# Patient Record
Sex: FEMALE | Race: WHITE | NOT HISPANIC OR LATINO | Employment: UNEMPLOYED | URBAN - METROPOLITAN AREA
[De-identification: names, ages, dates, MRNs, and addresses within clinical notes are randomized per-mention and may not be internally consistent; named-entity substitution may affect disease eponyms.]

---

## 2020-02-03 ENCOUNTER — APPOINTMENT (EMERGENCY)
Dept: RADIOLOGY | Facility: HOSPITAL | Age: 60
End: 2020-02-03
Payer: COMMERCIAL

## 2020-02-03 ENCOUNTER — HOSPITAL ENCOUNTER (EMERGENCY)
Facility: HOSPITAL | Age: 60
Discharge: HOME | End: 2020-02-03
Attending: EMERGENCY MEDICINE | Admitting: EMERGENCY MEDICINE
Payer: COMMERCIAL

## 2020-02-03 ENCOUNTER — APPOINTMENT (EMERGENCY)
Dept: CT IMAGING | Facility: HOSPITAL | Age: 60
End: 2020-02-03
Payer: COMMERCIAL

## 2020-02-03 VITALS
OXYGEN SATURATION: 98 % | HEIGHT: 61 IN | HEART RATE: 70 BPM | WEIGHT: 206 LBS | BODY MASS INDEX: 38.89 KG/M2 | RESPIRATION RATE: 16 BRPM | DIASTOLIC BLOOD PRESSURE: 92 MMHG | TEMPERATURE: 97.7 F | SYSTOLIC BLOOD PRESSURE: 208 MMHG

## 2020-02-03 DIAGNOSIS — I10 UNCONTROLLED HYPERTENSION: Primary | ICD-10-CM

## 2020-02-03 LAB
ALBUMIN SERPL BCP-MCNC: 3.9 G/DL (ref 3.5–5)
ALP SERPL-CCNC: 143 U/L (ref 46–116)
ALT SERPL W P-5'-P-CCNC: 21 U/L (ref 12–78)
ANION GAP SERPL CALCULATED.3IONS-SCNC: 11 MMOL/L (ref 4–13)
AST SERPL W P-5'-P-CCNC: 12 U/L (ref 5–45)
ATRIAL RATE: 55 BPM
BASOPHILS # BLD AUTO: 0.03 THOUSANDS/ΜL (ref 0–0.1)
BASOPHILS NFR BLD AUTO: 0 % (ref 0–1)
BILIRUB SERPL-MCNC: 0.5 MG/DL (ref 0.2–1)
BUN SERPL-MCNC: 13 MG/DL (ref 5–25)
CALCIUM SERPL-MCNC: 9.3 MG/DL (ref 8.3–10.1)
CHLORIDE SERPL-SCNC: 106 MMOL/L (ref 100–108)
CO2 SERPL-SCNC: 23 MMOL/L (ref 21–32)
CREAT SERPL-MCNC: 1.06 MG/DL (ref 0.6–1.3)
EOSINOPHIL # BLD AUTO: 0.11 THOUSAND/ΜL (ref 0–0.61)
EOSINOPHIL NFR BLD AUTO: 1 % (ref 0–6)
ERYTHROCYTE [DISTWIDTH] IN BLOOD BY AUTOMATED COUNT: 14.6 % (ref 11.6–15.1)
GFR SERPL CREATININE-BSD FRML MDRD: 58 ML/MIN/1.73SQ M
GLUCOSE SERPL-MCNC: 129 MG/DL (ref 65–140)
HCT VFR BLD AUTO: 50.3 % (ref 34.8–46.1)
HGB BLD-MCNC: 16.4 G/DL (ref 11.5–15.4)
IMM GRANULOCYTES # BLD AUTO: 0.05 THOUSAND/UL (ref 0–0.2)
IMM GRANULOCYTES NFR BLD AUTO: 1 % (ref 0–2)
LYMPHOCYTES # BLD AUTO: 1.73 THOUSANDS/ΜL (ref 0.6–4.47)
LYMPHOCYTES NFR BLD AUTO: 16 % (ref 14–44)
MCH RBC QN AUTO: 28.9 PG (ref 26.8–34.3)
MCHC RBC AUTO-ENTMCNC: 32.6 G/DL (ref 31.4–37.4)
MCV RBC AUTO: 89 FL (ref 82–98)
MONOCYTES # BLD AUTO: 0.44 THOUSAND/ΜL (ref 0.17–1.22)
MONOCYTES NFR BLD AUTO: 4 % (ref 4–12)
NEUTROPHILS # BLD AUTO: 8.69 THOUSANDS/ΜL (ref 1.85–7.62)
NEUTS SEG NFR BLD AUTO: 78 % (ref 43–75)
NRBC BLD AUTO-RTO: 0 /100 WBCS
P AXIS: 41 DEGREES
PLATELET # BLD AUTO: 301 THOUSANDS/UL (ref 149–390)
PMV BLD AUTO: 9.8 FL (ref 8.9–12.7)
POTASSIUM SERPL-SCNC: 3.8 MMOL/L (ref 3.5–5.3)
PR INTERVAL: 158 MS
PROT SERPL-MCNC: 7.9 G/DL (ref 6.4–8.2)
QRS AXIS: 28 DEGREES
QRSD INTERVAL: 86 MS
QT INTERVAL: 468 MS
QTC INTERVAL: 447 MS
RBC # BLD AUTO: 5.67 MILLION/UL (ref 3.81–5.12)
SODIUM SERPL-SCNC: 140 MMOL/L (ref 136–145)
T WAVE AXIS: 57 DEGREES
TROPONIN I SERPL-MCNC: <0.02 NG/ML
VENTRICULAR RATE: 55 BPM
WBC # BLD AUTO: 11.05 THOUSAND/UL (ref 4.31–10.16)

## 2020-02-03 PROCEDURE — 99284 EMERGENCY DEPT VISIT MOD MDM: CPT

## 2020-02-03 PROCEDURE — 93010 ELECTROCARDIOGRAM REPORT: CPT | Performed by: PHYSICIAN ASSISTANT

## 2020-02-03 PROCEDURE — 71046 X-RAY EXAM CHEST 2 VIEWS: CPT

## 2020-02-03 PROCEDURE — 70450 CT HEAD/BRAIN W/O DYE: CPT

## 2020-02-03 PROCEDURE — 93010 ELECTROCARDIOGRAM REPORT: CPT | Performed by: INTERNAL MEDICINE

## 2020-02-03 PROCEDURE — 93005 ELECTROCARDIOGRAM TRACING: CPT

## 2020-02-03 PROCEDURE — 80053 COMPREHEN METABOLIC PANEL: CPT | Performed by: PHYSICIAN ASSISTANT

## 2020-02-03 PROCEDURE — 85025 COMPLETE CBC W/AUTO DIFF WBC: CPT | Performed by: PHYSICIAN ASSISTANT

## 2020-02-03 PROCEDURE — 36415 COLL VENOUS BLD VENIPUNCTURE: CPT | Performed by: PHYSICIAN ASSISTANT

## 2020-02-03 PROCEDURE — 99284 EMERGENCY DEPT VISIT MOD MDM: CPT | Performed by: PHYSICIAN ASSISTANT

## 2020-02-03 PROCEDURE — 84484 ASSAY OF TROPONIN QUANT: CPT | Performed by: PHYSICIAN ASSISTANT

## 2020-02-03 RX ORDER — ATORVASTATIN CALCIUM 40 MG/1
40 TABLET, FILM COATED ORAL DAILY
COMMUNITY

## 2020-02-03 RX ORDER — LISINOPRIL AND HYDROCHLOROTHIAZIDE 25; 20 MG/1; MG/1
1 TABLET ORAL DAILY
Qty: 30 TABLET | Refills: 0 | Status: SHIPPED | OUTPATIENT
Start: 2020-02-03 | End: 2021-07-20 | Stop reason: HOSPADM

## 2020-02-03 RX ORDER — MELOXICAM 15 MG/1
15 TABLET ORAL DAILY
COMMUNITY

## 2020-02-03 RX ORDER — LISINOPRIL 20 MG/1
20 TABLET ORAL ONCE
Status: COMPLETED | OUTPATIENT
Start: 2020-02-03 | End: 2020-02-03

## 2020-02-03 RX ORDER — HYDROCHLOROTHIAZIDE 25 MG/1
25 TABLET ORAL DAILY
Status: DISCONTINUED | OUTPATIENT
Start: 2020-02-03 | End: 2020-02-03 | Stop reason: HOSPADM

## 2020-02-03 RX ORDER — LABETALOL 300 MG/1
300 TABLET, FILM COATED ORAL 3 TIMES DAILY
COMMUNITY
End: 2021-07-20 | Stop reason: HOSPADM

## 2020-02-03 RX ADMIN — HYDROCHLOROTHIAZIDE 25 MG: 25 TABLET ORAL at 14:47

## 2020-02-03 RX ADMIN — LISINOPRIL 20 MG: 20 TABLET ORAL at 14:47

## 2020-02-03 NOTE — RESULT ENCOUNTER NOTE
Attempted to call patient to notify her of CXR results  She will need follow-up with her cardiologist  Michael abdi

## 2020-02-03 NOTE — ED NOTES
Discharged reviewed with pt  Pt verbalized understanding and has no further questions at this time  Pt ambulatory off unit with steady gait       Ariana Raya RN  02/03/20 0040

## 2020-02-03 NOTE — DISCHARGE INSTRUCTIONS
Start taking Zestoretic daily  Keep a blood pressure log and follow-up with your family doctor in 1 week  Return to ER with new or worsening symptoms

## 2020-02-03 NOTE — ED PROVIDER NOTES
History  Chief Complaint   Patient presents with    High Blood Pressure     seen by cardiologist on thursday and increased BP medicaiton on thursday  States bp remains high since thursday  Also complaisn of difficulty breathing  32-year-old female with history of hypertension, hyperlipidemia, and CAD presenting for evaluation of uncontrolled blood pressures at home  Patient was recently seen by her cardiologist 4 days ago  Her labetalol dose was increased at that time and she reports compliance with this  Her blood pressure continues to be uncontrolled  Her systolic blood pressure was over 200 today which prompted her to seek medical attention  Patient currently complains of a headache and dyspnea on exertion  Patient has been checking her blood pressures daily and has been unable to keep her SBP 170s  Patient denies chest pain, dizziness, numbness, tingling, visual changes, abdominal pain, vomiting, diaphoresis  History provided by:  Patient   used: No    Hypertension   Severity:  Moderate  Onset quality:  Gradual  Timing:  Constant  Progression:  Worsening  Chronicity:  New  Notable PTA blood pressures:  170/100  Context: not noncompliance    Relieved by:  Nothing  Worsened by:  Nothing  Ineffective treatments:  Beta blockers  Associated symptoms: shortness of breath    Associated symptoms: no abdominal pain, no anxiety, no blurred vision, no chest pain, no confusion, no dizziness, no ear pain, no fatigue, no fever, no headaches, no loss of consciousness, no nausea, no neck pain, no palpitations, no peripheral edema, no syncope, no tinnitus, not vomiting and no weakness    Risk factors: cardiac disease        Prior to Admission Medications   Prescriptions Last Dose Informant Patient Reported?  Taking?   atorvastatin (LIPITOR) 40 mg tablet   Yes Yes   Sig: Take 40 mg by mouth daily   labetalol (NORMODYNE) 300 mg tablet   Yes Yes   Sig: Take 300 mg by mouth 3 (three) times a day   meloxicam (MOBIC) 15 mg tablet   Yes Yes   Sig: Take 15 mg by mouth daily   vitamin E 100 UNIT capsule   Yes Yes   Sig: Take 100 Units by mouth once a week      Facility-Administered Medications: None       Past Medical History:   Diagnosis Date    Hyperlipidemia     Hypertension        History reviewed  No pertinent surgical history  History reviewed  No pertinent family history  I have reviewed and agree with the history as documented  Social History     Tobacco Use    Smoking status: Current Every Day Smoker     Packs/day: 0 50     Types: Cigarettes    Smokeless tobacco: Never Used   Substance Use Topics    Alcohol use: Never     Frequency: Never    Drug use: Never        Review of Systems   Constitutional: Negative for chills, fatigue and fever  HENT: Negative for drooling, ear pain and tinnitus  Eyes: Negative for blurred vision, photophobia and visual disturbance  Respiratory: Positive for shortness of breath  Negative for cough, wheezing and stridor  Cardiovascular: Negative for chest pain, palpitations, leg swelling and syncope  Gastrointestinal: Negative for abdominal pain, nausea and vomiting  Musculoskeletal: Negative for neck pain and neck stiffness  Skin: Negative for color change and rash  Allergic/Immunologic: Negative for immunocompromised state  Neurological: Negative for dizziness, loss of consciousness, weakness and headaches  Psychiatric/Behavioral: Negative for confusion  The patient is not nervous/anxious  All other systems reviewed and are negative  Physical Exam  Physical Exam   Constitutional: She appears well-developed and well-nourished  No distress  Nontoxic appearing   HENT:   Head: Normocephalic and atraumatic  Right Ear: External ear normal    Left Ear: External ear normal    Nose: Nose normal    Mouth/Throat: Oropharynx is clear and moist    Eyes: Pupils are equal, round, and reactive to light   Conjunctivae and EOM are normal  Right eye exhibits no discharge  Left eye exhibits no discharge  No scleral icterus  Neck: Normal range of motion  Neck supple  Cardiovascular: Normal rate, regular rhythm and normal heart sounds  No murmur heard  Pulmonary/Chest: Effort normal and breath sounds normal  No stridor  No respiratory distress  She has no wheezes  She has no rales  No increased work of breathing  Lung sounds  Oxygen saturation 98% on room air    Abdominal: Soft  Bowel sounds are normal  She exhibits no distension  There is no tenderness  There is no guarding  Musculoskeletal: Normal range of motion  She exhibits no edema or deformity  Lymphadenopathy:     She has no cervical adenopathy  Neurological: She is alert  She is not disoriented  GCS eye subscore is 4  GCS verbal subscore is 5  GCS motor subscore is 6  No focal deficits   Skin: Skin is warm and dry  She is not diaphoretic  Psychiatric: She has a normal mood and affect  Her behavior is normal    Nursing note and vitals reviewed        Vital Signs  ED Triage Vitals [02/03/20 1159]   Temperature Pulse Respirations Blood Pressure SpO2   97 7 °F (36 5 °C) 69 16 (!) 220/102 97 %      Temp Source Heart Rate Source Patient Position - Orthostatic VS BP Location FiO2 (%)   Oral Monitor -- Right arm --      Pain Score       2           Vitals:    02/03/20 1159 02/03/20 1300 02/03/20 1447 02/03/20 1448   BP: (!) 220/102 (!) 198/151 (!) 208/92 (!) 208/92   Pulse: 69 69  70         Visual Acuity      ED Medications  Medications   lisinopril (ZESTRIL) tablet 20 mg (20 mg Oral Given 2/3/20 1447)       Diagnostic Studies  Results Reviewed     Procedure Component Value Units Date/Time    Troponin I [[id number]]  (Normal) Collected:  02/03/20 1331    Lab Status:  Final result Specimen:  Blood from Arm, Right Updated:  02/03/20 1357     Troponin I <0 02 ng/mL     Comprehensive metabolic panel [[id number]]  (Abnormal) Collected:  02/03/20 1331    Lab Status:  Final result Specimen:  Blood from Arm, Right Updated:  02/03/20 1355     Sodium 140 mmol/L      Potassium 3 8 mmol/L      Chloride 106 mmol/L      CO2 23 mmol/L      ANION GAP 11 mmol/L      BUN 13 mg/dL      Creatinine 1 06 mg/dL      Glucose 129 mg/dL      Calcium 9 3 mg/dL      AST 12 U/L      ALT 21 U/L      Alkaline Phosphatase 143 U/L      Total Protein 7 9 g/dL      Albumin 3 9 g/dL      Total Bilirubin 0 50 mg/dL      eGFR 58 ml/min/1 73sq m     Narrative:       [place] guidelines for Chronic Kidney Disease (CKD):     Stage 1 with normal or high GFR (GFR > 90 mL/min/1 73 square meters)    Stage 2 Mild CKD (GFR = 60-89 mL/min/1 73 square meters)    Stage 3A Moderate CKD (GFR = 45-59 mL/min/1 73 square meters)    Stage 3B Moderate CKD (GFR = 30-44 mL/min/1 73 square meters)    Stage 4 Severe CKD (GFR = 15-29 mL/min/1 73 square meters)    Stage 5 End Stage CKD (GFR <15 mL/min/1 73 square meters)  Note: GFR calculation is accurate only with a steady state creatinine    CBC and differential [[id number]]  (Abnormal) Collected:  02/03/20 1331    Lab Status:  Final result Specimen:  Blood from Arm, Right Updated:  02/03/20 1335     WBC 11 05 Thousand/uL      RBC 5 67 Million/uL      Hemoglobin 16 4 g/dL      Hematocrit 50 3 %      MCV 89 fL      MCH 28 9 pg      MCHC 32 6 g/dL      RDW 14 6 %      MPV 9 8 fL      Platelets 383 Thousands/uL      nRBC 0 /100 WBCs      Neutrophils Relative 78 %      Immat GRANS % 1 %      Lymphocytes Relative 16 %      Monocytes Relative 4 %      Eosinophils Relative 1 %      Basophils Relative 0 %      Neutrophils Absolute 8 69 Thousands/µL      Immature Grans Absolute 0 05 Thousand/uL      Lymphocytes Absolute 1 73 Thousands/µL      Monocytes Absolute 0 44 Thousand/µL      Eosinophils Absolute 0 11 Thousand/µL      Basophils Absolute 0 03 Thousands/µL                  XR chest 2 views   Final Result by Emy Nunez MD (02/03 1533)      Borderline vascular congestion Workstation performed: [id number]         CT head without contrast   Final Result by Ines Leal MD (02/03 1007)      No acute disease  Minor small vessel disease and scattered vascular calcifications  Workstation performed: [id number]                    Procedures  ECG 12 Lead Documentation Only  Date/Time: 2/3/2020 9:19 PM  Performed by: Janki Burnett PA-C  Authorized by: Janki Burnett PA-C     Indications / Diagnosis:  HTN  ECG reviewed by me, the ED Provider: yes    Patient location:  ED  Previous ECG:     Previous ECG:  Unavailable    Comparison to cardiac monitor: Yes    Interpretation:     Interpretation: abnormal    Rate:     ECG rate:  55    ECG rate assessment: bradycardic    Rhythm:     Rhythm: sinus rhythm    Ectopy:     Ectopy: none    QRS:     QRS axis:  Normal    QRS intervals:  Normal  Conduction:     Conduction: normal    ST segments:     ST segments:  Normal  T waves:     T waves: normal               ED Course  ED Course as of Feb 03 2119   Mon Feb 03, 2020   1448 Discussed case with patient's PCP, Dr Demario Fontana who recommends adding lisinopril 20mg and hydrochlorothiazine 25mg daily to her current regimen  He will see patient in 1 week for follow-up  MDM  Number of Diagnoses or Management Options  Uncontrolled hypertension: new and requires workup  Diagnosis management comments: 62yo female with a history of CAD and hypertension presenting for evaluation of uncontrolled blood pressures at home  Labetalol just increased to 300mg TID about 4 days ago  SBP was >200 today which prompted her to seek medical attention  Currently complains of headache and dyspnea on exertion  Differential diagnosis includes but is not limited to: asymptomatic hypertension, hypertensive urgency, hypertensive emergency, ACS    Initial ED plan: Will check cardiac labs, EKG, CXR, and CT head  No indications for IV antihypertensives at this time      Final assessment: Labs overall unremarkable  Troponin negative  EKG reveals sinus bradycardia without ST changes  CT head negative  CXR unremarkable per my read  Case discussed with patient's PCP, Dr Yareli Abarca via phone  Dr Yareli Abarca would like patient started on a combo of lisinopril and HCTZ  Script given to patient for 30 day supply  Advised patient to keep a blood pressure log and follow-up in 1 week with her PCP for recheck  ED return precautions discussed  Patient expressed understanding and is agreeable to plan  Patient discharged in stable condition  After discharge, radiology called about possible vascular congestion seen on CXR  No history of heart failure and no echocardiograms in the system  No clinical signs of volume overload  Voicemail left for patient  Amount and/or Complexity of Data Reviewed  Clinical lab tests: reviewed and ordered  Tests in the radiology section of CPT®: ordered and reviewed  Review and summarize past medical records: yes  Discuss the patient with other providers: yes  Independent visualization of images, tracings, or specimens: yes    Risk of Complications, Morbidity, and/or Mortality  Presenting problems: moderate  Diagnostic procedures: moderate  Management options: moderate    Patient Progress  Patient progress: stable        Disposition  Final diagnoses:   Uncontrolled hypertension     Time reflects when diagnosis was documented in both MDM as applicable and the Disposition within this note     Time User Action Codes Description Comment    2/3/2020  2:46 PM [address] [place], [place] [address] Uncontrolled hypertension       ED Disposition     ED Disposition Condition Date/Time Comment    Discharge Stable Mon Feb 3, 2020  2:46 PM Steven Coronado discharge to home/self care              Follow-up Information     Follow up With Specialties Details Why Contact Info Additional Information    Kulwant Zhu MD Internal Medicine Schedule an appointment as soon as possible for a visit   [address] [place] [address] [facility] Emergency Department Emergency Medicine  If symptoms worsen [address] [facility] [place] [id number] [phone] [place] ED, [facility], [address], [place], [address]          Discharge Medication List as of 2/3/2020  2:47 PM      START taking these medications    Details   lisinopril-hydrochlorothiazide (PRINZIDE,ZESTORETIC) 20-25 MG per tablet Take 1 tablet by mouth daily, Starting Mon 2/3/2020, Until Wed 3/4/2020, Print         CONTINUE these medications which have NOT CHANGED    Details   atorvastatin (LIPITOR) 40 mg tablet Take 40 mg by mouth daily, Historical Med      labetalol (NORMODYNE) 300 mg tablet Take 300 mg by mouth 3 (three) times a day, Historical Med      meloxicam (MOBIC) 15 mg tablet Take 15 mg by mouth daily, Historical Med      vitamin E 100 UNIT capsule Take 100 Units by mouth once a week, Historical Med           No discharge procedures on file      ED Provider  Electronically Signed by           Sita Adan PA-C  02/03/20 3131

## 2020-09-22 ENCOUNTER — HOSPITAL ENCOUNTER (EMERGENCY)
Facility: HOSPITAL | Age: 60
Discharge: HOME | End: 2020-09-22
Attending: EMERGENCY MEDICINE | Admitting: EMERGENCY MEDICINE
Payer: COMMERCIAL

## 2020-09-22 VITALS
WEIGHT: 213.63 LBS | OXYGEN SATURATION: 96 % | DIASTOLIC BLOOD PRESSURE: 88 MMHG | RESPIRATION RATE: 20 BRPM | TEMPERATURE: 97.9 F | BODY MASS INDEX: 40.36 KG/M2 | HEART RATE: 65 BPM | SYSTOLIC BLOOD PRESSURE: 176 MMHG

## 2020-09-22 DIAGNOSIS — K04.7 DENTAL ABSCESS: Primary | ICD-10-CM

## 2020-09-22 PROCEDURE — 99284 EMERGENCY DEPT VISIT MOD MDM: CPT | Performed by: EMERGENCY MEDICINE

## 2020-09-22 PROCEDURE — 99282 EMERGENCY DEPT VISIT SF MDM: CPT

## 2020-09-22 RX ORDER — PENICILLIN V POTASSIUM 250 MG/1
500 TABLET ORAL ONCE
Status: COMPLETED | OUTPATIENT
Start: 2020-09-22 | End: 2020-09-22

## 2020-09-22 RX ORDER — OXYCODONE HYDROCHLORIDE AND ACETAMINOPHEN 5; 325 MG/1; MG/1
1 TABLET ORAL EVERY 6 HOURS PRN
Qty: 20 TABLET | Refills: 0 | Status: SHIPPED | OUTPATIENT
Start: 2020-09-22

## 2020-09-22 RX ORDER — PENICILLIN V POTASSIUM 500 MG/1
500 TABLET ORAL 4 TIMES DAILY
Qty: 40 TABLET | Refills: 0 | Status: SHIPPED | OUTPATIENT
Start: 2020-09-22 | End: 2020-10-02

## 2020-09-22 RX ORDER — OXYCODONE HYDROCHLORIDE AND ACETAMINOPHEN 5; 325 MG/1; MG/1
1 TABLET ORAL ONCE
Status: COMPLETED | OUTPATIENT
Start: 2020-09-22 | End: 2020-09-22

## 2020-09-22 RX ADMIN — PENICILLIN V POTASSIUM 500 MG: 250 TABLET, FILM COATED ORAL at 19:59

## 2020-09-22 RX ADMIN — OXYCODONE HYDROCHLORIDE AND ACETAMINOPHEN 1 TABLET: 5; 325 TABLET ORAL at 19:59

## 2020-09-22 NOTE — DISCHARGE INSTRUCTIONS
Penicillin every 6 hours infection    Percocet one every 6 hours if needed for pain caution oncotic will make you sleepy  Follow-up with Oral maxillofacial surgery, call tomorrow, tell them you were here in the emergency department    Return for worsening swelling difficulty breathing or any problems

## 2020-09-22 NOTE — ED PROVIDER NOTES
History  Chief Complaint   Patient presents with    Dental Swelling     Pt states she has had bottom left dental pain and swelling since last night      HPI patient is a 82-year-old female, reports a few days ago she developed some swelling around her left lower molar, apparently had some Motrin at home and took that without relief  Patient reports that the swelling seemed to go away and then tonight the swelling has returned  She reports a swelling in her left lower jaw reports bump there around her left lower molar  She reports tenderness and pain there  Denies any difficulty opening her mouth completely  She complains of pain with any palpation or touching of that episode  She denies any vomiting  She had difficulty swallowing her saliva  She denies any drooling  There is no stridor or difficulty breathing  Past will history of hypertension hyperlipidemia  Family history noncontributory new line social history, smoker, denies drug abuse    Prior to Admission Medications   Prescriptions Last Dose Informant Patient Reported? Taking?   atorvastatin (LIPITOR) 40 mg tablet   Yes No   Sig: Take 40 mg by mouth daily   labetalol (NORMODYNE) 300 mg tablet   Yes No   Sig: Take 300 mg by mouth 3 (three) times a day   lisinopril-hydrochlorothiazide (PRINZIDE,ZESTORETIC) 20-25 MG per tablet   No No   Sig: Take 1 tablet by mouth daily   meloxicam (MOBIC) 15 mg tablet   Yes No   Sig: Take 15 mg by mouth daily   vitamin E 100 UNIT capsule   Yes No   Sig: Take 100 Units by mouth once a week      Facility-Administered Medications: None       Past Medical History:   Diagnosis Date    Hyperlipidemia     Hypertension        Past Surgical History:   Procedure Laterality Date    CARDIAC CATHETERIZATION      GANGLION CYST EXCISION      TYMPANOSTOMY TUBE PLACEMENT         History reviewed  No pertinent family history  I have reviewed and agree with the history as documented      E-Cigarette/Vaping     E-Cigarette/Vaping Substances     Social History     Tobacco Use    Smoking status: Current Every Day Smoker     Packs/day: 0 50     Types: Cigarettes    Smokeless tobacco: Never Used   Substance Use Topics    Alcohol use: Never     Frequency: Never    Drug use: Never       Review of Systems   Constitutional: Negative for fever  HENT: Positive for dental problem and facial swelling  Negative for congestion, drooling, trouble swallowing and voice change  Eyes: Negative for pain and redness  Respiratory: Negative for cough, shortness of breath and stridor  Cardiovascular: Negative for chest pain  Gastrointestinal: Negative for abdominal pain and vomiting  Physical Exam  Physical Exam  Vitals signs and nursing note reviewed  Constitutional:       Appearance: She is well-developed  HENT:      Head: Normocephalic  Right Ear: External ear normal       Left Ear: External ear normal       Nose: Nose normal       Mouth/Throat:      Comments: Normal posterior pharynx, there is tenderness and swelling below the only molar on the patient's left lower mandible, tenderness to palpation  The patient has no swelling under the tongue there is no stridor the patient has full excursion of her jaw there is no trismus  No signs of sepsis  No swelling into her neck  No sign of Adolfo's angina  Eyes:      General: Lids are normal       Pupils: Pupils are equal, round, and reactive to light  Neck:      Musculoskeletal: Normal range of motion and neck supple  Pulmonary:      Effort: Pulmonary effort is normal  No respiratory distress  Musculoskeletal: Normal range of motion  General: No deformity  Skin:     General: Skin is warm and dry  Neurological:      Mental Status: She is alert and oriented to person, place, and time           Vital Signs  ED Triage Vitals   Temperature Pulse Respirations Blood Pressure SpO2   09/22/20 1922 09/22/20 1922 09/22/20 1922 09/22/20 1922 09/22/20 1922   97 9 °F (36 6 °C) 65 20 (!) 176/88 96 %      Temp Source Heart Rate Source Patient Position - Orthostatic VS BP Location FiO2 (%)   09/22/20 1922 09/22/20 1922 -- 09/22/20 1922 --   Oral Monitor  Left arm       Pain Score       09/22/20 1959       8           Vitals:    09/22/20 1922   BP: (!) 176/88   Pulse: 65         Visual Acuity      ED Medications  Medications   penicillin V potassium (VEETID) tablet 500 mg (500 mg Oral Given 9/22/20 1959)   oxyCODONE-acetaminophen (PERCOCET) 5-325 mg per tablet 1 tablet (1 tablet Oral Given 9/22/20 1959)       Diagnostic Studies  Results Reviewed     None                 No orders to display              Procedures  Procedures         ED Course                           SBIRT 20yo+      Most Recent Value   SBIRT (22 yo +)   In order to provide better care to our patients, we are screening all of our patients for alcohol and drug use  Would it be okay to ask you these screening questions? Yes Filed at: 09/22/2020 2003   Initial Alcohol Screen: US AUDIT-C    1  How often do you have a drink containing alcohol?  0 Filed at: 09/22/2020 2003   2  How many drinks containing alcohol do you have on a typical day you are drinking? 0 Filed at: 09/22/2020 2003   3b  FEMALE Any Age, or MALE 65+: How often do you have 4 or more drinks on one occassion? 0 Filed at: 09/22/2020 2003   Audit-C Score  0 Filed at: 09/22/2020 2003   NADEEN: How many times in the past year have you    Used an illegal drug or used a prescription medication for non-medical reasons? Never Filed at: 09/22/2020 2003                  [facility] medical decision making 51-year-old female presents with left facial swelling left jaw swelling secondary to an abscessed tooth, no drainable abscess on exam, discussed her with antibiotics discussed analgesics discussed follow-up with Oral maxillofacial surgery for extraction  Discussed indications to return      Disposition  Final diagnoses:   Dental abscess - Left lower molar     Time reflects when diagnosis was documented in both MDM as applicable and the Disposition within this note     Time User Action Codes Description Comment    9/22/2020  7:45 PM Caprice Lazaro Add [K04 7] Dental abscess     9/22/2020  7:45 PM Caprice Lazaro Modify [K04 7] Dental abscess Left lower molar      ED Disposition     ED Disposition Condition Date/Time Comment    Discharge Stable Tue Sep 22, 2020  7:45 PM Ary Jones discharge to home/self care  Follow-up Information     Follow up With Specialties Details Why [address] [facility] 869          Discharge Medication List as of 9/22/2020  7:48 PM      START taking these medications    Details   oxyCODONE-acetaminophen (PERCOCET) 5-325 mg per tablet Take 1 tablet by mouth every 6 (six) hours as needed for severe pain for up to 20 dosesMax Daily Amount: 4 tablets, Starting Tue 9/22/2020, Normal      penicillin V potassium (VEETID) 500 mg tablet Take 1 tablet (500 mg total) by mouth 4 (four) times a day for 10 days, Starting Tue 9/22/2020, Until Fri 10/2/2020, Normal         CONTINUE these medications which have NOT CHANGED    Details   atorvastatin (LIPITOR) 40 mg tablet Take 40 mg by mouth daily, Historical Med      labetalol (NORMODYNE) 300 mg tablet Take 300 mg by mouth 3 (three) times a day, Historical Med      lisinopril-hydrochlorothiazide (PRINZIDE,ZESTORETIC) 20-25 MG per tablet Take 1 tablet by mouth daily, Starting Mon 2/3/2020, Until Wed 3/4/2020, Print      meloxicam (MOBIC) 15 mg tablet Take 15 mg by mouth daily, Historical Med      vitamin E 100 UNIT capsule Take 100 Units by mouth once a week, Historical Med           No discharge procedures on file      PDMP Review     None          ED Provider  Electronically Signed by           Janes Rogers MD  09/22/20 2008

## 2021-07-19 ENCOUNTER — HOSPITAL ENCOUNTER (OUTPATIENT)
Facility: HOSPITAL | Age: 61
Setting detail: OBSERVATION
Discharge: HOME | End: 2021-07-20
Attending: EMERGENCY MEDICINE | Admitting: FAMILY MEDICINE
Payer: COMMERCIAL

## 2021-07-19 ENCOUNTER — APPOINTMENT (EMERGENCY)
Dept: RADIOLOGY | Facility: HOSPITAL | Age: 61
End: 2021-07-19
Payer: COMMERCIAL

## 2021-07-19 DIAGNOSIS — I16.0 HYPERTENSIVE URGENCY: ICD-10-CM

## 2021-07-19 DIAGNOSIS — R07.89 CHEST TIGHTNESS: Primary | ICD-10-CM

## 2021-07-19 PROBLEM — Z72.0 TOBACCO ABUSE: Status: ACTIVE | Noted: 2021-07-19

## 2021-07-19 PROBLEM — I25.10 CAD (CORONARY ARTERY DISEASE): Status: ACTIVE | Noted: 2021-07-19

## 2021-07-19 PROBLEM — R07.9 CHEST PAIN: Status: ACTIVE | Noted: 2021-07-19

## 2021-07-19 LAB
ALBUMIN SERPL BCP-MCNC: 3.4 G/DL (ref 3.5–5)
ALP SERPL-CCNC: 141 U/L (ref 46–116)
ALT SERPL W P-5'-P-CCNC: 36 U/L (ref 12–78)
ANION GAP SERPL CALCULATED.3IONS-SCNC: 11 MMOL/L (ref 4–13)
AST SERPL W P-5'-P-CCNC: 21 U/L (ref 5–45)
ATRIAL RATE: 48 BPM
BASOPHILS # BLD AUTO: 0.05 THOUSANDS/ΜL (ref 0–0.1)
BASOPHILS NFR BLD AUTO: 0 % (ref 0–1)
BILIRUB SERPL-MCNC: 0.35 MG/DL (ref 0.2–1)
BUN SERPL-MCNC: 23 MG/DL (ref 5–25)
CALCIUM ALBUM COR SERPL-MCNC: 9.2 MG/DL (ref 8.3–10.1)
CALCIUM SERPL-MCNC: 8.7 MG/DL (ref 8.3–10.1)
CHLORIDE SERPL-SCNC: 106 MMOL/L (ref 100–108)
CO2 SERPL-SCNC: 26 MMOL/L (ref 21–32)
CREAT SERPL-MCNC: 1.05 MG/DL (ref 0.6–1.3)
EOSINOPHIL # BLD AUTO: 0.16 THOUSAND/ΜL (ref 0–0.61)
EOSINOPHIL NFR BLD AUTO: 1 % (ref 0–6)
ERYTHROCYTE [DISTWIDTH] IN BLOOD BY AUTOMATED COUNT: 15.1 % (ref 11.6–15.1)
GFR SERPL CREATININE-BSD FRML MDRD: 59 ML/MIN/1.73SQ M
GLUCOSE SERPL-MCNC: 105 MG/DL (ref 65–140)
HCT VFR BLD AUTO: 52.6 % (ref 34.8–46.1)
HGB BLD-MCNC: 16.6 G/DL (ref 11.5–15.4)
IMM GRANULOCYTES # BLD AUTO: 0.07 THOUSAND/UL (ref 0–0.2)
IMM GRANULOCYTES NFR BLD AUTO: 1 % (ref 0–2)
LYMPHOCYTES # BLD AUTO: 3.15 THOUSANDS/ΜL (ref 0.6–4.47)
LYMPHOCYTES NFR BLD AUTO: 23 % (ref 14–44)
MCH RBC QN AUTO: 28.4 PG (ref 26.8–34.3)
MCHC RBC AUTO-ENTMCNC: 31.6 G/DL (ref 31.4–37.4)
MCV RBC AUTO: 90 FL (ref 82–98)
MONOCYTES # BLD AUTO: 0.74 THOUSAND/ΜL (ref 0.17–1.22)
MONOCYTES NFR BLD AUTO: 5 % (ref 4–12)
NEUTROPHILS # BLD AUTO: 9.74 THOUSANDS/ΜL (ref 1.85–7.62)
NEUTS SEG NFR BLD AUTO: 70 % (ref 43–75)
NRBC BLD AUTO-RTO: 0 /100 WBCS
P AXIS: 53 DEGREES
PLATELET # BLD AUTO: 286 THOUSANDS/UL (ref 149–390)
PMV BLD AUTO: 9.3 FL (ref 8.9–12.7)
POTASSIUM SERPL-SCNC: 4.4 MMOL/L (ref 3.5–5.3)
PR INTERVAL: 130 MS
PROT SERPL-MCNC: 7.5 G/DL (ref 6.4–8.2)
QRS AXIS: 41 DEGREES
QRSD INTERVAL: 80 MS
QT INTERVAL: 496 MS
QTC INTERVAL: 443 MS
RBC # BLD AUTO: 5.85 MILLION/UL (ref 3.81–5.12)
SODIUM SERPL-SCNC: 143 MMOL/L (ref 136–145)
T WAVE AXIS: 78 DEGREES
TROPONIN I SERPL-MCNC: <0.02 NG/ML
TROPONIN I SERPL-MCNC: <0.02 NG/ML
VENTRICULAR RATE: 48 BPM
WBC # BLD AUTO: 13.91 THOUSAND/UL (ref 4.31–10.16)

## 2021-07-19 PROCEDURE — 99285 EMERGENCY DEPT VISIT HI MDM: CPT | Performed by: EMERGENCY MEDICINE

## 2021-07-19 PROCEDURE — 84484 ASSAY OF TROPONIN QUANT: CPT | Performed by: PHYSICIAN ASSISTANT

## 2021-07-19 PROCEDURE — 93010 ELECTROCARDIOGRAM REPORT: CPT | Performed by: INTERNAL MEDICINE

## 2021-07-19 PROCEDURE — 71045 X-RAY EXAM CHEST 1 VIEW: CPT

## 2021-07-19 PROCEDURE — 80053 COMPREHEN METABOLIC PANEL: CPT | Performed by: EMERGENCY MEDICINE

## 2021-07-19 PROCEDURE — 84484 ASSAY OF TROPONIN QUANT: CPT | Performed by: EMERGENCY MEDICINE

## 2021-07-19 PROCEDURE — 99284 EMERGENCY DEPT VISIT MOD MDM: CPT

## 2021-07-19 PROCEDURE — 36415 COLL VENOUS BLD VENIPUNCTURE: CPT

## 2021-07-19 PROCEDURE — 99220 PR INITIAL OBSERVATION CARE/DAY 70 MINUTES: CPT | Performed by: HOSPITALIST

## 2021-07-19 PROCEDURE — 93005 ELECTROCARDIOGRAM TRACING: CPT

## 2021-07-19 PROCEDURE — 85025 COMPLETE CBC W/AUTO DIFF WBC: CPT | Performed by: EMERGENCY MEDICINE

## 2021-07-19 RX ORDER — HYDRALAZINE HYDROCHLORIDE 20 MG/ML
5 INJECTION INTRAMUSCULAR; INTRAVENOUS EVERY 6 HOURS PRN
Status: DISCONTINUED | OUTPATIENT
Start: 2021-07-19 | End: 2021-07-20 | Stop reason: HOSPADM

## 2021-07-19 RX ORDER — NICOTINE 21 MG/24HR
1 PATCH, TRANSDERMAL 24 HOURS TRANSDERMAL DAILY
Status: DISCONTINUED | OUTPATIENT
Start: 2021-07-19 | End: 2021-07-20 | Stop reason: HOSPADM

## 2021-07-19 RX ORDER — MELOXICAM 7.5 MG/1
15 TABLET ORAL DAILY
Status: DISCONTINUED | OUTPATIENT
Start: 2021-07-19 | End: 2021-07-19

## 2021-07-19 RX ORDER — ONDANSETRON 2 MG/ML
4 INJECTION INTRAMUSCULAR; INTRAVENOUS EVERY 6 HOURS PRN
Status: DISCONTINUED | OUTPATIENT
Start: 2021-07-19 | End: 2021-07-20 | Stop reason: HOSPADM

## 2021-07-19 RX ORDER — MAGNESIUM HYDROXIDE/ALUMINUM HYDROXICE/SIMETHICONE 120; 1200; 1200 MG/30ML; MG/30ML; MG/30ML
30 SUSPENSION ORAL EVERY 6 HOURS PRN
Status: DISCONTINUED | OUTPATIENT
Start: 2021-07-19 | End: 2021-07-20 | Stop reason: HOSPADM

## 2021-07-19 RX ORDER — POLYETHYLENE GLYCOL 3350 17 G/17G
17 POWDER, FOR SOLUTION ORAL DAILY PRN
Status: DISCONTINUED | OUTPATIENT
Start: 2021-07-19 | End: 2021-07-20 | Stop reason: HOSPADM

## 2021-07-19 RX ORDER — AMLODIPINE BESYLATE 5 MG/1
5 TABLET ORAL DAILY
Status: DISCONTINUED | OUTPATIENT
Start: 2021-07-20 | End: 2021-07-19

## 2021-07-19 RX ORDER — ACETAMINOPHEN 325 MG/1
650 TABLET ORAL EVERY 6 HOURS PRN
Status: DISCONTINUED | OUTPATIENT
Start: 2021-07-19 | End: 2021-07-20 | Stop reason: HOSPADM

## 2021-07-19 RX ORDER — LISINOPRIL 20 MG/1
40 TABLET ORAL DAILY
Status: DISCONTINUED | OUTPATIENT
Start: 2021-07-20 | End: 2021-07-20 | Stop reason: HOSPADM

## 2021-07-19 RX ORDER — LISINOPRIL 10 MG/1
20 TABLET ORAL ONCE
Status: COMPLETED | OUTPATIENT
Start: 2021-07-19 | End: 2021-07-19

## 2021-07-19 RX ORDER — AMLODIPINE BESYLATE 5 MG/1
10 TABLET ORAL DAILY
Status: DISCONTINUED | OUTPATIENT
Start: 2021-07-19 | End: 2021-07-19

## 2021-07-19 RX ORDER — MELATONIN
1000 WEEKLY
COMMUNITY

## 2021-07-19 RX ORDER — ATORVASTATIN CALCIUM 40 MG/1
40 TABLET, FILM COATED ORAL
Status: DISCONTINUED | OUTPATIENT
Start: 2021-07-19 | End: 2021-07-20 | Stop reason: HOSPADM

## 2021-07-19 RX ORDER — ASPIRIN 81 MG/1
324 TABLET, CHEWABLE ORAL ONCE
Status: COMPLETED | OUTPATIENT
Start: 2021-07-19 | End: 2021-07-19

## 2021-07-19 RX ORDER — AMLODIPINE BESYLATE 10 MG/1
10 TABLET ORAL DAILY
Status: DISCONTINUED | OUTPATIENT
Start: 2021-07-20 | End: 2021-07-20 | Stop reason: HOSPADM

## 2021-07-19 RX ADMIN — Medication 1 PATCH: at 16:06

## 2021-07-19 RX ADMIN — LISINOPRIL 20 MG: 10 TABLET ORAL at 18:23

## 2021-07-19 RX ADMIN — ATORVASTATIN CALCIUM 40 MG: 40 TABLET, FILM COATED ORAL at 16:06

## 2021-07-19 RX ADMIN — AMLODIPINE BESYLATE 10 MG: 5 TABLET ORAL at 16:06

## 2021-07-19 RX ADMIN — ASPIRIN 81 MG CHEWABLE TABLET 324 MG: 81 TABLET CHEWABLE at 13:57

## 2021-07-19 NOTE — H&P
[address] H&P- Adela Cazares 1960, 64 y o  female MRN: [id number]  Unit/Bed#: ED 15 Encounter: [id number]  Primary Care Provider: Apryl Chavez MD   Date and time admitted to hospital: 7/19/2021  1:04 PM    * Hypertensive urgency  Assessment & Plan  · Recently seen in [facility] ER due to same symptoms of dizziness and noted HTN've above baseline  · Reports labetalol recently switched to metoprolol, but having episodes of bradycardia  · Reports lisinopril increased after ER visit, but denies improvement in symptoms  · No chest pain or headache associated, just dizziness  · Observation status for BP control, aim for  overnight and further tomorrow if tolerating well   · IV hydralazine prn significant or persistent elevations     Tobacco abuse  Assessment & Plan  · Counseling given, patch ordered    VTE Pharmacologic Prophylaxis: VTE Score: 2 Low Risk (Score 0-2) - Encourage Ambulation  Code Status: No Order FULL  Discussion with family: Patient declined call to   Anticipated Length of Stay: Patient will be admitted on an observation basis with an anticipated length of stay of less than 2 midnights secondary to Hypertensive urgency requiring IV medication and monitoring  Total Time for Visit, including Counseling / Coordination of Care: 30 minutes Greater than 50% of this total time spent on direct patient counseling and coordination of care  Chief Complaint: dizziness, elevated blood pressure     History of Present Illness:  Adela Cazares is a 64 y o  female with a PMH of HTN, tobacco abuse, and obesity who presents with headache and dizziness associated with hypertension above baseline  Patient does admit to increased stressors right now, mom has been in and out of the hospital for the past 4 months several times    Patient continues to smoke, she has not been eating healthy and has been using more prepared foods and lunch meats than usual   Patient admits to medication compliance, her labetalol was switched about 2 months ago to metoprolol  She has noticed a couple episodes of bradycardia as low as 25  She denies any symptoms associated with that  Blood pressure has been quite elevated, over 200 at home  With this she has noticed headaches and dizziness  No vision loss, no weakness, no chest pain or shortness of breath  No palpitations  She was seen at [facility] ER about 2 days ago and was advised increase lisinopril to 40 mg  Despite that, blood pressure remains above goal     Review of Systems:  Review of Systems   Constitutional: Negative for activity change, appetite change, chills, fatigue, fever and unexpected weight change  Respiratory: Negative for cough and shortness of breath  Cardiovascular: Negative for chest pain  Gastrointestinal: Negative for abdominal distention and abdominal pain  Genitourinary: Negative for difficulty urinating  Musculoskeletal: Negative for back pain  Neurological: Positive for dizziness and headaches  Negative for syncope and light-headedness  All other systems reviewed and are negative  Past Medical and Surgical History:   Past Medical History:   Diagnosis Date    Hyperlipidemia     Hypertension        Past Surgical History:   Procedure Laterality Date    CARDIAC CATHETERIZATION      GANGLION CYST EXCISION      TYMPANOSTOMY TUBE PLACEMENT         Meds/Allergies:  Prior to Admission medications    Medication Sig Start Date End Date Taking?  Authorizing Provider   atorvastatin (LIPITOR) 40 mg tablet Take 40 mg by mouth daily    Historical Provider, MD   labetalol (NORMODYNE) 300 mg tablet Take 300 mg by mouth 3 (three) times a day    Historical Provider, MD   lisinopril-hydrochlorothiazide (PRINZIDE,ZESTORETIC) 20-25 MG per tablet Take 1 tablet by mouth daily 2/3/20 3/4/20  Laura Romo PA-C   meloxicam (MOBIC) 15 mg tablet Take 15 mg by mouth daily    Historical Provider, MD oxyCODONE-acetaminophen (PERCOCET) 5-325 mg per tablet Take 1 tablet by mouth every 6 (six) hours as needed for severe pain for up to 20 dosesMax Daily Amount: 4 tablets 9/22/20   Malena Kevin MD   vitamin E 100 UNIT capsule Take 100 Units by mouth once a week    Historical Provider, MD     I have reviewed home medications with patient personally  Allergies: Allergies   Allergen Reactions    Codeine Rash       Social History:  Marital Status: /Civil Union   Occupation:  na  Patient Pre-hospital Living Situation: Home  Patient Pre-hospital Level of Mobility: walks  Patient Pre-hospital Diet Restrictions: none   Substance Use History:   Social History     Substance and Sexual Activity   Alcohol Use Never     Social History     Tobacco Use   Smoking Status Current Every Day Smoker    Packs/day: 0 50    Types: Cigarettes   Smokeless Tobacco Never Used     Social History     Substance and Sexual Activity   Drug Use Never       Family History:  History reviewed  No pertinent family history  Physical Exam:     Vitals:   Blood Pressure: (!) 223/104 (07/19/21 1118)  Pulse: 56 (07/19/21 1118)  Temperature: 97 5 °F (36 4 °C) (07/19/21 1118)  Temp Source: Temporal (07/19/21 1118)  Respirations: 16 (07/19/21 1118)  SpO2: 96 % (07/19/21 1118)    Physical Exam  Vitals and nursing note reviewed  Constitutional:       General: She is not in acute distress  Appearance: She is obese  She is not toxic-appearing  HENT:      Head: Normocephalic and atraumatic  Eyes:      Conjunctiva/sclera: Conjunctivae normal    Cardiovascular:      Rate and Rhythm: Normal rate and regular rhythm  Pulses: Normal pulses  Heart sounds: No murmur heard  Pulmonary:      Effort: Pulmonary effort is normal  No respiratory distress  Breath sounds: Normal breath sounds  No wheezing or rales  Abdominal:      General: Bowel sounds are normal  There is no distension  Palpations: Abdomen is soft  Comments: Obese but benign    Musculoskeletal:      Right lower leg: No edema  Left lower leg: No edema  Neurological:      Mental Status: She is alert and oriented to person, place, and time  Psychiatric:         Mood and Affect: Mood normal          Behavior: Behavior normal            Additional Data:     Lab Results:  Results from last 7 days   Lab Units 07/19/21  1127   WBC Thousand/uL 13 91*   HEMOGLOBIN g/dL 16 6*   HEMATOCRIT % 52 6*   PLATELETS Thousands/uL 286   NEUTROS PCT % 70   LYMPHS PCT % 23   MONOS PCT % 5   EOS PCT % 1     Results from last 7 days   Lab Units 07/19/21  1127   SODIUM mmol/L 143   POTASSIUM mmol/L 4 4   CHLORIDE mmol/L 106   CO2 mmol/L 26   BUN mg/dL 23   CREATININE mg/dL 1 05   ANION GAP mmol/L 11   CALCIUM mg/dL 8 7   ALBUMIN g/dL 3 4*   TOTAL BILIRUBIN mg/dL 0 35   ALK PHOS U/L 141*   ALT U/L 36   AST U/L 21   GLUCOSE RANDOM mg/dL 105                       Imaging: Reviewed radiology reports from this admission including: chest xray and Personally reviewed the following imaging: chest xray  XR chest 1 view portable   Final Result by Sharda Chopra MD (07/19 1236)      No acute consolidation or congestion                  Workstation performed: [id number]             EKG and Other Studies Reviewed on Admission:   · EKG: NSR  HR 50-60  ** Please Note: This note has been constructed using a voice recognition system   **

## 2021-07-19 NOTE — ASSESSMENT & PLAN NOTE
· Recently seen in [facility] ER due to same symptoms of dizziness and noted HTN've above baseline  · Reports labetalol recently switched to metoprolol, but having episodes of bradycardia  · Reports lisinopril increased after ER visit, but denies improvement in symptoms  · No chest pain or headache associated, just dizziness  · Observation status for BP control, aim for  overnight and further tomorrow if tolerating well   · IV hydralazine prn significant or persistent elevations

## 2021-07-19 NOTE — ED PROVIDER NOTES
Pt Name: Carlos Cassidy  MRN: [id number]  [place] 1960  Age/Sex: 64 y o  female  Date of evaluation: 2021  PCP: Manuela Apgar, MD    CHIEF COMPLAINT    Chief Complaint   Patient presents with    Hypertension     pt reports shes had hypertension X3 days  Was seen at [facility] and told to increase her meds but it has not helped  Pt reports her chest feels tight  HPI    64 y o  female presenting with uncontrolled hypertension for last several days  Was seen at [facility] couple days ago with a uncontrolled hypertension and dizziness, was advised to increase her lisinopril  Patient also states her heart rate has dropped as low as 25  She used to be on labetalol however 2 months ago was switched to metoprolol  Mentions lightheadedness when she gets up to go the bathroom  No syncope  Also now having chest tightness, mentions history of heart attack, states she had a blockage that was not amenable to stenting and was medically treated  No shortness of breath  Does have nausea, no vomiting  No diaphoresis  Past Medical and Surgical History    Past Medical History:   Diagnosis Date    Hyperlipidemia     Hypertension        Past Surgical History:   Procedure Laterality Date    CARDIAC CATHETERIZATION      GANGLION CYST EXCISION      TYMPANOSTOMY TUBE PLACEMENT         History reviewed  No pertinent family history  Social History     Tobacco Use    Smoking status: Current Every Day Smoker     Packs/day: 0 50     Types: Cigarettes    Smokeless tobacco: Never Used   Substance Use Topics    Alcohol use: Never    Drug use: Never           Allergies    Allergies   Allergen Reactions    Codeine Rash       Home Medications    Prior to Admission medications    Medication Sig Start Date End Date Taking?  Authorizing Provider   atorvastatin (LIPITOR) 40 mg tablet Take 40 mg by mouth daily    Historical Provider, MD   labetalol (NORMODYNE) 300 mg tablet Take 300 mg by mouth 3 (three) times a day    Historical Provider, MD   lisinopril-hydrochlorothiazide (PRINZIDE,ZESTORETIC) 20-25 MG per tablet Take 1 tablet by mouth daily 2/3/20 3/4/20  Laura Romo PA-C   meloxicam (MOBIC) 15 mg tablet Take 15 mg by mouth daily    Historical Provider, MD   oxyCODONE-acetaminophen (PERCOCET) 5-325 mg per tablet Take 1 tablet by mouth every 6 (six) hours as needed for severe pain for up to 20 dosesMax Daily Amount: 4 tablets 9/22/20   Paula Jacobs MD   vitamin E 100 UNIT capsule Take 100 Units by mouth once a week    Historical Provider, MD           Review of Systems    Review of Systems   Constitutional: Negative for chills and fever  HENT: Negative for rhinorrhea and sore throat  Eyes: Negative for pain and visual disturbance  Respiratory: Negative for cough and shortness of breath  Cardiovascular: Positive for chest pain  Negative for leg swelling  Gastrointestinal: Positive for nausea  Negative for abdominal pain and vomiting  Genitourinary: Negative for dysuria and hematuria  Musculoskeletal: Negative for back pain and myalgias  Skin: Negative for rash and wound  Neurological: Positive for dizziness  Negative for syncope and headaches  Physical Exam      ED Triage Vitals [07/19/21 1118]   Temperature Pulse Respirations Blood Pressure SpO2   97 5 °F (36 4 °C) 56 16 (!) 223/104 96 %      Temp Source Heart Rate Source Patient Position - Orthostatic VS BP Location FiO2 (%)   Temporal Monitor Sitting Left arm --      Pain Score       9               Physical Exam  Constitutional:       General: She is not in acute distress  Appearance: She is not ill-appearing  HENT:      Head: Normocephalic and atraumatic  Nose: Nose normal    Eyes:      Extraocular Movements: Extraocular movements intact  Pupils: Pupils are equal, round, and reactive to light  Cardiovascular:      Rate and Rhythm: Normal rate and regular rhythm     Pulmonary:      Effort: Pulmonary effort is normal  No respiratory distress  Chest:      Chest wall: No tenderness  Abdominal:      General: Abdomen is flat  There is no distension  Palpations: Abdomen is soft  Tenderness: There is no abdominal tenderness  Musculoskeletal:         General: No swelling or deformity  Normal range of motion  Cervical back: Normal range of motion and neck supple  Skin:     General: Skin is warm  Findings: No erythema  Neurological:      Mental Status: She is alert and oriented to person, place, and time  Mental status is at baseline                Diagnostic Results      Labs:    Results Reviewed     Procedure Component Value Units Date/Time    Troponin I [[id number]]  (Normal) Collected: 07/19/21 1127    Lab Status: Final result Specimen: Blood from Arm, Right Updated: 07/19/21 1156     Troponin I <0 02 ng/mL     Comprehensive metabolic panel [[id number]]  (Abnormal) Collected: 07/19/21 1127    Lab Status: Final result Specimen: Blood from Arm, Right Updated: 07/19/21 1152     Sodium 143 mmol/L      Potassium 4 4 mmol/L      Chloride 106 mmol/L      CO2 26 mmol/L      ANION GAP 11 mmol/L      BUN 23 mg/dL      Creatinine 1 05 mg/dL      Glucose 105 mg/dL      Calcium 8 7 mg/dL      Corrected Calcium 9 2 mg/dL      AST 21 U/L      ALT 36 U/L      Alkaline Phosphatase 141 U/L      Total Protein 7 5 g/dL      Albumin 3 4 g/dL      Total Bilirubin 0 35 mg/dL      eGFR 59 ml/min/1 73sq m     Narrative:      Laila guidelines for Chronic Kidney Disease (CKD):     Stage 1 with normal or high GFR (GFR > 90 mL/min/1 73 square meters)    Stage 2 Mild CKD (GFR = 60-89 mL/min/1 73 square meters)    Stage 3A Moderate CKD (GFR = 45-59 mL/min/1 73 square meters)    Stage 3B Moderate CKD (GFR = 30-44 mL/min/1 73 square meters)    Stage 4 Severe CKD (GFR = 15-29 mL/min/1 73 square meters)    Stage 5 End Stage CKD (GFR <15 mL/min/1 73 square meters)  Note: GFR calculation is accurate only with a steady state creatinine    CBC and differential [[id number]]  (Abnormal) Collected: 07/19/21 1127    Lab Status: Final result Specimen: Blood from Arm, Right Updated: 07/19/21 1134     WBC 13 91 Thousand/uL      RBC 5 85 Million/uL      Hemoglobin 16 6 g/dL      Hematocrit 52 6 %      MCV 90 fL      MCH 28 4 pg      MCHC 31 6 g/dL      RDW 15 1 %      MPV 9 3 fL      Platelets 750 Thousands/uL      nRBC 0 /100 WBCs      Neutrophils Relative 70 %      Immat GRANS % 1 %      Lymphocytes Relative 23 %      Monocytes Relative 5 %      Eosinophils Relative 1 %      Basophils Relative 0 %      Neutrophils Absolute 9 74 Thousands/µL      Immature Grans Absolute 0 07 Thousand/uL      Lymphocytes Absolute 3 15 Thousands/µL      Monocytes Absolute 0 74 Thousand/µL      Eosinophils Absolute 0 16 Thousand/µL      Basophils Absolute 0 05 Thousands/µL           All labs reviewed and utilized in the medical decision making process    Radiology:    XR chest 1 view portable   Final Result      No acute consolidation or congestion                  Workstation performed: [id number]             All radiology studies independently viewed by me and interpreted by the radiologist     Procedure    Procedures        MDM    Blood work reveals nonspecific leukocytosis, troponin negative, otherwise reassuring  Given history coronary artery disease and current chest pain as well as uncontrolled hypertension, will discuss with internal medicine for hospitalization  Will give aspirin  ED Course as of Jul 19 1344   Mon Jul 19, 2021   1321 EKG shows sinus bradycardia heart rate of 48, narrow QRS, intervals reassuring, normal axis, no ST elevation, no significant ST depression             Medications   aspirin chewable tablet 324 mg (has no administration in time range)       HEART score:    History 1=Moderate suspicious   ECG 1=Nonspecific repolarization disturbance   Age 1= > 45 - <65 years   Risk Factors 2= > 3 risk factors, or history of atherosclerotic disease   Troponin 0= < Normal limit   Total 5       Score 0-3: 1 7% had a MACE risk    0 4% (1 patient)     36 4% of patients were in this low risk group    Score 4-6: 16 6% had a MACE risk    Score 7-10: 50 1% had a MACE risk       FINAL IMPRESSION    Final diagnoses:   Chest tightness   Hypertensive urgency         DISPOSITION    Time reflects when diagnosis was documented in both MDM as applicable and the Disposition within this note     Time User Action Codes Description Comment    2021  1:44 PM Selma Creole Add [R07 89] Chest tightness     2021  1:44 PM Selma Creole Add [I16 0] Hypertensive urgency       ED Disposition     ED Disposition Condition Date/Time Comment    Admit Stable   1:44 PM Case was discussed with Dr Juan M Sanon and the patient's admission status was agreed to be Admission Status: observation status to the service of Dr Juan M Sanon   Follow-up Information    None           PATIENT REFERRED TO:    No follow-up provider specified  DISCHARGE MEDICATIONS:    Patient's Medications   Discharge Prescriptions    No medications on file       No discharge procedures on file  Missy Wasserman DO        This note was partially completed using voice recognition technology, and was scanned for gross errors; however some errors may still exist  Please contact the author with any questions or requests for clarification        Missy Wasserman DO  [phone]

## 2021-07-20 VITALS
HEIGHT: 59 IN | SYSTOLIC BLOOD PRESSURE: 148 MMHG | WEIGHT: 227.51 LBS | BODY MASS INDEX: 45.87 KG/M2 | TEMPERATURE: 97.5 F | HEART RATE: 56 BPM | OXYGEN SATURATION: 90 % | RESPIRATION RATE: 18 BRPM | DIASTOLIC BLOOD PRESSURE: 63 MMHG

## 2021-07-20 PROBLEM — R07.9 CHEST PAIN: Status: RESOLVED | Noted: 2021-07-19 | Resolved: 2021-07-20

## 2021-07-20 LAB
ANION GAP SERPL CALCULATED.3IONS-SCNC: 6 MMOL/L (ref 4–13)
BUN SERPL-MCNC: 31 MG/DL (ref 5–25)
CALCIUM SERPL-MCNC: 8.9 MG/DL (ref 8.3–10.1)
CHLORIDE SERPL-SCNC: 103 MMOL/L (ref 100–108)
CO2 SERPL-SCNC: 32 MMOL/L (ref 21–32)
CREAT SERPL-MCNC: 1.28 MG/DL (ref 0.6–1.3)
ERYTHROCYTE [DISTWIDTH] IN BLOOD BY AUTOMATED COUNT: 15.6 % (ref 11.6–15.1)
GFR SERPL CREATININE-BSD FRML MDRD: 46 ML/MIN/1.73SQ M
GLUCOSE P FAST SERPL-MCNC: 111 MG/DL (ref 65–99)
GLUCOSE SERPL-MCNC: 111 MG/DL (ref 65–140)
HCT VFR BLD AUTO: 49.6 % (ref 34.8–46.1)
HGB BLD-MCNC: 15.3 G/DL (ref 11.5–15.4)
MCH RBC QN AUTO: 28.8 PG (ref 26.8–34.3)
MCHC RBC AUTO-ENTMCNC: 30.8 G/DL (ref 31.4–37.4)
MCV RBC AUTO: 93 FL (ref 82–98)
PLATELET # BLD AUTO: 277 THOUSANDS/UL (ref 149–390)
PMV BLD AUTO: 9.7 FL (ref 8.9–12.7)
POTASSIUM SERPL-SCNC: 4.4 MMOL/L (ref 3.5–5.3)
RBC # BLD AUTO: 5.32 MILLION/UL (ref 3.81–5.12)
SODIUM SERPL-SCNC: 141 MMOL/L (ref 136–145)
WBC # BLD AUTO: 11.82 THOUSAND/UL (ref 4.31–10.16)

## 2021-07-20 PROCEDURE — 80048 BASIC METABOLIC PNL TOTAL CA: CPT | Performed by: PHYSICIAN ASSISTANT

## 2021-07-20 PROCEDURE — 99217 PR OBSERVATION CARE DISCHARGE MANAGEMENT: CPT | Performed by: PHYSICIAN ASSISTANT

## 2021-07-20 PROCEDURE — 85027 COMPLETE CBC AUTOMATED: CPT | Performed by: PHYSICIAN ASSISTANT

## 2021-07-20 RX ORDER — NICOTINE 21 MG/24HR
1 PATCH, TRANSDERMAL 24 HOURS TRANSDERMAL DAILY
Qty: 28 PATCH | Refills: 0 | Status: SHIPPED | OUTPATIENT
Start: 2021-07-21

## 2021-07-20 RX ORDER — METOPROLOL TARTRATE 50 MG/1
50 TABLET, FILM COATED ORAL 2 TIMES DAILY
COMMUNITY
Start: 2021-02-18 | End: 2021-07-20 | Stop reason: HOSPADM

## 2021-07-20 RX ORDER — LISINOPRIL 20 MG/1
20 TABLET ORAL 2 TIMES DAILY
Status: ON HOLD | COMMUNITY
Start: 2021-07-18 | End: 2021-07-20 | Stop reason: SDUPTHER

## 2021-07-20 RX ORDER — AMLODIPINE BESYLATE 10 MG/1
10 TABLET ORAL DAILY
Qty: 30 TABLET | Refills: 1 | Status: SHIPPED | OUTPATIENT
Start: 2021-07-21

## 2021-07-20 RX ORDER — LISINOPRIL 40 MG/1
40 TABLET ORAL DAILY
Qty: 30 TABLET | Refills: 1 | Status: SHIPPED | OUTPATIENT
Start: 2021-07-20 | End: 2022-07-20

## 2021-07-20 RX ADMIN — LISINOPRIL 40 MG: 20 TABLET ORAL at 09:50

## 2021-07-20 RX ADMIN — Medication 1 PATCH: at 09:54

## 2021-07-20 RX ADMIN — AMLODIPINE BESYLATE 10 MG: 10 TABLET ORAL at 09:50

## 2021-07-20 NOTE — PLAN OF CARE
Problem: Potential for Falls  Goal: Patient will remain free of falls  Description: INTERVENTIONS:  - Educate patient/family on patient safety including physical limitations  - Instruct patient to call for assistance with activity   - Consult OT/PT to assist with strengthening/mobility   - Keep Call bell within reach  - Keep bed low and locked with side rails adjusted as appropriate  - Keep care items and personal belongings within reach  - Initiate and maintain comfort rounds  - Make Fall Risk Sign visible to staff  - Offer Toileting every Hours, in advance of need  - Initiate/Maintain alarm  - Obtain necessary fall risk management equipment:   - Apply yellow socks and bracelet for high fall risk patients  - Consider moving patient to room near nurses station  Outcome: Progressing     Problem: PAIN - ADULT  Goal: Verbalizes/displays adequate comfort level or baseline comfort level  Description: Interventions:  - Encourage patient to monitor pain and request assistance  - Assess pain using appropriate pain scale  - Administer analgesics based on type and severity of pain and evaluate response  - Implement non-pharmacological measures as appropriate and evaluate response  - Consider cultural and social influences on pain and pain management  - Notify physician/advanced practitioner if interventions unsuccessful or patient reports new pain  Outcome: Progressing     Problem: SAFETY ADULT  Goal: Patient will remain free of falls  Description: INTERVENTIONS:  - Educate patient/family on patient safety including physical limitations  - Instruct patient to call for assistance with activity   - Consult OT/PT to assist with strengthening/mobility   - Keep Call bell within reach  - Keep bed low and locked with side rails adjusted as appropriate  - Keep care items and personal belongings within reach  - Initiate and maintain comfort rounds  - Make Fall Risk Sign visible to staff  - Offer Toileting every Hours, in advance of need  - Initiate/Maintain alarm  - Obtain necessary fall risk management equipment:   - Apply yellow socks and bracelet for high fall risk patients  - Consider moving patient to room near nurses station  Outcome: Progressing     Problem: DISCHARGE PLANNING  Goal: Discharge to home or other facility with appropriate resources  Description: INTERVENTIONS:  - Identify barriers to discharge w/patient and caregiver  - Arrange for needed discharge resources and transportation as appropriate  - Identify discharge learning needs (meds, wound care, etc )  - Arrange for interpretive services to assist at discharge as needed  - Refer to Case Management Department for coordinating discharge planning if the patient needs post-hospital services based on physician/advanced practitioner order or complex needs related to functional status, cognitive ability, or social support system  Outcome: Progressing     Problem: Knowledge Deficit  Goal: Patient/family/caregiver demonstrates understanding of disease process, treatment plan, medications, and discharge instructions  Description: Complete learning assessment and assess knowledge base    Interventions:  - Provide teaching at level of understanding  - Provide teaching via preferred learning methods  Outcome: Progressing     Problem: CARDIOVASCULAR - ADULT  Goal: Maintains optimal cardiac output and hemodynamic stability  Description: INTERVENTIONS:  - Monitor I/O, vital signs and rhythm  - Monitor for S/S and trends of decreased cardiac output  - Administer and titrate ordered vasoactive medications to optimize hemodynamic stability  - Assess quality of pulses, skin color and temperature  - Assess for signs of decreased coronary artery perfusion  - Instruct patient to report change in severity of symptoms  Outcome: Progressing  Goal: Absence of cardiac dysrhythmias or at baseline rhythm  Description: INTERVENTIONS:  - Continuous cardiac monitoring, vital signs, obtain 12 lead EKG if ordered  - Administer antiarrhythmic and heart rate control medications as ordered  - Monitor electrolytes and administer replacement therapy as ordered  Outcome: Progressing     Problem: METABOLIC, FLUID AND ELECTROLYTES - ADULT  Goal: Electrolytes maintained within normal limits  Description: INTERVENTIONS:  - Monitor labs and assess patient for signs and symptoms of electrolyte imbalances  - Administer electrolyte replacement as ordered  - Monitor response to electrolyte replacements, including repeat lab results as appropriate  - Instruct patient on fluid and nutrition as appropriate  Outcome: Progressing

## 2021-07-20 NOTE — CASE MANAGEMENT
Per rounding with SLIM, patient is anticipated to be discharged today  No needs identified  CM department will continue to follow patient through discharge

## 2021-07-20 NOTE — ASSESSMENT & PLAN NOTE
· History of, medically managed; continue outpatient follow up  · EKG sinus rhythm   Recent Labs     07/19/21  1127 07/19/21  1610   TROPONINI <0 02 <0 02

## 2021-07-20 NOTE — ASSESSMENT & PLAN NOTE
· Recently seen in [facility] ER due to same symptoms of dizziness and noted HTN've above baseline  · Medications adjusted and patient to follow up with primary cardiology, Dr Juliet Landaverde, 2 weeks from discharge   · BP improved  · Still mildly bradycardic, will hold on restarting beta blocker and defer to her follow up appointment with cardiology  · Advised to have BMP check in 1 week through PCP  /63 (BP Location: Left arm)   Pulse 56   Temp 97 5 °F (36 4 °C)   Resp 18   Ht 4' 11" (1 499 m)   Wt 103 kg (227 lb 8 2 oz)   SpO2 90%   BMI 45 95 kg/m²   Recent Labs     07/20/21  0448   BUN 31*   CREATININE 1 28

## 2021-07-20 NOTE — DISCHARGE INSTRUCTIONS
Bradycardia   WHAT YOU NEED TO KNOW:   Bradycardia is a slow heart rate, usually fewer than 60 beats per minute  A slow heart rate is normal for some people, such as athletes, and needs no treatment  Bradycardia may also be caused by health conditions that do need treatment  Your healthcare provider will tell you what heart rate is too low for you  DISCHARGE INSTRUCTIONS:   Call your local emergency number (911 in the [address] [facility] [facility]) or have someone call if:   · You have new or worsening dizziness, shortness of breath, chest pain, or confusion  Call your doctor or cardiologist if:   · You feel lightheaded or faint  · Your pulse rate is lower than healthcare providers say it should be, even with treatment  · You are more tired than usual, even with treatment  · You have questions or concerns about your condition or care  Medicines:   · Medicines  may be given to increase your heart rate and help your symptoms  You may also need medicine to treat a condition that is causing your symptoms  · Take your medicine as directed  Contact your healthcare provider if you think your medicine is not helping or if you have side effects  Tell him or her if you are allergic to any medicine  Keep a list of the medicines, vitamins, and herbs you take  Include the amounts, and when and why you take them  Bring the list or the pill bottles to follow-up visits  Carry your medicine list with you in case of an emergency  Manage or prevent bradycardia:   · Talk to your healthcare provider about all your current medicines  He or she may change a medicine if it is causing your slow heart rate  Do not  stop taking any medicine unless directed by your provider  · Keep a record of your symptoms  Include when you have bradycardia, and what you were doing when it started  Also include anything that made your symptoms better or worse  Bring the record to follow-up visits with your healthcare providers      · Do not smoke   Nicotine and other chemicals in cigarettes and cigars can cause heart and lung damage  Ask your healthcare provider for information if you currently smoke and need help to quit  E-cigarettes or smokeless tobacco still contain nicotine  Talk to your healthcare provider before you use these products  · Reach or maintain a healthy weight  Your healthcare provider can tell you what a healthy weight is for you  He or she can help you create a weight loss plan if needed  Weight loss can help strengthen your heartbeat  If you have sleep apnea, weight loss may help you breathe more regularly while you sleep  · Exercise as directed  Exercise can help strengthen your heart  It can also help you manage your weight and improve your sleep  Your healthcare provider can help you create an exercise plan  He or she may recommend 30 to 40 minutes of exercise most days of the week  · Eat a variety of healthy foods  Healthy foods include fruits, vegetables, whole-grain breads and cereals, low-fat dairy products, lean meats, fish, and cooked beans  Depending on the cause of your bradycardia, your healthcare provider may recommend a heart healthy diet  This diet is low in sodium (salt) and unhealthy fats  A dietitian or your healthcare provider can help you create a heart healthy diet  Heart monitoring at home: You may need to use a heart monitor at home to provide more information about your condition  This device is also called a Holter monitor, event monitor, or mobile telemetry  Bring your monitor with you to follow-up visits with your healthcare provider or cardiologist  Ask for more information about the Holter monitor  Follow up with your doctor or cardiologist as directed:  Write down your questions so you remember to ask them during your visits  You may need to see specialists for more treatment   If you have a pacemaker, your cardiologist needs to make sure that it is working as it should  For more information:   · [address]  [place] , [place]   Phone: [phone]  Web Address: https://[employer]/  [facility] 2021 Information is for End User's use only and may not be sold, redistributed or otherwise used for commercial purposes  All illustrations and images included in CareNotes® are the copyrighted property of A D A M , Inc  or [address] [place], [place] [address]  The above information is an  only  It is not intended as medical advice for individual conditions or treatments  Talk to your doctor, nurse or pharmacist before following any medical regimen to see if it is safe and effective for you  Hypertensive Crisis   WHAT YOU NEED TO KNOW:   What is a hypertensive crisis? A hypertensive crisis is a sudden spike in blood pressure to 180/120 or higher  A normal blood pressure is 119/79 or lower  A hypertensive crisis is also known as acute hypertension  This is a medical emergency that could lead to organ damage or be life-threatening  What increases my risk for a hypertensive crisis? · Not taking your blood pressure medicine as directed    · Hypertension caused by pregnancy (preeclampsia, eclampsia)    · Thyroid disease, kidney or adrenal disease, heart disease, or stroke    · Cocaine or amphetamine use    · Cigarette smoking or alcohol abuse    · Burns, head injuries, surgery, or trauma    What are the signs and symptoms of a hypertensive crisis? · Blurred vision or headache    · Nausea or vomiting    · Shortness of breath or chest pain    · Dizziness or weakness    · Problems with thinking or behavior changes, such as sleepiness, forgetfulness, or confusion    How is a hypertensive crisis diagnosed? Your healthcare provider will ask if you have health conditions, such as high blood pressure, diabetes, or heart disease  He or she will ask what your symptoms are and if you have ever had surgery   Tell him or her what medicines you take and if you have ever used illegal drugs  Your healthcare provider will check your blood pressure on both arms and listen to your heart and lungs  He or she will examine your eyes closely and also test your strength, balance, reflexes, and memory  The following tests may be done to check for damage to your heart, brain, and kidneys:  · Blood or urine tests  are done to find out if your liver and kidneys are functioning properly  High blood pressure can damage your kidneys  · An EKG  is used to monitor your heart  Sticky pads placed on your skin record your heart's electrical activity  · X-ray or CT  pictures may show signs of a stroke, heart failure, or fluid around your heart and lungs  You may be given contrast liquid before a CT scan to help healthcare providers see the pictures better  Tell the healthcare provider if you have ever had an allergic reaction to contrast liquid  How is a hypertensive crisis treated? Treatment depends on the cause of your hypertensive crisis  Healthcare providers will lower your blood pressure and try to prevent organ damage  You may need the following:  · Blood pressure medicine  is given to lower your blood pressure  There are many different types of blood pressure medicine, and you may need more than one type  It is very important to take your blood pressure medicine exactly as directed  Skipped doses can lead to a hypertensive crisis  Talk to your healthcare provider if you are having side effects from your medicine  Do not  stop taking it without talking to your healthcare provider  · Diuretics  help decrease extra fluid that collects in your blood vessels  This lowers your blood pressure by reducing pressure in your arteries  Diuretics are often called water pills  You may urinate more often while you take this medicine  What are the risks of a hypertensive crisis?   Even with treatment, you are at risk for a heart attack, stroke, or kidney damage  You could develop a bulge or tear in the wall of your aorta (the artery that supplies blood throughout your body)  Fluid could collect in your lungs and make it hard for you to breathe  You are at risk for blindness, eye damage, seizures, as well as brain damage  How can I help prevent another hypertensive crisis? · Check your blood pressure at home  Sit and rest for 5 minutes before you take your blood pressure  Extend your arm and support it on a flat surface  Your arm should be at the same level as your heart  Follow the directions that came with your blood pressure monitor  If possible, take at least 2 blood pressure readings each time  Take your blood pressure at least twice a day at the same times each day, such as morning and evening  Keep a record of your readings and bring it to your follow-up visits  Ask your healthcare provider what your blood pressure should be  · Manage any other health conditions you have  Health conditions such as diabetes can increase your risk for hypertension  Follow your healthcare provider's instructions and take all your medicines as directed  · Ask about all medicines  Certain medicines can increase your blood pressure  Examples include oral birth control pills, decongestants, herbal supplements, and NSAIDs, such as ibuprofen  Your healthcare provider can tell you which medicines are safe for you to take  This includes prescription and over-the-counter medicines  · Limit sodium (salt) as directed  Too much sodium can affect your fluid balance  Check labels to find low-sodium or no-salt-added foods  Some low-sodium foods use potassium salts for flavor  Too much potassium can also cause health problems  Your healthcare provider will tell you how much sodium and potassium are safe for you to have in a day  He or she may recommend that you limit sodium to 2,300 mg a day  · Follow the meal plan recommended by your healthcare provider    A dietitian or your provider can give you more information on low-sodium plans or the DASH (Dietary Approaches to Stop Hypertension) eating plan  The DASH plan is low in sodium, unhealthy fats, and total fat  It is high in potassium, calcium, and fiber  · Exercise to maintain a healthy weight  Exercise at least 30 minutes per day, on most days of the week  This will help decrease your blood pressure  Ask your healthcare provider about the best exercise plan for you  · Decrease stress  This may help lower your blood pressure  Learn ways to relax, such as deep breathing or listening to music  · Limit alcohol as directed  Alcohol can increase your blood pressure  A drink of alcohol is 12 ounces of beer, 5 ounces of wine, or 1½ ounces of liquor  · Do not smoke  Nicotine and other chemicals in cigarettes and cigars can increase your blood pressure and also cause lung damage  Ask your healthcare provider for information if you currently smoke and need help to quit  E-cigarettes or smokeless tobacco still contain nicotine  Talk to your healthcare provider before you use these products  Where can I find support and more information? · [facility]  [facility]   Phone: 7- [phone]  Web Address: https://www [employer]/  org     Call 798 for any of the following:   · You have chest pain  · You have back pain or shortness of breath  · You have weakness or numbness in your face, arms, or legs  · You cannot see or talk as well as usual     When should I seek immediate care? · You have a severe headache  When should I contact my healthcare provider? · Your blood pressure is 180/110 or higher but you have no other symptoms  · You have questions or concerns about your condition or care  CARE AGREEMENT:   You have the right to help plan your care  Learn about your health condition and how it may be treated   Discuss treatment options with your healthcare providers to decide what care you want to receive  You always have the right to refuse treatment  The above information is an  only  It is not intended as medical advice for individual conditions or treatments  Talk to your doctor, nurse or pharmacist before following any medical regimen to see if it is safe and effective for you  © Copyright [employer] 2021 Information is for End User's use only and may not be sold, redistributed or otherwise used for commercial purposes   All illustrations and images included in CareNotes® are the copyrighted property of A D A M , Inc  or [address] [place] [address]

## 2021-07-20 NOTE — UTILIZATION REVIEW
Initial Clinical Review    Admission: Date/Time/Statement:   Admission Orders (From admission, onward)     Ordered        07/19/21 1344  Place in Observation  Once                   Orders Placed This Encounter   Procedures    Place in Observation     Standing Status:   Standing     Number of Occurrences:   1     Order Specific Question:   Level of Care     Answer:   Med Surg [16]     ED Arrival Information     Expected Arrival Acuity    - 7/19/2021 11:00 Urgent         Means of arrival Escorted by Service Admission type    Walk-In Spouse General Medicine Urgent         Arrival complaint    HBP        Chief Complaint   Patient presents with    Hypertension     pt reports shes had hypertension X3 days  Was seen at [facility] and told to increase her meds but it has not helped  Pt reports her chest feels tight  Initial Presentation: 64 y o  female with a PMH of HTN, tobacco abuse, and obesity who presents with headache and dizziness associated with hypertension above baseline  increased stressors right now, mom has been in and out of the hospital for the past 4 months several times  Patient continues to smoke, she has not been eating healthy and has been using more prepared foods and lunch meats than usual noticed a couple episodes of bradycardia as low as 25  She denies any symptoms associated with that  Blood pressure has been quite elevated, over 200 at home  With this she has noticed headaches and dizziness  seen at [facility] ER about 2 days ago and was advised increase lisinopril to 40 mg  Admitted OBS status to monitor , iv hydralazine prn          ED Triage Vitals [07/19/21 1118]   Temperature Pulse Respirations Blood Pressure SpO2   97 5 °F (36 4 °C) 56 16 (!) 223/104 96 %      Temp Source Heart Rate Source Patient Position - Orthostatic VS BP Location FiO2 (%)   Temporal Monitor Sitting Left arm --      Pain Score       9          Wt Readings from Last 1 Encounters:   07/20/21 103 kg (227 lb 8 2 oz) Additional Vital Signs:   07/20/21 0726  --  56  --  170/79  --  --  --  --   07/20/21 0306  --  --  --  138/77  --  --  --  --   07/20/21 0000  97 5 °F (36 4 °C)  52Abnormal   18  155/80  --  90 %  --  --   07/19/21 2345  --  --  --  --  --  --  None (Room air)  --   07/19/21 2122  --  48Abnormal   18  179/84Abnormal   --  92 %  None (Room air)  Sitting   07/19/21 1823  --  --  --  190/84Abnormal   --  --  --  --   07/19/21 1630  --  48Abnormal   16  167/77  110  93 %  None (Room air)  Lying   07/19/21 1545  --  42Abnormal   21  208/100Abnormal   144  94 %  None (Room air)  Lying   07/19/21 1357  --  --  --  --  --  --           Pertinent Labs/Diagnostic Test Results:   7/19 PCXR No acute consolidation or congestion   7/19 EKG SB   Results from last 7 days   Lab Units 07/20/21  0448 07/19/21  1127   WBC Thousand/uL 11 82* 13 91*   HEMOGLOBIN g/dL 15 3 16 6*   HEMATOCRIT % 49 6* 52 6*   PLATELETS Thousands/uL 277 286   NEUTROS ABS Thousands/µL  --  9 74*     Results from last 7 days   Lab Units 07/20/21  0448 07/19/21  1127   SODIUM mmol/L 141 143   POTASSIUM mmol/L 4 4 4 4   CHLORIDE mmol/L 103 106   CO2 mmol/L 32 26   ANION GAP mmol/L 6 11   BUN mg/dL 31* 23   CREATININE mg/dL 1 28 1 05   EGFR ml/min/1 73sq m 46 59   CALCIUM mg/dL 8 9 8 7     Results from last 7 days   Lab Units 07/19/21  1127   AST U/L 21   ALT U/L 36   ALK PHOS U/L 141*   TOTAL PROTEIN g/dL 7 5   ALBUMIN g/dL 3 4*   TOTAL BILIRUBIN mg/dL 0 35     Results from last 7 days   Lab Units 07/20/21  0448 07/19/21  1127   GLUCOSE RANDOM mg/dL 111 105     Results from last 7 days   Lab Units 07/19/21  1610 07/19/21  1127   TROPONIN I ng/mL <0 02 <0 02     ED Treatment:   Medication Administration from 07/19/2021 1100 to 07/19/2021 2344       Date/Time Order Dose Route Action     07/19/2021 1357 aspirin chewable tablet 324 mg 324 mg Oral Given     07/19/2021 1606 atorvastatin (LIPITOR) tablet 40 mg 40 mg Oral Given     07/19/2021 1606 nicotine (NICODERM CQ) 14 mg/24hr TD 24 hr patch 1 patch 1 patch Transdermal Medication Applied     07/19/2021 1606 amLODIPine (NORVASC) tablet 10 mg 10 mg Oral Given     07/19/2021 1823 lisinopril (ZESTRIL) tablet 20 mg 20 mg Oral Given        Past Medical History:   Diagnosis Date    Hyperlipidemia     Hypertension      Present on Admission:   Hypertensive urgency   Tobacco abuse      Admitting Diagnosis: Chest tightness [R07 89]  Hypertension [I10]  Hypertensive urgency [I16 0]  Age/Sex: 64 y o  female  Admission Orders:  Scheduled Medications:  amLODIPine, 10 mg, Oral, Daily  atorvastatin, 40 mg, Oral, Daily With Dinner  lisinopril, 40 mg, Oral, Daily  nicotine, 1 patch, Transdermal, Daily      Continuous IV Infusions:     PRN Meds:  acetaminophen, 650 mg, Oral, Q6H PRN  aluminum-magnesium hydroxide-simethicone, 30 mL, Oral, Q6H PRN  hydrALAZINE, 5 mg, Intravenous, Q6H PRN  ondansetron, 4 mg, Intravenous, Q6H PRN  polyethylene glycol, 17 g, Oral, Daily PRN    Tele       Network Utilization Review Department  ATTENTION: Please call with any questions or concerns to [phone] and carefully listen to the prompts so that you are directed to the right person  All voicemails are confidential   Bess Hurley all requests for admission clinical reviews, approved or denied determinations and any other requests to dedicated fax number below belonging to the campus where the patient is receiving treatment   List of dedicated fax numbers for the Facilities:  [address] DENIALS (Administrative/Medical Necessity) [phone]   [address] (Maternity/NICU/Pediatrics) [phone]   [address] Nancie Oglesby [address] [place] [address]  [address] [address] [place] [address] [address] Long[address] [facility] [facility] [phone]

## 2021-07-20 NOTE — DISCHARGE SUMMARY
[facility] Discharge- Scottie López 1960, 64 y o  female MRN: [id number]  Unit/Bed#: -[facility] Encounter: [id number]  Primary Care Provider: Hayden Erazo MD   Date and time admitted to hospital: 7/19/2021  1:04 PM    * Hypertensive urgency  Assessment & Plan  · Recently seen in [facility] ER due to same symptoms of dizziness and noted HTN've above baseline  · Medications adjusted and patient to follow up with primary cardiology, Dr Ferdinand Scheuermann, 2 weeks from discharge   · BP improved  · Still mildly bradycardic, will hold on restarting beta blocker and defer to her follow up appointment with cardiology  · Advised to have BMP check in 1 week through PCP  /63 (BP Location: Left arm)   Pulse 56   Temp 97 5 °F (36 4 °C)   Resp 18   Ht 4' 11" (1 499 m)   Wt 103 kg (227 lb 8 2 oz)   SpO2 90%   BMI 45 95 kg/m²   Recent Labs     07/20/21  0448   BUN 31*   CREATININE 1 28       Tobacco abuse  Assessment & Plan  · Counseling given, patch ordered    CAD (coronary artery disease)  Assessment & Plan  · History of, medically managed; continue outpatient follow up  · EKG sinus rhythm   Recent Labs     07/19/21  1127 07/19/21  1610   TROPONINI <0 02 <0 02         Chest pain-resolved as of 7/20/2021  Assessment & Plan  Resolved, non cardiac        Medical Problems     Resolved Problems  Date Reviewed: 7/20/2021        Resolved    Chest pain 7/20/2021     Resolved by  Flora Saha PA-C              Discharging Physician / Practitioner: Flora Saha PA-C  PCP: Hayden Erazo MD  Admission Date:   Admission Orders (From admission, onward)     Ordered        07/19/21 1344  Place in Observation  Once                   Discharge Date: 07/20/21    Consultations During Hospital Stay:  · None     Procedures Performed:   · None     Significant Findings / Test Results:   XR chest 1 view portable   Final Result by Lucia Aponte MD (07/19 1236)      No acute consolidation or congestion Workstation performed: [id number]              Incidental Findings:   · None      Test Results Pending at Discharge (will require follow up): · None      Outpatient Follow-up/Tests Requested:  · PCP  · Cardiology  · BMP     Complications:  None     Reason for Admission: hypertensive urgency     Hospital Course:   Omar Parsons is a 64 y o  female patient who originally presented to the hospital on 7/19/2021 due to headache and dizziness with associated significantly elevated blood pressure  Patient was recently seen at [facility] 2 days prior to presentation here, prescribed higher dose of lisinopril and outpatient follow-up  Despite increase in lisinopril, patient still with persistently elevated systolic blood pressure > 200 with associated headache and dizziness  She has past medical history significant for hypertension, CAD which has been medically managed, obesity, tobacco abuse  She also has recent increase in stressors, her mother has been sick and in/out of the hospital many times over the past few months  Patient admits that she has not been on a good diet and has been eating a lot of prepared and frozen meals  BP now improved, stable for discharge  Please see above list of diagnoses and related plan for additional information  Condition at Discharge: good    Discharge Day Visit / Exam:   Subjective:  Patient seen this morning, she is feeling much better  No headache  No dizziness  No chest pain  She is hopeful to go home  Vitals: Blood Pressure: 148/63 (07/20/21 1150)  Pulse: 56 (07/20/21 0726)  Temperature: 97 5 °F (36 4 °C) (07/20/21 0000)  Temp Source: Temporal (07/19/21 1118)  Respirations: 18 (07/20/21 0000)  Height: 4' 11" (149 9 cm) (07/19/21 2345)  Weight - Scale: 103 kg (227 lb 8 2 oz) (07/20/21 0000)  SpO2: 90 % (07/20/21 0000)  Exam:   Physical Exam  Constitutional:       General: She is not in acute distress  Appearance: She is obese   She is not toxic-appearing or diaphoretic  Cardiovascular:      Rate and Rhythm: Normal rate and regular rhythm  Pulmonary:      Effort: Pulmonary effort is normal  No respiratory distress  Breath sounds: Normal breath sounds  Neurological:      Mental Status: She is alert and oriented to person, place, and time  Cranial Nerves: No cranial nerve deficit  Psychiatric:         Mood and Affect: Mood normal          Behavior: Behavior normal           Discussion with Family: Updated  () at bedside  Discharge instructions/Information to patient and family:   See after visit summary for information provided to patient and family  Provisions for Follow-Up Care:  See after visit summary for information related to follow-up care and any pertinent home health orders  Disposition:   Home    Planned Readmission: none      Discharge Statement:  I spent approx 25 minutes discharging the patient  This time was spent on the day of discharge  I had direct contact with the patient on the day of discharge  Greater than 50% of the total time was spent examining patient, answering all patient questions, arranging and discussing plan of care with patient as well as directly providing post-discharge instructions  Additional time then spent on discharge activities  Discharge Medications:  See after visit summary for reconciled discharge medications provided to patient and/or family        **Please Note: This note may have been constructed using a voice recognition system**

## 2024-03-11 ENCOUNTER — APPOINTMENT (EMERGENCY)
Dept: RADIOLOGY | Facility: HOSPITAL | Age: 64
DRG: 194 | End: 2024-03-11
Payer: COMMERCIAL

## 2024-03-11 ENCOUNTER — HOSPITAL ENCOUNTER (INPATIENT)
Facility: HOSPITAL | Age: 64
LOS: 8 days | Discharge: HOME HEALTH | DRG: 194 | End: 2024-03-19
Attending: EMERGENCY MEDICINE | Admitting: INTERNAL MEDICINE
Payer: COMMERCIAL

## 2024-03-11 DIAGNOSIS — R41.82 ALTERED MENTAL STATUS, UNSPECIFIED ALTERED MENTAL STATUS TYPE: ICD-10-CM

## 2024-03-11 DIAGNOSIS — K66.8 PNEUMOPERITONEUM: ICD-10-CM

## 2024-03-11 DIAGNOSIS — J44.9 CHRONIC OBSTRUCTIVE PULMONARY DISEASE, UNSPECIFIED COPD TYPE (HCC): ICD-10-CM

## 2024-03-11 DIAGNOSIS — J96.01 ACUTE RESPIRATORY FAILURE WITH HYPOXIA (HCC): ICD-10-CM

## 2024-03-11 DIAGNOSIS — I50.33 ACUTE ON CHRONIC DIASTOLIC HEART FAILURE (HCC): ICD-10-CM

## 2024-03-11 DIAGNOSIS — Z72.0 TOBACCO ABUSE: ICD-10-CM

## 2024-03-11 DIAGNOSIS — N17.9 AKI (ACUTE KIDNEY INJURY) (HCC): ICD-10-CM

## 2024-03-11 DIAGNOSIS — I50.9 CHF (CONGESTIVE HEART FAILURE) (HCC): Primary | ICD-10-CM

## 2024-03-11 DIAGNOSIS — E87.29 RESPIRATORY ACIDOSIS: ICD-10-CM

## 2024-03-11 DIAGNOSIS — G25.81 RLS (RESTLESS LEGS SYNDROME): ICD-10-CM

## 2024-03-11 LAB
ALBUMIN SERPL BCP-MCNC: 3.5 G/DL (ref 3.5–5)
ALP SERPL-CCNC: 169 U/L (ref 34–104)
ALT SERPL W P-5'-P-CCNC: 123 U/L (ref 7–52)
ANION GAP SERPL CALCULATED.3IONS-SCNC: 6 MMOL/L
AST SERPL W P-5'-P-CCNC: 65 U/L (ref 13–39)
BASOPHILS # BLD AUTO: 0.04 THOUSANDS/ÂΜL (ref 0–0.1)
BASOPHILS NFR BLD AUTO: 0 % (ref 0–1)
BILIRUB SERPL-MCNC: 0.63 MG/DL (ref 0.2–1)
BNP SERPL-MCNC: 1925 PG/ML (ref 0–100)
BUN SERPL-MCNC: 30 MG/DL (ref 5–25)
CALCIUM SERPL-MCNC: 8.6 MG/DL (ref 8.4–10.2)
CARDIAC TROPONIN I PNL SERPL HS: 23 NG/L
CHLORIDE SERPL-SCNC: 105 MMOL/L (ref 96–108)
CO2 SERPL-SCNC: 28 MMOL/L (ref 21–32)
CREAT SERPL-MCNC: 1.18 MG/DL (ref 0.6–1.3)
EOSINOPHIL # BLD AUTO: 0.07 THOUSAND/ÂΜL (ref 0–0.61)
EOSINOPHIL NFR BLD AUTO: 1 % (ref 0–6)
ERYTHROCYTE [DISTWIDTH] IN BLOOD BY AUTOMATED COUNT: 19.1 % (ref 11.6–15.1)
FLUAV RNA RESP QL NAA+PROBE: NEGATIVE
FLUBV RNA RESP QL NAA+PROBE: NEGATIVE
GFR SERPL CREATININE-BSD FRML MDRD: 49 ML/MIN/1.73SQ M
GLUCOSE SERPL-MCNC: 120 MG/DL (ref 65–140)
HCT VFR BLD AUTO: 46.7 % (ref 34.8–46.1)
HGB BLD-MCNC: 13.7 G/DL (ref 11.5–15.4)
IMM GRANULOCYTES # BLD AUTO: 0.07 THOUSAND/UL (ref 0–0.2)
IMM GRANULOCYTES NFR BLD AUTO: 1 % (ref 0–2)
LYMPHOCYTES # BLD AUTO: 1.44 THOUSANDS/ÂΜL (ref 0.6–4.47)
LYMPHOCYTES NFR BLD AUTO: 11 % (ref 14–44)
MAGNESIUM SERPL-MCNC: 2.1 MG/DL (ref 1.9–2.7)
MCH RBC QN AUTO: 23.6 PG (ref 26.8–34.3)
MCHC RBC AUTO-ENTMCNC: 29.3 G/DL (ref 31.4–37.4)
MCV RBC AUTO: 80 FL (ref 82–98)
MONOCYTES # BLD AUTO: 0.89 THOUSAND/ÂΜL (ref 0.17–1.22)
MONOCYTES NFR BLD AUTO: 7 % (ref 4–12)
NEUTROPHILS # BLD AUTO: 11.21 THOUSANDS/ÂΜL (ref 1.85–7.62)
NEUTS SEG NFR BLD AUTO: 80 % (ref 43–75)
NRBC BLD AUTO-RTO: 0 /100 WBCS
PLATELET # BLD AUTO: 266 THOUSANDS/UL (ref 149–390)
PMV BLD AUTO: 9 FL (ref 8.9–12.7)
POTASSIUM SERPL-SCNC: 5.2 MMOL/L (ref 3.5–5.3)
PROT SERPL-MCNC: 6.4 G/DL (ref 6.4–8.4)
RBC # BLD AUTO: 5.81 MILLION/UL (ref 3.81–5.12)
RSV RNA RESP QL NAA+PROBE: NEGATIVE
SARS-COV-2 RNA RESP QL NAA+PROBE: NEGATIVE
SODIUM SERPL-SCNC: 139 MMOL/L (ref 135–147)
WBC # BLD AUTO: 13.72 THOUSAND/UL (ref 4.31–10.16)

## 2024-03-11 PROCEDURE — 0241U HB NFCT DS VIR RESP RNA 4 TRGT: CPT | Performed by: EMERGENCY MEDICINE

## 2024-03-11 PROCEDURE — 83735 ASSAY OF MAGNESIUM: CPT | Performed by: EMERGENCY MEDICINE

## 2024-03-11 PROCEDURE — 83880 ASSAY OF NATRIURETIC PEPTIDE: CPT | Performed by: EMERGENCY MEDICINE

## 2024-03-11 PROCEDURE — 93005 ELECTROCARDIOGRAM TRACING: CPT

## 2024-03-11 PROCEDURE — 84484 ASSAY OF TROPONIN QUANT: CPT | Performed by: EMERGENCY MEDICINE

## 2024-03-11 PROCEDURE — 80053 COMPREHEN METABOLIC PANEL: CPT | Performed by: EMERGENCY MEDICINE

## 2024-03-11 PROCEDURE — 85025 COMPLETE CBC W/AUTO DIFF WBC: CPT | Performed by: EMERGENCY MEDICINE

## 2024-03-11 PROCEDURE — 99285 EMERGENCY DEPT VISIT HI MDM: CPT | Performed by: EMERGENCY MEDICINE

## 2024-03-11 PROCEDURE — 71046 X-RAY EXAM CHEST 2 VIEWS: CPT

## 2024-03-11 PROCEDURE — 36415 COLL VENOUS BLD VENIPUNCTURE: CPT | Performed by: EMERGENCY MEDICINE

## 2024-03-11 PROCEDURE — 99285 EMERGENCY DEPT VISIT HI MDM: CPT

## 2024-03-11 PROCEDURE — 96374 THER/PROPH/DIAG INJ IV PUSH: CPT

## 2024-03-11 RX ORDER — FUROSEMIDE 10 MG/ML
40 INJECTION INTRAMUSCULAR; INTRAVENOUS ONCE
Status: COMPLETED | OUTPATIENT
Start: 2024-03-11 | End: 2024-03-11

## 2024-03-11 RX ADMIN — FUROSEMIDE 40 MG: 10 INJECTION, SOLUTION INTRAMUSCULAR; INTRAVENOUS at 23:00

## 2024-03-12 ENCOUNTER — TELEPHONE (OUTPATIENT)
Dept: CARDIOLOGY CLINIC | Facility: CLINIC | Age: 64
End: 2024-03-12

## 2024-03-12 ENCOUNTER — APPOINTMENT (INPATIENT)
Dept: NON INVASIVE DIAGNOSTICS | Facility: HOSPITAL | Age: 64
DRG: 194 | End: 2024-03-12
Payer: COMMERCIAL

## 2024-03-12 PROBLEM — I50.33 ACUTE ON CHRONIC DIASTOLIC HEART FAILURE (HCC): Status: ACTIVE | Noted: 2024-03-12

## 2024-03-12 PROBLEM — J44.9 COPD (CHRONIC OBSTRUCTIVE PULMONARY DISEASE) (HCC): Status: ACTIVE | Noted: 2024-03-12

## 2024-03-12 PROBLEM — D72.829 LEUKOCYTOSIS: Status: ACTIVE | Noted: 2024-03-12

## 2024-03-12 PROBLEM — R74.01 TRANSAMINITIS: Status: ACTIVE | Noted: 2024-03-12

## 2024-03-12 LAB
2HR DELTA HS TROPONIN: 3 NG/L
4HR DELTA HS TROPONIN: -1 NG/L
ALBUMIN SERPL BCP-MCNC: 3.5 G/DL (ref 3.5–5)
ALP SERPL-CCNC: 158 U/L (ref 34–104)
ALT SERPL W P-5'-P-CCNC: 108 U/L (ref 7–52)
ANION GAP SERPL CALCULATED.3IONS-SCNC: 6 MMOL/L
AST SERPL W P-5'-P-CCNC: 49 U/L (ref 13–39)
ATRIAL RATE: 83 BPM
BILIRUB SERPL-MCNC: 0.61 MG/DL (ref 0.2–1)
BSA FOR ECHO PROCEDURE: 2.29 M2
BUN SERPL-MCNC: 31 MG/DL (ref 5–25)
CALCIUM SERPL-MCNC: 8.5 MG/DL (ref 8.4–10.2)
CARDIAC TROPONIN I PNL SERPL HS: 22 NG/L
CARDIAC TROPONIN I PNL SERPL HS: 26 NG/L
CHLORIDE SERPL-SCNC: 105 MMOL/L (ref 96–108)
CO2 SERPL-SCNC: 28 MMOL/L (ref 21–32)
CREAT SERPL-MCNC: 1.25 MG/DL (ref 0.6–1.3)
ERYTHROCYTE [DISTWIDTH] IN BLOOD BY AUTOMATED COUNT: 18.9 % (ref 11.6–15.1)
GFR SERPL CREATININE-BSD FRML MDRD: 46 ML/MIN/1.73SQ M
GLUCOSE SERPL-MCNC: 121 MG/DL (ref 65–140)
HCT VFR BLD AUTO: 46.9 % (ref 34.8–46.1)
HGB BLD-MCNC: 13.6 G/DL (ref 11.5–15.4)
MAGNESIUM SERPL-MCNC: 2.1 MG/DL (ref 1.9–2.7)
MCH RBC QN AUTO: 23.5 PG (ref 26.8–34.3)
MCHC RBC AUTO-ENTMCNC: 29 G/DL (ref 31.4–37.4)
MCV RBC AUTO: 81 FL (ref 82–98)
P AXIS: 64 DEGREES
PLATELET # BLD AUTO: 267 THOUSANDS/UL (ref 149–390)
PMV BLD AUTO: 9.3 FL (ref 8.9–12.7)
POTASSIUM SERPL-SCNC: 4.9 MMOL/L (ref 3.5–5.3)
PR INTERVAL: 138 MS
PROT SERPL-MCNC: 6.3 G/DL (ref 6.4–8.4)
QRS AXIS: 115 DEGREES
QRSD INTERVAL: 74 MS
QT INTERVAL: 358 MS
QTC INTERVAL: 420 MS
RBC # BLD AUTO: 5.79 MILLION/UL (ref 3.81–5.12)
SL CV LV EF: 60
SODIUM SERPL-SCNC: 139 MMOL/L (ref 135–147)
T WAVE AXIS: 94 DEGREES
VENTRICULAR RATE: 83 BPM
WBC # BLD AUTO: 13.43 THOUSAND/UL (ref 4.31–10.16)

## 2024-03-12 PROCEDURE — 93306 TTE W/DOPPLER COMPLETE: CPT

## 2024-03-12 PROCEDURE — 84484 ASSAY OF TROPONIN QUANT: CPT | Performed by: EMERGENCY MEDICINE

## 2024-03-12 PROCEDURE — 80053 COMPREHEN METABOLIC PANEL: CPT | Performed by: INTERNAL MEDICINE

## 2024-03-12 PROCEDURE — 93306 TTE W/DOPPLER COMPLETE: CPT | Performed by: INTERNAL MEDICINE

## 2024-03-12 PROCEDURE — 93010 ELECTROCARDIOGRAM REPORT: CPT | Performed by: INTERNAL MEDICINE

## 2024-03-12 PROCEDURE — 99222 1ST HOSP IP/OBS MODERATE 55: CPT | Performed by: INTERNAL MEDICINE

## 2024-03-12 PROCEDURE — 94760 N-INVAS EAR/PLS OXIMETRY 1: CPT

## 2024-03-12 PROCEDURE — 83735 ASSAY OF MAGNESIUM: CPT | Performed by: INTERNAL MEDICINE

## 2024-03-12 PROCEDURE — 36415 COLL VENOUS BLD VENIPUNCTURE: CPT | Performed by: EMERGENCY MEDICINE

## 2024-03-12 PROCEDURE — 85027 COMPLETE CBC AUTOMATED: CPT | Performed by: INTERNAL MEDICINE

## 2024-03-12 PROCEDURE — 94664 DEMO&/EVAL PT USE INHALER: CPT

## 2024-03-12 RX ORDER — ATORVASTATIN CALCIUM 40 MG/1
40 TABLET, FILM COATED ORAL
COMMUNITY

## 2024-03-12 RX ORDER — LABETALOL 300 MG/1
300 TABLET, FILM COATED ORAL 3 TIMES DAILY
COMMUNITY
End: 2024-03-28

## 2024-03-12 RX ORDER — LISINOPRIL 20 MG/1
20 TABLET ORAL 2 TIMES DAILY
COMMUNITY
End: 2024-03-19 | Stop reason: ALTCHOICE

## 2024-03-12 RX ORDER — ESCITALOPRAM OXALATE 5 MG/1
5 TABLET ORAL DAILY
COMMUNITY

## 2024-03-12 RX ORDER — TOPIRAMATE 25 MG/1
25 TABLET ORAL 2 TIMES DAILY
COMMUNITY

## 2024-03-12 RX ORDER — ACETAMINOPHEN 325 MG/1
650 TABLET ORAL EVERY 6 HOURS PRN
Status: DISCONTINUED | OUTPATIENT
Start: 2024-03-12 | End: 2024-03-15

## 2024-03-12 RX ORDER — FUROSEMIDE 10 MG/ML
60 INJECTION INTRAMUSCULAR; INTRAVENOUS
Status: DISCONTINUED | OUTPATIENT
Start: 2024-03-12 | End: 2024-03-13

## 2024-03-12 RX ORDER — ONDANSETRON 2 MG/ML
4 INJECTION INTRAMUSCULAR; INTRAVENOUS EVERY 6 HOURS PRN
Status: DISCONTINUED | OUTPATIENT
Start: 2024-03-12 | End: 2024-03-19 | Stop reason: HOSPADM

## 2024-03-12 RX ORDER — TOPIRAMATE 25 MG/1
25 TABLET ORAL 2 TIMES DAILY
Status: DISCONTINUED | OUTPATIENT
Start: 2024-03-12 | End: 2024-03-19 | Stop reason: HOSPADM

## 2024-03-12 RX ORDER — ENOXAPARIN SODIUM 100 MG/ML
40 INJECTION SUBCUTANEOUS DAILY
Status: DISCONTINUED | OUTPATIENT
Start: 2024-03-12 | End: 2024-03-19 | Stop reason: HOSPADM

## 2024-03-12 RX ORDER — HYDROXYZINE HYDROCHLORIDE 25 MG/1
25 TABLET, FILM COATED ORAL
Status: DISCONTINUED | OUTPATIENT
Start: 2024-03-12 | End: 2024-03-13

## 2024-03-12 RX ORDER — HYDROXYZINE HYDROCHLORIDE 25 MG/1
25 TABLET, FILM COATED ORAL
COMMUNITY

## 2024-03-12 RX ORDER — FLUTICASONE FUROATE, UMECLIDINIUM BROMIDE AND VILANTEROL TRIFENATATE 100; 62.5; 25 UG/1; UG/1; UG/1
1 POWDER RESPIRATORY (INHALATION) DAILY
COMMUNITY

## 2024-03-12 RX ORDER — ESCITALOPRAM OXALATE 10 MG/1
5 TABLET ORAL DAILY
Status: DISCONTINUED | OUTPATIENT
Start: 2024-03-12 | End: 2024-03-19 | Stop reason: HOSPADM

## 2024-03-12 RX ORDER — FLUTICASONE FUROATE AND VILANTEROL 100; 25 UG/1; UG/1
1 POWDER RESPIRATORY (INHALATION) DAILY
Status: DISCONTINUED | OUTPATIENT
Start: 2024-03-12 | End: 2024-03-19 | Stop reason: HOSPADM

## 2024-03-12 RX ORDER — ATORVASTATIN CALCIUM 40 MG/1
40 TABLET, FILM COATED ORAL
Status: DISCONTINUED | OUTPATIENT
Start: 2024-03-12 | End: 2024-03-12

## 2024-03-12 RX ORDER — ALBUTEROL SULFATE 2.5 MG/3ML
2.5 SOLUTION RESPIRATORY (INHALATION) EVERY 6 HOURS PRN
Status: DISCONTINUED | OUTPATIENT
Start: 2024-03-12 | End: 2024-03-17

## 2024-03-12 RX ORDER — LABETALOL 100 MG/1
300 TABLET, FILM COATED ORAL EVERY 8 HOURS SCHEDULED
Status: DISCONTINUED | OUTPATIENT
Start: 2024-03-12 | End: 2024-03-19 | Stop reason: HOSPADM

## 2024-03-12 RX ORDER — LISINOPRIL 20 MG/1
20 TABLET ORAL 2 TIMES DAILY
Status: DISCONTINUED | OUTPATIENT
Start: 2024-03-12 | End: 2024-03-13

## 2024-03-12 RX ORDER — ALBUTEROL SULFATE 2.5 MG/3ML
2.5 SOLUTION RESPIRATORY (INHALATION) EVERY 6 HOURS PRN
COMMUNITY

## 2024-03-12 RX ADMIN — ENOXAPARIN SODIUM 40 MG: 40 INJECTION SUBCUTANEOUS at 09:34

## 2024-03-12 RX ADMIN — LABETALOL HYDROCHLORIDE 300 MG: 100 TABLET, FILM COATED ORAL at 15:58

## 2024-03-12 RX ADMIN — TOPIRAMATE 25 MG: 25 TABLET, FILM COATED ORAL at 18:07

## 2024-03-12 RX ADMIN — LISINOPRIL 20 MG: 20 TABLET ORAL at 09:34

## 2024-03-12 RX ADMIN — FUROSEMIDE 60 MG: 10 INJECTION, SOLUTION INTRAMUSCULAR; INTRAVENOUS at 16:05

## 2024-03-12 RX ADMIN — LABETALOL HYDROCHLORIDE 300 MG: 100 TABLET, FILM COATED ORAL at 21:48

## 2024-03-12 RX ADMIN — UMECLIDINIUM 1 PUFF: 62.5 AEROSOL, POWDER ORAL at 11:29

## 2024-03-12 RX ADMIN — ACETAMINOPHEN 650 MG: 325 TABLET, FILM COATED ORAL at 11:39

## 2024-03-12 RX ADMIN — LISINOPRIL 20 MG: 20 TABLET ORAL at 21:48

## 2024-03-12 RX ADMIN — FLUTICASONE FUROATE AND VILANTEROL TRIFENATATE 1 PUFF: 100; 25 POWDER RESPIRATORY (INHALATION) at 11:29

## 2024-03-12 RX ADMIN — FUROSEMIDE 60 MG: 10 INJECTION, SOLUTION INTRAMUSCULAR; INTRAVENOUS at 09:33

## 2024-03-12 RX ADMIN — TOPIRAMATE 25 MG: 25 TABLET, FILM COATED ORAL at 09:34

## 2024-03-12 RX ADMIN — ESCITALOPRAM OXALATE 5 MG: 10 TABLET ORAL at 09:34

## 2024-03-12 RX ADMIN — LABETALOL HYDROCHLORIDE 300 MG: 100 TABLET, FILM COATED ORAL at 05:49

## 2024-03-12 NOTE — CONSULTS
Consultation - Cardiology   Dwaine Gould 63 y.o. female MRN: [id number]  Unit/Bed#: -01 Encounter: [id number]  03/12/24  2:23 PM    Assessment/ Plan:  Acute on chronic HFpEF, quite overloaded, continue lasix 60mg BID.  Daily weights.  Salt restriction.  Strict I's and O's.  Continue labetalol, lisinopril.  Echocardiogram ordered and pending.    2.  Hypertension stable on the lower side at times.  Continue on medications.  Continue labetalol, lisinopril, Lasix    3.  CAD, per [facility] records patient with single-vessel CAD with ostial RPDA lesion not amenable to PCI due to location of lesion October 2014.  Medical management advised.  Continue labetalol, lisinopril    4.  Transaminitis likely secondary to #1, statins discontinued at this time.  Continue to monitor liver functions.  Previously on Lipitor    5.  Hyperlipidemia, currently discontinued given transaminitis.      History of Present Illness   Physician Requesting Consult: Pravin Wolff DO    Reason for Consult / Principal Problem: chf    HPI: Dwaine Gould is a 63 y.o. year old female who presents with extremity edema.  Patient states she has chronic lower extremity and takes Lasix as needed.  Patient notes over the last couple days she had worsening lower extremity edema, increasing abdominal girth so much that she realized she could not wipe her own behind.  Patient admits to dietary indiscretions including Chinese food, chicken noodle soup, ham.  Also notes feeling extremely bloated and short of breath.  Patient is also a chronic smoker 2 packs a day.  She denies any chest pain, sweatiness, palpitations.  Patient states she took her last dose of Lasix the day prior without much relief.     PMH: Chronic diastolic heart failure, hypertension, CAD per [facility] records cardiac cath October 2014 showed single-vessel CAD with ostial RPDA lesion not amenable to PCI due to location of lesion, hyperlipidemia    Follows with Betty Dan    Inpatient  consult to Cardiology  Consult performed by: Angie Leong PA-C  Consult ordered by: Gabby Duran DO          EKG: NSR      Review of Systems   Constitutional: Negative.    Respiratory:  Positive for shortness of breath.    Cardiovascular:  Positive for leg swelling.   Gastrointestinal:  Positive for abdominal distention.   Musculoskeletal: Negative.    Neurological: Negative.    Hematological: Negative.    Psychiatric/Behavioral: Negative.     All other systems reviewed and are negative.      Historical Information   Past Medical History:   Diagnosis Date    Hyperlipidemia     Hypertension      Past Surgical History:   Procedure Laterality Date    CARDIAC CATHETERIZATION      GANGLION CYST EXCISION      TYMPANOSTOMY TUBE PLACEMENT       Social History     Substance and Sexual Activity   Alcohol Use Never     Social History     Substance and Sexual Activity   Drug Use Never     Social History     Tobacco Use   Smoking Status Every Day    Current packs/day: 0.50    Types: Cigarettes   Smokeless Tobacco Never       Family History: History reviewed. No pertinent family history.    Meds/Allergies   all current active meds have been reviewed  Allergies   Allergen Reactions    Codeine Anaphylaxis    Contrast [Iodinated Contrast Media] GI Intolerance       Objective   Vitals: Blood pressure 101/58, pulse 60, temperature 97.8 °F (36.6 °C), temperature source Oral, resp. rate 17, height 5' (1.524 m), weight (!) 147 kg (325 lb), SpO2 95%., Body mass index is 63.47 kg/m².,   Orthostatic Blood Pressures      Flowsheet Row Most Recent Value   Blood Pressure 101/58 filed at 2024 1226   Patient Position - Orthostatic VS Lying filed at 2024 1217            Systolic (24hrs), Av , Min:101 , Max:183     Diastolic (24hrs), Av, Min:58, Max:94      No intake or output data in the 24 hours ending 24 1423    Invasive Devices       Peripheral Intravenous Line  Duration             Peripheral IV  03/11/24 Right Antecubital <1 day                        Physical Exam:  GEN: Alert and oriented x 3, in no acute distress.  Ill appearing and well nourished.   HEENT: Sclera anicteric, conjunctivae pink, mucous membranes moist. Oropharynx clear.   NECK: Supple, no carotid bruits, unable to assess JVD. Trachea midline, no thyromegaly.   HEART: Regular rhythm, normal S1 and S2, no murmurs, clicks, gallops or rubs. PMI nondisplaced, no thrills.   LUNGS: decreased breath sounds bilaterally; no wheezes, rales, or rhonchi. No increased work of breathing or signs of respiratory distress.   ABDOMEN: +abdominal wall edema  EXTREMITIES: +3 edema, Skin warm and well perfused, no clubbing, cyanosis  NEURO: No focal findings. Normal speech. Mood and affect normal.   SKIN: Normal without suspicious lesions on exposed skin.      Lab Results:     Troponins:   Results from last 7 days   Lab Units 03/12/24  0232 03/12/24  0019 03/11/24  2204   HS TNI 0HR ng/L  --   --  23   HS TNI 2HR ng/L  --  26  --    HS TNI 4HR ng/L 22  --   --    HSTNI D4 ng/L -1  --   --        CBC with diff:   Results from last 7 days   Lab Units 03/12/24  0549 03/11/24  2204   WBC Thousand/uL 13.43* 13.72*   HEMOGLOBIN g/dL 13.6 13.7   HEMATOCRIT % 46.9* 46.7*   MCV fL 81* 80*   PLATELETS Thousands/uL 267 266   RBC Million/uL 5.79* 5.81*   MCH pg 23.5* 23.6*   MCHC g/dL 29.0* 29.3*   RDW % 18.9* 19.1*   MPV fL 9.3 9.0   NRBC AUTO /100 WBCs  --  0         CMP:   Results from last 7 days   Lab Units 03/12/24  0549 03/11/24  2204   POTASSIUM mmol/L 4.9 5.2   CHLORIDE mmol/L 105 105   CO2 mmol/L 28 28   BUN mg/dL 31* 30*   CREATININE mg/dL 1.25 1.18   CALCIUM mg/dL 8.5 8.6   AST U/L 49* 65*   ALT U/L 108* 123*   ALK PHOS U/L 158* 169*   EGFR ml/min/1.73sq m 46 49

## 2024-03-12 NOTE — TELEPHONE ENCOUNTER
PT is currently admitted     PT is not established with  Cardiology and has nor been consulted by cardio in the hospital as of yet. PT goes to [facility] for cardiac services. Need to verify if patient will be following up with regular cardiologist.     PT needs 24-72 hr hsp f/u for acute CHF

## 2024-03-12 NOTE — ASSESSMENT & PLAN NOTE
Wt Readings from Last 3 Encounters:   07/20/21 103 kg (227 lb 8.2 oz)   09/22/20 96.9 kg (213 lb 10 oz)   02/03/20 93.4 kg (206 lb)   Last echocardiogram in 2022 with preserved EF of 60 to 65%, grade 1 diastolic dysfunction.  Will repeat echo  Will consult cardiology  Chronically on p.o. Lasix 20 mg daily  Will place on IV Lasix 60 mg twice daily  Monitor I/O, daily weights  Educated patient on lifestyle/dietary modification  Serial troponin negative

## 2024-03-12 NOTE — H&P
Critical access hospital  H&P  Name: Dwaine Gould 63 y.o. female I MRN: [id number]  Unit/Bed#: Z1H4 I Date of Admission: 3/11/2024   Date of Service: 3/12/2024 I Hospital Day: 1      Assessment/Plan   * Acute on chronic diastolic heart failure (HCC)  Assessment & Plan  Wt Readings from Last 3 Encounters:   07/20/21 103 kg (227 lb 8.2 oz)   09/22/20 96.9 kg (213 lb 10 oz)   02/03/20 93.4 kg (206 lb)   Last echocardiogram in 2022 with preserved EF of 60 to 65%, grade 1 diastolic dysfunction.  Will repeat echo  Will consult cardiology  Chronically on p.o. Lasix 20 mg daily  Will place on IV Lasix 60 mg twice daily  Monitor I/O, daily weights  Educated patient on lifestyle/dietary modification  Serial troponin negative            Hypertensive urgency  Assessment & Plan  Improved  Continue blood pressure medication    Leukocytosis  Assessment & Plan  Likely reactive  No focal sign of acute infection source  Continue to trend    Transaminitis  Assessment & Plan  Likely secondary to hepatic congestion due to heart failure exacerbation  No clinical signs to suggest acute biliary tree disease i.e. infection or obstruction  Continue to trend    COPD (chronic obstructive pulmonary disease) ([facility])  Assessment & Plan  No sign of acute exacerbation  Continue pulmonary inhalers      CAD (coronary artery disease)  Assessment & Plan  Serial troponin negative  Patient is chest pain-free      Tobacco abuse  Assessment & Plan  Smoking cessation         VTE Prophylaxis: Enoxaparin (Lovenox)  / sequential compression device   Code Status: Full code  POLST: There is no POLST form on file for this patient (pre-hospital)  Discussion with family: Plan of care discussed with patient and her     Anticipated Length of Stay:  Patient will be admitted on an Inpatient basis with an anticipated length of stay of more than 2 midnights.   Justification for Hospital Stay: CHF exacerbation    Total Time for Visit, including Counseling /  Coordination of Care: 60 minutes.  Greater than 50% of this total time spent on direct patient counseling and coordination of care.    Chief Complaint:   Lower extremity edema, anasarca    History of Present Illness:    Dwaine Gould is a 63 y.o. female  medical history significant for chronic diastolic heart failure, hypertension, CAD presenting with lower extremity edema and anasarca.  She reports of chronic intermittent lower extremity edema for which she reports she takes as needed Lasix.  Per her med list at [facility] though she is supposed to be on scheduled Lasix 20 mg daily.  She reports over the past few days she has noted interval worsening lower extremity edema and increasing abdominal girth.  She also reports of edematous feeling extremely bloated.  She denies chest pain, diaphoresis, palpitations.  She reports that she last took her dose of Lasix yesterday without much relief.    Upon presentation to the ER, noted with signs of volume overload.  She was given a dose of IV Lasix 40 mg x 1.    Review of Systems:    Review of Systems   Cardiovascular:  Positive for leg swelling.   Musculoskeletal:  Positive for back pain.        History of chronic back pain       Past Medical and Surgical History:     Past Medical History:   Diagnosis Date    Hyperlipidemia     Hypertension        Past Surgical History:   Procedure Laterality Date    CARDIAC CATHETERIZATION      GANGLION CYST EXCISION      TYMPANOSTOMY TUBE PLACEMENT         Meds/Allergies:    Prior to Admission medications    Medication Sig Start Date End Date Taking? Authorizing Provider   albuterol (2.5 mg/3 mL) 0.083 % nebulizer solution Take 2.5 mg by nebulization every 6 (six) hours as needed for wheezing or shortness of breath   Yes Historical Provider, MD   atorvastatin (Lipitor) 40 mg tablet Take 40 mg by mouth daily at bedtime   Yes Historical Provider, MD   escitalopram (Lexapro) 5 mg tablet Take 5 mg by mouth daily   Yes Historical Provider,  MD   fluticasone-umeclidinium-vilanterol (Trelegy Ellipta) 100-62.5-25 mcg/actuation inhaler Inhale 1 puff daily Rinse mouth after use.   Yes Historical Provider, MD   hydrOXYzine HCL (ATARAX) 25 mg tablet Take 25 mg by mouth daily at bedtime as needed for itching   Yes Historical Provider, MD   labetalol (NORMODYNE) 300 mg tablet Take 300 mg by mouth 3 (three) times a day   Yes Historical Provider, MD   lisinopril (ZESTRIL) 20 mg tablet Take 20 mg by mouth 2 (two) times a day   Yes Historical Provider, MD   topiramate (Topamax) 25 mg tablet Take 25 mg by mouth 2 (two) times a day   Yes Historical Provider, MD   cholecalciferol (VITAMIN D3) 1,000 units tablet Take 50,000 Units by mouth once a week    Historical Provider, MD   meloxicam (MOBIC) 15 mg tablet Take 15 mg by mouth daily    Historical Provider, MD   amLODIPine (NORVASC) 10 mg tablet Take 1 tablet (10 mg total) by mouth daily 7/21/21 3/12/24  Milagro Mike PA-C   atorvastatin (LIPITOR) 40 mg tablet Take 40 mg by mouth daily  Patient not taking: Reported on 7/19/2021  3/12/24  Historical Provider, MD   nicotine (NICODERM CQ) 14 mg/24hr TD 24 hr patch Place 1 patch on the skin daily 7/21/21 3/12/24  Milagro Mike PA-C   oxyCODONE-acetaminophen (PERCOCET) 5-325 mg per tablet Take 1 tablet by mouth every 6 (six) hours as needed for severe pain for up to 20 dosesMax Daily Amount: 4 tablets  Patient not taking: Reported on 7/19/2021 9/22/20 3/12/24  Ernesto Hickman MD   vitamin E 100 UNIT capsule Take 100 Units by mouth once a week  3/12/24  Historical Provider, MD LARIOS have reviewed home medications using allscripts.    Allergies:   Allergies   Allergen Reactions    Codeine Anaphylaxis    Contrast [Iodinated Contrast Media] GI Intolerance       Social History:     Marital Status: /Civil Union   Occupation:   Patient Pre-hospital Living Situation: Resides at home  Patient Pre-hospital Level of Mobility: Independent  Patient Pre-hospital Diet  Restrictions: None  Substance Use History:   Social History     Substance and Sexual Activity   Alcohol Use Never     Social History     Tobacco Use   Smoking Status Every Day    Current packs/day: 0.50    Types: Cigarettes   Smokeless Tobacco Never     Social History     Substance and Sexual Activity   Drug Use Never       Family History:    History reviewed. No pertinent family history.    Physical Exam:     Vitals:   Blood Pressure: 121/74 (03/11/24 2308)  Pulse: 66 (03/11/24 2308)  Temperature: 98.1 °F (36.7 °C) (03/11/24 2112)  Temp Source: Temporal (03/11/24 2112)  Respirations: 20 (03/11/24 2308)  SpO2: 96 % (03/11/24 2308)    Physical Exam  Constitutional:       Appearance: She is obese.   Cardiovascular:      Rate and Rhythm: Normal rate and regular rhythm.      Pulses: Normal pulses.      Heart sounds: Normal heart sounds.   Pulmonary:      Effort: Pulmonary effort is normal. No respiratory distress.      Breath sounds: Normal breath sounds. No wheezing or rales.   Abdominal:      General: Bowel sounds are normal. There is no distension.      Palpations: Abdomen is soft.      Tenderness: There is no abdominal tenderness. There is no guarding.      Comments: Positive anasarca   Musculoskeletal:      Cervical back: Normal range of motion and neck supple.      Right lower leg: Edema present.      Left lower leg: Edema present.   Skin:     General: Skin is warm and dry.   Neurological:      General: No focal deficit present.      Mental Status: She is alert and oriented to person, place, and time. Mental status is at baseline.      Cranial Nerves: No cranial nerve deficit.      Motor: No weakness.         Additional Data:     Lab Results: I have personally reviewed pertinent reports.      Results from last 7 days   Lab Units 03/11/24  2204   WBC Thousand/uL 13.72*   HEMOGLOBIN g/dL 13.7   HEMATOCRIT % 46.7*   PLATELETS Thousands/uL 266   NEUTROS PCT % 80*   LYMPHS PCT % 11*   MONOS PCT % 7   EOS PCT % 1      Results from last 7 days   Lab Units 03/11/24  2204   SODIUM mmol/L 139   POTASSIUM mmol/L 5.2   CHLORIDE mmol/L 105   CO2 mmol/L 28   BUN mg/dL 30*   CREATININE mg/dL 1.18   ANION GAP mmol/L 6   CALCIUM mg/dL 8.6   ALBUMIN g/dL 3.5   TOTAL BILIRUBIN mg/dL 0.63   ALK PHOS U/L 169*   ALT U/L 123*   AST U/L 65*   GLUCOSE RANDOM mg/dL 120                       Imaging:  Follow-up official chest x-ray results    XR chest 2 views    (Results Pending)       EKG, Pathology, and Other Studies Reviewed on Admission:   EKG: Normal sinus rhythm at 83 bpm, right axis deviation    Allscripts / [facility] Records Reviewed: Yes     ** Please Note: This note has been constructed using a voice recognition system. **

## 2024-03-12 NOTE — ASSESSMENT & PLAN NOTE
Likely secondary to hepatic congestion due to heart failure exacerbation  No clinical signs to suggest acute biliary tree disease i.e. infection or obstruction  Continue to trend

## 2024-03-12 NOTE — ED PROVIDER NOTES
"Pt Name: Dwaine Gould  MRN: [id number]  Birthdate 1960  Age/Sex: 63 y.o. female  Date of evaluation: 3/11/2024  PCP: Jordan Ramirez MD    CHIEF COMPLAINT    Chief Complaint   Patient presents with    Shortness of Breath     Pt reports shortness of breath and feeling \"swollen\" on and off for \"awhile\". Pt states PCP prescribed Lasix and has been compliant with medication.          HPI and MDM    63 y.o. female presenting with shortness breath, leg swelling bilaterally.  Patient states that 2 months ago she was started on as needed Lasix for swelling.  Has been recently getting worse.  She has become short of breath.  Has productive cough with white sputum.  No fevers or chills.  No chest pain.  No known sick contacts.      Differential diagnosis considered includes but not limited to CHF exacerbation, ACS, electrolyte abnormalities, pneumonia, viral respiratory infection.    Patient does not wear any baseline oxygen, is currently requiring 2 L of oxygen via nasal cannula.    Per my independent interpretation of EKG, normal sinus, heart of 83, narrow QRS, intervals reassuring, no STEMI.    ED Course as of 03/11/24 2338   Mon Mar 11, 2024   2259 XR chest 2 views  Per my independent interpretation, cardiomegaly is noted.      BNP is elevated.  Patient given IV Lasix.  Discussed with internal medicine for hospitalization.    Medications   furosemide (LASIX) injection 40 mg (40 mg Intravenous Given 3/11/24 2300)         Past Medical and Surgical History    Past Medical History:   Diagnosis Date    Hyperlipidemia     Hypertension        Past Surgical History:   Procedure Laterality Date    CARDIAC CATHETERIZATION      GANGLION CYST EXCISION      TYMPANOSTOMY TUBE PLACEMENT         History reviewed. No pertinent family history.    Social History     Tobacco Use    Smoking status: Every Day     Current packs/day: 0.50     Types: Cigarettes    Smokeless tobacco: Never   Substance Use Topics    Alcohol use: Never    Drug " use: Never           Allergies    Allergies   Allergen Reactions    Codeine Anaphylaxis    Contrast [Iodinated Contrast Media] GI Intolerance       Home Medications    Prior to Admission medications    Medication Sig Start Date End Date Taking? Authorizing Provider   amLODIPine (NORVASC) 10 mg tablet Take 1 tablet (10 mg total) by mouth daily 7/21/21   Milagro Mike PA-C   atorvastatin (LIPITOR) 40 mg tablet Take 40 mg by mouth daily  Patient not taking: Reported on 7/19/2021    Historical Provider, MD   cholecalciferol (VITAMIN D3) 1,000 units tablet Take 1,000 Units by mouth once a week    Historical Provider, MD   lisinopril (ZESTRIL) 40 mg tablet Take 1 tablet (40 mg total) by mouth daily 7/20/21 7/20/22  Milagro Mike PA-C   meloxicam (MOBIC) 15 mg tablet Take 15 mg by mouth daily    Historical Provider, MD   nicotine (NICODERM CQ) 14 mg/24hr TD 24 hr patch Place 1 patch on the skin daily 7/21/21   Milagro Mike PA-C   oxyCODONE-acetaminophen (PERCOCET) 5-325 mg per tablet Take 1 tablet by mouth every 6 (six) hours as needed for severe pain for up to 20 dosesMax Daily Amount: 4 tablets  Patient not taking: Reported on 7/19/2021 9/22/20   Ernesto Hickman MD   vitamin E 100 UNIT capsule Take 100 Units by mouth once a week  Patient not taking: Reported on 7/19/2021    Historical Provider, MD           Physical Exam      ED Triage Vitals [03/11/24 2112]   Temperature Pulse Respirations Blood Pressure SpO2   98.1 °F (36.7 °C) 81 19 (!) 183/94 90 %      Temp Source Heart Rate Source Patient Position - Orthostatic VS BP Location FiO2 (%)   Temporal Monitor Sitting Right arm --      Pain Score       --               Physical Exam  Constitutional:       General: She is not in acute distress.  HENT:      Head: Normocephalic and atraumatic.      Nose: Nose normal.      Mouth/Throat:      Mouth: Mucous membranes are moist.   Eyes:      Extraocular Movements: Extraocular movements intact.      Pupils: Pupils are equal,  round, and reactive to light.   Cardiovascular:      Rate and Rhythm: Normal rate and regular rhythm.   Pulmonary:      Effort: Pulmonary effort is normal. No respiratory distress.      Comments: Fine wheezes appreciated bilaterally  Abdominal:      Palpations: Abdomen is soft.      Tenderness: There is no abdominal tenderness.   Musculoskeletal:         General: No deformity.      Cervical back: Normal range of motion and neck supple.      Comments: Significant bilateral lower extremity pitting edema   Skin:     General: Skin is warm.      Findings: No erythema.   Neurological:      Mental Status: She is alert and oriented to person, place, and time. Mental status is at baseline.              Diagnostic Results      Labs:    Results Reviewed       Procedure Component Value Units Date/Time    FLU/RSV/COVID - if FLU/RSV clinically relevant [[id number]] Collected: 03/11/24 2259    Lab Status: In process Specimen: Nares from Nose Updated: 03/11/24 2303    B-Type Natriuretic Peptide(BNP) [[id number]]  (Abnormal) Collected: 03/11/24 2204    Lab Status: Final result Specimen: Blood from Arm, Right Updated: 03/11/24 2240     BNP 1,925 pg/mL     HS Troponin I 2hr [[id number]]     Lab Status: No result Specimen: Blood     HS Troponin 0hr (reflex protocol) [[id number]]  (Normal) Collected: 03/11/24 2204    Lab Status: Final result Specimen: Blood from Arm, Right Updated: 03/11/24 2239     hs TnI 0hr 23 ng/L     Comprehensive metabolic panel [[id number]]  (Abnormal) Collected: 03/11/24 2204    Lab Status: Final result Specimen: Blood from Arm, Right Updated: 03/11/24 2234     Sodium 139 mmol/L      Potassium 5.2 mmol/L      Chloride 105 mmol/L      CO2 28 mmol/L      ANION GAP 6 mmol/L      BUN 30 mg/dL      Creatinine 1.18 mg/dL      Glucose 120 mg/dL      Calcium 8.6 mg/dL      AST 65 U/L       U/L      Alkaline Phosphatase 169 U/L      Total Protein 6.4 g/dL      Albumin 3.5 g/dL      Total Bilirubin 0.63 mg/dL      eGFR  49 ml/min/1.73sq m     Narrative:      National Kid[employer] guidelines for Chronic Kidney Disease (CKD):     Stage 1 with normal or high GFR (GFR > 90 mL/min/1.73 square meters)    Stage 2 Mild CKD (GFR = 60-89 mL/min/1.73 square meters)    Stage 3A Moderate CKD (GFR = 45-59 mL/min/1.73 square meters)    Stage 3B Moderate CKD (GFR = 30-44 mL/min/1.73 square meters)    Stage 4 Severe CKD (GFR = 15-29 mL/min/1.73 square meters)    Stage 5 End Stage CKD (GFR <15 mL/min/1.73 square meters)  Note: GFR calculation is accurate only with a steady state creatinine    Magnesium [[id number]]  (Normal) Collected: 03/11/24 2204    Lab Status: Final result Specimen: Blood from Arm, Right Updated: 03/11/24 2234     Magnesium 2.1 mg/dL     CBC and differential [[id number]]  (Abnormal) Collected: 03/11/24 2204    Lab Status: Final result Specimen: Blood from Arm, Right Updated: 03/11/24 2210     WBC 13.72 Thousand/uL      RBC 5.81 Million/uL      Hemoglobin 13.7 g/dL      Hematocrit 46.7 %      MCV 80 fL      MCH 23.6 pg      MCHC 29.3 g/dL      RDW 19.1 %      MPV 9.0 fL      Platelets 266 Thousands/uL      nRBC 0 /100 WBCs      Neutrophils Relative 80 %      Immat GRANS % 1 %      Lymphocytes Relative 11 %      Monocytes Relative 7 %      Eosinophils Relative 1 %      Basophils Relative 0 %      Neutrophils Absolute 11.21 Thousands/µL      Immature Grans Absolute 0.07 Thousand/uL      Lymphocytes Absolute 1.44 Thousands/µL      Monocytes Absolute 0.89 Thousand/µL      Eosinophils Absolute 0.07 Thousand/µL      Basophils Absolute 0.04 Thousands/µL             All labs reviewed and utilized in the medical decision making process    Radiology:    XR chest 2 views    (Results Pending)       All radiology studies independently viewed by me and interpreted by the radiologist.    Procedure    Procedures        FINAL IMPRESSION    Final diagnoses:   CHF (congestive heart failure) (HCC)   Acute respiratory failure with hypoxia  (HCC)         DISPOSITION    Time reflects when diagnosis was documented in both MDM as applicable and the Disposition within this note       Time User Action Codes Description Comment    3/11/2024 11:37 PM Jose David Uriostegui [I50.9] CHF (congestive heart failure) (HCC)     3/11/2024 11:37 PM Jose David Uriostegui [J96.01] Acute respiratory failure with hypoxia ([facility])           ED Disposition       ED Disposition   Admit    Condition   Stable    Date/Time   Mon Mar 11, 2024 11:37 PM    Comment   Case was discussed with Dr. Duran and the patient's admission status was agreed to be Admission Status: inpatient status to the service of Dr. Duran.               Follow-up Information    None           PATIENT REFERRED TO:    No follow-up provider specified.    DISCHARGE MEDICATIONS:    Patient's Medications   Discharge Prescriptions    No medications on file       No discharge procedures on file.         Jose David Uriostegui DO        This note was partially completed using voice recognition technology, and was scanned for gross errors; however some errors may still exist. Please contact the author with any questions or requests for clarification.      Jose David Uriostegui DO  03/11/24 2765

## 2024-03-12 NOTE — UTILIZATION REVIEW
"Initial Clinical Review    Admission: Date/Time/Statement:   Admission Orders (From admission, onward)       Ordered        03/11/24 2337  INPATIENT ADMISSION  Once                          Orders Placed This Encounter   Procedures    INPATIENT ADMISSION     Standing Status:   Standing     Number of Occurrences:   1     Order Specific Question:   Level of Care     Answer:   Med Surg [16]     Order Specific Question:   Estimated length of stay     Answer:   More than 2 Midnights     Order Specific Question:   Certification     Answer:   I certify that inpatient services are medically necessary for this patient for a duration of greater than two midnights. See H&P and MD Progress Notes for additional information about the patient's course of treatment.     ED Arrival Information       Expected   -    Arrival   3/11/2024 21:03    Acuity   Emergent              Means of arrival   Wheelchair    Escorted by   Family Member    Service   Hospitalist    Admission type   Emergency              Arrival complaint   sob             Chief Complaint   Patient presents with    Shortness of Breath     Pt reports shortness of breath and feeling \"swollen\" on and off for \"awhile\". Pt states PCP prescribed Lasix and has been compliant with medication.        Initial Presentation: 63 y.o. female who presented self from home to [facility] ED. Inpatient admission for evaluation and treatment of CHF. PMHx: HLD, HTN, CHF, CAD. Presented w/ b/l LE swelling, increased abdominal girth and bloating. Pt reports being on \"as needed\" lasix, but chart review shows pt prescribed 20 mg lasix daily. On exam, significant b/l LE pitting edema, wheezing b/l, requiring 2L O2 NC (baseline room air). BNP 1,925. Plan: IV lasix, Trend labs, replete electrolytes as needed; Supplemental O2, weaned as tolerated; continue PTA meds. Cardiology consulted.      Date: 03/12/24   Day 2:  On exam, anasarca, b/l LE edema. Plan: check echo, IV lasix 60 mg BID, DW, " I&O, continue PTA meds, Trend labs, replete electrolytes as needed. Supportive care.     Cardiology: Pt is significantly volume overloaded. Exam: unable to assess for JVD, decreased breath sounds, abdominal edema, b/l LE 3+ edema. Plan: continue IV lasix 60 mg BID, DW, I&O, sodium and fluid restriction. Continue labetalol and lisinopril. Check echo. Trend labs, replete electrolytes as needed.    ED Triage Vitals [03/11/24 2112]   Temperature Pulse Respirations Blood Pressure SpO2   98.1 °F (36.7 °C) 81 19 (!) 183/94 90 %      Temp Source Heart Rate Source Patient Position - Orthostatic VS BP Location FiO2 (%)   Temporal Monitor Sitting Right arm --      Pain Score       --          Wt Readings from Last 1 Encounters:   07/20/21 103 kg (227 lb 8.2 oz)     Additional Vital Signs:   Date/Time Temp Pulse Resp BP MAP (mmHg) SpO2 O2 Device   03/12/24 1410 -- 60 -- 101/58 -- -- --   03/12/24 1226 97.8 °F (36.6 °C) 60 17 101/58 -- -- --   03/12/24 1217 97.8 °F (36.6 °C) 60 17 101/58 77 95 % --   03/12/24 1043 -- 68 18 127/62 86 96 % Nasal cannula     Date/Time Pulse Resp BP MAP (mmHg) SpO2 Calculated FIO2 (%) - Nasal Cannula Nasal Cannula O2 Flow Rate (L/min) O2 Device   03/12/24 0831 50 Abnormal  -- 120/67 88 94 % -- -- None (Room air)   03/12/24 0624 73 18 119/59 84 97 % 28 2 L/min Nasal cannula   03/11/24 2308 66 20 121/74 93 96 % 28 2 L/min Nasal cannula   03/11/24 2116 -- -- -- -- 94 % 28 2 L/min Nasal cannula   03/11/24 2115 -- -- -- -- 88 % Abnormal  -- -- None (Room air)       Pertinent Labs/Diagnostic Test Results:   3/11 - EKG  Normal sinus rhythm  Right axis deviation  Low voltage QRS  Cannot rule out Anterior infarct , age undetermined    3/12 - Echo    Left Ventricle: Left ventricular cavity size is normal. Wall thickness is increased. There is mild to moderate concentric hypertrophy. The left ventricular ejection fraction is 60%. Systolic function is normal. Although no diagnostic regional wall motion  abnormality was identified, this possibility cannot be completely excluded on the basis of this study. Diastolic function is normal.    Right Ventricle: Right ventricular cavity size is mild to moderately dilated. Systolic function is mildly reduced.    Left Atrium: The atrium is mildly dilated.    Right Atrium: The atrium is mildly dilated.    Tricuspid Valve: There is mild to moderate regurgitation. Estimated RVSP 40 mmHg.    XR chest 2 views   Final Result by Dale Heredia MD (03/12 0839)      No acute cardiopulmonary disease on this examination, which is somewhat limited secondary to low lung volumes.            Workstation performed: [id number]           Results from last 7 days   Lab Units 03/11/24  2259   SARS-COV-2  Negative     Results from last 7 days   Lab Units 03/12/24  0549 03/11/24 2204   WBC Thousand/uL 13.43* 13.72*   HEMOGLOBIN g/dL 13.6 13.7   HEMATOCRIT % 46.9* 46.7*   PLATELETS Thousands/uL 267 266   NEUTROS ABS Thousands/µL  --  11.21*         Results from last 7 days   Lab Units 03/12/24  0549 03/11/24 2204   SODIUM mmol/L 139 139   POTASSIUM mmol/L 4.9 5.2   CHLORIDE mmol/L 105 105   CO2 mmol/L 28 28   ANION GAP mmol/L 6 6   BUN mg/dL 31* 30*   CREATININE mg/dL 1.25 1.18   EGFR ml/min/1.73sq m 46 49   CALCIUM mg/dL 8.5 8.6   MAGNESIUM mg/dL 2.1 2.1     Results from last 7 days   Lab Units 03/12/24  0549 03/11/24  2204   AST U/L 49* 65*   ALT U/L 108* 123*   ALK PHOS U/L 158* 169*   TOTAL PROTEIN g/dL 6.3* 6.4   ALBUMIN g/dL 3.5 3.5   TOTAL BILIRUBIN mg/dL 0.61 0.63         Results from last 7 days   Lab Units 03/12/24  0549 03/11/24  2204   GLUCOSE RANDOM mg/dL 121 120     Results from last 7 days   Lab Units 03/12/24  0232 03/12/24  0019 03/11/24 2204   HS TNI 0HR ng/L  --   --  23   HS TNI 2HR ng/L  --  26  --    HSTNI D2 ng/L  --  3  --    HS TNI 4HR ng/L 22  --   --    HSTNI D4 ng/L -1  --   --      Results from last 7 days   Lab Units 03/11/24  2204   BNP pg/mL 1,925*      Results from last 7 days   Lab Units 03/11/24  6699   INFLUENZA A PCR  Negative   INFLUENZA B PCR  Negative   RSV PCR  Negative         ED Treatment:   Medication Administration from 03/11/2024 2103 to 03/12/2024 0956         Date/Time Order Dose Route Action     03/11/2024 2300 EDT furosemide (LASIX) injection 40 mg 40 mg Intravenous Given     03/12/2024 0934 EDT enoxaparin (LOVENOX) subcutaneous injection 40 mg 40 mg Subcutaneous Given     03/12/2024 0933 EDT furosemide (LASIX) injection 60 mg 60 mg Intravenous Given     03/12/2024 0934 EDT escitalopram (LEXAPRO) tablet 5 mg 5 mg Oral Given     03/12/2024 0549 EDT labetalol (NORMODYNE) tablet 300 mg 300 mg Oral Given     03/12/2024 0934 EDT lisinopril (ZESTRIL) tablet 20 mg 20 mg Oral Given     03/12/2024 0934 EDT topiramate (TOPAMAX) tablet 25 mg 25 mg Oral Given          Past Medical History:   Diagnosis Date    Hyperlipidemia     Hypertension      Present on Admission:   Hypertensive urgency   CAD (coronary artery disease)   Acute on chronic diastolic heart failure (HCC)   Tobacco abuse      Admitting Diagnosis: SOB (shortness of breath) [R06.02]  Age/Sex: 63 y.o. female  Admission Orders:  Cardiac Diet w/ 2 gm sodium restriction.  1500 mL fluid restriction.  Up & OOB as tolerated.  DW. I&O. SCDs.    Scheduled Medications:  enoxaparin, 40 mg, Subcutaneous, Daily  escitalopram, 5 mg, Oral, Daily  Fluticasone Furoate-Vilanterol, 1 puff, Inhalation, Daily   And  umeclidinium, 1 puff, Inhalation, Daily  furosemide, 60 mg, Intravenous, BID (diuretic)  labetalol, 300 mg, Oral, Q8H ANGELES  lisinopril, 20 mg, Oral, BID  topiramate, 25 mg, Oral, BID    Continuous IV Infusions: none    PRN Meds:  acetaminophen, 650 mg, Oral, Q6H PRN; 3/12 x1  albuterol, 2.5 mg, Nebulization, Q6H PRN  hydrOXYzine HCL, 25 mg, Oral, HS PRN  ondansetron, 4 mg, Intravenous, Q6H PRN        IP CONSULT TO CARDIOLOGY    Network Utilization Review Department  ATTENTION: Please call with any  questions or concerns to [phone] and carefully listen to the prompts so that you are directed to the right person. All voicemails are confidential.   For Discharge needs, contact Care Management DC Support Team at [phone] opt. [phone]  Send all requests for admission clinical reviews, approved or denied determinations and any other requests to dedicated fax number below belonging to the [facility] where the patient is receiving treatment. List of dedicated fax numbers for the Facilities:  FACILITY NAME UR FAX NUMBER   ADMISSION DENIALS (Administrative/Medical Necessity) [phone]   DISCHARGE SUPPORT TEAM (NETWORK) [phone]   PARENT CHILD HEALTH (Maternity/NICU/Pediatrics) [phone]   [facility] [phone]   [facility] [phone]   [facility] [phone]   [facility] [phone]   formerly [facility] [phone]   [facility] [phone]   General acute hospital [phone]   [facility] [phone]   [facility] [phone]   [facility] [phone]   [facility] [phone]   [facility] [phone]

## 2024-03-12 NOTE — CASE MANAGEMENT
Case Management Discharge Planning Note    Patient name Dwaine Gould  Location /-01 MRN [id number]  : 1960 Date 3/12/2024       Current Admission Date: 3/11/2024  Current Admission Diagnosis:Acute on chronic diastolic heart failure (HCC)   Patient Active Problem List    Diagnosis Date Noted    Acute on chronic diastolic heart failure (HCC) 2024    COPD (chronic obstructive pulmonary disease) (HCC) 2024    Transaminitis 2024    Leukocytosis 2024    Hypertensive urgency 2021    Tobacco abuse 2021    CAD (coronary artery disease) 2021      LOS (days): 1  Geometric Mean LOS (GMLOS) (days):   Days to GMLOS:     OBJECTIVE:  Risk of Unplanned Readmission Score: 7.71         Current admission status: Inpatient   Preferred Pharmacy:   [facility]/pharmacy #[id number] - [place] - [address]  [address]  [place] [id number]  Phone: [phone] Fax: [phone]    Primary Care Provider: Jordan Ramirez MD    Primary Insurance: [employer]  Secondary Insurance:     DISCHARGE DETAILS:    As per SLIM rounds, patient dx: acute on chronic diastolic heart failure. Cardio consult, diuresis, immobile, patient non-compliant, OPCM referral needed.

## 2024-03-12 NOTE — PLAN OF CARE
Problem: PAIN - ADULT  Goal: Verbalizes/displays adequate comfort level or baseline comfort level  Description: Interventions:  - Encourage patient to monitor pain and request assistance  - Assess pain using appropriate pain scale  - Administer analgesics based on type and severity of pain and evaluate response  - Implement non-pharmacological measures as appropriate and evaluate response  - Consider cultural and social influences on pain and pain management  - Notify physician/advanced practitioner if interventions unsuccessful or patient reports new pain  Outcome: Progressing     Problem: INFECTION - ADULT  Goal: Absence or prevention of progression during hospitalization  Description: INTERVENTIONS:  - Assess and monitor for signs and symptoms of infection  - Monitor lab/diagnostic results  - Monitor all insertion sites, i.e. indwelling lines, tubes, and drains  - Monitor endotracheal if appropriate and nasal secretions for changes in amount and color  - [place] appropriate cooling/warming therapies per order  - Administer medications as ordered  - Instruct and encourage patient and family to use good hand hygiene technique  - Identify and instruct in appropriate isolation precautions for identified infection/condition  Outcome: Progressing  Goal: Absence of fever/infection during neutropenic period  Description: INTERVENTIONS:  - Monitor WBC    Outcome: Progressing     Problem: SAFETY ADULT  Goal: Patient will remain free of falls  Description: INTERVENTIONS:  - Educate patient/family on patient safety including physical limitations  - Instruct patient to call for assistance with activity   - Consult OT/PT to assist with strengthening/mobility   - Keep Call bell within reach  - Keep bed low and locked with side rails adjusted as appropriate  - Keep care items and personal belongings within reach  - Initiate and maintain comfort rounds  - Make Fall Risk Sign visible to staff  - Offer Toileting every 2 Hours,  in advance of need  - Initiate/Maintain Bed alarm  - Obtain necessary fall risk management equipment: bed alarm  - Apply yellow socks and bracelet for high fall risk patients  - Consider moving patient to room near nurses station  Outcome: Progressing  Goal: Maintain or return to baseline ADL function  Description: INTERVENTIONS:  -  Assess patient's ability to carry out ADLs; assess patient's baseline for ADL function and identify physical deficits which impact ability to perform ADLs (bathing, care of mouth/teeth, toileting, grooming, dressing, etc.)  - Assess/evaluate cause of self-care deficits   - Assess range of motion  - Assess patient's mobility; develop plan if impaired  - Assess patient's need for assistive devices and provide as appropriate  - Encourage maximum independence but intervene and supervise when necessary  - Involve family in performance of ADLs  - Assess for home care needs following discharge   - Consider OT consult to assist with ADL evaluation and planning for discharge  - Provide patient education as appropriate  Outcome: Progressing  Goal: Maintains/Returns to pre admission functional level  Description: INTERVENTIONS:  - Perform AM-PAC 6 Click Basic Mobility/ Daily Activity assessment daily.  - Set and communicate daily mobility goal to care team and patient/family/caregiver.   - Collaborate with rehabilitation services on mobility goals if consulted  - Perform Range of Motion 3 times a day.  - Reposition patient every 3 hours.  - Dangle patient 3 times a day  - Stand patient 3 times a day  - Ambulate patient 3 times a day  - Out of bed to chair 3 times a day   - Out of bed for meals 3 times a day  - Out of bed for toileting  - Record patient progress and toleration of activity level   Outcome: Progressing

## 2024-03-12 NOTE — QUICK NOTE
Patient seen and examined at bedside, she is post-midnight admission.    Planning for cardiology evaluation for diuresis.  Patient volume overloaded as well as non-compliant with diet at home.    Echocardiogram pending, IV Lasix.  Strict intake/output.     Blood pressures have improved, stable, 101-120/58-67    Continue to monitor, follow-up with intake/output in the AM

## 2024-03-13 PROBLEM — I10 ESSENTIAL HYPERTENSION: Status: ACTIVE | Noted: 2019-06-04

## 2024-03-13 PROBLEM — E78.5 HYPERLIPIDEMIA: Status: ACTIVE | Noted: 2019-06-04

## 2024-03-13 PROBLEM — R79.89 ELEVATED SERUM CREATININE: Status: ACTIVE | Noted: 2024-03-13

## 2024-03-13 PROBLEM — I16.0 HYPERTENSIVE URGENCY: Status: RESOLVED | Noted: 2021-07-19 | Resolved: 2024-03-13

## 2024-03-13 LAB
ALBUMIN SERPL BCP-MCNC: 3.5 G/DL (ref 3.5–5)
ALP SERPL-CCNC: 143 U/L (ref 34–104)
ALT SERPL W P-5'-P-CCNC: 104 U/L (ref 7–52)
ANION GAP SERPL CALCULATED.3IONS-SCNC: 5 MMOL/L (ref 4–13)
AST SERPL W P-5'-P-CCNC: 47 U/L (ref 13–39)
BASE EX.OXY STD BLDV CALC-SCNC: 94.4 % (ref 60–80)
BASE EXCESS BLDV CALC-SCNC: 0.7 MMOL/L
BASOPHILS # BLD AUTO: 0.04 THOUSANDS/ÂΜL (ref 0–0.1)
BASOPHILS NFR BLD AUTO: 0 % (ref 0–1)
BILIRUB SERPL-MCNC: 0.42 MG/DL (ref 0.2–1)
BUN SERPL-MCNC: 40 MG/DL (ref 5–25)
CALCIUM SERPL-MCNC: 8.3 MG/DL (ref 8.4–10.2)
CHLORIDE SERPL-SCNC: 104 MMOL/L (ref 96–108)
CO2 SERPL-SCNC: 31 MMOL/L (ref 21–32)
CREAT SERPL-MCNC: 1.44 MG/DL (ref 0.6–1.3)
EOSINOPHIL # BLD AUTO: 0.15 THOUSAND/ÂΜL (ref 0–0.61)
EOSINOPHIL NFR BLD AUTO: 1 % (ref 0–6)
ERYTHROCYTE [DISTWIDTH] IN BLOOD BY AUTOMATED COUNT: 19 % (ref 11.6–15.1)
GFR SERPL CREATININE-BSD FRML MDRD: 39 ML/MIN/1.73SQ M
GLUCOSE SERPL-MCNC: 125 MG/DL (ref 65–140)
HCO3 BLDV-SCNC: 25.9 MMOL/L (ref 24–30)
HCT VFR BLD AUTO: 43.5 % (ref 34.8–46.1)
HGB BLD-MCNC: 12.8 G/DL (ref 11.5–15.4)
IMM GRANULOCYTES # BLD AUTO: 0.06 THOUSAND/UL (ref 0–0.2)
IMM GRANULOCYTES NFR BLD AUTO: 1 % (ref 0–2)
LYMPHOCYTES # BLD AUTO: 1.43 THOUSANDS/ÂΜL (ref 0.6–4.47)
LYMPHOCYTES NFR BLD AUTO: 13 % (ref 14–44)
MAGNESIUM SERPL-MCNC: 2.1 MG/DL (ref 1.9–2.7)
MCH RBC QN AUTO: 23.7 PG (ref 26.8–34.3)
MCHC RBC AUTO-ENTMCNC: 29.4 G/DL (ref 31.4–37.4)
MCV RBC AUTO: 81 FL (ref 82–98)
MONOCYTES # BLD AUTO: 0.94 THOUSAND/ÂΜL (ref 0.17–1.22)
MONOCYTES NFR BLD AUTO: 8 % (ref 4–12)
NEUTROPHILS # BLD AUTO: 8.62 THOUSANDS/ÂΜL (ref 1.85–7.62)
NEUTS SEG NFR BLD AUTO: 77 % (ref 43–75)
NRBC BLD AUTO-RTO: 0 /100 WBCS
O2 CT BLDV-SCNC: 18.1 ML/DL
PCO2 BLDV: 43.2 MM HG (ref 42–50)
PH BLDV: 7.39 [PH] (ref 7.3–7.4)
PLATELET # BLD AUTO: 233 THOUSANDS/UL (ref 149–390)
PMV BLD AUTO: 9.5 FL (ref 8.9–12.7)
PO2 BLDV: 112.5 MM HG (ref 35–45)
POTASSIUM SERPL-SCNC: 4.2 MMOL/L (ref 3.5–5.3)
PROT SERPL-MCNC: 6.1 G/DL (ref 6.4–8.4)
RBC # BLD AUTO: 5.4 MILLION/UL (ref 3.81–5.12)
SODIUM SERPL-SCNC: 140 MMOL/L (ref 135–147)
WBC # BLD AUTO: 11.24 THOUSAND/UL (ref 4.31–10.16)

## 2024-03-13 PROCEDURE — 80053 COMPREHEN METABOLIC PANEL: CPT | Performed by: NURSE PRACTITIONER

## 2024-03-13 PROCEDURE — 99232 SBSQ HOSP IP/OBS MODERATE 35: CPT | Performed by: INTERNAL MEDICINE

## 2024-03-13 PROCEDURE — 83735 ASSAY OF MAGNESIUM: CPT | Performed by: NURSE PRACTITIONER

## 2024-03-13 PROCEDURE — 99232 SBSQ HOSP IP/OBS MODERATE 35: CPT | Performed by: NURSE PRACTITIONER

## 2024-03-13 PROCEDURE — 94640 AIRWAY INHALATION TREATMENT: CPT

## 2024-03-13 PROCEDURE — 85025 COMPLETE CBC W/AUTO DIFF WBC: CPT | Performed by: NURSE PRACTITIONER

## 2024-03-13 PROCEDURE — 82805 BLOOD GASES W/O2 SATURATION: CPT | Performed by: NURSE PRACTITIONER

## 2024-03-13 PROCEDURE — 94760 N-INVAS EAR/PLS OXIMETRY 1: CPT

## 2024-03-13 PROCEDURE — 94664 DEMO&/EVAL PT USE INHALER: CPT

## 2024-03-13 RX ORDER — LISINOPRIL 5 MG/1
5 TABLET ORAL 2 TIMES DAILY
Status: DISCONTINUED | OUTPATIENT
Start: 2024-03-13 | End: 2024-03-14

## 2024-03-13 RX ORDER — OXYCODONE HYDROCHLORIDE 5 MG/1
5 TABLET ORAL EVERY 4 HOURS PRN
Status: DISCONTINUED | OUTPATIENT
Start: 2024-03-13 | End: 2024-03-15

## 2024-03-13 RX ORDER — DIPHENHYDRAMINE HCL 25 MG
25 TABLET ORAL
Status: DISCONTINUED | OUTPATIENT
Start: 2024-03-13 | End: 2024-03-19 | Stop reason: HOSPADM

## 2024-03-13 RX ORDER — FUROSEMIDE 10 MG/ML
60 INJECTION INTRAMUSCULAR; INTRAVENOUS
Status: DISCONTINUED | OUTPATIENT
Start: 2024-03-13 | End: 2024-03-14

## 2024-03-13 RX ORDER — LANOLIN ALCOHOL/MO/W.PET/CERES
6 CREAM (GRAM) TOPICAL
Status: DISCONTINUED | OUTPATIENT
Start: 2024-03-13 | End: 2024-03-19 | Stop reason: HOSPADM

## 2024-03-13 RX ORDER — HYDROMORPHONE HCL IN WATER/PF 6 MG/30 ML
0.2 PATIENT CONTROLLED ANALGESIA SYRINGE INTRAVENOUS EVERY 2 HOUR PRN
Status: DISCONTINUED | OUTPATIENT
Start: 2024-03-13 | End: 2024-03-13

## 2024-03-13 RX ORDER — GABAPENTIN 100 MG/1
100 CAPSULE ORAL 3 TIMES DAILY
Status: DISCONTINUED | OUTPATIENT
Start: 2024-03-13 | End: 2024-03-14

## 2024-03-13 RX ORDER — LISINOPRIL 10 MG/1
10 TABLET ORAL 2 TIMES DAILY
Status: DISCONTINUED | OUTPATIENT
Start: 2024-03-13 | End: 2024-03-13

## 2024-03-13 RX ADMIN — ALBUTEROL SULFATE 2.5 MG: 2.5 SOLUTION RESPIRATORY (INHALATION) at 21:30

## 2024-03-13 RX ADMIN — DICLOFENAC SODIUM 2 G: 10 GEL TOPICAL at 21:37

## 2024-03-13 RX ADMIN — TOPIRAMATE 25 MG: 25 TABLET, FILM COATED ORAL at 17:12

## 2024-03-13 RX ADMIN — LISINOPRIL 5 MG: 5 TABLET ORAL at 21:26

## 2024-03-13 RX ADMIN — ENOXAPARIN SODIUM 40 MG: 40 INJECTION SUBCUTANEOUS at 09:16

## 2024-03-13 RX ADMIN — LABETALOL HYDROCHLORIDE 300 MG: 100 TABLET, FILM COATED ORAL at 05:17

## 2024-03-13 RX ADMIN — UMECLIDINIUM 1 PUFF: 62.5 AEROSOL, POWDER ORAL at 09:31

## 2024-03-13 RX ADMIN — GABAPENTIN 100 MG: 100 CAPSULE ORAL at 17:12

## 2024-03-13 RX ADMIN — FLUTICASONE FUROATE AND VILANTEROL TRIFENATATE 1 PUFF: 100; 25 POWDER RESPIRATORY (INHALATION) at 09:31

## 2024-03-13 RX ADMIN — GABAPENTIN 100 MG: 100 CAPSULE ORAL at 21:26

## 2024-03-13 RX ADMIN — GABAPENTIN 100 MG: 100 CAPSULE ORAL at 11:57

## 2024-03-13 RX ADMIN — FUROSEMIDE 60 MG: 10 INJECTION, SOLUTION INTRAMUSCULAR; INTRAVENOUS at 17:47

## 2024-03-13 RX ADMIN — HYDROXYZINE HYDROCHLORIDE 25 MG: 25 TABLET ORAL at 02:39

## 2024-03-13 RX ADMIN — ACETAMINOPHEN 650 MG: 325 TABLET, FILM COATED ORAL at 09:35

## 2024-03-13 RX ADMIN — LABETALOL HYDROCHLORIDE 300 MG: 100 TABLET, FILM COATED ORAL at 21:26

## 2024-03-13 RX ADMIN — ACETAMINOPHEN 650 MG: 325 TABLET, FILM COATED ORAL at 02:38

## 2024-03-13 RX ADMIN — TOPIRAMATE 25 MG: 25 TABLET, FILM COATED ORAL at 09:27

## 2024-03-13 RX ADMIN — Medication 6 MG: at 21:26

## 2024-03-13 RX ADMIN — OXYCODONE HYDROCHLORIDE 5 MG: 5 TABLET ORAL at 21:37

## 2024-03-13 RX ADMIN — ESCITALOPRAM OXALATE 5 MG: 10 TABLET ORAL at 09:27

## 2024-03-13 RX ADMIN — LABETALOL HYDROCHLORIDE 300 MG: 100 TABLET, FILM COATED ORAL at 14:39

## 2024-03-13 NOTE — UTILIZATION REVIEW
NOTIFICATION OF INPATIENT ADMISSION   AUTHORIZATION REQUEST   SERVICING FACILITY:   [place] [address]  Tax ID: [id number]  NPI: [id number] ATTENDING PROVIDER:  Attending Name and NPI#: Pravin Wolff Do [[id number]]  Address: [address] [place] [address]  Phone: [phone]     ADMISSION INFORMATION:  Place of Service: Inpatient [facility]  Place of Service Code: 21  Inpatient Admission Date/Time: 3/11/24 11:37 PM  Discharge Date/Time: No discharge date for patient encounter.  Admitting Diagnosis Code/Description:  CHF (congestive heart failure) ([facility]) [I50.9]  SOB (shortness of breath) [R06.02]  Acute respiratory failure with hypoxia ([facility]) [J96.01]     UTILIZATION REVIEW CONTACT:  Mishel Warren Utilization   Network Utilization Review Department  Phone: [phone]  Fax [phone]  Email: [email or web address]  Contact for approvals/pending authorizations, clinical reviews, and discharge.     PHYSICIAN ADVISORY SERVICES:  Medical Necessity Denial & Hljh-hq-Xnmk Review  Phone: [phone]  Fax: [phone]  Email: [email or web address]     DISCHARGE SUPPORT TEAM:  For Patients Discharge Needs & Updates  Phone: [phone] opt. 2 Fax: [phone]  Email: [email or web address]

## 2024-03-13 NOTE — CASE MANAGEMENT
Case Management Assessment & Discharge Planning Note    Patient name Dwaine Gould  Location /-01 MRN [id number]  : 1960 Date 3/13/2024       Current Admission Date: 3/11/2024  Current Admission Diagnosis:Acute on chronic diastolic heart failure (HCC)   Patient Active Problem List    Diagnosis Date Noted    Elevated serum creatinine 2024    Acute on chronic diastolic heart failure (HCC) 2024    COPD (chronic obstructive pulmonary disease) (HCC) 2024    Transaminitis 2024    Leukocytosis 2024    Tobacco abuse 2021    CAD (coronary artery disease) 2021    Essential hypertension 2019    Hyperlipidemia 2019      LOS (days): 2  Geometric Mean LOS (GMLOS) (days):   Days to GMLOS:     OBJECTIVE:    Risk of Unplanned Readmission Score: 10.25         Current admission status: Inpatient       Preferred Pharmacy:   [facility]/pharmacy #[id number] - [place] [address]  [address]  [place] [id number]  Phone: [phone] Fax: [phone]    Primary Care Provider: Jordan Ramirez MD    Primary Insurance: [employer]  Secondary Insurance:     ASSESSMENT:  Active Health Care Proxies       JOHN GOULD Health Care Representative - Spouse   Primary Phone: [phone] (Mobile)                 Advance Directives  Does patient have a Health Care POA?: No  Was patient offered paperwork?: Yes (Patient declined)  Does patient currently have a Health Care decision maker?: Yes, please see Health Care Proxy section  Does patient have Advance Directives?: No  Was patient offered paperwork?: Yes (Patient declined)  Primary Contact: John Gould, Spouse         Readmission Root Cause  30 Day Readmission: No    Patient Information  Admitted from:: Home  Mental Status: Alert  During Assessment patient was accompanied by: Not accompanied during assessment  Assessment information provided by:: Patient  Primary Caregiver: Self  Caregiver's  Relationship to Patient:: Family Member  Support Systems: Self, Spouse/significant other, Daughter, Family members  County of Residence: [place]  What city do you live in?: [place]  Home entry access options. Select all that apply.: Stairs  Number of steps to enter home.: 4  Do the steps have railings?: No  Type of Current Residence: [address] [place] home  Upon entering residence, is there a bedroom on the main floor (no further steps)?: No (Patient sleeps on recliner when she can't make it upstairs.)  A bedroom is located on the following floor levels of residence (select all that apply):: 2nd Floor  Upon entering residence, is there a bathroom on the main floor (no further steps)?: Yes  Number of steps to 2nd floor from main floor: One Flight  Living Arrangements: Lives w/ Spouse/significant other  Is patient a ?: No    Activities of Daily Living Prior to Admission  Functional Status: Assistance  Completes ADLs independently?: No  Level of ADL dependence: Assistance  Ambulates independently?: No  Level of ambulatory dependence: Assistance  Does patient use assisted devices?: Yes  Assisted Devices (DME) used: Straight Cane, Wheelchair, Walker  Does patient currently own DME?: Yes  What DME does the patient currently own?: Walker, Wheelchair, Straight Cane  Does patient have a history of Outpatient Therapy (PT/OT)?: No  Does the patient have a history of Short-Term Rehab?: No  Does patient have a history of HHC?: No  Does patient currently have HHC?: No         Patient Information Continued  Income Source: Other (Comment) (Spouse's disability check)  Does patient have prescription coverage?: Yes  Does patient receive dialysis treatments?: No  Does patient have a history of substance abuse?: Yes  Historical substance use preference: Methamphetamines, Other (Pain meds)  History of Withdrawal Symptoms: Other withdrawal symptoms (specify in comment) (Unable to explain)  Is patient currently in treatment for substance  abuse?: No. Patient declined treatment information.  Does patient have a history of Mental Health Diagnosis?: No    PHQ 2/9 Screening   Reviewed PHQ 2/9 Depression Screening Score?: No    Means of Transportation  Means of Transport to Appts:: Family transport      Social Determinants of Health (SDOH)      Flowsheet Row Most Recent Value   Housing Stability    In the last 12 months, was there a time when you were not able to pay the mortgage or rent on time? N   In the last 12 months, how many places have you lived? 1   In the last 12 months, was there a time when you did not have a steady place to sleep or slept in a shelter (including now)? N   Transportation Needs    In the past 12 months, has lack of transportation kept you from medical appointments or from getting medications? no   In the past 12 months, has lack of transportation kept you from meetings, work, or from getting things needed for daily living? No   Food Insecurity    Within the past 12 months, you worried that your food would run out before you got the money to buy more. Never true   Within the past 12 months, the food you bought just didn't last and you didn't have money to get more. Never true   Utilities    In the past 12 months has the electric, gas, oil, or water company threatened to shut off services in your home? No            DISCHARGE DETAILS:    Discharge planning discussed with:: Patient  Freedom of Choice: Yes  Comments - Freedom of Choice: CM discussed discharge planning and freedom of choice if services are recommended by SLIM. While patient requested HHC if needed, CM expressed importance of family support and being able to care for patient in HHC absence and between visits while STR will provide 24 hour care. Patient understood and is open of STR if needed.  CM contacted family/caregiver?: No- see comments (Patient declined.)  Were Treatment Team discharge recommendations reviewed with patient/caregiver?: Yes  Did patient/caregiver  verbalize understanding of patient care needs?: Yes  Were patient/caregiver advised of the risks associated with not following Treatment Team discharge recommendations?: Yes    Requested Home Health Care         Is the patient interested in HHC at discharge?: Yes  Home Health Discipline requested:: Home Health Aide, Nursing, Occupational Therapy, Physical Therapy  Home Health Follow-Up Provider:: PCP  Home Health Services Needed:: Evaluate Functional Status and Safety, Gait/ADL Training, Strengthening/Theraputic Exercises to Improve Function    DME Referral Provided  Referral made for DME?: No    Other Referral/Resources/Interventions Provided:  Interventions: HHC, Short Term Rehab  Referral Comments: TBD    Would you like to participate in our [facility] service program?  : No - Declined    Treatment Team Recommendation: Home with Home Health Care, Short Term Rehab  Discharge Destination Plan:: Home with Home Health Care, Short Term Rehab  Transport at Discharge : Family, BLS Ambulance (Depending on recs)      OPCM referral sent in outbasket.     *Reply received from OPCM stating that patient's PCP is out of network and unable to provide OPCM.

## 2024-03-13 NOTE — PROGRESS NOTES
Cardiology Progress Note - Dwaine Gould 63 y.o. female MRN: [id number]    Unit/Bed#: -01 Encounter: [id number]      Assessment/Plan:  Acute on chronic HFpEF, still quite overloaded but a shade improved.  Continue with current dose of IV Lasix.  Daily weights.  Salt restriction.  Strict I's and O's/lackluster output.  Continue labetalol, lisinopril.  Echo done EF 60%, mild to moderate concentric hypertrophy, biatrial dilation, mild to moderate TR with PA pressure of 40    2.  Hypertension, blood pressure on the lower side.  Continue labetalol, lisinopril decreased, continue Lasix.    3.  CAD. per [facility] records.  Continue medical management.    4.  Transaminitis likely secondary to #1.  Remain off of statins.  Continue to monitor liver functions, management per Slim.  Would consider abdominal ultrasound to rule out underlying GI etiology for transaminitis.    5.  Hyperlipidemia, currently off statins given #4      Subjective:   Patient seen and examined.  No significant events overnight. Im feeling slightly better. Denies cp    Objective:     Vitals: Blood pressure 104/59, pulse 73, temperature (!) 97.4 °F (36.3 °C), resp. rate 20, height 5' (1.524 m), weight (!) 148 kg (326 lb 1 oz), SpO2 93%., Body mass index is 63.68 kg/m².,   Orthostatic Blood Pressures      Flowsheet Row Most Recent Value   Blood Pressure 104/59 filed at 03/13/2024 1439   Patient Position - Orthostatic VS Lying filed at 03/12/2024 1500              Intake/Output Summary (Last 24 hours) at 3/13/2024 1445  Last data filed at 3/13/2024 1235  Gross per 24 hour   Intake 430 ml   Output 100 ml   Net 330 ml         Physical Exam:  GEN: Alert and oriented x 3, in no acute distress.  Ill appearing and well nourished.   HEENT: Sclera anicteric, conjunctivae pink, mucous membranes moist. Oropharynx clear.   NECK: Supple, no carotid bruits, unable to assess JVD. Trachea midline, no thyromegaly.   HEART: Regular rhythm, normal S1 and S2, no murmurs,  clicks, gallops or rubs. PMI nondisplaced, no thrills.   LUNGS: decreased breath sounds bilaterally; no wheezes, rales, or rhonchi. No increased work of breathing or signs of respiratory distress.   ABDOMEN: +abdominal wall edema, Soft, nontender, normoactive bowel sounds.   EXTREMITIES: +2 edema, Skin warm and well perfused, no clubbing, cyanosis  NEURO: No focal findings. Normal speech. Mood and affect normal.   SKIN: Normal without suspicious lesions on exposed skin.      Medications:      Current Facility-Administered Medications:     acetaminophen (TYLENOL) tablet 650 mg, 650 mg, Oral, Q6H PRN, Gabby Duran, DO, 650 mg at 03/13/24 0935    albuterol inhalation solution 2.5 mg, 2.5 mg, Nebulization, Q6H PRN, Gabby Duran DO    Diclofenac Sodium (VOLTAREN) 1 % topical gel 2 g, 2 g, Topical, 4x Daily, GUSTAVO Turcios    diphenhydrAMINE (BENADRYL) tablet 25 mg, 25 mg, Oral, HS PRN, GUSTAVO Turcios    enoxaparin (LOVENOX) subcutaneous injection 40 mg, 40 mg, Subcutaneous, Daily, Gabby Duran DO, 40 mg at 03/13/24 0916    escitalopram (LEXAPRO) tablet 5 mg, 5 mg, Oral, Daily, Gabby Duran DO, 5 mg at 03/13/24 0927    Fluticasone Furoate-Vilanterol 100-25 mcg/actuation 1 puff, 1 puff, Inhalation, Daily, 1 puff at 03/13/24 0931 **AND** umeclidinium 62.5 mcg/actuation inhaler AEPB 1 puff, 1 puff, Inhalation, Daily, Gabby Duran DO, 1 puff at 03/13/24 0931    furosemide (LASIX) injection 60 mg, 60 mg, Intravenous, BID (diuretic), Angie Loeng PA-C    gabapentin (NEURONTIN) capsule 100 mg, 100 mg, Oral, TID, GUSTAVO Turcios, 100 mg at 03/13/24 1157    labetalol (NORMODYNE) tablet 300 mg, 300 mg, Oral, Q8H ANGELES, Gabby Duran DO, 300 mg at 03/13/24 1439    lisinopril (ZESTRIL) tablet 5 mg, 5 mg, Oral, BID, Angie Leong PA-C    melatonin tablet 6 mg, 6 mg, Oral, HS, GUSTAVO Turcios    ondansetron (ZOFRAN)  injection 4 mg, 4 mg, Intravenous, Q6H PRN, Gabby Duran DO    oxyCODONE (ROXICODONE) split tablet 2.5 mg, 2.5 mg, Oral, Q4H PRN **OR** oxyCODONE (ROXICODONE) IR tablet 5 mg, 5 mg, Oral, Q4H PRN, GUSTAVO Turcios    topiramate (TOPAMAX) tablet 25 mg, 25 mg, Oral, BID, Gabby Duran DO, 25 mg at 03/13/24 0927     Labs & Results:    Results from last 7 days   Lab Units 03/12/24  0232 03/12/24  0019 03/11/24  2204   HS TNI 0HR ng/L  --   --  23   HS TNI 2HR ng/L  --  26  --    HS TNI 4HR ng/L 22  --   --    HSTNI D4 ng/L -1  --   --      Results from last 7 days   Lab Units 03/13/24  0514 03/12/24  0549 03/11/24  2204   WBC Thousand/uL 11.24* 13.43* 13.72*   HEMOGLOBIN g/dL 12.8 13.6 13.7   HEMATOCRIT % 43.5 46.9* 46.7*   PLATELETS Thousands/uL 233 267 266         Results from last 7 days   Lab Units 03/13/24  0514 03/12/24  0549 03/11/24  2204   POTASSIUM mmol/L 4.2 4.9 5.2   CHLORIDE mmol/L 104 105 105   CO2 mmol/L 31 28 28   BUN mg/dL 40* 31* 30*   CREATININE mg/dL 1.44* 1.25 1.18   CALCIUM mg/dL 8.3* 8.5 8.6   ALK PHOS U/L 143* 158* 169*   ALT U/L 104* 108* 123*   AST U/L 47* 49* 65*         Results from last 7 days   Lab Units 03/13/24  0514 03/12/24  0549 03/11/24  2204   MAGNESIUM mg/dL 2.1 2.1 2.1

## 2024-03-13 NOTE — PROGRESS NOTES
[facility]  Progress Note  Name: Dwaine Gould I  MRN: [id number]  Unit/Bed#: -01 I Date of Admission: 3/11/2024   Date of Service: 3/13/2024 I Hospital Day: 2    Assessment/Plan   * Acute on chronic diastolic heart failure (HCC)  Assessment & Plan  Patient presented to the ED with complaints of bilateral lower extremity edema and anasarca, with non-compliance with her diet.  Patient with acute on chronic diastolic HF exacerbation  Cardiology consult, appreciate input  Recommending to treat with IV Lasix 60 mg BID  Monitor intake/output  No output documented - will discuss with nursing  Daily standing weight  Low salt diet  Echo (3/12/24): Left ventricular cavity size is normal. Wall thickness is increased. There is mild to moderate concentric hypertrophy. The left ventricular ejection fraction is 60%. Systolic function is normal.  Diastolic function is normal.     Elevated serum creatinine  Assessment & Plan  Creatinine rising from 1.25 to 1.44 this morning  Unsure of most recent baseline, 2021 baseline is 0.8-1  Patient is receiving IV Lasix, 60 mg BID at this time  Monitor for hypotension, avoid nephrotoxins  Monitor BMP    Leukocytosis  Assessment & Plan  Trending down since admission  No infectious source    Transaminitis  Assessment & Plan  Present on admission, remaining stable  Likely in setting of CHF exacerbation, no RUQ pain  Statin on hold    COPD (chronic obstructive pulmonary disease) ([facility])  Assessment & Plan  No sign of acute exacerbation  Continue pulmonary inhalers  Recommending OP follow-up    CAD (coronary artery disease)  Assessment & Plan  History of single vessel CAD  No chest pain currently  Continue with Labetalol and Lisinopril  Not on a statin at this time due to transaminitis on admission    Tobacco abuse  Assessment & Plan  Patient smokes 1/2 ppd  Counseled on smoking cessation for >3 min           VTE Pharmacologic Prophylaxis:    Enoxaparin    Mobility:    Basic Mobility Inpatient Raw Score: 18  -HLM Goal: 6: Walk 10 steps or more  JH-HLM Achieved: 3: Sit at edge of bed  HLM Goal NOT achieved. Continue with multidisciplinary rounding and encourage appropriate mobility to improve upon HLM goals.    Patient Centered Rounds: I performed bedside rounds with nursing staff today.   Discussions with Specialists or Other Care Team Provider: Case Management, Cardiology    Education and Discussions with Family / Patient: Patient declined call to .     Total Time Spent on Date of Encounter in care of patient: 45 mins. This time was spent on one or more of the following: performing physical exam; counseling and coordination of care; obtaining or reviewing history; documenting in the medical record; reviewing/ordering tests, medications or procedures; communicating with other healthcare professionals and discussing with patient's family/caregivers.    Current Length of Stay: 2 day(s)  Current Patient Status: Inpatient   Certification Statement: The patient will continue to require additional inpatient hospital stay due to ongoing treatment in setting of acute on chronic CHF exacerbation.  Discharge Plan: Anticipate discharge in 48-72 hrs to home with home services.    Code Status: Level 1 - Full Code    Subjective:   Patient resting in bed, reporting pain in her legs, and inability to sleep.  No complaints of shortness of breath.  Feels that her legs are not any less swollen then when she came in.    Objective:     Vitals:   Temp (24hrs), Av.7 °F (36.5 °C), Min:97.3 °F (36.3 °C), Max:98.2 °F (36.8 °C)    Temp:  [97.3 °F (36.3 °C)-98.2 °F (36.8 °C)] 97.4 °F (36.3 °C)  HR:  [60-75] 67  Resp:  [17-22] 22  BP: ()/(49-73) 93/50  SpO2:  [88 %-95 %] 90 %  Body mass index is 63.68 kg/m².     Input and Output Summary (last 24 hours):     Intake/Output Summary (Last 24 hours) at 3/13/2024 1045  Last data filed at 3/13/2024 0882  Gross per 24 hour   Intake 210 ml    Output 100 ml   Net 110 ml       Physical Exam:   Physical Exam  Vitals and nursing note reviewed.   Constitutional:       General: She is not in acute distress.     Appearance: She is morbidly obese. She is ill-appearing.   Cardiovascular:      Rate and Rhythm: Normal rate.      Pulses: Normal pulses.      Heart sounds: Normal heart sounds.   Pulmonary:      Effort: Pulmonary effort is normal. No tachypnea, bradypnea or respiratory distress.      Breath sounds: Decreased breath sounds present.      Comments: 2 L o2 via NC  Abdominal:      General: Bowel sounds are normal. There is no distension.      Palpations: Abdomen is soft.      Tenderness: There is no abdominal tenderness.   Musculoskeletal:         General: Normal range of motion.      Right lower leg: Edema present.      Left lower leg: Edema present.   Skin:     General: Skin is warm and dry.   Neurological:      Mental Status: She is alert and oriented to person, place, and time.   Psychiatric:         Mood and Affect: Mood normal.          Additional Data:     Labs:  Results from last 7 days   Lab Units 03/13/24  0514   WBC Thousand/uL 11.24*   HEMOGLOBIN g/dL 12.8   HEMATOCRIT % 43.5   PLATELETS Thousands/uL 233   NEUTROS PCT % 77*   LYMPHS PCT % 13*   MONOS PCT % 8   EOS PCT % 1     Results from last 7 days   Lab Units 03/13/24  0514   SODIUM mmol/L 140   POTASSIUM mmol/L 4.2   CHLORIDE mmol/L 104   CO2 mmol/L 31   BUN mg/dL 40*   CREATININE mg/dL 1.44*   ANION GAP mmol/L 5   CALCIUM mg/dL 8.3*   ALBUMIN g/dL 3.5   TOTAL BILIRUBIN mg/dL 0.42   ALK PHOS U/L 143*   ALT U/L 104*   AST U/L 47*   GLUCOSE RANDOM mg/dL 125                       Lines/Drains:  Invasive Devices       Peripheral Intravenous Line  Duration             Peripheral IV 03/11/24 Right Antecubital 1 day                          Imaging: No pertinent imaging reviewed.    Recent Cultures (last 7 days):         Last 24 Hours Medication List:   Current Facility-Administered Medications    Medication Dose Route Frequency Provider Last Rate    acetaminophen  650 mg Oral Q6H PRN Gabby Cecyjuan Duran, DO      albuterol  2.5 mg Nebulization Q6H PRN Gabby Cecy Roger, DO      enoxaparin  40 mg Subcutaneous Daily Gabby Cecyjuan Duran, DO      escitalopram  5 mg Oral Daily Gabby Cecy Roger, DO      Fluticasone Furoate-Vilanterol  1 puff Inhalation Daily Gabbybrown Duran, DO      And    umeclidinium  1 puff Inhalation Daily Gabby Cecy Duran, DO      furosemide  60 mg Intravenous BID (diuretic) Angie Leong PA-C      hydrOXYzine HCL  25 mg Oral HS PRN Gabby Duran, DO      labetalol  300 mg Oral Q8H ANGELES Gabby Duran, DO      lisinopril  5 mg Oral BID Angie Leong PA-C      ondansetron  4 mg Intravenous Q6H PRN Gabby Duran, DO      topiramate  25 mg Oral BID Gabby Duran, DO          Today, Patient Was Seen By: GUSTAVO Turcios    **Please Note: This note may have been constructed using a voice recognition system.**

## 2024-03-13 NOTE — RESPIRATORY THERAPY NOTE
RT Protocol Note  Dwaine Gould 63 y.o. female MRN: [id number]  Unit/Bed#: -01 Encounter: [id number]    Assessment    Principal Problem:    Acute on chronic diastolic heart failure (HCC)  Active Problems:    Tobacco abuse    CAD (coronary artery disease)    COPD (chronic obstructive pulmonary disease) (HCC)    Transaminitis    Leukocytosis    Elevated serum creatinine      Home Pulmonary Medications:  Inhalers/neb       Past Medical History:   Diagnosis Date    Hyperlipidemia     Hypertension      Social History     Socioeconomic History    Marital status: /Civil Union     Spouse name: None    Number of children: None    Years of education: None    Highest education level: None   Occupational History    None   Tobacco Use    Smoking status: Every Day     Current packs/day: 0.50     Types: Cigarettes    Smokeless tobacco: Never   Vaping Use    Vaping status: Every Day    Substances: Nicotine   Substance and Sexual Activity    Alcohol use: Never    Drug use: Never    Sexual activity: Not Currently   Other Topics Concern    None   Social History Narrative    None     Social Determinants of Health     Financial Resource Strain: Not on file   Food Insecurity: No Food Insecurity (3/12/2024)    Hunger Vital Sign     Worried About Running Out of Food in the Last Year: Never true     Ran Out of Food in the Last Year: Never true   Transportation Needs: No Transportation Needs (3/12/2024)    PRAPARE - Transportation     Lack of Transportation (Medical): No     Lack of Transportation (Non-Medical): No   Physical Activity: Not on file   Stress: Not on file   Social Connections: Not on file   Intimate Partner Violence: Not on file   Housing Stability: Low Risk  (3/12/2024)    Housing Stability Vital Sign     Unable to Pay for Housing in the Last Year: No     Number of Places Lived in the Last Year: 1     Unstable Housing in the Last Year: No       Subjective         Objective    Physical Exam:   Assessment Type: Assess  only  General Appearance: Drowsy, Eyes open/responds to voice  Respiratory Pattern: Labored (mild)  Cough: None  O2 Device: NC    Vitals:  Blood pressure 93/50, pulse (!) 46, temperature (!) 97.4 °F (36.3 °C), resp. rate 20, height 5' (1.524 m), weight (!) 148 kg (326 lb 1 oz), SpO2 90%.          Imaging and other studies: I have personally reviewed pertinent reports.      O2 Device: NC     Plan    Respiratory Plan: Home Bronchodilator Patient pathway        Resp Comments: Pt sleeping upon entering room.  Wakes when name is called.  Pt w/ mild labored breathing.  Pt in no distress.  Will cont w/ current therapy.

## 2024-03-13 NOTE — DISCHARGE INSTRUCTIONS
Low-Sodium Diet   WHAT YOU NEED TO KNOW:   A low-sodium diet limits foods that are high in sodium (salt). You will need to follow a low-sodium diet if you have high blood pressure, kidney disease, or heart failure. You may also need to follow this diet if you have a condition that is causing your body to retain (hold) extra fluid. You may need to limit the amount of sodium you eat in a day to 1,500 to 2,000 mg. Ask your healthcare provider how much sodium you can have each day.  DISCHARGE INSTRUCTIONS:   How to use food labels to choose foods that are low in sodium:  Read food labels to find the amount of sodium they contain. The amount of sodium is listed in milligrams (mg). The % Daily Value (DV) column tells you how much of your daily needs are met by 1 serving of the food for each nutrient listed. Choose foods that have less than 5% of the DV of sodium. These foods are considered low in sodium. Foods that have 20% or more of the DV of sodium are considered high in sodium. Some food labels may also list any of the following terms that tell you about the sodium content in the food:  Sodium-free:  Less than 5 mg in each serving    Very low sodium:  35 mg of sodium or less in each serving    Low sodium:  140 mg of sodium or less in each serving    Reduced sodium:  At least 25% less sodium in each serving than the regular type    Light in sodium:  50% less sodium in each serving    Unsalted or no added salt:  No extra salt is added during processing (the food may still contain sodium)       Foods to avoid:  Salty foods are high in sodium. You should avoid the following:  Processed foods:      Mixes for cornbread, biscuits, cake, and pudding     Instant foods, such as potatoes, cereals, noodles, and rice     Packaged foods, such as bread stuffing, rice and pasta mixes, snack dip mixes, and macaroni and cheese     Canned foods, such as canned vegetables, soups, broths, sauces, and vegetable or tomato juice    Snack  foods, such as salted chips, popcorn, pretzels, pork rinds, salted crackers, and salted nuts    Frozen foods, such as dinners, entrees, vegetables with sauces, and breaded meats    Sauerkraut, pickled vegetables, and other foods prepared in brine    Meats and cheeses:      Smoked or cured meat, such as corned beef, reeves, ham, hot dogs, and sausage    Canned meats or spreads, such as potted meats, sardines, anchovies, and imitation seafood    Deli or lunch meats, such as bologna, ham, turkey, and roast beef    Processed cheese, such as American cheese and cheese spreads    Condiments, sauces, and seasonings:      Salt (¼ teaspoon of salt contains 575 mg of sodium)    Seasonings made with salt, such as garlic salt, celery salt, onion salt, and seasoned salt    Regular soy sauce, barbecue sauce, teriyaki sauce, steak sauce, Worcestershire sauce, and most flavored vinegars    Canned gravy and mixes     Regular condiments, such as mustard, ketchup, and salad dressings    Pickles and olives    Meat tenderizers and monosodium glutamate (MSG)    Foods to include:  Read the food label to find the amount of sodium in each serving.  Bread and cereal:  Try to choose breads with less than 80 mg of sodium per serving.     Bread, roll, chantelle, tortilla, or unsalted crackers.    Ready-to-eat cereals with less than 5% DV of sodium (examples include shredded wheat and puffed rice)    Pasta    Vegetables and fruits:      Unsalted fresh, frozen, or canned vegetables    Fresh, frozen, or canned fruits    Fruit juice    Dairy:  One serving has about 150 mg of sodium.    Milk, all types    Yogurt    Hard cheese, such as cheddar, Swiss, [place] merly, or mozzarella    Meat and other protein foods:  Some raw meats may have added sodium.     Plain meats, fish, and poultry     Egg    Other foods:      Homemade pudding    Unsalted nuts, popcorn, or pretzels    Unsalted butter or margarine    Ways to get less sodium:  If you are used to the  flavor of salt, it will take time to get used to low-sodium foods. Your healthcare provider or dietitian can help you create a plan for lowering sodium. The plan will be specific to your needs and your family's needs. You may focus on 1 or 2 changes each week, such as the following:  Add spices and herbs to foods instead of salt during cooking.  Use salt-free seasonings to add flavor to foods. Examples include onion powder, garlic powder, basil, carr powder, paprika, and parsley. Try lemon or lime juice or vinegar to give foods a tart flavor. Use hot peppers, pepper, or cayenne pepper to add a spicy flavor.    Do not keep a salt shaker at your kitchen table.  This may help keep you from adding salt to food at the table. A teaspoon of salt has 2,300 mg of sodium. It may take time to get used to enjoying the natural flavor of food instead of adding salt. Talk to your healthcare provider before you use salt substitutes. Some salt substitutes have a high amount of a chemical called potassium chloride. This form of potassium needs to be avoided if you have kidney disease.    Choose low-sodium foods at restaurants.  Meals from restaurants are often high in sodium. Some restaurants have nutrition information on the menu that tells you the amount of sodium in their foods. If possible, ask for your food to be prepared with less, or no salt.    Shop for unsalted or low-sodium foods and snacks at the grocery store.  Examples include unsalted or low-sodium broths, soups, and canned vegetables. Choose fresh or frozen vegetables instead. Choose unsalted nuts or seeds or fresh fruits or vegetables as snacks. Read food labels and choose salt-free, very low-sodium, or low-sodium foods. You can also rinse canned vegetables under running water to remove extra sodium before you cook them.    © Copyright [employer] 2023 Information is for End User's use only and may not be sold, redistributed or otherwise used for commercial  purposes.  The above information is an  only. It is not intended as medical advice for individual conditions or treatments. Talk to your doctor, nurse or pharmacist before following any medical regimen to see if it is safe and effective for you.

## 2024-03-13 NOTE — PLAN OF CARE
Problem: INFECTION - ADULT  Goal: Absence or prevention of progression during hospitalization  Description: INTERVENTIONS:  - Assess and monitor for signs and symptoms of infection  - Monitor lab/diagnostic results  - Monitor all insertion sites, i.e. indwelling lines, tubes, and drains  - Monitor endotracheal if appropriate and nasal secretions for changes in amount and color  - [place] appropriate cooling/warming therapies per order  - Administer medications as ordered  - Instruct and encourage patient and family to use good hand hygiene technique  - Identify and instruct in appropriate isolation precautions for identified infection/condition  Outcome: Progressing

## 2024-03-13 NOTE — ASSESSMENT & PLAN NOTE
Patient presented to the ED with complaints of bilateral lower extremity edema and anasarca, with non-compliance with her diet.  Patient with acute on chronic diastolic HF exacerbation  Cardiology consult, appreciate input  Recommending to treat with IV Lasix 60 mg BID  Monitor intake/output  No output documented - will discuss with nursing  Daily standing weight  Low salt diet  Echo (3/12/24): Left ventricular cavity size is normal. Wall thickness is increased. There is mild to moderate concentric hypertrophy. The left ventricular ejection fraction is 60%. Systolic function is normal.  Diastolic function is normal.

## 2024-03-13 NOTE — ASSESSMENT & PLAN NOTE
Creatinine rising from 1.25 to 1.44 this morning  Unsure of most recent baseline, 2021 baseline is 0.8-1  Patient is receiving IV Lasix, 60 mg BID at this time  Monitor for hypotension, avoid nephrotoxins  Monitor BMP

## 2024-03-13 NOTE — ASSESSMENT & PLAN NOTE
Present on admission, remaining stable  Likely in setting of CHF exacerbation, no RUQ pain  Statin on hold

## 2024-03-13 NOTE — ASSESSMENT & PLAN NOTE
History of single vessel CAD  No chest pain currently  Continue with Labetalol and Lisinopril  Not on a statin at this time due to transaminitis on admission

## 2024-03-13 NOTE — PLAN OF CARE
Problem: PAIN - ADULT  Goal: Verbalizes/displays adequate comfort level or baseline comfort level  Description: Interventions:  - Encourage patient to monitor pain and request assistance  - Assess pain using appropriate pain scale  - Administer analgesics based on type and severity of pain and evaluate response  - Implement non-pharmacological measures as appropriate and evaluate response  - Consider cultural and social influences on pain and pain management  - Notify physician/advanced practitioner if interventions unsuccessful or patient reports new pain  Outcome: Progressing     Problem: INFECTION - ADULT  Goal: Absence or prevention of progression during hospitalization  Description: INTERVENTIONS:  - Assess and monitor for signs and symptoms of infection  - Monitor lab/diagnostic results  - Monitor all insertion sites, i.e. indwelling lines, tubes, and drains  - Monitor endotracheal if appropriate and nasal secretions for changes in amount and color  - [place] appropriate cooling/warming therapies per order  - Administer medications as ordered  - Instruct and encourage patient and family to use good hand hygiene technique  - Identify and instruct in appropriate isolation precautions for identified infection/condition  Outcome: Progressing  Goal: Absence of fever/infection during neutropenic period  Description: INTERVENTIONS:  - Monitor WBC    Outcome: Progressing     Problem: SAFETY ADULT  Goal: Patient will remain free of falls  Description: INTERVENTIONS:  - Educate patient/family on patient safety including physical limitations  - Instruct patient to call for assistance with activity   - Consult OT/PT to assist with strengthening/mobility   - Keep Call bell within reach  - Keep bed low and locked with side rails adjusted as appropriate  - Keep care items and personal belongings within reach  - Initiate and maintain comfort rounds  - Make Fall Risk Sign visible to staff  - Offer Toileting every 2 Hours,  in advance of need  - Initiate/Maintain bed alarm  - Obtain necessary fall risk management equipment  - Apply yellow socks and bracelet for high fall risk patients  - Consider moving patient to room near nurses station  Outcome: Progressing  Goal: Maintain or return to baseline ADL function  Description: INTERVENTIONS:  -  Assess patient's ability to carry out ADLs; assess patient's baseline for ADL function and identify physical deficits which impact ability to perform ADLs (bathing, care of mouth/teeth, toileting, grooming, dressing, etc.)  - Assess/evaluate cause of self-care deficits   - Assess range of motion  - Assess patient's mobility; develop plan if impaired  - Assess patient's need for assistive devices and provide as appropriate  - Encourage maximum independence but intervene and supervise when necessary  - Involve family in performance of ADLs  - Assess for home care needs following discharge   - Consider OT consult to assist with ADL evaluation and planning for discharge  - Provide patient education as appropriate  Outcome: Progressing  Goal: Maintains/Returns to pre admission functional level  Description: INTERVENTIONS:  - Perform AM-PAC 6 Click Basic Mobility/ Daily Activity assessment daily.  - Set and communicate daily mobility goal to care team and patient/family/caregiver.   - Collaborate with rehabilitation services on mobility goals if consulted  - Perform Range of Motion 2 times a day.  - Reposition patient every 2 hours.  - Dangle patient 2 times a day  - Stand patient 2 times a day  - Ambulate patient 2 times a day  - Out of bed to chair 2 times a day   - Out of bed for meals 2 times a day  - Out of bed for toileting  - Record patient progress and toleration of activity level   Outcome: Progressing     Problem: Prexisting or High Potential for Compromised Skin Integrity  Goal: Skin integrity is maintained or improved  Description: INTERVENTIONS:  - Identify patients at risk for skin  breakdown  - Assess and monitor skin integrity  - Assess and monitor nutrition and hydration status  - Monitor labs   - Assess for incontinence   - Turn and reposition patient  - Assist with mobility/ambulation  - Relieve pressure over bony prominences  - Avoid friction and shearing  - Provide appropriate hygiene as needed including keeping skin clean and dry  - Evaluate need for skin moisturizer/barrier cream  - Collaborate with interdisciplinary team   - Patient/family teaching  - Consider wound care consult   Outcome: Progressing

## 2024-03-14 ENCOUNTER — APPOINTMENT (INPATIENT)
Dept: CT IMAGING | Facility: HOSPITAL | Age: 64
DRG: 194 | End: 2024-03-14
Payer: COMMERCIAL

## 2024-03-14 ENCOUNTER — APPOINTMENT (INPATIENT)
Dept: ULTRASOUND IMAGING | Facility: HOSPITAL | Age: 64
DRG: 194 | End: 2024-03-14
Payer: COMMERCIAL

## 2024-03-14 PROBLEM — J96.01 ACUTE RESPIRATORY FAILURE WITH HYPOXIA (HCC): Status: ACTIVE | Noted: 2024-03-14

## 2024-03-14 PROBLEM — N17.9 AKI (ACUTE KIDNEY INJURY) (HCC): Status: ACTIVE | Noted: 2024-03-13

## 2024-03-14 PROBLEM — J96.00 ACUTE RESPIRATORY FAILURE (HCC): Status: ACTIVE | Noted: 2024-03-14

## 2024-03-14 PROBLEM — R41.82 ALTERED MENTAL STATUS: Status: ACTIVE | Noted: 2024-03-14

## 2024-03-14 PROBLEM — E87.29 RESPIRATORY ACIDOSIS: Status: ACTIVE | Noted: 2024-03-14

## 2024-03-14 LAB
ALBUMIN SERPL BCP-MCNC: 3.8 G/DL (ref 3.5–5)
ALP SERPL-CCNC: 158 U/L (ref 34–104)
ALT SERPL W P-5'-P-CCNC: 98 U/L (ref 7–52)
ANION GAP SERPL CALCULATED.3IONS-SCNC: 5 MMOL/L (ref 4–13)
ARTERIAL PATENCY WRIST A: YES
AST SERPL W P-5'-P-CCNC: 40 U/L (ref 13–39)
BACTERIA UR QL AUTO: ABNORMAL /HPF
BASE EXCESS BLDA CALC-SCNC: -0.4 MMOL/L
BASE EXCESS BLDA CALC-SCNC: 0 MMOL/L
BASE EXCESS BLDA CALC-SCNC: 3.5 MMOL/L
BILIRUB DIRECT SERPL-MCNC: 0.18 MG/DL (ref 0–0.2)
BILIRUB SERPL-MCNC: 0.46 MG/DL (ref 0.2–1)
BILIRUB UR QL STRIP: NEGATIVE
BUN SERPL-MCNC: 48 MG/DL (ref 5–25)
CALCIUM SERPL-MCNC: 8.3 MG/DL (ref 8.4–10.2)
CHLORIDE SERPL-SCNC: 103 MMOL/L (ref 96–108)
CLARITY UR: CLEAR
CO2 SERPL-SCNC: 31 MMOL/L (ref 21–32)
COLOR UR: YELLOW
CREAT SERPL-MCNC: 1.69 MG/DL (ref 0.6–1.3)
CREAT UR-MCNC: 131.1 MG/DL
ERYTHROCYTE [DISTWIDTH] IN BLOOD BY AUTOMATED COUNT: 19.5 % (ref 11.6–15.1)
GFR SERPL CREATININE-BSD FRML MDRD: 32 ML/MIN/1.73SQ M
GLUCOSE SERPL-MCNC: 127 MG/DL (ref 65–140)
GLUCOSE SERPL-MCNC: 127 MG/DL (ref 65–140)
GLUCOSE UR STRIP-MCNC: NEGATIVE MG/DL
HCO3 BLDA-SCNC: 30.2 MMOL/L (ref 22–28)
HCO3 BLDA-SCNC: 30.5 MMOL/L (ref 22–28)
HCO3 BLDA-SCNC: 33.5 MMOL/L (ref 22–28)
HCT VFR BLD AUTO: 47.9 % (ref 34.8–46.1)
HGB BLD-MCNC: 13.5 G/DL (ref 11.5–15.4)
HGB UR QL STRIP.AUTO: NEGATIVE
HYALINE CASTS #/AREA URNS LPF: ABNORMAL /LPF
IPAP: 16
IPAP: 20
KETONES UR STRIP-MCNC: NEGATIVE MG/DL
LEUKOCYTE ESTERASE UR QL STRIP: ABNORMAL
MAGNESIUM SERPL-MCNC: 2.4 MG/DL (ref 1.9–2.7)
MCH RBC QN AUTO: 23.6 PG (ref 26.8–34.3)
MCHC RBC AUTO-ENTMCNC: 28.2 G/DL (ref 31.4–37.4)
MCV RBC AUTO: 84 FL (ref 82–98)
MICROALBUMIN UR-MCNC: 32.4 MG/L
MICROALBUMIN/CREAT 24H UR: 25 MG/G CREATININE (ref 0–30)
NASAL CANNULA: 5
NITRITE UR QL STRIP: NEGATIVE
NON VENT- BIPAP: ABNORMAL
NON VENT- BIPAP: ABNORMAL
NON-SQ EPI CELLS URNS QL MICRO: ABNORMAL /HPF
O2 CT BLDA-SCNC: 16.8 ML/DL (ref 16–23)
O2 CT BLDA-SCNC: 17.7 ML/DL (ref 16–23)
O2 CT BLDA-SCNC: 17.7 ML/DL (ref 16–23)
OSMOLALITY UR: 569 MMOL/KG
OXYHGB MFR BLDA: 89.2 % (ref 94–97)
OXYHGB MFR BLDA: 92.3 % (ref 94–97)
OXYHGB MFR BLDA: 92.3 % (ref 94–97)
PCO2 BLDA: 80.6 MM HG (ref 36–44)
PCO2 BLDA: 81.8 MM HG (ref 36–44)
PCO2 BLDA: 82.5 MM HG (ref 36–44)
PEEP MAX SETTING VENT: 8 CM[H2O]
PEEP MAX SETTING VENT: 8 CM[H2O]
PH BLDA: 7.19 [PH] (ref 7.35–7.45)
PH BLDA: 7.19 [PH] (ref 7.35–7.45)
PH BLDA: 7.23 [PH] (ref 7.35–7.45)
PH UR STRIP.AUTO: 5.5 [PH]
PLATELET # BLD AUTO: 257 THOUSANDS/UL (ref 149–390)
PMV BLD AUTO: 9.3 FL (ref 8.9–12.7)
PO2 BLDA: 69 MM HG (ref 75–129)
PO2 BLDA: 78.1 MM HG (ref 75–129)
PO2 BLDA: 82.3 MM HG (ref 75–129)
POTASSIUM SERPL-SCNC: 5 MMOL/L (ref 3.5–5.3)
PROCALCITONIN SERPL-MCNC: 0.13 NG/ML
PROT SERPL-MCNC: 6.7 G/DL (ref 6.4–8.4)
PROT UR STRIP-MCNC: ABNORMAL MG/DL
RBC # BLD AUTO: 5.71 MILLION/UL (ref 3.81–5.12)
RBC #/AREA URNS AUTO: ABNORMAL /HPF
SODIUM 24H UR-SCNC: 16 MOL/L
SODIUM SERPL-SCNC: 139 MMOL/L (ref 135–147)
SP GR UR STRIP.AUTO: 1.02 (ref 1–1.03)
SPECIMEN SOURCE: ABNORMAL
TSH SERPL DL<=0.05 MIU/L-ACNC: 2.62 UIU/ML (ref 0.45–4.5)
UROBILINOGEN UR STRIP-ACNC: <2 MG/DL
UUN 24H UR-MCNC: 920 MG/DL
VENT BIPAP FIO2: 40 %
VENT BIPAP FIO2: 40 %
WBC # BLD AUTO: 12.3 THOUSAND/UL (ref 4.31–10.16)
WBC #/AREA URNS AUTO: ABNORMAL /HPF

## 2024-03-14 PROCEDURE — 94760 N-INVAS EAR/PLS OXIMETRY 1: CPT

## 2024-03-14 PROCEDURE — 99255 IP/OBS CONSLTJ NEW/EST HI 80: CPT | Performed by: INTERNAL MEDICINE

## 2024-03-14 PROCEDURE — 92610 EVALUATE SWALLOWING FUNCTION: CPT

## 2024-03-14 PROCEDURE — 99232 SBSQ HOSP IP/OBS MODERATE 35: CPT | Performed by: INTERNAL MEDICINE

## 2024-03-14 PROCEDURE — 80076 HEPATIC FUNCTION PANEL: CPT | Performed by: NURSE PRACTITIONER

## 2024-03-14 PROCEDURE — 82570 ASSAY OF URINE CREATININE: CPT

## 2024-03-14 PROCEDURE — 82043 UR ALBUMIN QUANTITATIVE: CPT

## 2024-03-14 PROCEDURE — 84300 ASSAY OF URINE SODIUM: CPT | Performed by: NURSE PRACTITIONER

## 2024-03-14 PROCEDURE — 80048 BASIC METABOLIC PNL TOTAL CA: CPT | Performed by: NURSE PRACTITIONER

## 2024-03-14 PROCEDURE — 94664 DEMO&/EVAL PT USE INHALER: CPT

## 2024-03-14 PROCEDURE — 83735 ASSAY OF MAGNESIUM: CPT | Performed by: NURSE PRACTITIONER

## 2024-03-14 PROCEDURE — 97167 OT EVAL HIGH COMPLEX 60 MIN: CPT

## 2024-03-14 PROCEDURE — 85027 COMPLETE CBC AUTOMATED: CPT | Performed by: NURSE PRACTITIONER

## 2024-03-14 PROCEDURE — 94660 CPAP INITIATION&MGMT: CPT

## 2024-03-14 PROCEDURE — 84145 PROCALCITONIN (PCT): CPT | Performed by: NURSE PRACTITIONER

## 2024-03-14 PROCEDURE — 71250 CT THORAX DX C-: CPT

## 2024-03-14 PROCEDURE — 36600 WITHDRAWAL OF ARTERIAL BLOOD: CPT

## 2024-03-14 PROCEDURE — 94002 VENT MGMT INPAT INIT DAY: CPT

## 2024-03-14 PROCEDURE — 84540 ASSAY OF URINE/UREA-N: CPT

## 2024-03-14 PROCEDURE — 76705 ECHO EXAM OF ABDOMEN: CPT

## 2024-03-14 PROCEDURE — 82805 BLOOD GASES W/O2 SATURATION: CPT | Performed by: NURSE PRACTITIONER

## 2024-03-14 PROCEDURE — 83935 ASSAY OF URINE OSMOLALITY: CPT | Performed by: NURSE PRACTITIONER

## 2024-03-14 PROCEDURE — 99232 SBSQ HOSP IP/OBS MODERATE 35: CPT | Performed by: NURSE PRACTITIONER

## 2024-03-14 PROCEDURE — 70450 CT HEAD/BRAIN W/O DYE: CPT

## 2024-03-14 PROCEDURE — 97163 PT EVAL HIGH COMPLEX 45 MIN: CPT

## 2024-03-14 PROCEDURE — 81001 URINALYSIS AUTO W/SCOPE: CPT | Performed by: NURSE PRACTITIONER

## 2024-03-14 PROCEDURE — 82805 BLOOD GASES W/O2 SATURATION: CPT

## 2024-03-14 PROCEDURE — 84443 ASSAY THYROID STIM HORMONE: CPT | Performed by: NURSE PRACTITIONER

## 2024-03-14 PROCEDURE — 99255 IP/OBS CONSLTJ NEW/EST HI 80: CPT | Performed by: PSYCHIATRY & NEUROLOGY

## 2024-03-14 PROCEDURE — 82948 REAGENT STRIP/BLOOD GLUCOSE: CPT

## 2024-03-14 RX ORDER — NICOTINE 21 MG/24HR
21 PATCH, TRANSDERMAL 24 HOURS TRANSDERMAL DAILY
Status: DISCONTINUED | OUTPATIENT
Start: 2024-03-15 | End: 2024-03-19 | Stop reason: HOSPADM

## 2024-03-14 RX ADMIN — LABETALOL HYDROCHLORIDE 300 MG: 100 TABLET, FILM COATED ORAL at 05:58

## 2024-03-14 RX ADMIN — TOPIRAMATE 25 MG: 25 TABLET, FILM COATED ORAL at 17:35

## 2024-03-14 RX ADMIN — ACETAMINOPHEN 650 MG: 325 TABLET, FILM COATED ORAL at 19:48

## 2024-03-14 RX ADMIN — TOPIRAMATE 25 MG: 25 TABLET, FILM COATED ORAL at 13:19

## 2024-03-14 RX ADMIN — ESCITALOPRAM OXALATE 5 MG: 10 TABLET ORAL at 13:19

## 2024-03-14 RX ADMIN — Medication 6 MG: at 21:09

## 2024-03-14 RX ADMIN — LABETALOL HYDROCHLORIDE 300 MG: 100 TABLET, FILM COATED ORAL at 21:09

## 2024-03-14 RX ADMIN — UMECLIDINIUM 1 PUFF: 62.5 AEROSOL, POWDER ORAL at 13:19

## 2024-03-14 RX ADMIN — FLUTICASONE FUROATE AND VILANTEROL TRIFENATATE 1 PUFF: 100; 25 POWDER RESPIRATORY (INHALATION) at 13:19

## 2024-03-14 RX ADMIN — Medication 2.5 MG: at 21:16

## 2024-03-14 NOTE — ASSESSMENT & PLAN NOTE
History of single vessel CAD  No chest pain currently  Continue with Labetalol  Hold lisinopril at this time due to lower blood pressures, most recent 109/55  Not on a statin at this time due to transaminitis on admission

## 2024-03-14 NOTE — ASSESSMENT & PLAN NOTE
History of single vessel CAD  No chest pain currently  Continue with Labetalol  Hold lisinopril at this time due to lower blood pressures, most recent 119/74  Not on a statin at this time due to transaminitis on admission

## 2024-03-14 NOTE — CONSULTS
Consultation - Neurology   Dwaine Gould 63 y.o. female MRN: [id number]  Unit/Bed#: -01 Encounter: [id number]      Assessment/Plan     Altered mental status  Assessment & Plan  63 y.o. female with HTN, HLD, CAD, tobacco use, HFpEF, COPD, obesity and self reported remote history of seizure not on AEDs admitted 3/11/24 with SOB, worsening edema, acute on chronic HFpEF. Cardiology was consulted and she was diuresed. Initial labs notable for leukocytosis and transaminitis (statin discontinued).   3/14 RRT called for acute change in mental status with reported episode of lethargy and confusion lasting about 5-10 minutes.  She was initially disoriented to situation, place, and president. /74 with . Upon neurology arrival, she was awake and alert, disoriented to age and month. There was no post-ictal state, states that she recalls the event and being confused, no incontinence or tongue bite. States that she had a seizure about 10 years ago but is not on AEDs STAT CTH without acute intracranial findings. Labs notable for hypercapnia with respiratory acidosis and LETICIA with creat 1.69 (baseline 0.8-1.2). Patient was placed on Bipap and nephrology consulted, holding diuretics  Neuroimaging/work-up:  3/14/24 CTH: No acute intracranial abnormality. Chronic microangiopathy.  Right middle ear and small mastoid effusions.  3/12/24 ECHO:    Left Ventricle: Left ventricular cavity size is normal. Wall thickness is increased. There is mild to moderate concentric hypertrophy. The left ventricular ejection fraction is 60%. Systolic function is normal. Although no diagnostic regional wall motion abnormality was identified, this possibility cannot be completely excluded on the basis of this study. Diastolic function is normal.    Right Ventricle: Right ventricular cavity size is mild to moderately dilated. Systolic function is mildly reduced.    Left Atrium: The atrium is mildly dilated.    Right Atrium: The atrium is  mildly dilated.    Tricuspid Valve: There is mild to moderate regurgitation. Estimated RVSP 40 mmHg.  Pertinent labs:  AB.1/81.8/82.3/30.5  TSH WNL  Creat 1.6 (1.4)  WBC 12.3 (11.2)  AST/ALT/Alk phos 40/98/158 (47/104/143)  UA +leukocytes  Recommendations:  TME in the setting of acute medical issues including hypoxic/hypercapnic respiratory failure, respiratory acidosis, LETICIA  MRI brain w/wo contrast to better evaluate white matter changes on CTH; possible PRES  Hold gabapentin in the setting of LETICIA  If recurrent events or no clinical improvement despite correction of metabolic derangement, consider Routine EEG  Euglycemic, Normotension  Continue to monitor and notify neurology with any changes.  - Medical management and correction of any metabolic or infectious disturbances per primary service.   - outpatient sleep study; possible RILEY/CSA        Recommendations for outpatient neurological follow up have yet to be determined.    Reason for Consult / Principal Problem: AMS  HPI: Dwaine Gould is a 63 y.o. female with HTN, HLD, CAD, tobacco use, HFpEF, COPD, obesity and self reported remote history of seizure not on AEDs admitted 3/11/24 with SOB, worsening edema, acute on chronic HFpEF. Cardiology was consulted and she was diuresed. Initial labs notable for leukocytosis and transaminitis (statin discontinued).   3/14 RRT called for acute change in mental status with reported episode of lethargy and confusion lasting about 5-10 minutes.  She was initially disoriented to situation, place, and president. /74 with . Upon neurology arrival, she was awake and alert, disoriented to age and month. There was no post-ictal state, states that she recalls the event and being confused, no incontinence or tongue bite. States that she had a seizure about 10 years ago but is not on AEDs STAT CTH without acute intracranial findings. Labs notable for hypercapnia with respiratory acidosis and LETICIA with creat 1.69 (baseline  0.8-1.2). Patient was placed on Bipap and nephrology consulted, holding diuretics    Inpatient consult to Neurology  Consult performed by: GUSTAVO Duncan  Consult ordered by: GUSTAVO Turcios        Recommendpreston  Review of Systems   Unable to perform ROS: Acuity of condition       Historical Information   Past Medical History:   Diagnosis Date    Hyperlipidemia     Hypertension      Past Surgical History:   Procedure Laterality Date    CARDIAC CATHETERIZATION      GANGLION CYST EXCISION      TYMPANOSTOMY TUBE PLACEMENT       Social History   Social History     Substance and Sexual Activity   Alcohol Use Never     Social History     Substance and Sexual Activity   Drug Use Never     E-Cigarette/Vaping    E-Cigarette Use Current Every Day User      E-Cigarette/Vaping Substances    Nicotine Yes     THC No     CBD No     Flavoring No     Other No     Unknown No      Social History     Tobacco Use   Smoking Status Every Day    Current packs/day: 0.50    Types: Cigarettes   Smokeless Tobacco Never     Family History: History reviewed. No pertinent family history.    Review of previous medical records was completed.     Meds/Allergies   all current active meds have been reviewed    Allergies   Allergen Reactions    Codeine Anaphylaxis    Contrast [Iodinated Contrast Media] GI Intolerance       Objective   Vitals:Blood pressure 111/60, pulse 77, temperature (!) 97.4 °F (36.3 °C), resp. rate 16, height 5' (1.524 m), weight (!) 148 kg (326 lb 4.5 oz), SpO2 92%.,Body mass index is 63.72 kg/m².    Intake/Output Summary (Last 24 hours) at 3/14/2024 1322  Last data filed at 3/14/2024 1200  Gross per 24 hour   Intake 60 ml   Output 950 ml   Net -890 ml       Invasive Devices:   Invasive Devices       Peripheral Intravenous Line  Duration             Peripheral IV 03/11/24 Right Antecubital 2 days              Drain  Duration             Urethral Catheter 16 Fr. <1 day                    Physical Exam  Vitals reviewed.    Constitutional:       General: She is not in acute distress.     Appearance: She is obese.   HENT:      Head: Normocephalic and atraumatic.   Pulmonary:      Comments: On supplemental oxygen  Musculoskeletal:      Right lower leg: Edema present.      Left lower leg: Edema present.   Neurological:      Mental Status: She is alert.      Cranial Nerves: Cranial nerves 2-12 are intact.      Coordination: Finger-Nose-Finger Test normal.   Psychiatric:         Speech: Speech normal.       Neurologic Exam     Mental Status   Follows 2 step commands.   Attention: normal. Concentration: normal.   Speech: speech is normal   Level of consciousness: alert  Able to name object. Able to repeat.   Disoriented to month and age  Did not answer year     Cranial Nerves   Cranial nerves II through XII intact.     Motor Exam BUE no drift >10seconds  BLE +drift but doesn't hit the bed within 5 seconds     Sensory Exam   Light touch normal.     Gait, Coordination, and Reflexes     Coordination   Finger to nose coordination: normal    Tremor   Resting tremor: absent      Lab Results: I have personally reviewed pertinent reports.    Imaging Studies: I have personally reviewed pertinent reports.   and I have personally reviewed pertinent films in PACS  EKG, Pathology, and Other Studies: I have personally reviewed pertinent reports.      Code Status: Level 1 - Full Code    Counseling / Coordination of Care  Assessment, images, and plan reviewed with Dr. Qiu. Plan discussed with patient and primary service

## 2024-03-14 NOTE — ASSESSMENT & PLAN NOTE
Patient presented to the ED with complaints of bilateral lower extremity edema and anasarca, with non-compliance with her diet.  Patient with acute on chronic diastolic HF exacerbation  Cardiology consult, appreciate input  Patient had been receiving 60 mg IV Lasix intially, but was discontinued when she developed an LETICIA   Monitor intake/output  [place] catheter was placed for strict I&O, only net - 790 mL  Daily standing weight  Low salt diet  Echo (3/12/24): Left ventricular cavity size is normal. Wall thickness is increased. There is mild to moderate concentric hypertrophy. The left ventricular ejection fraction is 60%. Systolic function is normal.  Diastolic function is normal.   Cardiology, Nephrology following  Defer further diuretics to them.

## 2024-03-14 NOTE — ASSESSMENT & PLAN NOTE
Patient had been smoking 2 ppd; quit on day of admission.  Counseled on smoking cessation for >3 min

## 2024-03-14 NOTE — ASSESSMENT & PLAN NOTE
Initially, patient had been requiring 3 L of supplemental oxygen to maintain oxygen saturation levels above 88%  On 3/15, patient with increasing oxygen requirements up to 5 L, BiPAP as well in setting of respiratory acidosis.  CT chest (3/14/24): Mild interstitial edema. Mild dependent atelectasis in the lower lobes. Emphysema.  Procalcitonin within normal limits, low concern for pneumonia  Will initiate patient on IV Solu-Medrol today, in setting of emphysema, monitor for improvement.  Respiratory protocol

## 2024-03-14 NOTE — PLAN OF CARE
Problem: OCCUPATIONAL THERAPY ADULT  Goal: Performs self-care activities at highest level of function for planned discharge setting.  See evaluation for individualized goals.  Description: Treatment Interventions: ADL retraining, Functional transfer training, UE strengthening/ROM, Endurance training, Cognitive reorientation, Patient/family training, Equipment evaluation/education, Compensatory technique education, Energy conservation, Activityengagement          See flowsheet documentation for full assessment, interventions and recommendations.   Note: Limitation: Decreased ADL status, Decreased UE strength, Decreased cognition, Decreased endurance, Decreased self-care trans, Decreased high-level ADLs, Decreased sensation  Prognosis: Good  Assessment: Patient is a 63 y.o. female seen for OT evaluation s/p admit to [facility]  on 3/11/2024 w/Acute on chronic diastolic heart failure (HCC). Commorbidities affecting patient's functional performance at time of assessment include: tobacco abuse, CAD, COPD, leukocytosis, LETICIA, acute respiratory failure, AMS and HTN. Orders placed for OT evaluation and treatment and up and OOB as tolerated . Performed at least two patient identifiers during session including name and wristband.  Prior to admission, Patient reporting being requires assist with LB ADLs, ambulatory with no AD, and lives with . Personal factors affecting patient at time of initial evaluation include: steps to enter, difficulty performing ADLs, and difficulty performing IADLs. Upon evaluation, patient requires minimal  assist for UB ADLs, moderate assist for LB ADLs, transfers and functional ambulation in room and bathroom with minimal  assist, with the use of Rolling Walker.  Patient is oriented x 4.  Occupational performance is affected by the following deficits: decreased functional reach, decreased muscle strength, dynamic sit/ stand balance deficit with poor standing tolerance time for  self care and functional mobility, decreased activity tolerance, impaired sensation, impaired memory, and delayed righting and equilibrium reactions. Patient to benefit from continued Occupational Therapy treatment while in the hospital to address deficits as defined above and maximize level of functional independence with ADLs and functional mobility. Occupational Performance areas to address include: grooming , bathing/ shower, dressing, toilet hygiene, transfer to all surfaces, functional ambulation, medication routine/ management, IADLS: Household maintenance, IADLs: safety procedures, and IADLs: meal prep/ clean up. From OT standpoint, recommendation at time of d/c would be Level II (moderate resource intensity).     Rehab Resource Intensity Level, OT: II (Moderate Resource Intensity)     Rafaela Kaur OTSONALI, OTR/L

## 2024-03-14 NOTE — ASSESSMENT & PLAN NOTE
Noted on the morning of 3/14, evidenced by creatinine rising from 1.25 to 1.69  Creatinine this morning 1.19 after discontinuation of IV Lasix  Unsure of most recent baseline, 2021 baseline is 0.8-1  FEUrea noting 24.7% which is consistent with prerenal disease.  De La Cruz in place for accurate intake/output  Monitor for hypotension, avoid nephrotoxins  Monitor BMP  Nephrology following

## 2024-03-14 NOTE — CASE MANAGEMENT
Case Management Progress Note    Patient name Dwaine Gould  Location /-01 MRN [id number]  : 1960 Date 3/14/2024       LOS (days): 3  Geometric Mean LOS (GMLOS) (days):   Days to GMLOS:        OBJECTIVE:        Current admission status: Inpatient  Preferred Pharmacy:   [facility]/pharmacy #[id number] - [place] - [address]  [place] [address]  Phone: [phone] Fax: [phone]    Primary Care Provider: Jordan Ramirez MD    Primary Insurance: [employer]  Secondary Insurance:     PROGRESS NOTE:    CM reviewed chart and discussed case in SLIM rounds.  The patient had a rapid response this morning- now placed back on BiPAP.    Patient seen by Therapy today.  Rehab Resource Intensity Level, PT: II (Moderate Resource Intensity)     CM was unable to speak with patient about D/C planning and disposition today- she was not up to talking.    CM made AIDIN referrals for both STR and HHC .      PLAN-CM will continue to monitor status and meet with the patient to discuss accepting Provider options.

## 2024-03-14 NOTE — ASSESSMENT & PLAN NOTE
In setting of Co2 retention  ABG completed, noted to have a pH of 7.191, pCO2 of 80.6, with a HCO3 of 30.2  Likely cause of altered mental status, in setting of undiagnosed sleep apnea, obesity hypoventilation syndrome  Neurology asked to evaluate the patient  MRI brain w/wo contrast to better evaluate white matter changes on CTH; possible PRES   If recurrent events or no clinical improvement despite correction of metabolic derangement, consider Routine EEG   Outpatient sleep study; possible RILEY/CSA   CT Head (3/14/24): No acute intracranial abnormality. Chronic microangiopathy. Right middle ear and small mastoid effusions.    Now resolved.

## 2024-03-14 NOTE — PLAN OF CARE
Problem: PHYSICAL THERAPY ADULT  Goal: Performs mobility at highest level of function for planned discharge setting.  See evaluation for individualized goals.  Description: Treatment/Interventions: Functional transfer training, LE strengthening/ROM, Therapeutic exercise, Endurance training, Patient/family training, Bed mobility, Gait training, Spoke to nursing, OT  Equipment Recommended: Walker       See flowsheet documentation for full assessment, interventions and recommendations.  Note: Prognosis: Good  Problem List: Decreased strength, Decreased endurance, Impaired balance, Decreased mobility, Decreased safety awareness, Impaired sensation  Assessment: Pt is 63 y.o. female seen for high-complexity PT evaluation on 3/14/2024 s/p admit to [facility] on 3/11/2024 w/ Acute on chronic diastolic heart failure (HCC). PT was consulted to assess pt's functional mobility and d/c needs. Order placed for PT eval and tx, w/ up and OOB as tolerated order. PTA, pt was living in a two story home with 27 MEGA and FOS to second floor, pt has been sleeping on first floor. Pt reports independence with functional mobility at baseline with recent use of RW due to fatigue and weakness. At time of eval, patient required Uday for sup > sit with HOB elevated, and Uday for STS and ambulation 4' to bedside chair with rolling walker. Spo2 maintained >/=91% on 5Lo2 via nc. Upon evaluation, pt presenting with impaired functional mobility d/t decreased strength, decreased endurance, impaired balance, decreased mobility, decreased safety awareness, and activity intolerance. Pertinent PMHx and current co-morbidities affecting pt's physical performance at time of assessment include: acute on chronic diastolic heart failure, respiratory acidosis, altered mental status, LETICIA, leukocytosis, CAD . Personal factors affecting pt at time of eval include: inaccessible home environment, lives in two story house, ambulating w/ assistive  device, stairs to enter home, inability to ambulate household distances, inability to navigate community distances, inability to navigate level surfaces w/o external assistance, and positive fall history. The following objective measures performed on IE also reveal limitations: AM-PAC 6-Clicks: 15/24. Pt's clinical presentation is currently unstable/unpredictable seen in pt's presentation of abnormal lab value(s), need for input for task focus and mobility technique, need for minimal assist w/ all phases of mobility when usually mobilizing independently, tolerance to only 4 feet of ambulation, and ongoing medical assessment. Overall, pt's rehab potential and prognosis to return to PLOF is good as impacted by objective findings, warranting pt to receive further skilled PT interventions to address impairments, activity limitations, and participation restrictions. Pt to benefit from continued PT services to address deficits as defined above and maximize level of functional independent mobility and consistency. From PT/mobility standpoint, recommendation at time of d/c would be level 2, moderate resource intensity in order to facilitate return to PLOF.  Barriers to Discharge: Inaccessible home environment, Decreased caregiver support     Rehab Resource Intensity Level, PT: II (Moderate Resource Intensity)    See flowsheet documentation for full assessment.      Veronica Soto; PT, DPT

## 2024-03-14 NOTE — ASSESSMENT & PLAN NOTE
Creatinine rising from 1.25 to 1.69 this morning  Unsure of most recent baseline, 2021 baseline is 0.8-1  Patient is receiving IV Lasix, 60 mg BID, will hold at this time  Urinary retention protocol, check a bladder scan to rule out possible retention  Check urine studies for FENa  Monitor for hypotension, avoid nephrotoxins  Monitor BMP  Consult nephrology due to increasing serum creatinine

## 2024-03-14 NOTE — ASSESSMENT & PLAN NOTE
63 y.o. female with HTN, HLD, CAD, tobacco use, HFpEF, COPD, obesity and self reported remote history of seizure not on AEDs admitted 3/11/24 with SOB, worsening edema, acute on chronic HFpEF. Cardiology was consulted and she was diuresed. Initial labs notable for leukocytosis and transaminitis (statin discontinued).   3/14 RRT called for acute change in mental status with reported episode of lethargy and confusion lasting about 5-10 minutes.  She was initially disoriented to situation, place, and president. /74 with . Upon neurology arrival, she was awake and alert, disoriented to age and month. There was no post-ictal state, states that she recalls the event and being confused, no incontinence or tongue bite. States that she had a seizure about 10 years ago but is not on AEDs STAT CTH without acute intracranial findings. Labs notable for hypercapnia with respiratory acidosis and LETICIA with creat 1.69 (baseline 0.8-1.2). Patient was placed on Bipap and nephrology consulted, holding diuretics  Neuroimaging/work-up:  3/14/24 CTH: No acute intracranial abnormality. Chronic microangiopathy.  Right middle ear and small mastoid effusions.  3/12/24 ECHO:    Left Ventricle: Left ventricular cavity size is normal. Wall thickness is increased. There is mild to moderate concentric hypertrophy. The left ventricular ejection fraction is 60%. Systolic function is normal. Although no diagnostic regional wall motion abnormality was identified, this possibility cannot be completely excluded on the basis of this study. Diastolic function is normal.    Right Ventricle: Right ventricular cavity size is mild to moderately dilated. Systolic function is mildly reduced.    Left Atrium: The atrium is mildly dilated.    Right Atrium: The atrium is mildly dilated.    Tricuspid Valve: There is mild to moderate regurgitation. Estimated RVSP 40 mmHg.  Pertinent labs:  AB.1/81.8/82.3/30.5  TSH WNL  Creat 1.6 (1.4)  WBC 12.3  (11.2)  AST/ALT/Alk phos 40/98/158 (47/104/143)  UA +leukocytes  Recommendations:  TME in the setting of acute medical issues including hypoxic/hypercapnic respiratory failure, respiratory acidosis, LETICIA  MRI brain w/wo contrast to better evaluate white matter changes on CTH; possible PRES  Hold gabapentin in the setting of LETICIA  If recurrent events or no clinical improvement despite correction of metabolic derangement, consider Routine EEG  Euglycemic, Normotension  Continue to monitor and notify neurology with any changes.  - Medical management and correction of any metabolic or infectious disturbances per primary service.   - outpatient sleep study; possible RILEY/CSA

## 2024-03-14 NOTE — ASSESSMENT & PLAN NOTE
Present on admission, remaining stable  Likely in setting of CHF exacerbation, no RUQ pain  Statin on hold  RUQ US (3/15/24): Liver size within normal range, surface contour is smooth, echogenicity and echotexture are within normal limits, no significant intrahepatic biliary dilatation, CBD not visualized, no ascites.

## 2024-03-14 NOTE — ASSESSMENT & PLAN NOTE
Stabilized. Procalcitonin level within normal limits  Afebrile, not tachycardic  CT Chest (3/14/24): Mild interstitial edema. Mild dependent atelectasis in the lower lobes. Emphysema

## 2024-03-14 NOTE — ASSESSMENT & PLAN NOTE
Acute altered mental status this morning  ABG completed, noted to have a pH of 7.191, pCO2 of 80.6, with a HCO3 of 30.2  Likely cause of altered mental status, in setting of undiagnosed sleep apnea, obesity hypoventilation syndrome  Neurology asked to evaluate the patient  CT Head completed.

## 2024-03-14 NOTE — CONSULTS
NEPHROLOGY CONSULTATION NOTE    Patient: Dwaine Gould               Sex: female          DOA: 3/11/2024  9:31 PM   YOB: 1960        Age:  63 y.o.        LOS:  LOS: 3 days         REASON FOR THE REFERRAL / CONSULTATION: Acute kidney injury    DATE OF CONSULTATION / SERVICE: 3/14/2024    ADMISSION DIAGNOSIS: Acute on chronic diastolic heart failure (HCC)     CHIEF COMPLAINT     Worsening lower extremity edema and anasarca    HPI     Dwaine Gould is a 63 y.o. female  with a past medical history sniffing for chronic diastolic heart failure, hypertension, and coronary artery disease who presented to the emergency department with worsening lower extremity edema and anasarca. A renal consultation is requested today for assistance in the management of kidney injury.    Patient reports chronic lower extremity edema for which she was previously prescribed Lasix once daily, though she has been taking on an as-needed basis prior to admission.  She reports worsening of lower extremity edema over the past several days which prompted hospital admission.  She is unclear on her home medication regimen.    After review of the medical record, it appears that her baseline creatinine level fluctuates around 0.8.  She presented to our facility with a creatinine of 1.18 and elevated.  She initiated on IV furosemide for diuresis for management of lower extremity edema with noted rise in her creatinine to 1.69 on labs today.  Patient with no significant documented urine output and bedside nursing staff reports incontinence.    Patient was a rapid response earlier this morning for altered mental status.  She also reports ongoing shortness of breath and stat ABG showing respiratory acidosis with plans to initiate BiPAP.  Patient reports she is feeling okay and is asking for food and water.    Reviewed past 24 hour events.    PAST MEDICAL HISTORY     Past Medical History:   Diagnosis Date    Hyperlipidemia     Hypertension         PAST SURGICAL HISTORY     Past Surgical History:   Procedure Laterality Date    CARDIAC CATHETERIZATION      GANGLION CYST EXCISION      TYMPANOSTOMY TUBE PLACEMENT         ALLERGIES     Allergies   Allergen Reactions    Codeine Anaphylaxis    Contrast [Iodinated Contrast Media] GI Intolerance       SOCIAL HISTORY     Social History     Substance and Sexual Activity   Alcohol Use Never     Social History     Substance and Sexual Activity   Drug Use Never     Social History     Tobacco Use   Smoking Status Every Day    Current packs/day: 0.50    Types: Cigarettes   Smokeless Tobacco Never       FAMILY HISTORY     History reviewed. No pertinent family history.    CURRENT MEDICATIONS       Current Facility-Administered Medications:     acetaminophen (TYLENOL) tablet 650 mg, 650 mg, Oral, Q6H PRN, Gabby Duran, DO, 650 mg at 03/13/24 0935    albuterol inhalation solution 2.5 mg, 2.5 mg, Nebulization, Q6H PRN, Gabby Duran DO, 2.5 mg at 03/13/24 2130    Diclofenac Sodium (VOLTAREN) 1 % topical gel 2 g, 2 g, Topical, 4x Daily, GUSTAVO Turcios, 2 g at 03/13/24 2137    diphenhydrAMINE (BENADRYL) tablet 25 mg, 25 mg, Oral, HS PRN, GUSTAVO Turcios    enoxaparin (LOVENOX) subcutaneous injection 40 mg, 40 mg, Subcutaneous, Daily, Gabby Duran DO, 40 mg at 03/13/24 0916    escitalopram (LEXAPRO) tablet 5 mg, 5 mg, Oral, Daily, Gabby Duran DO, 5 mg at 03/13/24 0927    Fluticasone Furoate-Vilanterol 100-25 mcg/actuation 1 puff, 1 puff, Inhalation, Daily, 1 puff at 03/13/24 0931 **AND** umeclidinium 62.5 mcg/actuation inhaler AEPB 1 puff, 1 puff, Inhalation, Daily, Gabby Duran DO, 1 puff at 03/13/24 0931    labetalol (NORMODYNE) tablet 300 mg, 300 mg, Oral, Q8H ANGELES, Gabby Duran DO, 300 mg at 03/14/24 0558    melatonin tablet 6 mg, 6 mg, Oral, HS, GUSTAVO Turcios, 6 mg at 03/13/24 2126    ondansetron (ZOFRAN) injection 4 mg,  4 mg, Intravenous, Q6H PRN, Gabby Duran,     oxyCODONE (ROXICODONE) split tablet 2.5 mg, 2.5 mg, Oral, Q4H PRN **OR** oxyCODONE (ROXICODONE) IR tablet 5 mg, 5 mg, Oral, Q4H PRN, GUSTAVO Turcios, 5 mg at 03/13/24 2137    topiramate (TOPAMAX) tablet 25 mg, 25 mg, Oral, BID, Gabbyjosué Duran DO, 25 mg at 03/13/24 1712    REVIEW OF SYSTEMS   Review of Systems   Constitutional:  Positive for activity change and fatigue. Negative for chills and fever.   HENT:  Negative for trouble swallowing.    Respiratory:  Positive for shortness of breath.    Cardiovascular:  Positive for leg swelling. Negative for chest pain.   Gastrointestinal:  Negative for nausea and vomiting.   Genitourinary:  Negative for difficulty urinating, dysuria, frequency and hematuria.   Musculoskeletal:  Negative for back pain.   Skin:  Negative for pallor.   Neurological:  Positive for weakness. Negative for dizziness, syncope and light-headedness.   Psychiatric/Behavioral:  Negative for sleep disturbance. The patient is not nervous/anxious.        OBJECTIVE     Current Weight: Weight - Scale: (!) 148 kg (326 lb 4.5 oz)  Vitals:    03/14/24 1036   BP:    Pulse: 71   Resp: 16   Temp:    SpO2: 95%     Body mass index is 63.72 kg/m².    Intake/Output Summary (Last 24 hours) at 3/14/2024 1145  Last data filed at 3/14/2024 0952  Gross per 24 hour   Intake 280 ml   Output 550 ml   Net -270 ml       PHYSICAL EXAMINATION     Physical Exam  Vitals reviewed.   Constitutional:       General: She is not in acute distress.  HENT:      Head: Normocephalic.      Mouth/Throat:      Lips: Pink.      Mouth: Mucous membranes are moist.   Eyes:      General: Lids are normal. No scleral icterus.  Cardiovascular:      Rate and Rhythm: Normal rate and regular rhythm.      Heart sounds: S1 normal and S2 normal.   Pulmonary:      Effort: Tachypnea and accessory muscle usage present. No respiratory distress.      Breath sounds: Decreased air  "movement present.   Abdominal:      General: There is no distension.      Tenderness: There is no abdominal tenderness.   Musculoskeletal:      Cervical back: Normal range of motion and neck supple. No tenderness.      Right lower leg: Edema present.      Left lower leg: Edema present.   Skin:     General: Skin is warm.      Coloration: Skin is not cyanotic or jaundiced.   Neurological:      General: No focal deficit present.      Mental Status: She is alert and oriented to person, place, and time.   Psychiatric:         Attention and Perception: Attention normal.         Speech: Speech normal.         Behavior: Behavior is cooperative.           LAB RESULTS        Results from last 7 days   Lab Units 03/14/24  0427 03/13/24  0514 03/12/24  0549 03/11/24  2204   WBC Thousand/uL 12.30* 11.24* 13.43* 13.72*   HEMOGLOBIN g/dL 13.5 12.8 13.6 13.7   HEMATOCRIT % 47.9* 43.5 46.9* 46.7*   PLATELETS Thousands/uL 257 233 267 266   POTASSIUM mmol/L 5.0 4.2 4.9 5.2   CHLORIDE mmol/L 103 104 105 105   CO2 mmol/L 31 31 28 28   BUN mg/dL 48* 40* 31* 30*   CREATININE mg/dL 1.69* 1.44* 1.25 1.18   EGFR ml/min/1.73sq m 32 39 46 49   CALCIUM mg/dL 8.3* 8.3* 8.5 8.6   MAGNESIUM mg/dL 2.4 2.1 2.1 2.1       Recent Labs     03/14/24 0427   WBC 12.30*     Recent Labs     03/14/24 0427   HGB 13.5       Recent Labs     03/14/24 0427   HCT 47.9*     Recent Labs     03/14/24  0427        Recent Labs     03/14/24  0427   SODIUM 139     Recent Labs     03/14/24  0427   K 5.0     Recent Labs     03/14/24  0427        Recent Labs     03/14/24 0427   CO2 31     Recent Labs     03/14/24 0427   BUN 48*     Recent Labs     03/14/24  0427   CREATININE 1.69*     Recent Labs     03/14/24 0427   EGFR 32     Recent Labs     03/14/24 0427   CALCIUM 8.3*     Recent Labs     03/14/24 0427   MG 2.4     No results for input(s): \"PHOS\" in the last 72 hours.  Invalid input(s): \"ALBUMIN\"  No results for input(s): \"PROT\" in the last 72 " "hours.  No results for input(s): \"GLUCOSE\" in the last 72 hours.    RADIOLOGY RESULTS     Results for orders placed during the hospital encounter of 07/19/21    XR chest 1 view portable    Narrative  CHEST    INDICATION:   chest tightness.    COMPARISON:  February 3, 2020    EXAM PERFORMED/VIEWS:  XR CHEST PORTABLE  Images: 2    FINDINGS:    Cardiomediastinal silhouette appears unremarkable.    No acute consolidation.  No pleural effusion or pneumothorax    Osseous structures appear within normal limits for patient age.    Impression  No acute consolidation or congestion            Workstation performed: [id number]    Results for orders placed during the hospital encounter of 03/11/24    XR chest 2 views    Narrative  XR CHEST PA & LATERAL    INDICATION: sob.    COMPARISON: 7/19/2021; 2/3/2020    FINDINGS:    Low lung volumes, which causes crowding of bronchovascular markings.  Within that limitation, there is no focal lung opacity. No pneumothorax or pleural effusion.    Normal cardiomediastinal silhouette.    Bones are unremarkable for age.    Normal upper abdomen.    Impression  No acute cardiopulmonary disease on this examination, which is somewhat limited secondary to low lung volumes.        Workstation performed: [id number]      PLAN / RECOMMENDATIONS      63-year-old female with a past medical history sniffing for chronic diastolic heart failure, hypertension, and coronary artery disease who presented to the emergency department with worsening lower extremity edema and anasarca.    Acute kidney injury:  Admitted with a creatinine of 1.18, worsening to 1.69 on labs today.  There is only 250 mL of urine output documented over the past 24 hours despite use of IV furosemide.     Etiology unclear at present time, patient with diffuse anasarca and concern for third spacing of fluids.  Chest x-ray showed no acute pulmonary disease on admission.  Will plan to hold diuretic therapy today and check urinalysis " "and urine electrolyte levels for further evaluation.    Will place De La Cruz catheter for strict I's/O monitoring to rule out urinary retention.  Will monitor daily BMP.  Avoid nephrotoxic agents such as NSAIDs and IV contrast as able.     Chronic diastolic heart failure:  Most recent echocardiogram showed an ejection fraction of 60% with normal diastolic function.  She has chronic lower extremity edema, patient poor historian given recent rapid response and uncertain if improved since admission.    Respiratory acidosis:  Patient rapid response for altered mental status and noted to have an ABG with pCO2 of 80.6 and pH of 7.191.  Patient being placed on BiPAP with plans for CT scan, management per primary team.    Thank you for the consultation to participate in patient's care. I have discussed this plan with my attending physician.     GUSTAVO Smith    3/14/2024      Portions of the record may have been created with voice recognition software. Occasional wrong word or \"sound a like\" substitutions may have occurred due to the inherent limitations of voice recognition software. Read the chart carefully and recognize, using context, where substitutions have occurred.     "

## 2024-03-14 NOTE — OCCUPATIONAL THERAPY NOTE
Occupational Therapy Evaluation      Dwaine Gould    3/14/2024    Patient Active Problem List   Diagnosis    Tobacco abuse    CAD (coronary artery disease)    Acute on chronic diastolic heart failure (HCC)    COPD (chronic obstructive pulmonary disease) (HCC)    Transaminitis    Leukocytosis    Essential hypertension    Hyperlipidemia    LETICIA (acute kidney injury) (HCC)    Acute respiratory failure with hypoxia (HCC)    Altered mental status    Respiratory acidosis       Past Medical History:   Diagnosis Date    Hyperlipidemia     Hypertension        Past Surgical History:   Procedure Laterality Date    CARDIAC CATHETERIZATION      GANGLION CYST EXCISION      TYMPANOSTOMY TUBE PLACEMENT          03/14/24 1239   OT Last Visit   OT Visit Date 03/14/24   Note Type   Note type Evaluation   Additional Comments Pt agreeable to OT eval. Upon arrival pt supine in bed with HOB elevated.   Pain Assessment   Pain Assessment Tool 0-10   Pain Score No Pain   Restrictions/Precautions   Weight Bearing Precautions Per Order No   Other Precautions Chair Alarm;Bed Alarm;O2;Fall Risk;Telemetry  (5 L O2 via NC)   Home Living   Type of Home House   Home Layout Two level;Stairs to enter with rails;1/2 bath on main level  (27 MEGA; FOS to 2nd floor; pt sleeping on 1st floor in recliner)   Bathroom Shower/Tub   (sponge bathing on 1st floor)   Bathroom Toilet Standard   Bathroom Accessibility Accessible   Home Equipment Walker  (recently utilizing RW for the past 2 days; no AD used at baseline)   Prior Function   Level of [place] Needs assistance with ADLs;Independent with functional mobility;Independent with IADLS  ( assists c LB dressing and bathing)   Lives With Spouse   Receives Help From Family   IADLs Independent with driving;Independent with meal prep;Independent with medication management   Falls in the last 6 months 1 to 4   Vocational Retired   Lifestyle   Autonomy Patient reporting being requires assist with LB ADLs,  ambulatory with no AD, and lives with    Reciprocal Relationships    Service to Others Retired Manger of [employer]    Intrinsic Gratification Reading   ADL   Eating Assistance 5  Supervision/Setup   Grooming Assistance 5  Supervision/Setup   UB Bathing Assistance 4  Minimal Assistance   LB Bathing Assistance 3  Moderate Assistance   UB Dressing Assistance 4  Minimal Assistance   LB Dressing Assistance 3  Moderate Assistance   Toileting Assistance  3  Moderate Assistance   Additional Comments ADL levels based on functional performance during OT eval   Bed Mobility   Supine to Sit 4  Minimal assistance   Additional items Assist x 1;HOB elevated;Bedrails;Increased time required;Verbal cues   Sit to Supine   (DNT: pt seated OOB in recliner at end of session)   Additional Comments Pt denied lightheaded/dizziness with transitional movements   Transfers   Sit to Stand 4  Minimal assistance   Additional items Assist x 1;Increased time required;Verbal cues;Bedrails   Stand to Sit 4  Minimal assistance   Additional items Assist x 1;Armrests;Increased time required;Verbal cues   Functional Mobility   Functional Mobility 4  Minimal assistance   Additional Comments Pt ambulated short distance to recliner with RW. Pt grossly unsteady with mild SOB. SpO2 >90% on 5 L O2. Encouraged PLB.   Additional items Rolling walker   Balance   Static Sitting Fair +   Dynamic Sitting Fair   Static Standing Fair -   Dynamic Standing Poor +   Activity Tolerance   Activity Tolerance Patient limited by fatigue   Medical Staff Made Aware Pt seen as a co-eval with PT due to the patient's co-morbidities and clinically unstable presentation indicated by chart review.   RUE Assessment   RUE Assessment WFL  (grossly 4-/5 MMT based on functional performance during OT eval)   LUE Assessment   LUE Assessment WFL  (grossly 4-/5 MMT based on functional performance during OT eval)   Hand Function   Gross Motor Coordination Functional   Fine  Motor Coordination Functional   Sensation   Light Touch Partial deficits in the RLE;Partial deficits in the LLE  (decreased sensation in BLEs)   Cognition   Overall Cognitive Status   (Questionable)   Arousal/Participation Alert;Responsive;Cooperative   Attention Within functional limits   Orientation Level Oriented X4  (increased time to recall month/year)   Memory Decreased recall of recent events  (pt did not recall events of today (speech eval ~1 hr prior to session))   Following Commands Follows all commands and directions without difficulty   Assessment   Limitation Decreased ADL status;Decreased UE strength;Decreased cognition;Decreased endurance;Decreased self-care trans;Decreased high-level ADLs;Decreased sensation   Prognosis Good   Assessment Patient is a 63 y.o. female seen for OT evaluation s/p admit to [facility]  on 3/11/2024 w/Acute on chronic diastolic heart failure (HCC). Commorbidities affecting patient's functional performance at time of assessment include: tobacco abuse, CAD, COPD, leukocytosis, LETICIA, acute respiratory failure, AMS and HTN. Orders placed for OT evaluation and treatment and up and OOB as tolerated . Performed at least two patient identifiers during session including name and wristband.  Prior to admission, Patient reporting being requires assist with LB ADLs, ambulatory with no AD, and lives with . Personal factors affecting patient at time of initial evaluation include: steps to enter, difficulty performing ADLs, and difficulty performing IADLs. Upon evaluation, patient requires minimal  assist for UB ADLs, moderate assist for LB ADLs, transfers and functional ambulation in room and bathroom with minimal  assist, with the use of Rolling Walker.  Patient is oriented x 4.  Occupational performance is affected by the following deficits: decreased functional reach, decreased muscle strength, dynamic sit/ stand balance deficit with poor standing tolerance time for  self care and functional mobility, decreased activity tolerance, impaired sensation, impaired memory, and delayed righting and equilibrium reactions. Patient to benefit from continued Occupational Therapy treatment while in the hospital to address deficits as defined above and maximize level of functional independence with ADLs and functional mobility. Occupational Performance areas to address include: grooming , bathing/ shower, dressing, toilet hygiene, transfer to all surfaces, functional ambulation, medication routine/ management, IADLS: Household maintenance, IADLs: safety procedures, and IADLs: meal prep/ clean up. From OT standpoint, recommendation at time of d/c would be Level II (moderate resource intensity).   Goals   Patient Goals to get better   Plan   Treatment Interventions ADL retraining;Functional transfer training;UE strengthening/ROM;Endurance training;Cognitive reorientation;Patient/family training;Equipment evaluation/education;Compensatory technique education;Energy conservation;Activityengagement   Goal Expiration Date 03/29/24   OT Treatment Day 0   OT Frequency 3-5x/wk   Discharge Recommendation   Rehab Resource Intensity Level, OT II (Moderate Resource Intensity)   AM-PAC Daily Activity Inpatient   Lower Body Dressing 2   Bathing 2   Toileting 2   Upper Body Dressing 3   Grooming 3   Eating 3   Daily Activity Raw Score 15   Daily Activity Standardized Score (Calc for Raw Score >=11) 34.69   AM-PAC Applied Cognition Inpatient   Following a Speech/Presentation 3   Understanding Ordinary Conversation 4   Taking Medications 3   Remembering Where Things Are Placed or Put Away 2   Remembering List of 4-5 Errands 2   Taking Care of Complicated Tasks 2   Applied Cognition Raw Score 16   Applied Cognition Standardized Score 35.03   Modified [place] Scale   Modified [place] Scale 4   End of Consult   Patient Position at End of Consult Bedside chair;Bed/Chair alarm activated;All needs within reach    Nurse Communication Nurse aware of consult     GOALS:    *ADL transfers with (I) for inc'd independence with ADLs/purposeful tasks    *UB ADL with (I) for inc'd independence with self cares    *LB ADL with (I) using AE prn for inc'd independence with self cares    *Toileting with (I) for clothing management and hygiene for return to PLOF with personal care    *Increase stand tolerance x 5  m for inc'd tolerance with standing purposeful tasks    *Participate in 10m UE therex to increase overall stamina/activity tolerance for purposeful tasks    *Bed mobility- (I) for inc'd independence to manage own comfort and initiate EOB & OOB purposeful tasks    *Patient will verbalize 3 safety awareness/ principles to prevent falls in the home setting.     *Patient will verbalize and demonstrate use of energy conservation/deep breathing techniques and work simplification skills during functional activities with no verbal cues.     *Patient will increase OOB/sitting tolerance to 2-4 hours per day to increase participation in self-care and leisure tasks with no s/s of exertion.     *Patient will engage in ongoing cognitive assessment to assist with safe discharge planning/recommendations.      *Pt will demonstrate use of long handled AE during 100% of tx sessions for increased ADL safety and independence following D/C     MELIZA Crystal, OTR/L

## 2024-03-14 NOTE — SPEECH THERAPY NOTE
Speech-Language Pathology Bedside Swallow Evaluation        Patient Name: Dwaine Gould    Today's Date: 3/14/2024     Problem List  Principal Problem:    Acute on chronic diastolic heart failure (HCC)  Active Problems:    Tobacco abuse    CAD (coronary artery disease)    COPD (chronic obstructive pulmonary disease) (HCC)    Transaminitis    Leukocytosis    LETICIA (acute kidney injury) (HCC)    Acute respiratory failure with hypoxia (HCC)    Altered mental status    Respiratory acidosis         Past Medical History  Past Medical History:   Diagnosis Date    Hyperlipidemia     Hypertension        Past Surgical History  Past Surgical History:   Procedure Laterality Date    CARDIAC CATHETERIZATION      GANGLION CYST EXCISION      TYMPANOSTOMY TUBE PLACEMENT         Summary/Impressions:   Bedside observations support grossly intact oropharyngeal swallow function across all consistencies tested.  Self-fed solid and liquid trials with no s/s of dysphagia or distress.  Patient denies difficulties or changes from baseline function.  Note, pt w/ limited dentition though reports ability to eat without - demonstrated at bedside.      Recommendations:   Diet: regular diet and thin liquids   Meds: whole with liquid   Feeding assistance: NA  Frequent Oral care: 2-4x/day  Aspiration precautions and compensatory swallowing strategies: upright posture and slow rate of feeding  Other Recommendations/ considerations: No further ST follow-up; please re order should pt status change or concerns arise      Current Medical Status  Pt is a 63 y.o. female who presented to [facility] with worsening lower extremity edema and anasarca. Patient was a rapid response earlier this morning for altered mental status. She also reports ongoing shortness of breath and stat ABG showing respiratory acidosis with plans to initiate BiPAP. Patient reports she is feeling okay and is asking for food and water.   Given RRT orders for dysphagia evaluation  placed prior to PO offerings upon removal of BiPAP     Past medical history:  Please see H&P for details    Special Studies:  CT head 3/14/24:  No acute intracranial abnormality. Chronic microangiopathy.  Right middle ear and small mastoid effusions.    CT chest pending    Chest XR 3/11/24:  No acute cardiopulmonary disease on this examination, which is somewhat limited secondary to low lung volumes.     Social/Education/Vocational Hx:  Pt lives with family    Swallow Information   Current Risks for Dysphagia & Aspiration: AMS  Current Symptoms/Concerns:  change in mental status   Current Diet: NPO   Baseline Diet: regular diet and thin liquids    Baseline Assessment   Behavior/Cognition: alert  Speech/Language Status: able to participate in conversation  Patient Positioning: upright in bed    Swallow Mechanism Exam   Facial: symmetrical  Labial: WFL  Lingual: WFL  Velum: symmetrical  Mandible: adequate ROM  Dentition: limited dentition  Vocal quality:rough   Volitional Cough: strong/productive   Respiratory: 5L NC     Consistencies Assessed and Performance   Consistencies Administered: thin liquids, puree, and hard solids    Oral Stage: WFL. Patient presents with adequate bolus acceptance, containment and manipulation.  Mastication judged to be efficient and complete.  No oral residue.     Pharyngeal Stage: Appears functional.  Laryngeal rise noted upon palpation.  Swallow initiation appears timely.  No overt s/s of aspiration or distress.  Vocal quality remains clear and dry.     Esophageal Concerns: none reported     Plan  No additional follow-up at this time. Please re-order should pt exhibit change in status or concerns arise.     Results Reviewed with: patient and RN         Allyssa Bolivar MS, CCC-SLP  Speech-Language Pathologist  [place] #[id number]  [place] #[id number]

## 2024-03-14 NOTE — PHYSICAL THERAPY NOTE
PHYSICAL THERAPY EVALUATION  NAME:  Dwaine Gould  DATE: 03/14/24    AGE:   63 y.o.  Mrn:   [id number]  ADMIT DX:  CHF (congestive heart failure) ([facility]) [I50.9]  SOB (shortness of breath) [R06.02]  Acute respiratory failure with hypoxia ([facility]) [J96.01]  Problem List:   Patient Active Problem List   Diagnosis    Tobacco abuse    CAD (coronary artery disease)    Acute on chronic diastolic heart failure (HCC)    COPD (chronic obstructive pulmonary disease) ([facility])    Transaminitis    Leukocytosis    Essential hypertension    Hyperlipidemia    LETICIA (acute kidney injury) ([facility])    Acute respiratory failure with hypoxia (HCC)    Altered mental status    Respiratory acidosis       Past Medical History  Past Medical History:   Diagnosis Date    Hyperlipidemia     Hypertension        Past Surgical History  Past Surgical History:   Procedure Laterality Date    CARDIAC CATHETERIZATION      GANGLION CYST EXCISION      TYMPANOSTOMY TUBE PLACEMENT         Length Of Stay: 3  Performed at least 2 patient identifiers during session: Name and Birthday       03/14/24 1240   PT Last Visit   PT Visit Date 03/14/24   Note Type   Note type Evaluation   Pain Assessment   Pain Assessment Tool 0-10   Pain Score No Pain   Restrictions/Precautions   Weight Bearing Precautions Per Order No   Braces or Orthoses   (none reported)   Other Precautions Chair Alarm;Bed Alarm;O2;Fall Risk;Multiple lines  (wilson. 5LO2 - no O2 worn at home)   Home Living   Type of Home House   Home Layout Two level;Stairs to enter with rails;1/2 bath on main level  (27 MEGA; FOS to 2nd floor; pt sleeping on 1st floor in recliner)   Bathroom Shower/Tub   (sponge bathing at baseline)   Bathroom Toilet Standard   Bathroom Accessibility Accessible   Home Equipment Walker   Additional Comments pt reports typically she ambulates without AD; however, for the past 2-3 days she has been using RW due to fatigue   Prior Function   Level of [place] Needs assistance with  "ADLs;Independent with functional mobility;Independent with IADLS  ( assists c LB dressing and bathing)   Lives With Spouse   Receives Help From Family   IADLs Independent with driving;Independent with meal prep;Independent with medication management   Falls in the last 6 months 1 to 4  (1 per pt)   Vocational   (pt currently not working)   General   Family/Caregiver Present No   Cognition   Overall Cognitive Status WFL   Arousal/Participation Alert   Orientation Level Oriented X4   Memory Within functional limits   Following Commands Follows all commands and directions without difficulty   Comments Pt agreeable to PT session   RLE Assessment   RLE Assessment X   Strength RLE   RLE Overall Strength 4-/5  (grossly assesed during functional mobility)   LLE Assessment   LLE Assessment X   Strength LLE   LLE Overall Strength 4-/5  (grossly assesed during functional mobility)   Coordination   Sensation X  (pt reports \"pins and needles\" to BLEs)   Bed Mobility   Supine to Sit 4  Minimal assistance   Additional items Assist x 1;HOB elevated;Bedrails;Increased time required;Verbal cues   Sit to Supine   (not tested as pt seated in bedside chair at end of session)   Additional Comments Pt without complaints of lightheadedness, chest pain, dizziness throughout session   Transfers   Sit to Stand 4  Minimal assistance   Additional items Assist x 1;Increased time required;Verbal cues;Armrests   Stand to Sit 4  Minimal assistance   Additional items Assist x 1;Increased time required;Verbal cues;Armrests   Additional Comments RW used during transfers   Ambulation/Elevation   Gait pattern Improper Weight shift;Decreased foot clearance;Forward Flexion;Wide GIUSEPPE   Gait Assistance 4  Minimal assist   Additional items Assist x 1;Verbal cues   Assistive Device Rolling walker   Distance 4' to bedside chair   Stair Management Assistance Not tested   Balance   Static Sitting Fair +   Dynamic Sitting Fair   Static Standing Fair - "   Dynamic Standing Poor +   Ambulatory Poor +   Endurance Deficit   Endurance Deficit Yes   Endurance Deficit Description decreased activity tolerance, fatigue   Activity Tolerance   Activity Tolerance Patient limited by fatigue   Medical Staff Made Aware Pt seen as a co-eval with FLORENTINO Rafaela due to the patient's co-morbidities, clinical presentation, and present impairments which are a regression from the patient's baseline.   Nurse Made Aware LAURA Willis   Assessment   Prognosis Good   Problem List Decreased strength;Decreased endurance;Impaired balance;Decreased mobility;Decreased safety awareness;Impaired sensation   Assessment Pt is 63 y.o. female seen for high-complexity PT evaluation on 3/14/2024 s/p admit to [facility] on 3/11/2024 w/ Acute on chronic diastolic heart failure (HCC). PT was consulted to assess pt's functional mobility and d/c needs. Order placed for PT eval and tx, w/ up and OOB as tolerated order. PTA, pt was living in a two story home with 27 MEGA and FOS to second floor, pt has been sleeping on first floor. Pt reports independence with functional mobility at baseline with recent use of RW due to fatigue and weakness. At time of eval, patient required Uday for sup > sit with HOB elevated, and Uday for STS and ambulation 4' to bedside chair with rolling walker. Spo2 maintained >/=91% on 5Lo2 via nc. Upon evaluation, pt presenting with impaired functional mobility d/t decreased strength, decreased endurance, impaired balance, decreased mobility, decreased safety awareness, and activity intolerance. Pertinent PMHx and current co-morbidities affecting pt's physical performance at time of assessment include: acute on chronic diastolic heart failure, respiratory acidosis, altered mental status, LETICIA, leukocytosis, CAD . Personal factors affecting pt at time of eval include: inaccessible home environment, lives in two story house, ambulating w/ assistive device, stairs to enter home,  inability to ambulate household distances, inability to navigate community distances, inability to navigate level surfaces w/o external assistance, and positive fall history. The following objective measures performed on IE also reveal limitations: AM-PAC 6-Clicks: 15/24. Pt's clinical presentation is currently unstable/unpredictable seen in pt's presentation of abnormal lab value(s), need for input for task focus and mobility technique, need for minimal assist w/ all phases of mobility when usually mobilizing independently, tolerance to only 4 feet of ambulation, and ongoing medical assessment. Overall, pt's rehab potential and prognosis to return to PLOF is good as impacted by objective findings, warranting pt to receive further skilled PT interventions to address impairments, activity limitations, and participation restrictions. Pt to benefit from continued PT services to address deficits as defined above and maximize level of functional independent mobility and consistency. From PT/mobility standpoint, recommendation at time of d/c would be level 2, moderate resource intensity in order to facilitate return to PLOF.   Barriers to Discharge Inaccessible home environment;Decreased caregiver support   Goals   STG Expiration Date 03/24/24   Short Term Goal #1 In 10 days: Increase bilateral LE strength 1/2 grade to facilitate independent mobility, Perform all bed mobility tasks modified independent to decrease caregiver burden, Perform all transfers modified independent to improve independence, Ambulate > 100 ft. with RW modified independent w/o LOB and w/ normalized gait pattern 100% of the time, Increase all balance 1/2 grade to decrease risk for falls, Tolerate standing 3-5 minutes to facilitate functional task performance, and PT to see and establish goals for stairs when appropriate   PT Treatment Day 0   Plan   Treatment/Interventions Functional transfer training;LE strengthening/ROM;Therapeutic  exercise;Endurance training;Patient/family training;Bed mobility;Gait training;Spoke to nursing;OT   PT Frequency 3-5x/wk   Discharge Recommendation   Rehab Resource Intensity Level, PT II (Moderate Resource Intensity)   Equipment Recommended Walker   Walker Package Recommended Wheeled walker   AM-PAC Basic Mobility Inpatient   Turning in Flat Bed Without Bedrails 3   Lying on Back to Sitting on Edge of Flat Bed Without Bedrails 3   Moving Bed to Chair 3   Standing Up From Chair Using Arms 3   Walk in Room 2   Climb 3-5 Stairs With Railing 1   Basic Mobility Inpatient Raw Score 15   Basic Mobility Standardized Score 36.97   Highest Level Of Mobility   JH-HLM Goal 4: Move to chair/commode   JH-HLM Achieved 4: Move to chair/commode   End of Consult   Patient Position at End of Consult All needs within reach;Bed/Chair alarm activated;Bedside chair       Time In: 1220  Time Out: 1240  Total Evaluation Minutes: 20     Veronica Soto, PT

## 2024-03-14 NOTE — PROGRESS NOTES
[facility]  Progress Note  Name: Dwaine Gould I  MRN: [id number]  Unit/Bed#: MS [facility]-01 I Date of Admission: 3/11/2024   Date of Service: 3/14/2024 I Hospital Day: 3    Assessment/Plan   * Acute on chronic diastolic heart failure (HCC)  Assessment & Plan  Patient presented to the ED with complaints of bilateral lower extremity edema and anasarca, with non-compliance with her diet.  Patient with acute on chronic diastolic HF exacerbation  Cardiology consult, appreciate input  Recommending to treat with IV Lasix 60 mg BID  Monitor intake/output  High likelihood that output inaccurate.  Daily standing weight  Low salt diet  Echo (3/12/24): Left ventricular cavity size is normal. Wall thickness is increased. There is mild to moderate concentric hypertrophy. The left ventricular ejection fraction is 60%. Systolic function is normal.  Diastolic function is normal.   Will hold IV Lasix at this time due to increasing creatinine, discussed with cardiology    Respiratory acidosis  Assessment & Plan  Noted this morning during rapid response  pH as noted - pH 7.191, pCO2 80.6, HCO3 30  Will trial BiPAP, recheck ABG after 1 hour    Altered mental status  Assessment & Plan  Acute altered mental status this morning  ABG completed, noted to have a pH of 7.191, pCO2 of 80.6, with a HCO3 of 30.2  Likely cause of altered mental status, in setting of undiagnosed sleep apnea, obesity hypoventilation syndrome  Neurology asked to evaluate the patient  CT Head completed.    Acute respiratory failure with hypoxia (HCC)  Assessment & Plan  Patient requiring 3 L of supplemental oxygen to maintain oxygen saturation levels above 88%  Exact etiology unknown, obtain CT Chest today.  Procalcitonin within normal limits  Respiratory protocol  Arterial blood gas with pH 7.191, pCO2 80.6    LETICIA (acute kidney injury) (HCC)  Assessment & Plan  Creatinine rising from 1.25 to 1.69 this morning  Unsure of most recent baseline, 2021  baseline is 0.8-1  Patient is receiving IV Lasix, 60 mg BID, will hold at this time  Urinary retention protocol, check a bladder scan to rule out possible retention  Check urine studies for FENa  Monitor for hypotension, avoid nephrotoxins  Monitor BMP  Consult nephrology due to increasing serum creatinine    Leukocytosis  Assessment & Plan  Stabilized. Procalcitonin level within normal limits  Afebrile, not tachycardic  Check CT Chest this morning.    Transaminitis  Assessment & Plan  Present on admission, remaining stable  Likely in setting of CHF exacerbation, no RUQ pain  Statin on hold    COPD (chronic obstructive pulmonary disease) (HCC)  Assessment & Plan  No sign of acute exacerbation  Continue pulmonary inhalers  Recommending OP follow-up    CAD (coronary artery disease)  Assessment & Plan  History of single vessel CAD  No chest pain currently  Continue with Labetalol  Hold lisinopril at this time due to lower blood pressures, most recent 119/74  Not on a statin at this time due to transaminitis on admission    Tobacco abuse  Assessment & Plan  Patient smokes 1/2 ppd  Counseled on smoking cessation for >3 min           VTE Pharmacologic Prophylaxis:   Moderate Risk (Score 3-4) - Pharmacological DVT Prophylaxis Ordered: enoxaparin (Lovenox).    Mobility:   Basic Mobility Inpatient Raw Score: 18  JH-HLM Goal: 6: Walk 10 steps or more  JH-HLM Achieved: 3: Sit at edge of bed  HLM Goal NOT achieved. Continue with multidisciplinary rounding and encourage appropriate mobility to improve upon HLM goals.    Patient Centered Rounds: I performed bedside rounds with nursing staff today.   Discussions with Specialists or Other Care Team Provider: Case Management, Neurology, Nephrology, Cardiology    Education and Discussions with Family / Patient:  Will attempt to call patients ..     Total Time Spent on Date of Encounter in care of patient: 45 mins. This time was spent on one or more of the following: performing  physical exam; counseling and coordination of care; obtaining or reviewing history; documenting in the medical record; reviewing/ordering tests, medications or procedures; communicating with other healthcare professionals and discussing with patient's family/caregivers.    Current Length of Stay: 3 day(s)  Current Patient Status: Inpatient   Certification Statement: The patient will continue to require additional inpatient hospital stay due to ongoing treatment in setting of altered mental status, need for nephrology consultation for LETICIA, need for bipap.  Discharge Plan: Anticipate discharge in >72 hrs to discharge location to be determined pending rehab evaluations.    Code Status: Level 1 - Full Code    Subjective:   Patient was a rapid response for altered mental status changes.  Upon entering the room, she was found to be awake, alert but was unable to answer her age or time of year.      Objective:     Vitals:   Temp (24hrs), Av.5 °F (36.4 °C), Min:97.3 °F (36.3 °C), Max:97.7 °F (36.5 °C)    Temp:  [97.3 °F (36.3 °C)-97.7 °F (36.5 °C)] 97.4 °F (36.3 °C)  HR:  [46-79] 71  Resp:  [16-22] 16  BP: (104-136)/(59-78) 119/74  SpO2:  [89 %-95 %] 95 %  Body mass index is 63.72 kg/m².     Input and Output Summary (last 24 hours):     Intake/Output Summary (Last 24 hours) at 3/14/2024 1102  Last data filed at 3/14/2024 0952  Gross per 24 hour   Intake 280 ml   Output 550 ml   Net -270 ml       Physical Exam:   Physical Exam  Vitals and nursing note reviewed.   Constitutional:       General: She is not in acute distress.     Appearance: She is morbidly obese. She is ill-appearing.   Cardiovascular:      Rate and Rhythm: Normal rate.      Pulses: Normal pulses.      Heart sounds: Normal heart sounds.   Pulmonary:      Effort: Pulmonary effort is normal. Tachypnea present. No bradypnea or respiratory distress.      Breath sounds: Decreased breath sounds present.      Comments: 93-95% on 4 L o2 via NC.  Abdominal:       General: Bowel sounds are normal.      Palpations: Abdomen is soft.   Musculoskeletal:         General: Normal range of motion.      Right lower leg: Edema present.      Left lower leg: Edema present.   Skin:     General: Skin is warm and dry.   Neurological:      Mental Status: She is alert. She is disoriented.      GCS: GCS eye subscore is 4. GCS verbal subscore is 4. GCS motor subscore is 6.   Psychiatric:         Mood and Affect: Mood normal.          Additional Data:     Labs:  Results from last 7 days   Lab Units 03/14/24  0427 03/13/24  0514   WBC Thousand/uL 12.30* 11.24*   HEMOGLOBIN g/dL 13.5 12.8   HEMATOCRIT % 47.9* 43.5   PLATELETS Thousands/uL 257 233   NEUTROS PCT %  --  77*   LYMPHS PCT %  --  13*   MONOS PCT %  --  8   EOS PCT %  --  1     Results from last 7 days   Lab Units 03/14/24  0427 03/13/24  0514   SODIUM mmol/L 139 140   POTASSIUM mmol/L 5.0 4.2   CHLORIDE mmol/L 103 104   CO2 mmol/L 31 31   BUN mg/dL 48* 40*   CREATININE mg/dL 1.69* 1.44*   ANION GAP mmol/L 5 5   CALCIUM mg/dL 8.3* 8.3*   ALBUMIN g/dL  --  3.5   TOTAL BILIRUBIN mg/dL  --  0.42   ALK PHOS U/L  --  143*   ALT U/L  --  104*   AST U/L  --  47*   GLUCOSE RANDOM mg/dL 127 125         Results from last 7 days   Lab Units 03/14/24  0920   POC GLUCOSE mg/dl 127         Results from last 7 days   Lab Units 03/14/24  0427   PROCALCITONIN ng/ml 0.13       Lines/Drains:  Invasive Devices       Peripheral Intravenous Line  Duration             Peripheral IV 03/11/24 Right Antecubital 2 days                    Imaging: CT Head ordered,  CT Chest ordered.    Recent Cultures (last 7 days):         Last 24 Hours Medication List:   Current Facility-Administered Medications   Medication Dose Route Frequency Provider Last Rate    acetaminophen  650 mg Oral Q6H PRN Gabby Cecy Duran, DO      albuterol  2.5 mg Nebulization Q6H PRN Gabby Duran, DO      Diclofenac Sodium  2 g Topical 4x Daily GUSTAVO Turcios       diphenhydrAMINE  25 mg Oral HS PRN GUSTAVO Turcios      enoxaparin  40 mg Subcutaneous Daily Gabby Cecy Roger, DO      escitalopram  5 mg Oral Daily Gabby Cecy Roger, DO      Fluticasone Furoate-Vilanterol  1 puff Inhalation Daily Gabby Cecy Roger, DO      And    umeclidinium  1 puff Inhalation Daily Gabby Cecyjuan Duran, DO      labetalol  300 mg Oral Q8H ANGELES Gabby Cecyjuan Duran, DO      melatonin  6 mg Oral HS GUSTAVO Turcios      ondansetron  4 mg Intravenous Q6H PRN Gabbybrown Duran, DO      oxyCODONE  2.5 mg Oral Q4H PRN GUSTAVO Turcios      Or    oxyCODONE  5 mg Oral Q4H PRN GUSTAVO Turcios      topiramate  25 mg Oral BID Gabby Cecy Duarn, DO          Today, Patient Was Seen By: GUSTAVO Turcios    **Please Note: This note may have been constructed using a voice recognition system.**

## 2024-03-14 NOTE — RAPID RESPONSE
Rapid Response Note  Dwaine Gould 63 y.o. female MRN: [id number]  Unit/Bed#: -01 Encounter: [id number]    Rapid Response Notification(s):   Response called date/time:  3/14/2024 9:25 AM  Response team arrival date/time:  3/14/2024 9:26 AM  Response end date/time:  3/14/2024 9:31 AM  Level of care:  [facility]  Rapid response location:  [facility] unit  Primary reason for rapid response call:  Acute change in neuro status    Rapid Response Intervention(s):   Airway:  None  Breathing:  None  Circulation:  None  Fluids administered:  None  Medications administered:  None       Assessment:   Altered mental status    Plan:   CT head  Risk vs benefit of CTA head and neck to r/o TIA  Consider doppler of carotids   Recommend lipid panel      Rapid Response Outcome:   Transfer:  Remain on floor  Primary service notified of transfer: Yes    Code Status: Level 1 (Full Code)      Family notified of transfer: no  Family member contacted: No     Background/Situation:   Dwaine Goudl is a 63 y.o. female who was an RRT on floor due to altered mental status. Patient noted to be Aox3 upon first assessment with nurse this AM. When nurse came back in, patient noted to be disoriented to situation and place, unable to identify who the president was. When CC came to beside for evaluation, patient back to baseline and mentation appropriate asking why everyone was in her room. VSS, no increase in oxygen requirements. .     Review of Systems   Constitutional: Negative.    HENT: Negative.     Eyes: Negative.    Respiratory:  Negative for chest tightness and shortness of breath.    Cardiovascular:  Negative for chest pain.   Gastrointestinal:  Negative for abdominal pain.   Endocrine: Negative.    Genitourinary: Negative.    Allergic/Immunologic: Negative.    Neurological:  Negative for weakness, light-headedness and headaches.   Psychiatric/Behavioral:  Negative for confusion.         Patient denies confusion, only confused as to why  "everyone was in her room        Objective:   Vitals:    03/14/24 0600 03/14/24 0644 03/14/24 0926 03/14/24 0927   BP:  111/78 119/74    BP Location:       Pulse:   75 77   Resp:  20     Temp:  (!) 97.4 °F (36.3 °C)     TempSrc:       SpO2:   95% 93%   Weight: (!) 148 kg (326 lb 4.5 oz)      Height:         Physical Exam  Constitutional:       Appearance: Normal appearance.   HENT:      Head: Normocephalic.      Mouth/Throat:      Mouth: Mucous membranes are moist.   Eyes:      Extraocular Movements: Extraocular movements intact.      Pupils: Pupils are equal, round, and reactive to light.   Cardiovascular:      Rate and Rhythm: Normal rate and regular rhythm.      Pulses: Normal pulses.   Pulmonary:      Effort: Pulmonary effort is normal.      Breath sounds: Normal breath sounds. No wheezing or rhonchi.   Abdominal:      General: Bowel sounds are normal.   Musculoskeletal:      Cervical back: Normal range of motion.      Right lower leg: Edema present.      Left lower leg: Edema present.   Skin:     General: Skin is warm and dry.      Capillary Refill: Capillary refill takes less than 2 seconds.      Comments: Face purple, but per nurse this has been patient's baseline color   Neurological:      General: No focal deficit present.      Mental Status: She is alert and oriented to person, place, and time. Mental status is at baseline.      Motor: No weakness.   Psychiatric:         Mood and Affect: Mood normal.         Behavior: Behavior normal.         Thought Content: Thought content normal.         Portions of the record may have been created with voice recognition software.  Occasional wrong word or \"sound a like\" substitutions may have occurred due to the inherent limitations of voice recognition software.  Read the chart carefully and recognize, using context, where substitutions have occurred.    GUSTAVO Dudley      "

## 2024-03-14 NOTE — PROGRESS NOTES
[facility]  Progress Note  Name: Dwaine Gould I  MRN: [id number]  Unit/Bed#: -01 I Date of Admission: 3/11/2024   Date of Service: 3/14/2024 I Hospital Day: 3    Assessment/Plan   * Acute on chronic diastolic heart failure (HCC)  Assessment & Plan  Patient presented to the ED with complaints of bilateral lower extremity edema and anasarca, with non-compliance with her diet.  Patient with acute on chronic diastolic HF exacerbation  Cardiology consult, appreciate input  Recommending to treat with IV Lasix 60 mg BID  Monitor intake/output  High likelihood that output inaccurate.  Daily standing weight  Low salt diet  Echo (3/12/24): Left ventricular cavity size is normal. Wall thickness is increased. There is mild to moderate concentric hypertrophy. The left ventricular ejection fraction is 60%. Systolic function is normal.  Diastolic function is normal.   Will hold IV Lasix at this time due to increasing creatinine, discussed with cardiology    Altered mental status  Assessment & Plan  Acute altered mental status this morning  ABG completed, noted to have a pH of 7.191, pCO2 of 80.6, with a HCO3 of 30.2  Likely cause of altered mental status, in setting of undiagnosed sleep apnea, obesity hypoventilation syndrome  Neurology asked to evaluate the patient  CT Head (3/14/24): No acute intracranial abnormality. Chronic microangiopathy. Right middle ear and small mastoid effusions.    Respiratory acidosis  Assessment & Plan  Noted this morning during rapid response  pH as noted - pH 7.191, pCO2 80.6, HCO3 30  Will trial BiPAP, recheck ABG after 1 hour    Acute respiratory failure with hypoxia (HCC)  Assessment & Plan  Patient requiring 3 L of supplemental oxygen to maintain oxygen saturation levels above 88%  Exact etiology unknown, obtain CT Chest   CT chest (3/14/24): Mild interstitial edema. Mild dependent atelectasis in the lower lobes. Emphysema.  Procalcitonin within normal  limits  Respiratory protocol  Arterial blood gas with pH 7.191, pCO2 80.6    LETICIA (acute kidney injury) ([facility])  Assessment & Plan  Creatinine rising from 1.25 to 1.69 this morning  Unsure of most recent baseline, 2021 baseline is 0.8-1  Patient is receiving IV Lasix, 60 mg BID, will hold at this time  Urinary retention protocol, check a bladder scan to rule out possible retention  Check urine studies for FENa  Monitor for hypotension, avoid nephrotoxins  Monitor BMP  Consult nephrology due to increasing serum creatinine    Leukocytosis  Assessment & Plan  Stabilized. Procalcitonin level within normal limits  Afebrile, not tachycardic  Check CT Chest this morning.    Transaminitis  Assessment & Plan  Present on admission, remaining stable  Likely in setting of CHF exacerbation, no RUQ pain  Statin on hold    COPD (chronic obstructive pulmonary disease) ([facility])  Assessment & Plan  No sign of acute exacerbation  Continue pulmonary inhalers  Recommending OP follow-up    CAD (coronary artery disease)  Assessment & Plan  History of single vessel CAD  No chest pain currently  Continue with Labetalol  Hold lisinopril at this time due to lower blood pressures, most recent 119/74  Not on a statin at this time due to transaminitis on admission    Tobacco abuse  Assessment & Plan  Patient smokes 1/2 ppd  Counseled on smoking cessation for >3 min         VTE Pharmacologic Prophylaxis:    Enoxaparin (Lovenox)    Mobility:   Basic Mobility Inpatient Raw Score: 15  JH-HLM Goal: 4: Move to chair/commode  JH-HLM Achieved: 4: Move to chair/commode  HLM Goal NOT achieved. Continue with multidisciplinary rounding and encourage appropriate mobility to improve upon HLM goals.    Patient Centered Rounds: I performed bedside rounds with nursing staff today.   Discussions with Specialists or Other Care Team Provider: Pulmonology, Cardiology, Nephrology, Critical Care, CM    Education and Discussions with Family / Patient: {Family  Communication:62044}    Total Time Spent on Date of Encounter in care of patient: 35 mins. This time was spent on one or more of the following: performing physical exam; counseling and coordination of care; obtaining or reviewing history; documenting in the medical record; reviewing/ordering tests, medications or procedures; communicating with other healthcare professionals and discussing with patient's family/caregivers.    Current Length of Stay: 3 day(s)  Current Patient Status: Inpatient   Certification Statement: The patient will continue to require additional inpatient hospital stay due to heart failure, respiratory acidosis, elevated creatinine  Discharge Plan: Anticipate discharge in 48-72 hrs to {Discharge Location:35791}    Code Status: Level 1 - Full Code    Subjective:   Patient seen at bedside. She complains of shortness of breath and pain in both of her legs. Denies chest pain, headache, chills, or nausea.     Prior to encounter patient had an acute change in mental status for approximately 10 minutes noticed by her nurse. Patient was unable to state what year it was, her date of birth, or who the current President is. Rapid response was called.     Objective:     Vitals:   Temp (24hrs), Av.5 °F (36.4 °C), Min:97.3 °F (36.3 °C), Max:97.7 °F (36.5 °C)    Temp:  [97.3 °F (36.3 °C)-97.7 °F (36.5 °C)] 97.4 °F (36.3 °C)  HR:  [65-79] 77  Resp:  [16-22] 16  BP: (104-136)/(59-78) 111/60  SpO2:  [89 %-95 %] 92 %  Body mass index is 63.72 kg/m².     Input and Output Summary (last 24 hours):     Intake/Output Summary (Last 24 hours) at 3/14/2024 1426  Last data filed at 3/14/2024 1200  Gross per 24 hour   Intake 60 ml   Output 950 ml   Net -890 ml       Physical Exam:   Physical Exam  Vitals and nursing note reviewed.   Constitutional:       Appearance: She is obese. She is ill-appearing.   HENT:      Head: Normocephalic and atraumatic.   Cardiovascular:      Rate and Rhythm: Normal rate and regular rhythm.       Heart sounds: No murmur heard.  Pulmonary:      Effort: Tachypnea present.      Breath sounds: Decreased breath sounds present. No wheezing, rhonchi or rales.   Abdominal:      Palpations: Abdomen is soft.      Tenderness: There is no abdominal tenderness.   Musculoskeletal:      Right lower leg: Edema (2+ pitting) present.      Left lower leg: Edema (2+ pitting) present.   Skin:     General: Skin is warm and dry.   Neurological:      General: No focal deficit present.      Mental Status: She is alert.   Psychiatric:         Mood and Affect: Mood normal.         Behavior: Behavior normal.         Thought Content: Thought content normal.         Judgment: Judgment normal.          Additional Data:     Labs:  Results from last 7 days   Lab Units 03/14/24  0427 03/13/24  0514   WBC Thousand/uL 12.30* 11.24*   HEMOGLOBIN g/dL 13.5 12.8   HEMATOCRIT % 47.9* 43.5   PLATELETS Thousands/uL 257 233   NEUTROS PCT %  --  77*   LYMPHS PCT %  --  13*   MONOS PCT %  --  8   EOS PCT %  --  1     Results from last 7 days   Lab Units 03/14/24  0427   SODIUM mmol/L 139   POTASSIUM mmol/L 5.0   CHLORIDE mmol/L 103   CO2 mmol/L 31   BUN mg/dL 48*   CREATININE mg/dL 1.69*   ANION GAP mmol/L 5   CALCIUM mg/dL 8.3*   ALBUMIN g/dL 3.8   TOTAL BILIRUBIN mg/dL 0.46   ALK PHOS U/L 158*   ALT U/L 98*   AST U/L 40*   GLUCOSE RANDOM mg/dL 127         Results from last 7 days   Lab Units 03/14/24  0920   POC GLUCOSE mg/dl 127         Results from last 7 days   Lab Units 03/14/24  0427   PROCALCITONIN ng/ml 0.13       Lines/Drains:  Invasive Devices       Peripheral Intravenous Line  Duration             Peripheral IV 03/11/24 Right Antecubital 2 days              Drain  Duration             Urethral Catheter 16 Fr. <1 day                          Imaging: Reviewed radiology reports from this admission including: chest xray and CT head    Recent Cultures (last 7 days):         Last 24 Hours Medication List:   Current Facility-Administered  Medications   Medication Dose Route Frequency Provider Last Rate    acetaminophen  650 mg Oral Q6H PRN Gabby Duran, DO      albuterol  2.5 mg Nebulization Q6H PRN Gabbybrown Duran, DO      Diclofenac Sodium  2 g Topical 4x Daily GUSTAVO Turcios      diphenhydrAMINE  25 mg Oral HS PRN GUSTAVO Turcios      enoxaparin  40 mg Subcutaneous Daily Gabbybrown Duran, DO      escitalopram  5 mg Oral Daily Gabbybrown Duran, DO      Fluticasone Furoate-Vilanterol  1 puff Inhalation Daily Gabby Duran, DO      And    umeclidinium  1 puff Inhalation Daily Gabby Cecy Duran, DO      labetalol  300 mg Oral Q8H ANGELES Gabbybrown Duran, DO      melatonin  6 mg Oral HS GUSTAVO Turcios      ondansetron  4 mg Intravenous Q6H PRN Gabby Duran,       oxyCODONE  2.5 mg Oral Q4H PRN GUSTAVO Turcios      Or    oxyCODONE  5 mg Oral Q4H PRN GUSTAVO Turcios      topiramate  25 mg Oral BID Gabby Duran DO          Today, Patient Was Seen By: Amber Garner    **Please Note: This note may have been constructed using a voice recognition system.**

## 2024-03-14 NOTE — RESPIRATORY THERAPY NOTE
03/14/24 1620   Respiratory Assessment   Resp Comments Pt placed back on 5L nasal cannula at this time.   O2 Device NC   Oxygen Therapy/Pulse Ox   O2 Device Nasal cannula   O2 Therapy Oxygen humidified   Nasal Cannula O2 Flow Rate (L/min) 5 L/min   Calculated FIO2 (%) - Nasal Cannula 40   SpO2 94 %   SpO2 Activity At Rest   $ Pulse Oximetry Spot Check Charge Completed   Respiratory Charges    $ Arterial Puncture ABG  Yes

## 2024-03-14 NOTE — RESPIRATORY THERAPY NOTE
Discussed use of CPAP for hours of sleep patient refusing at this time. Currently on 3lpm nasal cannula spo2 92% heart rate 65. Patient resting without apparent respiratory distress. Will monitor closely overnight for possible respiratory compromise.

## 2024-03-14 NOTE — RESPIRATORY THERAPY NOTE
RT Ventilator Management Note  Dwaine Gould 63 y.o. female MRN: [id number]  Unit/Bed#: -01 Encounter: [id number]      Daily Screen    No data found in the last 10 encounters.           Physical Exam:   Assessment Type: Assess only  General Appearance: Drowsy, Eyes open/responds to voice  Respiratory Pattern: Spontaneous, Assisted  Chest Assessment: Chest expansion symmetrical  Bilateral Breath Sounds: Diminished, Clear  Cough: None  O2 Device: v60 ELEVEN      Resp Comments: Pt placed on BiPAP due to increased CO2 on ABG.  Pt tolerating BiPAP well at this time.  Will reassess pt in 1 hour.     03/14/24 1036   Respiratory Assessment   Assessment Type Assess only   General Appearance Drowsy;Eyes open/responds to voice   Respiratory Pattern Spontaneous;Assisted   Chest Assessment Chest expansion symmetrical   Bilateral Breath Sounds Diminished;Clear   Cough None   Resp Comments Pt placed on BiPAP due to increased CO2 on ABG.  Pt tolerating BiPAP well at this time.  Will reassess pt in 1 hour.   O2 Device v60 ELEVEN   Non-Invasive Information   O2 Interface Device Face mask   Non-Invasive Ventilation Mode BiPAP   $ Continous NIV Initial   SpO2 95 %   $ Pulse Oximetry Spot Check Charge Completed   Non-Invasive Settings   IPAP (cm) 16 cm   EPAP (cm) 8 cm   Rate (Set) 12   FiO2 (%) 40   Pressure Support (cm H2O) 8   Rise Time 2   Inspiratory Time (Set) 1   Humidification   (heater)   Temperature (Set) 31   Non-Invasive Readings   Skin Intervention Skin intact   Total Rate 16   MV (Mech) 6.5   Peak Pressure (Obs) 17   Spontaneous Vt (mL) 429   Heater Temperature (Obs)   (warming)   Leak (lpm) 0   Non-Invasive Alarms   Insp Pressure High (cm H20) 25   Insp Pressure Low (cm H20) 6   Low Insp Pressure Time (sec) 20 sec   MV Low (L/min) 3   Vt High (mL) 1200   Vt Low (mL) 200   High Resp Rate (BPM) 40 BPM   Low Resp Rate (BPM) 8 BPM   Maintenance   Water bag changed No

## 2024-03-14 NOTE — ASSESSMENT & PLAN NOTE
Noted 3/14/24 during rapid response  pH as noted - pH 7.191, pCO2 80.6, HCO3 30  Will trial BiPAP, recheck ABG after 1 hour  Most recent - pH 7.227, pCO2 82.5, HCO3 33.5  Will get venous blood gas today

## 2024-03-14 NOTE — PLAN OF CARE
Problem: PAIN - ADULT  Goal: Verbalizes/displays adequate comfort level or baseline comfort level  Description: Interventions:  - Encourage patient to monitor pain and request assistance  - Assess pain using appropriate pain scale  - Administer analgesics based on type and severity of pain and evaluate response  - Implement non-pharmacological measures as appropriate and evaluate response  - Consider cultural and social influences on pain and pain management  - Notify physician/advanced practitioner if interventions unsuccessful or patient reports new pain  Outcome: Progressing     Problem: INFECTION - ADULT  Goal: Absence or prevention of progression during hospitalization  Description: INTERVENTIONS:  - Assess and monitor for signs and symptoms of infection  - Monitor lab/diagnostic results  - Monitor all insertion sites, i.e. indwelling lines, tubes, and drains  - Monitor endotracheal if appropriate and nasal secretions for changes in amount and color  - [place] appropriate cooling/warming therapies per order  - Administer medications as ordered  - Instruct and encourage patient and family to use good hand hygiene technique  - Identify and instruct in appropriate isolation precautions for identified infection/condition  Outcome: Progressing  Goal: Absence of fever/infection during neutropenic period  Description: INTERVENTIONS:  - Monitor WBC    Outcome: Progressing     Problem: SAFETY ADULT  Goal: Patient will remain free of falls  Description: INTERVENTIONS:  - Educate patient/family on patient safety including physical limitations  - Instruct patient to call for assistance with activity   - Consult OT/PT to assist with strengthening/mobility   - Keep Call bell within reach  - Keep bed low and locked with side rails adjusted as appropriate  - Keep care items and personal belongings within reach  - Initiate and maintain comfort rounds  - Make Fall Risk Sign visible to staff  - Offer Toileting every 2 Hours,  in advance of need  - Initiate/Maintain bed alarm  - Obtain necessary fall risk management equipment  - Apply yellow socks and bracelet for high fall risk patients  - Consider moving patient to room near nurses station  Outcome: Progressing  Goal: Maintain or return to baseline ADL function  Description: INTERVENTIONS:  -  Assess patient's ability to carry out ADLs; assess patient's baseline for ADL function and identify physical deficits which impact ability to perform ADLs (bathing, care of mouth/teeth, toileting, grooming, dressing, etc.)  - Assess/evaluate cause of self-care deficits   - Assess range of motion  - Assess patient's mobility; develop plan if impaired  - Assess patient's need for assistive devices and provide as appropriate  - Encourage maximum independence but intervene and supervise when necessary  - Involve family in performance of ADLs  - Assess for home care needs following discharge   - Consider OT consult to assist with ADL evaluation and planning for discharge  - Provide patient education as appropriate  Outcome: Progressing  Goal: Maintains/Returns to pre admission functional level  Description: INTERVENTIONS:  - Perform AM-PAC 6 Click Basic Mobility/ Daily Activity assessment daily.  - Set and communicate daily mobility goal to care team and patient/family/caregiver.   - Collaborate with rehabilitation services on mobility goals if consulted  - Perform Range of Motion 2 times a day.  - Reposition patient every 2 hours.  - Dangle patient 2 times a day  - Stand patient 2 times a day  - Ambulate patient 2 times a day  - Out of bed to chair 2 times a day   - Out of bed for meals 2 times a day  - Out of bed for toileting  - Record patient progress and toleration of activity level   Outcome: Progressing     Problem: Prexisting or High Potential for Compromised Skin Integrity  Goal: Skin integrity is maintained or improved  Description: INTERVENTIONS:  - Identify patients at risk for skin  breakdown  - Assess and monitor skin integrity  - Assess and monitor nutrition and hydration status  - Monitor labs   - Assess for incontinence   - Turn and reposition patient  - Assist with mobility/ambulation  - Relieve pressure over bony prominences  - Avoid friction and shearing  - Provide appropriate hygiene as needed including keeping skin clean and dry  - Evaluate need for skin moisturizer/barrier cream  - Collaborate with interdisciplinary team   - Patient/family teaching  - Consider wound care consult   Outcome: Progressing

## 2024-03-14 NOTE — RESPIRATORY THERAPY NOTE
RT Protocol Note  Dwaine Gould 63 y.o. female MRN: [id number]  Unit/Bed#: -01 Encounter: [id number]    Assessment    Principal Problem:    Acute on chronic diastolic heart failure (HCC)  Active Problems:    Tobacco abuse    CAD (coronary artery disease)    COPD (chronic obstructive pulmonary disease) (HCC)    Transaminitis    Leukocytosis    LETICIA (acute kidney injury) (HCC)    Acute respiratory failure with hypoxia (HCC)      Home Pulmonary Medications:  Inhalers/neb       Past Medical History:   Diagnosis Date    Hyperlipidemia     Hypertension      Social History     Socioeconomic History    Marital status: /Civil Union     Spouse name: None    Number of children: None    Years of education: None    Highest education level: None   Occupational History    None   Tobacco Use    Smoking status: Every Day     Current packs/day: 0.50     Types: Cigarettes    Smokeless tobacco: Never   Vaping Use    Vaping status: Every Day    Substances: Nicotine   Substance and Sexual Activity    Alcohol use: Never    Drug use: Never    Sexual activity: Not Currently   Other Topics Concern    None   Social History Narrative    None     Social Determinants of Health     Financial Resource Strain: Not on file   Food Insecurity: No Food Insecurity (3/12/2024)    Hunger Vital Sign     Worried About Running Out of Food in the Last Year: Never true     Ran Out of Food in the Last Year: Never true   Transportation Needs: No Transportation Needs (3/12/2024)    PRAPARE - Transportation     Lack of Transportation (Medical): No     Lack of Transportation (Non-Medical): No   Physical Activity: Not on file   Stress: Not on file   Social Connections: Not on file   Intimate Partner Violence: Not on file   Housing Stability: Low Risk  (3/12/2024)    Housing Stability Vital Sign     Unable to Pay for Housing in the Last Year: No     Number of Places Lived in the Last Year: 1     Unstable Housing in the Last Year: No       Subjective          Objective    Physical Exam:   Assessment Type: Assess only  General Appearance: Drowsy, Eyes open/responds to voice  Respiratory Pattern: Spontaneous, Assisted  Chest Assessment: Chest expansion symmetrical  Bilateral Breath Sounds: Diminished, Clear  Cough: None  O2 Device: v60 ELEVEN    Vitals:  Blood pressure 119/74, pulse 71, temperature (!) 97.4 °F (36.3 °C), resp. rate (P) 16, height 5' (1.524 m), weight (!) 148 kg (326 lb 4.5 oz), SpO2 95%.    Results from last 7 days   Lab Units 03/14/24  0956   PH ART  7.191*   PCO2 ART mm Hg 80.6*   PO2 ART mm Hg 69.0*   HCO3 ART mmol/L 30.2*   BASE EXC ART mmol/L -0.4   O2 CONTENT ART mL/dL 17.7   O2 HGB, ARTERIAL % 89.2*   ABG SOURCE  Radial, Right   RADHA TEST  Yes       Imaging and other studies: I have personally reviewed pertinent reports.      O2 Device: v60 ELEVEN     Plan    Respiratory Plan: Home Bronchodilator Patient pathway        Resp Comments: Pt placed on BiPAP due to increased CO2 on ABG.  Pt tolerating BiPAP well at this time.  Will reassess pt in 1 hour.

## 2024-03-14 NOTE — ASSESSMENT & PLAN NOTE
Stabilized. Procalcitonin level within normal limits  Afebrile, not tachycardic  Check CT Chest this morning.

## 2024-03-14 NOTE — RESPIRATORY THERAPY NOTE
RT Ventilator Management Note  Dwaine Gould 63 y.o. female MRN: [id number]  Unit/Bed#: -01 Encounter: [id number]      Daily Screen    No data found in the last 10 encounters.           Physical Exam:   Assessment Type: (P) Assess only  General Appearance: (P) Awake, Alert  Respiratory Pattern: (P) Spontaneous, Assisted  Chest Assessment: (P) Chest expansion symmetrical  Bilateral Breath Sounds: (P) Diminished, Clear  Cough: (P) None  O2 Device: (P) v60 ELEVEN      Resp Comments: (P) Pt placed back on BiPAP due to elevated CO2 per previous ABG.  Will monitor on same.  IPAP increased at this time as well.     03/14/24 1458   Respiratory Assessment   Assessment Type Assess only   General Appearance Awake;Alert   Respiratory Pattern Spontaneous;Assisted   Chest Assessment Chest expansion symmetrical   Bilateral Breath Sounds Diminished;Clear   Cough None   Resp Comments Pt placed back on BiPAP due to elevated CO2 per previous ABG.  Will monitor on same.  IPAP increased at this time as well.   O2 Device v60 ELEVEN   Non-Invasive Information   O2 Interface Device Face mask   Non-Invasive Ventilation Mode BiPAP   $ Pulse Oximetry Spot Check Charge Completed   Non-Invasive Settings   IPAP (cm) (S)  20 cm   EPAP (cm) 8 cm   Rate (Set) 12   FiO2 (%) 40   Pressure Support (cm H2O) 12   Rise Time 2   Inspiratory Time (Set) 1   Humidification   (heater)   Temperature (Set) 31   Non-Invasive Readings   Skin Intervention Skin intact   Total Rate 15   MV (Mech) 7.7   Peak Pressure (Obs) 20   Spontaneous Vt (mL) 445   Heater Temperature (Obs)   (warming)   Leak (lpm) 0   Non-Invasive Alarms   Insp Pressure High (cm H20) 25   Insp Pressure Low (cm H20) 6   Low Insp Pressure Time (sec) 20 sec   MV Low (L/min) 3   Vt High (mL) 1400   Vt Low (mL) 200   High Resp Rate (BPM) 40 BPM   Low Resp Rate (BPM) 8 BPM   Maintenance   Water bag changed No

## 2024-03-14 NOTE — PROGRESS NOTES
Cardiology Progress Note - Dwaine Gould 63 y.o. female MRN: [id number]    Unit/Bed#: -01 Encounter: [id number]      Assessment/Plan:  Acute on chronic HFpEF, still overloaded but slightly improved.  Creatinine slightly worse today diuretics on hold.  Daily weights.  Salt restriction.  Strict I's and O's/lackluster output.  Continue labetalol, lisinopril    2.  Hypertension, blood pressure is on the lower side at times.  Continue to monitor.    3.  CAD per records from [facility].  Management per [facility].  Patient follows with [facility] cardiology.    4.  Transaminitis likely secondary to #1.  Remain off of statins.  Continue to monitor.    5.  Acute kidney injury, creatinine 1.6 up from 1.4.  Management per S[facility].  Diuretics on hold.    6.  Hyperlipidemia, statins currently off due to #4.  Continue to monitor.    7.  Transient altered mental status prompting rapid response.  Seen by neurology.  Management per Slim and neurology      Subjective:   Patient seen and examined.  No significant events overnight. Pt was rapid response this AM for altered mental status, by the time we saw the she was back to baseline. Denies cp    Objective:     Vitals: Blood pressure 111/60, pulse 77, temperature (!) 97.4 °F (36.3 °C), resp. rate 16, height 5' (1.524 m), weight (!) 148 kg (326 lb 4.5 oz), SpO2 92%., Body mass index is 63.72 kg/m².,   Orthostatic Blood Pressures      Flowsheet Row Most Recent Value   Blood Pressure 111/60 filed at 03/14/2024 1317   Patient Position - Orthostatic VS Sitting filed at 03/13/2024 2211              Intake/Output Summary (Last 24 hours) at 3/14/2024 1445  Last data filed at 3/14/2024 1200  Gross per 24 hour   Intake 60 ml   Output 950 ml   Net -890 ml         Physical Exam:  GEN: Alert and oriented in no acute distress.  Ill appearing and well nourished.   HEENT: Sclera anicteric, conjunctivae pink, mucous membranes moist. Oropharynx clear.   NECK: Supple, no carotid bruits, hard to assess JVD. Trachea  midline, no thyromegaly.   HEART: Regular rhythm, normal S1 and S2, no murmurs, clicks, gallops or rubs. PMI nondisplaced, no thrills.   LUNGS: Clear to auscultation bilaterally; no wheezes, rales, or rhonchi. No increased work of breathing or signs of respiratory distress.   ABDOMEN: +abdominal wall edema, Soft, nontender, nondistended, normoactive bowel sounds.   EXTREMITIES: + edema, Skin warm and well perfused, no clubbing, cyanosis  NEURO: No focal findings. Normal speech. Mood and affect normal.   SKIN: Normal without suspicious lesions on exposed skin.      Medications:      Current Facility-Administered Medications:     acetaminophen (TYLENOL) tablet 650 mg, 650 mg, Oral, Q6H PRN, Gabby Cecy Duran, DO, 650 mg at 03/13/24 0935    albuterol inhalation solution 2.5 mg, 2.5 mg, Nebulization, Q6H PRN, Gabby Duran, DO, 2.5 mg at 03/13/24 2130    Diclofenac Sodium (VOLTAREN) 1 % topical gel 2 g, 2 g, Topical, 4x Daily, GUSTAVO Turcios, 2 g at 03/13/24 2137    diphenhydrAMINE (BENADRYL) tablet 25 mg, 25 mg, Oral, HS PRN, GUSTAVO Turcios    enoxaparin (LOVENOX) subcutaneous injection 40 mg, 40 mg, Subcutaneous, Daily, Gabby Duran DO, 40 mg at 03/13/24 0916    escitalopram (LEXAPRO) tablet 5 mg, 5 mg, Oral, Daily, Gabby Cecy Duran, DO, 5 mg at 03/14/24 1319    Fluticasone Furoate-Vilanterol 100-25 mcg/actuation 1 puff, 1 puff, Inhalation, Daily, 1 puff at 03/14/24 1319 **AND** umeclidinium 62.5 mcg/actuation inhaler AEPB 1 puff, 1 puff, Inhalation, Daily, Gabby Duran DO, 1 puff at 03/14/24 1319    labetalol (NORMODYNE) tablet 300 mg, 300 mg, Oral, Q8H ANGELES, Gabby Duran DO, 300 mg at 03/14/24 0558    melatonin tablet 6 mg, 6 mg, Oral, HS, GUSTAVO Turcios, 6 mg at 03/13/24 2126    ondansetron (ZOFRAN) injection 4 mg, 4 mg, Intravenous, Q6H PRN, Gabby Duran DO    oxyCODONE (ROXICODONE) split tablet 2.5 mg, 2.5 mg,  Oral, Q4H PRN **OR** oxyCODONE (ROXICODONE) IR tablet 5 mg, 5 mg, Oral, Q4H PRN, GUSTAVO Turcios, 5 mg at 03/13/24 2137    topiramate (TOPAMAX) tablet 25 mg, 25 mg, Oral, BID, Gabby Duran DO, 25 mg at 03/14/24 1319     Labs & Results:    Results from last 7 days   Lab Units 03/12/24  0232 03/12/24  0019 03/11/24  2204   HS TNI 0HR ng/L  --   --  23   HS TNI 2HR ng/L  --  26  --    HS TNI 4HR ng/L 22  --   --    HSTNI D4 ng/L -1  --   --      Results from last 7 days   Lab Units 03/14/24 0427 03/13/24  0514 03/12/24  0549   WBC Thousand/uL 12.30* 11.24* 13.43*   HEMOGLOBIN g/dL 13.5 12.8 13.6   HEMATOCRIT % 47.9* 43.5 46.9*   PLATELETS Thousands/uL 257 233 267         Results from last 7 days   Lab Units 03/14/24  0427 03/13/24  0514 03/12/24  0549   POTASSIUM mmol/L 5.0 4.2 4.9   CHLORIDE mmol/L 103 104 105   CO2 mmol/L 31 31 28   BUN mg/dL 48* 40* 31*   CREATININE mg/dL 1.69* 1.44* 1.25   CALCIUM mg/dL 8.3* 8.3* 8.5   ALK PHOS U/L 158* 143* 158*   ALT U/L 98* 104* 108*   AST U/L 40* 47* 49*         Results from last 7 days   Lab Units 03/14/24 0427 03/13/24  0514 03/12/24  0549   MAGNESIUM mg/dL 2.4 2.1 2.1

## 2024-03-14 NOTE — ASSESSMENT & PLAN NOTE
Noted this morning during rapid response  pH as noted - pH 7.191, pCO2 80.6, HCO3 30  Will trial BiPAP, recheck ABG after 1 hour

## 2024-03-14 NOTE — RESPIRATORY THERAPY NOTE
03/14/24 1203   Respiratory Assessment   Resp Comments ABG drawn on BiPAP and then pt placed back on nasal cannula.  Will titrate as able.   O2 Device NC   Oxygen Therapy/Pulse Ox   O2 Device Nasal cannula   O2 Therapy Oxygen humidified   Nasal Cannula O2 Flow Rate (L/min) 5 L/min   Calculated FIO2 (%) - Nasal Cannula 40   SpO2 94 %   SpO2 Activity At Rest   $ Pulse Oximetry Spot Check Charge Completed   Respiratory Charges    $ Arterial Puncture ABG  Yes

## 2024-03-14 NOTE — ASSESSMENT & PLAN NOTE
Patient requiring 3 L of supplemental oxygen to maintain oxygen saturation levels above 88%  Exact etiology unknown, obtain CT Chest today.  Procalcitonin within normal limits  Respiratory protocol  Arterial blood gas with pH 7.191, pCO2 80.6

## 2024-03-14 NOTE — ASSESSMENT & PLAN NOTE
Patient presented to the ED with complaints of bilateral lower extremity edema and anasarca, with non-compliance with her diet.  Patient with acute on chronic diastolic HF exacerbation  Cardiology consult, appreciate input  Recommending to treat with IV Lasix 60 mg BID  Monitor intake/output  High likelihood that output inaccurate.  Daily standing weight  Low salt diet  Echo (3/12/24): Left ventricular cavity size is normal. Wall thickness is increased. There is mild to moderate concentric hypertrophy. The left ventricular ejection fraction is 60%. Systolic function is normal.  Diastolic function is normal.   Will hold IV Lasix at this time due to increasing creatinine, discussed with cardiology

## 2024-03-15 ENCOUNTER — APPOINTMENT (INPATIENT)
Dept: MRI IMAGING | Facility: HOSPITAL | Age: 64
DRG: 194 | End: 2024-03-15
Payer: COMMERCIAL

## 2024-03-15 PROBLEM — G93.41 ACUTE METABOLIC ENCEPHALOPATHY: Status: ACTIVE | Noted: 2024-03-14

## 2024-03-15 LAB
ANION GAP SERPL CALCULATED.3IONS-SCNC: 5 MMOL/L (ref 4–13)
BASE EX.OXY STD BLDV CALC-SCNC: 92.5 % (ref 60–80)
BASE EXCESS BLDV CALC-SCNC: 3.3 MMOL/L
BUN SERPL-MCNC: 43 MG/DL (ref 5–25)
CALCIUM SERPL-MCNC: 8.4 MG/DL (ref 8.4–10.2)
CHLORIDE SERPL-SCNC: 106 MMOL/L (ref 96–108)
CO2 SERPL-SCNC: 29 MMOL/L (ref 21–32)
CREAT SERPL-MCNC: 1.19 MG/DL (ref 0.6–1.3)
ERYTHROCYTE [DISTWIDTH] IN BLOOD BY AUTOMATED COUNT: 18.5 % (ref 11.6–15.1)
GFR SERPL CREATININE-BSD FRML MDRD: 49 ML/MIN/1.73SQ M
GLUCOSE SERPL-MCNC: 108 MG/DL (ref 65–140)
HCO3 BLDV-SCNC: 29.7 MMOL/L (ref 24–30)
HCT VFR BLD AUTO: 43.1 % (ref 34.8–46.1)
HGB BLD-MCNC: 12.2 G/DL (ref 11.5–15.4)
MAGNESIUM SERPL-MCNC: 2.5 MG/DL (ref 1.9–2.7)
MCH RBC QN AUTO: 23.7 PG (ref 26.8–34.3)
MCHC RBC AUTO-ENTMCNC: 28.3 G/DL (ref 31.4–37.4)
MCV RBC AUTO: 84 FL (ref 82–98)
O2 CT BLDV-SCNC: 17.3 ML/DL
PCO2 BLDV: 52.7 MM HG (ref 42–50)
PH BLDV: 7.37 [PH] (ref 7.3–7.4)
PLATELET # BLD AUTO: 195 THOUSANDS/UL (ref 149–390)
PMV BLD AUTO: 10 FL (ref 8.9–12.7)
PO2 BLDV: 78.2 MM HG (ref 35–45)
POTASSIUM SERPL-SCNC: 4.7 MMOL/L (ref 3.5–5.3)
RBC # BLD AUTO: 5.15 MILLION/UL (ref 3.81–5.12)
SODIUM SERPL-SCNC: 140 MMOL/L (ref 135–147)
WBC # BLD AUTO: 10.45 THOUSAND/UL (ref 4.31–10.16)

## 2024-03-15 PROCEDURE — 83735 ASSAY OF MAGNESIUM: CPT | Performed by: NURSE PRACTITIONER

## 2024-03-15 PROCEDURE — 80048 BASIC METABOLIC PNL TOTAL CA: CPT

## 2024-03-15 PROCEDURE — 82805 BLOOD GASES W/O2 SATURATION: CPT | Performed by: NURSE PRACTITIONER

## 2024-03-15 PROCEDURE — 99232 SBSQ HOSP IP/OBS MODERATE 35: CPT | Performed by: INTERNAL MEDICINE

## 2024-03-15 PROCEDURE — 85027 COMPLETE CBC AUTOMATED: CPT | Performed by: NURSE PRACTITIONER

## 2024-03-15 PROCEDURE — 99232 SBSQ HOSP IP/OBS MODERATE 35: CPT | Performed by: NURSE PRACTITIONER

## 2024-03-15 PROCEDURE — 94760 N-INVAS EAR/PLS OXIMETRY 1: CPT

## 2024-03-15 PROCEDURE — 94660 CPAP INITIATION&MGMT: CPT

## 2024-03-15 RX ORDER — ROPINIROLE 0.25 MG/1
0.5 TABLET, FILM COATED ORAL 3 TIMES DAILY
Status: DISCONTINUED | OUTPATIENT
Start: 2024-03-15 | End: 2024-03-19 | Stop reason: HOSPADM

## 2024-03-15 RX ORDER — METHYLPREDNISOLONE SODIUM SUCCINATE 40 MG/ML
40 INJECTION, POWDER, LYOPHILIZED, FOR SOLUTION INTRAMUSCULAR; INTRAVENOUS EVERY 8 HOURS SCHEDULED
Status: DISCONTINUED | OUTPATIENT
Start: 2024-03-15 | End: 2024-03-16

## 2024-03-15 RX ORDER — ACETAMINOPHEN 325 MG/1
975 TABLET ORAL EVERY 8 HOURS SCHEDULED
Status: DISCONTINUED | OUTPATIENT
Start: 2024-03-15 | End: 2024-03-17

## 2024-03-15 RX ORDER — ECHINACEA PURPUREA EXTRACT 125 MG
1 TABLET ORAL
Status: DISCONTINUED | OUTPATIENT
Start: 2024-03-15 | End: 2024-03-19 | Stop reason: HOSPADM

## 2024-03-15 RX ADMIN — ENOXAPARIN SODIUM 40 MG: 40 INJECTION SUBCUTANEOUS at 09:27

## 2024-03-15 RX ADMIN — LABETALOL HYDROCHLORIDE 300 MG: 100 TABLET, FILM COATED ORAL at 21:05

## 2024-03-15 RX ADMIN — ROPINIROLE 0.5 MG: 0.25 TABLET, FILM COATED ORAL at 21:05

## 2024-03-15 RX ADMIN — Medication 2.5 MG: at 09:29

## 2024-03-15 RX ADMIN — ACETAMINOPHEN 975 MG: 325 TABLET, FILM COATED ORAL at 21:05

## 2024-03-15 RX ADMIN — TOPIRAMATE 25 MG: 25 TABLET, FILM COATED ORAL at 18:43

## 2024-03-15 RX ADMIN — UMECLIDINIUM 1 PUFF: 62.5 AEROSOL, POWDER ORAL at 09:30

## 2024-03-15 RX ADMIN — LABETALOL HYDROCHLORIDE 300 MG: 100 TABLET, FILM COATED ORAL at 05:31

## 2024-03-15 RX ADMIN — ACETAMINOPHEN 975 MG: 325 TABLET, FILM COATED ORAL at 13:16

## 2024-03-15 RX ADMIN — DICLOFENAC SODIUM 2 G: 10 GEL TOPICAL at 13:18

## 2024-03-15 RX ADMIN — NICOTINE 21 MG: 21 PATCH, EXTENDED RELEASE TRANSDERMAL at 09:31

## 2024-03-15 RX ADMIN — Medication 6 MG: at 21:04

## 2024-03-15 RX ADMIN — LABETALOL HYDROCHLORIDE 300 MG: 100 TABLET, FILM COATED ORAL at 13:16

## 2024-03-15 RX ADMIN — METHYLPREDNISOLONE SODIUM SUCCINATE 40 MG: 40 INJECTION, POWDER, FOR SOLUTION INTRAMUSCULAR; INTRAVENOUS at 13:16

## 2024-03-15 RX ADMIN — FLUTICASONE FUROATE AND VILANTEROL TRIFENATATE 1 PUFF: 100; 25 POWDER RESPIRATORY (INHALATION) at 09:30

## 2024-03-15 RX ADMIN — TOPIRAMATE 25 MG: 25 TABLET, FILM COATED ORAL at 09:27

## 2024-03-15 RX ADMIN — SALINE NASAL SPRAY 1 SPRAY: 1.5 SOLUTION NASAL at 18:43

## 2024-03-15 RX ADMIN — ROPINIROLE 0.5 MG: 0.25 TABLET, FILM COATED ORAL at 13:16

## 2024-03-15 RX ADMIN — DICLOFENAC SODIUM 2 G: 10 GEL TOPICAL at 09:31

## 2024-03-15 RX ADMIN — METHYLPREDNISOLONE SODIUM SUCCINATE 40 MG: 40 INJECTION, POWDER, FOR SOLUTION INTRAMUSCULAR; INTRAVENOUS at 21:04

## 2024-03-15 RX ADMIN — DICLOFENAC SODIUM 2 G: 10 GEL TOPICAL at 18:43

## 2024-03-15 RX ADMIN — ESCITALOPRAM OXALATE 5 MG: 10 TABLET ORAL at 09:27

## 2024-03-15 RX ADMIN — ROPINIROLE 0.5 MG: 0.25 TABLET, FILM COATED ORAL at 18:43

## 2024-03-15 NOTE — CASE MANAGEMENT
Case Management Assessment & Discharge Planning Note    Patient name Dwaine Gould  Location /-01 MRN [id number]  : 1960 Date 3/15/2024       Current Admission Date: 3/11/2024  Current Admission Diagnosis:Acute on chronic diastolic heart failure (HCC)   Patient Active Problem List    Diagnosis Date Noted    Acute respiratory failure with hypoxia (HCC) 2024    Acute metabolic encephalopathy 2024    Respiratory acidosis 2024    LETICIA (acute kidney injury) (HCC) 2024    Acute on chronic diastolic heart failure (HCC) 2024    COPD (chronic obstructive pulmonary disease) (HCC) 2024    Transaminitis 2024    Leukocytosis 2024    Tobacco abuse 2021    CAD (coronary artery disease) 2021    Essential hypertension 2019    Hyperlipidemia 2019      LOS (days): 4  Geometric Mean LOS (GMLOS) (days):   Days to GMLOS:     OBJECTIVE:    Risk of Unplanned Readmission Score: 9.13         Current admission status: Inpatient       Preferred Pharmacy:   [facility]/pharmacy #[id number] - [place] - [address]  [place] [id number]  Phone: [phone] Fax: [phone]    Primary Care Provider: Jordan Ramirez MD    Primary Insurance: [employer]  Secondary Insurance:     ASSESSMENT:  Active Health Care Proxies       MARLI GOULD [facility] Representative - Spouse   Primary Phone: [phone] (Mobile)                 Social Determinants of Health (SDOH)      Flowsheet Row Most Recent Value   Housing Stability    In the last 12 months, was there a time when you were not able to pay the mortgage or rent on time? N   In the last 12 months, how many places have you lived? 1   In the last 12 months, was there a time when you did not have a steady place to sleep or slept in a shelter (including now)? N   Transportation Needs    In the past 12 months, has lack of transportation kept you from medical appointments or  from getting medications? no   In the past 12 months, has lack of transportation kept you from meetings, work, or from getting things needed for daily living? No   Food Insecurity    Within the past 12 months, you worried that your food would run out before you got the money to buy more. Never true   Within the past 12 months, the food you bought just didn't last and you didn't have money to get more. Never true   Utilities    In the past 12 months has the electric, gas, oil, or water company threatened to shut off services in your home? No            DISCHARGE DETAILS:          Comments - Freedom of Choice: CM met with patient , who states she does not want STR and believes the plan  is d/c home with her daughter with HHC upon d/c . She requested CM contact her daughter.   CM spoke with daughter Eva via phone. She confirmed  the plan is  to d/c home to her Sister's house(the patient's local daughter) Kay Medina-who  does not work outside of home.  Kay's  cell   :[phone]  [facility].  The d/c  address will be [address] [place] [address].  CM added this information to the EPIC facesheet and [facility] the [facility]  CM contacted family/caregiver?: Yes  Were Treatment Team discharge recommendations reviewed with patient/caregiver?: Yes  Did patient/caregiver verbalize understanding of patient care needs?: Yes  Were patient/caregiver advised of the risks associated with not following Treatment Team discharge recommendations?: Yes    Contacts  Patient Contacts: Eva Berry  Daughter  [phone]  Relationship to Patient:: Family  Contact Method: Phone         Other Referral/Resources/Interventions Provided:  Referral Comments: The patient will d/c home to local daughter Soni levy.  HHC referrals are in [facility]- CM will review with patient and family when avaialble.  The patient  may need home O2- will monitor.    Would you like to participate in our [facility] service program?  : No - Declined       Discharge Destination Plan:: Home with Home Health Care  Transport at Discharge :  (TBD)

## 2024-03-15 NOTE — NURSING NOTE
Pt was found to be lethargic and confused, unable to answer questions appropriately. Provider called, blood sugar checked, vital signs collected, rapid response called.

## 2024-03-15 NOTE — PROGRESS NOTES
Cardiology Progress Note - Dwaine Gould 63 y.o. female MRN: [id number]    Unit/Bed#: -01 Encounter: [id number]      Assessment/Plan:  1.  Acute on chronic HFpEF/right heart failure, still quite overloaded but improved.  Diuretic management per nephrology given LETICIA.  Daily weights.  Salt restriction.  Strict I's and O's.    2.  Hypertension, blood pressure low at times.  Continue to monitor closely last blood pressure 109/55    3.  CAD per records from [facility].  Management per Slim.  Patient follows with [facility] cardiology.    4.  Transaminitis likely secondary to #1.  Remain off of statins.  Continue to monitor.    5.  Acute kidney injury, creatinine today 1.1 down from 1.6.  Nephrology following.  Diuretic management per nephrology.    6.  Hyperlipidemia, statin was discontinued given transaminitis.    7.  Transient altered mental status, with prompt return to normal.  Management per Slim/neurology, related to respiratory acidosis/acute metabolic encephalopathy    Patient is stable from a cardiac standpoint, will sign off.  Call if needed.  Patient to follow-up with primary cardiology at [facility] at discharge.      Subjective:   Patient seen and examined.  No significant events overnight. Im feeling ok better than yesterday.    Objective:     Vitals: Blood pressure 109/55, pulse 68, temperature 97.7 °F (36.5 °C), resp. rate 18, height 5' (1.524 m), weight (!) 148 kg (325 lb 2.9 oz), SpO2 91%., Body mass index is 63.51 kg/m².,   Orthostatic Blood Pressures      Flowsheet Row Most Recent Value   Blood Pressure 109/55 filed at 03/15/2024 0702   Patient Position - Orthostatic VS Sitting filed at 03/13/2024 2211              Intake/Output Summary (Last 24 hours) at 3/15/2024 1355  Last data filed at 3/15/2024 1201  Gross per 24 hour   Intake 660 ml   Output 650 ml   Net 10 ml         Physical Exam:  GEN: Alert and oriented in no acute distress.  Ill appearing and well nourished.   HEENT: Sclera anicteric, conjunctivae pink,  mucous membranes moist. Oropharynx clear.   NECK: Supple, no carotid bruits, hard to assess JVD. Trachea midline, no thyromegaly.   HEART: Regular rhythm, normal S1 and S2, no murmurs, clicks, gallops or rubs. PMI nondisplaced, no thrills.   LUNGS: decreased breath sounds bilaterally; no wheezes, rales, or rhonchi. No increased work of breathing or signs of respiratory distress.   ABDOMEN: +abdominal wall edema, Soft, nontender, nondistended, normoactive bowel sounds.   EXTREMITIES: + edema, Skin warm and well perfused, no clubbing, cyanosis  NEURO: No focal findings. Normal speech. Mood and affect normal.   SKIN: Normal without suspicious lesions on exposed skin.      Medications:      Current Facility-Administered Medications:     acetaminophen (TYLENOL) tablet 975 mg, 975 mg, Oral, Q8H ANGELES, GUSTAVO Turcios, 975 mg at 03/15/24 1316    albuterol inhalation solution 2.5 mg, 2.5 mg, Nebulization, Q6H PRN, Gabby Duran DO, 2.5 mg at 03/13/24 2130    Diclofenac Sodium (VOLTAREN) 1 % topical gel 2 g, 2 g, Topical, 4x Daily, GUSTAVO Turcios, 2 g at 03/15/24 1318    diphenhydrAMINE (BENADRYL) tablet 25 mg, 25 mg, Oral, HS PRN, GUSTAVO Turcios    enoxaparin (LOVENOX) subcutaneous injection 40 mg, 40 mg, Subcutaneous, Daily, Gabby Duran DO, 40 mg at 03/15/24 0927    escitalopram (LEXAPRO) tablet 5 mg, 5 mg, Oral, Daily, Gabby Duran, DO, 5 mg at 03/15/24 0927    Fluticasone Furoate-Vilanterol 100-25 mcg/actuation 1 puff, 1 puff, Inhalation, Daily, 1 puff at 03/15/24 0930 **AND** umeclidinium 62.5 mcg/actuation inhaler AEPB 1 puff, 1 puff, Inhalation, Daily, Gabby Duran DO, 1 puff at 03/15/24 0930    labetalol (NORMODYNE) tablet 300 mg, 300 mg, Oral, Q8H ANGELES, Gabby Duran DO, 300 mg at 03/15/24 1316    melatonin tablet 6 mg, 6 mg, Oral, HS, GUSTAVO Turcios, 6 mg at 03/14/24 2109    methylPREDNISolone sodium succinate  (Solu-MEDROL) injection 40 mg, 40 mg, Intravenous, Q8H ANGELES, GUSTAVO Turcios, 40 mg at 03/15/24 1316    nicotine (NICODERM CQ) 21 mg/24 hr TD 24 hr patch 21 mg, 21 mg, Transdermal, Daily, Pravin Wolff, DO, 21 mg at 03/15/24 0931    nicotine polacrilex (NICORETTE) gum 2 mg, 2 mg, Oral, Q2H PRN, Pravin Donatoan, DO    ondansetron (ZOFRAN) injection 4 mg, 4 mg, Intravenous, Q6H PRN, Gabby Duran, DO    rOPINIRole (REQUIP) tablet 0.5 mg, 0.5 mg, Oral, TID, GUSTAVO Tucrios, 0.5 mg at 03/15/24 1316    sodium chloride (OCEAN) 0.65 % nasal spray 1 spray, 1 spray, Each Nare, Q1H PRN, GUSTAVO Turcios    topiramate (TOPAMAX) tablet 25 mg, 25 mg, Oral, BID, Gabbybrown Duran, DO, 25 mg at 03/15/24 0927     Labs & Results:    Results from last 7 days   Lab Units 03/12/24  0232 03/12/24  0019 03/11/24  2204   HS TNI 0HR ng/L  --   --  23   HS TNI 2HR ng/L  --  26  --    HS TNI 4HR ng/L 22  --   --    HSTNI D4 ng/L -1  --   --      Results from last 7 days   Lab Units 03/15/24  0528 03/14/24  0427 03/13/24  0514   WBC Thousand/uL 10.45* 12.30* 11.24*   HEMOGLOBIN g/dL 12.2 13.5 12.8   HEMATOCRIT % 43.1 47.9* 43.5   PLATELETS Thousands/uL 195 257 233         Results from last 7 days   Lab Units 03/15/24  0528 03/14/24  0427 03/13/24  0514 03/12/24  0549   POTASSIUM mmol/L 4.7 5.0 4.2 4.9   CHLORIDE mmol/L 106 103 104 105   CO2 mmol/L 29 31 31 28   BUN mg/dL 43* 48* 40* 31*   CREATININE mg/dL 1.19 1.69* 1.44* 1.25   CALCIUM mg/dL 8.4 8.3* 8.3* 8.5   ALK PHOS U/L  --  158* 143* 158*   ALT U/L  --  98* 104* 108*   AST U/L  --  40* 47* 49*         Results from last 7 days   Lab Units 03/15/24  0528 03/14/24  0427 03/13/24  0514   MAGNESIUM mg/dL 2.5 2.4 2.1

## 2024-03-15 NOTE — PROGRESS NOTES
[facility]  Progress Note  Name: Dwaine Gould I  MRN: [id number]  Unit/Bed#: -01 I Date of Admission: 3/11/2024   Date of Service: 3/15/2024 I Hospital Day: 4    Assessment/Plan   * Acute on chronic diastolic heart failure (HCC)  Assessment & Plan  Patient presented to the ED with complaints of bilateral lower extremity edema and anasarca, with non-compliance with her diet.  Patient with acute on chronic diastolic HF exacerbation  Cardiology consult, appreciate input  Patient had been receiving 60 mg IV Lasix intially, but was discontinued when she developed an LETICIA   Monitor intake/output  De La Cruz catheter was placed for strict I&O, only net - 790 mL  Daily standing weight  Low salt diet  Echo (3/12/24): Left ventricular cavity size is normal. Wall thickness is increased. There is mild to moderate concentric hypertrophy. The left ventricular ejection fraction is 60%. Systolic function is normal.  Diastolic function is normal.   Cardiology, Nephrology following  Defer further diuretics to them.    Respiratory acidosis  Assessment & Plan  Noted 3/14/24 during rapid response  pH as noted - pH 7.191, pCO2 80.6, HCO3 30  Will trial BiPAP, recheck ABG after 1 hour  Most recent - pH 7.227, pCO2 82.5, HCO3 33.5  Will get venous blood gas today    Acute metabolic encephalopathy  Assessment & Plan  In setting of Co2 retention  ABG completed, noted to have a pH of 7.191, pCO2 of 80.6, with a HCO3 of 30.2  Likely cause of altered mental status, in setting of undiagnosed sleep apnea, obesity hypoventilation syndrome  Neurology asked to evaluate the patient  MRI brain w/wo contrast to better evaluate white matter changes on CTH; possible PRES   If recurrent events or no clinical improvement despite correction of metabolic derangement, consider Routine EEG   Outpatient sleep study; possible RILEY/CSA   CT Head (3/14/24): No acute intracranial abnormality. Chronic microangiopathy. Right middle ear and  small mastoid effusions.    Now resolved.    Acute respiratory failure with hypoxia ([facility])  Assessment & Plan  Initially, patient had been requiring 3 L of supplemental oxygen to maintain oxygen saturation levels above 88%  On 3/15, patient with increasing oxygen requirements up to 5 L, BiPAP as well in setting of respiratory acidosis.  CT chest (3/14/24): Mild interstitial edema. Mild dependent atelectasis in the lower lobes. Emphysema.  Procalcitonin within normal limits, low concern for pneumonia  Will initiate patient on IV Solu-Medrol today, in setting of emphysema, monitor for improvement.  Respiratory protocol    LETICIA (acute kidney injury) ([facility])  Assessment & Plan  Noted on the morning of 3/14, evidenced by creatinine rising from 1.25 to 1.69  Creatinine this morning 1.19 after discontinuation of IV Lasix  Unsure of most recent baseline, 2021 baseline is 0.8-1  FEUrea noting 24.7% which is consistent with prerenal disease.  De La Cruz in place for accurate intake/output  Monitor for hypotension, avoid nephrotoxins  Monitor BMP  Nephrology following    Leukocytosis  Assessment & Plan  Stabilized. Procalcitonin level within normal limits  Afebrile, not tachycardic  CT Chest (3/14/24): Mild interstitial edema. Mild dependent atelectasis in the lower lobes. Emphysema     Transaminitis  Assessment & Plan  Present on admission, remaining stable  Likely in setting of CHF exacerbation, no RUQ pain  Statin on hold  RUQ US (3/15/24): Liver size within normal range, surface contour is smooth, echogenicity and echotexture are within normal limits, no significant intrahepatic biliary dilatation, CBD not visualized, no ascites.    COPD (chronic obstructive pulmonary disease) ([facility])  Assessment & Plan  No sign of acute exacerbation  Continue pulmonary inhalers  Recommending OP follow-up, needs PFTs    CAD (coronary artery disease)  Assessment & Plan  History of single vessel CAD  No chest pain currently  Continue with  Labetalol  Hold lisinopril at this time due to lower blood pressures, most recent 109/55  Not on a statin at this time due to transaminitis on admission    Tobacco abuse  Assessment & Plan  Patient had been smoking 2 ppd; quit on day of admission.  Counseled on smoking cessation for >3 min         VTE Pharmacologic Prophylaxis:   Moderate Risk (Score 3-4) - Pharmacological DVT Prophylaxis Ordered: enoxaparin (Lovenox).    Mobility:   Basic Mobility Inpatient Raw Score: 15  JH-HLM Goal: 4: Move to chair/commode  JH-HLM Achieved: 2: Bed activities/Dependent transfer  JH-HLM Goal NOT achieved. Continue with multidisciplinary rounding and encourage appropriate mobility to improve upon JH-HLM goals.    Patient Centered Rounds: I performed bedside rounds with nursing staff today.   Discussions with Specialists or Other Care Team Provider: Cardiology, Nephrology, CM    Education and Discussions with Family / Patient: Attempted to update  (daughter) via phone. Unable to contact. Eva @ [phone] at 1021 am.  Unidentified VM, no message left.    Total Time Spent on Date of Encounter in care of patient: 35 mins. This time was spent on one or more of the following: performing physical exam; counseling and coordination of care; obtaining or reviewing history; documenting in the medical record; reviewing/ordering tests, medications or procedures; communicating with other healthcare professionals and discussing with patient's family/caregivers.    Current Length of Stay: 4 day(s)  Current Patient Status: Inpatient   Certification Statement: The patient will continue to require additional inpatient hospital stay due to need for bipap.  Discharge Plan: Anticipate discharge in 24-48 hrs to home.    Code Status: Level 1 - Full Code    Subjective:   Patient resting in bed, reports she is feeling better today, and really doesn't remember anything from yesterday.  She has no recollection of wearing the bipap  yesterday.  No complaints of chest pain today.  No shortness of breath at rest.  She reports she has not been OOB at all yet, and reports minimal activity at home as well, only going from the bathroom to her recliner.    Reports ongoing right leg pain, she reports restless legs, that continue to move.    Objective:     Vitals:   Temp (24hrs), Av °F (36.7 °C), Min:97.7 °F (36.5 °C), Max:98.3 °F (36.8 °C)    Temp:  [97.7 °F (36.5 °C)-98.3 °F (36.8 °C)] 97.7 °F (36.5 °C)  HR:  [68-80] 68  Resp:  [14-18] 18  BP: (109-135)/(55-68) 109/55  SpO2:  [92 %-96 %] 93 %  Body mass index is 63.51 kg/m².     Input and Output Summary (last 24 hours):     Intake/Output Summary (Last 24 hours) at 3/15/2024 1044  Last data filed at 3/15/2024 0846  Gross per 24 hour   Intake 420 ml   Output 1050 ml   Net -630 ml       Physical Exam:   Physical Exam  Vitals and nursing note reviewed.   Constitutional:       Appearance: She is morbidly obese. She is ill-appearing.   Cardiovascular:      Rate and Rhythm: Normal rate and regular rhythm.      Pulses: Normal pulses.      Heart sounds: Normal heart sounds. No murmur heard.  Pulmonary:      Effort: Pulmonary effort is normal.      Breath sounds: Decreased breath sounds present. No rhonchi or rales.   Abdominal:      Palpations: Abdomen is soft.      Tenderness: There is no abdominal tenderness.   Musculoskeletal:      Right lower leg: Edema (2+ pitting) present.      Left lower leg: Edema (2+ pitting) present.   Skin:     General: Skin is warm and dry.      Coloration: Skin is pale.   Neurological:      Mental Status: She is alert and oriented to person, place, and time.   Psychiatric:         Mood and Affect: Mood normal.         Behavior: Behavior normal.         Thought Content: Thought content normal.         Judgment: Judgment normal.         Additional Data:     Labs:  Results from last 7 days   Lab Units 03/15/24  0528 24  0427 24  0514   WBC Thousand/uL 10.45*   < >  11.24*   HEMOGLOBIN g/dL 12.2   < > 12.8   HEMATOCRIT % 43.1   < > 43.5   PLATELETS Thousands/uL 195   < > 233   NEUTROS PCT %  --   --  77*   LYMPHS PCT %  --   --  13*   MONOS PCT %  --   --  8   EOS PCT %  --   --  1    < > = values in this interval not displayed.     Results from last 7 days   Lab Units 03/15/24  0528 03/14/24  0427   SODIUM mmol/L 140 139   POTASSIUM mmol/L 4.7 5.0   CHLORIDE mmol/L 106 103   CO2 mmol/L 29 31   BUN mg/dL 43* 48*   CREATININE mg/dL 1.19 1.69*   ANION GAP mmol/L 5 5   CALCIUM mg/dL 8.4 8.3*   ALBUMIN g/dL  --  3.8   TOTAL BILIRUBIN mg/dL  --  0.46   ALK PHOS U/L  --  158*   ALT U/L  --  98*   AST U/L  --  40*   GLUCOSE RANDOM mg/dL 108 127         Results from last 7 days   Lab Units 03/14/24  0920   POC GLUCOSE mg/dl 127         Results from last 7 days   Lab Units 03/14/24  0427   PROCALCITONIN ng/ml 0.13       Lines/Drains:  Invasive Devices       Peripheral Intravenous Line  Duration             Peripheral IV 03/11/24 Right Antecubital 3 days              Drain  Duration             Urethral Catheter 16 Fr. <1 day                  Urinary Catheter:  Goal for removal: Remove after 48 hrs of I/O monitoring         \    Imaging: Reviewed radiology reports from this admission including: chest xray, chest CT scan, CT head, and ultrasound(s)    Recent Cultures (last 7 days):         Last 24 Hours Medication List:   Current Facility-Administered Medications   Medication Dose Route Frequency Provider Last Rate    acetaminophen  650 mg Oral Q6H PRN Gabbyjosué Duran, DO      albuterol  2.5 mg Nebulization Q6H PRN Gabby Duran, DO      Diclofenac Sodium  2 g Topical 4x Daily GUSTAVO Turcios      diphenhydrAMINE  25 mg Oral HS PRN GUSTAVO Turcios      enoxaparin  40 mg Subcutaneous Daily Gabby Duran, DO      escitalopram  5 mg Oral Daily Gabbyjosué Duran, DO      Fluticasone Furoate-Vilanterol  1 puff Inhalation Daily Gabby  Cecy Duran, DO      And    umeclidinium  1 puff Inhalation Daily Gabby Cecy Duran, DO      labetalol  300 mg Oral Q8H ANGELES Gabby Cecy Duran, DO      melatonin  6 mg Oral HS GUSTAVO Turcios      methylPREDNISolone sodium succinate  40 mg Intravenous Q8H [facility] GUSTAVO Turcios      nicotine  21 mg Transdermal Daily Pravin Wolff, DO      nicotine polacrilex  2 mg Oral Q2H PRN Pravin Wolff DO      ondansetron  4 mg Intravenous Q6H PRN Gabby Duran DO      oxyCODONE  2.5 mg Oral Q4H PRN GUSTAVO Turcios      Or    oxyCODONE  5 mg Oral Q4H PRN GUSTAVO Turcios      sodium chloride  1 spray Each Nare Q1H PRN GUSTAVO Turcios      topiramate  25 mg Oral BID Gabby Duran DO          Today, Patient Was Seen By: GUSTAVO Turcios    **Please Note: This note may have been constructed using a voice recognition system.**

## 2024-03-15 NOTE — PLAN OF CARE
Problem: PAIN - ADULT  Goal: Verbalizes/displays adequate comfort level or baseline comfort level  Description: Interventions:  - Encourage patient to monitor pain and request assistance  - Assess pain using appropriate pain scale  - Administer analgesics based on type and severity of pain and evaluate response  - Implement non-pharmacological measures as appropriate and evaluate response  - Consider cultural and social influences on pain and pain management  - Notify physician/advanced practitioner if interventions unsuccessful or patient reports new pain  Outcome: Progressing     Problem: INFECTION - ADULT  Goal: Absence or prevention of progression during hospitalization  Description: INTERVENTIONS:  - Assess and monitor for signs and symptoms of infection  - Monitor lab/diagnostic results  - Monitor all insertion sites, i.e. indwelling lines, tubes, and drains  - Monitor endotracheal if appropriate and nasal secretions for changes in amount and color  - [place] appropriate cooling/warming therapies per order  - Administer medications as ordered  - Instruct and encourage patient and family to use good hand hygiene technique  - Identify and instruct in appropriate isolation precautions for identified infection/condition  Outcome: Progressing  Goal: Absence of fever/infection during neutropenic period  Description: INTERVENTIONS:  - Monitor WBC    Outcome: Progressing     Problem: SAFETY ADULT  Goal: Patient will remain free of falls  Description: INTERVENTIONS:  - Educate patient/family on patient safety including physical limitations  - Instruct patient to call for assistance with activity   - Consult OT/PT to assist with strengthening/mobility   - Keep Call bell within reach  - Keep bed low and locked with side rails adjusted as appropriate  - Keep care items and personal belongings within reach  - Initiate and maintain comfort rounds  - Make Fall Risk Sign visible to staff  - Offer Toileting every 2 Hours,  in advance of need  -Initiate/Maintain bed alarm  - Obtain necessary fall risk management equipment: yellow socks  - Apply yellow socks and bracelet for high fall risk patients  - Consider moving patient to room near nurses station  Outcome: Progressing  Goal: Maintain or return to baseline ADL function  Description: INTERVENTIONS:  -  Assess patient's ability to carry out ADLs; assess patient's baseline for ADL function and identify physical deficits which impact ability to perform ADLs (bathing, care of mouth/teeth, toileting, grooming, dressing, etc.)  - Assess/evaluate cause of self-care deficits   - Assess range of motion  - Assess patient's mobility; develop plan if impaired  - Assess patient's need for assistive devices and provide as appropriate  - Encourage maximum independence but intervene and supervise when necessary  - Involve family in performance of ADLs  - Assess for home care needs following discharge   - Consider OT consult to assist with ADL evaluation and planning for discharge  - Provide patient education as appropriate  Outcome: Progressing  Goal: Maintains/Returns to pre admission functional level  Description: INTERVENTIONS:  - Perform AM-PAC 6 Click Basic Mobility/ Daily Activity assessment daily.  - Set and communicate daily mobility goal to care team and patient/family/caregiver.   - Collaborate with rehabilitation services on mobility goals if consulted  - Perform Range of Motion 2 times a day.  - Reposition patient every 2 hours.  - Dangle patient 2 times a day  - Stand patient 2 times a day  - Ambulate patient 2 times a day  - Out of bed to chair 2 times a day   - Out of bed for meals 2 times a day  - Out of bed for toileting  - Record patient progress and toleration of activity level   Outcome: Progressing     Problem: Prexisting or High Potential for Compromised Skin Integrity  Goal: Skin integrity is maintained or improved  Description: INTERVENTIONS:  - Identify patients at risk for  skin breakdown  - Assess and monitor skin integrity  - Assess and monitor nutrition and hydration status  - Monitor labs   - Assess for incontinence   - Turn and reposition patient  - Assist with mobility/ambulation  - Relieve pressure over bony prominences  - Avoid friction and shearing  - Provide appropriate hygiene as needed including keeping skin clean and dry  - Evaluate need for skin moisturizer/barrier cream  - Collaborate with interdisciplinary team   - Patient/family teaching  - Consider wound care consult   Outcome: Progressing

## 2024-03-15 NOTE — PLAN OF CARE
Problem: PAIN - ADULT  Goal: Verbalizes/displays adequate comfort level or baseline comfort level  Description: Interventions:  - Encourage patient to monitor pain and request assistance  - Assess pain using appropriate pain scale  - Administer analgesics based on type and severity of pain and evaluate response  - Implement non-pharmacological measures as appropriate and evaluate response  - Consider cultural and social influences on pain and pain management  - Notify physician/advanced practitioner if interventions unsuccessful or patient reports new pain  Outcome: Progressing     Problem: INFECTION - ADULT  Goal: Absence or prevention of progression during hospitalization  Description: INTERVENTIONS:  - Assess and monitor for signs and symptoms of infection  - Monitor lab/diagnostic results  - Monitor all insertion sites, i.e. indwelling lines, tubes, and drains  - Monitor endotracheal if appropriate and nasal secretions for changes in amount and color  - [place] appropriate cooling/warming therapies per order  - Administer medications as ordered  - Instruct and encourage patient and family to use good hand hygiene technique  - Identify and instruct in appropriate isolation precautions for identified infection/condition  Outcome: Progressing  Goal: Absence of fever/infection during neutropenic period  Description: INTERVENTIONS:  - Monitor WBC    Outcome: Progressing     Problem: SAFETY ADULT  Goal: Patient will remain free of falls  Description: INTERVENTIONS:  - Educate patient/family on patient safety including physical limitations  - Instruct patient to call for assistance with activity   - Consult OT/PT to assist with strengthening/mobility   - Keep Call bell within reach  - Keep bed low and locked with side rails adjusted as appropriate  - Keep care items and personal belongings within reach  - Initiate and maintain comfort rounds  - Make Fall Risk Sign visible to staff  - Offer Toileting every 2 Hours,  in advance of need  - Initiate/Maintain bed alarm  - Obtain necessary fall risk management equipment: Bed alarm   - Apply yellow socks and bracelet for high fall risk patients  - Consider moving patient to room near nurses station  Outcome: Progressing  Goal: Maintain or return to baseline ADL function  Description: INTERVENTIONS:  -  Assess patient's ability to carry out ADLs; assess patient's baseline for ADL function and identify physical deficits which impact ability to perform ADLs (bathing, care of mouth/teeth, toileting, grooming, dressing, etc.)  - Assess/evaluate cause of self-care deficits   - Assess range of motion  - Assess patient's mobility; develop plan if impaired  - Assess patient's need for assistive devices and provide as appropriate  - Encourage maximum independence but intervene and supervise when necessary  - Involve family in performance of ADLs  - Assess for home care needs following discharge   - Consider OT consult to assist with ADL evaluation and planning for discharge  - Provide patient education as appropriate  Outcome: Progressing  Goal: Maintains/Returns to pre admission functional level  Description: INTERVENTIONS:  - Perform AM-PAC 6 Click Basic Mobility/ Daily Activity assessment daily.  - Set and communicate daily mobility goal to care team and patient/family/caregiver.   - Collaborate with rehabilitation services on mobility goals if consulted  - Perform Range of Motion 3 times a day.  - Reposition patient every 3 hours.  - Dangle patient 3 times a day  - Stand patient 3 times a day  - Ambulate patient 3 times a day  - Out of bed to chair 3 times a day   - Out of bed for meals 3 times a day  - Out of bed for toileting  - Record patient progress and toleration of activity level   Outcome: Progressing     Problem: Prexisting or High Potential for Compromised Skin Integrity  Goal: Skin integrity is maintained or improved  Description: INTERVENTIONS:  - Identify patients at risk for  skin breakdown  - Assess and monitor skin integrity  - Assess and monitor nutrition and hydration status  - Monitor labs   - Assess for incontinence   - Turn and reposition patient  - Assist with mobility/ambulation  - Relieve pressure over bony prominences  - Avoid friction and shearing  - Provide appropriate hygiene as needed including keeping skin clean and dry  - Evaluate need for skin moisturizer/barrier cream  - Collaborate with interdisciplinary team   - Patient/family teaching  - Consider wound care consult   Outcome: Progressing

## 2024-03-15 NOTE — PLAN OF CARE
Problem: PAIN - ADULT  Goal: Verbalizes/displays adequate comfort level or baseline comfort level  Description: Interventions:  - Encourage patient to monitor pain and request assistance  - Assess pain using appropriate pain scale  - Administer analgesics based on type and severity of pain and evaluate response  - Implement non-pharmacological measures as appropriate and evaluate response  - Consider cultural and social influences on pain and pain management  - Notify physician/advanced practitioner if interventions unsuccessful or patient reports new pain  3/15/2024 0008 by Maru Johns RN  Outcome: Progressing  3/15/2024 0004 by Maru Johns RN  Outcome: Progressing     Problem: INFECTION - ADULT  Goal: Absence or prevention of progression during hospitalization  Description: INTERVENTIONS:  - Assess and monitor for signs and symptoms of infection  - Monitor lab/diagnostic results  - Monitor all insertion sites, i.e. indwelling lines, tubes, and drains  - Monitor endotracheal if appropriate and nasal secretions for changes in amount and color  - [place] appropriate cooling/warming therapies per order  - Administer medications as ordered  - Instruct and encourage patient and family to use good hand hygiene technique  - Identify and instruct in appropriate isolation precautions for identified infection/condition  3/15/2024 0008 by Maru Johns RN  Outcome: Progressing  3/15/2024 0004 by Maru Johns RN  Outcome: Progressing  Goal: Absence of fever/infection during neutropenic period  Description: INTERVENTIONS:  - Monitor WBC    3/15/2024 0008 by Maru Johns RN  Outcome: Progressing  3/15/2024 0004 by Maru Johns RN  Outcome: Progressing     Problem: SAFETY ADULT  Goal: Patient will remain free of falls  Description: INTERVENTIONS:  - Educate patient/family on patient safety including physical limitations  - Instruct patient to call for assistance with activity   - Consult OT/PT to assist with  strengthening/mobility   - Keep Call bell within reach  - Keep bed low and locked with side rails adjusted as appropriate  - Keep care items and personal belongings within reach  - Initiate and maintain comfort rounds  - Make Fall Risk Sign visible to staff  - Offer Toileting every 2 Hours, in advance of need  -Initiate/Maintain bed alarm  - Obtain necessary fall risk management equipment: yellow socks  - Apply yellow socks and bracelet for high fall risk patients  - Consider moving patient to room near nurses station  3/15/2024 0008 by Maru Johns RN  Outcome: Progressing  3/15/2024 0004 by Maru Johns RN  Outcome: Progressing  Goal: Maintain or return to baseline ADL function  Description: INTERVENTIONS:  -  Assess patient's ability to carry out ADLs; assess patient's baseline for ADL function and identify physical deficits which impact ability to perform ADLs (bathing, care of mouth/teeth, toileting, grooming, dressing, etc.)  - Assess/evaluate cause of self-care deficits   - Assess range of motion  - Assess patient's mobility; develop plan if impaired  - Assess patient's need for assistive devices and provide as appropriate  - Encourage maximum independence but intervene and supervise when necessary  - Involve family in performance of ADLs  - Assess for home care needs following discharge   - Consider OT consult to assist with ADL evaluation and planning for discharge  - Provide patient education as appropriate  3/15/2024 0008 by Maru Johns RN  Outcome: Progressing  3/15/2024 0004 by Maru Johns RN  Outcome: Progressing  Goal: Maintains/Returns to pre admission functional level  Description: INTERVENTIONS:  - Perform AM-PAC 6 Click Basic Mobility/ Daily Activity assessment daily.  - Set and communicate daily mobility goal to care team and patient/family/caregiver.   - Collaborate with rehabilitation services on mobility goals if consulted  - Perform Range of Motion 2 times a day.  - Reposition patient  every 2 hours.  - Dangle patient 2 times a day  - Stand patient 2 times a day  - Ambulate patient 2 times a day  - Out of bed to chair 2 times a day   - Out of bed for meals 2 times a day  - Out of bed for toileting  - Record patient progress and toleration of activity level   3/15/2024 0008 by Maru Johns RN  Outcome: Progressing  3/15/2024 0004 by Maru Johns RN  Outcome: Progressing     Problem: Prexisting or High Potential for Compromised Skin Integrity  Goal: Skin integrity is maintained or improved  Description: INTERVENTIONS:  - Identify patients at risk for skin breakdown  - Assess and monitor skin integrity  - Assess and monitor nutrition and hydration status  - Monitor labs   - Assess for incontinence   - Turn and reposition patient  - Assist with mobility/ambulation  - Relieve pressure over bony prominences  - Avoid friction and shearing  - Provide appropriate hygiene as needed including keeping skin clean and dry  - Evaluate need for skin moisturizer/barrier cream  - Collaborate with interdisciplinary team   - Patient/family teaching  - Consider wound care consult   3/15/2024 0008 by Maru Johns RN  Outcome: Progressing  3/15/2024 0004 by Maru Johns RN  Outcome: Progressing

## 2024-03-15 NOTE — PROGRESS NOTES
NEPHROLOGY PROGRESS NOTE    Patient: Dwaine Gould               Sex: female          DOA: 3/11/2024  9:31 PM   YOB: 1960        Age:  63 y.o.        LOS:  LOS: 4 days       HPI     Patient with acute kidney injury and respiratory acidosis    SUBJECTIVE     Feeling much better    Off BiPAP    No other acute complaint    CURRENT MEDICATIONS       Current Facility-Administered Medications:     acetaminophen (TYLENOL) tablet 650 mg, 650 mg, Oral, Q6H PRN, Gabby Cecy Roger, DO, 650 mg at 03/14/24 1948    albuterol inhalation solution 2.5 mg, 2.5 mg, Nebulization, Q6H PRN, Gabby Cecy Roger, DO, 2.5 mg at 03/13/24 2130    Diclofenac Sodium (VOLTAREN) 1 % topical gel 2 g, 2 g, Topical, 4x Daily, GUSTAVO Turcios, 2 g at 03/15/24 0931    diphenhydrAMINE (BENADRYL) tablet 25 mg, 25 mg, Oral, HS PRN, GUSTAVO Turcios    enoxaparin (LOVENOX) subcutaneous injection 40 mg, 40 mg, Subcutaneous, Daily, Gabby Cecy Roger, DO, 40 mg at 03/15/24 0927    escitalopram (LEXAPRO) tablet 5 mg, 5 mg, Oral, Daily, Gabby Cecy Roger, DO, 5 mg at 03/15/24 0927    Fluticasone Furoate-Vilanterol 100-25 mcg/actuation 1 puff, 1 puff, Inhalation, Daily, 1 puff at 03/15/24 0930 **AND** umeclidinium 62.5 mcg/actuation inhaler AEPB 1 puff, 1 puff, Inhalation, Daily, Gabby Cecy Roger, DO, 1 puff at 03/15/24 0930    labetalol (NORMODYNE) tablet 300 mg, 300 mg, Oral, Q8H ANGELES, Gabby Cecy Roger, DO, 300 mg at 03/15/24 0531    melatonin tablet 6 mg, 6 mg, Oral, HS, GUSTAVO Turcios, 6 mg at 03/14/24 2109    methylPREDNISolone sodium succinate (Solu-MEDROL) injection 40 mg, 40 mg, Intravenous, Q8H ANGELES, GUSTAVO Turcios    nicotine (NICODERM CQ) 21 mg/24 hr TD 24 hr patch 21 mg, 21 mg, Transdermal, Daily, Pravin Wolff DO, 21 mg at 03/15/24 0931    nicotine polacrilex (NICORETTE) gum 2 mg, 2 mg, Oral, Q2H PRN, Pravin Wolff DO    ondansetron (ZOFRAN)  injection 4 mg, 4 mg, Intravenous, Q6H PRN, Gabby Duran DO    oxyCODONE (ROXICODONE) split tablet 2.5 mg, 2.5 mg, Oral, Q4H PRN, 2.5 mg at 03/15/24 0929 **OR** oxyCODONE (ROXICODONE) IR tablet 5 mg, 5 mg, Oral, Q4H PRN, VANDANA TurciosNP, 5 mg at 03/13/24 2137    sodium chloride (OCEAN) 0.65 % nasal spray 1 spray, 1 spray, Each Nare, Q1H PRN, Nida Stinson CRNP    topiramate (TOPAMAX) tablet 25 mg, 25 mg, Oral, BID, Gabby Duran DO, 25 mg at 03/15/24 0927    OBJECTIVE     Current Weight: Weight - Scale: (!) 148 kg (325 lb 2.9 oz)  Vitals:    03/15/24 0702   BP: 109/55   Pulse: 68   Resp: 18   Temp: 97.7 °F (36.5 °C)   SpO2: 93%       Intake/Output Summary (Last 24 hours) at 3/15/2024 1200  Last data filed at 3/15/2024 0846  Gross per 24 hour   Intake 420 ml   Output 650 ml   Net -230 ml       PHYSICAL EXAMINATION     Physical Exam  Constitutional:       General: She is not in acute distress.     Appearance: She is well-developed. She is obese.   HENT:      Head: Normocephalic.      Mouth/Throat:      Mouth: Mucous membranes are moist.   Eyes:      General: No scleral icterus.     Conjunctiva/sclera: Conjunctivae normal.   Neck:      Vascular: No JVD.   Cardiovascular:      Rate and Rhythm: Normal rate.      Heart sounds: Normal heart sounds.   Pulmonary:      Effort: Pulmonary effort is normal.      Breath sounds: No wheezing.   Abdominal:      Palpations: Abdomen is soft.      Tenderness: There is no abdominal tenderness.   Musculoskeletal:         General: Normal range of motion.      Cervical back: Neck supple.   Skin:     General: Skin is warm.      Findings: No rash.   Neurological:      Mental Status: She is alert and oriented to person, place, and time.   Psychiatric:         Behavior: Behavior normal.          LAB RESULTS     Results from last 7 days   Lab Units 03/15/24  0528 03/14/24  0427 03/13/24  0514 03/12/24  0549 03/11/24  2204   WBC Thousand/uL 10.45* 12.30*  11.24* 13.43* 13.72*   HEMOGLOBIN g/dL 12.2 13.5 12.8 13.6 13.7   HEMATOCRIT % 43.1 47.9* 43.5 46.9* 46.7*   PLATELETS Thousands/uL 195 257 233 267 266   POTASSIUM mmol/L 4.7 5.0 4.2 4.9 5.2   CHLORIDE mmol/L 106 103 104 105 105   CO2 mmol/L 29 31 31 28 28   BUN mg/dL 43* 48* 40* 31* 30*   CREATININE mg/dL 1.19 1.69* 1.44* 1.25 1.18   EGFR ml/min/1.73sq m 49 32 39 46 49   CALCIUM mg/dL 8.4 8.3* 8.3* 8.5 8.6   MAGNESIUM mg/dL 2.5 2.4 2.1 2.1 2.1       RADIOLOGY RESULTS      Results for orders placed during the hospital encounter of 07/19/21    XR chest 1 view portable    Narrative  CHEST    INDICATION:   chest tightness.    COMPARISON:  February 3, 2020    EXAM PERFORMED/VIEWS:  XR CHEST PORTABLE  Images: 2    FINDINGS:    Cardiomediastinal silhouette appears unremarkable.    No acute consolidation.  No pleural effusion or pneumothorax    Osseous structures appear within normal limits for patient age.    Impression  No acute consolidation or congestion            Workstation performed: [id number]    Results for orders placed during the hospital encounter of 03/11/24    XR chest 2 views    Narrative  XR CHEST PA & LATERAL    INDICATION: sob.    COMPARISON: 7/19/2021; 2/3/2020    FINDINGS:    Low lung volumes, which causes crowding of bronchovascular markings.  Within that limitation, there is no focal lung opacity. No pneumothorax or pleural effusion.    Normal cardiomediastinal silhouette.    Bones are unremarkable for age.    Normal upper abdomen.    Impression  No acute cardiopulmonary disease on this examination, which is somewhat limited secondary to low lung volumes.        Workstation performed: [id number]    Results for orders placed during the hospital encounter of 03/11/24    CT chest wo contrast    Narrative  CT CHEST WITHOUT IV CONTRAST    INDICATION:   Hypoxia, Tachypnea. Per my review of the medical record, history of heart failure and COPD.    COMPARISON: CXR 3/11/2024 and 7/19/2021.    TECHNIQUE:  "Chest CT without intravenous contrast.  Axial, sagittal, coronal 2D reformats and coronal MIPS from source data.    Radiation dose length product (DLP):  1014 mGy-cm . Radiation dose exposure minimized using iterative reconstruction and automated exposure control.    FINDINGS:    LUNGS: Mild septal thickening due to interstitial edema. Dependent atelectasis in the lower lobes. Moderate centrilobular and paraseptal emphysema.    AIRWAYS: No significant filling defects.    PLEURA:  Unremarkable.    HEART/GREAT VESSELS: Mild cardiomegaly. Mild coronary artery calcification indicating atherosclerotic heart disease.    MEDIASTINUM AND DUANE:  Unremarkable.    CHEST WALL AND LOWER NECK: Anasarca in the abdominal wall.    UPPER ABDOMEN: Question small adrenal adenomas or hyperplasia.    OSSEOUS STRUCTURES: Mild degenerative disease in the spine.    Impression  Mild interstitial edema.    Mild dependent atelectasis in the lower lobes.    Emphysema.      Workstation performed: [id number]    No results found for this or any previous visit.    No results found for this or any previous visit.    No results found for this or any previous visit.      PLAN / RECOMMENDATIONS      Acute kidney injury: Improved with holding diuretic which I will continue to hold for now    Respiratory acidosis: Much better and off BiPAP    Diastolic heart failure: Off diuretic but will need diuretic soon    Will follow    Francis Vazquez MD  Nephrology  3/15/2024        Portions of the record may have been created with voice recognition software. Occasional wrong word or \"sound a like\" substitutions may have occurred due to the inherent limitations of voice recognition software. Read the chart carefully and recognize, using context, where substitutions have occurred.  "

## 2024-03-15 NOTE — PLAN OF CARE
Problem: INFECTION - ADULT  Goal: Absence or prevention of progression during hospitalization  Description: INTERVENTIONS:  - Assess and monitor for signs and symptoms of infection  - Monitor lab/diagnostic results  - Monitor all insertion sites, i.e. indwelling lines, tubes, and drains  - Monitor endotracheal if appropriate and nasal secretions for changes in amount and color  - [place] appropriate cooling/warming therapies per order  - Administer medications as ordered  - Instruct and encourage patient and family to use good hand hygiene technique  - Identify and instruct in appropriate isolation precautions for identified infection/condition  Outcome: Progressing

## 2024-03-15 NOTE — TREATMENT PLAN
Discussion with patients daughter, Eva, via phone at [phone] x [phone] minutes.  Medical update provided regarding her current medical status, and plan of care going forward.    Patient is known to be non-compliant, with history of non-compliance with medications as well as follow-ups with her providers in the past.  History of sleep apnea, has seen a pulmonologist in the past as well - sometime in 2022, she had seen Dr. Amor.    Reportedly, the plan will be for the patient to live with her other daughter on discharge for extra help and assistance with ADLs.  Eva does not think she will be receptive or open to going to [facility] at this time.    She also made note that Prednisone in the past has made her mother very violent and angry and to avoid Prednisone if possible.     Will try to avoid narcotics as patient has had history of opioid abuse in the past and will try to manage symptoms with Tylenol and Requip for restless leg syndrome.

## 2024-03-16 PROBLEM — E87.5 HYPERKALEMIA: Status: ACTIVE | Noted: 2024-03-16

## 2024-03-16 LAB
ANION GAP SERPL CALCULATED.3IONS-SCNC: 1 MMOL/L (ref 4–13)
ANION GAP SERPL CALCULATED.3IONS-SCNC: 4 MMOL/L (ref 4–13)
BUN SERPL-MCNC: 37 MG/DL (ref 5–25)
BUN SERPL-MCNC: 37 MG/DL (ref 5–25)
CALCIUM SERPL-MCNC: 8.5 MG/DL (ref 8.4–10.2)
CALCIUM SERPL-MCNC: 8.8 MG/DL (ref 8.4–10.2)
CHLORIDE SERPL-SCNC: 102 MMOL/L (ref 96–108)
CHLORIDE SERPL-SCNC: 104 MMOL/L (ref 96–108)
CO2 SERPL-SCNC: 29 MMOL/L (ref 21–32)
CO2 SERPL-SCNC: 36 MMOL/L (ref 21–32)
CREAT SERPL-MCNC: 1.07 MG/DL (ref 0.6–1.3)
CREAT SERPL-MCNC: 1.14 MG/DL (ref 0.6–1.3)
ERYTHROCYTE [DISTWIDTH] IN BLOOD BY AUTOMATED COUNT: 18.6 % (ref 11.6–15.1)
GFR SERPL CREATININE-BSD FRML MDRD: 52 ML/MIN/1.73SQ M
GFR SERPL CREATININE-BSD FRML MDRD: 56 ML/MIN/1.73SQ M
GLUCOSE SERPL-MCNC: 138 MG/DL (ref 65–140)
GLUCOSE SERPL-MCNC: 164 MG/DL (ref 65–140)
HCT VFR BLD AUTO: 47.5 % (ref 34.8–46.1)
HGB BLD-MCNC: 13.3 G/DL (ref 11.5–15.4)
MAGNESIUM SERPL-MCNC: 2.5 MG/DL (ref 1.9–2.7)
MCH RBC QN AUTO: 23.6 PG (ref 26.8–34.3)
MCHC RBC AUTO-ENTMCNC: 28 G/DL (ref 31.4–37.4)
MCV RBC AUTO: 84 FL (ref 82–98)
PLATELET # BLD AUTO: 206 THOUSANDS/UL (ref 149–390)
PMV BLD AUTO: 9.7 FL (ref 8.9–12.7)
POTASSIUM SERPL-SCNC: 5.4 MMOL/L (ref 3.5–5.3)
POTASSIUM SERPL-SCNC: 5.7 MMOL/L (ref 3.5–5.3)
RBC # BLD AUTO: 5.64 MILLION/UL (ref 3.81–5.12)
SODIUM SERPL-SCNC: 137 MMOL/L (ref 135–147)
SODIUM SERPL-SCNC: 139 MMOL/L (ref 135–147)
WBC # BLD AUTO: 11.4 THOUSAND/UL (ref 4.31–10.16)

## 2024-03-16 PROCEDURE — 85027 COMPLETE CBC AUTOMATED: CPT | Performed by: NURSE PRACTITIONER

## 2024-03-16 PROCEDURE — 80048 BASIC METABOLIC PNL TOTAL CA: CPT | Performed by: NURSE PRACTITIONER

## 2024-03-16 PROCEDURE — 94660 CPAP INITIATION&MGMT: CPT

## 2024-03-16 PROCEDURE — 94760 N-INVAS EAR/PLS OXIMETRY 1: CPT

## 2024-03-16 PROCEDURE — 94664 DEMO&/EVAL PT USE INHALER: CPT

## 2024-03-16 PROCEDURE — 83735 ASSAY OF MAGNESIUM: CPT | Performed by: NURSE PRACTITIONER

## 2024-03-16 PROCEDURE — 99232 SBSQ HOSP IP/OBS MODERATE 35: CPT | Performed by: NURSE PRACTITIONER

## 2024-03-16 PROCEDURE — 99232 SBSQ HOSP IP/OBS MODERATE 35: CPT | Performed by: INTERNAL MEDICINE

## 2024-03-16 RX ORDER — METHYLPREDNISOLONE SODIUM SUCCINATE 40 MG/ML
40 INJECTION, POWDER, LYOPHILIZED, FOR SOLUTION INTRAMUSCULAR; INTRAVENOUS EVERY 12 HOURS SCHEDULED
Status: DISCONTINUED | OUTPATIENT
Start: 2024-03-16 | End: 2024-03-19 | Stop reason: HOSPADM

## 2024-03-16 RX ORDER — SODIUM POLYSTYRENE SULFONATE 4.1 MEQ/G
15 POWDER, FOR SUSPENSION ORAL; RECTAL ONCE
Status: COMPLETED | OUTPATIENT
Start: 2024-03-16 | End: 2024-03-16

## 2024-03-16 RX ORDER — TORSEMIDE 20 MG/1
20 TABLET ORAL DAILY
Status: DISCONTINUED | OUTPATIENT
Start: 2024-03-16 | End: 2024-03-19 | Stop reason: HOSPADM

## 2024-03-16 RX ADMIN — SODIUM POLYSTYRENE SULFONATE 15 G: 1 POWDER ORAL; RECTAL at 15:14

## 2024-03-16 RX ADMIN — LABETALOL HYDROCHLORIDE 300 MG: 100 TABLET, FILM COATED ORAL at 13:30

## 2024-03-16 RX ADMIN — ACETAMINOPHEN 975 MG: 325 TABLET, FILM COATED ORAL at 21:40

## 2024-03-16 RX ADMIN — LABETALOL HYDROCHLORIDE 300 MG: 100 TABLET, FILM COATED ORAL at 21:40

## 2024-03-16 RX ADMIN — METHYLPREDNISOLONE SODIUM SUCCINATE 40 MG: 40 INJECTION, POWDER, FOR SOLUTION INTRAMUSCULAR; INTRAVENOUS at 13:29

## 2024-03-16 RX ADMIN — Medication 6 MG: at 21:40

## 2024-03-16 RX ADMIN — DICLOFENAC SODIUM 2 G: 10 GEL TOPICAL at 21:45

## 2024-03-16 RX ADMIN — ENOXAPARIN SODIUM 40 MG: 40 INJECTION SUBCUTANEOUS at 08:33

## 2024-03-16 RX ADMIN — TOPIRAMATE 25 MG: 25 TABLET, FILM COATED ORAL at 17:40

## 2024-03-16 RX ADMIN — TORSEMIDE 20 MG: 20 TABLET ORAL at 11:25

## 2024-03-16 RX ADMIN — ESCITALOPRAM OXALATE 5 MG: 10 TABLET ORAL at 08:33

## 2024-03-16 RX ADMIN — ROPINIROLE 0.5 MG: 0.25 TABLET, FILM COATED ORAL at 21:40

## 2024-03-16 RX ADMIN — LABETALOL HYDROCHLORIDE 300 MG: 100 TABLET, FILM COATED ORAL at 05:14

## 2024-03-16 RX ADMIN — METHYLPREDNISOLONE SODIUM SUCCINATE 40 MG: 40 INJECTION, POWDER, FOR SOLUTION INTRAMUSCULAR; INTRAVENOUS at 05:14

## 2024-03-16 RX ADMIN — FLUTICASONE FUROATE AND VILANTEROL TRIFENATATE 1 PUFF: 100; 25 POWDER RESPIRATORY (INHALATION) at 08:36

## 2024-03-16 RX ADMIN — ROPINIROLE 0.5 MG: 0.25 TABLET, FILM COATED ORAL at 08:33

## 2024-03-16 RX ADMIN — ACETAMINOPHEN 975 MG: 325 TABLET, FILM COATED ORAL at 05:14

## 2024-03-16 RX ADMIN — TOPIRAMATE 25 MG: 25 TABLET, FILM COATED ORAL at 08:33

## 2024-03-16 RX ADMIN — UMECLIDINIUM 1 PUFF: 62.5 AEROSOL, POWDER ORAL at 08:36

## 2024-03-16 RX ADMIN — ACETAMINOPHEN 975 MG: 325 TABLET, FILM COATED ORAL at 13:29

## 2024-03-16 RX ADMIN — NICOTINE 21 MG: 21 PATCH, EXTENDED RELEASE TRANSDERMAL at 08:33

## 2024-03-16 RX ADMIN — METHYLPREDNISOLONE SODIUM SUCCINATE 40 MG: 40 INJECTION, POWDER, FOR SOLUTION INTRAMUSCULAR; INTRAVENOUS at 20:57

## 2024-03-16 RX ADMIN — ROPINIROLE 0.5 MG: 0.25 TABLET, FILM COATED ORAL at 15:49

## 2024-03-16 NOTE — ASSESSMENT & PLAN NOTE
In setting of Co2 retention  ABG completed, noted to have a pH of 7.191, pCO2 of 80.6, with a HCO3 of 30.2  Likely cause of altered mental status, in setting of undiagnosed sleep apnea, obesity hypoventilation syndrome  Neurology asked to evaluate the patient  MRI brain w/wo contrast pending; to better evaluate white matter changes on CTH; possible PRES   If recurrent events or no clinical improvement despite correction of metabolic derangement, consider Routine EEG   Outpatient sleep study; possible RILEY/CSA   CT Head (3/14/24): No acute intracranial abnormality. Chronic microangiopathy. Right middle ear and small mastoid effusions.    Now resolved.

## 2024-03-16 NOTE — RESPIRATORY THERAPY NOTE
03/15/24 2222   Respiratory Assessment   Assessment Type Assess only   General Appearance Alert;Awake   Respiratory Pattern Assisted   Chest Assessment Chest expansion symmetrical   Bilateral Breath Sounds Diminished   Cough None   Resp Comments Pt placed on bipap at this time   O2 Device V60   Non-Invasive Information   O2 Interface Device Face mask   Non-Invasive Ventilation Mode BiPAP   $ Intermittent NIV Yes   SpO2 95 %   $ Pulse Oximetry Spot Check Charge Completed   Non-Invasive Settings   IPAP (cm) 20 cm   EPAP (cm) 8 cm   Rate (Set) 12   FiO2 (%) 40   Pressure Support (cm H2O) 12   Rise Time 2   Inspiratory Time (Set) 1   Non-Invasive Readings   Skin Intervention Skin intact   Total Rate 12   Vt (mL) (Mech) 428   MV (Mech) 5   Peak Pressure (Obs) 20   Spontaneous Vt (mL) 409   Leak (lpm) 0   Non-Invasive Alarms   Insp Pressure High (cm H20) 30   Insp Pressure Low (cm H20) 6   Low Insp Pressure Time (sec) 20 sec   MV Low (L/min) 3   Vt High (mL) 1200   Vt Low (mL) 200   High Resp Rate (BPM) 35 BPM   Low Resp Rate (BPM) 8 BPM   Apnea Interval (sec) 20   Apnea Rate 12     RT Ventilator Management Note  Dwaine Gould 63 y.o. female MRN: [id number]  Unit/Bed#: -01 Encounter: [id number]      Daily Screen    No data found in the last 10 encounters.           Physical Exam:   Assessment Type: Assess only  General Appearance: Alert, Awake  Respiratory Pattern: Assisted  Chest Assessment: Chest expansion symmetrical  Bilateral Breath Sounds: Diminished  Cough: None  O2 Device: V60      Resp Comments: Pt placed on bipap at this time

## 2024-03-16 NOTE — PROGRESS NOTES
NEPHROLOGY PROGRESS NOTE    Patient: Dwaine Gould               Sex: female          DOA: 3/11/2024  9:31 PM   YOB: 1960        Age:  63 y.o.        LOS:  LOS: 5 days       HPI     Patient with acute kidney injury    SUBJECTIVE     Much better for now    No other complaint    No chest pain no palpitation    CURRENT MEDICATIONS       Current Facility-Administered Medications:     acetaminophen (TYLENOL) tablet 975 mg, 975 mg, Oral, Q8H ANGELES, GUSTAVO Turcios, 975 mg at 03/16/24 0514    albuterol inhalation solution 2.5 mg, 2.5 mg, Nebulization, Q6H PRN, Gabby Cecy Roger, DO, 2.5 mg at 03/13/24 2130    Diclofenac Sodium (VOLTAREN) 1 % topical gel 2 g, 2 g, Topical, 4x Daily, GUSTAVO Turcios, 2 g at 03/15/24 1843    diphenhydrAMINE (BENADRYL) tablet 25 mg, 25 mg, Oral, HS PRN, GUSTAVO Turcios    enoxaparin (LOVENOX) subcutaneous injection 40 mg, 40 mg, Subcutaneous, Daily, Gabby Cecy Roger, DO, 40 mg at 03/16/24 0833    escitalopram (LEXAPRO) tablet 5 mg, 5 mg, Oral, Daily, Gabby Cecy Roger, DO, 5 mg at 03/16/24 0833    Fluticasone Furoate-Vilanterol 100-25 mcg/actuation 1 puff, 1 puff, Inhalation, Daily, 1 puff at 03/16/24 0836 **AND** umeclidinium 62.5 mcg/actuation inhaler AEPB 1 puff, 1 puff, Inhalation, Daily, Gabby Cecy Roger, DO, 1 puff at 03/16/24 0836    labetalol (NORMODYNE) tablet 300 mg, 300 mg, Oral, Q8H ANGELES, Gabby Cecy Roger, DO, 300 mg at 03/16/24 0514    melatonin tablet 6 mg, 6 mg, Oral, HS, GUSTAVO Turcios, 6 mg at 03/15/24 2104    methylPREDNISolone sodium succinate (Solu-MEDROL) injection 40 mg, 40 mg, Intravenous, Q8H ANGELES, GUSTAVO Turcios, 40 mg at 03/16/24 0514    nicotine (NICODERM CQ) 21 mg/24 hr TD 24 hr patch 21 mg, 21 mg, Transdermal, Daily, Pravin Wolff DO, 21 mg at 03/16/24 0833    nicotine polacrilex (NICORETTE) gum 2 mg, 2 mg, Oral, Q2H PRN, Pravin Wolff DO    ondansetron  (ZOFRAN) injection 4 mg, 4 mg, Intravenous, Q6H PRN, Gabby Cecy Roger, DO    rOPINIRole (REQUIP) tablet 0.5 mg, 0.5 mg, Oral, TID, GUSTAVO Turcios, 0.5 mg at 03/16/24 0833    sodium chloride (OCEAN) 0.65 % nasal spray 1 spray, 1 spray, Each Nare, Q1H PRN, GUSTAVO Turcios, 1 spray at 03/15/24 1843    topiramate (TOPAMAX) tablet 25 mg, 25 mg, Oral, BID, Gabby Cecyjuan Duran, DO, 25 mg at 03/16/24 0833    torsemide (DEMADEX) tablet 20 mg, 20 mg, Oral, Daily, Francis Vazquez MD    OBJECTIVE     Current Weight: Weight - Scale: (!) 141 kg (311 lb 11.7 oz)  Vitals:    03/16/24 0913   BP:    Pulse: 61   Resp: 17   Temp:    SpO2: 90%       Intake/Output Summary (Last 24 hours) at 3/16/2024 1034  Last data filed at 3/16/2024 0839  Gross per 24 hour   Intake 1080 ml   Output 1150 ml   Net -70 ml       PHYSICAL EXAMINATION     Physical Exam  Constitutional:       General: She is not in acute distress.     Appearance: She is well-developed.   HENT:      Head: Normocephalic.      Mouth/Throat:      Mouth: Mucous membranes are moist.   Eyes:      General: No scleral icterus.     Conjunctiva/sclera: Conjunctivae normal.   Neck:      Vascular: No JVD.   Cardiovascular:      Rate and Rhythm: Normal rate.      Heart sounds: Normal heart sounds.   Pulmonary:      Effort: Pulmonary effort is normal.      Breath sounds: No wheezing.   Abdominal:      Palpations: Abdomen is soft.      Tenderness: There is no abdominal tenderness.   Musculoskeletal:         General: Normal range of motion.      Cervical back: Neck supple.   Skin:     General: Skin is warm.      Findings: No rash.   Neurological:      Mental Status: She is alert and oriented to person, place, and time.   Psychiatric:         Behavior: Behavior normal.          LAB RESULTS     Results from last 7 days   Lab Units 03/16/24  0937 03/16/24  0643 03/15/24  0528 03/14/24  0427 03/13/24  0514 03/12/24  0549 03/11/24  2204   WBC Thousand/uL 11.40*  --   10.45* 12.30* 11.24* 13.43* 13.72*   HEMOGLOBIN g/dL 13.3  --  12.2 13.5 12.8 13.6 13.7   HEMATOCRIT % 47.5*  --  43.1 47.9* 43.5 46.9* 46.7*   PLATELETS Thousands/uL 206  --  195 257 233 267 266   POTASSIUM mmol/L  --  5.7* 4.7 5.0 4.2 4.9 5.2   CHLORIDE mmol/L  --  104 106 103 104 105 105   CO2 mmol/L  --  29 29 31 31 28 28   BUN mg/dL  --  37* 43* 48* 40* 31* 30*   CREATININE mg/dL  --  1.07 1.19 1.69* 1.44* 1.25 1.18   EGFR ml/min/1.73sq m  --  56 49 32 39 46 49   CALCIUM mg/dL  --  8.5 8.4 8.3* 8.3* 8.5 8.6   MAGNESIUM mg/dL  --  2.5 2.5 2.4 2.1 2.1 2.1       RADIOLOGY RESULTS      Results for orders placed during the hospital encounter of 07/19/21    XR chest 1 view portable    Narrative  CHEST    INDICATION:   chest tightness.    COMPARISON:  February 3, 2020    EXAM PERFORMED/VIEWS:  XR CHEST PORTABLE  Images: 2    FINDINGS:    Cardiomediastinal silhouette appears unremarkable.    No acute consolidation.  No pleural effusion or pneumothorax    Osseous structures appear within normal limits for patient age.    Impression  No acute consolidation or congestion            Workstation performed: [id number]    Results for orders placed during the hospital encounter of 03/11/24    XR chest 2 views    Narrative  XR CHEST PA & LATERAL    INDICATION: sob.    COMPARISON: 7/19/2021; 2/3/2020    FINDINGS:    Low lung volumes, which causes crowding of bronchovascular markings.  Within that limitation, there is no focal lung opacity. No pneumothorax or pleural effusion.    Normal cardiomediastinal silhouette.    Bones are unremarkable for age.    Normal upper abdomen.    Impression  No acute cardiopulmonary disease on this examination, which is somewhat limited secondary to low lung volumes.        Workstation performed: [id number]    Results for orders placed during the hospital encounter of 03/11/24    CT chest wo contrast    Narrative  CT CHEST WITHOUT IV CONTRAST    INDICATION:   Hypoxia, Tachypnea. Per my review  "of the medical record, history of heart failure and COPD.    COMPARISON: CXR 3/11/2024 and 7/19/2021.    TECHNIQUE: Chest CT without intravenous contrast.  Axial, sagittal, coronal 2D reformats and coronal MIPS from source data.    Radiation dose length product (DLP):  1014 mGy-cm . Radiation dose exposure minimized using iterative reconstruction and automated exposure control.    FINDINGS:    LUNGS: Mild septal thickening due to interstitial edema. Dependent atelectasis in the lower lobes. Moderate centrilobular and paraseptal emphysema.    AIRWAYS: No significant filling defects.    PLEURA:  Unremarkable.    HEART/GREAT VESSELS: Mild cardiomegaly. Mild coronary artery calcification indicating atherosclerotic heart disease.    MEDIASTINUM AND DUANE:  Unremarkable.    CHEST WALL AND LOWER NECK: Anasarca in the abdominal wall.    UPPER ABDOMEN: Question small adrenal adenomas or hyperplasia.    OSSEOUS STRUCTURES: Mild degenerative disease in the spine.    Impression  Mild interstitial edema.    Mild dependent atelectasis in the lower lobes.    Emphysema.      Workstation performed: [id number]    No results found for this or any previous visit.    No results found for this or any previous visit.    No results found for this or any previous visit.      PLAN / RECOMMENDATIONS      Acute kidney injury: Resolved    Morbid obesity: Stable for now    Chronic diastolic heart failure: Will resume diuretic with torsemide 20 mg once a day    Will sign off for now and call back if necessary    Francis Vazquez MD  Nephrology  3/16/2024        Portions of the record may have been created with voice recognition software. Occasional wrong word or \"sound a like\" substitutions may have occurred due to the inherent limitations of voice recognition software. Read the chart carefully and recognize, using context, where substitutions have occurred.  "

## 2024-03-16 NOTE — ASSESSMENT & PLAN NOTE
Noted 3/14/24 during rapid response  pH as noted - pH 7.191, pCO2 80.6, HCO3 30  Trialed on BiPAP, recheck ABG after 1 hour  Most recent - pH 7.227, pCO2 82.5, HCO3 33.5  Venous gas obtained 3/15  pCO2 52.7 bicarb 29.7  Repeat in a.m.

## 2024-03-16 NOTE — ASSESSMENT & PLAN NOTE
History of single vessel CAD  No chest pain currently  Continue with Labetalol  Hold lisinopril at this time due to lower blood pressures, discussed holding or resuming this medication upon discharge or resume sooner if hypertensive  Not on a statin at this time due to transaminitis on admission

## 2024-03-16 NOTE — PLAN OF CARE
Problem: PAIN - ADULT  Goal: Verbalizes/displays adequate comfort level or baseline comfort level  Description: Interventions:  - Encourage patient to monitor pain and request assistance  - Assess pain using appropriate pain scale  - Administer analgesics based on type and severity of pain and evaluate response  - Implement non-pharmacological measures as appropriate and evaluate response  - Consider cultural and social influences on pain and pain management  - Notify physician/advanced practitioner if interventions unsuccessful or patient reports new pain  Outcome: Progressing     Problem: INFECTION - ADULT  Goal: Absence or prevention of progression during hospitalization  Description: INTERVENTIONS:  - Assess and monitor for signs and symptoms of infection  - Monitor lab/diagnostic results  - Monitor all insertion sites, i.e. indwelling lines, tubes, and drains  - Monitor endotracheal if appropriate and nasal secretions for changes in amount and color  - [place] appropriate cooling/warming therapies per order  - Administer medications as ordered  - Instruct and encourage patient and family to use good hand hygiene technique  - Identify and instruct in appropriate isolation precautions for identified infection/condition  Outcome: Progressing  Goal: Absence of fever/infection during neutropenic period  Description: INTERVENTIONS:  - Monitor WBC    Outcome: Progressing     Problem: SAFETY ADULT  Goal: Patient will remain free of falls  Description: INTERVENTIONS:  - Educate patient/family on patient safety including physical limitations  - Instruct patient to call for assistance with activity   - Consult OT/PT to assist with strengthening/mobility   - Keep Call bell within reach  - Keep bed low and locked with side rails adjusted as appropriate  - Keep care items and personal belongings within reach  - Initiate and maintain comfort rounds  - Make Fall Risk Sign visible to staff  - Offer Toileting every 2 Hours,  in advance of need  -Initiate/Maintain bed alarm  - Obtain necessary fall risk management equipment: yellow socks  - Apply yellow socks and bracelet for high fall risk patients  - Consider moving patient to room near nurses station  Outcome: Progressing  Goal: Maintain or return to baseline ADL function  Description: INTERVENTIONS:  -  Assess patient's ability to carry out ADLs; assess patient's baseline for ADL function and identify physical deficits which impact ability to perform ADLs (bathing, care of mouth/teeth, toileting, grooming, dressing, etc.)  - Assess/evaluate cause of self-care deficits   - Assess range of motion  - Assess patient's mobility; develop plan if impaired  - Assess patient's need for assistive devices and provide as appropriate  - Encourage maximum independence but intervene and supervise when necessary  - Involve family in performance of ADLs  - Assess for home care needs following discharge   - Consider OT consult to assist with ADL evaluation and planning for discharge  - Provide patient education as appropriate  Outcome: Progressing  Goal: Maintains/Returns to pre admission functional level  Description: INTERVENTIONS:  - Perform AM-PAC 6 Click Basic Mobility/ Daily Activity assessment daily.  - Set and communicate daily mobility goal to care team and patient/family/caregiver.   - Collaborate with rehabilitation services on mobility goals if consulted  - Perform Range of Motion 2 times a day.  - Reposition patient every 2 hours.  - Dangle patient 2 times a day  - Stand patient 2 times a day  - Ambulate patient 2 times a day  - Out of bed to chair 2 times a day   - Out of bed for meals 2 times a day  - Out of bed for toileting  - Record patient progress and toleration of activity level   Outcome: Progressing     Problem: Prexisting or High Potential for Compromised Skin Integrity  Goal: Skin integrity is maintained or improved  Description: INTERVENTIONS:  - Identify patients at risk for  skin breakdown  - Assess and monitor skin integrity  - Assess and monitor nutrition and hydration status  - Monitor labs   - Assess for incontinence   - Turn and reposition patient  - Assist with mobility/ambulation  - Relieve pressure over bony prominences  - Avoid friction and shearing  - Provide appropriate hygiene as needed including keeping skin clean and dry  - Evaluate need for skin moisturizer/barrier cream  - Collaborate with interdisciplinary team   - Patient/family teaching  - Consider wound care consult   Outcome: Progressing

## 2024-03-16 NOTE — ASSESSMENT & PLAN NOTE
Initially, patient had been requiring 3 L of supplemental oxygen to maintain oxygen saturation levels above 88%  On 3/15, patient with increasing oxygen requirements up to 5 L, BiPAP as well in setting of respiratory acidosis.  CT chest (3/14/24): Mild interstitial edema. Mild dependent atelectasis in the lower lobes. Emphysema.  Procalcitonin within normal limits, low concern for pneumonia  Patient initiated on IV Solu-Medrol 40 mg every 8 hours on 3/15 in setting of worsening hypoxia and  in setting of emphysema.  Will repeat VBG in a.m.   Respiratory protocol

## 2024-03-16 NOTE — RESPIRATORY THERAPY NOTE
RT Protocol Note  Dwaine Gould 63 y.o. female MRN: [id number]  Unit/Bed#: -01 Encounter: [id number]    Assessment    Principal Problem:    Acute on chronic diastolic heart failure (HCC)  Active Problems:    Tobacco abuse    CAD (coronary artery disease)    COPD (chronic obstructive pulmonary disease) (HCC)    Transaminitis    Leukocytosis    LETICIA (acute kidney injury) (HCC)    Acute respiratory failure with hypoxia (HCC)    Acute metabolic encephalopathy    Respiratory acidosis  Subjective Data: drowsy    Objective    Physical Exam:   Assessment Type: Assess only  General Appearance: Awake, Alert  Respiratory Pattern: Assisted  Chest Assessment: Chest expansion symmetrical  Bilateral Breath Sounds: Diminished  Cough: None  O2 Device: V60    Vitals:  Blood pressure 137/69, pulse 61, temperature 98 °F (36.7 °C), resp. rate 17, height 5' (1.524 m), weight (!) 141 kg (311 lb 11.7 oz), SpO2 90%.  O2 Device: V60     Plan    Respiratory Plan: Discontinue Protocol        Resp Comments: will dc protocol pt has not req prn txs in over 48 hrs

## 2024-03-16 NOTE — RESPIRATORY THERAPY NOTE
03/15/24 2222   Respiratory Protocol   Protocol Initiated? No   Protocol Selection Respiratory   Language Barrier? No   Medical & Social History Reviewed? Yes   Diagnostic Studies Reviewed? Yes   Physical Assessment Performed? Yes   Respiratory Plan Home Bronchodilator Patient pathway   Respiratory Assessment   Assessment Type Assess only   General Appearance Alert;Awake   Respiratory Pattern Assisted   Chest Assessment Chest expansion symmetrical   Bilateral Breath Sounds Diminished   Cough None   Resp Comments Will continue with current tx plan   O2 Device V60   Additional Assessments   Pulse 75   Respirations 13   SpO2 95 %     RT Ventilator Management Note  Dwaine Gould 63 y.o. female MRN: [id number]  Unit/Bed#: -01 Encounter: [id number]      Daily Screen    No data found in the last 10 encounters.           Physical Exam:   Assessment Type: Assess only  General Appearance: Alert, Awake  Respiratory Pattern: Assisted  Chest Assessment: Chest expansion symmetrical  Bilateral Breath Sounds: Diminished  Cough: None  O2 Device: V60      Resp Comments: Will continue with current tx plan

## 2024-03-16 NOTE — PLAN OF CARE
Problem: PAIN - ADULT  Goal: Verbalizes/displays adequate comfort level or baseline comfort level  Description: Interventions:  - Encourage patient to monitor pain and request assistance  - Assess pain using appropriate pain scale  - Administer analgesics based on type and severity of pain and evaluate response  - Implement non-pharmacological measures as appropriate and evaluate response  - Consider cultural and social influences on pain and pain management  - Notify physician/advanced practitioner if interventions unsuccessful or patient reports new pain  3/16/2024 0948 by Taisha Ortiz RN  Outcome: Progressing  3/16/2024 0947 by Taisha Ortiz RN  Outcome: Progressing     Problem: Prexisting or High Potential for Compromised Skin Integrity  Goal: Skin integrity is maintained or improved  Description: INTERVENTIONS:  - Identify patients at risk for skin breakdown  - Assess and monitor skin integrity  - Assess and monitor nutrition and hydration status  - Monitor labs   - Assess for incontinence   - Turn and reposition patient  - Assist with mobility/ambulation  - Relieve pressure over bony prominences  - Avoid friction and shearing  - Provide appropriate hygiene as needed including keeping skin clean and dry  - Evaluate need for skin moisturizer/barrier cream  - Collaborate with interdisciplinary team   - Patient/family teaching  - Consider wound care consult   Outcome: Progressing     Problem: SAFETY ADULT  Goal: Patient will remain free of falls  Description: INTERVENTIONS:  - Educate patient/family on patient safety including physical limitations  - Instruct patient to call for assistance with activity   - Consult OT/PT to assist with strengthening/mobility   - Keep Call bell within reach  - Keep bed low and locked with side rails adjusted as appropriate  - Keep care items and personal belongings within reach  - Initiate and maintain comfort rounds  - Make Fall Risk Sign visible to staff  - Offer  Toileting every 2 Hours, in advance of need  - Initiate/Maintain alarm  - Obtain necessary fall risk management equipment  - Apply yellow socks and bracelet for high fall risk patients  - Consider moving patient to room near nurses station  Outcome: Progressing

## 2024-03-16 NOTE — ASSESSMENT & PLAN NOTE
Patient started on IV Solu-Medrol due to worsening hypoxia emphysema findings on imaging  Currently on IV Solu-Medrol 40 mg every 8 hours improved can decrease today to every 8 hrs  Continue pulmonary inhalers  Recommending OP follow-up, needs PFTs

## 2024-03-16 NOTE — ASSESSMENT & PLAN NOTE
Patient presented to the ED with complaints of bilateral lower extremity edema and anasarca, with non-compliance with her diet.  Patient with acute on chronic diastolic HF exacerbation  Cardiology consult, appreciate input  Patient had been receiving 60 mg IV Lasix intially, but was discontinued when she developed an LETICIA   Patient transition back to torsemide 20 mg daily on 3/16  Nephrology now signed off 3/16  Monitor intake/output  [place] catheter was placed for strict I&O, only net - 790 mL  Daily standing weight  Low salt diet  Echo (3/12/24): Left ventricular cavity size is normal. Wall thickness is increased. There is mild to moderate concentric hypertrophy. The left ventricular ejection fraction is 60%. Systolic function is normal.  Diastolic function is normal.   Cardiology, Nephrology following  Defer further diuretics to them.

## 2024-03-16 NOTE — ASSESSMENT & PLAN NOTE
Present on admission, remaining stable  Likely in setting of CHF exacerbation, no RUQ pain  Statin on hold  RUQ US (3/15/24): Liver size within normal range, surface contour is smooth, echogenicity and echotexture are within normal limits, no significant intrahepatic biliary dilatation, CBD not visualized, no ascites.  Repeat CMP tomorrow

## 2024-03-16 NOTE — ASSESSMENT & PLAN NOTE
Potassium this morning 5 point 7 repeat BMP this afternoon 5.4  Will give a one time dose of Kayexalate  Repeat BMP in a.m.

## 2024-03-16 NOTE — RESPIRATORY THERAPY NOTE
03/16/24 0406   Respiratory Assessment   Resp Comments Pt found off bipap   Non-Invasive Information   SpO2 93 %     RT Ventilator Management Note  Dwaine Gould 63 y.o. female MRN: [id number]  Unit/Bed#: -01 Encounter: [id number]      Daily Screen    No data found in the last 10 encounters.           Physical Exam:   Assessment Type: Assess only  General Appearance: Alert, Awake  Respiratory Pattern: Assisted  Chest Assessment: Chest expansion symmetrical  Bilateral Breath Sounds: Diminished  Cough: None  O2 Device: V60      Resp Comments: Pt found off bipap

## 2024-03-16 NOTE — PROGRESS NOTES
[facility]  Progress Note  Name: Dwaine Gould I  MRN: [id number]  Unit/Bed#: -01 I Date of Admission: 3/11/2024   Date of Service: 3/16/2024 I Hospital Day: 5    Assessment/Plan   * Acute on chronic diastolic heart failure (HCC)  Assessment & Plan  Patient presented to the ED with complaints of bilateral lower extremity edema and anasarca, with non-compliance with her diet.  Patient with acute on chronic diastolic HF exacerbation  Cardiology consult, appreciate input  Patient had been receiving 60 mg IV Lasix intially, but was discontinued when she developed an LETICIA   Patient transition back to torsemide 20 mg daily on 3/16  Nephrology now signed off 3/16  Monitor intake/output  De La Cruz catheter was placed for strict I&O, only net - 790 mL  Daily standing weight  Low salt diet  Echo (3/12/24): Left ventricular cavity size is normal. Wall thickness is increased. There is mild to moderate concentric hypertrophy. The left ventricular ejection fraction is 60%. Systolic function is normal.  Diastolic function is normal.   Cardiology, Nephrology following  Defer further diuretics to them.    Acute respiratory failure with hypoxia (HCC)  Assessment & Plan  Initially, patient had been requiring 3 L of supplemental oxygen to maintain oxygen saturation levels above 88%  On 3/15, patient with increasing oxygen requirements up to 5 L, BiPAP as well in setting of respiratory acidosis.  CT chest (3/14/24): Mild interstitial edema. Mild dependent atelectasis in the lower lobes. Emphysema.  Procalcitonin within normal limits, low concern for pneumonia  Patient initiated on IV Solu-Medrol 40 mg every 8 hours on 3/15 in setting of worsening hypoxia and  in setting of emphysema.  Will repeat VBG in a.m.   Respiratory protocol    Hyperkalemia  Assessment & Plan  Potassium this morning 5 point 7 repeat BMP this afternoon 5.4  Will give a one time dose of Kayexalate  Repeat BMP in a.m.    Respiratory  acidosis  Assessment & Plan  Noted 3/14/24 during rapid response  pH as noted - pH 7.191, pCO2 80.6, HCO3 30  Trialed on BiPAP, recheck ABG after 1 hour  Most recent - pH 7.227, pCO2 82.5, HCO3 33.5  Venous gas obtained 3/15  pCO2 52.7 bicarb 29.7  Repeat in a.m.    Acute metabolic encephalopathy  Assessment & Plan  In setting of Co2 retention  ABG completed, noted to have a pH of 7.191, pCO2 of 80.6, with a HCO3 of 30.2  Likely cause of altered mental status, in setting of undiagnosed sleep apnea, obesity hypoventilation syndrome  Neurology asked to evaluate the patient  MRI brain w/wo contrast pending; to better evaluate white matter changes on CTH; possible PRES   If recurrent events or no clinical improvement despite correction of metabolic derangement, consider Routine EEG   Outpatient sleep study; possible RILEY/CSA   CT Head (3/14/24): No acute intracranial abnormality. Chronic microangiopathy. Right middle ear and small mastoid effusions.    Now resolved.    LETICIA (acute kidney injury) (HCC)  Assessment & Plan  Noted on the morning of 3/14, evidenced by creatinine rising from 1.25 to 1.69  Creatinine this morning 1.19 after discontinuation of IV Lasix  Unsure of most recent baseline, 2021 baseline is 0.8-1  De La Cruz in place for accurate intake/output will discontinue in next 24 hours  Creatinine appears to be back at baseline at 1.07  Monitor for hypotension, avoid nephrotoxins  Monitor BMP  Nephrology following    Transaminitis  Assessment & Plan  Present on admission, remaining stable  Likely in setting of CHF exacerbation, no RUQ pain  Statin on hold  RUQ US (3/15/24): Liver size within normal range, surface contour is smooth, echogenicity and echotexture are within normal limits, no significant intrahepatic biliary dilatation, CBD not visualized, no ascites.  Repeat CMP tomorrow    COPD (chronic obstructive pulmonary disease) (HCC)  Assessment & Plan  Patient started on IV Solu-Medrol due to worsening hypoxia  emphysema findings on imaging  Currently on IV Solu-Medrol 40 mg every 8 hours improved can decrease today to every 8 hrs  Continue pulmonary inhalers  Recommending OP follow-up, needs PFTs    CAD (coronary artery disease)  Assessment & Plan  History of single vessel CAD  No chest pain currently  Continue with Labetalol  Hold lisinopril at this time due to lower blood pressures, discussed holding or resuming this medication upon discharge or resume sooner if hypertensive  Not on a statin at this time due to transaminitis on admission    Tobacco abuse  Assessment & Plan  Patient had been smoking 2 ppd; quit on day of admission.  Counseled on smoking cessation for >3 min               VTE Pharmacologic Prophylaxis:    lovenox    Mobility:   Basic Mobility Inpatient Raw Score: 13  JH-HLM Goal: 4: Move to chair/commode  JH-HLM Achieved: 4: Move to chair/commode  JH-HLM Goal achieved. Continue to encourage appropriate mobility.    Patient Centered Rounds: I performed bedside rounds with nursing staff today.   Discussions with Specialists or Other Care Team Provider: Nephrology    Education and Discussions with Family / Patient: Updated  (daughter and mother) at bedside.    Total Time Spent on Date of Encounter in care of patient: 40 percent.  Mins. This time was spent on one or more of the following: performing physical exam; counseling and coordination of care; obtaining or reviewing history; documenting in the medical record; reviewing/ordering tests, medications or procedures; communicating with other healthcare professionals and discussing with patient's family/caregivers.    Current Length of Stay: 5 day(s)  Current Patient Status: Inpatient   Certification Statement: Cardiology was consultedThe patient will continue to require additional inpatient hospital stay due to acute respiratory failure wean from steroids  Discharge Plan: Anticipate discharge in 24-48 hrs to home with home services.    Code  Status: Level 1 - Full Code    Subjective:   Patient reports overall she is starting to feel better and less fatigued also breathing easier feels less confused.  Discussion normal need for sleep study was explained BiPAP were explained.  Patient further does not want to undergo an MRI she is very claustrophobic and be open to going to patient is agreeable to get the De La Cruz out today and await further recommendations from neurology.    Objective:     Vitals:   Temp (24hrs), Av.7 °F (36.5 °C), Min:97.4 °F (36.3 °C), Max:98 °F (36.7 °C)    Temp:  [97.4 °F (36.3 °C)-98 °F (36.7 °C)] 98 °F (36.7 °C)  HR:  [54-76] 69  Resp:  [13-20] 17  BP: ()/(60-82) 132/64  SpO2:  [90 %-95 %] 92 %  Body mass index is 60.88 kg/m².     Input and Output Summary (last 24 hours):     Intake/Output Summary (Last 24 hours) at 3/16/2024 1519  Last data filed at 3/16/2024 0839  Gross per 24 hour   Intake 600 ml   Output 1150 ml   Net -550 ml       Physical Exam:   Physical Exam  Vitals and nursing note reviewed.   Constitutional:       General: She is not in acute distress.     Appearance: She is well-developed. She is obese.   HENT:      Head: Normocephalic and atraumatic.   Eyes:      Conjunctiva/sclera: Conjunctivae normal.   Cardiovascular:      Rate and Rhythm: Normal rate and regular rhythm.      Heart sounds: No murmur heard.  Pulmonary:      Effort: Pulmonary effort is normal. No respiratory distress.      Breath sounds: Examination of the right-lower field reveals decreased breath sounds. Examination of the left-lower field reveals decreased breath sounds. Decreased breath sounds present.   Abdominal:      Palpations: Abdomen is soft.      Tenderness: There is no abdominal tenderness.   Musculoskeletal:         General: No swelling.      Cervical back: Neck supple.   Skin:     General: Skin is warm and dry.      Capillary Refill: Capillary refill takes less than 2 seconds.   Neurological:      Mental Status: She is alert and  oriented to person, place, and time.   Psychiatric:         Mood and Affect: Mood normal.          Additional Data:     Labs:  Results from last 7 days   Lab Units 03/16/24  0937 03/14/24  0427 03/13/24  0514   WBC Thousand/uL 11.40*   < > 11.24*   HEMOGLOBIN g/dL 13.3   < > 12.8   HEMATOCRIT % 47.5*   < > 43.5   PLATELETS Thousands/uL 206   < > 233   NEUTROS PCT %  --   --  77*   LYMPHS PCT %  --   --  13*   MONOS PCT %  --   --  8   EOS PCT %  --   --  1    < > = values in this interval not displayed.     Results from last 7 days   Lab Units 03/16/24  1347 03/15/24  0528 03/14/24  0427   SODIUM mmol/L 139   < > 139   POTASSIUM mmol/L 5.4*   < > 5.0   CHLORIDE mmol/L 102   < > 103   CO2 mmol/L 36*   < > 31   BUN mg/dL 37*   < > 48*   CREATININE mg/dL 1.14   < > 1.69*   ANION GAP mmol/L 1*   < > 5   CALCIUM mg/dL 8.8   < > 8.3*   ALBUMIN g/dL  --   --  3.8   TOTAL BILIRUBIN mg/dL  --   --  0.46   ALK PHOS U/L  --   --  158*   ALT U/L  --   --  98*   AST U/L  --   --  40*   GLUCOSE RANDOM mg/dL 164*   < > 127    < > = values in this interval not displayed.         Results from last 7 days   Lab Units 03/14/24  0920   POC GLUCOSE mg/dl 127         Results from last 7 days   Lab Units 03/14/24  0427   PROCALCITONIN ng/ml 0.13       Lines/Drains:  Invasive Devices       Peripheral Intravenous Line  Duration             Peripheral IV 03/15/24 Left;Ventral (anterior) Forearm 1 day              Drain  Duration             Urethral Catheter 16 Fr. 2 days                  Urinary Catheter:  Goal for removal: No longer needed. Will place order to discontinue               Imaging: Reviewed radiology reports from this admission including: chest CT scan    Recent Cultures (last 7 days):         Last 24 Hours Medication List:   Current Facility-Administered Medications   Medication Dose Route Frequency Provider Last Rate    acetaminophen  975 mg Oral Q8H GUSTAVO Hinkle      albuterol  2.5 mg Nebulization Q6H PRN  Gabbyjosué Amaralunjuan Duran, DO      Diclofenac Sodium  2 g Topical 4x Daily GUSTAVO Turcios      diphenhydrAMINE  25 mg Oral HS PRN GUSTAVO Turcios      enoxaparin  40 mg Subcutaneous Daily Gabbybrown Duran, DO      escitalopram  5 mg Oral Daily Gabby Cecyjuan Duran, DO      Fluticasone Furoate-Vilanterol  1 puff Inhalation Daily Gabby Cecy Duran, DO      And    umeclidinium  1 puff Inhalation Daily Gabby Cecyjuan Duran, DO      labetalol  300 mg Oral Q8H [facility] Cecy Duran, DO      melatonin  6 mg Oral HS GUSTAVO Turcios      methylPREDNISolone sodium succinate  40 mg Intravenous Q12H [facility] GUSTAVO Goel      nicotine  21 mg Transdermal Daily Pravin Mariella, DO      nicotine polacrilex  2 mg Oral Q2H PRN Pravin Wolff, DO      ondansetron  4 mg Intravenous Q6H PRN Gabbybrown Duran, DO      rOPINIRole  0.5 mg Oral TID GUSTAVO Turcios      sodium chloride  1 spray Each Nare Q1H PRN GUSTAVO Turcios      topiramate  25 mg Oral BID Gabbybrown Duran, DO      torsemide  20 mg Oral Daily Francis Vazquez MD          Today, Patient Was Seen By: GUSTAVO Goel    **Please Note: This note may have been constructed using a voice recognition system.**

## 2024-03-16 NOTE — ASSESSMENT & PLAN NOTE
Noted on the morning of 3/14, evidenced by creatinine rising from 1.25 to 1.69  Creatinine this morning 1.19 after discontinuation of IV Lasix  Unsure of most recent baseline, 2021 baseline is 0.8-1  De La Cruz in place for accurate intake/output will discontinue in next 24 hours  Creatinine appears to be back at baseline at 1.07  Monitor for hypotension, avoid nephrotoxins  Monitor BMP  Nephrology following

## 2024-03-17 LAB
ALBUMIN SERPL BCP-MCNC: 3.3 G/DL (ref 3.5–5)
ALP SERPL-CCNC: 110 U/L (ref 34–104)
ALT SERPL W P-5'-P-CCNC: 49 U/L (ref 7–52)
ANION GAP SERPL CALCULATED.3IONS-SCNC: 4 MMOL/L (ref 4–13)
AST SERPL W P-5'-P-CCNC: 19 U/L (ref 13–39)
BILIRUB SERPL-MCNC: 0.41 MG/DL (ref 0.2–1)
BUN SERPL-MCNC: 41 MG/DL (ref 5–25)
CALCIUM ALBUM COR SERPL-MCNC: 9.1 MG/DL (ref 8.3–10.1)
CALCIUM SERPL-MCNC: 8.5 MG/DL (ref 8.4–10.2)
CHLORIDE SERPL-SCNC: 102 MMOL/L (ref 96–108)
CO2 SERPL-SCNC: 34 MMOL/L (ref 21–32)
CREAT SERPL-MCNC: 1.22 MG/DL (ref 0.6–1.3)
ERYTHROCYTE [DISTWIDTH] IN BLOOD BY AUTOMATED COUNT: 18.2 % (ref 11.6–15.1)
GFR SERPL CREATININE-BSD FRML MDRD: 47 ML/MIN/1.73SQ M
GLUCOSE SERPL-MCNC: 213 MG/DL (ref 65–140)
HCT VFR BLD AUTO: 43.6 % (ref 34.8–46.1)
HGB BLD-MCNC: 12.5 G/DL (ref 11.5–15.4)
MCH RBC QN AUTO: 23.9 PG (ref 26.8–34.3)
MCHC RBC AUTO-ENTMCNC: 28.7 G/DL (ref 31.4–37.4)
MCV RBC AUTO: 83 FL (ref 82–98)
PLATELET # BLD AUTO: 183 THOUSANDS/UL (ref 149–390)
PMV BLD AUTO: 9.6 FL (ref 8.9–12.7)
POTASSIUM SERPL-SCNC: 5.1 MMOL/L (ref 3.5–5.3)
PROT SERPL-MCNC: 6 G/DL (ref 6.4–8.4)
RBC # BLD AUTO: 5.24 MILLION/UL (ref 3.81–5.12)
SODIUM SERPL-SCNC: 140 MMOL/L (ref 135–147)
WBC # BLD AUTO: 13.46 THOUSAND/UL (ref 4.31–10.16)

## 2024-03-17 PROCEDURE — 85027 COMPLETE CBC AUTOMATED: CPT | Performed by: NURSE PRACTITIONER

## 2024-03-17 PROCEDURE — 99232 SBSQ HOSP IP/OBS MODERATE 35: CPT | Performed by: NURSE PRACTITIONER

## 2024-03-17 PROCEDURE — 94660 CPAP INITIATION&MGMT: CPT

## 2024-03-17 PROCEDURE — 80053 COMPREHEN METABOLIC PANEL: CPT | Performed by: NURSE PRACTITIONER

## 2024-03-17 PROCEDURE — 94760 N-INVAS EAR/PLS OXIMETRY 1: CPT

## 2024-03-17 RX ORDER — ALBUTEROL SULFATE 90 UG/1
2 AEROSOL, METERED RESPIRATORY (INHALATION) EVERY 4 HOURS PRN
Status: DISCONTINUED | OUTPATIENT
Start: 2024-03-17 | End: 2024-03-19 | Stop reason: HOSPADM

## 2024-03-17 RX ORDER — ACETAMINOPHEN 325 MG/1
650 TABLET ORAL EVERY 6 HOURS PRN
Status: DISCONTINUED | OUTPATIENT
Start: 2024-03-17 | End: 2024-03-19 | Stop reason: HOSPADM

## 2024-03-17 RX ADMIN — ROPINIROLE 0.5 MG: 0.25 TABLET, FILM COATED ORAL at 16:26

## 2024-03-17 RX ADMIN — LABETALOL HYDROCHLORIDE 300 MG: 100 TABLET, FILM COATED ORAL at 07:24

## 2024-03-17 RX ADMIN — TORSEMIDE 20 MG: 20 TABLET ORAL at 08:08

## 2024-03-17 RX ADMIN — ROPINIROLE 0.5 MG: 0.25 TABLET, FILM COATED ORAL at 08:07

## 2024-03-17 RX ADMIN — ROPINIROLE 0.5 MG: 0.25 TABLET, FILM COATED ORAL at 21:01

## 2024-03-17 RX ADMIN — ESCITALOPRAM OXALATE 5 MG: 10 TABLET ORAL at 08:09

## 2024-03-17 RX ADMIN — DICLOFENAC SODIUM 2 G: 10 GEL TOPICAL at 21:04

## 2024-03-17 RX ADMIN — TOPIRAMATE 25 MG: 25 TABLET, FILM COATED ORAL at 08:08

## 2024-03-17 RX ADMIN — ACETAMINOPHEN 975 MG: 325 TABLET, FILM COATED ORAL at 07:24

## 2024-03-17 RX ADMIN — LABETALOL HYDROCHLORIDE 300 MG: 100 TABLET, FILM COATED ORAL at 21:01

## 2024-03-17 RX ADMIN — NICOTINE 21 MG: 21 PATCH, EXTENDED RELEASE TRANSDERMAL at 08:09

## 2024-03-17 RX ADMIN — ACETAMINOPHEN 650 MG: 325 TABLET, FILM COATED ORAL at 08:57

## 2024-03-17 RX ADMIN — METHYLPREDNISOLONE SODIUM SUCCINATE 40 MG: 40 INJECTION, POWDER, FOR SOLUTION INTRAMUSCULAR; INTRAVENOUS at 20:59

## 2024-03-17 RX ADMIN — Medication 6 MG: at 21:01

## 2024-03-17 RX ADMIN — SALINE NASAL SPRAY 1 SPRAY: 1.5 SOLUTION NASAL at 13:38

## 2024-03-17 RX ADMIN — DICLOFENAC SODIUM 2 G: 10 GEL TOPICAL at 08:07

## 2024-03-17 RX ADMIN — UMECLIDINIUM 1 PUFF: 62.5 AEROSOL, POWDER ORAL at 08:07

## 2024-03-17 RX ADMIN — TOPIRAMATE 25 MG: 25 TABLET, FILM COATED ORAL at 17:21

## 2024-03-17 RX ADMIN — FLUTICASONE FUROATE AND VILANTEROL TRIFENATATE 1 PUFF: 100; 25 POWDER RESPIRATORY (INHALATION) at 08:07

## 2024-03-17 RX ADMIN — METHYLPREDNISOLONE SODIUM SUCCINATE 40 MG: 40 INJECTION, POWDER, FOR SOLUTION INTRAMUSCULAR; INTRAVENOUS at 08:07

## 2024-03-17 RX ADMIN — LABETALOL HYDROCHLORIDE 300 MG: 100 TABLET, FILM COATED ORAL at 13:38

## 2024-03-17 RX ADMIN — ENOXAPARIN SODIUM 40 MG: 40 INJECTION SUBCUTANEOUS at 08:08

## 2024-03-17 NOTE — PLAN OF CARE
Problem: PAIN - ADULT  Goal: Verbalizes/displays adequate comfort level or baseline comfort level  Description: Interventions:  - Encourage patient to monitor pain and request assistance  - Assess pain using appropriate pain scale  - Administer analgesics based on type and severity of pain and evaluate response  - Implement non-pharmacological measures as appropriate and evaluate response  - Consider cultural and social influences on pain and pain management  - Notify physician/advanced practitioner if interventions unsuccessful or patient reports new pain  Outcome: Progressing     Problem: PAIN - ADULT  Goal: Verbalizes/displays adequate comfort level or baseline comfort level  Description: Interventions:  - Encourage patient to monitor pain and request assistance  - Assess pain using appropriate pain scale  - Administer analgesics based on type and severity of pain and evaluate response  - Implement non-pharmacological measures as appropriate and evaluate response  - Consider cultural and social influences on pain and pain management  - Notify physician/advanced practitioner if interventions unsuccessful or patient reports new pain  3/17/2024 1118 by Boby Mercado RN  Outcome: Progressing  3/17/2024 1118 by Boby Mercado RN  Outcome: Progressing

## 2024-03-17 NOTE — ASSESSMENT & PLAN NOTE
Patient started on IV Solu-Medrol due to worsening hypoxia emphysema findings on imaging  IV Solu-Medrol 40 mg decreased from every 8 hours improved to every 12 hrs on 3/16  Continue pulmonary inhalers  Recommending OP follow-up, needs PFTs

## 2024-03-17 NOTE — PLAN OF CARE
Problem: PAIN - ADULT  Goal: Verbalizes/displays adequate comfort level or baseline comfort level  Description: Interventions:  - Encourage patient to monitor pain and request assistance  - Assess pain using appropriate pain scale  - Administer analgesics based on type and severity of pain and evaluate response  - Implement non-pharmacological measures as appropriate and evaluate response  - Consider cultural and social influences on pain and pain management  - Notify physician/advanced practitioner if interventions unsuccessful or patient reports new pain  Outcome: Progressing     Problem: INFECTION - ADULT  Goal: Absence or prevention of progression during hospitalization  Description: INTERVENTIONS:  - Assess and monitor for signs and symptoms of infection  - Monitor lab/diagnostic results  - Monitor all insertion sites, i.e. indwelling lines, tubes, and drains  - Monitor endotracheal if appropriate and nasal secretions for changes in amount and color  - [place] appropriate cooling/warming therapies per order  - Administer medications as ordered  - Instruct and encourage patient and family to use good hand hygiene technique  - Identify and instruct in appropriate isolation precautions for identified infection/condition  Outcome: Progressing  Goal: Absence of fever/infection during neutropenic period  Description: INTERVENTIONS:  - Monitor WBC    Outcome: Progressing

## 2024-03-17 NOTE — ASSESSMENT & PLAN NOTE
Noted 3/14/24 during rapid response  pH as noted - pH 7.191, pCO2 80.6, HCO3 30  Trialed on BiPAP, recheck ABG after 1 hour  Most recent - pH 7.227, pCO2 82.5, HCO3 33.5  Venous gas obtained 3/15  pCO2 52.7 HCO3 29.7  Repeat pending

## 2024-03-17 NOTE — PROGRESS NOTES
On license of [facility]  Progress Note  Name: Dwaine Gould I  MRN: [id number]  Unit/Bed#: -01 I Date of Admission: 3/11/2024   Date of Service: 3/17/2024 I Hospital Day: 6    Assessment/Plan   * Acute on chronic diastolic heart failure (HCC)  Assessment & Plan  Patient presented to the ED with complaints of bilateral lower extremity edema and anasarca, with non-compliance with her diet.  Patient with acute on chronic diastolic HF exacerbation  Cardiology consult, appreciate input  Patient had been receiving 60 mg IV Lasix intially, but was discontinued when she developed an LETICIA   Patient transition back to torsemide 20 mg daily on 3/16  Nephrology now signed off 3/16  Monitor intake/output  De La Cruz catheter was placed for strict I&O, only net - 790 mL  Discontinued 3/16  Daily standing weight  Low salt diet  Echo (3/12/24): Left ventricular cavity size is normal. Wall thickness is increased. There is mild to moderate concentric hypertrophy. The left ventricular ejection fraction is 60%. Systolic function is normal.  Diastolic function is normal.   Cardiology, Nephrology both signed off call with questions    Acute respiratory failure with hypoxia (HCC)  Assessment & Plan  Initially, patient had been requiring 3 L of supplemental oxygen to maintain oxygen saturation levels above 88%  On 3/15, patient with increasing oxygen requirements up to 5 L, BiPAP as well in setting of respiratory acidosis.  CT chest (3/14/24): Mild interstitial edema. Mild dependent atelectasis in the lower lobes. Emphysema.  Procalcitonin within normal limits, low concern for pneumonia  Patient initiated on IV Solu-Medrol 40 mg every 8 hours on 3/15 in setting of worsening hypoxia and  in setting of emphysema. Now q12h  Can transition to oral taper tomorrow   repeat VBG pending  Patient is more alert today   Respiratory protocol    Hyperkalemia  Assessment & Plan  Potassium this morning 5 point 7 repeat BMP this afternoon  5.4  S/p one time dose of Kayexalate  Repeat BMP pending    Respiratory acidosis  Assessment & Plan  Noted 3/14/24 during rapid response  pH as noted - pH 7.191, pCO2 80.6, HCO3 30  Trialed on BiPAP, recheck ABG after 1 hour  Most recent - pH 7.227, pCO2 82.5, HCO3 33.5  Venous gas obtained 3/15  pCO2 52.7 HCO3 29.7  Repeat pending    Acute metabolic encephalopathy  Assessment & Plan  In setting of Co2 retention  ABG completed, noted to have a pH of 7.191, pCO2 of 80.6, with a HCO3 of 30.2  Likely cause of altered mental status, in setting of undiagnosed sleep apnea, obesity hypoventilation syndrome  Neurology asked to evaluate the patient  MRI brain w/wo contrast pending; to better evaluate white matter changes on CTH; possible PRES   If recurrent events or no clinical improvement despite correction of metabolic derangement, consider Routine EEG   Outpatient sleep study; possible RILEY/CSA  Patient may require oxygen at discharge    CT Head (3/14/24): No acute intracranial abnormality. Chronic microangiopathy. Right middle ear and small mastoid effusions.    Now resolved.    LETICIA (acute kidney injury) (HCC)  Assessment & Plan  Noted on the morning of 3/14, evidenced by creatinine rising from 1.25 to 1.69  Creatinine this morning 1.19 after discontinuation of IV Lasix  Unsure of most recent baseline, 2021 baseline is 0.8-1  De La Cruz in place for accurate intake/output discontinued 3/16  Creatinine appears to be back at baseline at 1.07  Monitor for hypotension, avoid nephrotoxins  Monitor BMP  Nephrology signed off    Transaminitis  Assessment & Plan  Present on admission, remaining stable  Likely in setting of CHF exacerbation, no RUQ pain  Statin on hold  RUQ US (3/15/24): Liver size within normal range, surface contour is smooth, echogenicity and echotexture are within normal limits, no significant intrahepatic biliary dilatation, CBD not visualized, no ascites.  Repeat CMP pending    COPD (chronic obstructive  pulmonary disease) (HCC)  Assessment & Plan  Patient started on IV Solu-Medrol due to worsening hypoxia emphysema findings on imaging  IV Solu-Medrol 40 mg decreased from every 8 hours improved to every 12 hrs on 3/16  Continue pulmonary inhalers  Recommending OP follow-up, needs PFTs    CAD (coronary artery disease)  Assessment & Plan  History of single vessel CAD  No chest pain currently  Continue with Labetalol  Hold lisinopril at this time due to lower blood pressures, discuss holding or resuming this medication upon discharge or resume sooner if hypertensive  Not on a statin at this time due to transaminitis on admission    Tobacco abuse  Assessment & Plan  Patient had been smoking 2 ppd; quit on day of admission.  Counseled on smoking cessation for >3 min           VTE Pharmacologic Prophylaxis: VTE Score: 11 High Risk (Score >/= 5) - Pharmacological DVT Prophylaxis Ordered: enoxaparin (Lovenox). Sequential Compression Devices Ordered.    Mobility:   Basic Mobility Inpatient Raw Score: 13  JH-HLM Goal: 4: Move to chair/commode  JH-HLM Achieved: 4: Move to chair/commode  JH-HLM Goal achieved. Continue to encourage appropriate mobility.    Patient Centered Rounds: I performed bedside rounds with nursing staff today.   Discussions with Specialists or Other Care Team Provider: message into MRI and further discussion with neurology    Education and Discussions with Family / Patient: Patient declined call to .     Total Time Spent on Date of Encounter in care of patient: 45 mins. This time was spent on one or more of the following: performing physical exam; counseling and coordination of care; obtaining or reviewing history; documenting in the medical record; reviewing/ordering tests, medications or procedures; communicating with other healthcare professionals and discussing with patient's family/caregivers.    Current Length of Stay: 6 day(s)  Current Patient Status: Inpatient   Certification  Statement: The patient will continue to require additional inpatient hospital stay due to acute respiratory failure   Discharge Plan: Anticipate discharge in 24-48 hrs to home with home services.    Code Status: Level 1 - Full Code    Subjective:   Patient feels fearful that her family is not telling her things and that she is scared she is dying. Medical update provided patient can state name, , and that she is in the hospital. She just feels people are not be honest with her regarding her care. Update provided on current care and gains she has made along with stability. Holding up progression explained regarding neurology recs and MRI. Patient became tearful stating she cannot do the MRI as she has severe claustrophobia and will do open but does not think she can tolerate it even with medication to relax. Patient made aware would discuss further with MRI tech and neurology to formulate a plan.      Objective:     Vitals:   Temp (24hrs), Av.6 °F (36.4 °C), Min:97.6 °F (36.4 °C), Max:97.6 °F (36.4 °C)    Temp:  [97.6 °F (36.4 °C)] 97.6 °F (36.4 °C)  HR:  [61-76] 68  Resp:  [14-17] 17  BP: (118-161)/(57-75) 131/57  SpO2:  [82 %-95 %] 82 %  Body mass index is 58.99 kg/m².     Input and Output Summary (last 24 hours):     Intake/Output Summary (Last 24 hours) at 3/17/2024 0840  Last data filed at 3/17/2024 0553  Gross per 24 hour   Intake 200 ml   Output 1175 ml   Net -975 ml       Physical Exam:   Physical Exam  Vitals and nursing note reviewed.   Constitutional:       General: She is not in acute distress.     Appearance: She is well-developed. She is obese.   HENT:      Head: Normocephalic and atraumatic.   Eyes:      Conjunctiva/sclera: Conjunctivae normal.   Cardiovascular:      Rate and Rhythm: Normal rate and regular rhythm.      Heart sounds: No murmur heard.  Pulmonary:      Effort: Pulmonary effort is normal. No respiratory distress.      Breath sounds: Examination of the right-lower field reveals  decreased breath sounds. Examination of the left-lower field reveals decreased breath sounds. Decreased breath sounds present.   Abdominal:      Palpations: Abdomen is soft.      Tenderness: There is no abdominal tenderness.   Musculoskeletal:         General: No swelling.      Cervical back: Neck supple.   Skin:     General: Skin is warm and dry.      Capillary Refill: Capillary refill takes less than 2 seconds.   Neurological:      Mental Status: She is alert and oriented to person, place, and time.   Psychiatric:         Mood and Affect: Mood is anxious. Affect is tearful.          Additional Data:     Labs:  Results from last 7 days   Lab Units 03/16/24  0937 03/14/24  0427 03/13/24  0514   WBC Thousand/uL 11.40*   < > 11.24*   HEMOGLOBIN g/dL 13.3   < > 12.8   HEMATOCRIT % 47.5*   < > 43.5   PLATELETS Thousands/uL 206   < > 233   NEUTROS PCT %  --   --  77*   LYMPHS PCT %  --   --  13*   MONOS PCT %  --   --  8   EOS PCT %  --   --  1    < > = values in this interval not displayed.     Results from last 7 days   Lab Units 03/16/24  1347 03/15/24  0528 03/14/24  0427   SODIUM mmol/L 139   < > 139   POTASSIUM mmol/L 5.4*   < > 5.0   CHLORIDE mmol/L 102   < > 103   CO2 mmol/L 36*   < > 31   BUN mg/dL 37*   < > 48*   CREATININE mg/dL 1.14   < > 1.69*   ANION GAP mmol/L 1*   < > 5   CALCIUM mg/dL 8.8   < > 8.3*   ALBUMIN g/dL  --   --  3.8   TOTAL BILIRUBIN mg/dL  --   --  0.46   ALK PHOS U/L  --   --  158*   ALT U/L  --   --  98*   AST U/L  --   --  40*   GLUCOSE RANDOM mg/dL 164*   < > 127    < > = values in this interval not displayed.         Results from last 7 days   Lab Units 03/14/24  0920   POC GLUCOSE mg/dl 127         Results from last 7 days   Lab Units 03/14/24  0427   PROCALCITONIN ng/ml 0.13       Lines/Drains:  Invasive Devices       Peripheral Intravenous Line  Duration             Peripheral IV 03/15/24 Left;Ventral (anterior) Forearm 1 day                          Imaging: No pertinent imaging  reviewed.    Recent Cultures (last 7 days):         Last 24 Hours Medication List:   Current Facility-Administered Medications   Medication Dose Route Frequency Provider Last Rate    acetaminophen  650 mg Oral Q6H PRN GUSTAVO Goel      albuterol  2 puff Inhalation Q4H PRN Pravin Wolff, DO      Diclofenac Sodium  2 g Topical 4x Daily GUSTAVO Turcios      diphenhydrAMINE  25 mg Oral HS PRN GUSTAVO Turcios      enoxaparin  40 mg Subcutaneous Daily Gabby Cecy Roger, DO      escitalopram  5 mg Oral Daily Gabby Cecy Roger, DO      Fluticasone Furoate-Vilanterol  1 puff Inhalation Daily Gabby Cecy Roger, DO      And    umeclidinium  1 puff Inhalation Daily Gabby Cecy Roger, DO      labetalol  300 mg Oral Q8H [facility] Cecy Roger, DO      melatonin  6 mg Oral HS GUSTAVO Turcios      methylPREDNISolone sodium succinate  40 mg Intravenous Q12H [facility] GUSTAVO Goel      nicotine  21 mg Transdermal Daily Pravin Wolff, DO      nicotine polacrilex  2 mg Oral Q2H PRN Pravin Wolff, DO      ondansetron  4 mg Intravenous Q6H PRN [facility] Cecy Roger, DO      rOPINIRole  0.5 mg Oral TID GUSTAVO Turcios      sodium chloride  1 spray Each Nare Q1H PRN GUSTAVO Turcios      topiramate  25 mg Oral BID Gabby Cecy Roger, DO      torsemide  20 mg Oral Daily Francis Vazquez MD          Today, Patient Was Seen By: GUSTAVO Goel    **Please Note: This note may have been constructed using a voice recognition system.**

## 2024-03-17 NOTE — ASSESSMENT & PLAN NOTE
Potassium this morning 5 point 7 repeat BMP this afternoon 5.4  S/p one time dose of Kayexalate  Repeat BMP pending

## 2024-03-17 NOTE — ASSESSMENT & PLAN NOTE
Patient presented to the ED with complaints of bilateral lower extremity edema and anasarca, with non-compliance with her diet.  Patient with acute on chronic diastolic HF exacerbation  Cardiology consult, appreciate input  Patient had been receiving 60 mg IV Lasix intially, but was discontinued when she developed an LETICIA   Patient transition back to torsemide 20 mg daily on 3/16  Nephrology now signed off 3/16  Monitor intake/output  De La Cruz catheter was placed for strict I&O, only net - 790 mL  Discontinued 3/16  Daily standing weight  Low salt diet  Echo (3/12/24): Left ventricular cavity size is normal. Wall thickness is increased. There is mild to moderate concentric hypertrophy. The left ventricular ejection fraction is 60%. Systolic function is normal.  Diastolic function is normal.   Cardiology, Nephrology both signed off call with questions

## 2024-03-17 NOTE — ASSESSMENT & PLAN NOTE
Noted on the morning of 3/14, evidenced by creatinine rising from 1.25 to 1.69  Creatinine this morning 1.19 after discontinuation of IV Lasix  Unsure of most recent baseline, 2021 baseline is 0.8-1  De La Cruz in place for accurate intake/output discontinued 3/16  Creatinine appears to be back at baseline at 1.07  Monitor for hypotension, avoid nephrotoxins  Monitor BMP  Nephrology signed off

## 2024-03-17 NOTE — ASSESSMENT & PLAN NOTE
In setting of Co2 retention  ABG completed, noted to have a pH of 7.191, pCO2 of 80.6, with a HCO3 of 30.2  Likely cause of altered mental status, in setting of undiagnosed sleep apnea, obesity hypoventilation syndrome  Neurology asked to evaluate the patient  MRI brain w/wo contrast pending; to better evaluate white matter changes on CTH; possible PRES   If recurrent events or no clinical improvement despite correction of metabolic derangement, consider Routine EEG   Outpatient sleep study; possible RILEY/CSA  Patient may require oxygen at discharge    CT Head (3/14/24): No acute intracranial abnormality. Chronic microangiopathy. Right middle ear and small mastoid effusions.    Now resolved.

## 2024-03-17 NOTE — ASSESSMENT & PLAN NOTE
Initially, patient had been requiring 3 L of supplemental oxygen to maintain oxygen saturation levels above 88%  On 3/15, patient with increasing oxygen requirements up to 5 L, BiPAP as well in setting of respiratory acidosis.  CT chest (3/14/24): Mild interstitial edema. Mild dependent atelectasis in the lower lobes. Emphysema.  Procalcitonin within normal limits, low concern for pneumonia  Patient initiated on IV Solu-Medrol 40 mg every 8 hours on 3/15 in setting of worsening hypoxia and  in setting of emphysema. Now q12h  Can transition to oral taper tomorrow   repeat VBG pending  Patient is more alert today   Respiratory protocol

## 2024-03-17 NOTE — QUICK NOTE
Patient adamant about not being able to tolerate the MRI here due to claustrophobia.  She has remained at baseline over the last 48 hours with no episodes of confusion.  Discussed with on-call neurology Dr. Sampson okay to cancel MRI at this time if she has remained at her baseline.  Low threshold to reorder continue to monitor.  If she remains medically stable would consider discharge in the next 24 to 48 hours

## 2024-03-17 NOTE — RESPIRATORY THERAPY NOTE
03/16/24 1819   Respiratory Assessment   Resp Comments pt placed on BiPAP for hours of sleep   Non-Invasive Information   O2 Interface Device Face mask   Non-Invasive Ventilation Mode BiPAP   $ Intermittent NIV Yes   $ Pulse Oximetry Spot Check Charge Completed   Non-Invasive Settings   IPAP (cm) 20 cm   EPAP (cm) 8 cm   Rate (Set) 12   FiO2 (%) 40   Pressure Support (cm H2O) 12   Rise Time 2   Inspiratory Time (Set) 1   Non-Invasive Readings   Skin Intervention Skin intact   Total Rate 21   MV (Mech) 12.2   Peak Pressure (Obs) 21   Spontaneous Vt (mL) 570   Leak (lpm) 0   Non-Invasive Alarms   Insp Pressure High (cm H20) 30   Insp Pressure Low (cm H20) 6   Low Insp Pressure Time (sec) 20 sec   MV Low (L/min) 3   Vt High (mL) 1200   Vt Low (mL) 200   High Resp Rate (BPM) 35 BPM   Low Resp Rate (BPM) 8 BPM

## 2024-03-17 NOTE — QUICK NOTE
Patient with severe claustrophobia and cannot tolerate MRI brain, in speaking with primary team  She has had no further confusional episodes or unresponsive episodes since treatment of her acute medical issues  Thus okay to cancel in patient MRI brain.    Patient can obtain MRI brain out patient in open MRI per her preference  She can follow up at least once with our general neurology AP or attending physician within 12 weeks time after OP MRI brain.    Neurology will sign off please call if any questions or concerns.

## 2024-03-17 NOTE — ASSESSMENT & PLAN NOTE
History of single vessel CAD  No chest pain currently  Continue with Labetalol  Hold lisinopril at this time due to lower blood pressures, discuss holding or resuming this medication upon discharge or resume sooner if hypertensive  Not on a statin at this time due to transaminitis on admission

## 2024-03-17 NOTE — ASSESSMENT & PLAN NOTE
Present on admission, remaining stable  Likely in setting of CHF exacerbation, no RUQ pain  Statin on hold  RUQ US (3/15/24): Liver size within normal range, surface contour is smooth, echogenicity and echotexture are within normal limits, no significant intrahepatic biliary dilatation, CBD not visualized, no ascites.  Repeat CMP pending

## 2024-03-18 PROBLEM — E87.5 HYPERKALEMIA: Status: RESOLVED | Noted: 2024-03-16 | Resolved: 2024-03-18

## 2024-03-18 LAB
BASE EX.OXY STD BLDV CALC-SCNC: 95.6 % (ref 60–80)
BASE EXCESS BLDV CALC-SCNC: 10.2 MMOL/L
HCO3 BLDV-SCNC: 38.6 MMOL/L (ref 24–30)
O2 CT BLDV-SCNC: 18 ML/DL
PCO2 BLDV: 71.2 MM HG (ref 42–50)
PH BLDV: 7.35 [PH] (ref 7.3–7.4)
PO2 BLDV: 117.9 MM HG (ref 35–45)

## 2024-03-18 PROCEDURE — 94760 N-INVAS EAR/PLS OXIMETRY 1: CPT

## 2024-03-18 PROCEDURE — 82805 BLOOD GASES W/O2 SATURATION: CPT | Performed by: NURSE PRACTITIONER

## 2024-03-18 PROCEDURE — 99232 SBSQ HOSP IP/OBS MODERATE 35: CPT | Performed by: PHYSICIAN ASSISTANT

## 2024-03-18 PROCEDURE — 94761 N-INVAS EAR/PLS OXIMETRY MLT: CPT

## 2024-03-18 PROCEDURE — 94660 CPAP INITIATION&MGMT: CPT

## 2024-03-18 RX ADMIN — Medication 6 MG: at 21:09

## 2024-03-18 RX ADMIN — TORSEMIDE 20 MG: 20 TABLET ORAL at 10:03

## 2024-03-18 RX ADMIN — LABETALOL HYDROCHLORIDE 300 MG: 100 TABLET, FILM COATED ORAL at 16:17

## 2024-03-18 RX ADMIN — NICOTINE 21 MG: 21 PATCH, EXTENDED RELEASE TRANSDERMAL at 10:03

## 2024-03-18 RX ADMIN — LABETALOL HYDROCHLORIDE 300 MG: 100 TABLET, FILM COATED ORAL at 21:09

## 2024-03-18 RX ADMIN — ROPINIROLE 0.5 MG: 0.25 TABLET, FILM COATED ORAL at 10:02

## 2024-03-18 RX ADMIN — ESCITALOPRAM OXALATE 5 MG: 10 TABLET ORAL at 10:02

## 2024-03-18 RX ADMIN — TOPIRAMATE 25 MG: 25 TABLET, FILM COATED ORAL at 18:45

## 2024-03-18 RX ADMIN — METHYLPREDNISOLONE SODIUM SUCCINATE 40 MG: 40 INJECTION, POWDER, FOR SOLUTION INTRAMUSCULAR; INTRAVENOUS at 21:06

## 2024-03-18 RX ADMIN — LABETALOL HYDROCHLORIDE 300 MG: 100 TABLET, FILM COATED ORAL at 05:32

## 2024-03-18 RX ADMIN — ENOXAPARIN SODIUM 40 MG: 40 INJECTION SUBCUTANEOUS at 10:02

## 2024-03-18 RX ADMIN — FLUTICASONE FUROATE AND VILANTEROL TRIFENATATE 1 PUFF: 100; 25 POWDER RESPIRATORY (INHALATION) at 10:05

## 2024-03-18 RX ADMIN — UMECLIDINIUM 1 PUFF: 62.5 AEROSOL, POWDER ORAL at 10:05

## 2024-03-18 RX ADMIN — ROPINIROLE 0.5 MG: 0.25 TABLET, FILM COATED ORAL at 16:16

## 2024-03-18 RX ADMIN — TOPIRAMATE 25 MG: 25 TABLET, FILM COATED ORAL at 10:03

## 2024-03-18 RX ADMIN — METHYLPREDNISOLONE SODIUM SUCCINATE 40 MG: 40 INJECTION, POWDER, FOR SOLUTION INTRAMUSCULAR; INTRAVENOUS at 10:03

## 2024-03-18 RX ADMIN — ROPINIROLE 0.5 MG: 0.25 TABLET, FILM COATED ORAL at 21:09

## 2024-03-18 NOTE — PROGRESS NOTES
Former[facility]  Progress Note  Name: Dwaine Gould I  MRN: [id number]  Unit/Bed#: -01 I Date of Admission: 3/11/2024   Date of Service: 3/18/2024 I Hospital Day: 7    Assessment/Plan   Acute respiratory failure with hypoxia (HCC)  Assessment & Plan  Initially, patient had been requiring 3 L of supplemental oxygen to maintain oxygen saturation levels above 88%  On 3/15, patient with increasing oxygen requirements up to 5 L, BiPAP as well in setting of respiratory acidosis.  CT chest (3/14/24): Mild interstitial edema. Mild dependent atelectasis in the lower lobes. Emphysema.  Procalcitonin within normal limits, low concern for pneumonia  Patient initiated on IV Solu-Medrol 40 mg every 8 hours on 3/15 in setting of worsening hypoxia and  in setting of emphysema. Now q12h  Can transition to oral taper in anticipation of discharge    Repeat VBG showing elevated CO2 but compensated  Patient is alert today   Respiratory protocol    * Acute on chronic diastolic heart failure (HCC)  Assessment & Plan  Patient presented to the ED with complaints of bilateral lower extremity edema and anasarca, with non-compliance with her diet.  Patient with acute on chronic diastolic HF exacerbation  Cardiology consult, appreciate input  Patient had been receiving 60 mg IV Lasix intially, but was discontinued when she developed an LETICIA   Patient transition back to torsemide 20 mg daily on 3/16  Nephrology now signed off 3/16  Monitor intake/output  De La Cruz catheter was placed for strict I&O, only net - 790 mL  Discontinued 3/16  Daily standing weight  Low salt diet  Echo (3/12/24): Left ventricular cavity size is normal. Wall thickness is increased. There is mild to moderate concentric hypertrophy. The left ventricular ejection fraction is 60%. Systolic function is normal.  Diastolic function is normal.   Cardiology, Nephrology both signed off call with questions    CAD (coronary artery disease)  Assessment &  Plan  History of single vessel CAD  No chest pain currently  Continue with Labetalol  Hold lisinopril at this time due to lower blood pressures, discuss holding or resuming this medication upon discharge or resume sooner if hypertensive  Not on a statin at this time due to transaminitis on admission    COPD (chronic obstructive pulmonary disease) ([facility])  Assessment & Plan  Patient started on IV Solu-Medrol due to worsening hypoxia emphysema findings on imaging  IV Solu-Medrol 40 mg decreased from every 8 hours improved to every 12 hrs on 3/16  Transition to oral taper on discharge   Continue pulmonary inhalers  Recommending OP follow-up, needs PFTs    Respiratory acidosis  Assessment & Plan  Noted 3/14/24 during rapid response  pH as noted - pH 7.191, pCO2 80.6, HCO3 30   Latest Reference Range & Units 03/15/24 12:23 03/18/24 05:21   pH, Sammy 7.300 - 7.400  7.369 7.352   pCO2, Sammy 42.0 - 50.0 mm Hg 52.7 (H) 71.2 (HH)   pO2, Sammy 35.0 - 45.0 mm Hg 78.2 (H) 117.9 (H)   HCO3, Sammy 24 - 30 mmol/L 29.7 38.6 (H)   Base Excess, Sammy mmol/L 3.3 10.2   O2 Content, Sammy ml/dL 17.3 18.0   O2 HGB, VENOUS 60.0 - 80.0 % 92.5 (H) 95.6 (H)       Tobacco abuse  Assessment & Plan  Patient had been smoking 2 ppd; quit on day of admission.  Counseled on smoking cessation for >3 min    LETICIA (acute kidney injury) ([facility])  Assessment & Plan  Noted on the morning of 3/14, evidenced by creatinine rising from 1.25 to 1.69  Unsure of most recent baseline, 2021 baseline is 0.8-1  De La Cruz in place for accurate intake/output, discontinued 3/16  Creatinine appears to be back at baseline at 1.07  Monitor for hypotension, avoid nephrotoxins  Nephrology signed off  Creatinine stable 1.22    Acute metabolic encephalopathy  Assessment & Plan  In setting of Co2 retention  ABG completed, noted to have a pH of 7.191, pCO2 of 80.6, with a HCO3 of 30.2  Likely cause of altered mental status, in setting of undiagnosed sleep apnea, obesity hypoventilation  syndrome  Neurology asked to evaluate the patient  MRI brain w/wo contrast pending; to better evaluate white matter changes on CTH; possible PRES   If recurrent events or no clinical improvement despite correction of metabolic derangement, consider Routine EEG   Outpatient sleep study; possible RILEY/CSA  Patient may require oxygen at discharge    CT Head (3/14/24): No acute intracranial abnormality. Chronic microangiopathy. Right middle ear and small mastoid effusions.    Now resolved.    Leukocytosis  Assessment & Plan  Stabilized. Procalcitonin level within normal limits  Afebrile, not tachycardic  CT Chest (3/14/24): Mild interstitial edema. Mild dependent atelectasis in the lower lobes. Emphysema     Transaminitis  Assessment & Plan  Present on admission, remaining stable  Likely in setting of CHF exacerbation, no RUQ pain  Statin on hold  RUQ US (3/15/24): Liver size within normal range, surface contour is smooth, echogenicity and echotexture are within normal limits, no significant intrahepatic biliary dilatation, CBD not visualized, no ascites.  Repeat CMP pending    Hyperkalemia-resolved as of 3/18/2024  Assessment & Plan  Improved with kayexalate, will monitor      Hypertensive urgency-resolved as of 3/13/2024  Assessment & Plan  Blood Pressure: 134/64              VTE Pharmacologic Prophylaxis: VTE Score: 11 High Risk (Score >/= 5) - Pharmacological DVT Prophylaxis Ordered: enoxaparin (Lovenox). Sequential Compression Devices Ordered.    Mobility:   Basic Mobility Inpatient Raw Score: 17  JH-HLM Goal: 5: Stand one or more mins  JH-HLM Achieved: 7: Walk 25 feet or more  JH-HLM Goal achieved. Continue to encourage appropriate mobility.    Patient Centered Rounds: I evaluated the patient without nursing staff present due to d/w nurse independently due to availability     Discussions with Specialists or Other Care Team Provider: STEPHANIE     Education and Discussions with Family / Patient: Updated   (daughter) via phone.    Total Time Spent on Date of Encounter in care of patient: 45 mins. This time was spent on one or more of the following: performing physical exam; counseling and coordination of care; obtaining or reviewing history; documenting in the medical record; reviewing/ordering tests, medications or procedures; communicating with other healthcare professionals and discussing with patient's family/caregivers.    Current Length of Stay: 7 day(s)  Current Patient Status: Inpatient   Certification Statement: The patient will continue to require additional inpatient hospital stay due to pending home O2 qualification  Discharge Plan: Anticipate discharge later today or tomorrow to home with home services.    Code Status: Level 1 - Full Code    Subjective:   Patient seen this am, she is feeling well today, urinating well still with oral diuretic regimen. She has not been weaned from oxygen support yet. She also notes that she has RILEY but does not use CPAP at home.     Objective:     Vitals:   Temp (24hrs), Av.2 °F (36.8 °C), Min:97.8 °F (36.6 °C), Max:98.4 °F (36.9 °C)    Temp:  [97.8 °F (36.6 °C)-98.4 °F (36.9 °C)] 98.4 °F (36.9 °C)  HR:  [62-73] 67  Resp:  [20] 20  BP: (112-163)/(56-74) 134/64  SpO2:  [90 %-95 %] 95 %  Body mass index is 56.53 kg/m².     Input and Output Summary (last 24 hours):     Intake/Output Summary (Last 24 hours) at 3/18/2024 0910  Last data filed at 3/17/2024 2000  Gross per 24 hour   Intake 420 ml   Output 2200 ml   Net -1780 ml       Physical Exam:   Physical Exam  Vitals and nursing note reviewed.   Constitutional:       General: She is not in acute distress.     Appearance: Normal appearance. She is obese. She is not ill-appearing or toxic-appearing.   Cardiovascular:      Rate and Rhythm: Normal rate and regular rhythm.      Heart sounds: Normal heart sounds.   Pulmonary:      Effort: Pulmonary effort is normal. No respiratory distress.      Breath sounds: Normal breath  sounds. No wheezing.   Abdominal:      General: Bowel sounds are normal. There is no distension.      Palpations: Abdomen is soft.      Tenderness: There is no abdominal tenderness.   Skin:     General: Skin is warm and dry.      Coloration: Skin is not pale.   Neurological:      Mental Status: She is alert and oriented to person, place, and time.   Psychiatric:         Mood and Affect: Mood normal.         Behavior: Behavior normal.           Additional Data:     Labs:  Results from last 7 days   Lab Units 03/17/24  0906 03/14/24  0427 03/13/24  0514   WBC Thousand/uL 13.46*   < > 11.24*   HEMOGLOBIN g/dL 12.5   < > 12.8   HEMATOCRIT % 43.6   < > 43.5   PLATELETS Thousands/uL 183   < > 233   NEUTROS PCT %  --   --  77*   LYMPHS PCT %  --   --  13*   MONOS PCT %  --   --  8   EOS PCT %  --   --  1    < > = values in this interval not displayed.     Results from last 7 days   Lab Units 03/17/24  0906   SODIUM mmol/L 140   POTASSIUM mmol/L 5.1   CHLORIDE mmol/L 102   CO2 mmol/L 34*   BUN mg/dL 41*   CREATININE mg/dL 1.22   ANION GAP mmol/L 4   CALCIUM mg/dL 8.5   ALBUMIN g/dL 3.3*   TOTAL BILIRUBIN mg/dL 0.41   ALK PHOS U/L 110*   ALT U/L 49   AST U/L 19   GLUCOSE RANDOM mg/dL 213*         Results from last 7 days   Lab Units 03/14/24  0920   POC GLUCOSE mg/dl 127         Results from last 7 days   Lab Units 03/14/24  0427   PROCALCITONIN ng/ml 0.13       Lines/Drains:  Invasive Devices       Peripheral Intravenous Line  Duration             Peripheral IV 03/15/24 Left;Ventral (anterior) Forearm 2 days                          Imaging: No pertinent imaging reviewed.    Recent Cultures (last 7 days):         Last 24 Hours Medication List:   Current Facility-Administered Medications   Medication Dose Route Frequency Provider Last Rate    acetaminophen  650 mg Oral Q6H PRN GUSTAVO Goel      albuterol  2 puff Inhalation Q4H PRN Pravin Wolff DO      Diclofenac Sodium  2 g Topical 4x Daily Nida  GUSTAVO Stinson      diphenhydrAMINE  25 mg Oral HS PRN GUSTAVO Turcios      enoxaparin  40 mg Subcutaneous Daily Gabby Cecy Roger, DO      escitalopram  5 mg Oral Daily Gabby Cecy Roger, DO      Fluticasone Furoate-Vilanterol  1 puff Inhalation Daily Gabby Cecy Roger, DO      And    umeclidinium  1 puff Inhalation Daily Gabby Cecy Roger, DO      labetalol  300 mg Oral Q8H [facility] Gabby Cecy Roger, DO      melatonin  6 mg Oral HS GUSTAVO Turcios      methylPREDNISolone sodium succinate  40 mg Intravenous Q12H [facility] Alise Keenan, VANDANANP      nicotine  21 mg Transdermal Daily Pravin Wolff, DO      nicotine polacrilex  2 mg Oral Q2H PRN Pravin Wolff, DO      ondansetron  4 mg Intravenous Q6H PRN Gabby Cecy Roger, DO      rOPINIRole  0.5 mg Oral TID GUSTAVO Turcios      sodium chloride  1 spray Each Nare Q1H PRN GUSTAVO Turcios      topiramate  25 mg Oral BID Gabby Cecy Roger, DO      torsemide  20 mg Oral Daily Francis Vazquez MD          Today, Patient Was Seen By: Milagro Mike PA-C    **Please Note: This note may have been constructed using a voice recognition system.**

## 2024-03-18 NOTE — ASSESSMENT & PLAN NOTE
Patient started on IV Solu-Medrol due to worsening hypoxia emphysema findings on imaging  IV Solu-Medrol 40 mg decreased from every 8 hours improved to every 12 hrs on 3/16  Transition to oral taper on discharge   Continue pulmonary inhalers  Recommending OP follow-up, needs PFTs

## 2024-03-18 NOTE — ASSESSMENT & PLAN NOTE
Noted on the morning of 3/14, evidenced by creatinine rising from 1.25 to 1.69  Unsure of most recent baseline, 2021 baseline is 0.8-1  De La Cruz in place for accurate intake/output, discontinued 3/16  Creatinine appears to be back at baseline at 1.07  Monitor for hypotension, avoid nephrotoxins  Nephrology signed off  Creatinine stable 1.22

## 2024-03-18 NOTE — RESPIRATORY THERAPY NOTE
Home Oxygen Qualifying Test     Patient name: Dwaine Gould        : 1960   Date of Test:  2024  Diagnosis:    Home Oxygen Test:    **Medicare Guidelines require item(s) 1-5 on all ambulatory patients or 1 and 2 on non-ambulatory patients.    1. Baseline SPO2 on Room Air at rest 86 %   If <= 88% on Room Air add O2 via NC to obtain SpO2 >=88%. If LPM needed, document LPM 4 needed to reach =>88%    SPO2 during exertion on Room Air N/A  %  During exertion monitor SPO2. If SPO2 increases >=89%, do not add supplemental oxygen    SPO2 on Oxygen at Rest 91 % at 4 LPM    SPO2 during exertion on Oxygen 91 % at 5 LPM    Test performed during exertion activity.      [x]  Supplemental Home Oxygen is indicated.    []  Client does not qualify for home oxygen.    Respiratory Additional Notes- pt qualifies for 4L at rest and 5L during exertion.    Veronica Morrow, RT

## 2024-03-18 NOTE — UTILIZATION REVIEW
Continued Stay Review    Date: 3/18/24                           Current Patient Class: IP  Current Level of Care: MS    HPI:63 y.o. female initially admitted on 3/11/24 - DX:  Acute on chronic diastolic heart failure / Acute respiratory failure with hypoxia / COPD (chronic obstructive pulmonary disease) / Respiratory acidosis / Leukocytosis     Assessment/Plan:   3/18/24: Patient more alert today. she is feeling well today, urinating well still with oral diuretic regimen. She has not been weaned from oxygen support yet. She also notes that she has RILEY but does not use CPAP at home. critical value pCO2 71.2, down trending from 82.5. Patient on BiPAP continue; I/O Net -1780; pCO2 71.2; pO2 117.9; HCO3 38.6; Patient transition back to torsemide 20 mg daily on 3/16; Nephrology now signed off 3/16; continue solumedrol, however, IV Solu-Medrol 40 mg decreased from every 8 hours improved to every 12 hrs on 3/16; monitor labs. CM following for HHC when dc'd    Vital Signs:   Date/Time Temp Pulse Resp BP MAP (mmHg) SpO2 Calculated FIO2 (%) - Nasal Cannula Nasal Cannula O2 Flow Rate (L/min) O2 Device O2 Interface Device   03/18/24 07:01:33 98.4 °F (36.9 °C) -- 20 134/64 87 -- -- -- -- --   03/18/24 05:31:50 -- 67 -- 163/74 104 95 % -- -- -- --   03/18/24 0205 -- -- -- -- -- 95 % -- -- -- Face mask       Pertinent Labs/Diagnostic Results:   Results from last 7 days   Lab Units 03/11/24  2259   SARS-COV-2  Negative     Results from last 7 days   Lab Units 03/17/24  0906 03/16/24  0937 03/15/24  0528 03/14/24  0427 03/13/24  0514 03/12/24  0549 03/11/24  2204   WBC Thousand/uL 13.46* 11.40* 10.45* 12.30* 11.24*   < > 13.72*   HEMOGLOBIN g/dL 12.5 13.3 12.2 13.5 12.8   < > 13.7   HEMATOCRIT % 43.6 47.5* 43.1 47.9* 43.5   < > 46.7*   PLATELETS Thousands/uL 183 206 195 257 233   < > 266   NEUTROS ABS Thousands/µL  --   --   --   --  8.62*  --  11.21*    < > = values in this interval not displayed.       Results from last 7 days    Lab Units 03/17/24  0906 03/16/24  1347 03/16/24  0643 03/15/24  0528 03/14/24  0427 03/13/24  0514 03/12/24  0549   SODIUM mmol/L 140 139 137 140 139 140 139   POTASSIUM mmol/L 5.1 5.4* 5.7* 4.7 5.0 4.2 4.9   CHLORIDE mmol/L 102 102 104 106 103 104 105   CO2 mmol/L 34* 36* 29 29 31 31 28   ANION GAP mmol/L 4 1* 4 5 5 5 6   BUN mg/dL 41* 37* 37* 43* 48* 40* 31*   CREATININE mg/dL 1.22 1.14 1.07 1.19 1.69* 1.44* 1.25   EGFR ml/min/1.73sq m 47 52 56 49 32 39 46   CALCIUM mg/dL 8.5 8.8 8.5 8.4 8.3* 8.3* 8.5   MAGNESIUM mg/dL  --   --  2.5 2.5 2.4 2.1 2.1     Results from last 7 days   Lab Units 03/17/24  0906 03/14/24  0427 03/13/24  0514 03/12/24  0549 03/11/24  2204   AST U/L 19 40* 47* 49* 65*   ALT U/L 49 98* 104* 108* 123*   ALK PHOS U/L 110* 158* 143* 158* 169*   TOTAL PROTEIN g/dL 6.0* 6.7 6.1* 6.3* 6.4   ALBUMIN g/dL 3.3* 3.8 3.5 3.5 3.5   TOTAL BILIRUBIN mg/dL 0.41 0.46 0.42 0.61 0.63   BILIRUBIN DIRECT mg/dL  --  0.18  --   --   --      Results from last 7 days   Lab Units 03/14/24  0920   POC GLUCOSE mg/dl 127     Results from last 7 days   Lab Units 03/17/24  0906 03/16/24  1347 03/16/24  0643 03/15/24  0528 03/14/24  0427 03/13/24  0514 03/12/24  0549 03/11/24  2204   GLUCOSE RANDOM mg/dL 213* 164* 138 108 127 125 121 120       Results from last 7 days   Lab Units 03/14/24  1611 03/14/24  1201 03/14/24  0956   PH ART  7.227* 7.190* 7.191*   PCO2 ART mm Hg 82.5* 81.8* 80.6*   PO2 ART mm Hg 78.1 82.3 69.0*   HCO3 ART mmol/L 33.5* 30.5* 30.2*   BASE EXC ART mmol/L 3.5 0.0 -0.4   O2 CONTENT ART mL/dL 16.8 17.7 17.7   O2 HGB, ARTERIAL % 92.3* 92.3* 89.2*   ABG SOURCE  Radial, Left Radial, Left Radial, Right     Results from last 7 days   Lab Units 03/18/24  0521 03/15/24  1223 03/13/24  1628   PH PUSHPA  7.352 7.369 7.395   PCO2 PUSHPA mm Hg 71.2* 52.7* 43.2   PO2 PUSHPA mm Hg 117.9* 78.2* 112.5*   HCO3 PUSHPA mmol/L 38.6* 29.7 25.9   BASE EXC PUSHPA mmol/L 10.2 3.3 0.7   O2 CONTENT PUSHPA ml/dL 18.0 17.3 18.1   O2 HGB, VENOUS  % 95.6* 92.5* 94.4*       Results from last 7 days   Lab Units 03/12/24  0232 03/12/24  0019 03/11/24  2204   HS TNI 0HR ng/L  --   --  23   HS TNI 2HR ng/L  --  26  --    HSTNI D2 ng/L  --  3  --    HS TNI 4HR ng/L 22  --   --    HSTNI D4 ng/L -1  --   --        Results from last 7 days   Lab Units 03/14/24  0427   TSH 3RD GENERATON uIU/mL 2.618     Results from last 7 days   Lab Units 03/14/24  0427   PROCALCITONIN ng/ml 0.13       Results from last 7 days   Lab Units 03/11/24  2204   BNP pg/mL 1,925*       Results from last 7 days   Lab Units 03/14/24  0958   OSMO UR mmol/     Results from last 7 days   Lab Units 03/14/24  0958   CLARITY UA  Clear   COLOR UA  Yellow   SPEC GRAV UA  1.020   PH UA  5.5   GLUCOSE UA mg/dl Negative   KETONES UA mg/dl Negative   BLOOD UA  Negative   PROTEIN UA mg/dl Trace*   NITRITE UA  Negative   BILIRUBIN UA  Negative   UROBILINOGEN UA (BE) mg/dl <2.0   LEUKOCYTES UA  Small*   WBC UA /hpf 4-10*   RBC UA /hpf 1-2   BACTERIA UA /hpf Occasional   EPITHELIAL CELLS WET PREP /hpf Occasional   SODIUM UR  16   CREATININE UR mg/dL 131.1     Results from last 7 days   Lab Units 03/11/24  2259   INFLUENZA A PCR  Negative   INFLUENZA B PCR  Negative   RSV PCR  Negative       Medications:   Scheduled Medications:  Diclofenac Sodium, 2 g, Topical, 4x Daily  enoxaparin, 40 mg, Subcutaneous, Daily  escitalopram, 5 mg, Oral, Daily  Fluticasone Furoate-Vilanterol, 1 puff, Inhalation, Daily   And  umeclidinium, 1 puff, Inhalation, Daily  labetalol, 300 mg, Oral, Q8H [facility]  melatonin, 6 mg, Oral, HS  methylPREDNISolone sodium succinate, 40 mg, Intravenous, Q12H [facility]  nicotine, 21 mg, Transdermal, Daily  rOPINIRole, 0.5 mg, Oral, TID  topiramate, 25 mg, Oral, BID  torsemide, 20 mg, Oral, Daily      Continuous IV Infusions: None       PRN Meds:  acetaminophen, 650 mg, Oral, Q6H PRN  albuterol, 2 puff, Inhalation, Q4H PRN  diphenhydrAMINE, 25 mg, Oral, HS PRN  nicotine polacrilex, 2 mg, Oral, Q2H  PRN  ondansetron, 4 mg, Intravenous, Q6H PRN  sodium chloride, 1 spray, Each Nare, Q1H PRN        Discharge Plan: TBD    Network Utilization Review Department  ATTENTION: Please call with any questions or concerns to [phone] and carefully listen to the prompts so that you are directed to the right person. All voicemails are confidential.   For Discharge needs, contact Care Management DC Support Team at [phone] opt. [phone]  Send all requests for admission clinical reviews, approved or denied determinations and any other requests to dedicated fax number below belonging to the campus where the patient is receiving treatment. List of dedicated fax numbers for the Facilities:  FACILITY NAME UR FAX NUMBER   ADMISSION DENIALS (Administrative/Medical Necessity) [phone]   DISCHARGE SUPPORT TEAM (NETWORK) [phone]   PARENT CHILD HEALTH (Maternity/NICU/Pediatrics) [phone]   [facility] [phone]   [facility] [phone]   [facility] [phone]   [facility] [phone]   [facility] [phone]   [facility] [phone]   [facility] [phone]   [facility] [phone]   [facility] [phone]   [facility] [phone]   [facility] [phone]   [facility] [phone]

## 2024-03-18 NOTE — PLAN OF CARE
Problem: PAIN - ADULT  Goal: Verbalizes/displays adequate comfort level or baseline comfort level  Description: Interventions:  - Encourage patient to monitor pain and request assistance  - Assess pain using appropriate pain scale  - Administer analgesics based on type and severity of pain and evaluate response  - Implement non-pharmacological measures as appropriate and evaluate response  - Consider cultural and social influences on pain and pain management  - Notify physician/advanced practitioner if interventions unsuccessful or patient reports new pain  Outcome: Progressing     Problem: INFECTION - ADULT  Goal: Absence or prevention of progression during hospitalization  Description: INTERVENTIONS:  - Assess and monitor for signs and symptoms of infection  - Monitor lab/diagnostic results  - Monitor all insertion sites, i.e. indwelling lines, tubes, and drains  - Monitor endotracheal if appropriate and nasal secretions for changes in amount and color  - [place] appropriate cooling/warming therapies per order  - Administer medications as ordered  - Instruct and encourage patient and family to use good hand hygiene technique  - Identify and instruct in appropriate isolation precautions for identified infection/condition  Outcome: Progressing  Goal: Absence of fever/infection during neutropenic period  Description: INTERVENTIONS:  - Monitor WBC    Outcome: Progressing     Problem: SAFETY ADULT  Goal: Patient will remain free of falls  Description: INTERVENTIONS:  - Educate patient/family on patient safety including physical limitations  - Instruct patient to call for assistance with activity   - Consult OT/PT to assist with strengthening/mobility   - Keep Call bell within reach  - Keep bed low and locked with side rails adjusted as appropriate  - Keep care items and personal belongings within reach  - Initiate and maintain comfort rounds  - Make Fall Risk Sign visible to staff  - Offer Toileting every  Hours,  in advance of need  - Initiate/Maintain alarm  - Obtain necessary fall risk management equipment:   - Apply yellow socks and bracelet for high fall risk patients  - Consider moving patient to room near nurses station  Outcome: Progressing  Goal: Maintain or return to baseline ADL function  Description: INTERVENTIONS:  -  Assess patient's ability to carry out ADLs; assess patient's baseline for ADL function and identify physical deficits which impact ability to perform ADLs (bathing, care of mouth/teeth, toileting, grooming, dressing, etc.)  - Assess/evaluate cause of self-care deficits   - Assess range of motion  - Assess patient's mobility; develop plan if impaired  - Assess patient's need for assistive devices and provide as appropriate  - Encourage maximum independence but intervene and supervise when necessary  - Involve family in performance of ADLs  - Assess for home care needs following discharge   - Consider OT consult to assist with ADL evaluation and planning for discharge  - Provide patient education as appropriate  Outcome: Progressing  Goal: Maintains/Returns to pre admission functional level  Description: INTERVENTIONS:  - Perform AM-PAC 6 Click Basic Mobility/ Daily Activity assessment daily.  - Set and communicate daily mobility goal to care team and patient/family/caregiver.   - Collaborate with rehabilitation services on mobility goals if consulted  - Perform Range of Motion  times a day.  - Reposition patient every  hours.  - Dangle patient  times a day  - Stand patient  times a day  - Ambulate patient  times a day  - Out of bed to chair  times a day   - Out of bed for meals  times a day  - Out of bed for toileting  - Record patient progress and toleration of activity level   Outcome: Progressing     Problem: Prexisting or High Potential for Compromised Skin Integrity  Goal: Skin integrity is maintained or improved  Description: INTERVENTIONS:  - Identify patients at risk for skin breakdown  -  Assess and monitor skin integrity  - Assess and monitor nutrition and hydration status  - Monitor labs   - Assess for incontinence   - Turn and reposition patient  - Assist with mobility/ambulation  - Relieve pressure over bony prominences  - Avoid friction and shearing  - Provide appropriate hygiene as needed including keeping skin clean and dry  - Evaluate need for skin moisturizer/barrier cream  - Collaborate with interdisciplinary team   - Patient/family teaching  - Consider wound care consult   Outcome: Progressing

## 2024-03-18 NOTE — RESPIRATORY THERAPY NOTE
03/18/24 0205   Respiratory Assessment   General Appearance Sleeping   Respiratory Pattern Assisted;Spontaneous   Resp Comments pt asleep and remains on bipap   O2 Device v60 Eleven   Non-Invasive Information   O2 Interface Device Face mask   Non-Invasive Ventilation Mode BiPAP   SpO2 95 %   $ Pulse Oximetry Spot Check Charge Completed   Non-Invasive Settings   IPAP (cm) 20 cm   EPAP (cm) 8 cm   Rate (Set) 12   FiO2 (%) 40   Rise Time 2   Inspiratory Time (Set) 1   Humidification   (passover)   Temperature (Set)   (n/a)   Non-Invasive Readings   Skin Intervention Skin intact   Total Rate 15   Vt (mL) (Mech) 482   MV (Mech) 7.2   Peak Pressure (Obs) 20   Leak (lpm) 0   Non-Invasive Alarms   Insp Pressure High (cm H20) 30   Insp Pressure Low (cm H20) 6   Low Insp Pressure Time (sec) 20 sec   MV Low (L/min) 3   Vt High (mL) 1200   Vt Low (mL) 200   High Resp Rate (BPM) 35 BPM   Low Resp Rate (BPM) 8 BPM   Maintenance   Water bag changed No

## 2024-03-18 NOTE — CASE MANAGEMENT
Case Management Discharge Planning Note    Patient name Dwaine Gould  Location /-01 MRN [id number]  : 1960 Date 3/18/2024       Current Admission Date: 3/11/2024  Current Admission Diagnosis:Acute on chronic diastolic heart failure (HCC)   Patient Active Problem List    Diagnosis Date Noted    Hyperkalemia 2024    Acute respiratory failure with hypoxia (HCC) 2024    Acute metabolic encephalopathy 2024    Respiratory acidosis 2024    LETICIA (acute kidney injury) (HCC) 2024    Acute on chronic diastolic heart failure (HCC) 2024    COPD (chronic obstructive pulmonary disease) ([facility]) 2024    Transaminitis 2024    Leukocytosis 2024    Tobacco abuse 2021    CAD (coronary artery disease) 2021    Essential hypertension 2019    Hyperlipidemia 2019      LOS (days): 7  Geometric Mean LOS (GMLOS) (days):   Days to GMLOS:     OBJECTIVE:  Risk of Unplanned Readmission Score: 10.57         Current admission status: Inpatient   Preferred Pharmacy:   [facility]/pharmacy #[id number] - [place] - [address]  [place] [id number]  Phone: [phone] Fax: [phone]    Primary Care Provider: Jordan Ramirez MD    Primary Insurance: [place]  Secondary Insurance:     DISCHARGE DETAILS:      Other Referral/Resources/Interventions Provided:  Referral Comments: Spoke with pt this AM and she has chosen [facility] this AM. [facility] reserved.Also called her daughter Kay who has agreed for pt to come home to her home. Daughter's address has been given to Home[facility],

## 2024-03-18 NOTE — ASSESSMENT & PLAN NOTE
In setting of Co2 retention  ABG completed, noted to have a pH of 7.191, pCO2 of 80.6, with a HCO3 of 30.2  Likely cause of altered mental status, in setting of undiagnosed sleep apnea, obesity hypoventilation syndrome  Neurology asked to evaluate the patient  MRI brain w/wo contrast pending; to better evaluate white matter changes on CTH; possible PRES   If recurrent events or no clinical improvement despite correction of metabolic derangement, consider Routine EEG   Outpatient sleep study; possible RILEY/OHS  Patient may require oxygen at discharge    CT Head (3/14/24): No acute intracranial abnormality. Chronic microangiopathy. Right middle ear and small mastoid effusions.    Now resolved.

## 2024-03-18 NOTE — ASSESSMENT & PLAN NOTE
Noted 3/14/24 during rapid response  pH as noted - pH 7.191, pCO2 80.6, HCO3 30   Latest Reference Range & Units 03/15/24 12:23 03/18/24 05:21   pH, Sammy 7.300 - 7.400  7.369 7.352   pCO2, Sammy 42.0 - 50.0 mm Hg 52.7 (H) 71.2 (HH)   pO2, Sammy 35.0 - 45.0 mm Hg 78.2 (H) 117.9 (H)   HCO3, Sammy 24 - 30 mmol/L 29.7 38.6 (H)   Base Excess, Sammy mmol/L 3.3 10.2   O2 Content, Sammy ml/dL 17.3 18.0   O2 HGB, VENOUS 60.0 - 80.0 % 92.5 (H) 95.6 (H)

## 2024-03-18 NOTE — MALNUTRITION/BMI
This medical record reflects one or more clinical indicators suggestive of morbid obesity.      BMI Findings:  Adult BMI Classifications: Morbid Obesity 50-59.9        Body mass index is 56.53 kg/m².     See Nutrition note dated 3/18/24 for additional details.  Completed nutrition assessment is viewable in the nutrition documentation.

## 2024-03-18 NOTE — RESPIRATORY THERAPY NOTE
03/17/24 4065   Respiratory Assessment   Resp Comments placed pt on bipap for HS use, RN notified   O2 Device v60   Non-Invasive Information   O2 Interface Device Face mask   Non-Invasive Ventilation Mode BiPAP   $ Intermittent NIV Yes   SpO2 94 %   $ Pulse Oximetry Spot Check Charge Completed   Non-Invasive Settings   IPAP (cm) 20 cm   EPAP (cm) 8 cm   Rate (Set) 12   FiO2 (%) 40   Rise Time 2  (15 min ramp applied)   Inspiratory Time (Set) 1   Humidification   (passover)   Temperature (Set)   (n/a)   Non-Invasive Readings   Skin Intervention Skin intact   Total Rate 19   Vt (mL) (Mech) 419   MV (Mech) 8   Peak Pressure (Obs) 13   Leak (lpm) 0   Non-Invasive Alarms   Insp Pressure High (cm H20) 30   Insp Pressure Low (cm H20) 6   Low Insp Pressure Time (sec) 20 sec   MV Low (L/min) 3   Vt High (mL) 1200   Vt Low (mL) 200   High Resp Rate (BPM) 35 BPM   Low Resp Rate (BPM) 8 BPM   Maintenance   Water bag changed No

## 2024-03-18 NOTE — PLAN OF CARE
Problem: INFECTION - ADULT  Goal: Absence or prevention of progression during hospitalization  Description: INTERVENTIONS:  - Assess and monitor for signs and symptoms of infection  - Monitor lab/diagnostic results  - Monitor all insertion sites, i.e. indwelling lines, tubes, and drains  - Monitor endotracheal if appropriate and nasal secretions for changes in amount and color  - [place] appropriate cooling/warming therapies per order  - Administer medications as ordered  - Instruct and encourage patient and family to use good hand hygiene technique  - Identify and instruct in appropriate isolation precautions for identified infection/condition  Outcome: Progressing

## 2024-03-18 NOTE — ASSESSMENT & PLAN NOTE
Initially, patient had been requiring 3 L of supplemental oxygen to maintain oxygen saturation levels above 88%  On 3/15, patient with increasing oxygen requirements up to 5 L, BiPAP as well in setting of respiratory acidosis.  CT chest (3/14/24): Mild interstitial edema. Mild dependent atelectasis in the lower lobes. Emphysema.  Procalcitonin within normal limits, low concern for pneumonia  Patient initiated on IV Solu-Medrol 40 mg every 8 hours on 3/15 in setting of worsening hypoxia and  in setting of emphysema. Now q12h  Can transition to oral taper in anticipation of discharge    Repeat VBG showing elevated CO2 but compensated  Patient is alert today   Will need oxygen on discharge, 4 L at rest and 5 L with exertion   no chills/no decreased eating/drinking/no dizziness/no fever/no tingling/no vomiting/no weakness

## 2024-03-19 ENCOUNTER — APPOINTMENT (INPATIENT)
Dept: CT IMAGING | Facility: HOSPITAL | Age: 64
DRG: 194 | End: 2024-03-19
Payer: COMMERCIAL

## 2024-03-19 ENCOUNTER — ANESTHESIA EVENT (INPATIENT)
Dept: PERIOP | Facility: HOSPITAL | Age: 64
DRG: 230 | End: 2024-03-19
Payer: COMMERCIAL

## 2024-03-19 ENCOUNTER — ANESTHESIA (INPATIENT)
Dept: PERIOP | Facility: HOSPITAL | Age: 64
DRG: 230 | End: 2024-03-19
Payer: COMMERCIAL

## 2024-03-19 ENCOUNTER — HOSPITAL ENCOUNTER (INPATIENT)
Facility: HOSPITAL | Age: 64
LOS: 9 days | Discharge: HOME HEALTH | DRG: 230 | End: 2024-03-28
Attending: STUDENT IN AN ORGANIZED HEALTH CARE EDUCATION/TRAINING PROGRAM | Admitting: ANESTHESIOLOGY
Payer: COMMERCIAL

## 2024-03-19 VITALS
TEMPERATURE: 97.9 F | DIASTOLIC BLOOD PRESSURE: 56 MMHG | RESPIRATION RATE: 18 BRPM | SYSTOLIC BLOOD PRESSURE: 125 MMHG | BODY MASS INDEX: 55.4 KG/M2 | WEIGHT: 282.19 LBS | HEART RATE: 63 BPM | OXYGEN SATURATION: 89 % | HEIGHT: 60 IN

## 2024-03-19 DIAGNOSIS — R78.81 GRAM-NEGATIVE BACTEREMIA: ICD-10-CM

## 2024-03-19 DIAGNOSIS — I10 ESSENTIAL HYPERTENSION: ICD-10-CM

## 2024-03-19 DIAGNOSIS — K66.8 PNEUMOPERITONEUM: Primary | ICD-10-CM

## 2024-03-19 DIAGNOSIS — J44.9 CHRONIC OBSTRUCTIVE PULMONARY DISEASE, UNSPECIFIED COPD TYPE (HCC): ICD-10-CM

## 2024-03-19 DIAGNOSIS — K66.8 FREE INTRAPERITONEAL AIR: Primary | ICD-10-CM

## 2024-03-19 DIAGNOSIS — K63.1 COLON PERFORATION (HCC): ICD-10-CM

## 2024-03-19 PROBLEM — I50.32 CHRONIC DIASTOLIC HEART FAILURE (HCC): Status: ACTIVE | Noted: 2024-03-12

## 2024-03-19 LAB
ABO GROUP BLD: NORMAL
ABO GROUP BLD: NORMAL
ALBUMIN SERPL BCP-MCNC: 3.3 G/DL (ref 3.5–5)
ALP SERPL-CCNC: 84 U/L (ref 34–104)
ALT SERPL W P-5'-P-CCNC: 33 U/L (ref 7–52)
ANION GAP SERPL CALCULATED.3IONS-SCNC: 5 MMOL/L (ref 4–13)
AST SERPL W P-5'-P-CCNC: 15 U/L (ref 13–39)
BACTERIA UR QL AUTO: ABNORMAL /HPF
BASOPHILS # BLD AUTO: 0.01 THOUSANDS/ÂΜL (ref 0–0.1)
BASOPHILS NFR BLD AUTO: 0 % (ref 0–1)
BILIRUB SERPL-MCNC: 0.78 MG/DL (ref 0.2–1)
BILIRUB UR QL STRIP: NEGATIVE
BLD GP AB SCN SERPL QL: NEGATIVE
BUN SERPL-MCNC: 40 MG/DL (ref 5–25)
CALCIUM ALBUM COR SERPL-MCNC: 9.1 MG/DL (ref 8.3–10.1)
CALCIUM SERPL-MCNC: 8.5 MG/DL (ref 8.4–10.2)
CHLORIDE SERPL-SCNC: 99 MMOL/L (ref 96–108)
CLARITY UR: CLEAR
CO2 SERPL-SCNC: 39 MMOL/L (ref 21–32)
COLOR UR: ABNORMAL
CREAT SERPL-MCNC: 1.17 MG/DL (ref 0.6–1.3)
DME PARACHUTE DELIVERY DATE ACTUAL: NORMAL
DME PARACHUTE DELIVERY DATE EXPECTED: NORMAL
DME PARACHUTE DELIVERY DATE REQUESTED: NORMAL
DME PARACHUTE ITEM DESCRIPTION: NORMAL
DME PARACHUTE ORDER STATUS: NORMAL
DME PARACHUTE SUPPLIER NAME: NORMAL
DME PARACHUTE SUPPLIER PHONE: NORMAL
EOSINOPHIL # BLD AUTO: 0 THOUSAND/ÂΜL (ref 0–0.61)
EOSINOPHIL NFR BLD AUTO: 0 % (ref 0–6)
ERYTHROCYTE [DISTWIDTH] IN BLOOD BY AUTOMATED COUNT: 18.7 % (ref 11.6–15.1)
GFR SERPL CREATININE-BSD FRML MDRD: 50 ML/MIN/1.73SQ M
GLUCOSE SERPL-MCNC: 150 MG/DL (ref 65–140)
GLUCOSE UR STRIP-MCNC: NEGATIVE MG/DL
HCT VFR BLD AUTO: 44.9 % (ref 34.8–46.1)
HGB BLD-MCNC: 13.1 G/DL (ref 11.5–15.4)
HGB UR QL STRIP.AUTO: NEGATIVE
IMM GRANULOCYTES # BLD AUTO: 0.07 THOUSAND/UL (ref 0–0.2)
IMM GRANULOCYTES NFR BLD AUTO: 1 % (ref 0–2)
KETONES UR STRIP-MCNC: NEGATIVE MG/DL
LACTATE SERPL-SCNC: 1.1 MMOL/L (ref 0.5–2)
LEUKOCYTE ESTERASE UR QL STRIP: NEGATIVE
LIPASE SERPL-CCNC: <6 U/L (ref 11–82)
LYMPHOCYTES # BLD AUTO: 0.51 THOUSANDS/ÂΜL (ref 0.6–4.47)
LYMPHOCYTES NFR BLD AUTO: 3 % (ref 14–44)
MCH RBC QN AUTO: 23.3 PG (ref 26.8–34.3)
MCHC RBC AUTO-ENTMCNC: 29.2 G/DL (ref 31.4–37.4)
MCV RBC AUTO: 80 FL (ref 82–98)
MONOCYTES # BLD AUTO: 0.75 THOUSAND/ÂΜL (ref 0.17–1.22)
MONOCYTES NFR BLD AUTO: 5 % (ref 4–12)
MUCOUS THREADS UR QL AUTO: ABNORMAL
NEUTROPHILS # BLD AUTO: 13.46 THOUSANDS/ÂΜL (ref 1.85–7.62)
NEUTS SEG NFR BLD AUTO: 91 % (ref 43–75)
NITRITE UR QL STRIP: NEGATIVE
NON-SQ EPI CELLS URNS QL MICRO: ABNORMAL /HPF
NRBC BLD AUTO-RTO: 0 /100 WBCS
PH UR STRIP.AUTO: 8 [PH]
PLATELET # BLD AUTO: 162 THOUSANDS/UL (ref 149–390)
PMV BLD AUTO: 9.2 FL (ref 8.9–12.7)
POTASSIUM SERPL-SCNC: 3.9 MMOL/L (ref 3.5–5.3)
PROT SERPL-MCNC: 6 G/DL (ref 6.4–8.4)
PROT UR STRIP-MCNC: ABNORMAL MG/DL
RBC # BLD AUTO: 5.63 MILLION/UL (ref 3.81–5.12)
RBC #/AREA URNS AUTO: ABNORMAL /HPF
RH BLD: POSITIVE
RH BLD: POSITIVE
SODIUM SERPL-SCNC: 143 MMOL/L (ref 135–147)
SP GR UR STRIP.AUTO: 1.02 (ref 1–1.03)
SPECIMEN EXPIRATION DATE: NORMAL
UROBILINOGEN UR STRIP-ACNC: 2 MG/DL
WBC # BLD AUTO: 14.8 THOUSAND/UL (ref 4.31–10.16)
WBC #/AREA URNS AUTO: ABNORMAL /HPF

## 2024-03-19 PROCEDURE — 85014 HEMATOCRIT: CPT

## 2024-03-19 PROCEDURE — 0DBB0ZZ EXCISION OF ILEUM, OPEN APPROACH: ICD-10-PCS | Performed by: STUDENT IN AN ORGANIZED HEALTH CARE EDUCATION/TRAINING PROGRAM

## 2024-03-19 PROCEDURE — 87185 SC STD ENZYME DETCJ PER NZM: CPT | Performed by: STUDENT IN AN ORGANIZED HEALTH CARE EDUCATION/TRAINING PROGRAM

## 2024-03-19 PROCEDURE — 80053 COMPREHEN METABOLIC PANEL: CPT | Performed by: STUDENT IN AN ORGANIZED HEALTH CARE EDUCATION/TRAINING PROGRAM

## 2024-03-19 PROCEDURE — 86901 BLOOD TYPING SEROLOGIC RH(D): CPT | Performed by: STUDENT IN AN ORGANIZED HEALTH CARE EDUCATION/TRAINING PROGRAM

## 2024-03-19 PROCEDURE — 84132 ASSAY OF SERUM POTASSIUM: CPT

## 2024-03-19 PROCEDURE — 87070 CULTURE OTHR SPECIMN AEROBIC: CPT | Performed by: STUDENT IN AN ORGANIZED HEALTH CARE EDUCATION/TRAINING PROGRAM

## 2024-03-19 PROCEDURE — 74176 CT ABD & PELVIS W/O CONTRAST: CPT

## 2024-03-19 PROCEDURE — 83690 ASSAY OF LIPASE: CPT | Performed by: STUDENT IN AN ORGANIZED HEALTH CARE EDUCATION/TRAINING PROGRAM

## 2024-03-19 PROCEDURE — 99497 ADVNCD CARE PLAN 30 MIN: CPT | Performed by: PHYSICIAN ASSISTANT

## 2024-03-19 PROCEDURE — 86850 RBC ANTIBODY SCREEN: CPT | Performed by: STUDENT IN AN ORGANIZED HEALTH CARE EDUCATION/TRAINING PROGRAM

## 2024-03-19 PROCEDURE — 87154 CUL TYP ID BLD PTHGN 6+ TRGT: CPT | Performed by: STUDENT IN AN ORGANIZED HEALTH CARE EDUCATION/TRAINING PROGRAM

## 2024-03-19 PROCEDURE — 99255 IP/OBS CONSLTJ NEW/EST HI 80: CPT | Performed by: STUDENT IN AN ORGANIZED HEALTH CARE EDUCATION/TRAINING PROGRAM

## 2024-03-19 PROCEDURE — 87075 CULTR BACTERIA EXCEPT BLOOD: CPT | Performed by: STUDENT IN AN ORGANIZED HEALTH CARE EDUCATION/TRAINING PROGRAM

## 2024-03-19 PROCEDURE — 07BB0ZX EXCISION OF MESENTERIC LYMPHATIC, OPEN APPROACH, DIAGNOSTIC: ICD-10-PCS | Performed by: STUDENT IN AN ORGANIZED HEALTH CARE EDUCATION/TRAINING PROGRAM

## 2024-03-19 PROCEDURE — 82947 ASSAY GLUCOSE BLOOD QUANT: CPT

## 2024-03-19 PROCEDURE — 99239 HOSP IP/OBS DSCHRG MGMT >30: CPT | Performed by: PHYSICIAN ASSISTANT

## 2024-03-19 PROCEDURE — 87181 SC STD AGAR DILUTION PER AGT: CPT | Performed by: STUDENT IN AN ORGANIZED HEALTH CARE EDUCATION/TRAINING PROGRAM

## 2024-03-19 PROCEDURE — 87205 SMEAR GRAM STAIN: CPT | Performed by: STUDENT IN AN ORGANIZED HEALTH CARE EDUCATION/TRAINING PROGRAM

## 2024-03-19 PROCEDURE — 97605 NEG PRS WND THER DME<=50SQCM: CPT | Performed by: CLINICAL NURSE SPECIALIST

## 2024-03-19 PROCEDURE — 88341 IMHCHEM/IMCYTCHM EA ADD ANTB: CPT | Performed by: PATHOLOGY

## 2024-03-19 PROCEDURE — 81001 URINALYSIS AUTO W/SCOPE: CPT | Performed by: INTERNAL MEDICINE

## 2024-03-19 PROCEDURE — 87040 BLOOD CULTURE FOR BACTERIA: CPT | Performed by: STUDENT IN AN ORGANIZED HEALTH CARE EDUCATION/TRAINING PROGRAM

## 2024-03-19 PROCEDURE — 87186 SC STD MICRODIL/AGAR DIL: CPT | Performed by: STUDENT IN AN ORGANIZED HEALTH CARE EDUCATION/TRAINING PROGRAM

## 2024-03-19 PROCEDURE — 83605 ASSAY OF LACTIC ACID: CPT | Performed by: STUDENT IN AN ORGANIZED HEALTH CARE EDUCATION/TRAINING PROGRAM

## 2024-03-19 PROCEDURE — 82330 ASSAY OF CALCIUM: CPT

## 2024-03-19 PROCEDURE — 82803 BLOOD GASES ANY COMBINATION: CPT

## 2024-03-19 PROCEDURE — 44140 PARTIAL REMOVAL OF COLON: CPT | Performed by: CLINICAL NURSE SPECIALIST

## 2024-03-19 PROCEDURE — 87076 CULTURE ANAEROBE IDENT EACH: CPT | Performed by: STUDENT IN AN ORGANIZED HEALTH CARE EDUCATION/TRAINING PROGRAM

## 2024-03-19 PROCEDURE — 88309 TISSUE EXAM BY PATHOLOGIST: CPT | Performed by: PATHOLOGY

## 2024-03-19 PROCEDURE — 0DTF0ZZ RESECTION OF RIGHT LARGE INTESTINE, OPEN APPROACH: ICD-10-PCS | Performed by: STUDENT IN AN ORGANIZED HEALTH CARE EDUCATION/TRAINING PROGRAM

## 2024-03-19 PROCEDURE — 99223 1ST HOSP IP/OBS HIGH 75: CPT | Performed by: STUDENT IN AN ORGANIZED HEALTH CARE EDUCATION/TRAINING PROGRAM

## 2024-03-19 PROCEDURE — 44140 PARTIAL REMOVAL OF COLON: CPT | Performed by: STUDENT IN AN ORGANIZED HEALTH CARE EDUCATION/TRAINING PROGRAM

## 2024-03-19 PROCEDURE — 88342 IMHCHEM/IMCYTCHM 1ST ANTB: CPT | Performed by: PATHOLOGY

## 2024-03-19 PROCEDURE — 87077 CULTURE AEROBIC IDENTIFY: CPT | Performed by: STUDENT IN AN ORGANIZED HEALTH CARE EDUCATION/TRAINING PROGRAM

## 2024-03-19 PROCEDURE — 84295 ASSAY OF SERUM SODIUM: CPT

## 2024-03-19 PROCEDURE — 86900 BLOOD TYPING SEROLOGIC ABO: CPT | Performed by: STUDENT IN AN ORGANIZED HEALTH CARE EDUCATION/TRAINING PROGRAM

## 2024-03-19 PROCEDURE — 85025 COMPLETE CBC W/AUTO DIFF WBC: CPT | Performed by: STUDENT IN AN ORGANIZED HEALTH CARE EDUCATION/TRAINING PROGRAM

## 2024-03-19 PROCEDURE — 97605 NEG PRS WND THER DME<=50SQCM: CPT | Performed by: STUDENT IN AN ORGANIZED HEALTH CARE EDUCATION/TRAINING PROGRAM

## 2024-03-19 RX ORDER — PHENAZOPYRIDINE HYDROCHLORIDE 100 MG/1
100 TABLET, FILM COATED ORAL
Status: DISCONTINUED | OUTPATIENT
Start: 2024-03-19 | End: 2024-03-19 | Stop reason: HOSPADM

## 2024-03-19 RX ORDER — LABETALOL HYDROCHLORIDE 5 MG/ML
10 INJECTION, SOLUTION INTRAVENOUS EVERY 6 HOURS PRN
Status: DISCONTINUED | OUTPATIENT
Start: 2024-03-19 | End: 2024-03-20

## 2024-03-19 RX ORDER — NICOTINE 21 MG/24HR
1 PATCH, TRANSDERMAL 24 HOURS TRANSDERMAL DAILY
Status: DISCONTINUED | OUTPATIENT
Start: 2024-03-20 | End: 2024-03-19

## 2024-03-19 RX ORDER — POLYETHYLENE GLYCOL 3350 17 G/17G
17 POWDER, FOR SOLUTION ORAL DAILY
Status: DISCONTINUED | OUTPATIENT
Start: 2024-03-19 | End: 2024-03-19

## 2024-03-19 RX ORDER — ROPINIROLE 0.5 MG/1
0.5 TABLET, FILM COATED ORAL 3 TIMES DAILY
Qty: 90 TABLET | Refills: 0 | Status: SHIPPED | OUTPATIENT
Start: 2024-03-19

## 2024-03-19 RX ORDER — ONDANSETRON 2 MG/ML
INJECTION INTRAMUSCULAR; INTRAVENOUS AS NEEDED
Status: DISCONTINUED | OUTPATIENT
Start: 2024-03-19 | End: 2024-03-20

## 2024-03-19 RX ORDER — METRONIDAZOLE 500 MG/100ML
INJECTION, SOLUTION INTRAVENOUS CONTINUOUS PRN
Status: DISCONTINUED | OUTPATIENT
Start: 2024-03-19 | End: 2024-03-20

## 2024-03-19 RX ORDER — FENTANYL CITRATE 50 UG/ML
INJECTION, SOLUTION INTRAMUSCULAR; INTRAVENOUS AS NEEDED
Status: DISCONTINUED | OUTPATIENT
Start: 2024-03-19 | End: 2024-03-20

## 2024-03-19 RX ORDER — HYDROMORPHONE HCL/PF 1 MG/ML
SYRINGE (ML) INJECTION AS NEEDED
Status: DISCONTINUED | OUTPATIENT
Start: 2024-03-19 | End: 2024-03-20

## 2024-03-19 RX ORDER — HEPARIN SODIUM 5000 [USP'U]/ML
INJECTION, SOLUTION INTRAVENOUS; SUBCUTANEOUS AS NEEDED
Status: DISCONTINUED | OUTPATIENT
Start: 2024-03-19 | End: 2024-03-20

## 2024-03-19 RX ORDER — TORSEMIDE 20 MG/1
20 TABLET ORAL DAILY
Qty: 30 TABLET | Refills: 0 | Status: SHIPPED | OUTPATIENT
Start: 2024-03-20

## 2024-03-19 RX ORDER — PANTOPRAZOLE SODIUM 40 MG/10ML
40 INJECTION, POWDER, LYOPHILIZED, FOR SOLUTION INTRAVENOUS ONCE
Qty: 40 MG | Refills: 0 | Status: DISCONTINUED | OUTPATIENT
Start: 2024-03-19 | End: 2024-03-20

## 2024-03-19 RX ORDER — NICOTINE 21 MG/24HR
1 PATCH, TRANSDERMAL 24 HOURS TRANSDERMAL DAILY
Status: DISCONTINUED | OUTPATIENT
Start: 2024-03-20 | End: 2024-03-28 | Stop reason: HOSPADM

## 2024-03-19 RX ORDER — OXYBUTYNIN CHLORIDE 5 MG/1
5 TABLET ORAL 3 TIMES DAILY
Status: DISCONTINUED | OUTPATIENT
Start: 2024-03-19 | End: 2024-03-19

## 2024-03-19 RX ORDER — KETOROLAC TROMETHAMINE 30 MG/ML
15 INJECTION, SOLUTION INTRAMUSCULAR; INTRAVENOUS ONCE
Status: COMPLETED | OUTPATIENT
Start: 2024-03-19 | End: 2024-03-19

## 2024-03-19 RX ORDER — ENOXAPARIN SODIUM 100 MG/ML
40 INJECTION SUBCUTANEOUS
Status: DISCONTINUED | OUTPATIENT
Start: 2024-03-20 | End: 2024-03-20

## 2024-03-19 RX ORDER — SUCCINYLCHOLINE/SOD CL,ISO/PF 100 MG/5ML
SYRINGE (ML) INTRAVENOUS AS NEEDED
Status: DISCONTINUED | OUTPATIENT
Start: 2024-03-19 | End: 2024-03-20

## 2024-03-19 RX ORDER — ROCURONIUM BROMIDE 10 MG/ML
INJECTION, SOLUTION INTRAVENOUS AS NEEDED
Status: DISCONTINUED | OUTPATIENT
Start: 2024-03-19 | End: 2024-03-20

## 2024-03-19 RX ORDER — BISACODYL 10 MG
10 SUPPOSITORY, RECTAL RECTAL DAILY PRN
Status: DISCONTINUED | OUTPATIENT
Start: 2024-03-19 | End: 2024-03-19 | Stop reason: HOSPADM

## 2024-03-19 RX ORDER — LIDOCAINE HYDROCHLORIDE 20 MG/ML
INJECTION, SOLUTION EPIDURAL; INFILTRATION; INTRACAUDAL; PERINEURAL AS NEEDED
Status: DISCONTINUED | OUTPATIENT
Start: 2024-03-19 | End: 2024-03-20

## 2024-03-19 RX ORDER — NICOTINE 21 MG/24HR
1 PATCH, TRANSDERMAL 24 HOURS TRANSDERMAL DAILY
Qty: 28 PATCH | Refills: 0 | Status: SHIPPED | OUTPATIENT
Start: 2024-03-20

## 2024-03-19 RX ORDER — EPHEDRINE SULFATE 50 MG/ML
INJECTION INTRAVENOUS AS NEEDED
Status: DISCONTINUED | OUTPATIENT
Start: 2024-03-19 | End: 2024-03-20

## 2024-03-19 RX ORDER — PREDNISONE 20 MG/1
TABLET ORAL DAILY
Qty: 10 TABLET | Refills: 0 | Status: SHIPPED | OUTPATIENT
Start: 2024-03-20 | End: 2024-03-28

## 2024-03-19 RX ORDER — KETAMINE HCL IN NACL, ISO-OSM 100MG/10ML
SYRINGE (ML) INJECTION AS NEEDED
Status: DISCONTINUED | OUTPATIENT
Start: 2024-03-19 | End: 2024-03-20

## 2024-03-19 RX ORDER — SIMETHICONE 80 MG
80 TABLET,CHEWABLE ORAL EVERY 6 HOURS PRN
Status: DISCONTINUED | OUTPATIENT
Start: 2024-03-19 | End: 2024-03-19 | Stop reason: HOSPADM

## 2024-03-19 RX ORDER — SODIUM CHLORIDE, SODIUM LACTATE, POTASSIUM CHLORIDE, CALCIUM CHLORIDE 600; 310; 30; 20 MG/100ML; MG/100ML; MG/100ML; MG/100ML
INJECTION, SOLUTION INTRAVENOUS CONTINUOUS PRN
Status: DISCONTINUED | OUTPATIENT
Start: 2024-03-19 | End: 2024-03-20

## 2024-03-19 RX ORDER — OXYBUTYNIN CHLORIDE 5 MG/1
5 TABLET ORAL 3 TIMES DAILY
Status: DISCONTINUED | OUTPATIENT
Start: 2024-03-19 | End: 2024-03-19 | Stop reason: HOSPADM

## 2024-03-19 RX ORDER — SODIUM CHLORIDE 9 MG/ML
INJECTION, SOLUTION INTRAVENOUS CONTINUOUS PRN
Status: DISCONTINUED | OUTPATIENT
Start: 2024-03-19 | End: 2024-03-20

## 2024-03-19 RX ORDER — PROPOFOL 10 MG/ML
INJECTION, EMULSION INTRAVENOUS AS NEEDED
Status: DISCONTINUED | OUTPATIENT
Start: 2024-03-19 | End: 2024-03-20

## 2024-03-19 RX ORDER — HYDROMORPHONE HCL/PF 1 MG/ML
1 SYRINGE (ML) INJECTION EVERY 6 HOURS PRN
Status: DISCONTINUED | OUTPATIENT
Start: 2024-03-19 | End: 2024-03-20

## 2024-03-19 RX ORDER — MAGNESIUM HYDROXIDE 1200 MG/15ML
LIQUID ORAL AS NEEDED
Status: DISCONTINUED | OUTPATIENT
Start: 2024-03-19 | End: 2024-03-20 | Stop reason: HOSPADM

## 2024-03-19 RX ORDER — SODIUM CHLORIDE 9 MG/ML
100 INJECTION, SOLUTION INTRAVENOUS CONTINUOUS
Status: DISCONTINUED | OUTPATIENT
Start: 2024-03-19 | End: 2024-03-20

## 2024-03-19 RX ADMIN — DICLOFENAC SODIUM 2 G: 10 GEL TOPICAL at 09:22

## 2024-03-19 RX ADMIN — HYDROMORPHONE HYDROCHLORIDE 0.5 MG: 1 INJECTION, SOLUTION INTRAMUSCULAR; INTRAVENOUS; SUBCUTANEOUS at 23:55

## 2024-03-19 RX ADMIN — OXYBUTYNIN CHLORIDE 5 MG: 5 TABLET ORAL at 12:29

## 2024-03-19 RX ADMIN — ROCURONIUM BROMIDE 20 MG: 10 INJECTION, SOLUTION INTRAVENOUS at 22:39

## 2024-03-19 RX ADMIN — SALINE NASAL SPRAY 1 SPRAY: 1.5 SOLUTION NASAL at 09:22

## 2024-03-19 RX ADMIN — ROCURONIUM BROMIDE 10 MG: 10 INJECTION, SOLUTION INTRAVENOUS at 23:30

## 2024-03-19 RX ADMIN — ROPINIROLE 0.5 MG: 0.25 TABLET, FILM COATED ORAL at 09:16

## 2024-03-19 RX ADMIN — METHYLPREDNISOLONE SODIUM SUCCINATE 40 MG: 40 INJECTION, POWDER, FOR SOLUTION INTRAMUSCULAR; INTRAVENOUS at 09:16

## 2024-03-19 RX ADMIN — Medication 20 MG: at 22:56

## 2024-03-19 RX ADMIN — KETOROLAC TROMETHAMINE 15 MG: 30 INJECTION, SOLUTION INTRAMUSCULAR; INTRAVENOUS at 12:29

## 2024-03-19 RX ADMIN — METRONIDAZOLE: 500 INJECTION, SOLUTION INTRAVENOUS at 21:51

## 2024-03-19 RX ADMIN — SIMETHICONE 80 MG: 80 TABLET, CHEWABLE ORAL at 15:40

## 2024-03-19 RX ADMIN — TOPIRAMATE 25 MG: 25 TABLET, FILM COATED ORAL at 09:16

## 2024-03-19 RX ADMIN — ENOXAPARIN SODIUM 40 MG: 40 INJECTION SUBCUTANEOUS at 09:16

## 2024-03-19 RX ADMIN — FLUTICASONE FUROATE AND VILANTEROL TRIFENATATE 1 PUFF: 100; 25 POWDER RESPIRATORY (INHALATION) at 09:22

## 2024-03-19 RX ADMIN — ESCITALOPRAM OXALATE 5 MG: 10 TABLET ORAL at 09:17

## 2024-03-19 RX ADMIN — PHENAZOPYRIDINE 100 MG: 100 TABLET ORAL at 15:40

## 2024-03-19 RX ADMIN — ACETAMINOPHEN 650 MG: 325 TABLET, FILM COATED ORAL at 00:50

## 2024-03-19 RX ADMIN — PROPOFOL 100 MG: 10 INJECTION, EMULSION INTRAVENOUS at 21:28

## 2024-03-19 RX ADMIN — ROCURONIUM BROMIDE 70 MG: 10 INJECTION, SOLUTION INTRAVENOUS at 21:43

## 2024-03-19 RX ADMIN — SODIUM CHLORIDE 100 ML/HR: 0.9 INJECTION, SOLUTION INTRAVENOUS at 20:43

## 2024-03-19 RX ADMIN — LIDOCAINE HYDROCHLORIDE 100 MG: 20 INJECTION, SOLUTION EPIDURAL; INFILTRATION; INTRACAUDAL; PERINEURAL at 21:28

## 2024-03-19 RX ADMIN — ONDANSETRON 4 MG: 2 INJECTION INTRAMUSCULAR; INTRAVENOUS at 21:56

## 2024-03-19 RX ADMIN — Medication 3000 MG: at 21:48

## 2024-03-19 RX ADMIN — SODIUM CHLORIDE, SODIUM LACTATE, POTASSIUM CHLORIDE, AND CALCIUM CHLORIDE: .6; .31; .03; .02 INJECTION, SOLUTION INTRAVENOUS at 21:23

## 2024-03-19 RX ADMIN — BISACODYL 10 MG: 10 SUPPOSITORY RECTAL at 15:41

## 2024-03-19 RX ADMIN — PHENYLEPHRINE HYDROCHLORIDE 40 MCG/MIN: 10 INJECTION INTRAVENOUS at 21:52

## 2024-03-19 RX ADMIN — LABETALOL HYDROCHLORIDE 300 MG: 100 TABLET, FILM COATED ORAL at 05:20

## 2024-03-19 RX ADMIN — ROPINIROLE 0.5 MG: 0.25 TABLET, FILM COATED ORAL at 15:41

## 2024-03-19 RX ADMIN — HEPARIN SODIUM 5000 UNITS: 5000 INJECTION INTRAVENOUS; SUBCUTANEOUS at 21:53

## 2024-03-19 RX ADMIN — SODIUM CHLORIDE: 0.9 INJECTION, SOLUTION INTRAVENOUS at 21:32

## 2024-03-19 RX ADMIN — EPHEDRINE SULFATE 5 MG: 50 INJECTION, SOLUTION INTRAVENOUS at 22:08

## 2024-03-19 RX ADMIN — FENTANYL CITRATE 50 MCG: 50 INJECTION, SOLUTION INTRAMUSCULAR; INTRAVENOUS at 22:40

## 2024-03-19 RX ADMIN — LABETALOL HYDROCHLORIDE 300 MG: 100 TABLET, FILM COATED ORAL at 14:13

## 2024-03-19 RX ADMIN — NICOTINE 21 MG: 21 PATCH, EXTENDED RELEASE TRANSDERMAL at 09:21

## 2024-03-19 RX ADMIN — PHENAZOPYRIDINE 100 MG: 100 TABLET ORAL at 12:29

## 2024-03-19 RX ADMIN — TORSEMIDE 20 MG: 20 TABLET ORAL at 09:15

## 2024-03-19 RX ADMIN — FENTANYL CITRATE 50 MCG: 50 INJECTION, SOLUTION INTRAMUSCULAR; INTRAVENOUS at 21:28

## 2024-03-19 RX ADMIN — Medication 200 MG: at 21:28

## 2024-03-19 RX ADMIN — KETOROLAC TROMETHAMINE 15 MG: 30 INJECTION, SOLUTION INTRAMUSCULAR; INTRAVENOUS at 04:39

## 2024-03-19 NOTE — TREATMENT PLAN
I was on floor call and received the CT scan results which was ordered earlier which showed moderate pneumoperitoneum and concern for perforated hollow viscus and presence of nodularity in the mesentery and that mesenteric mass such as carcinoid will need to be ruled out.  I called the patient and discussed with the patient and patient's daughters about this finding.  At home patient is complaining of some abdominal pain which is gradually worsening.  Direct admission for the patient has been arranged.  Patient will be here in 20 minutes  Discussed case with general surgery and the admitting attending as well.

## 2024-03-19 NOTE — CASE MANAGEMENT
Case Management Assessment & Discharge Planning Note    Patient name Dwaine Gould  Location /-01 MRN [id number]  : 1960 Date 3/19/2024       Current Admission Date: 3/11/2024  Current Admission Diagnosis:Acute on chronic diastolic heart failure (HCC)   Patient Active Problem List    Diagnosis Date Noted    Acute respiratory failure with hypoxia (HCC) 2024    Acute metabolic encephalopathy 2024    Respiratory acidosis 2024    LETICIA (acute kidney injury) (HCC) 2024    Acute on chronic diastolic heart failure (HCC) 2024    COPD (chronic obstructive pulmonary disease) (HCC) 2024    Transaminitis 2024    Leukocytosis 2024    Tobacco abuse 2021    CAD (coronary artery disease) 2021    Essential hypertension 2019    Hyperlipidemia 2019      LOS (days): 8  Geometric Mean LOS (GMLOS) (days):   Days to GMLOS:     OBJECTIVE:    Risk of Unplanned Readmission Score: 10.07         Current admission status: Inpatient       Preferred Pharmacy:   [facility]/pharmacy #[id number] - [place] - [address]  [address]  [place] [address]  Phone: [phone] Fax: [phone]    Primary Care Provider: Jordan Ramirez MD    Primary Insurance: [place]  Secondary Insurance:     ASSESSMENT:  Active Health Care Proxies       MARLI GOULD [facility] Representative - Spouse   Primary Phone: [phone] (Mobile)                                                                Social Determinants of Health (SDOH)      Flowsheet Row Most Recent Value   Housing Stability    In the last 12 months, was there a time when you were not able to pay the mortgage or rent on time? N   In the last 12 months, how many places have you lived? 1   In the last 12 months, was there a time when you did not have a steady place to sleep or slept in a shelter (including now)? N   Transportation Needs    In the past 12 months, has lack of  transportation kept you from medical appointments or from getting medications? no   In the past 12 months, has lack of transportation kept you from meetings, work, or from getting things needed for daily living? No   Food Insecurity    Within the past 12 months, you worried that your food would run out before you got the money to buy more. Never true   Within the past 12 months, the food you bought just didn't last and you didn't have money to get more. Never true   Utilities    In the past 12 months has the electric, gas, oil, or water company threatened to shut off services in your home? No            DISCHARGE DETAILS:    Discharge planning discussed with:: patient and dtr-Kay  Freedom of Choice: Yes  Comments - Freedom of Choice: Cm notified of d/c today. CM coordianted for Home 02 (conferred with[facility] Liaison  for correct input due to the liter flow and ohter issues)and liachristian Matthew is bringing a double portable to her room at approx noon today for transport home.  CM updated hte patient and her dtr. Kay, who the patient will be living with upon d/c.  David's dtr and spouse will transport [facility] at approx 4pm- nursing and [facility] notified.  [facility] to follow - SOC tomorrow.  CM also made a referral to [facility] and to [facility] for Home Waiver Eval .  The  patient plans to return home with her spouse in approx 1 month.  Both of them have care needs(disabled) and are on Community Medicaid  CM contacted family/caregiver?: Yes  Were Treatment Team discharge recommendations reviewed with patient/caregiver?: Yes  Did patient/caregiver verbalize understanding of patient care needs?: Yes  Were patient/caregiver advised of the risks associated with not following Treatment Team discharge recommendations?: Yes    Contacts  Patient Contacts: Kay Min  Daughter  [phone]  Relationship to Patient:: Family  Contact Method: Phone  Phone Number: [phone]  Reason/Outcome: Discharge  Planning    Requested Home Health Care         Home Health Discipline requested:: Nursing, Occupational Therapy, Physical Therapy  Home Health Agency Name:: [facility]  Home Health Follow-Up Provider:: PCP  Home Health Services Needed:: Strengthening/Theraputic Exercises to Improve Function, Oxygen Via Nasal Cannula, Evaluate Functional Status and Safety, COPD Management  Homebound Criteria Met:: Requires the Assistance of Another Person for Safe Ambulation or to Leave the Home, Uses an Assist Device (i.e. cane, walker, etc)  Supporting Clincal Findings:: Requires Oxygen, Fatigues Easliy in Short Distances, Limited Endurance    DME Referral Provided  Referral made for DME?: Yes  DME referral completed for the following items:: Home Oxygen concentrator, Portable Oxygen tanks  DME Supplier Name:: [employer]    Other Referral/Resources/Interventions Provided:  Interventions: HODA, [place], [place], Outpatient , DME  Referral Comments: D/C home to [facility] [place].  She is transporting home via car.  Double Portable O2 deliverd to room by [facility] liaison.  CM referred to [facility]AN[facility] and Nancy for Home Waiver eval.    Would you like to participate in our [facility] service program?  : No - Declined    Treatment Team Recommendation: Home with Home Health Care  Discharge Destination Plan:: Home with Home Health Care  Transport at Discharge : Family

## 2024-03-19 NOTE — QUICK NOTE
Patient reporting significant lower abdominal pain despite tylenol use. Pain is located in lower abdomen on both sides and feels like 'uti' pain. Heating pads also not helping.   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 Has received oxycodone in the past however she appears to be in respiratory acidosis and hypoxic/hypercarbic respiratory failure which may be worsened with oxycodone use. Her renal function has improved at this time so will give 1 time lower dose of ketorolac and monitor renal function closely. Send UA to r/o UTI.

## 2024-03-19 NOTE — CONSULTS
Consultation - General Surgery   Dwaine Gould 63 y.o. female MRN: [id number]  Unit/Bed#: -01 Encounter: [id number]    ASSESSMENT:  63-year-old female with abdominal pain, free intraperitoneal air    PLAN:  -Patient presents to the after CT scan from today showed free intraperitoneal air  -Patient states that last night she developed severe lower abdominal pain which was constant, this slightly improved this morning but is still present  -She notes that the pain comes and goes in waves and that this is the most severe abdominal pain that she has had  -On exam the patient is distended with rebound and guarding in the bilateral lower quadrants consistent with peritonitis  -CT scan of the abdomen and pelvis from 3/19/2024 report and images reviewed, there is a modest amount of pneumoperitoneum throughout the abdomen with origin unclear, concerning for perforated viscus  -Due to the patient's abdominal exam consistent with peritonitis along with the free intraperitoneal air on CT scan plan is for diagnostic laparoscopy, possible exploratory laparotomy, possible bowel resection, possible ostomy, all other indicated procedures  -Discussed with the patient that I do not know where this air has come from in her abdomen so I will have to evaluate with a diagnostic laparoscopy first  -Awaiting blood work    All risks, benefits, alternatives of the procedure were discussed at length with the patient.  Risks include bleeding, infection, damage to surrounding structures, need for additional surgery.  All questions were answered to satisfaction.  The patient voiced understanding and signed consent.    _______________________________________________________________  Physician Requesting Consult: Michael FOWLER MD    Reason for Consult / Principal Problem: Abdominal pain, free intraperitoneal air    HPI: Dwaine Gould is a 63 y.o. year old female who presents with abdominal pain.  Patient states she developed abdominal pain  last night that was in her lower abdomen and was very severe.  She stated it was constant.  It got better this morning but still remained and it has gotten worse in the last few hours.  She now describes it as a coming and going type of feeling.  She states it is some the worst pain that she has ever had.    Historical Information   Past Medical History:   Diagnosis Date    Hyperlipidemia     Hypertension      Past Surgical History:   Procedure Laterality Date    CARDIAC CATHETERIZATION      GANGLION CYST EXCISION      TYMPANOSTOMY TUBE PLACEMENT       Social History   Social History     Substance and Sexual Activity   Alcohol Use Never     Social History     Substance and Sexual Activity   Drug Use Never     Social History     Tobacco Use   Smoking Status Every Day    Current packs/day: 0.50    Types: Cigarettes   Smokeless Tobacco Never     Family History: non-contributory}    Meds/Allergies   Home meds:   Prior to Admission medications    Medication Sig Start Date End Date Taking? Authorizing Provider   albuterol (2.5 mg/3 mL) 0.083 % nebulizer solution Take 2.5 mg by nebulization every 6 (six) hours as needed for wheezing or shortness of breath    Historical Provider, MD   atorvastatin (Lipitor) 40 mg tablet Take 40 mg by mouth daily at bedtime    Historical Provider, MD   cholecalciferol (VITAMIN D3) 1,000 units tablet Take 50,000 Units by mouth once a week    Historical Provider, MD   escitalopram (Lexapro) 5 mg tablet Take 5 mg by mouth daily    Historical Provider, MD   fluticasone-umeclidinium-vilanterol (Trelegy Ellipta) 100-62.5-25 mcg/actuation inhaler Inhale 1 puff daily Rinse mouth after use.    Historical Provider, MD   hydrOXYzine HCL (ATARAX) 25 mg tablet Take 25 mg by mouth daily at bedtime as needed for itching    Historical Provider, MD   labetalol (NORMODYNE) 300 mg tablet Take 300 mg by mouth 3 (three) times a day    Historical Provider, MD   meloxicam (MOBIC) 15 mg tablet Take 15 mg by mouth  daily    Historical Provider, MD   nicotine (NICODERM CQ) 21 mg/24 hr TD 24 hr patch Place 1 patch on the skin over 24 hours daily 3/20/24   Milagro Mike PA-C   predniSONE 20 mg tablet Take 2 tablets (40 mg total) by mouth daily for 2 days, THEN 1.5 tablets (30 mg total) daily for 2 days, THEN 1 tablet (20 mg total) daily for 2 days, THEN 0.5 tablets (10 mg total) daily for 2 days. Do not start before March 20, 2024. 3/20/24 3/28/24  Milagro Mike PA-C   rOPINIRole (REQUIP) 0.5 mg tablet Take 1 tablet (0.5 mg total) by mouth 3 (three) times a day 3/19/24   Milagro Mike PA-C   topiramate (Topamax) 25 mg tablet Take 25 mg by mouth 2 (two) times a day    Historical Provider, MD   torsemide (DEMADEX) 20 mg tablet Take 1 tablet (20 mg total) by mouth daily 3/20/24   Milagro Mike PA-C   lisinopril (ZESTRIL) 20 mg tablet Take 20 mg by mouth 2 (two) times a day  3/19/24  Historical Provider, MD     Scheduled Meds:  Current Facility-Administered Medications   Medication Dose Route Frequency Provider Last Rate    ampicillin-sulbactam  3 g Intravenous Q6H Michael FOWLER MD      pantoprazole (PROTONIX) 80 mg in sodium chloride 0.9 % 100 mL infusion  8 mg/hr Intravenous Continuous Michael FOWLER MD      sodium chloride  100 mL/hr Intravenous Continuous Michael FOWLER MD       Continuous Infusions:pantoprazole (PROTONIX) 80 mg in sodium chloride 0.9 % 100 mL infusion, 8 mg/hr  sodium chloride, 100 mL/hr      PRN Meds:      ALLERGIES:   Allergies   Allergen Reactions    Codeine Anaphylaxis    Contrast [Iodinated Contrast Media] GI Intolerance    Prednisone Irritability     Increase in anger/abusive behaviors       Review of Systems:  Constitutional: Denies weight change, fever, chills, night sweats; positive fatigue  HEENT: Denies headaches, hearing change, vision change, nasal congestion, sore throat  Cardiovascular: Denies chest pain; positive shortness of breath  Respiratory: Denies cough,  dyspnea  Gastrointestinal: Positive abdominal pain  Genitourinary: Denies dysuria, hematuria  Musculoskeletal: Denies arthralgias, myalgias  Neuro: Denies weakness, numbness, loss of consciousness  Heme: Denies easy bruising, bleeding  Endocrine: Denies polyuria, polydipsia    Objective   Vitals:  Blood pressure 163/77, pulse 68, temperature 98.1 °F (36.7 °C), SpO2 95%.  There is no height or weight on file to calculate BMI.      I/Os:  I/O       None            Invasive Lines/Tubes:  Invasive Devices       None                   Physical Exam  General appearance:   Constitutional:       Appearance: Normal appearance.  Obese  HENT:      Head: Normocephalic and atraumatic.      Nose: Nose normal.   Eyes:      General: No scleral icterus.     Conjunctiva/sclera: Conjunctivae normal.   Cardiovascular:      Rate and Rhythm: Normal rate  Pulmonary:      Effort: Increased work of breathing  Abdominal:      General: Distended     Tenderness: Tenderness along with rebound and guarding in the bilateral lower quadrants  Musculoskeletal:         General: No signs of injury.   Skin:     General: Skin is warm.      Coloration: Skin is not jaundiced.   Neurological:      General: No focal deficit present.      Mental Status: Patient is alert and oriented to person, place, and time.   Psychiatric:         Mood and Affect: Mood normal.         Behavior: Behavior normal.    Lab Results and Cultures:   CBC:   Results from last 7 days   Lab Units 03/17/24  0906 03/16/24  0937 03/15/24  0528 03/14/24  0427 03/13/24  0514   WBC Thousand/uL 13.46* 11.40* 10.45* 12.30* 11.24*   HEMOGLOBIN g/dL 12.5 13.3 12.2 13.5 12.8   HEMATOCRIT % 43.6 47.5* 43.1 47.9* 43.5   PLATELETS Thousands/uL 183 206 195 257 233     BMP/CMP:  Results from last 7 days   Lab Units 03/17/24  0906 03/16/24  1347 03/16/24  0643 03/15/24  0528 03/14/24  0427   POTASSIUM mmol/L 5.1 5.4* 5.7* 4.7 5.0   CHLORIDE mmol/L 102 102 104 106 103   CO2 mmol/L 34* 36* 29 29 31  "  BUN mg/dL 41* 37* 37* 43* 48*   CREATININE mg/dL 1.22 1.14 1.07 1.19 1.69*   CALCIUM mg/dL 8.5 8.8 8.5 8.4 8.3*     Coags:     Lipid panel:     HgbA1c: No results found for: \"HGBA1C\"    Urinalysis:   Lab Results   Component Value Date    COLORU Light Yellow 03/19/2024    CLARITYU Clear 03/19/2024    SPECGRAV 1.022 03/19/2024    PHUR 8.0 03/19/2024    LEUKOCYTESUR Negative 03/19/2024    NITRITE Negative 03/19/2024    GLUCOSEU Negative 03/19/2024    KETONESU Negative 03/19/2024    BILIRUBINUR Negative 03/19/2024    BLOODU Negative 03/19/2024   ,   Urine Culture: No results found for: \"URINECX\"  Wound Culure: No results found for: \"WOUNDCULT\"  Blood Culture: No results found for: \"BLOODCX\"    Imaging Studies: I have personally reviewed pertinent films in PACS  EKG, Pathology, and Other Studies: I have personally reviewed pertinent reports.      Roger Joel, DO  3/19/2024  "

## 2024-03-19 NOTE — PLAN OF CARE
Problem: PAIN - ADULT  Goal: Verbalizes/displays adequate comfort level or baseline comfort level  Description: Interventions:  - Encourage patient to monitor pain and request assistance  - Assess pain using appropriate pain scale  - Administer analgesics based on type and severity of pain and evaluate response  - Implement non-pharmacological measures as appropriate and evaluate response  - Consider cultural and social influences on pain and pain management  - Notify physician/advanced practitioner if interventions unsuccessful or patient reports new pain  Outcome: Progressing     Problem: INFECTION - ADULT  Goal: Absence or prevention of progression during hospitalization  Description: INTERVENTIONS:  - Assess and monitor for signs and symptoms of infection  - Monitor lab/diagnostic results  - Monitor all insertion sites, i.e. indwelling lines, tubes, and drains  - Monitor endotracheal if appropriate and nasal secretions for changes in amount and color  - Frost appropriate cooling/warming therapies per order  - Administer medications as ordered  - Instruct and encourage patient and family to use good hand hygiene technique  - Identify and instruct in appropriate isolation precautions for identified infection/condition  Outcome: Progressing

## 2024-03-19 NOTE — DISCHARGE SUMMARY
[facility]  Discharge- Dwaine Gould 1960, 63 y.o. female MRN: [id number]  Unit/Bed#: MS Flood01 Encounter: [id number]  Primary Care Provider: Jordan Ramirez MD   Date and time admitted to hospital: 3/11/2024  9:31 PM    Acute respiratory failure with hypoxia (HCC)  Assessment & Plan  Initially, patient had been requiring 3 L of supplemental oxygen to maintain oxygen saturation levels above 88%  On 3/15, patient with increasing oxygen requirements up to 5 L, BiPAP as well in setting of respiratory acidosis.  CT chest (3/14/24): Mild interstitial edema. Mild dependent atelectasis in the lower lobes. Emphysema.  Procalcitonin within normal limits, low concern for pneumonia  Patient initiated on IV Solu-Medrol 40 mg every 8 hours on 3/15 in setting of worsening hypoxia and  in setting of emphysema. Now q12h  Can transition to oral taper in anticipation of discharge    Repeat VBG showing elevated CO2 but compensated  Patient is alert today   Will need oxygen on discharge, 4 L at rest and 5 L with exertion    * Acute on chronic diastolic heart failure (HCC)  Assessment & Plan  Patient presented to the ED with complaints of bilateral lower extremity edema and anasarca, with non-compliance with her diet.  Patient with acute on chronic diastolic HF exacerbation  Cardiology consult, appreciate input  Patient had been receiving 60 mg IV Lasix intially, but was discontinued when she developed an LETICIA   Patient transition back to torsemide 20 mg daily on 3/16  Nephrology now signed off 3/16  Monitor intake/output  De La Cruz catheter was placed for strict I&O, only net - 790 mL  Discontinued 3/16  Daily standing weight  Low salt diet  Echo (3/12/24): Left ventricular cavity size is normal. Wall thickness is increased. There is mild to moderate concentric hypertrophy. The left ventricular ejection fraction is 60%. Systolic function is normal.  Diastolic function is normal.   Cardiology, Nephrology both  signed off call with questions    CAD (coronary artery disease)  Assessment & Plan  History of single vessel CAD  No chest pain currently  Continue with Labetalol  Hold lisinopril at this time due to lower blood pressures, discuss holding or resuming this medication upon discharge or resume sooner if hypertensive  Not on a statin at this time due to transaminitis on admission    COPD (chronic obstructive pulmonary disease) ([facility])  Assessment & Plan  Patient started on IV Solu-Medrol due to worsening hypoxia emphysema findings on imaging  IV Solu-Medrol 40 mg decreased from every 8 hours improved to every 12 hrs on 3/16  Transition to oral taper on discharge   Continue pulmonary inhalers  Recommending OP follow-up, needs PFTs    Respiratory acidosis  Assessment & Plan  Noted 3/14/24 during rapid response  pH as noted - pH 7.191, pCO2 80.6, HCO3 30   Latest Reference Range & Units 03/15/24 12:23 03/18/24 05:21   pH, Sammy 7.300 - 7.400  7.369 7.352   pCO2, Sammy 42.0 - 50.0 mm Hg 52.7 (H) 71.2 (HH)   pO2, Sammy 35.0 - 45.0 mm Hg 78.2 (H) 117.9 (H)   HCO3, Sammy 24 - 30 mmol/L 29.7 38.6 (H)   Base Excess, Sammy mmol/L 3.3 10.2   O2 Content, Sammy ml/dL 17.3 18.0   O2 HGB, VENOUS 60.0 - 80.0 % 92.5 (H) 95.6 (H)       Tobacco abuse  Assessment & Plan  Patient had been smoking 2 ppd; quit on day of admission.  Counseled on smoking cessation for >3 min    LETICIA (acute kidney injury) ([facility])  Assessment & Plan  Noted on the morning of 3/14, evidenced by creatinine rising from 1.25 to 1.69  Unsure of most recent baseline, 2021 baseline is 0.8-1  De La Cruz in place for accurate intake/output, discontinued 3/16  Creatinine appears to be back at baseline at 1.07  Monitor for hypotension, avoid nephrotoxins  Nephrology signed off  Creatinine stable 1.22    Acute metabolic encephalopathy  Assessment & Plan  In setting of Co2 retention  ABG completed, noted to have a pH of 7.191, pCO2 of 80.6, with a HCO3 of 30.2  Likely cause of altered mental  status, in setting of undiagnosed sleep apnea, obesity hypoventilation syndrome  Neurology asked to evaluate the patient  MRI brain w/wo contrast pending; to better evaluate white matter changes on CTH; possible PRES   If recurrent events or no clinical improvement despite correction of metabolic derangement, consider Routine EEG   Outpatient sleep study; possible RILEY/OHS  Patient may require oxygen at discharge    CT Head (3/14/24): No acute intracranial abnormality. Chronic microangiopathy. Right middle ear and small mastoid effusions.    Now resolved.    Leukocytosis  Assessment & Plan  Stabilized. Procalcitonin level within normal limits  Afebrile, not tachycardic  CT Chest (3/14/24): Mild interstitial edema. Mild dependent atelectasis in the lower lobes. Emphysema     Transaminitis  Assessment & Plan  Present on admission, remaining stable  Likely in setting of CHF exacerbation, no RUQ pain  Statin on hold  RUQ US (3/15/24): Liver size within normal range, surface contour is smooth, echogenicity and echotexture are within normal limits, no significant intrahepatic biliary dilatation, CBD not visualized, no ascites.    Hyperkalemia-resolved as of 3/18/2024  Assessment & Plan  Improved with kayexalate, will monitor      Hypertensive urgency-resolved as of 3/13/2024  Assessment & Plan  Blood Pressure: 134/64       Medical Problems       Resolved Problems  Date Reviewed: 3/19/2024            Resolved    Hypertensive urgency 3/13/2024     Resolved by  GUSTAVO Turcios    Hyperkalemia 3/18/2024     Resolved by  Milagro Mike PA-C        Discharging Physician / Practitioner: Milagro Mike PA-C  PCP: Jordan Ramirez MD  Admission Date:   Admission Orders (From admission, onward)       Ordered        03/11/24 4844  INPATIENT ADMISSION  Once                          Discharge Date: 03/19/24    Consultations During Hospital Stay:  IP CONSULT TO CARDIOLOGY  IP CONSULT TO NEUROLOGY  IP CONSULT TO NEPHROLOGY  "    Procedures Performed:   None     Significant Findings / Test Results:   US right upper quadrant    Result Date: 3/15/2024  Very limited exam with findings detailed above. Consider repeat examination after fasting to distend the gallbladder.. Workstation performed: [id number]     CT chest wo contrast    Result Date: 3/14/2024  Mild interstitial edema. Mild dependent atelectasis in the lower lobes. Emphysema. Workstation performed: [id number]     CT head wo contrast    Result Date: 3/14/2024  No acute intracranial abnormality. Chronic microangiopathy. Right middle ear and small mastoid effusions. Workstation performed: [id number]       Incidental Findings:        Test Results Pending at Discharge (will require follow up):        Outpatient Tests Requested:      Complications:  encephalopathy     Reason for Admission: edema, hypoxia     Hospital Course:   Dwaine Gould is a 63 y.o. female patient who originally presented to the hospital on 3/11/2024 due to edema and reports cyanosis at home. Was found to have acute hypoxic and hypercapnic respiratory failure with acidosis, LETICIA, metabolic encephalopathy, and acute on chronic CHF.     She is now hemodynamically stable and optimized for discharge. She would really benefit from BiPAP on discharge, however needs sleep medicine referral for evaluation. She will require home oxygen at 4 L in the meantime. Strongly encouraged complete tobacco cessation as well as dietary compliance with low sodium. Discussed in detail with daughters and patient.     Please see above list of diagnoses and related plan for additional information.     Condition at Discharge: stable    Discharge Day Visit / Exam:   Subjective:  patient seen sitting up in bed, she is feeling well now. Earlier today she had sudden onset of \"UTI pain\" that seemed better with toradol. Now resolved. No fevers or chills. We discussed home oxygen and need for sleep medicine referral, both of which she is agreeable. "   Vitals: Blood Pressure: 111/64 (03/19/24 0707)  Pulse: 69 (03/19/24 0707)  Temperature: 98 °F (36.7 °C) (03/19/24 0707)  Temp Source: Axillary (03/16/24 2351)  Respirations: 17 (03/19/24 0707)  Height: 5' (152.4 cm) (03/12/24 1410)  Weight - Scale: 128 kg (282 lb 3 oz) (03/19/24 0600)  SpO2: 94 % (03/19/24 0707)  Exam:   Physical Exam  Vitals and nursing note reviewed.   Constitutional:       General: She is awake. She is not in acute distress.     Appearance: Normal appearance. She is morbidly obese. She is not ill-appearing or toxic-appearing.   Cardiovascular:      Rate and Rhythm: Normal rate and regular rhythm.      Heart sounds: Normal heart sounds.   Pulmonary:      Effort: Pulmonary effort is normal. No respiratory distress.      Breath sounds: Normal breath sounds. No wheezing or rales.   Abdominal:      General: Bowel sounds are normal. There is no distension.      Palpations: Abdomen is soft.   Skin:     General: Skin is warm and dry.      Capillary Refill: Capillary refill takes less than 2 seconds.      Coloration: Skin is not cyanotic or pale.   Neurological:      Mental Status: She is alert and oriented to person, place, and time.   Psychiatric:         Mood and Affect: Mood normal.         Behavior: Behavior normal. Behavior is cooperative.          Discussion with Family: Updated  (daughters Eva and Kay) via phone.    Discharge instructions/Information to patient and family:   See after visit summary for information provided to patient and family.      Provisions for Follow-Up Care:  See after visit summary for information related to follow-up care and any pertinent home health orders.      Mobility at time of Discharge:   Basic Mobility Inpatient Raw Score: 17  JH-HLM Goal: 5: Stand one or more mins  JH-HLM Achieved: 7: Walk 25 feet or more  HLM Goal achieved. Continue to encourage appropriate mobility.     Disposition:   Home with VNA Services (Reminder: Complete face to  face encounter)    Planned Readmission: no     Discharge Statement:  I spent 65 minutes discharging the patient. This time was spent on the day of discharge. I had direct contact with the patient on the day of discharge. Greater than 50% of the total time was spent examining patient, answering all patient questions, arranging and discussing plan of care with patient as well as directly providing post-discharge instructions.  Additional time then spent on discharge activities.    Discharge Medications:  See after visit summary for reconciled discharge medications provided to patient and/or family.      **Please Note: This note may have been constructed using a voice recognition system**

## 2024-03-19 NOTE — ACP (ADVANCE CARE PLANNING)
Advanced Care Planning Progress Note    Serious Illness Conversation    1. What is your understanding now of where you are with your illness?  Prognostic Understanding: appropriate understanding of prognosis     2. How much information about what is likely to be ahead with your illness would you like to have?  Information: patient wants to be fully informed     3. What did you (clinician) communicate to the patient?  Prognostic Communication: Function - I hope that this is not the case, but I’m worried that this may be as strong as you will feel.     4. If your health situation worsens, what are your most important goals?  Goals: be mentally aware     5. If you become sicker, how much are you willing to go through for the possibility of gaining more time?  Be in the hospital: Yes   Be in the ICU: Yes   Be on a ventilator: Yes      9. How much does your proxy and family know about your priorities and wishes?  Discussion Discussion: extensive discussion with family (daughters x2) about goals and wishes     I’ve heard you say that going home is really important to you. Keeping that in mind, and what we know about your illness, I recommend that we follow up with sleep medicine outpatient for RILEY evaluation and continue supplemental O2 for discharge with goal of weight loss and dietary adherence. This will help us make sure that your treatment plans reflect what’s important to you.     How does this plan sound to you? I will do everything I can to help you through this.  Patient verbalized understanding of the plan     I have spent 30 minutes speaking with my patient on advanced care planning today or during this visit     Advanced directives         Milagro Mike PA-C

## 2024-03-19 NOTE — NURSING NOTE
Informed MO Mike that patient had a huge bowel movement and her abdominal  pain is gone . Patient wants to know if she can go home, patient doesn't want to wait for CT scan result.  Mo Mike says patient can go home and she can call the patient if anything needs follow-up.

## 2024-03-19 NOTE — ANESTHESIA PREPROCEDURE EVALUATION
Procedure:  LAPAROSCOPY DIAGNOSTIC, Possible Exploratory Laparotomy, Possible Bowel Resection, Possible Ostomy, All Other Indicated Procedures (Abdomen)    Relevant Problems   CARDIO   (+) CAD (coronary artery disease)   (+) Essential hypertension   (+) Hyperlipidemia      /RENAL   (+) LETICIA (acute kidney injury) (HCC)      PULMONARY   (+) Acute respiratory failure with hypoxia (HCC)   (+) COPD (chronic obstructive pulmonary disease) (HCC)      Behavioral Health   (+) Tobacco abuse      Blood   (+) Leukocytosis      Cardiovascular/Peripheral Vascular   (+) Acute on chronic diastolic heart failure (HCC)      Other   (+) Respiratory acidosis   (+) Transaminitis      Admitted on 3/11 for AHRF due to COPD exacerbation and Diastolic HF exacerbation requiring steroids & IV diuretics, history also remarkable for tobacco abuse with recent cessation on admission & RILEY/OHS with recent in hospital PPV use, BMI 55.1.  Discharged today from hospital with resolved abdominal pain, CT scan resulted with pneumoperitoneum.   Called back to the hospital at which point she was eating food.      Of note, on labs on 3/16 K was 5.7 , she received Kayexalate.   Physical Exam    Airway    Mallampati score: II  TM Distance: >3 FB  Neck ROM: full     Dental   Comment: Overall poor dentition     Cardiovascular  Rhythm: regular, Rate: normal    Pulmonary   Decreased breath sounds    Other Findings  4lpm NC  Anasarca  Pedal edemapost-pubertal.      Anesthesia Plan  ASA Score- 4 Emergent    Anesthesia Type- general with ASA Monitors.         Additional Monitors: arterial line.    Airway Plan: ETT.    Comment: Increased risk of aspiration given recent dietary intake; unfortunately, clinical exam with peritonitis so need to proceed.    Untreated sleep apnea, recent HF and respiratory failure, as well as BMI 55 increase risk of post operative pulmonary complications. Possible post op ventilation.  Increased risk of cardiac complications given recent  admission for HF & known CAD. .       Plan Factors-Exercise tolerance (METS): >4 METS.    Chart reviewed. EKG reviewed. Imaging results reviewed. Existing labs reviewed. Patient summary reviewed.    Patient is a current smoker.  Patient instructed to abstain from smoking on day of procedure. Patient smoked on day of surgery.    Obstructive sleep apnea risk education given perioperatively.        Induction- intravenous and rapid sequence induction.    Postoperative Plan-     Informed Consent- Anesthetic plan and risks discussed with patient (daughter & son-daniela).  I personally reviewed this patient with the CRNA. Discussed and agreed on the Anesthesia Plan with the CRNA..            Lab Results   Component Value Date    WBC 14.80 (H) 03/19/2024    HGB 13.1 03/19/2024    HCT 44.9 03/19/2024    MCV 80 (L) 03/19/2024     03/19/2024     Lab Results   Component Value Date    CALCIUM 8.5 03/19/2024    K 3.9 03/19/2024    CO2 39 (H) 03/19/2024    CL 99 03/19/2024    BUN 40 (H) 03/19/2024    CREATININE 1.17 03/19/2024     Type and Screen:  A  No results found for this or any previous visit.

## 2024-03-20 ENCOUNTER — ANESTHESIA EVENT (INPATIENT)
Dept: PERIOP | Facility: HOSPITAL | Age: 64
DRG: 230 | End: 2024-03-20
Payer: COMMERCIAL

## 2024-03-20 ENCOUNTER — APPOINTMENT (INPATIENT)
Dept: RADIOLOGY | Facility: HOSPITAL | Age: 64
DRG: 230 | End: 2024-03-20
Payer: COMMERCIAL

## 2024-03-20 PROBLEM — J96.22 ACUTE ON CHRONIC RESPIRATORY FAILURE WITH HYPOXIA AND HYPERCAPNIA (HCC): Status: ACTIVE | Noted: 2024-03-20

## 2024-03-20 PROBLEM — N17.9 AKI (ACUTE KIDNEY INJURY) (HCC): Status: RESOLVED | Noted: 2024-03-13 | Resolved: 2024-03-20

## 2024-03-20 PROBLEM — R74.01 TRANSAMINITIS: Status: RESOLVED | Noted: 2024-03-12 | Resolved: 2024-03-20

## 2024-03-20 PROBLEM — J96.21 ACUTE ON CHRONIC RESPIRATORY FAILURE WITH HYPOXIA AND HYPERCAPNIA (HCC): Status: ACTIVE | Noted: 2024-03-20

## 2024-03-20 PROBLEM — G93.41 ACUTE METABOLIC ENCEPHALOPATHY: Status: RESOLVED | Noted: 2024-03-14 | Resolved: 2024-03-20

## 2024-03-20 PROBLEM — N18.31 STAGE 3A CHRONIC KIDNEY DISEASE (HCC): Status: ACTIVE | Noted: 2024-03-20

## 2024-03-20 LAB
ALBUMIN SERPL BCP-MCNC: 2.9 G/DL (ref 3.5–5)
ALP SERPL-CCNC: 63 U/L (ref 34–104)
ALT SERPL W P-5'-P-CCNC: 24 U/L (ref 7–52)
ANION GAP SERPL CALCULATED.3IONS-SCNC: 5 MMOL/L (ref 4–13)
ANION GAP SERPL CALCULATED.3IONS-SCNC: 7 MMOL/L (ref 4–13)
ARTERIAL PATENCY WRIST A: YES
AST SERPL W P-5'-P-CCNC: 12 U/L (ref 13–39)
ATRIAL RATE: 54 BPM
BASE EXCESS BLDA CALC-SCNC: 10.2 MMOL/L
BASE EXCESS BLDA CALC-SCNC: 10.3 MMOL/L
BASE EXCESS BLDA CALC-SCNC: 9 MMOL/L (ref -2–3)
BASOPHILS # BLD AUTO: 0.02 THOUSANDS/ÂΜL (ref 0–0.1)
BASOPHILS NFR BLD AUTO: 0 % (ref 0–1)
BILIRUB SERPL-MCNC: 1.03 MG/DL (ref 0.2–1)
BODY TEMPERATURE: 98 DEGREES FEHRENHEIT
BUN SERPL-MCNC: 28 MG/DL (ref 5–25)
BUN SERPL-MCNC: 30 MG/DL (ref 5–25)
BUN SERPL-MCNC: 35 MG/DL (ref 5–25)
BUN SERPL-MCNC: 38 MG/DL (ref 5–25)
CA-I BLD-SCNC: 1.01 MMOL/L (ref 1.12–1.32)
CA-I BLD-SCNC: 1.06 MMOL/L (ref 1.12–1.32)
CA-I BLD-SCNC: 1.09 MMOL/L (ref 1.12–1.32)
CALCIUM ALBUM COR SERPL-MCNC: 9.3 MG/DL (ref 8.3–10.1)
CALCIUM SERPL-MCNC: 7.4 MG/DL (ref 8.4–10.2)
CALCIUM SERPL-MCNC: 8.3 MG/DL (ref 8.4–10.2)
CALCIUM SERPL-MCNC: 8.4 MG/DL (ref 8.4–10.2)
CALCIUM SERPL-MCNC: 8.5 MG/DL (ref 8.4–10.2)
CHLORIDE SERPL-SCNC: 102 MMOL/L (ref 96–108)
CHLORIDE SERPL-SCNC: 103 MMOL/L (ref 96–108)
CO2 SERPL-SCNC: 34 MMOL/L (ref 21–32)
CO2 SERPL-SCNC: 36 MMOL/L (ref 21–32)
CO2 SERPL-SCNC: 36 MMOL/L (ref 21–32)
CO2 SERPL-SCNC: 37 MMOL/L (ref 21–32)
CREAT SERPL-MCNC: 1.03 MG/DL (ref 0.6–1.3)
CREAT SERPL-MCNC: 1.04 MG/DL (ref 0.6–1.3)
CREAT SERPL-MCNC: 1.15 MG/DL (ref 0.6–1.3)
CREAT SERPL-MCNC: 1.17 MG/DL (ref 0.6–1.3)
EOSINOPHIL # BLD AUTO: 0 THOUSAND/ÂΜL (ref 0–0.61)
EOSINOPHIL NFR BLD AUTO: 0 % (ref 0–6)
ERYTHROCYTE [DISTWIDTH] IN BLOOD BY AUTOMATED COUNT: 18.6 % (ref 11.6–15.1)
ERYTHROCYTE [DISTWIDTH] IN BLOOD BY AUTOMATED COUNT: 18.8 % (ref 11.6–15.1)
EST. AVERAGE GLUCOSE BLD GHB EST-MCNC: 146 MG/DL
FIO2 GAS DIL.REBREATH: 80 L
FOLATE SERPL-MCNC: 9.5 NG/ML
GFR SERPL CREATININE-BSD FRML MDRD: 50 ML/MIN/1.73SQ M
GFR SERPL CREATININE-BSD FRML MDRD: 51 ML/MIN/1.73SQ M
GFR SERPL CREATININE-BSD FRML MDRD: 58 ML/MIN/1.73SQ M
GFR SERPL CREATININE-BSD FRML MDRD: 58 ML/MIN/1.73SQ M
GLUCOSE SERPL-MCNC: 104 MG/DL (ref 65–140)
GLUCOSE SERPL-MCNC: 113 MG/DL (ref 65–140)
GLUCOSE SERPL-MCNC: 114 MG/DL (ref 65–140)
GLUCOSE SERPL-MCNC: 114 MG/DL (ref 65–140)
GLUCOSE SERPL-MCNC: 123 MG/DL (ref 65–140)
GLUCOSE SERPL-MCNC: 143 MG/DL (ref 65–140)
GLUCOSE SERPL-MCNC: 152 MG/DL (ref 65–140)
GLUCOSE SERPL-MCNC: 153 MG/DL (ref 65–140)
GLUCOSE SERPL-MCNC: 160 MG/DL (ref 65–140)
HBA1C MFR BLD: 6.7 %
HCO3 BLDA-SCNC: 34.5 MMOL/L (ref 22–28)
HCO3 BLDA-SCNC: 36.1 MMOL/L (ref 22–28)
HCO3 BLDA-SCNC: 37.1 MMOL/L (ref 22–28)
HCT VFR BLD AUTO: 40.4 % (ref 34.8–46.1)
HCT VFR BLD AUTO: 43.6 % (ref 34.8–46.1)
HCT VFR BLD CALC: 39 % (ref 34.8–46.1)
HGB BLD-MCNC: 12 G/DL (ref 11.5–15.4)
HGB BLD-MCNC: 13 G/DL (ref 11.5–15.4)
HGB BLDA-MCNC: 13.3 G/DL (ref 11.5–15.4)
HOROWITZ INDEX BLDA+IHG-RTO: 5 MM[HG]
HOROWITZ INDEX BLDA+IHG-RTO: 50 MM[HG]
IMM GRANULOCYTES # BLD AUTO: 0.04 THOUSAND/UL (ref 0–0.2)
IMM GRANULOCYTES NFR BLD AUTO: 0 % (ref 0–2)
LACTATE SERPL-SCNC: 1 MMOL/L (ref 0.5–2)
LYMPHOCYTES # BLD AUTO: 0.9 THOUSANDS/ÂΜL (ref 0.6–4.47)
LYMPHOCYTES NFR BLD AUTO: 7 % (ref 14–44)
MAGNESIUM SERPL-MCNC: 1.9 MG/DL (ref 1.9–2.7)
MAGNESIUM SERPL-MCNC: 2.3 MG/DL (ref 1.9–2.7)
MAGNESIUM SERPL-MCNC: 2.3 MG/DL (ref 1.9–2.7)
MAGNESIUM SERPL-MCNC: 2.7 MG/DL (ref 1.9–2.7)
MCH RBC QN AUTO: 23.3 PG (ref 26.8–34.3)
MCH RBC QN AUTO: 23.4 PG (ref 26.8–34.3)
MCHC RBC AUTO-ENTMCNC: 29.7 G/DL (ref 31.4–37.4)
MCHC RBC AUTO-ENTMCNC: 29.8 G/DL (ref 31.4–37.4)
MCV RBC AUTO: 78 FL (ref 82–98)
MCV RBC AUTO: 79 FL (ref 82–98)
MONOCYTES # BLD AUTO: 0.57 THOUSAND/ÂΜL (ref 0.17–1.22)
MONOCYTES NFR BLD AUTO: 5 % (ref 4–12)
NEUTROPHILS # BLD AUTO: 10.82 THOUSANDS/ÂΜL (ref 1.85–7.62)
NEUTS SEG NFR BLD AUTO: 88 % (ref 43–75)
NRBC BLD AUTO-RTO: 0 /100 WBCS
O2 CT BLDA-SCNC: 17.1 ML/DL (ref 16–23)
O2 CT BLDA-SCNC: 17.3 ML/DL (ref 16–23)
OXYHGB MFR BLDA: 92.5 % (ref 94–97)
OXYHGB MFR BLDA: 92.7 % (ref 94–97)
P AXIS: 54 DEGREES
PCO2 BLD: 36 MMOL/L (ref 21–32)
PCO2 BLD: 49.6 MM HG (ref 36–44)
PCO2 BLDA: 53.2 MM HG (ref 36–44)
PCO2 BLDA: 59.3 MM HG (ref 36–44)
PEEP RESPIRATORY: 12 CM[H2O]
PEEP RESPIRATORY: 12 CM[H2O]
PH BLD: 7.45 [PH] (ref 7.35–7.45)
PH BLDA: 7.41 [PH] (ref 7.35–7.45)
PH BLDA: 7.45 [PH] (ref 7.35–7.45)
PHOSPHATE SERPL-MCNC: 3.7 MG/DL (ref 2.3–4.1)
PLATELET # BLD AUTO: 153 THOUSANDS/UL (ref 149–390)
PLATELET # BLD AUTO: 159 THOUSANDS/UL (ref 149–390)
PMV BLD AUTO: 9 FL (ref 8.9–12.7)
PMV BLD AUTO: 9.4 FL (ref 8.9–12.7)
PO2 BLD: 69 MM HG (ref 75–129)
PO2 BLDA: 71.8 MM HG (ref 75–129)
PO2 BLDA: 76.8 MM HG (ref 75–129)
POTASSIUM BLD-SCNC: 3.6 MMOL/L (ref 3.5–5.3)
POTASSIUM SERPL-SCNC: 3.2 MMOL/L (ref 3.5–5.3)
POTASSIUM SERPL-SCNC: 3.3 MMOL/L (ref 3.5–5.3)
POTASSIUM SERPL-SCNC: 3.5 MMOL/L (ref 3.5–5.3)
POTASSIUM SERPL-SCNC: 4 MMOL/L (ref 3.5–5.3)
PR INTERVAL: 136 MS
PROCALCITONIN SERPL-MCNC: 0.4 NG/ML
PROT SERPL-MCNC: 5 G/DL (ref 6.4–8.4)
QRS AXIS: -82 DEGREES
QRSD INTERVAL: 86 MS
QT INTERVAL: 514 MS
QTC INTERVAL: 487 MS
RBC # BLD AUTO: 5.14 MILLION/UL (ref 3.81–5.12)
RBC # BLD AUTO: 5.56 MILLION/UL (ref 3.81–5.12)
SAO2 % BLD FROM PO2: 94 % (ref 60–85)
SODIUM BLD-SCNC: 142 MMOL/L (ref 136–145)
SODIUM SERPL-SCNC: 144 MMOL/L (ref 135–147)
SODIUM SERPL-SCNC: 144 MMOL/L (ref 135–147)
SODIUM SERPL-SCNC: 146 MMOL/L (ref 135–147)
SODIUM SERPL-SCNC: 146 MMOL/L (ref 135–147)
SPECIMEN SOURCE: ABNORMAL
T WAVE AXIS: 250 DEGREES
VENT AC: 14
VENT AC: 14
VENT- AC: AC
VENT- AC: AC
VENTRICULAR RATE: 54 BPM
VIT B12 SERPL-MCNC: 261 PG/ML (ref 180–914)
VT SETTING VENT: 370 ML
VT SETTING VENT: 400 ML
WBC # BLD AUTO: 10.5 THOUSAND/UL (ref 4.31–10.16)
WBC # BLD AUTO: 12.35 THOUSAND/UL (ref 4.31–10.16)

## 2024-03-20 PROCEDURE — 83735 ASSAY OF MAGNESIUM: CPT | Performed by: NURSE PRACTITIONER

## 2024-03-20 PROCEDURE — 84100 ASSAY OF PHOSPHORUS: CPT | Performed by: NURSE PRACTITIONER

## 2024-03-20 PROCEDURE — 82330 ASSAY OF CALCIUM: CPT | Performed by: NURSE PRACTITIONER

## 2024-03-20 PROCEDURE — 80048 BASIC METABOLIC PNL TOTAL CA: CPT | Performed by: NURSE PRACTITIONER

## 2024-03-20 PROCEDURE — 85025 COMPLETE CBC W/AUTO DIFF WBC: CPT | Performed by: NURSE PRACTITIONER

## 2024-03-20 PROCEDURE — 94002 VENT MGMT INPAT INIT DAY: CPT

## 2024-03-20 PROCEDURE — 80048 BASIC METABOLIC PNL TOTAL CA: CPT

## 2024-03-20 PROCEDURE — 87081 CULTURE SCREEN ONLY: CPT

## 2024-03-20 PROCEDURE — 83735 ASSAY OF MAGNESIUM: CPT

## 2024-03-20 PROCEDURE — 5A1945Z RESPIRATORY VENTILATION, 24-96 CONSECUTIVE HOURS: ICD-10-PCS | Performed by: STUDENT IN AN ORGANIZED HEALTH CARE EDUCATION/TRAINING PROGRAM

## 2024-03-20 PROCEDURE — 85027 COMPLETE CBC AUTOMATED: CPT | Performed by: NURSE PRACTITIONER

## 2024-03-20 PROCEDURE — 99024 POSTOP FOLLOW-UP VISIT: CPT | Performed by: SURGERY

## 2024-03-20 PROCEDURE — 82948 REAGENT STRIP/BLOOD GLUCOSE: CPT

## 2024-03-20 PROCEDURE — 71045 X-RAY EXAM CHEST 1 VIEW: CPT

## 2024-03-20 PROCEDURE — 82805 BLOOD GASES W/O2 SATURATION: CPT | Performed by: NURSE PRACTITIONER

## 2024-03-20 PROCEDURE — 82746 ASSAY OF FOLIC ACID SERUM: CPT | Performed by: ANESTHESIOLOGY

## 2024-03-20 PROCEDURE — 93005 ELECTROCARDIOGRAM TRACING: CPT

## 2024-03-20 PROCEDURE — 82607 VITAMIN B-12: CPT | Performed by: ANESTHESIOLOGY

## 2024-03-20 PROCEDURE — 93010 ELECTROCARDIOGRAM REPORT: CPT | Performed by: INTERNAL MEDICINE

## 2024-03-20 PROCEDURE — C9113 INJ PANTOPRAZOLE SODIUM, VIA: HCPCS

## 2024-03-20 PROCEDURE — 83605 ASSAY OF LACTIC ACID: CPT | Performed by: NURSE PRACTITIONER

## 2024-03-20 PROCEDURE — 94760 N-INVAS EAR/PLS OXIMETRY 1: CPT

## 2024-03-20 PROCEDURE — 94640 AIRWAY INHALATION TREATMENT: CPT

## 2024-03-20 PROCEDURE — 84145 PROCALCITONIN (PCT): CPT | Performed by: NURSE PRACTITIONER

## 2024-03-20 PROCEDURE — 80053 COMPREHEN METABOLIC PANEL: CPT | Performed by: NURSE PRACTITIONER

## 2024-03-20 PROCEDURE — 83036 HEMOGLOBIN GLYCOSYLATED A1C: CPT | Performed by: NURSE PRACTITIONER

## 2024-03-20 RX ORDER — BUDESONIDE 0.5 MG/2ML
0.5 INHALANT ORAL
Status: DISCONTINUED | OUTPATIENT
Start: 2024-03-20 | End: 2024-03-26

## 2024-03-20 RX ORDER — HYDROMORPHONE HCL/PF 1 MG/ML
0.5 SYRINGE (ML) INJECTION EVERY 6 HOURS
Status: DISCONTINUED | OUTPATIENT
Start: 2024-03-20 | End: 2024-03-23

## 2024-03-20 RX ORDER — PANTOPRAZOLE SODIUM 40 MG/10ML
40 INJECTION, POWDER, LYOPHILIZED, FOR SOLUTION INTRAVENOUS
Status: DISCONTINUED | OUTPATIENT
Start: 2024-03-20 | End: 2024-03-24

## 2024-03-20 RX ORDER — PROPOFOL 10 MG/ML
INJECTION, EMULSION INTRAVENOUS CONTINUOUS PRN
Status: DISCONTINUED | OUTPATIENT
Start: 2024-03-20 | End: 2024-03-20

## 2024-03-20 RX ORDER — POTASSIUM CHLORIDE 14.9 MG/ML
20 INJECTION INTRAVENOUS ONCE
Status: COMPLETED | OUTPATIENT
Start: 2024-03-21 | End: 2024-03-21

## 2024-03-20 RX ORDER — HYDROMORPHONE HCL/PF 1 MG/ML
1 SYRINGE (ML) INJECTION
Status: DISCONTINUED | OUTPATIENT
Start: 2024-03-20 | End: 2024-03-23

## 2024-03-20 RX ORDER — LEVALBUTEROL INHALATION SOLUTION 1.25 MG/3ML
1.25 SOLUTION RESPIRATORY (INHALATION)
Status: DISCONTINUED | OUTPATIENT
Start: 2024-03-20 | End: 2024-03-26

## 2024-03-20 RX ORDER — INSULIN LISPRO 100 [IU]/ML
1-5 INJECTION, SOLUTION INTRAVENOUS; SUBCUTANEOUS EVERY 6 HOURS SCHEDULED
Status: DISCONTINUED | OUTPATIENT
Start: 2024-03-20 | End: 2024-03-24

## 2024-03-20 RX ORDER — CHLORHEXIDINE GLUCONATE ORAL RINSE 1.2 MG/ML
15 SOLUTION DENTAL EVERY 12 HOURS SCHEDULED
Status: DISCONTINUED | OUTPATIENT
Start: 2024-03-20 | End: 2024-03-24

## 2024-03-20 RX ORDER — FUROSEMIDE 10 MG/ML
20 INJECTION INTRAMUSCULAR; INTRAVENOUS ONCE
Status: COMPLETED | OUTPATIENT
Start: 2024-03-20 | End: 2024-03-20

## 2024-03-20 RX ORDER — POTASSIUM CHLORIDE 14.9 MG/ML
20 INJECTION INTRAVENOUS ONCE
Qty: 100 ML | Refills: 0 | Status: COMPLETED | OUTPATIENT
Start: 2024-03-20 | End: 2024-03-20

## 2024-03-20 RX ORDER — MAGNESIUM SULFATE HEPTAHYDRATE 40 MG/ML
2 INJECTION, SOLUTION INTRAVENOUS ONCE
Status: COMPLETED | OUTPATIENT
Start: 2024-03-20 | End: 2024-03-20

## 2024-03-20 RX ORDER — ALBUMIN (HUMAN) 12.5 G/50ML
25 SOLUTION INTRAVENOUS EVERY 6 HOURS
Status: DISCONTINUED | OUTPATIENT
Start: 2024-03-20 | End: 2024-03-22

## 2024-03-20 RX ORDER — CALCIUM GLUCONATE 20 MG/ML
2 INJECTION, SOLUTION INTRAVENOUS ONCE
Status: COMPLETED | OUTPATIENT
Start: 2024-03-20 | End: 2024-03-20

## 2024-03-20 RX ORDER — KETAMINE HCL IN NACL, ISO-OSM 100MG/10ML
50 SYRINGE (ML) INJECTION DAILY
Qty: 15 ML | Refills: 0 | Status: DISCONTINUED | OUTPATIENT
Start: 2024-03-20 | End: 2024-03-21

## 2024-03-20 RX ORDER — POTASSIUM CHLORIDE 14.9 MG/ML
20 INJECTION INTRAVENOUS ONCE
Status: COMPLETED | OUTPATIENT
Start: 2024-03-20 | End: 2024-03-21

## 2024-03-20 RX ORDER — HYDRALAZINE HYDROCHLORIDE 20 MG/ML
10 INJECTION INTRAMUSCULAR; INTRAVENOUS EVERY 6 HOURS PRN
Status: DISCONTINUED | OUTPATIENT
Start: 2024-03-20 | End: 2024-03-28 | Stop reason: HOSPADM

## 2024-03-20 RX ORDER — PROPOFOL 10 MG/ML
5-50 INJECTION, EMULSION INTRAVENOUS
Status: DISCONTINUED | OUTPATIENT
Start: 2024-03-20 | End: 2024-03-23

## 2024-03-20 RX ORDER — HEPARIN SODIUM 5000 [USP'U]/ML
7500 INJECTION, SOLUTION INTRAVENOUS; SUBCUTANEOUS EVERY 8 HOURS SCHEDULED
Status: DISCONTINUED | OUTPATIENT
Start: 2024-03-20 | End: 2024-03-28 | Stop reason: HOSPADM

## 2024-03-20 RX ORDER — SODIUM CHLORIDE, SODIUM GLUCONATE, SODIUM ACETATE, POTASSIUM CHLORIDE, MAGNESIUM CHLORIDE, SODIUM PHOSPHATE, DIBASIC, AND POTASSIUM PHOSPHATE .53; .5; .37; .037; .03; .012; .00082 G/100ML; G/100ML; G/100ML; G/100ML; G/100ML; G/100ML; G/100ML
30 INJECTION, SOLUTION INTRAVENOUS CONTINUOUS
Status: DISCONTINUED | OUTPATIENT
Start: 2024-03-20 | End: 2024-03-20

## 2024-03-20 RX ORDER — LEVALBUTEROL INHALATION SOLUTION 1.25 MG/3ML
1.25 SOLUTION RESPIRATORY (INHALATION)
Status: DISCONTINUED | OUTPATIENT
Start: 2024-03-20 | End: 2024-03-20

## 2024-03-20 RX ADMIN — HYDROMORPHONE HYDROCHLORIDE 0.5 MG: 1 INJECTION, SOLUTION INTRAMUSCULAR; INTRAVENOUS; SUBCUTANEOUS at 20:50

## 2024-03-20 RX ADMIN — POTASSIUM CHLORIDE 20 MEQ: 14.9 INJECTION, SOLUTION INTRAVENOUS at 03:05

## 2024-03-20 RX ADMIN — ALBUMIN (HUMAN) 25 G: 0.25 INJECTION, SOLUTION INTRAVENOUS at 13:04

## 2024-03-20 RX ADMIN — FUROSEMIDE 20 MG: 10 INJECTION, SOLUTION INTRAMUSCULAR; INTRAVENOUS at 11:35

## 2024-03-20 RX ADMIN — NICOTINE 1 PATCH: 21 PATCH, EXTENDED RELEASE TRANSDERMAL at 08:01

## 2024-03-20 RX ADMIN — POTASSIUM CHLORIDE 20 MEQ: 14.9 INJECTION, SOLUTION INTRAVENOUS at 23:04

## 2024-03-20 RX ADMIN — CHLORHEXIDINE GLUCONATE 0.12% ORAL RINSE 15 ML: 1.2 LIQUID ORAL at 09:42

## 2024-03-20 RX ADMIN — ALBUMIN (HUMAN) 25 G: 0.25 INJECTION, SOLUTION INTRAVENOUS at 19:31

## 2024-03-20 RX ADMIN — HEPARIN SODIUM 7500 UNITS: 5000 INJECTION INTRAVENOUS; SUBCUTANEOUS at 13:04

## 2024-03-20 RX ADMIN — PROPOFOL 20 MCG/KG/MIN: 10 INJECTION, EMULSION INTRAVENOUS at 09:42

## 2024-03-20 RX ADMIN — PIPERACILLIN AND TAZOBACTAM 4.5 G: 36; 4.5 INJECTION, POWDER, FOR SOLUTION INTRAVENOUS at 13:20

## 2024-03-20 RX ADMIN — PROPOFOL 50 MCG/KG/MIN: 10 INJECTION, EMULSION INTRAVENOUS at 00:31

## 2024-03-20 RX ADMIN — PIPERACILLIN AND TAZOBACTAM 4.5 G: 36; 4.5 INJECTION, POWDER, FOR SOLUTION INTRAVENOUS at 21:03

## 2024-03-20 RX ADMIN — HYDROMORPHONE HYDROCHLORIDE 0.5 MG: 1 INJECTION, SOLUTION INTRAMUSCULAR; INTRAVENOUS; SUBCUTANEOUS at 14:50

## 2024-03-20 RX ADMIN — PROPOFOL 30 MCG/KG/MIN: 10 INJECTION, EMULSION INTRAVENOUS at 04:07

## 2024-03-20 RX ADMIN — FUROSEMIDE 20 MG: 10 INJECTION, SOLUTION INTRAMUSCULAR; INTRAVENOUS at 17:03

## 2024-03-20 RX ADMIN — Medication 10 MG: at 00:53

## 2024-03-20 RX ADMIN — ALBUMIN (HUMAN) 25 G: 0.25 INJECTION, SOLUTION INTRAVENOUS at 07:46

## 2024-03-20 RX ADMIN — HYDROMORPHONE HYDROCHLORIDE 1 MG: 1 INJECTION, SOLUTION INTRAMUSCULAR; INTRAVENOUS; SUBCUTANEOUS at 22:42

## 2024-03-20 RX ADMIN — LEVALBUTEROL HYDROCHLORIDE 1.25 MG: 1.25 SOLUTION RESPIRATORY (INHALATION) at 07:36

## 2024-03-20 RX ADMIN — IPRATROPIUM BROMIDE 0.5 MG: 0.5 SOLUTION RESPIRATORY (INHALATION) at 20:30

## 2024-03-20 RX ADMIN — BUDESONIDE INHALATION 0.5 MG: 0.5 SUSPENSION RESPIRATORY (INHALATION) at 07:36

## 2024-03-20 RX ADMIN — IPRATROPIUM BROMIDE 0.5 MG: 0.5 SOLUTION RESPIRATORY (INHALATION) at 07:36

## 2024-03-20 RX ADMIN — HYDROMORPHONE HYDROCHLORIDE 1 MG: 1 INJECTION, SOLUTION INTRAMUSCULAR; INTRAVENOUS; SUBCUTANEOUS at 09:52

## 2024-03-20 RX ADMIN — MAGNESIUM SULFATE HEPTAHYDRATE 2 G: 40 INJECTION, SOLUTION INTRAVENOUS at 03:14

## 2024-03-20 RX ADMIN — SODIUM CHLORIDE, SODIUM GLUCONATE, SODIUM ACETATE, POTASSIUM CHLORIDE, MAGNESIUM CHLORIDE, SODIUM PHOSPHATE, DIBASIC, AND POTASSIUM PHOSPHATE 30 ML/HR: .53; .5; .37; .037; .03; .012; .00082 INJECTION, SOLUTION INTRAVENOUS at 01:45

## 2024-03-20 RX ADMIN — CALCIUM GLUCONATE 2 G: 20 INJECTION, SOLUTION INTRAVENOUS at 06:09

## 2024-03-20 RX ADMIN — HYDROMORPHONE HYDROCHLORIDE 1 MG: 1 INJECTION, SOLUTION INTRAMUSCULAR; INTRAVENOUS; SUBCUTANEOUS at 02:00

## 2024-03-20 RX ADMIN — PROPOFOL 25 MCG/KG/MIN: 10 INJECTION, EMULSION INTRAVENOUS at 15:23

## 2024-03-20 RX ADMIN — HYDRALAZINE HYDROCHLORIDE 10 MG: 20 INJECTION INTRAMUSCULAR; INTRAVENOUS at 19:16

## 2024-03-20 RX ADMIN — LEVALBUTEROL HYDROCHLORIDE 1.25 MG: 1.25 SOLUTION RESPIRATORY (INHALATION) at 13:25

## 2024-03-20 RX ADMIN — LEVALBUTEROL HYDROCHLORIDE 1.25 MG: 1.25 SOLUTION RESPIRATORY (INHALATION) at 20:30

## 2024-03-20 RX ADMIN — CALCIUM GLUCONATE 2 G: 20 INJECTION, SOLUTION INTRAVENOUS at 03:10

## 2024-03-20 RX ADMIN — HEPARIN SODIUM 7500 UNITS: 5000 INJECTION INTRAVENOUS; SUBCUTANEOUS at 21:22

## 2024-03-20 RX ADMIN — POTASSIUM CHLORIDE 20 MEQ: 14.9 INJECTION, SOLUTION INTRAVENOUS at 03:49

## 2024-03-20 RX ADMIN — CHLORHEXIDINE GLUCONATE 0.12% ORAL RINSE 15 ML: 1.2 LIQUID ORAL at 02:01

## 2024-03-20 RX ADMIN — INSULIN LISPRO 1 UNITS: 100 INJECTION, SOLUTION INTRAVENOUS; SUBCUTANEOUS at 05:45

## 2024-03-20 RX ADMIN — CHLORHEXIDINE GLUCONATE 0.12% ORAL RINSE 15 ML: 1.2 LIQUID ORAL at 20:50

## 2024-03-20 RX ADMIN — PIPERACILLIN AND TAZOBACTAM 4.5 G: 36; 4.5 INJECTION, POWDER, FOR SOLUTION INTRAVENOUS at 04:44

## 2024-03-20 RX ADMIN — ROCURONIUM BROMIDE 10 MG: 10 INJECTION, SOLUTION INTRAVENOUS at 00:10

## 2024-03-20 RX ADMIN — Medication 20 MG: at 00:18

## 2024-03-20 RX ADMIN — HYDROMORPHONE HYDROCHLORIDE 1 MG: 1 INJECTION, SOLUTION INTRAMUSCULAR; INTRAVENOUS; SUBCUTANEOUS at 05:51

## 2024-03-20 RX ADMIN — PIPERACILLIN AND TAZOBACTAM 4.5 G: 36; 4.5 INJECTION, POWDER, FOR SOLUTION INTRAVENOUS at 01:55

## 2024-03-20 RX ADMIN — ALBUMIN (HUMAN) 25 G: 0.25 INJECTION, SOLUTION INTRAVENOUS at 01:45

## 2024-03-20 RX ADMIN — IPRATROPIUM BROMIDE 0.5 MG: 0.5 SOLUTION RESPIRATORY (INHALATION) at 13:25

## 2024-03-20 RX ADMIN — PROPOFOL 25 MCG/KG/MIN: 10 INJECTION, EMULSION INTRAVENOUS at 19:07

## 2024-03-20 RX ADMIN — BUDESONIDE INHALATION 0.5 MG: 0.5 SUSPENSION RESPIRATORY (INHALATION) at 20:40

## 2024-03-20 RX ADMIN — PANTOPRAZOLE SODIUM 40 MG: 40 INJECTION, POWDER, FOR SOLUTION INTRAVENOUS at 08:01

## 2024-03-20 RX ADMIN — Medication 50 MG: at 14:51

## 2024-03-20 RX ADMIN — HEPARIN SODIUM 7500 UNITS: 5000 INJECTION INTRAVENOUS; SUBCUTANEOUS at 05:12

## 2024-03-20 NOTE — ANESTHESIA PROCEDURE NOTES
Arterial Line Insertion    Performed by: Allyssa Hurley CRNA  Authorized by: Pasquale Fragoso DO  Preparation: Patient was prepped and draped in the usual sterile fashion.  Indications: hemodynamic monitoring    Procedure Details:  Needle gauge: 20    Post-procedure:  Post-procedure: dressing applied  Patient tolerance: Patient tolerated the procedure well with no immediate complications

## 2024-03-20 NOTE — CONSULTS
Formerly [facility], later [facility]  Consult  Name: Dwaine Gould 63 y.o. female I MRN: [id number]  Unit/Bed#: ICU 04 I Date of Admission: 3/19/2024   Date of Service: 3/20/2024 I Hospital Day: 1    Consults    Assessment/Plan   Tobacco abuse  Assessment & Plan  Would advise cessation  Nicotine patch while inpatient    Stage 3a chronic kidney disease (HCC)  Assessment & Plan  Lab Results   Component Value Date    EGFR 58 03/20/2024    EGFR 50 03/19/2024    EGFR 47 03/17/2024    CREATININE 1.04 03/20/2024    CREATININE 1.17 03/19/2024    CREATININE 1.22 03/17/2024   Pt creatinine appears to be at baseline  Will continue to trend  Avoid nephrotoxic agents  Strict I/O    Acute on chronic respiratory failure with hypoxia and hypercapnia (HCC)  Assessment & Plan  Pt with COPD and likely RILEY, D/C home with oxygen 4-5L  Remains intubated postoperatively with open abdomen  Continue mechanical ventilation for now  Check ABG and make vent changes as appropriate  Currently on AC 14/400/50%/+12  Continue scheduled nebs  Pt was D/C home on steroids on 3/19, will hold for now as patient has minimal wheezing and in light of surgery   Will need bipap QHS once extubated    Respiratory acidosis  Assessment & Plan  Pt with chronic resp acidosis 2/2 COPD, likely RILEY, +/- OHS  Currently intubated  Will need bipap once extubated    Hyperlipidemia  Assessment & Plan  Hold statin for now  Restart when able to take po    Essential hypertension  Assessment & Plan  Oral meds on hold  PRN hydralazine for SBP>170    Leukocytosis  Assessment & Plan  In the setting of pneumoperitoneum  F/U blood and OR cultures  Continue broad spectrum abx  Initial lactic negative, will recheck now  Trend procal  Trend fever curve and WBC    COPD (chronic obstructive pulmonary disease) (HCC)  Assessment & Plan  Does not appear to be in acute exacerbation  Continue scheduled bronchodilators  Will hold steroids for now, patient with minimal wheezing    Acute on  chronic heart failure with preserved ejection fraction (HCC)  Assessment & Plan  Wt Readings from Last 3 Encounters:   03/20/24 129 kg (285 lb 0.9 oz)   03/19/24 128 kg (282 lb 3 oz)   07/20/21 103 kg (227 lb 8.2 oz)     Pt presented to the hospital 3/11 and was aggressively diuresed  Echo on 3/12 showed EF 60%, mild to moderate concentric hypertrophy, and normal diastolic function  Was discharged home on 3/19 but returned later that day after CT findings of free intraperitoneal air and taken to OR  Pt still with significant anasarca  Holding diuresis in the immediate post op period  Monitor daily weights  Strict I/O  Will initiate albumin q6 hours            CAD (coronary artery disease)  Assessment & Plan  Per [facility] records patient with single-vessel CAD with ostial RPDA lesion not amenable to PCI due to location of lesion October 2014  Continue medical management-po meds on hold currently    * Pneumoperitoneum  Assessment & Plan  Now s/p diag lap converted to exp lap, right hemicolectomy with anastomosis, washout, Abthera placement POD0  Pt had abdominal pain 3/19-CT scan showed free intraperitoneal air  Taken urgently to OR, transferred to ICU postoperatively with abdomen open, Abthera in place, and on mechanical ventilation  In OR, abdominal cavity was noted to have grossly purulent material in it along with perforation in the cecum at a mass  Due to the significant edema of the abdominal wall and contamination of the abdominal cavity, the decision was made to leave the abdomen open and Abthera VAC was placed with plan to return to OR  F/U culture results  Continue Zosyn D1, (abx D2)  Dilaudid for pain  Surgery following           History of Present Illness     HPI: Dwaine Gould is a 63 y.o. who presents with abdominal pain and pneumoperitoneum. She has a PMH of CAD, ongoing tobacco abuse, COPD, likely RILEY, HTN, HLD, HFpEF, and CKD. She had a recent admission for resp failure and acute on chronic heart failure and  was discharged on 3/19. Prior to discharge CT abdomen/pelvis was done 2/2 complaints of abdominal pain. She left prior to the imaging being resulted. CT showed intraperitoneal free air and nodules in the right lower quadrant.She was taken urgently to the operating room and underwent diagnostic laparotomy converted to exploratory laparotomy, right hemicolectomy with anastomosis, washout, and Abthera VAC placement. She was transferred to the ICU postoperatively for ongoing management.    History obtained from chart review and unobtainable from patient due to intubated/sedated.  Review of Systems   Unable to perform ROS: Intubated     Disposition: Critical care   Historical Information   Past Medical History:  No date: Acute metabolic encephalopathy  No date: Hyperlipidemia  No date: Hypertension  No date: Transaminitis Past Surgical History:  No date: CARDIAC CATHETERIZATION  No date: GANGLION CYST EXCISION  No date: TYMPANOSTOMY TUBE PLACEMENT   Current Outpatient Medications   Medication Instructions    albuterol 2.5 mg, Nebulization, Every 6 hours PRN    atorvastatin (LIPITOR) 40 mg, Oral, Daily at bedtime    cholecalciferol 50,000 Units, Oral, Weekly    escitalopram (LEXAPRO) 5 mg, Oral, Daily    fluticasone-umeclidinium-vilanterol (Trelegy Ellipta) 100-62.5-25 mcg/actuation inhaler 1 puff, Inhalation, Daily, Rinse mouth after use.    hydrOXYzine HCL (ATARAX) 25 mg, Oral, Daily at bedtime PRN    labetalol (NORMODYNE) 300 mg, Oral, 3 times daily    meloxicam (MOBIC) 15 mg, Oral, Daily    nicotine (NICODERM CQ) 21 mg/24 hr TD 24 hr patch 1 patch, Transdermal, Daily    predniSONE 20 mg tablet Take 2 tablets (40 mg total) by mouth daily for 2 days, THEN 1.5 tablets (30 mg total) daily for 2 days, THEN 1 tablet (20 mg total) daily for 2 days, THEN 0.5 tablets (10 mg total) daily for 2 days. Do not start before March 20, 2024.    rOPINIRole (REQUIP) 0.5 mg, Oral, 3 times daily    topiramate (TOPAMAX) 25 mg, Oral, 2  times daily    torsemide (DEMADEX) 20 mg, Oral, Daily    Allergies   Allergen Reactions    Codeine Anaphylaxis    Contrast [Iodinated Contrast Media] GI Intolerance    Prednisone Irritability     Increase in anger/abusive behaviors      Social History     Tobacco Use    Smoking status: Every Day     Current packs/day: 0.50     Types: Cigarettes    Smokeless tobacco: Never   Vaping Use    Vaping status: Every Day    Substances: Nicotine   Substance Use Topics    Alcohol use: Never    Drug use: Never    History reviewed. No pertinent family history.     Objective                            Vitals I/O      Most Recent Min/Max in 24hrs   Temp 97.9 °F (36.6 °C) Temp  Min: 97.3 °F (36.3 °C)  Max: 98.1 °F (36.7 °C)   Pulse 55 Pulse  Min: 51  Max: 69   Resp 14 Resp  Min: 14  Max: 18   /67 BP  Min: 111/64  Max: 177/79   O2 Sat 97 % SpO2  Min: 81 %  Max: 98 %      Intake/Output Summary (Last 24 hours) at 3/20/2024 0238  Last data filed at 3/20/2024 0217  Gross per 24 hour   Intake 1800 ml   Output 960 ml   Net 840 ml       Diet NPO    Invasive Monitoring           Physical Exam   Physical Exam  Eyes:      Pupils: Pupils are equal, round, and reactive to light.   Skin:     General: Skin is warm and dry.   HENT:      Head: Normocephalic and atraumatic.      Mouth/Throat:      Mouth: Mucous membranes are dry.   Neck:      Vascular: No JVD.   Cardiovascular:      Rate and Rhythm: Regular rhythm. Bradycardia present.   Musculoskeletal:      Right lower leg: Edema present.      Left lower leg: Edema present.   Abdominal: General: Bowel sounds are absent. There is distension.     Palpations: Abdomen is soft.      Comments: Midline incision with Abthera in place   Constitutional:       Appearance: She is morbidly obese. She is ill-appearing.      Interventions: She is sedated and intubated.   Pulmonary:      Effort: She is intubated.      Breath sounds: Decreased air movement present. Examination of the left-upper field reveals  wheezing. Examination of the left-middle field reveals wheezing. Wheezing present.   Neurological:      GCS: GCS eye subscore is 1. GCS verbal subscore is 1. GCS motor subscore is 5.   Genitourinary/Anorectal:  [place] present.          Diagnostic Studies      EKG: sinus lucia  Imaging: CXR shows ETT at level of nelson, pulm edema and bilat pleural effusions, L>R I have personally reviewed pertinent films in PACS     Medications:  Scheduled PRN   Albumin 25%, 25 g, Q6H  calcium gluconate, 2 g, Once  chlorhexidine, 15 mL, Q12H ANGELES  heparin (porcine), 7,500 Units, Q8H ANGELES  ipratropium, 0.5 mg, TID  levalbuterol, 1.25 mg, TID  magnesium sulfate, 2 g, Once  nicotine, 1 patch, Daily  piperacillin-tazobactam, 4.5 g, Q8H  potassium chloride, 20 mEq, Once   Followed by  potassium chloride, 20 mEq, Once      hydrALAZINE, 10 mg, Q6H PRN  HYDROmorphone, 1 mg, Q3H PRN       Continuous    multi-electrolyte, 30 mL/hr, Last Rate: 30 mL/hr (03/20/24 0145)  propofol, 5-50 mcg/kg/min, Last Rate: 30 mcg/kg/min (03/20/24 0125)         Labs:    CBC    Recent Labs     03/19/24 2016 03/19/24 2225 03/20/24  0144   WBC 14.80*  --  10.50*   HGB 13.1 13.3 13.0   HCT 44.9 39 43.6     --  159     BMP    Recent Labs     03/19/24 2016 03/19/24 2225 03/20/24  0144   SODIUM 143  --  144   K 3.9  --  3.3*   CL 99  --  103   CO2 39* 36* 34*   AGAP 5  --  7   BUN 40*  --  38*   CREATININE 1.17  --  1.04   CALCIUM 8.5  --  7.4*       Coags    No recent results     Additional Electrolytes  Recent Labs     03/19/24 2225 03/20/24  0144   MG  --  1.9   CAIONIZED 1.06* 1.01*          Blood Gas    Recent Labs     03/20/24  0201   PHART 7.449   OEZ2VNZ 53.2*   PO2ART 71.8*   OYP2GVX 36.1*   BEART 10.2   SOURCE Line, Arterial     Recent Labs     03/18/24  0521 03/20/24  0201   PHVEN 7.352  --    SUY3ERB 71.2*  --    PO2VEN 117.9*  --    APM9LDB 38.6*  --    BEVEN 10.2  --    K3XGKNK 95.6*  --    SOURCE  --  Line, Arterial    LFTs  Recent Labs      03/19/24 2016   ALT 33   AST 15   ALKPHOS 84   ALB 3.3*   TBILI 0.78       Infectious  No recent results  Glucose  Recent Labs     03/19/24 2016 03/20/24  0144   GLUC 150* 160*               GUSTAVO Alexander

## 2024-03-20 NOTE — ASSESSMENT & PLAN NOTE
Present on admission history of COPD.  Patient was discharged home today with home oxygen 4 to 5 L which is new for her.  She was not on home o2 prior.   Currently not in acute exacerbation.  O2 saturation 92 to 94% on 4 L nasal cannula.

## 2024-03-20 NOTE — ASSESSMENT & PLAN NOTE
In the setting of pneumoperitoneum  F/U blood and OR cultures  Continue broad spectrum abx  Initial lactic negative, will recheck now  Trend procal  Trend fever curve and WBC

## 2024-03-20 NOTE — ASSESSMENT & PLAN NOTE
On prior hospitalization she was noted to have an episode of encephalopathy due to respiratory acidosis, hypercapnia which was treated with CPAP.  Currently she is awake, alert, oriented x 3.  CPAP nightly for now.

## 2024-03-20 NOTE — ASSESSMENT & PLAN NOTE
Present on admission with history of hypertension.  Blood pressure currently reasonably controlled.  Resume home dose of labetalol, torsemide.

## 2024-03-20 NOTE — ARC ADMISSION
ARC  spoke with pt's daughters via TC. Reviewed ARC program, acute rehab criteria and approval process, insurance authorization process, as well as ARC locations and preferences. Preferred ARC location is [facility] and second choice is [facility].  All questions answered.  Family was made aware that pt's case is currently being followed/reviewed and that ARC Reviewer will keep their  updated regarding referral status when a determination is made.

## 2024-03-20 NOTE — PLAN OF CARE
Problem: PAIN - ADULT  Goal: Verbalizes/displays adequate comfort level or baseline comfort level  Description: Interventions:  - Encourage patient to monitor pain and request assistance  - Assess pain using appropriate pain scale  - Administer analgesics based on type and severity of pain and evaluate response  - Implement non-pharmacological measures as appropriate and evaluate response  - Consider cultural and social influences on pain and pain management  - Notify physician/advanced practitioner if interventions unsuccessful or patient reports new pain  Outcome: Progressing     Problem: INFECTION - ADULT  Goal: Absence or prevention of progression during hospitalization  Description: INTERVENTIONS:  - Assess and monitor for signs and symptoms of infection  - Monitor lab/diagnostic results  - Monitor all insertion sites, i.e. indwelling lines, tubes, and drains  - Monitor endotracheal if appropriate and nasal secretions for changes in amount and color  - [place] appropriate cooling/warming therapies per order  - Administer medications as ordered  - Instruct and encourage patient and family to use good hand hygiene technique  - Identify and instruct in appropriate isolation precautions for identified infection/condition  Outcome: Progressing  Goal: Absence of fever/infection during neutropenic period  Description: INTERVENTIONS:  - Monitor WBC    Outcome: Progressing     Problem: SAFETY ADULT  Goal: Patient will remain free of falls  Description: INTERVENTIONS:  - Educate patient/family on patient safety including physical limitations  - Instruct patient to call for assistance with activity   - Consult OT/PT to assist with strengthening/mobility   - Keep Call bell within reach  - Keep bed low and locked with side rails adjusted as appropriate  - Keep care items and personal belongings within reach  - Initiate and maintain comfort rounds  - Make Fall Risk Sign visible to staff  - Offer Toileting every 2 Hours,  in advance of need  - Initiate/Maintain bed alarm  - Obtain necessary fall risk management equipment:    - Apply yellow socks and bracelet for high fall risk patients  - Consider moving patient to room near nurses station  Outcome: Progressing  Goal: Maintain or return to baseline ADL function  Description: INTERVENTIONS:  -  Assess patient's ability to carry out ADLs; assess patient's baseline for ADL function and identify physical deficits which impact ability to perform ADLs (bathing, care of mouth/teeth, toileting, grooming, dressing, etc.)  - Assess/evaluate cause of self-care deficits   - Assess range of motion  - Assess patient's mobility; develop plan if impaired  - Assess patient's need for assistive devices and provide as appropriate  - Encourage maximum independence but intervene and supervise when necessary  - Involve family in performance of ADLs  - Assess for home care needs following discharge   - Consider OT consult to assist with ADL evaluation and planning for discharge  - Provide patient education as appropriate  Outcome: Progressing  Goal: Maintains/Returns to pre admission functional level  Description: INTERVENTIONS:  - Perform AM-PAC 6 Click Basic Mobility/ Daily Activity assessment daily.  - Set and communicate daily mobility goal to care team and patient/family/caregiver.   - Collaborate with rehabilitation services on mobility goals if consulted  - Perform Range of Motion 3 times a day.  - Reposition patient every 2 hours.  - Dangle patient 3 times a day  - Stand patient 3 times a day  - Ambulate patient 3 times a day  - Out of bed to chair 3 times a day   - Out of bed for meals 3 times a day  - Out of bed for toileting  - Record patient progress and toleration of activity level   Outcome: Progressing     Problem: DISCHARGE PLANNING  Goal: Discharge to home or other facility with appropriate resources  Description: INTERVENTIONS:  - Identify barriers to discharge w/patient and caregiver  -  Arrange for needed discharge resources and transportation as appropriate  - Identify discharge learning needs (meds, wound care, etc.)  - Arrange for interpretive services to assist at discharge as needed  - Refer to Case Management Department for coordinating discharge planning if the patient needs post-hospital services based on physician/advanced practitioner order or complex needs related to functional status, cognitive ability, or social support system  Outcome: Progressing     Problem: Knowledge Deficit  Goal: Patient/family/caregiver demonstrates understanding of disease process, treatment plan, medications, and discharge instructions  Description: Complete learning assessment and assess knowledge base.  Interventions:  - Provide teaching at level of understanding  - Provide teaching via preferred learning methods  Outcome: Progressing     Problem: SAFETY,RESTRAINT: NV/NON-SELF DESTRUCTIVE BEHAVIOR  Goal: Remains free of harm/injury (restraint for non violent/non self-detsructive behavior)  Description: INTERVENTIONS:  - Instruct patient/family regarding restraint use   - Assess and monitor physiologic and psychological status   - Provide interventions and comfort measures to meet assessed patient needs   - Identify and implement measures to help patient regain control  - Assess readiness for release of restraint   Outcome: Progressing  Goal: Returns to optimal restraint-free functioning  Description: INTERVENTIONS:  - Assess the patient's behavior and symptoms that indicate continued need for restraint  - Identify and implement measures to help patient regain control  - Assess readiness for release of restraint   Outcome: Progressing     Problem: Prexisting or High Potential for Compromised Skin Integrity  Goal: Skin integrity is maintained or improved  Description: INTERVENTIONS:  - Identify patients at risk for skin breakdown  - Assess and monitor skin integrity  - Assess and monitor nutrition and  hydration status  - Monitor labs   - Assess for incontinence   - Turn and reposition patient  - Assist with mobility/ambulation  - Relieve pressure over bony prominences  - Avoid friction and shearing  - Provide appropriate hygiene as needed including keeping skin clean and dry  - Evaluate need for skin moisturizer/barrier cream  - Collaborate with interdisciplinary team   - Patient/family teaching  - Consider wound care consult   Outcome: Progressing     Problem: Nutrition/Hydration-ADULT  Goal: Nutrient/Hydration intake appropriate for improving, restoring or maintaining nutritional needs  Description: Monitor and assess patient's nutrition/hydration status for malnutrition. Collaborate with interdisciplinary team and initiate plan and interventions as ordered.  Monitor patient's weight and dietary intake as ordered or per policy. Utilize nutrition screening tool and intervene as necessary. Determine patient's food preferences and provide high-protein, high-caloric foods as appropriate.     INTERVENTIONS:  - Monitor oral intake, urinary output, labs, and treatment plans  - Assess nutrition and hydration status and recommend course of action  - Evaluate amount of meals eaten  - Assist patient with eating if necessary   - Allow adequate time for meals  - Recommend/ encourage appropriate diets, oral nutritional supplements, and vitamin/mineral supplements  - Order, calculate, and assess calorie counts as needed  - Recommend, monitor, and adjust tube feedings and TPN/PPN based on assessed needs  - Assess need for intravenous fluids  - Provide specific nutrition/hydration education as appropriate  - Include patient/family/caregiver in decisions related to nutrition  Outcome: Progressing

## 2024-03-20 NOTE — ASSESSMENT & PLAN NOTE
Lab Results   Component Value Date    EGFR 58 03/20/2024    EGFR 50 03/19/2024    EGFR 47 03/17/2024    CREATININE 1.04 03/20/2024    CREATININE 1.17 03/19/2024    CREATININE 1.22 03/17/2024   Pt creatinine appears to be at baseline  Will continue to trend  Avoid nephrotoxic agents  Strict I/O

## 2024-03-20 NOTE — RESPIRATORY THERAPY NOTE
RT Ventilator Management Note  Dwaine Gould 63 y.o. female MRN: [id number]  Unit/Bed#: ICU 04 Encounter: [id number]      Daily Screen         3/20/2024  0115 3/20/2024  0738          Patient safety screen outcome:: Failed Failed      Not Ready for Weaning due to:: PEEP > 8cmH2O PEEP > 8cmH2O;Underline problem not resolved;Going on Transport intubated                Physical Exam:   Assessment Type: Assess only  General Appearance: Sedated  Respiratory Pattern: Assisted  Chest Assessment: Chest expansion symmetrical  Bilateral Breath Sounds: Diminished  Cough: Productive (assisted)  Suction: ET Tube  O2 Device: vent  Subjective Data: sedated      Resp Comments: pulmicort given       03/20/24 1146   Respiratory Assessment   Assessment Type Assess only   General Appearance Sedated   Respiratory Pattern Assisted   Chest Assessment Chest expansion symmetrical   Bilateral Breath Sounds Diminished   Suction ET Tube   O2 Device vent   Subjective Data sedated   Vent Information   Vent ID [place]   Vent type Drager   Drager Vent Mode AC/VC+   $ Pulse Oximetry Spot Check Charge Completed   SpO2 95 %   AC/VC+ Settings   Resp Rate (BPM) 14 BPM   VT (mL) 370 mL   Insp Time (S) 0.98 S   FIO2 (%) 40 %   PEEP (cmH2O) 10 cmH2O   Rise Time (%) 20 %   Trigger Sensitivity Flow (LPM) 2 LPM   Humidification Heater   Heater Temp 98.6 °F (37 °C)   AC/VC+ Actuals   Resp Rate (BPM) 14 BPM   VT (mL) 367 mL   MV (Obs) 4.47   MAP (cmH2O) 13 cmH2O   Peak Pressure (cmH2O) 25 cmH2O   I:E Ratio (Obs) 1:3.4   Static Compliance (mL/cmH20) 29.7 mL/cmH2O   Plateau Pressure (cm H2O) 24.9 cm H2O   Heater Temperature (Obs) 99 °F (37.2 °C)   AC/VC+ ALARMS   High Peak Pressure (cmH2O) 45 cmH2O   High Resp Rate (BPM) 30 BPM   High MV (L/min) 10 L/min   Low MV (L/min) 3 L/min   High VT (mL) 870 mL   Maintenance   Alarm (pink) cable attached Yes   Resuscitation bag with peep valve at bedside Yes   Water bag changed No   Circuit changed No   ETT  Cuffed;Oral;Hi-Lo  7 mm   Placement Date/Time: 03/19/24 2129   Mask Ventilation: Mask ventilation not attempted (0)  Preoxygenated: Yes  Technique: Video laryngoscopy;Rapid sequence  Type: Cuffed;Oral;Hi-Lo  Tube Size: 7 mm  Laryngoscope: Harmon  Blade Size: 3  Location: Oral...   Secured at (cm) 20   Measured from Gums   Secured Location Left   Secured by Commercial tube whitfield   Site Condition Dry   Cuff Pressure (color) Green   HI-LO Suction  Continuous low suction   HI-LO Secretions Scant   HI-LO Intervention Patent

## 2024-03-20 NOTE — ASSESSMENT & PLAN NOTE
Pt with chronic resp acidosis 2/2 COPD, likely RILEY, +/- OHS  Currently intubated  Will need bipap once extubated

## 2024-03-20 NOTE — ASSESSMENT & PLAN NOTE
Does not appear to be in acute exacerbation  Continue scheduled bronchodilators  Will hold steroids for now, patient with minimal wheezing

## 2024-03-20 NOTE — ANESTHESIA PROCEDURE NOTES
Arterial Line Insertion    Performed by: Allyssa Hurley CRNA  Authorized by: Pasquale Fragoso DO  Consent: Verbal consent obtained.  Risks and benefits: risks, benefits and alternatives were discussed  Consent given by: patient  Patient understanding: patient states understanding of the procedure being performed  Patient consent: the patient's understanding of the procedure matches consent given  Procedure consent: procedure consent matches procedure scheduled  Relevant documents: relevant documents present and verified  Patient identity confirmed: arm band and verbally with patient  Preparation: Patient was prepped and draped in the usual sterile fashion.  Indications: hemodynamic monitoring  Orientation:  Right  Location: radial artery  Sedation:  Patient sedated: yes    Procedure Details:  Ulises's test normal: yes  Needle gauge: 20  Seldinger technique: Seldinger technique used  Number of attempts: 2    Post-procedure:  Post-procedure: dressing applied  Waveform: good waveform  Post-procedure CNS: normal  Patient tolerance: patient tolerated the procedure well with no immediate complications  Comments: US guided Right radial arterial line with arrow-integral wire catheter on third attempt.  Initial attempts with inability to thread catheter. Small localized hematoma.

## 2024-03-20 NOTE — RESPIRATORY THERAPY NOTE
RT Ventilator Management Note  Dwaine Gould 63 y.o. female MRN: [id number]  Unit/Bed#: ICU 04 Encounter: [id number]      Daily Screen         3/20/2024  0115             Patient safety screen outcome:: Failed    Not Ready for Weaning due to:: PEEP > 8cmH2O              Physical Exam:   Assessment Type: Assess only  General Appearance: Sedated  Respiratory Pattern: Assisted  Chest Assessment: Chest expansion symmetrical  Bilateral Breath Sounds: Clear, Diminished  Cough: None  O2 Device: mechanical ventilation  Subjective Data: sedated      Resp Comments: patient received from OR s/p exp lap requring intubation and mechanical ventilation for procedure to maintain adeqauet ventilation. et tube in place and secure tie tension adequate no eveidence of skin breakdown noted at this time patient being sedated without apparent respiratory distress. plan: continue current support until further orders

## 2024-03-20 NOTE — ASSESSMENT & PLAN NOTE
Patient used to smoke 2 packs a day.  Reports last cigarette was before hospitalization on 3/12/24.  Counseled on continuous cessation.

## 2024-03-20 NOTE — ASSESSMENT & PLAN NOTE
Wt Readings from Last 3 Encounters:   03/20/24 129 kg (285 lb 0.9 oz)   03/19/24 128 kg (282 lb 3 oz)   07/20/21 103 kg (227 lb 8.2 oz)     Pt presented to the hospital 3/11 and was aggressively diuresed  Echo on 3/12 showed EF 60%, mild to moderate concentric hypertrophy, and normal diastolic function  Was discharged home on 3/19 but returned later that day after CT findings of free intraperitoneal air and taken to OR  Pt still with significant anasarca  Holding diuresis in the immediate post op period  Monitor daily weights  Strict I/O  Will initiate albumin q6 hours

## 2024-03-20 NOTE — ASSESSMENT & PLAN NOTE
Wt Readings from Last 3 Encounters:   03/19/24 128 kg (282 lb 3 oz)   07/20/21 103 kg (227 lb 8.2 oz)   09/22/20 96.9 kg (213 lb 10 oz)     Echo report from 03/12/24 noted with EF of 60%.  Patient was hospitalized from 03/12-3/18 due to acute on chronic CHF exacerbation.  Patient was discharged home on torsemide 20 mg daily.  Currently noted with lower extremity edema.  Resume home dose of torsemide 20 mg daily.

## 2024-03-20 NOTE — UTILIZATION REVIEW
Initial Clinical Review    Admission: Date/Time/Statement:   Admission Orders (From admission, onward)       Ordered        03/19/24 2052  Inpatient Admission  Once                          Orders Placed This Encounter   Procedures    Inpatient Admission     Standing Status:   Standing     Number of Occurrences:   1     Order Specific Question:   Level of Care     Answer:   Med Surg [16]     Order Specific Question:   Estimated length of stay     Answer:   More than 2 Midnights     Order Specific Question:   Certification     Answer:   I certify that inpatient services are medically necessary for this patient for a duration of greater than two midnights. See H&P and MD Progress Notes for additional information about the patient's course of treatment.     ED Arrival Information       Patient not seen in ED                       No chief complaint on file.      Initial Presentation: 63 y.o. female w/ PMH of morbid obesity, COPD, CHF, HTN presented to [facility] from home, direct admitted as Inpatient d/t abnormal CT abdomen pelvis finding concerning for pneumoperitoneum and perforated viscus.  Pt was admitted on 03/12, dx w/ acute on chronic HFpEF complicated w/ LETICIA, encephalopathy, hypercapnia placed on CPAP, dc'd home today on new 4L NC O2. She c/o of abd pain, CT abd done but dc'd prior to result.  CT abdomen pelvis  noted with modest amount of pneumoperitoneum, finding concerning for ruptured hollow viscus, nodule in the right lower quadrant mesentery may be reactive, recommended to have follow-up CT/further workup for possibility of mesenteric mass such as carcinoid, small bilateral pleural effusions with compressive atelectasis and mild cardiomegaly noted, subcutaneous edema and thickening of lower anterior abdominal wall finding may represent panniculitis.   Pt was called back for direct admission.  On arrival to [facility], pt on 4L NC, RLQ tenderness , guarding present.  Plan:  started on IV Unasyn, IV Protonix drip. Keep  n.p.o. for now. Gen surg consulted- plan for OR tonight.    03/19 Gen Surg Consult: Pt developed severe lower abdominal pain which was constant, this slightly improved this morning but is still present.   CT scan from today showed free intraperitoneal air   On exam the patient is distended with rebound and guarding in the bilateral lower quadrants consistent with peritonitis  Plan: plan is for diagnostic laparoscopy, possible exploratory laparotomy, possible bowel resection, possible ostomy, all other indicated procedures. Pending bld work.    OP Note:  SURGERY DATE: 3/19/2024   Procedure(s):  DIAGNOSTIC LAPAROSCOPY converted to Exploratory Laparotomy. Right Hemicolectomy. Abdominal wash-out. Abthera Placement  Anesthesia Type: General   Operative Findings:  Upon entry into abdominal cavity there was noted to be grossly purulent material, this was cultured  The abdominal cavity was inspected and there was a significant inflammatory reaction in the right lower quadrant, there appeared to be a perforation in the cecum at a mass  Due to this finding the decision was made to convert to an exploratory laparotomy  Right hemicolectomy was done with anastomosis  Abdomen was washed out with 7 L of saline and an ABThera VAC was placed    Date: 03/20   Day 2:   Critical Care Consult: Dx pneumoperitoneum, acute on chronic resp failure w/ hypoxia and hypercapnia, resp acidosis, acute on chronic HFpEF:  Pt underwent diagnostic laparotomy converted to exploratory laparotomy, right hemicolectomy with anastomosis, washout, and Abthera VAC placement yesterday. She was transferred to the ICU postoperatively, intubated, sedated  for ongoing management.   Plan: F/U culture results. Continue Zosyn . Dilaudid for pain. Cont MV- AC 14/400/50%/+12. Check ABG. Cont sched nebs. Trend LA and procal. Trend fever curve and WBC. Holding diuresis in the immediate post op period. Monitor daily weights. Strict I/O. Will initiate albumin q6  hours    Gen Surg Notes: Pt POD #1 s/p diagnostic laparoscopy converted to exploratory laparotomy with right hemicolectomy, abdominal washout, placement of ABThera VAC for perforated cecum with pneumoperitoneum. Remains intubated, sedated. ABThera VAC 825cc serosang output recorded. NGT scant bilious output. Tentative plan for takeback to OR tomorrow vs Friday pending clinical status for exploratory laparotomy, 2nd look, possible closure of abdomen. NPO/IVF with NGT decompression at this time. IV Zosyn. Trend labs, monitor Hb, WBC, and vitals. I/Os. Serial abdominal exams.  PE:  Abd soft, ABTHera VAC in place holding adequate suction and serosanguinous drainage in [facility]     ED Triage Vitals   Temperature Pulse Respirations Blood Pressure SpO2   03/19/24 1909 03/19/24 1909 03/20/24 0115 03/19/24 1909 03/19/24 1909   98.1 °F (36.7 °C) 68 14 163/77 95 %      Temp Source Heart Rate Source Patient Position - Orthostatic VS BP Location FiO2 (%)   03/20/24 0159 03/20/24 0120 03/20/24 0159 03/20/24 0159 03/20/24 0300   Esophageal Monitor Lying Left arm 50      Pain Score       --                 Wt Readings from Last 1 Encounters:   03/20/24 129 kg (285 lb 0.9 oz)     Additional Vital Signs:   Date/Time Temp Pulse Resp BP MAP (mmHg) Arterial Line BP MAP SpO2 FiO2 (%) Patient Position - Orthostatic VS   03/20/24 0800 98.4 °F (36.9 °C) 56 -- 139/64 92 145/58 84 mmHg 94 % -- --   03/20/24 0738 -- -- -- -- -- -- -- 94 % -- --   03/20/24 0736 -- -- -- -- -- -- -- 96 % -- --   03/20/24 0700 98.1 °F (36.7 °C) 55 14 144/66 95 157/58 86 mmHg 97 % -- Lying   03/20/24 0600 98.2 °F (36.8 °C) 57 14 143/65 93 157/60 88 mmHg 97 % 50  --   FiO2 (%): 370/14/12 at 03/20/24 0600   03/20/24 0500 98.2 °F (36.8 °C) 56 14 146/65 93 166/62 91 mmHg 98 % -- --   03/20/24 0400 98.1 °F (36.7 °C) 56 14 140/63 91 163/59 87 mmHg 98 % 50  Lying   FiO2 (%): 370/14/12 at 03/20/24 0400   03/20/24 0308 -- 54 Abnormal  14 -- -- -- -- 95 % -- --   03/20/24  0300 98.1 °F (36.7 °C) 52 Abnormal  14 113/53 77 141/51 74 mmHg 96 % 50  Lying   FiO2 (%): 370/14/12 at 03/20/24 0300   03/20/24 0230 97.9 °F (36.6 °C) 55 -- 142/67 97 169/60 90 mmHg 97 % -- --   03/20/24 0220 97.9 °F (36.6 °C) 56 -- -- -- 173/62 94 mmHg 97 % -- --   03/20/24 0215 97.9 °F (36.6 °C) 55 -- -- -- 175/68 99 mmHg 95 % -- --   03/20/24 0210 97.9 °F (36.6 °C) 55 -- -- -- 185/71 104 mmHg 97 % -- --   03/20/24 0205 97.9 °F (36.6 °C) 56 -- -- -- 183/72 105 mmHg 98 % -- --   03/20/24 0200 97.9 °F (36.6 °C) 54 Abnormal  -- -- -- 191/76 111 mmHg 97 % -- --   03/20/24 0159 97.9 °F (36.6 °C) 54 Abnormal  14 177/79 Abnormal  114 -- -- 97 % -- Lying   03/20/24 0155 97.9 °F (36.6 °C) 54 Abnormal  -- -- -- 45/22 30 mmHg Abnormal  97 % -- --   03/20/24 0150 97.3 °F (36.3 °C) Abnormal  53 Abnormal  -- -- -- 176/69 101 mmHg 97 % -- --   03/20/24 0145 -- 52 Abnormal  -- -- -- 181/71 104 mmHg 97 % -- --   03/20/24 0140 -- 53 Abnormal  -- -- -- 181/69 103 mmHg 97 % -- --   03/20/24 0135 -- 52 Abnormal  -- -- -- 185/70 104 mmHg 96 % -- --   03/20/24 0130 -- 54 Abnormal  -- -- -- 203/74 120 mmHg 96 % -- --   03/20/24 0125 -- 56 -- -- -- 198/80 124 mmHg 96 % -- --   03/20/24 0120 -- 52 Abnormal  -- -- -- 204/84 123 mmHg 97 % -- --   03/20/24 0115 -- 51 Abnormal  14 -- -- -- -- 97 % -- --       Pertinent Labs/Diagnostic Test Results:   XR chest portable ICU    (Results Pending)         Results from last 7 days   Lab Units 03/20/24 0530 03/20/24 0144 03/19/24 2225 03/19/24 2016 03/17/24  0906   WBC Thousand/uL 12.35* 10.50*  --  14.80* 13.46*   HEMOGLOBIN g/dL 12.0 13.0  --  13.1 12.5   I STAT HEMOGLOBIN g/dl  --   --  13.3  --   --    HEMATOCRIT % 40.4 43.6  --  44.9 43.6   HEMATOCRIT, ISTAT %  --   --  39  --   --    PLATELETS Thousands/uL 153 159  --  162 183   NEUTROS ABS Thousands/µL 10.82*  --   --  13.46*  --          Results from last 7 days   Lab Units 03/20/24 0530 03/20/24 0144 03/19/24 2225 03/19/24 2016  "03/17/24  0906 03/16/24  1347 03/16/24  0643 03/15/24  0528 03/14/24  0427   SODIUM mmol/L 144 144  --  143 140 139 137 140 139   POTASSIUM mmol/L 4.0 3.3*  --  3.9 5.1 5.4* 5.7* 4.7 5.0   CHLORIDE mmol/L 103 103  --  99 102 102 104 106 103   CO2 mmol/L 36* 34*  --  39* 34* 36* 29 29 31   CO2, I-STAT mmol/L  --   --  36*  --   --   --   --   --   --    ANION GAP mmol/L 5 7  --  5 4 1* 4 5 5   BUN mg/dL 35* 38*  --  40* 41* 37* 37* 43* 48*   CREATININE mg/dL 1.03 1.04  --  1.17 1.22 1.14 1.07 1.19 1.69*   EGFR ml/min/1.73sq m 58 58  --  50 47 52 56 49 32   CALCIUM mg/dL 8.4 7.4*  --  8.5 8.5 8.8 8.5 8.4 8.3*   CALCIUM, IONIZED mmol/L 1.09* 1.01*  --   --   --   --   --   --   --    CALCIUM, IONIZED, ISTAT mmol/L  --   --  1.06*  --   --   --   --   --   --    MAGNESIUM mg/dL 2.7 1.9  --   --   --   --  2.5 2.5 2.4   PHOSPHORUS mg/dL 3.7  --   --   --   --   --   --   --   --      Results from last 7 days   Lab Units 03/20/24  0530 03/19/24 2016 03/17/24  0906 03/14/24  0427   AST U/L 12* 15 19 40*   ALT U/L 24 33 49 98*   ALK PHOS U/L 63 84 110* 158*   TOTAL PROTEIN g/dL 5.0* 6.0* 6.0* 6.7   ALBUMIN g/dL 2.9* 3.3* 3.3* 3.8   TOTAL BILIRUBIN mg/dL 1.03* 0.78 0.41 0.46   BILIRUBIN DIRECT mg/dL  --   --   --  0.18     Results from last 7 days   Lab Units 03/20/24  0526 03/14/24  0920   POC GLUCOSE mg/dl 153* 127     Results from last 7 days   Lab Units 03/20/24  0530 03/20/24  0144 03/19/24  2016 03/17/24  0906 03/16/24  1347 03/16/24  0643 03/15/24  0528 03/14/24  0427   GLUCOSE RANDOM mg/dL 143* 160* 150* 213* 164* 138 108 127         Results from last 7 days   Lab Units 03/20/24  0530   HEMOGLOBIN A1C % 6.7*   EAG mg/dl 146     No results found for: \"BETA-HYDROXYBUTYRATE\"   Results from last 7 days   Lab Units 03/20/24  0528 03/20/24  0201 03/14/24  1611   PH ART  7.414 7.449 7.227*   PCO2 ART mm Hg 59.3* 53.2* 82.5*   PO2 ART mm Hg 76.8 71.8* 78.1   HCO3 ART mmol/L 37.1* 36.1* 33.5*   BASE EXC ART mmol/L 10.3 10.2 " 3.5   O2 CONTENT ART mL/dL 17.1 17.3 16.8   O2 HGB, ARTERIAL % 92.7* 92.5* 92.3*   ABG SOURCE  Line, Arterial Line, Arterial Radial, Left     Results from last 7 days   Lab Units 03/18/24  0521 03/15/24  1223 03/13/24  1628   PH PUSHPA  7.352 7.369 7.395   PCO2 PUSHPA mm Hg 71.2* 52.7* 43.2   PO2 PUSHPA mm Hg 117.9* 78.2* 112.5*   HCO3 PUSHPA mmol/L 38.6* 29.7 25.9   BASE EXC PUSHPA mmol/L 10.2 3.3 0.7   O2 CONTENT PUSHPA ml/dL 18.0 17.3 18.1   O2 HGB, VENOUS % 95.6* 92.5* 94.4*     Results from last 7 days   Lab Units 03/19/24  2225   I STAT BASE EXC mmol/L 9*   I STAT O2 SAT % 94*   ISTAT PH ART  7.451*   I STAT ART PCO2 mm HG 49.6*   I STAT ART PO2 mm HG 69.0*   I STAT ART HCO3 mmol/L 34.5*                     Results from last 7 days   Lab Units 03/14/24  0427   TSH 3RD GENERATON uIU/mL 2.618     Results from last 7 days   Lab Units 03/20/24  0530 03/14/24  0427   PROCALCITONIN ng/ml 0.40* 0.13     Results from last 7 days   Lab Units 03/20/24  0144 03/19/24 2016   LACTIC ACID mmol/L 1.0 1.1                                 Results from last 7 days   Lab Units 03/19/24  2016   LIPASE u/L <6*             Results from last 7 days   Lab Units 03/14/24  0958   OSMO UR mmol/     Results from last 7 days   Lab Units 03/19/24  0627 03/14/24  0958   CLARITY UA  Clear Clear   COLOR UA  Light Yellow Yellow   SPEC GRAV UA  1.022 1.020   PH UA  8.0 5.5   GLUCOSE UA mg/dl Negative Negative   KETONES UA mg/dl Negative Negative   BLOOD UA  Negative Negative   PROTEIN UA mg/dl Trace* Trace*   NITRITE UA  Negative Negative   BILIRUBIN UA  Negative Negative   UROBILINOGEN UA (BE) mg/dl 2.0* <2.0   LEUKOCYTES UA  Negative Small*   WBC UA /hpf 2-4* 4-10*   RBC UA /hpf 1-2 1-2   BACTERIA UA /hpf Moderate* Occasional   EPITHELIAL CELLS WET PREP /hpf Occasional Occasional   MUCUS THREADS  Occasional*  --    SODIUM UR   --  16   CREATININE UR mg/dL  --  131.1                                 Results from last 7 days   Lab Units 03/19/24 2017    BLOOD CULTURE  Received in Microbiology Lab. Culture in Progress.  Received in Microbiology Lab. Culture in Progress.                   ED Treatment:   Medication Administration - No Administrations Displayed (No Start Event Found)       None          Past Medical History:   Diagnosis Date    Acute metabolic encephalopathy     Hyperlipidemia     Hypertension     Transaminitis      Present on Admission:   Tobacco abuse   Essential hypertension   COPD (chronic obstructive pulmonary disease) (HCC)   Acute on chronic heart failure with preserved ejection fraction (HCC)   Respiratory acidosis   Hyperlipidemia   Leukocytosis   CAD (coronary artery disease)      Admitting Diagnosis: Pneumoperitoneum  Age/Sex: 63 y.o. female  Admission Orders:  NGT maintenance  Elevate HOB  PT/OT  Cardio-Pulmonary Monitoring, Neuro Checks, Nursing dysphagia screen, I/O, Daily weights, Vital signs  NPO    Scheduled Medications:  Albumin 25%, 25 g, Intravenous, Q6H  budesonide, 0.5 mg, Nebulization, Q12H  chlorhexidine, 15 mL, Mouth/Throat, Q12H ANGELES  heparin (porcine), 7,500 Units, Subcutaneous, Q8H ANGELES  insulin lispro, 1-5 Units, Subcutaneous, Q6H ANGELES  ipratropium, 0.5 mg, Nebulization, TID  levalbuterol, 1.25 mg, Nebulization, TID  nicotine, 1 patch, Transdermal, Daily  pantoprazole, 40 mg, Intravenous, Q24H ANGELES  piperacillin-tazobactam, 4.5 g, Intravenous, Q8H      Continuous IV Infusions:  propofol, 5-50 mcg/kg/min, Intravenous, Titrated      PRN Meds:  hydrALAZINE, 10 mg, Intravenous, Q6H PRN  HYDROmorphone, 1 mg, Intravenous, Q3H PRN        IP CONSULT TO CASE MANAGEMENT    Network Utilization Review Department  ATTENTION: Please call with any questions or concerns to [phone] and carefully listen to the prompts so that you are directed to the right person. All voicemails are confidential.   For Discharge needs, contact Care Management DC Support Team at [phone] opt. 2  Send all requests for admission clinical reviews,  approved or denied determinations and any other requests to dedicated fax number below belonging to the campus where the patient is receiving treatment. List of dedicated fax numbers for the Facilities:  FACILITY NAME UR FAX NUMBER   ADMISSION DENIALS (Administrative/Medical Necessity) [phone]   DISCHARGE SUPPORT TEAM (NETWORK) [phone]   PARENT CHILD HEALTH (Maternity/NICU/Pediatrics) [phone]   [facility] [phone]   [facility] [phone]   [facility] [phone]   [facility] [phone]   [facility] [phone]   [facility] [phone]   [facility] [phone]   [facility] [phone]   [facility] [phone]   [facility] [phone]   [facility] [phone]   [facility] [phone]

## 2024-03-20 NOTE — ASSESSMENT & PLAN NOTE
Pt with COPD and likely RILEY, D/C home with oxygen 4-5L  Remains intubated postoperatively with open abdomen  Continue mechanical ventilation for now  Check ABG and make vent changes as appropriate  Currently on AC 14/400/50%/+12  Continue scheduled nebs  Pt was D/C home on steroids on 3/19, will hold for now as patient has minimal wheezing and in light of surgery   Will need bipap QHS once extubated

## 2024-03-20 NOTE — ASSESSMENT & PLAN NOTE
Per [facility] records patient with single-vessel CAD with ostial RPDA lesion not amenable to PCI due to location of lesion October 2014  Continue medical management-po meds on hold currently

## 2024-03-20 NOTE — ASSESSMENT & PLAN NOTE
Now s/p diag lap converted to exp lap, right hemicolectomy with anastomosis, washout, Abthera placement POD0  Pt had abdominal pain 3/19-CT scan showed free intraperitoneal air  Taken urgently to OR, transferred to ICU postoperatively with abdomen open, Abthera in place, and on mechanical ventilation  In OR, abdominal cavity was noted to have grossly purulent material in it along with perforation in the cecum at a mass  Due to the significant edema of the abdominal wall and contamination of the abdominal cavity, the decision was made to leave the abdomen open and Abthera VAC was placed with plan to return to OR  F/U culture results  Continue Zosyn D1, (abx D2)  Dilaudid for pain  Surgery following

## 2024-03-20 NOTE — OP NOTE
OPERATIVE REPORT  PATIENT NAME: Dwaine Gould    :  1960  MRN: [id number]  Pt Location: [facility] OR ROOM 02    SURGERY DATE: 3/19/2024    Surgeons and Role:     * Roger Joel,  - Primary     * Felipe Nagel PA-C - Assisting    Preop Diagnosis:  Free intraperitoneal air [K66.8]    Post-Op Diagnosis Codes:     * Free intraperitoneal air [K66.8]  Perforated cecal mass    Procedure(s):  DIAGNOSTIC LAPAROSCOPY converted to Exploratory Laparotomy. Right Hemicolectomy. Abdominal wash-out. Abthera Placement    Specimen(s):  ID Type Source Tests Collected by Time Destination   1 : Right Colon Tissue Colon TISSUE EXAM Rogerjacqueline Hanna Marycruz,  3/19/2024 2329    A : Peritoneal cultures Body Fluid Peritoneal Fluid ANAEROBIC CULTURE AND GRAM STAIN, BODY FLUID CULTURE AND GRAM STAIN Roger Joel,  3/19/2024 2200        Estimated Blood Loss:   100 mL    Drains:  Urethral Catheter Straight-tip;Non-latex 16 Fr. (Active)   Number of days: 1       Anesthesia Type:   General    Operative Indications:  Free intraperitoneal air [K66.8]    Operative Findings:  Upon entry into abdominal cavity there was noted to be grossly purulent material, this was cultured  The abdominal cavity was inspected and there was a significant inflammatory reaction in the right lower quadrant, there appeared to be a perforation in the cecum at a mass  Due to this finding the decision was made to convert to an exploratory laparotomy  Right hemicolectomy was done with anastomosis  Abdomen was washed out with 7 L of saline and an ABThera VAC was placed    Complications:   None    Procedure and Technique:  The patient was seen again in Preoperative Holding.  All the risks, benefits, complications, treatment options, and expected outcomes were discussed with the patient and family at length. All questions were answered to satisfaction. There was concurrence with the proposed plan and informed consent was obtained. The site of surgery was  properly noted/marked. The patient was taken to Operating Room, identified, and the procedure verified as diagnostic laparoscopy, possible exploratory laparotomy, possible bowel resection, possible ostomy, all other indicated procedures.    The patient was placed in the supine position on the operating room table.  The patient had received preoperative antibiotics and SCDs placed on the bilateral lower extremities.  Anesthesia was then induced and the patient was intubated.  A De La Cruz catheter was placed.    The abdomen was then prepped and draped in the usual sterile fashion using ChloraPrep.  A Time-Out was then performed with all involved present confirming the correct patient, procedure, antibiotics, and any additional concerns.  A 1.5 centimeter supraumbilical incision was made using a #15 blade and the umbilical stalk was grasped and elevated.  Using blunt dissection the fascia was exposed and was incised.  Using a large blunt Eva clamp the peritoneum was entered under direct visualization.  Air was noted to escape from the abdomen.  A 11 mm port was then placed in the fascial opening and pneumoperitoneum was established.  Initial pressure upon establishing pneumoperitoneum was noted to be low.  Additional 5 mm ports were placed under direct visualization in the following locations: Left upper quadrant in the midclavicular line and left lower quadrant in the midclavicular line.  There was noted to be purulent material throughout the abdominal cavity.  This was suctioned and sent for culture.  Most of the inflammatory response appears to be in the right lower quadrant.  The omentum was peeled back off the cecum and there was noted to be a small perforation at the ileocolic junction.  It was roughly the size of a pencil eraser.  The appendix was noted to be without signs of acute inflammation.  The cecum felt like it had a mass in the same area.  Due to this the decision was made to convert to an exploratory  laparotomy.  The abdomen was then opened through the midline using a #10 blade and electrocautery.  After the abdomen was opened and NG tube was confirmed placement in the stomach.  The small bowel was then run from the ligament of Treitz to the ileocecal valve and no pathology or injury was noted.  There was a mass in the cecum at the terminal ileum junction and palpable nodes in the mesentery.  The remainder of the colon was inspected and there was no inflammation.  At the rectosigmoid area there was some scarring most likely from the patient's prior mesh placement.  There was no obvious inflammation of the sigmoid colon going into this area.  The decision was then made to do a right hemicolectomy for the cecal mass and enlarged lymph nodes.  The right colon was then mobilized along the white line of Toldt.  The transverse colon was freed to the proximal portion.  A MARIBEL blue load stapler was then used to transect the terminal ileum.  The mesentery was then taken with the Enseal device and the enlarged nodes were included.  A MARIBEL blue load stapler was then used to transect the proximal transverse colon just past the hepatic flexure.  The mesentery was then taken with the Enseal device.  The ileocolic and right colic arteries were taken and these were also tied off with 0 Vicryl.  The specimen was then passed off the field.  Both the proximal transverse colon and the distal ileum were viable and only slightly edematous.  The decision was made to perform an anastomosis.  A side-to-side functional end-to-end anastomosis was then created between the distal ileum and the proximal transverse colon.  After the common channel was created with a MARIBEL blue load stapler it was hemostatic and patent.  TA 60 stapler was then used to close the common channel.  Crotch stitches of interrupted 3-0 silk were then placed.  Interrupted 3-0 silk in a Lembert like fashion was used to reinforce the anastomosis.  The anastomosis was noted  to be widely patent.  The abdomen was then copiously irrigated in all 4 quadrants in the pelvis with 7 L of saline.  Hemostasis was noted.  Due to the significant amount of edema of the abdominal wall and also of the contamination of the abdominal cavity the decision was made to keep the abdomen open and place an ABThera VAC.  The 2 left-sided laparoscopic port incisions were closed with staples.    All instrument, sponge, and needle counts were noted to be correct at the conclusion of the case. The patient was brought to the ICU intubated and sedated.  Patient will need to return to the operating room for another planned operation.     I was present for the entire procedure., A qualified resident physician was not available., and A physician assistant was required during the procedure for retraction, tissue handling, dissection and suturing.    Patient Disposition:  intubated and hemodynamically stable.    This procedure was not performed to treat colon cancer through resection      SIGNATURE: Roger Joel DO  DATE: March 20, 2024  TIME: 12:34 AM

## 2024-03-20 NOTE — ANESTHESIA POSTPROCEDURE EVALUATION
Post-Op Assessment Note    CV Status:  Stable  Pain Score: 0    Pain management: adequate       Mental Status:  Unresponsive   Hydration Status:  Euvolemic   PONV Controlled:  Controlled   Airway Patency:  Patent  Airway: intubated     Post Op Vitals Reviewed: Yes    No anethesia notable event occurred.    Staff: CRNA, Anesthesiologist               BP   150/76   Temp   97.5   Pulse  50   Resp   14   SpO2   100

## 2024-03-20 NOTE — RESPIRATORY THERAPY NOTE
RT Ventilator Management Note  Dwaine Gould 63 y.o. female MRN: [id number]  Unit/Bed#: ICU 04 Encounter: [id number]      Daily Screen         3/20/2024  0115 3/20/2024  0738          Patient safety screen outcome:: Failed Failed      Not Ready for Weaning due to:: PEEP > 8cmH2O PEEP > 8cmH2O;Underline problem not resolved;Going on Transport intubated                Physical Exam:   Assessment Type: Pre-treatment  General Appearance: Sedated  Respiratory Pattern: Assisted  Chest Assessment: Chest expansion symmetrical  Bilateral Breath Sounds: Diminished, Scattered  Cough: Productive (assisted)  Suction: ET Tube  O2 Device: vent  Subjective Data: sedated      Resp Comments: pt remains intubated and on full mechnaical support  decreased peep to 10 and fio2 to 40%  skin integrity remains intact  pt is not appropriate for sbt due to being scheduled to go back to the OR tomorrow       03/20/24 0738   Respiratory Assessment   Assessment Type Pre-treatment   General Appearance Sedated   Respiratory Pattern Assisted   Chest Assessment Chest expansion symmetrical   Bilateral Breath Sounds Diminished;Scattered   Suction ET Tube   Resp Comments pt remains intubated and on full mechnaical support  decreased peep to 10 and fio2 to 40%  skin integrity remains intact  pt is not appropriate for sbt due to being scheduled to go back to the OR tomorrow   O2 Device vent   Subjective Data sedated   Vent Information   Vent ID Ruy   Vent type Drager   Drager Vent Mode AC/VC+   Is the patient reintubated? No   $ Pulse Oximetry Spot Check Charge Completed   SpO2 94 %   AC/VC+ Settings   Resp Rate (BPM) 14 BPM   VT (mL) 370 mL   Insp Time (S) 0.98 S   FIO2 (%) (S)  40 %   PEEP (cmH2O) (S)  10 cmH2O   Rise Time (%) 20 %   Trigger Sensitivity Flow (LPM) 2 LPM   Humidification Heater   Heater Temp 98.6 °F (37 °C)   AC/VC+ Actuals   Resp Rate (BPM) 14 BPM   VT (mL) 370 mL   MV (Obs) 4.42   MAP (cmH2O) 13 cmH2O   Peak Pressure (cmH2O) 25 cmH2O    I:E Ratio (Obs) 1:3.4   Static Compliance (mL/cmH20) 34.7 mL/cmH2O   Plateau Pressure (cm H2O) 20.8 cm H2O   Heater Temperature (Obs) 98.6 °F (37 °C)   AC/VC+ ALARMS   High Peak Pressure (cmH2O) 45 cmH2O   High Resp Rate (BPM) 30 BPM   High MV (L/min) 10 L/min   Low MV (L/min) 3 L/min   High VT (mL) 870 mL   Maintenance   Alarm (pink) cable attached Yes   Resuscitation bag with peep valve at bedside Yes   Water bag changed No   Circuit changed No   Daily Screen   Patient safety screen outcome: Failed   Not Ready for Weaning due to: PEEP > 8cmH2O;Underline problem not resolved;Going on Transport intubated   IHI Ventilator Associated Pneumonia Bundle   Daily Assessment of Readiness to Extubate Yes   Head of Bed Elevated HOB 30   Oral Care Mouth suctioned   ETT  Cuffed;Oral;Hi-Lo 7 mm   Placement Date/Time: 03/19/24 2129   Mask Ventilation: Mask ventilation not attempted (0)  Preoxygenated: Yes  Technique: Video laryngoscopy;Rapid sequence  Type: Cuffed;Oral;Hi-Lo  Tube Size: 7 mm  Laryngoscope: rubberit  Blade Size: 3  Location: Oral...   Secured at (cm) 20   Measured from Gums   Secured Location Center   Repositioned (S)  Center to Left   Secured by Commercial tube whitfield   Site Condition Dry   Cuff Pressure (color) Green   HI-LO Suction  Continuous low suction   HI-LO Secretions Scant   HI-LO Intervention Patent

## 2024-03-20 NOTE — ASSESSMENT & PLAN NOTE
63-year-old female patient with past medical history of morbid obesity, COPD, CHF, hypertension, was hospitalized on 03/12/2024 secondary to acute respiratory failure with hypoxia due to acute on chronic HFpEF, volume overload, hospitalization was complicated by acute kidney injury which was treated with holding diuretics and nephrology consult and LETICIA had resolved, was also complicated by encephalopathy due to hypercapnia which was treated with CPAP.  Patient was discharged home today on home oxygen 4 L which is new for her.  Patient had complained of having abdominal pain and abdominal CT scan had been ordered by primary team however patient was discharged prior to the  result of CT abdomen.  Subsequently later in the day on-call provider was contacted and informed by radiology regarding abnormal CT abdomen pelvis finding concerning for pneumoperitoneum and perforated viscus therefore patient was called back by on-call provider as a direct admit.    Currently labs for CBC, CMP, lactic acid, 2 sets of blood cultures ordered and pending.  CT abdomen pelvis report noted with modest amount of pneumoperitoneum, finding concerning for ruptured hollow viscus, nodule in the right lower quadrant mesentery may be reactive, recommended to have follow-up CT/further workup for possibility of mesenteric mass such as carcinoid, small bilateral pleural effusions with compressive atelectasis and mild cardiomegaly noted, subcutaneous edema and thickening of lower anterior abdominal wall finding may represent panniculitis.    Currently on encounter patient appears comfortable not in distress.  She does complain of having abdominal pain towards right lower quadrant, worsening with food.  Currently she denies fever, chills, nausea, vomiting, diarrhea, melena, hematochezia, any other new complaints.  Empirically started on IV Unasyn, IV Protonix drip.  Keep n.p.o. for now.  Discussed with surgery team and plan for OR tonight.  Above  has been discussed with the patient, patient's , daughter and son-in-law at the bedside.

## 2024-03-20 NOTE — PROGRESS NOTES
Progress Note - General Surgery   Dwaine Gould 63 y.o. female MRN: [id number]  Unit/Bed#: ICU 04 Encounter: [id number]    Assessment/Plan  Dwaine Gould is a 63 y.o. female     POD0 s/p diagnostic laparoscopy converted to exploratory laparotomy with right hemicolectomy, abdominal washout, placement of ABThera VAC for perforated cecum with pneumoperitoneum  AVSS, WBC 12.35, Hb 12.0  UOP 1.2L/24hr, Cr 1.03  ABThera VAC 825cc serosang output recorded    NGT scant bilious output    Tentative plan for takeback to OR tomorrow vs Friday pending clinical status for exploratory laparotomy, 2nd look, possible closure of abdomen  NPO/IVF with NGT decompression at this time  IV Zosyn for antimicrobial coverage   Trend labs, monitor Hb, WBC, and vitals   I/Os  Serial abdominal exams  Remainder of care per ICU team, General Surgery will continue to follow along     Subjective/Objective    Subjective: Intubated and sedated     Objective:     Blood pressure 124/58, pulse 60, temperature 98.6 °F (37 °C), resp. rate 14, height 5' (1.524 m), weight 129 kg (285 lb 0.9 oz), SpO2 94%.,Body mass index is 55.67 kg/m².      Intake/Output Summary (Last 24 hours) at 3/20/2024 1024  Last data filed at 3/20/2024 1000  Gross per 24 hour   Intake 2782.08 ml   Output 2130 ml   Net 652.08 ml       Invasive Devices       Peripheral Intravenous Line  Duration             Peripheral IV 03/19/24 Distal;Left;Ventral (anterior) Forearm <1 day    Peripheral IV 03/19/24 Left;Proximal;Ventral (anterior) Forearm <1 day              Arterial Line  Duration             Arterial Line 03/19/24 Right Radial <1 day              Drain  Duration             NG/OG/Enteral Tube Nasogastric Left nare <1 day    Urethral Catheter Straight-tip;Non-latex 16 Fr. <1 day              Airway  Duration             ETT  Cuffed;Oral;Hi-Lo 7 mm <1 day                    Physical Exam: /58   Pulse 60   Temp 98.6 °F (37 °C)   Resp 14   Ht 5' (1.524 m)   Wt 129 kg (285 lb 0.9  oz)   SpO2 94%   BMI 55.67 kg/m²   General appearance:  intubated and sedated  Lungs:  mechanical breath sounds  Heart: regular rate and rhythm, S1, S2 normal, no murmur, click, rub or gallop  Abdomen:  morbidly obese, soft, ABTHera VAC in place holding adequate suction and serosanguinous drainage in cannister  Extremities: extremities normal, warm and well-perfused; no cyanosis, clubbing, or edema    Lab, Imaging and other studies:I have personally reviewed pertinent lab results.     VTE Pharmacologic Prophylaxis: Heparin  VTE Mechanical Prophylaxis: sequential compression device    Recent Results (from the past 36 hour(s))   UA (URINE) with reflex to Scope    Collection Time: 03/19/24  6:27 AM   Result Value Ref Range    Color, UA Light Yellow     Clarity, UA Clear     Specific Gravity, UA 1.022 1.003 - 1.030    pH, UA 8.0 4.5, 5.0, 5.5, 6.0, 6.5, 7.0, 7.5, 8.0    Leukocytes, UA Negative Negative    Nitrite, UA Negative Negative    Protein, UA Trace (A) Negative mg/dl    Glucose, UA Negative Negative mg/dl    Ketones, UA Negative Negative mg/dl    Urobilinogen, UA 2.0 (A) <2.0 mg/dl mg/dl    Bilirubin, UA Negative Negative    Occult Blood, UA Negative Negative   Urine Microscopic    Collection Time: 03/19/24  6:27 AM   Result Value Ref Range    RBC, UA 1-2 None Seen, 1-2 /hpf    WBC, UA 2-4 (A) None Seen, 1-2 /hpf    Epithelial Cells Occasional None Seen, Occasional /hpf    Bacteria, UA Moderate (A) None Seen, Occasional /hpf    MUCUS THREADS Occasional (A) None Seen   Home O2 Setup    Collection Time: 03/19/24  9:51 AM   Result Value Ref Range    Supplier Name [employer]     Supplier Phone Number [phone]     Order Status Delivery Successful     Delivery Note      Delivery Request Date 03/19/2024     Date Delivered  03/19/2024     Supplier Name 03/19/2024     Item Description       Home Oxygen Concentrator with Portability, Adult, High Liter Flow (Over 4LPM)    Item Description        Portable Gaseous Oxygen System, High Liter Flow (Over 4LPM)    Item Description Portable O2 Contents, Gas     Item Description Portable Tank with Conserving Device / Pulse Dose     Item Description O2 Humidifier Bottle, High Liter Flow    Lactic acid, plasma (w/reflex if result > 2.0)    Collection Time: 03/19/24  8:16 PM   Result Value Ref Range    LACTIC ACID 1.1 0.5 - 2.0 mmol/L   CBC and differential    Collection Time: 03/19/24  8:16 PM   Result Value Ref Range    WBC 14.80 (H) 4.31 - 10.16 Thousand/uL    RBC 5.63 (H) 3.81 - 5.12 Million/uL    Hemoglobin 13.1 11.5 - 15.4 g/dL    Hematocrit 44.9 34.8 - 46.1 %    MCV 80 (L) 82 - 98 fL    MCH 23.3 (L) 26.8 - 34.3 pg    MCHC 29.2 (L) 31.4 - 37.4 g/dL    RDW 18.7 (H) 11.6 - 15.1 %    MPV 9.2 8.9 - 12.7 fL    Platelets 162 149 - 390 Thousands/uL    nRBC 0 /100 WBCs    Neutrophils Relative 91 (H) 43 - 75 %    Immature Grans % 1 0 - 2 %    Lymphocytes Relative 3 (L) 14 - 44 %    Monocytes Relative 5 4 - 12 %    Eosinophils Relative 0 0 - 6 %    Basophils Relative 0 0 - 1 %    Neutrophils Absolute 13.46 (H) 1.85 - 7.62 Thousands/µL    Absolute Immature Grans 0.07 0.00 - 0.20 Thousand/uL    Absolute Lymphocytes 0.51 (L) 0.60 - 4.47 Thousands/µL    Absolute Monocytes 0.75 0.17 - 1.22 Thousand/µL    Eosinophils Absolute 0.00 0.00 - 0.61 Thousand/µL    Basophils Absolute 0.01 0.00 - 0.10 Thousands/µL   Comprehensive metabolic panel    Collection Time: 03/19/24  8:16 PM   Result Value Ref Range    Sodium 143 135 - 147 mmol/L    Potassium 3.9 3.5 - 5.3 mmol/L    Chloride 99 96 - 108 mmol/L    CO2 39 (H) 21 - 32 mmol/L    ANION GAP 5 4 - 13 mmol/L    BUN 40 (H) 5 - 25 mg/dL    Creatinine 1.17 0.60 - 1.30 mg/dL    Glucose 150 (H) 65 - 140 mg/dL    Calcium 8.5 8.4 - 10.2 mg/dL    Corrected Calcium 9.1 8.3 - 10.1 mg/dL    AST 15 13 - 39 U/L    ALT 33 7 - 52 U/L    Alkaline Phosphatase 84 34 - 104 U/L    Total Protein 6.0 (L) 6.4 - 8.4 g/dL    Albumin 3.3 (L) 3.5 - 5.0 g/dL     Total Bilirubin 0.78 0.20 - 1.00 mg/dL    eGFR 50 ml/min/1.73sq m   Lipase    Collection Time: 03/19/24  8:16 PM   Result Value Ref Range    Lipase <6 (L) 11 - 82 u/L   Type and screen    Collection Time: 03/19/24  8:16 PM   Result Value Ref Range    ABO Grouping A     Rh Factor Positive     Antibody Screen Negative     Specimen Expiration Date [id number]    Blood culture    Collection Time: 03/19/24  8:17 PM    Specimen: Arm, Left; Blood   Result Value Ref Range    Blood Culture Received in Microbiology Lab. Culture in Progress.    Blood culture    Collection Time: 03/19/24  8:17 PM    Specimen: Arm, Left; Blood   Result Value Ref Range    Blood Culture Received in Microbiology Lab. Culture in Progress.    ABORh Recheck - Contact Blood Bank Prior to Collection    Collection Time: 03/19/24  8:33 PM   Result Value Ref Range    ABO Grouping A     Rh Factor Positive    POCT Blood Gas (CG8+)    Collection Time: 03/19/24 10:25 PM   Result Value Ref Range    pH, Art i-STAT 7.451 (H) 7.350 - 7.450    pCO2, Art i-STAT 49.6 (H) 36.0 - 44.0 mm HG    pO2, ART i-STAT 69.0 (L) 75.0 - 129.0 mm HG    BE, i-STAT 9 (H) -2 - 3 mmol/L    HCO3, Art i-STAT 34.5 (H) 22.0 - 28.0 mmol/L    CO2, i-STAT 36 (H) 21 - 32 mmol/L    O2 Sat, i-STAT 94 (H) 60 - 85 %    SODIUM, I-STAT 142 136 - 145 mmol/l    Potassium, i-STAT 3.6 3.5 - 5.3 mmol/L    Calcium, Ionized i-STAT 1.06 (L) 1.12 - 1.32 mmol/L    Hct, i-STAT 39 34.8 - 46.1 %    Hgb, i-STAT 13.3 11.5 - 15.4 g/dl    Glucose, i-STAT 152 (H) 65 - 140 mg/dl    POC FIO2 80 L    Specimen Type ARTERIAL    ECG 12 lead    Collection Time: 03/20/24  1:14 AM   Result Value Ref Range    Ventricular Rate 54 BPM    Atrial Rate 54 BPM    IA Interval 136 ms    QRSD Interval 86 ms    QT Interval 514 ms    QTC Interval 487 ms    P [place] 54 degrees    QRS Axis -82 degrees    T Wave Axis 250 degrees   CBC    Collection Time: 03/20/24  1:44 AM   Result Value Ref Range    WBC 10.50 (H) 4.31 - 10.16 Thousand/uL    RBC  5.56 (H) 3.81 - 5.12 Million/uL    Hemoglobin 13.0 11.5 - 15.4 g/dL    Hematocrit 43.6 34.8 - 46.1 %    MCV 78 (L) 82 - 98 fL    MCH 23.4 (L) 26.8 - 34.3 pg    MCHC 29.8 (L) 31.4 - 37.4 g/dL    RDW 18.8 (H) 11.6 - 15.1 %    Platelets 159 149 - 390 Thousands/uL    MPV 9.0 8.9 - 12.7 fL   Basic metabolic panel    Collection Time: 03/20/24  1:44 AM   Result Value Ref Range    Sodium 144 135 - 147 mmol/L    Potassium 3.3 (L) 3.5 - 5.3 mmol/L    Chloride 103 96 - 108 mmol/L    CO2 34 (H) 21 - 32 mmol/L    ANION GAP 7 4 - 13 mmol/L    BUN 38 (H) 5 - 25 mg/dL    Creatinine 1.04 0.60 - 1.30 mg/dL    Glucose 160 (H) 65 - 140 mg/dL    Calcium 7.4 (L) 8.4 - 10.2 mg/dL    eGFR 58 ml/min/1.73sq m   Magnesium    Collection Time: 03/20/24  1:44 AM   Result Value Ref Range    Magnesium 1.9 1.9 - 2.7 mg/dL   Calcium, ionized    Collection Time: 03/20/24  1:44 AM   Result Value Ref Range    Calcium, Ionized 1.01 (L) 1.12 - 1.32 mmol/L   Lactic acid, plasma (w/reflex if result > 2.0)    Collection Time: 03/20/24  1:44 AM   Result Value Ref Range    LACTIC ACID 1.0 0.5 - 2.0 mmol/L   Blood gas, arterial    Collection Time: 03/20/24  2:01 AM   Result Value Ref Range    pH, Arterial 7.449 7.350 - 7.450    pCO2, Arterial 53.2 (H) 36.0 - 44.0 mm Hg    pO2, Arterial 71.8 (L) 75.0 - 129.0 mm Hg    HCO3, Arterial 36.1 (H) 22.0 - 28.0 mmol/L    Base Excess, Arterial 10.2 mmol/L    O2 Content, Arterial 17.3 16.0 - 23.0 mL/dL    O2 HGB,Arterial  92.5 (L) 94.0 - 97.0 %    SOURCE Line, Arterial     RADHA TEST Yes     Vent Type- AC AC     AC Rate 14     Tidal Volume 400 ml    Inspired Air (FIO2) 5     PEEP 12    Fingerstick Glucose (POCT)    Collection Time: 03/20/24  5:26 AM   Result Value Ref Range    POC Glucose 153 (H) 65 - 140 mg/dl   Blood gas, arterial    Collection Time: 03/20/24  5:28 AM   Result Value Ref Range    pH, Arterial 7.414 7.350 - 7.450    pCO2, Arterial 59.3 (H) 36.0 - 44.0 mm Hg    pO2, Arterial 76.8 75.0 - 129.0 mm Hg     HCO3, Arterial 37.1 (H) 22.0 - 28.0 mmol/L    Base Excess, Arterial 10.3 mmol/L    O2 Content, Arterial 17.1 16.0 - 23.0 mL/dL    O2 HGB,Arterial  92.7 (L) 94.0 - 97.0 %    SOURCE Line, Arterial     Temperature 98.0 Degrees Fehrenheit    Vent Type- AC AC     AC Rate 14     Tidal Volume 370 ml    Inspired Air (FIO2) 50     PEEP 12    Procalcitonin    Collection Time: 03/20/24  5:30 AM   Result Value Ref Range    Procalcitonin 0.40 (H) <=0.25 ng/ml   CBC and differential    Collection Time: 03/20/24  5:30 AM   Result Value Ref Range    WBC 12.35 (H) 4.31 - 10.16 Thousand/uL    RBC 5.14 (H) 3.81 - 5.12 Million/uL    Hemoglobin 12.0 11.5 - 15.4 g/dL    Hematocrit 40.4 34.8 - 46.1 %    MCV 79 (L) 82 - 98 fL    MCH 23.3 (L) 26.8 - 34.3 pg    MCHC 29.7 (L) 31.4 - 37.4 g/dL    RDW 18.6 (H) 11.6 - 15.1 %    MPV 9.4 8.9 - 12.7 fL    Platelets 153 149 - 390 Thousands/uL    nRBC 0 /100 WBCs    Neutrophils Relative 88 (H) 43 - 75 %    Immature Grans % 0 0 - 2 %    Lymphocytes Relative 7 (L) 14 - 44 %    Monocytes Relative 5 4 - 12 %    Eosinophils Relative 0 0 - 6 %    Basophils Relative 0 0 - 1 %    Neutrophils Absolute 10.82 (H) 1.85 - 7.62 Thousands/µL    Absolute Immature Grans 0.04 0.00 - 0.20 Thousand/uL    Absolute Lymphocytes 0.90 0.60 - 4.47 Thousands/µL    Absolute Monocytes 0.57 0.17 - 1.22 Thousand/µL    Eosinophils Absolute 0.00 0.00 - 0.61 Thousand/µL    Basophils Absolute 0.02 0.00 - 0.10 Thousands/µL   Comprehensive metabolic panel    Collection Time: 03/20/24  5:30 AM   Result Value Ref Range    Sodium 144 135 - 147 mmol/L    Potassium 4.0 3.5 - 5.3 mmol/L    Chloride 103 96 - 108 mmol/L    CO2 36 (H) 21 - 32 mmol/L    ANION GAP 5 4 - 13 mmol/L    BUN 35 (H) 5 - 25 mg/dL    Creatinine 1.03 0.60 - 1.30 mg/dL    Glucose 143 (H) 65 - 140 mg/dL    Calcium 8.4 8.4 - 10.2 mg/dL    Corrected Calcium 9.3 8.3 - 10.1 mg/dL    AST 12 (L) 13 - 39 U/L    ALT 24 7 - 52 U/L    Alkaline Phosphatase 63 34 - 104 U/L    Total  Protein 5.0 (L) 6.4 - 8.4 g/dL    Albumin 2.9 (L) 3.5 - 5.0 g/dL    Total Bilirubin 1.03 (H) 0.20 - 1.00 mg/dL    eGFR 58 ml/min/1.73sq m   Magnesium    Collection Time: 03/20/24  5:30 AM   Result Value Ref Range    Magnesium 2.7 1.9 - 2.7 mg/dL   Calcium, ionized    Collection Time: 03/20/24  5:30 AM   Result Value Ref Range    Calcium, Ionized 1.09 (L) 1.12 - 1.32 mmol/L   Phosphorus    Collection Time: 03/20/24  5:30 AM   Result Value Ref Range    Phosphorus 3.7 2.3 - 4.1 mg/dL   Hemoglobin A1C w/ EAG Estimation    Collection Time: 03/20/24  5:30 AM   Result Value Ref Range    Hemoglobin A1C 6.7 (H) Normal 4.0-5.6%; PreDiabetic 5.7-6.4%; Diabetic >=6.5%; Glycemic control for adults with diabetes <7.0% %     mg/dl

## 2024-03-20 NOTE — UTILIZATION REVIEW
NOTIFICATION OF INPATIENT ADMISSION   AUTHORIZATION REQUEST   SERVICING FACILITY:   [place] [address]  Tax ID: [id number]  NPI: [id number] ATTENDING PROVIDER:  Attending Name and NPI#: Pasquale Fragoso Do [[id number]]  Address: [address] [place] [address]  Phone: [phone]     ADMISSION INFORMATION:  Place of Service: Inpatient [facility]  Place of Service Code: 21  Inpatient Admission Date/Time: 3/19/24  7:05 PM  Discharge Date/Time: No discharge date for patient encounter.  Admitting Diagnosis Code/Description:  Pneumoperitoneum     UTILIZATION REVIEW CONTACT:  Mishel Warren Utilization   Network Utilization Review Department  Phone: [phone]  Fax [phone]  Email: [email or web address]  Contact for approvals/pending authorizations, clinical reviews, and discharge.     PHYSICIAN ADVISORY SERVICES:  Medical Necessity Denial & Oahe-kq-Ttxh Review  Phone: [phone]  Fax: [phone]  Email: [email or web address]     DISCHARGE SUPPORT TEAM:  For Patients Discharge Needs & Updates  Phone: [phone] opt. 2 Fax: [phone]  Email: [email or web address]

## 2024-03-20 NOTE — RESPIRATORY THERAPY NOTE
RT Ventilator Management Note  Dwaine Gould 63 y.o. female MRN: [id number]  Unit/Bed#: ICU 04 Encounter: [id number]      Daily Screen         3/20/2024  0115 3/20/2024  0738          Patient safety screen outcome:: Failed Failed      Not Ready for Weaning due to:: PEEP > 8cmH2O PEEP > 8cmH2O;Underline problem not resolved;Going on Transport intubated                Physical Exam:   Assessment Type: Assess only  General Appearance: Sedated  Respiratory Pattern: Assisted  Chest Assessment: Chest expansion symmetrical  Bilateral Breath Sounds: Diminished  Suction: ET Tube  O2 Device: vent  Subjective Data: sedated      Resp Comments: pt remains intubated and on full supprot  no additional changes today.  Plan is for pt to go the OR tomorrow         03/20/24 1529   Respiratory Assessment   Assessment Type Assess only   General Appearance Sedated   Respiratory Pattern Assisted   Chest Assessment Chest expansion symmetrical   Bilateral Breath Sounds Diminished   Suction ET Tube   Resp Comments pt remains intubated and on full supprot  no additional changes today.  Plan is for pt to go the OR tomorrow   O2 Device vent   Subjective Data sedated   Vent Information   Vent ID Terrell   Vent type Drager   Drager Vent Mode AC/VC+   $ Pulse Oximetry Spot Check Charge Completed   SpO2 95 %   AC/VC+ Settings   Resp Rate (BPM) 14 BPM   VT (mL) 370 mL   Insp Time (S) 0.98 S   FIO2 (%) 40 %   PEEP (cmH2O) 10 cmH2O   Rise Time (%) 20 %   Trigger Sensitivity Flow (LPM) 2 LPM   Humidification Heater   Heater Temp 98.6 °F (37 °C)   AC/VC+ Actuals   Resp Rate (BPM) 14 BPM   VT (mL) 369 mL   MV (Obs) 4.51   MAP (cmH2O) 13 cmH2O   Peak Pressure (cmH2O) 24 cmH2O   I:E Ratio (Obs) 1:3.4   Static Compliance (mL/cmH20) 36.7 mL/cmH2O   Plateau Pressure (cm H2O) 23.3 cm H2O   Heater Temperature (Obs) 99 °F (37.2 °C)   AC/VC+ ALARMS   High Peak Pressure (cmH2O) 45 cmH2O   High Resp Rate (BPM) 30 BPM   High MV (L/min) 10 L/min   Low MV (L/min) 3 L/min    High VT (mL) 870 mL   Maintenance   Alarm (pink) cable attached Yes   Resuscitation bag with peep valve at bedside Yes   Water bag changed No   Circuit changed No   ETT  Cuffed;Oral;Hi-Lo 7 mm   Placement Date/Time: 03/19/24 2129   Mask Ventilation: Mask ventilation not attempted (0)  Preoxygenated: Yes  Technique: Video laryngoscopy;Rapid sequence  Type: Cuffed;Oral;Hi-Lo  Tube Size: 7 mm  Laryngoscope: [employer]  Blade Size: 3  Location: Oral...   Secured at (cm) 20   Measured from Gums   Secured Location Left   Repositioned (S)  Left to Right   Secured by Commercial tube whitfield   Site Condition Dry   Cuff Pressure (color) Green   HI-LO Suction  Continuous low suction   HI-LO Secretions Scant   HI-LO Intervention Patent

## 2024-03-20 NOTE — CASE MANAGEMENT
Case Management Assessment & Discharge Planning Note    Patient name Dwaine Gould  Location ICU 04/ICU 04 MRN [id number]  : 1960 Date 3/20/2024       Current Admission Date: 3/19/2024  Current Admission Diagnosis:Pneumoperitoneum   Patient Active Problem List    Diagnosis Date Noted    Acute on chronic respiratory failure with hypoxia and hypercapnia (HCC) 2024    Stage 3a chronic kidney disease (HCC) 2024    Pneumoperitoneum 2024    Respiratory acidosis 2024    Acute on chronic heart failure with preserved ejection fraction (HCC) 2024    COPD (chronic obstructive pulmonary disease) (HCC) 2024    Leukocytosis 2024    Tobacco abuse 2021    CAD (coronary artery disease) 2021    Essential hypertension 2019    Hyperlipidemia 2019      LOS (days): 1  Geometric Mean LOS (GMLOS) (days):   Days to GMLOS:     OBJECTIVE:  PATIENT READMITTED TO HOSPITAL  Risk of Unplanned Readmission Score: 12.65         Current admission status: Inpatient       Preferred Pharmacy:   [id number] - [place] - [address]  [place] [address]  Phone: [phone] Fax: [phone]    Primary Care Provider: Jordan Ramirez MD    Primary Insurance: [employer]  Secondary Insurance:     ASSESSMENT:  Active Health Care Proxies       MARLI GOULD [facility] Representative - Spouse   Primary Phone: [phone] (Mobile)                 Advance Directives  Does patient have a Health Care POA?: No  Was patient offered paperwork?: Yes (not interested)         Readmission Root Cause  30 Day Readmission: Yes  Who directed you to return to the hospital?: Other (comment) (Our hospital)  Did you understand whom to contact if you had questions or problems?: Yes  Did you get your prescriptions before you left the hospital?: No  Reason:: Preference for own pharmacy  Were you able to get your prescriptions filled when you left  the hospital?: Yes  Did you take your medications as prescribed?: Yes  Were you able to get to your follow-up appointments?: No  Reason:: Readmitted prior to appointment  During previous admission, was a post-acute recommendation made?: Yes  What post-acute resources were offered?: [facility]  Patient was readmitted due to: pneumoperitoneum    Patient Information  Admitted from:: Home  Mental Status: Alert  During Assessment patient was accompanied by: Not accompanied during assessment  Assessment information provided by:: Patient  Primary Caregiver: Self  Caregiver's Name::   Caregiver's Relationship to Patient:: Family Member  Support Systems: Spouse/significant other  County of Residence: [place]  What city do you live in?: [place]  Home entry access options. Select all that apply.: Stairs  Number of steps to enter home.: 4  Do the steps have railings?: No  Type of Current Residence: 2 story home  Upon entering residence, is there a bedroom on the main floor (no further steps)?: No (Sometimes sleeps in a recliner if she is unable to go upstairs)  A bedroom is located on the following floor levels of residence (select all that apply):: 2nd Floor  Upon entering residence, is there a bathroom on the main floor (no further steps)?: Yes  Number of steps to 2nd floor from main floor: One Flight  Living Arrangements: Lives w/ Spouse/significant other  Is patient a ?: No    Activities of Daily Living Prior to Admission  Functional Status: Assistance  Completes ADLs independently?: No  Level of ADL dependence: Assistance  Ambulates independently?: No  Level of ambulatory dependence: Assistance  Does patient use assisted devices?: Yes  Assisted Devices (DME) used: Straight Cane, Wheelchair, Walker, Home Oxygen concentrator  DME Company Name (respiratory supplies): [employer]  O2 Rate(s): 4L  Does patient currently own DME?: Yes  What DME does the patient currently own?: Walker, Straight Cane, Wheelchair  Does  patient have a history of Outpatient Therapy (PT/OT)?: No  Does the patient have a history of Short-Term Rehab?: No  Does patient have a history of HHC?: Yes  Does patient currently have HHC?: Yes ([facility])    Current Home Health Care  Type of Current Home Care Services: Home OT, Home PT, Nurse visit  Current Home Health Agency:: Other (please enter name in comment) ([facility])  Current Home Health Follow-Up Provider:: PCP    Patient Information Continued  Income Source: Unemployed  Does patient have prescription coverage?: Yes  Does patient receive dialysis treatments?: No  Does patient have a history of substance abuse?: Yes  Historical substance use preference:  (other- methamphetamines, pain medication)  History of Withdrawal Symptoms: Other withdrawal symptoms (specify in comment)  Is patient currently in treatment for substance abuse?: N/A - sober  Does patient have a history of Mental Health Diagnosis?: No         Means of Transportation  Means of Transport to Appts:: Family transport          DISCHARGE DETAILS:    Discharge planning discussed with:: daughter  Freedom of Choice: Yes  Comments - Freedom of Choice: DC needs uncertain, referrals done for [facility] and [facility]  CM contacted family/caregiver?: Yes  Were Treatment Team discharge recommendations reviewed with patient/caregiver?: Yes  Did patient/caregiver verbalize understanding of patient care needs?: Yes  Were patient/caregiver advised of the risks associated with not following Treatment Team discharge recommendations?: Yes    Contacts  Patient Contacts: Kay Jimmy  Phone Number: [phone]  Reason/Outcome: Continuity of Care    Requested Home Health Care         Is the patient interested in HHC at discharge?: No         Other Referral/Resources/Interventions Provided:  Referral Comments: DC needs uncertain, referrals done for [facility] and [facility]         Treatment Team Recommendation:  Acute Rehab, Short Term Rehab  Discharge Destination Plan:: Acute Rehab, Short Term Rehab  Transport at Discharge : [facility]

## 2024-03-20 NOTE — QUICK NOTE
Spoke to  (Rocky) at bedside, and Eva (Daughter) over phone. Given update on patient and all questions answered.

## 2024-03-20 NOTE — [facility] ADMISSION
Referral received for patient consideration of [facility] placement for rehab.  Will review and will update CM with determination when known.

## 2024-03-20 NOTE — H&P
[facility]  H&P  Name: Dwaine Gould 63 y.o. female I MRN: [id number]  Unit/Bed#: -01 I Date of Admission: 3/19/2024   Date of Service: 3/19/2024 I Hospital Day: 0      Assessment/Plan   * Pneumoperitoneum  Assessment & Plan  63-year-old female patient with past medical history of morbid obesity, COPD, CHF, hypertension, was hospitalized on 03/12/2024 secondary to acute respiratory failure with hypoxia due to acute on chronic HFpEF, volume overload, hospitalization was complicated by acute kidney injury which was treated with holding diuretics and nephrology consult and LETICIA had resolved, was also complicated by encephalopathy due to hypercapnia which was treated with CPAP.  Patient was discharged home today on home oxygen 4 L which is new for her.  Patient had complained of having abdominal pain and abdominal CT scan had been ordered by primary team however patient was discharged prior to the  result of CT abdomen.  Subsequently later in the day on-call provider was contacted and informed by radiology regarding abnormal CT abdomen pelvis finding concerning for pneumoperitoneum and perforated viscus therefore patient was called back by on-call provider as a direct admit.    Currently labs for CBC, CMP, lactic acid, 2 sets of blood cultures ordered and pending.  CT abdomen pelvis report noted with modest amount of pneumoperitoneum, finding concerning for ruptured hollow viscus, nodule in the right lower quadrant mesentery may be reactive, recommended to have follow-up CT/further workup for possibility of mesenteric mass such as carcinoid, small bilateral pleural effusions with compressive atelectasis and mild cardiomegaly noted, subcutaneous edema and thickening of lower anterior abdominal wall finding may represent panniculitis.    Currently on encounter patient appears comfortable not in distress.  She does complain of having abdominal pain towards right lower quadrant, worsening with  food.  Currently she denies fever, chills, nausea, vomiting, diarrhea, melena, hematochezia, any other new complaints.  Empirically started on IV Unasyn, IV Protonix drip.  Keep n.p.o. for now.  Discussed with surgery team and plan for OR tonight.  Above has been discussed with the patient, patient's , daughter and son-in-law at the bedside.          Respiratory acidosis  Assessment & Plan  On prior hospitalization she was noted to have an episode of encephalopathy due to respiratory acidosis, hypercapnia which was treated with CPAP.  Currently she is awake, alert, oriented x 3.  CPAP nightly for now.      Essential hypertension  Assessment & Plan  Present on admission with history of hypertension.  Blood pressure currently reasonably controlled.  Currently holding home dose of labetalol, torsemide since NPO.  Continue IV labetalol as needed.    COPD (chronic obstructive pulmonary disease) ([facility])  Assessment & Plan  Present on admission history of COPD.  Patient was discharged home today with home oxygen 4 to 5 L which is new for her.  She was not on home o2 prior.   Currently not in acute exacerbation.  O2 saturation 92 to 94% on 4 L nasal cannula.  Patient was discharged on tapering dose of prednisone however currently strict NPO.  Currently not in exacerbation.      Chronic heart failure with preserved ejection fraction (HFpEF) ([facility])  Assessment & Plan  Wt Readings from Last 3 Encounters:   03/19/24 128 kg (282 lb 3 oz)   07/20/21 103 kg (227 lb 8.2 oz)   09/22/20 96.9 kg (213 lb 10 oz)     Echo report from 03/12/24 noted with EF of 60%.  Patient was hospitalized from 03/12-3/18 due to acute on chronic CHF exacerbation.  Patient was discharged home on torsemide 20 mg daily.  Holding home dose of torsemide since currently NPO going to OR.  Monitor volume status          Tobacco abuse  Assessment & Plan  Patient used to smoke 2 packs a day.  Reports last cigarette was before hospitalization on  3/12/24.  Counseled on continuous cessation.       VTE Prophylaxis: Enoxaparin (Lovenox)     Code Status: Level 1 Full code    Discussion with family: Spoke with , daughter, son-in-law at the bedside.    Anticipated Length of Stay:  Patient will be admitted on an Inpatient basis with an anticipated length of stay of  > 2 midnights.   Justification for Hospital Stay: Pneumoperitoneum concerning for perforated viscus.    Total Time for Visit, including Counseling / Coordination of Care: 90 minutes.  Greater than 50% of this total time spent on direct patient counseling and coordination of care.    Chief Complaint:   Right lower quadrant pain, pneumoperitoneum.    History of Present Illness:    Dwaine Gould is a 63 y.o. female  patient with past medical history of morbid obesity, COPD, CHF, hypertension, was hospitalized on 03/12/2024 secondary to acute respiratory failure with hypoxia due to acute on chronic HFpEF, volume overload, hospitalization was complicated by acute kidney injury which was treated with holding diuretics and nephrology consult and LETICIA had resolved, was also complicated by encephalopathy due to hypercapnia which was treated with CPAP.  Patient was discharged home today on home oxygen 4 L which is new for her.  Patient had complained of having abdominal pain and abdominal CT scan had been ordered by primary team however patient was discharged prior to CT abdomen pelvis result and subsequently later in the day on-call provider was contacted and informed by radiology regarding abnormal CT abdomen pelvis finding concerning for pneumoperitoneum and possible perforated viscus therefore patient was called back by on-call provider as a direct admit.on my encounter patient appears comfortable not in distress.  Currently she is complaining of right lower quadrant abdominal pain which is worsening with food.  Currently she denies chest pain, dyspnea, fever, chills, nausea, vomiting, diarrhea, dysuria,  hematuria, melena, hematochezia, any other new complaints.  No other events reported.  Last cigarette was before hospitalization on 03/12/24.  Currently she denies any other new complaints.  No other events were.  Patient and family member are extremely appreciative of care.  Daughter at the bedside reports that she is glad that her mother had complaint of abdominal pain which led for the provider to follow-up with CT scan results otherwise she usually does not complain.     Review of Systems:    Review of Systems   Constitutional:  Negative for chills, diaphoresis, fatigue and fever.   HENT:  Negative for congestion.    Eyes:  Negative for visual disturbance.   Respiratory:  Negative for cough and shortness of breath.    Gastrointestinal:  Positive for abdominal distention and abdominal pain.   Endocrine: Negative for polyuria.   Genitourinary:  Negative for dysuria.   Neurological:  Negative for weakness.   Psychiatric/Behavioral:  Negative for agitation.        Past Medical and Surgical History:     Past Medical History:   Diagnosis Date    Hyperlipidemia     Hypertension        Past Surgical History:   Procedure Laterality Date    CARDIAC CATHETERIZATION      GANGLION CYST EXCISION      TYMPANOSTOMY TUBE PLACEMENT         Meds/Allergies:    Prior to Admission medications    Medication Sig Start Date End Date Taking? Authorizing Provider   albuterol (2.5 mg/3 mL) 0.083 % nebulizer solution Take 2.5 mg by nebulization every 6 (six) hours as needed for wheezing or shortness of breath    Historical Provider, MD   atorvastatin (Lipitor) 40 mg tablet Take 40 mg by mouth daily at bedtime    Historical Provider, MD   cholecalciferol (VITAMIN D3) 1,000 units tablet Take 50,000 Units by mouth once a week    Historical Provider, MD   escitalopram (Lexapro) 5 mg tablet Take 5 mg by mouth daily    Historical Provider, MD   fluticasone-umeclidinium-vilanterol (Trelegy Ellipta) 100-62.5-25 mcg/actuation inhaler Inhale 1 puff  daily Rinse mouth after use.    Historical Provider, MD   hydrOXYzine HCL (ATARAX) 25 mg tablet Take 25 mg by mouth daily at bedtime as needed for itching    Historical Provider, MD   labetalol (NORMODYNE) 300 mg tablet Take 300 mg by mouth 3 (three) times a day    Historical Provider, MD   meloxicam (MOBIC) 15 mg tablet Take 15 mg by mouth daily    Historical Provider, MD   nicotine (NICODERM CQ) 21 mg/24 hr TD 24 hr patch Place 1 patch on the skin over 24 hours daily 3/20/24   Milagro Mike PA-C   predniSONE 20 mg tablet Take 2 tablets (40 mg total) by mouth daily for 2 days, THEN 1.5 tablets (30 mg total) daily for 2 days, THEN 1 tablet (20 mg total) daily for 2 days, THEN 0.5 tablets (10 mg total) daily for 2 days. Do not start before March 20, 2024. 3/20/24 3/28/24  Milagro Mike PA-C   rOPINIRole (REQUIP) 0.5 mg tablet Take 1 tablet (0.5 mg total) by mouth 3 (three) times a day 3/19/24   Milagro Mike PA-C   topiramate (Topamax) 25 mg tablet Take 25 mg by mouth 2 (two) times a day    Historical Provider, MD   torsemide (DEMADEX) 20 mg tablet Take 1 tablet (20 mg total) by mouth daily 3/20/24   Milagro Mike PA-C   lisinopril (ZESTRIL) 20 mg tablet Take 20 mg by mouth 2 (two) times a day  3/19/24  Historical Provider, MD     I have reviewed home medications with a medical source (PCP, Pharmacy, other).    Allergies:   Allergies   Allergen Reactions    Codeine Anaphylaxis    Contrast [Iodinated Contrast Media] GI Intolerance    Prednisone Irritability     Increase in anger/abusive behaviors       Social History:     Marital Status: /Civil Union     Substance Use History:   Social History     Substance and Sexual Activity   Alcohol Use Never     Social History     Tobacco Use   Smoking Status Every Day    Current packs/day: 0.50    Types: Cigarettes   Smokeless Tobacco Never     Social History     Substance and Sexual Activity   Drug Use Never       Family History:    No family history on  file.    Physical Exam:     Vitals:   Blood Pressure: 163/77 (03/19/24 1909)  Pulse: 68 (03/19/24 1909)  Temperature: 98.1 °F (36.7 °C) (03/19/24 1909)  SpO2: 95 % (03/19/24 1909)    Physical Exam  Constitutional:       General: She is not in acute distress.     Appearance: Normal appearance. She is not ill-appearing, toxic-appearing or diaphoretic.      Comments: Morbidly obese female patient, acutely nontoxic appearing.   HENT:      Head: Normocephalic and atraumatic.   Eyes:      Pupils: Pupils are equal, round, and reactive to light.   Cardiovascular:      Rate and Rhythm: Normal rate.      Pulses: Normal pulses.   Pulmonary:      Effort: Pulmonary effort is normal. No respiratory distress.      Breath sounds: No wheezing.      Comments: O2 saturation noted 94 to 95% on 4 L nasal cannula.  Abdominal:      General: Bowel sounds are normal. There is distension.      Palpations: Abdomen is soft.      Tenderness: There is abdominal tenderness (Right lower quadrant tenderness noted on exam along with guarding.). There is guarding.   Musculoskeletal:      Right lower leg: Edema present.      Left lower leg: Edema present.   Neurological:      Mental Status: She is alert and oriented to person, place, and time. Mental status is at baseline.   Psychiatric:         Mood and Affect: Mood normal.         Behavior: Behavior normal.           Additional Data:     Lab Results: I have personally reviewed pertinent reports.      Results from last 7 days   Lab Units 03/19/24 2016 03/14/24  0427 03/13/24  0514   WBC Thousand/uL 14.80*   < > 11.24*   HEMOGLOBIN g/dL 13.1   < > 12.8   HEMATOCRIT % 44.9   < > 43.5   PLATELETS Thousands/uL 162   < > 233   NEUTROS PCT %  --   --  77*   LYMPHS PCT %  --   --  13*   MONOS PCT %  --   --  8   EOS PCT %  --   --  1    < > = values in this interval not displayed.     Results from last 7 days   Lab Units 03/17/24  0906   SODIUM mmol/L 140   POTASSIUM mmol/L 5.1   CHLORIDE mmol/L 102   CO2  mmol/L 34*   BUN mg/dL 41*   CREATININE mg/dL 1.22   ANION GAP mmol/L 4   CALCIUM mg/dL 8.5   ALBUMIN g/dL 3.3*   TOTAL BILIRUBIN mg/dL 0.41   ALK PHOS U/L 110*   ALT U/L 49   AST U/L 19   GLUCOSE RANDOM mg/dL 213*         Results from last 7 days   Lab Units 03/14/24  0920   POC GLUCOSE mg/dl 127         Results from last 7 days   Lab Units 03/19/24 2016 03/14/24  0427   LACTIC ACID mmol/L 1.1  --    PROCALCITONIN ng/ml  --  0.13       Imaging: I have personally reviewed pertinent reports.      No orders to display           ** Please Note: This note has been constructed using a voice recognition system. **

## 2024-03-21 ENCOUNTER — ANESTHESIA (INPATIENT)
Dept: PERIOP | Facility: HOSPITAL | Age: 64
DRG: 230 | End: 2024-03-21
Payer: COMMERCIAL

## 2024-03-21 PROBLEM — D69.6 THROMBOCYTOPENIA (HCC): Status: ACTIVE | Noted: 2024-03-21

## 2024-03-21 PROBLEM — D64.9 ANEMIA: Status: ACTIVE | Noted: 2024-03-21

## 2024-03-21 LAB
ALBUMIN SERPL BCP-MCNC: 3.5 G/DL (ref 3.5–5)
ALP SERPL-CCNC: 49 U/L (ref 34–104)
ALT SERPL W P-5'-P-CCNC: 15 U/L (ref 7–52)
ANION GAP SERPL CALCULATED.3IONS-SCNC: 5 MMOL/L (ref 4–13)
AST SERPL W P-5'-P-CCNC: 12 U/L (ref 13–39)
BILIRUB SERPL-MCNC: 0.91 MG/DL (ref 0.2–1)
BUN SERPL-MCNC: 25 MG/DL (ref 5–25)
CA-I BLD-SCNC: 1.04 MMOL/L (ref 1.12–1.32)
CALCIUM SERPL-MCNC: 8.1 MG/DL (ref 8.4–10.2)
CHLORIDE SERPL-SCNC: 104 MMOL/L (ref 96–108)
CO2 SERPL-SCNC: 37 MMOL/L (ref 21–32)
CREAT SERPL-MCNC: 1.14 MG/DL (ref 0.6–1.3)
ERYTHROCYTE [DISTWIDTH] IN BLOOD BY AUTOMATED COUNT: 18.6 % (ref 11.6–15.1)
GFR SERPL CREATININE-BSD FRML MDRD: 52 ML/MIN/1.73SQ M
GLUCOSE SERPL-MCNC: 103 MG/DL (ref 65–140)
GLUCOSE SERPL-MCNC: 93 MG/DL (ref 65–140)
GLUCOSE SERPL-MCNC: 95 MG/DL (ref 65–140)
HCT VFR BLD AUTO: 35.1 % (ref 34.8–46.1)
HGB BLD-MCNC: 10.5 G/DL (ref 11.5–15.4)
MAGNESIUM SERPL-MCNC: 2.3 MG/DL (ref 1.9–2.7)
MCH RBC QN AUTO: 23.7 PG (ref 26.8–34.3)
MCHC RBC AUTO-ENTMCNC: 29.9 G/DL (ref 31.4–37.4)
MCV RBC AUTO: 79 FL (ref 82–98)
MRSA NOSE QL CULT: NORMAL
PHOSPHATE SERPL-MCNC: 3.3 MG/DL (ref 2.3–4.1)
PLATELET # BLD AUTO: 133 THOUSANDS/UL (ref 149–390)
PMV BLD AUTO: 9.6 FL (ref 8.9–12.7)
POTASSIUM SERPL-SCNC: 3.5 MMOL/L (ref 3.5–5.3)
PROCALCITONIN SERPL-MCNC: 0.39 NG/ML
PROT SERPL-MCNC: 5.3 G/DL (ref 6.4–8.4)
RBC # BLD AUTO: 4.43 MILLION/UL (ref 3.81–5.12)
SODIUM SERPL-SCNC: 146 MMOL/L (ref 135–147)
WBC # BLD AUTO: 10.46 THOUSAND/UL (ref 4.31–10.16)

## 2024-03-21 PROCEDURE — 80053 COMPREHEN METABOLIC PANEL: CPT | Performed by: NURSE PRACTITIONER

## 2024-03-21 PROCEDURE — 83735 ASSAY OF MAGNESIUM: CPT | Performed by: NURSE PRACTITIONER

## 2024-03-21 PROCEDURE — 84100 ASSAY OF PHOSPHORUS: CPT | Performed by: NURSE PRACTITIONER

## 2024-03-21 PROCEDURE — 84145 PROCALCITONIN (PCT): CPT

## 2024-03-21 PROCEDURE — 85027 COMPLETE CBC AUTOMATED: CPT

## 2024-03-21 PROCEDURE — 5A1945Z RESPIRATORY VENTILATION, 24-96 CONSECUTIVE HOURS: ICD-10-PCS | Performed by: ANESTHESIOLOGY

## 2024-03-21 PROCEDURE — 94640 AIRWAY INHALATION TREATMENT: CPT

## 2024-03-21 PROCEDURE — 82330 ASSAY OF CALCIUM: CPT | Performed by: NURSE PRACTITIONER

## 2024-03-21 PROCEDURE — 94760 N-INVAS EAR/PLS OXIMETRY 1: CPT

## 2024-03-21 PROCEDURE — 0JQ80ZZ REPAIR ABDOMEN SUBCUTANEOUS TISSUE AND FASCIA, OPEN APPROACH: ICD-10-PCS | Performed by: SURGERY

## 2024-03-21 PROCEDURE — 94003 VENT MGMT INPAT SUBQ DAY: CPT

## 2024-03-21 PROCEDURE — 49900 REPAIR OF ABDOMINAL WALL: CPT | Performed by: SURGERY

## 2024-03-21 PROCEDURE — 82948 REAGENT STRIP/BLOOD GLUCOSE: CPT

## 2024-03-21 PROCEDURE — C9113 INJ PANTOPRAZOLE SODIUM, VIA: HCPCS

## 2024-03-21 PROCEDURE — 49900 REPAIR OF ABDOMINAL WALL: CPT | Performed by: PHYSICIAN ASSISTANT

## 2024-03-21 RX ORDER — HYDROMORPHONE HCL/PF 1 MG/ML
SYRINGE (ML) INJECTION AS NEEDED
Status: DISCONTINUED | OUTPATIENT
Start: 2024-03-21 | End: 2024-03-21

## 2024-03-21 RX ORDER — MAGNESIUM HYDROXIDE 1200 MG/15ML
LIQUID ORAL AS NEEDED
Status: DISCONTINUED | OUTPATIENT
Start: 2024-03-21 | End: 2024-03-21 | Stop reason: HOSPADM

## 2024-03-21 RX ORDER — FENTANYL CITRATE 50 UG/ML
INJECTION, SOLUTION INTRAMUSCULAR; INTRAVENOUS AS NEEDED
Status: DISCONTINUED | OUTPATIENT
Start: 2024-03-21 | End: 2024-03-21

## 2024-03-21 RX ORDER — FUROSEMIDE 10 MG/ML
20 INJECTION INTRAMUSCULAR; INTRAVENOUS DAILY
Status: DISCONTINUED | OUTPATIENT
Start: 2024-03-21 | End: 2024-03-22

## 2024-03-21 RX ORDER — KETAMINE HCL IN NACL, ISO-OSM 100MG/10ML
50 SYRINGE (ML) INJECTION ONCE
Status: COMPLETED | OUTPATIENT
Start: 2024-03-22 | End: 2024-03-22

## 2024-03-21 RX ORDER — SODIUM CHLORIDE, SODIUM LACTATE, POTASSIUM CHLORIDE, CALCIUM CHLORIDE 600; 310; 30; 20 MG/100ML; MG/100ML; MG/100ML; MG/100ML
INJECTION, SOLUTION INTRAVENOUS CONTINUOUS PRN
Status: DISCONTINUED | OUTPATIENT
Start: 2024-03-21 | End: 2024-03-21

## 2024-03-21 RX ORDER — FENTANYL CITRATE-0.9 % NACL/PF 10 MCG/ML
50 PLASTIC BAG, INJECTION (ML) INTRAVENOUS CONTINUOUS
Status: DISCONTINUED | OUTPATIENT
Start: 2024-03-21 | End: 2024-03-21

## 2024-03-21 RX ORDER — ROCURONIUM BROMIDE 10 MG/ML
INJECTION, SOLUTION INTRAVENOUS AS NEEDED
Status: DISCONTINUED | OUTPATIENT
Start: 2024-03-21 | End: 2024-03-21

## 2024-03-21 RX ORDER — POTASSIUM CHLORIDE 14.9 MG/ML
20 INJECTION INTRAVENOUS ONCE
Qty: 100 ML | Refills: 0 | Status: COMPLETED | OUTPATIENT
Start: 2024-03-21 | End: 2024-03-21

## 2024-03-21 RX ORDER — CALCIUM GLUCONATE 20 MG/ML
2 INJECTION, SOLUTION INTRAVENOUS ONCE
Status: COMPLETED | OUTPATIENT
Start: 2024-03-21 | End: 2024-03-21

## 2024-03-21 RX ORDER — MIDAZOLAM HYDROCHLORIDE 2 MG/2ML
INJECTION, SOLUTION INTRAMUSCULAR; INTRAVENOUS AS NEEDED
Status: DISCONTINUED | OUTPATIENT
Start: 2024-03-21 | End: 2024-03-21

## 2024-03-21 RX ADMIN — HYDROMORPHONE HYDROCHLORIDE 1 MG: 1 INJECTION, SOLUTION INTRAMUSCULAR; INTRAVENOUS; SUBCUTANEOUS at 22:47

## 2024-03-21 RX ADMIN — PIPERACILLIN AND TAZOBACTAM 4.5 G: 36; 4.5 INJECTION, POWDER, FOR SOLUTION INTRAVENOUS at 04:53

## 2024-03-21 RX ADMIN — HEPARIN SODIUM 7500 UNITS: 5000 INJECTION INTRAVENOUS; SUBCUTANEOUS at 21:20

## 2024-03-21 RX ADMIN — CHLORHEXIDINE GLUCONATE 0.12% ORAL RINSE 15 ML: 1.2 LIQUID ORAL at 20:17

## 2024-03-21 RX ADMIN — HYDROMORPHONE HYDROCHLORIDE 0.5 MG: 1 INJECTION, SOLUTION INTRAMUSCULAR; INTRAVENOUS; SUBCUTANEOUS at 15:37

## 2024-03-21 RX ADMIN — METHYLNALTREXONE BROMIDE 20 MG: 12 INJECTION, SOLUTION SUBCUTANEOUS at 17:47

## 2024-03-21 RX ADMIN — CHLORHEXIDINE GLUCONATE 0.12% ORAL RINSE 15 ML: 1.2 LIQUID ORAL at 08:25

## 2024-03-21 RX ADMIN — PROPOFOL 35 MCG/KG/MIN: 10 INJECTION, EMULSION INTRAVENOUS at 15:35

## 2024-03-21 RX ADMIN — ALBUMIN (HUMAN) 25 G: 0.25 INJECTION, SOLUTION INTRAVENOUS at 01:27

## 2024-03-21 RX ADMIN — PIPERACILLIN AND TAZOBACTAM 4.5 G: 36; 4.5 INJECTION, POWDER, FOR SOLUTION INTRAVENOUS at 11:32

## 2024-03-21 RX ADMIN — LEVALBUTEROL HYDROCHLORIDE 1.25 MG: 1.25 SOLUTION RESPIRATORY (INHALATION) at 13:07

## 2024-03-21 RX ADMIN — HYDROMORPHONE HYDROCHLORIDE 1 MG: 1 INJECTION, SOLUTION INTRAMUSCULAR; INTRAVENOUS; SUBCUTANEOUS at 04:17

## 2024-03-21 RX ADMIN — BUDESONIDE INHALATION 0.5 MG: 0.5 SUSPENSION RESPIRATORY (INHALATION) at 19:34

## 2024-03-21 RX ADMIN — ALBUMIN (HUMAN) 25 G: 0.25 INJECTION, SOLUTION INTRAVENOUS at 07:23

## 2024-03-21 RX ADMIN — SODIUM CHLORIDE, SODIUM LACTATE, POTASSIUM CHLORIDE, AND CALCIUM CHLORIDE: .6; .31; .03; .02 INJECTION, SOLUTION INTRAVENOUS at 10:48

## 2024-03-21 RX ADMIN — BUDESONIDE INHALATION 0.5 MG: 0.5 SUSPENSION RESPIRATORY (INHALATION) at 07:33

## 2024-03-21 RX ADMIN — HEPARIN SODIUM 7500 UNITS: 5000 INJECTION INTRAVENOUS; SUBCUTANEOUS at 15:37

## 2024-03-21 RX ADMIN — FUROSEMIDE 20 MG: 10 INJECTION, SOLUTION INTRAMUSCULAR; INTRAVENOUS at 13:54

## 2024-03-21 RX ADMIN — POTASSIUM CHLORIDE 20 MEQ: 14.9 INJECTION, SOLUTION INTRAVENOUS at 01:10

## 2024-03-21 RX ADMIN — PANTOPRAZOLE SODIUM 40 MG: 40 INJECTION, POWDER, FOR SOLUTION INTRAVENOUS at 08:15

## 2024-03-21 RX ADMIN — NICOTINE 1 PATCH: 21 PATCH, EXTENDED RELEASE TRANSDERMAL at 08:16

## 2024-03-21 RX ADMIN — PROPOFOL 30 MCG/KG/MIN: 10 INJECTION, EMULSION INTRAVENOUS at 08:44

## 2024-03-21 RX ADMIN — CALCIUM GLUCONATE 2 G: 20 INJECTION, SOLUTION INTRAVENOUS at 06:16

## 2024-03-21 RX ADMIN — ALBUMIN (HUMAN) 25 G: 0.25 INJECTION, SOLUTION INTRAVENOUS at 13:54

## 2024-03-21 RX ADMIN — POTASSIUM CHLORIDE 20 MEQ: 14.9 INJECTION, SOLUTION INTRAVENOUS at 06:09

## 2024-03-21 RX ADMIN — PROPOFOL 25 MCG/KG/MIN: 10 INJECTION, EMULSION INTRAVENOUS at 00:24

## 2024-03-21 RX ADMIN — IPRATROPIUM BROMIDE 0.5 MG: 0.5 SOLUTION RESPIRATORY (INHALATION) at 19:34

## 2024-03-21 RX ADMIN — Medication 50 MG: at 08:15

## 2024-03-21 RX ADMIN — ALBUMIN (HUMAN) 25 G: 0.25 INJECTION, SOLUTION INTRAVENOUS at 20:17

## 2024-03-21 RX ADMIN — IPRATROPIUM BROMIDE 0.5 MG: 0.5 SOLUTION RESPIRATORY (INHALATION) at 13:07

## 2024-03-21 RX ADMIN — FENTANYL CITRATE 100 MCG: 50 INJECTION, SOLUTION INTRAMUSCULAR; INTRAVENOUS at 10:48

## 2024-03-21 RX ADMIN — MIDAZOLAM HYDROCHLORIDE 2 MG: 1 INJECTION, SOLUTION INTRAMUSCULAR; INTRAVENOUS at 12:24

## 2024-03-21 RX ADMIN — HEPARIN SODIUM 7500 UNITS: 5000 INJECTION INTRAVENOUS; SUBCUTANEOUS at 05:31

## 2024-03-21 RX ADMIN — HYDROMORPHONE HYDROCHLORIDE 0.5 MG: 1 INJECTION, SOLUTION INTRAMUSCULAR; INTRAVENOUS; SUBCUTANEOUS at 11:26

## 2024-03-21 RX ADMIN — ROCURONIUM BROMIDE 30 MG: 10 INJECTION, SOLUTION INTRAVENOUS at 11:39

## 2024-03-21 RX ADMIN — PROPOFOL 40 MCG/KG/MIN: 10 INJECTION, EMULSION INTRAVENOUS at 21:20

## 2024-03-21 RX ADMIN — LEVALBUTEROL HYDROCHLORIDE 1.25 MG: 1.25 SOLUTION RESPIRATORY (INHALATION) at 19:34

## 2024-03-21 RX ADMIN — POTASSIUM CHLORIDE 20 MEQ: 14.9 INJECTION, SOLUTION INTRAVENOUS at 08:16

## 2024-03-21 RX ADMIN — HYDROMORPHONE HYDROCHLORIDE 0.5 MG: 1 INJECTION, SOLUTION INTRAMUSCULAR; INTRAVENOUS; SUBCUTANEOUS at 03:06

## 2024-03-21 RX ADMIN — MIDAZOLAM HYDROCHLORIDE 2 MG: 1 INJECTION, SOLUTION INTRAMUSCULAR; INTRAVENOUS at 10:48

## 2024-03-21 RX ADMIN — IPRATROPIUM BROMIDE 0.5 MG: 0.5 SOLUTION RESPIRATORY (INHALATION) at 07:33

## 2024-03-21 RX ADMIN — PROPOFOL 40 MCG/KG/MIN: 10 INJECTION, EMULSION INTRAVENOUS at 18:11

## 2024-03-21 RX ADMIN — HYDRALAZINE HYDROCHLORIDE 10 MG: 20 INJECTION INTRAMUSCULAR; INTRAVENOUS at 12:28

## 2024-03-21 RX ADMIN — PROPOFOL 20 MCG/KG/MIN: 10 INJECTION, EMULSION INTRAVENOUS at 05:01

## 2024-03-21 RX ADMIN — HYDROMORPHONE HYDROCHLORIDE 0.5 MG: 1 INJECTION, SOLUTION INTRAMUSCULAR; INTRAVENOUS; SUBCUTANEOUS at 08:15

## 2024-03-21 RX ADMIN — ROCURONIUM BROMIDE 50 MG: 10 INJECTION, SOLUTION INTRAVENOUS at 10:48

## 2024-03-21 RX ADMIN — LEVALBUTEROL HYDROCHLORIDE 1.25 MG: 1.25 SOLUTION RESPIRATORY (INHALATION) at 07:33

## 2024-03-21 RX ADMIN — HYDROMORPHONE HYDROCHLORIDE 1 MG: 1 INJECTION, SOLUTION INTRAMUSCULAR; INTRAVENOUS; SUBCUTANEOUS at 13:54

## 2024-03-21 RX ADMIN — HYDROMORPHONE HYDROCHLORIDE 1 MG: 1 INJECTION, SOLUTION INTRAMUSCULAR; INTRAVENOUS; SUBCUTANEOUS at 11:46

## 2024-03-21 RX ADMIN — HYDROMORPHONE HYDROCHLORIDE 1 MG: 1 INJECTION, SOLUTION INTRAMUSCULAR; INTRAVENOUS; SUBCUTANEOUS at 07:18

## 2024-03-21 RX ADMIN — PIPERACILLIN AND TAZOBACTAM 4.5 G: 36; 4.5 INJECTION, POWDER, FOR SOLUTION INTRAVENOUS at 22:02

## 2024-03-21 RX ADMIN — HYDROMORPHONE HYDROCHLORIDE 0.5 MG: 1 INJECTION, SOLUTION INTRAMUSCULAR; INTRAVENOUS; SUBCUTANEOUS at 20:17

## 2024-03-21 RX ADMIN — HYDROMORPHONE HYDROCHLORIDE 0.5 MG: 1 INJECTION, SOLUTION INTRAMUSCULAR; INTRAVENOUS; SUBCUTANEOUS at 11:40

## 2024-03-21 NOTE — RESPIRATORY THERAPY NOTE
RT Ventilator Management Note  Dwaine Gould 63 y.o. female MRN: [id number]  Unit/Bed#: ICU 04 Encounter: [id number]      Daily Screen         3/20/2024  0738 3/20/2024  2030          Patient safety screen outcome:: Failed Failed      Not Ready for Weaning due to:: PEEP > 8cmH2O;Underline problem not resolved;Going on Transport intubated PEEP > 8cmH2O;Underline problem not resolved                Physical Exam:   Assessment Type: Assess only  General Appearance: Drowsy, Eyes open/responds to stimulus  Respiratory Pattern: Assisted  Chest Assessment: Chest expansion symmetrical  Bilateral Breath Sounds: Clear, Diminished, Scattered, Coarse  Cough: Productive (assisted)  Suction: Oral, ET Tube  O2 Device: mechanical ventilation  Subjective Data: sedated      Resp Comments: patient remains intubated and sedated on full mechanical ventilation. et tube in place and secure tie tesnion adeqauet no evidence of skin breakdown noted at this time b.s. with scattered coarseness pateint suctioned for modaret cloudy white pale yellow seretions. b.s. improved patient resting wihut apparent respiratory distress plan : continue current support patetin for possible trip to O.R. tomorrow for abdomen washout/closure of open belly.

## 2024-03-21 NOTE — ANESTHESIA PREPROCEDURE EVALUATION
Procedure:  LAPAROTOMY EXPLORATORY, washout, possible closure (Abdomen)    Relevant Problems   ANESTHESIA (within normal limits)      CARDIO   (+) Acute on chronic heart failure with preserved ejection fraction (HCC)   (+) CAD (coronary artery disease)   (+) Essential hypertension   (+) Hyperlipidemia      /RENAL   (+) Stage 3a chronic kidney disease (HCC)      PULMONARY   (+) Acute on chronic respiratory failure with hypoxia and hypercapnia (HCC)   (+) COPD (chronic obstructive pulmonary disease) (HCC)        Physical Exam    Airway  Comment: intubated           Dental       Cardiovascular  Rhythm: regular, Rate: normal    Pulmonary   Breath sounds clear to auscultation    Other Findings  post-pubertal.      Anesthesia Plan  ASA Score- 4     Anesthesia Type- general with ASA Monitors.         Additional Monitors:     Airway Plan:            Plan Factors-    Chart reviewed.   Existing labs reviewed. Patient summary reviewed.                  Induction-     Postoperative Plan- Plan for postoperative opioid use.     Informed Consent- Anesthetic plan and risks discussed with daughter (Her  could not be reached.).  I personally reviewed this patient with the CRNA. Discussed and agreed on the Anesthesia Plan with the CRNA..

## 2024-03-21 NOTE — ASSESSMENT & PLAN NOTE
Oral meds on hold  PRN hydralazine for SBP>170  Takes labetolol at home-using hydralazine currently given bradycardia

## 2024-03-21 NOTE — RESPIRATORY THERAPY NOTE
RT Ventilator Management Note  Dwaine Gould 63 y.o. female MRN: [id number]  Unit/Bed#: ICU 04 Encounter: [id number]      Daily Screen         3/20/2024  2030 3/21/2024  0734          Patient safety screen outcome:: Failed Failed      Not Ready for Weaning due to:: PEEP > 8cmH2O;Underline problem not resolved PEEP > 8cmH2O;Going on Transport intubated;Underline problem not resolved                Physical Exam:   Assessment Type: Assess only  General Appearance: Sedated  Respiratory Pattern: Assisted  Chest Assessment: Chest expansion symmetrical  Bilateral Breath Sounds: Diminished  Cough: Productive (assisted)  Suction: ET Tube  O2 Device: vent  Subjective Data: sedated      Resp Comments: pt remains intubated and on full mechanical support  pt is not appropraite for sbt at this time.   PIP's have increaed since OR and CC wants to get her sedated and on full support       03/21/24 1522   Respiratory Assessment   Assessment Type Assess only   General Appearance Sedated   Respiratory Pattern Assisted   Chest Assessment Chest expansion symmetrical   Bilateral Breath Sounds Diminished   Suction ET Tube   Resp Comments pt remains intubated and on full mechanical support  pt is not appropraite for sbt at this time.   PIP's have increaed since OR and CC wants to get her sedated and on full support   O2 Device vent   Subjective Data sedated   Vent Information   Vent ID [id number]   Vent type Drager   Drager Vent Mode AC/VC+   $ Pulse Oximetry Spot Check Charge Completed   SpO2 96 %   AC/VC+ Settings   Resp Rate (BPM) 14 BPM   VT (mL) 370 mL   Insp Time (S) 0.98 S   FIO2 (%) 40 %   PEEP (cmH2O) 10 cmH2O   Rise Time (%) 20 %   Trigger Sensitivity Flow (LPM) 2 LPM   Humidification Heater   Heater Temp 98.6 °F (37 °C)   AC/VC+ Actuals   Resp Rate (BPM) 14 BPM   VT (mL) 368 mL   MV (Obs) 3.93   MAP (cmH2O) 14 cmH2O   Peak Pressure (cmH2O) 29 cmH2O   I:E Ratio (Obs) 1:3.4   Static Compliance (mL/cmH20) 26.6 mL/cmH2O   Plateau  Pressure (cm H2O) 21.5 cm H2O   Heater Temperature (Obs) 98.6 °F (37 °C)   AC/VC+ ALARMS   High Peak Pressure (cmH2O) 40 cmH2O   High Resp Rate (BPM) 30 BPM   High MV (L/min) 10 L/min   Low MV (L/min) 3 L/min   High VT (mL) 870 mL   Maintenance   Alarm (pink) cable attached Yes   Resuscitation bag with peep valve at bedside Yes   Water bag changed Yes   Circuit changed No   ETT  Cuffed;Oral;Hi-Lo 7 mm   Placement Date/Time: 03/19/24 2129   Mask Ventilation: Mask ventilation not attempted (0)  Preoxygenated: Yes  Technique: Video laryngoscopy;Rapid sequence  Type: Cuffed;Oral;Hi-Lo  Tube Size: 7 mm  Laryngoscope: Media Redefined  Blade Size: 3  Location: Oral...   Secured at (cm) 20   Measured from Gums   Secured Location Left   Repositioned (S)  Left to Right   Secured by Commercial tube whitfield   Site Condition Dry   Cuff Pressure (color) Green   HI-LO Suction  Continuous low suction   HI-LO Secretions Scant   HI-LO Intervention Patent

## 2024-03-21 NOTE — ASSESSMENT & PLAN NOTE
Wt Readings from Last 3 Encounters:   03/20/24 129 kg (285 lb 0.9 oz)   03/19/24 128 kg (282 lb 3 oz)   07/20/21 103 kg (227 lb 8.2 oz)     Pt presented to the hospital 3/11 and was aggressively diuresed  Echo on 3/12 showed EF 60%, mild to moderate concentric hypertrophy, and normal diastolic function  Was discharged home on 3/19 but returned later that day after CT findings of free intraperitoneal air and taken to OR  Pt still with significant anasarca  Diuresis initiated 3/20-pt diuresing well  Monitor daily weights  Strict I/O  Continue albumin q6 hours

## 2024-03-21 NOTE — PROGRESS NOTES
[facility]  Progress Note  Name: Dwaine Gould I  MRN: [id number]  Unit/Bed#: ICU 04 I Date of Admission: 3/19/2024   Date of Service: 3/21/2024 I Hospital Day: 2    Assessment/Plan   Tobacco abuse  Assessment & Plan  Would advise cessation  Nicotine patch while inpatient    Stage 3a chronic kidney disease (HCC)  Assessment & Plan  Lab Results   Component Value Date    EGFR 50 03/20/2024    EGFR 58 03/20/2024    EGFR 58 03/20/2024    CREATININE 1.17 03/20/2024    CREATININE 1.03 03/20/2024    CREATININE 1.04 03/20/2024   Pt creatinine appears to be at baseline  Will continue to trend  Pt still with significant anasarca and being aggressively diuresed   Avoid nephrotoxic agents  Strict I/O    Acute on chronic respiratory failure with hypoxia and hypercapnia (HCC)  Assessment & Plan  Pt with COPD and likely RILEY, D/C home with oxygen 4-5L  Remains intubated postoperatively with open abdomen  Continue mechanical ventilation for now  vent changes as needed  Currently on AC 14/370/40%/+10  Continue scheduled nebs  Propofol for sedation, RASS -2 to -3 given open abdomen  Will need bipap QHS once extubated    Respiratory acidosis  Assessment & Plan  Pt with chronic resp acidosis 2/2 COPD, likely RILEY, +/- OHS  Currently intubated  Will need bipap once extubated    Hyperlipidemia  Assessment & Plan  Hold statin for now  Restart when able to take po    Essential hypertension  Assessment & Plan  Oral meds on hold  PRN hydralazine for SBP>170  Takes labetolol at home-using hydralazine currently given bradycardia    Leukocytosis  Assessment & Plan  In the setting of pneumoperitoneum  F/U blood and OR cultures  Continue broad spectrum abx  Lactic negative  Trend procal  Trend fever curve and WBC    COPD (chronic obstructive pulmonary disease) ([facility])  Assessment & Plan  Does not appear to be in acute exacerbation  Continue scheduled bronchodilators      Acute on chronic heart failure with preserved ejection  fraction (HCC)  Assessment & Plan  Wt Readings from Last 3 Encounters:   03/20/24 129 kg (285 lb 0.9 oz)   03/19/24 128 kg (282 lb 3 oz)   07/20/21 103 kg (227 lb 8.2 oz)     Pt presented to the hospital 3/11 and was aggressively diuresed  Echo on 3/12 showed EF 60%, mild to moderate concentric hypertrophy, and normal diastolic function  Was discharged home on 3/19 but returned later that day after CT findings of free intraperitoneal air and taken to OR  Pt still with significant anasarca  Diuresis initiated 3/20-pt diuresing well  Monitor daily weights  Strict I/O  Continue albumin q6 hours            CAD (coronary artery disease)  Assessment & Plan  Per [facility] records patient with single-vessel CAD with ostial RPDA lesion not amenable to PCI due to location of lesion October 2014  Continue medical management-po meds on hold currently    * Pneumoperitoneum  Assessment & Plan  Now s/p diag lap converted to exp lap, right hemicolectomy with anastomosis, washout, Abthera placement POD1  Pt had abdominal pain 3/19-CT scan showed free intraperitoneal air  Taken urgently to OR, transferred to ICU postoperatively with abdomen open, Abthera in place, and on mechanical ventilation  In OR, abdominal cavity was noted to have grossly purulent material in it along with perforation in the cecum at a mass  Due to the significant edema of the abdominal wall and contamination of the abdominal cavity, the decision was made to leave the abdomen open and Abthera VAC was placed with plan to return to OR  F/U culture results  Continue Zosyn D2, (abx D3)  Dilaudid PRN, ketamine x3 days for pain  Surgery following  Plan for return to OR this morning             Disposition: Critical care    ICU Core Measures     Vented Patient  VAP Bundle  VAP bundle ordered     A: Assess, Prevent, and Manage Pain Has pain been assessed? Yes  Need for changes to pain regimen? No   B: Both Spontaneous Awakening Trials (SATs) and Spontaneous Breathing Trials  (SBTs) Plan to perform spontaneous awakening trial today? Yes   Plan to perform spontaneous breathing trial today? No secondary to open chest/abdomen  Obvious barriers to extubation? Yes   C: Choice of Sedation RASS Goal: -2 Light Sedation  Need for changes to sedation or analgesia regimen? No   D: Delirium CAM-ICU: Unable to perform secondary to Acute cognitive dysfunction   E: Early Mobility  Plan for early mobility? Yes   F: Family Engagement Plan for family engagement today? Yes       Antibiotic Review: Awaiting culture results.  and Continue broad spectrum secondary to severity of illness.     Review of Invasive Devices:    De La Cruz Plan: Continue for accurate I/O monitoring for 48 hours    Timbo Plan: Keep arterial line for hemodynamic monitoring and frequent labs    Prophylaxis:  VTE VTE covered by:  heparin (porcine), Subcutaneous, 7,500 Units at 03/20/24 2122       Stress Ulcer  covered bypantoprazole (PROTONIX) injection 40 mg [[id number]]         Significant 24hr Events     24hr events: Tolerating diuresis. Plan for return to OR today.     Subjective   Review of Systems   Unable to perform ROS: Intubated      Objective                            Vitals I/O      Most Recent Min/Max in 24hrs   Temp 98.8 °F (37.1 °C) Temp  Min: 98.1 °F (36.7 °C)  Max: 99.3 °F (37.4 °C)   Pulse 72 Pulse  Min: 55  Max: 74   Resp 14 Resp  Min: 14  Max: 16   /61 BP  Min: 92/46  Max: 180/63   O2 Sat 95 % SpO2  Min: 92 %  Max: 98 %      Intake/Output Summary (Last 24 hours) at 3/21/2024 0309  Last data filed at 3/21/2024 0300  Gross per 24 hour   Intake 1453.35 ml   Output 4560 ml   Net -3106.65 ml       Diet NPO    Invasive Monitoring           Physical Exam   Physical Exam  Eyes:      Pupils: Pupils are equal, round, and reactive to light.   Skin:     General: Skin is warm and dry.   HENT:      Head: Normocephalic and atraumatic.   Cardiovascular:      Rate and Rhythm: Normal rate and regular rhythm.   Musculoskeletal:       Right lower le+ Edema present.      Left lower le+ Edema present.   Abdominal: General: Bowel sounds are absent. There is distension.     Palpations: Abdomen is soft.      Comments: Midline vac in place with serosang drainage   Constitutional:       Appearance: She is morbidly obese. She is ill-appearing.      Interventions: She is sedated and intubated.   Pulmonary:      Effort: She is intubated.      Breath sounds: Decreased breath sounds present.   Neurological:      GCS: GCS eye subscore is 3. GCS verbal subscore is 1. GCS motor subscore is 5.   Genitourinary/Anorectal:  De La Cruz present.          Diagnostic Studies      EKG: NSR  Imaging:  I have personally reviewed pertinent reports.       Medications:  Scheduled PRN   Albumin 25%, 25 g, Q6H  budesonide, 0.5 mg, Q12H  chlorhexidine, 15 mL, Q12H ANGELES  heparin (porcine), 7,500 Units, Q8H ANGELES  HYDROmorphone, 0.5 mg, Q6H  insulin lispro, 1-5 Units, Q6H ANGELES  ipratropium, 0.5 mg, TID  Ketamine HCl, 50 mg, Daily  levalbuterol, 1.25 mg, TID  nicotine, 1 patch, Daily  pantoprazole, 40 mg, Q24H ANGELES  piperacillin-tazobactam, 4.5 g, Q8H  potassium chloride, 20 mEq, Once      hydrALAZINE, 10 mg, Q6H PRN  HYDROmorphone, 1 mg, Q3H PRN       Continuous    propofol, 5-50 mcg/kg/min, Last Rate: 25 mcg/kg/min (24 0200)         Labs:    CBC    Recent Labs     24  0144 24  0530   WBC 10.50* 12.35*   HGB 13.0 12.0   HCT 43.6 40.4    153     BMP    Recent Labs     24  1502 24  2112   SODIUM 146 146   K 3.5 3.2*    102   CO2 36* 37*   AGAP 7 7   BUN 30* 28*   CREATININE 1.17 1.15   CALCIUM 8.3* 8.5       Coags    No recent results     Additional Electrolytes  Recent Labs     24  0144 24  0530 24  1502 24  2112   MG 1.9 2.7 2.3 2.3   PHOS  --  3.7  --   --    CAIONIZED 1.01* 1.09*  --   --           Blood Gas    Recent Labs     24  0528   PHART 7.414   EEW6ULH 59.3*   PO2ART 76.8   AMQ2EVN 37.1*   BEART 10.3    SOURCE Line, Arterial     Recent Labs     03/20/24  0528   SOURCE Line, Arterial    LFTs  Recent Labs     03/19/24 2016 03/20/24  0530   ALT 33 24   AST 15 12*   ALKPHOS 84 63   ALB 3.3* 2.9*   TBILI 0.78 1.03*       Infectious  Recent Labs     03/20/24  0530   PROCALCITONI 0.40*     Glucose  Recent Labs     03/20/24  0144 03/20/24  0530 03/20/24  1502 03/20/24  2112   GLUC 160* 143* 114 114               GUSTAVO Alexander

## 2024-03-21 NOTE — RESPIRATORY THERAPY NOTE
RT Ventilator Management Note  Dwaine Gould 63 y.o. female MRN: [id number]  Unit/Bed#: ICU 04 Encounter: [id number]      Daily Screen         3/20/2024  2030 3/21/2024  0734          Patient safety screen outcome:: Failed Failed      Not Ready for Weaning due to:: PEEP > 8cmH2O;Underline problem not resolved PEEP > 8cmH2O;Going on Transport intubated;Underline problem not resolved                Physical Exam:   Assessment Type: Pre-treatment  General Appearance: Sedated, Sleeping  Respiratory Pattern: Assisted  Chest Assessment: Chest expansion symmetrical  Bilateral Breath Sounds: Diminished  Cough: Productive (assisted)  Suction: ET Tube  O2 Device: vent  Subjective Data: sedated      Resp Comments: pt remains intubated and sedated on full mechnical support  no changes at this time  skin integrity remains intact.  pt is not appropriate for sbt at this time due to possibly going to or       03/21/24 0734   Respiratory Assessment   Assessment Type Pre-treatment   General Appearance Sedated;Sleeping   Respiratory Pattern Assisted   Chest Assessment Chest expansion symmetrical   Bilateral Breath Sounds Diminished   Suction ET Tube   Resp Comments pt remains intubated and sedated on full mechnical support  no changes at this time  skin integrity remains intact.  pt is not appropriate for sbt at this time due to possibly going to or   O2 Device vent   Subjective Data sedated   Vent Information   Vent ID Ruy   Vent type Drager   Drager Vent Mode AC/VC+   Is the patient reintubated? No   $ Vent Daily Charge-Subsequent Yes   $ Pulse Oximetry Spot Check Charge Completed   SpO2 96 %   AC/VC+ Settings   Resp Rate (BPM) 14 BPM   VT (mL) 370 mL   Insp Time (S) 0.98 S   FIO2 (%) 40 %   PEEP (cmH2O) 10 cmH2O   Rise Time (%) 20 %   Trigger Sensitivity Flow (LPM) 2 LPM   Humidification Heater   Heater Temp 98.6 °F (37 °C)   AC/VC+ Actuals   Resp Rate (BPM) 14 BPM   VT (mL) 373 mL   MV (Obs) 3.61   MAP (cmH2O) 15 cmH2O   Peak  Pressure (cmH2O) 30 cmH2O   I:E Ratio (Obs) 1:3.4   Static Compliance (mL/cmH20) 26.4 mL/cmH2O   Plateau Pressure (cm H2O) 27.5 cm H2O   Heater Temperature (Obs) 97.3 °F (36.3 °C)   AC/VC+ ALARMS   High Peak Pressure (cmH2O) 40 cmH2O   High Resp Rate (BPM) 30 BPM   High MV (L/min) 10 L/min   Low MV (L/min) 3 L/min   High VT (mL) 870 mL   Maintenance   Alarm (pink) cable attached Yes   Resuscitation bag with peep valve at bedside Yes   Water bag changed No   Circuit changed No   Daily Screen   Patient safety screen outcome: Failed   Not Ready for Weaning due to: PEEP > 8cmH2O;Going on Transport intubated;Underline problem not resolved   IHI Ventilator Associated Pneumonia Bundle   Daily Awakening Trials Performed Yes   Daily Assessment of Readiness to Extubate Yes   Head of Bed Elevated HOB 30   Oral Care Mouth suctioned   ETT  Cuffed;Oral;Hi-Lo 7 mm   Placement Date/Time: 03/19/24 2129   Mask Ventilation: Mask ventilation not attempted (0)  Preoxygenated: Yes  Technique: Video laryngoscopy;Rapid sequence  Type: Cuffed;Oral;Hi-Lo  Tube Size: 7 mm  Laryngoscope: Swagsy  Blade Size: 3  Location: Oral...   Secured at (cm) 20   Measured from Gums   Secured Location Center   Repositioned (S)  Center to Left   Secured by Commercial tube whitfield   Site Condition Dry   Cuff Pressure (color) Green   HI-LO Suction  Continuous low suction   HI-LO Secretions Scant   HI-LO Intervention Patent

## 2024-03-21 NOTE — RESPIRATORY THERAPY NOTE
RT Ventilator Management Note  Dwaine Gould 63 y.o. female MRN: [id number]  Unit/Bed#: ICU 04 Encounter: [id number]      Daily Screen         3/20/2024  0738 3/20/2024  2030          Patient safety screen outcome:: Failed Failed      Not Ready for Weaning due to:: PEEP > 8cmH2O;Underline problem not resolved;Going on Transport intubated PEEP > 8cmH2O;Underline problem not resolved                Physical Exam:   Assessment Type: Assess only  General Appearance: Drowsy, Eyes open/responds to stimulus  Respiratory Pattern: Assisted  Chest Assessment: Chest expansion symmetrical  Bilateral Breath Sounds: Clear, Diminished, Scattered, Coarse  Cough: Productive (assisted)  Suction: Oral, ET Tube  O2 Device: mechanical ventilation  Subjective Data: sedated      Resp Comments: patient remaisn intubated and sedated s/p being admitted due to pneumoperitonium requiring surgry to repair. patient drowsy appropriately responsive to verbal stimuli without apparent respiratory distress patient is for possible retuen to OR today for wound washout and possible closure of open abdomen. plan: continue current support until further orders

## 2024-03-21 NOTE — ASSESSMENT & PLAN NOTE
Now s/p diag lap converted to exp lap, right hemicolectomy with anastomosis, washout, Abthera placement POD1  Pt had abdominal pain 3/19-CT scan showed free intraperitoneal air  Taken urgently to OR, transferred to ICU postoperatively with abdomen open, Abthera in place, and on mechanical ventilation  In OR, abdominal cavity was noted to have grossly purulent material in it along with perforation in the cecum at a mass  Due to the significant edema of the abdominal wall and contamination of the abdominal cavity, the decision was made to leave the abdomen open and Abthera VAC was placed with plan to return to OR  F/U culture results  Continue Zosyn D2, (abx D3)  Dilaudid PRN, ketamine x3 days for pain  Surgery following  Plan for return to OR this morning

## 2024-03-21 NOTE — PLAN OF CARE

## 2024-03-21 NOTE — ASSESSMENT & PLAN NOTE
In the setting of pneumoperitoneum  F/U blood and OR cultures  Continue broad spectrum abx  Lactic negative  Trend procal  Trend fever curve and WBC

## 2024-03-21 NOTE — ASSESSMENT & PLAN NOTE
Pt with COPD and likely RILEY, D/C home with oxygen 4-5L  Remains intubated postoperatively with open abdomen  Continue mechanical ventilation for now  vent changes as needed  Currently on AC 14/370/40%/+10  Continue scheduled nebs  Propofol for sedation, RASS -2 to -3 given open abdomen  Will need bipap QHS once extubated

## 2024-03-21 NOTE — ASSESSMENT & PLAN NOTE
Lab Results   Component Value Date    EGFR 50 03/20/2024    EGFR 58 03/20/2024    EGFR 58 03/20/2024    CREATININE 1.17 03/20/2024    CREATININE 1.03 03/20/2024    CREATININE 1.04 03/20/2024   Pt creatinine appears to be at baseline  Will continue to trend  Pt still with significant anasarca and being aggressively diuresed   Avoid nephrotoxic agents  Strict I/O

## 2024-03-21 NOTE — ANESTHESIA POSTPROCEDURE EVALUATION
Post-Op Assessment Note    CV Status:  Stable    Pain management: adequate       Mental Status:  Sleepy   Hydration Status:  Euvolemic and stable   PONV Controlled:  Controlled   Airway Patency:  Patent  Airway: intubated     Post Op Vitals Reviewed: Yes    No anethesia notable event occurred.    Staff: Anesthesiologist     Reason for prolonged intubation > 24 hours:  For airway protection.          BP      Temp      Pulse     Resp      SpO2 93 % (03/21/24 1308)

## 2024-03-21 NOTE — RESPIRATORY THERAPY NOTE
RT Ventilator Management Note  Dwaine Gould 63 y.o. female MRN: [id number]  Unit/Bed#: ICU 04 Encounter: [id number]      Daily Screen         3/20/2024  2030 3/21/2024  0734          Patient safety screen outcome:: Failed Failed      Not Ready for Weaning due to:: PEEP > 8cmH2O;Underline problem not resolved PEEP > 8cmH2O;Going on Transport intubated;Underline problem not resolved                Physical Exam:   Assessment Type: Pre-treatment  General Appearance: Sedated, Sleeping  Respiratory Pattern: Assisted  Chest Assessment: Chest expansion symmetrical  Bilateral Breath Sounds: Diminished  Cough: Productive (assisted)  Suction: ET Tube  O2 Device: vent  Subjective Data: sedated      Resp Comments: pulmicort given       03/21/24 1038   Vent Information   Vent ID Cotton   Vent type Drager   Drager Vent Mode AC/VC+   AC/VC+ Settings   Resp Rate (BPM) 14 BPM   VT (mL) 370 mL   Insp Time (S) 0.98 S   FIO2 (%) 40 %   PEEP (cmH2O) 10 cmH2O   Rise Time (%) 2 %   Trigger Sensitivity Flow (LPM) 2 LPM   Humidification Heater   Heater Temp 98.6 °F (37 °C)   AC/VC+ Actuals   Resp Rate (BPM) 14 BPM   VT (mL) 370 mL   MV (Obs) 4.27   MAP (cmH2O) 14 cmH2O   Peak Pressure (cmH2O) 28 cmH2O   I:E Ratio (Obs) 1:3.4   Static Compliance (mL/cmH20) 26.4 mL/cmH2O   Plateau Pressure (cm H2O) 19.5 cm H2O   Heater Temperature (Obs) 98.6 °F (37 °C)   AC/VC+ ALARMS   High Peak Pressure (cmH2O) 40 cmH2O   High Resp Rate (BPM) 30 BPM   High MV (L/min) 10 L/min   Low MV (L/min) 3 L/min   High VT (mL) 870 mL

## 2024-03-22 ENCOUNTER — APPOINTMENT (INPATIENT)
Dept: RADIOLOGY | Facility: HOSPITAL | Age: 64
DRG: 230 | End: 2024-03-22
Payer: COMMERCIAL

## 2024-03-22 PROBLEM — R78.81 GRAM-NEGATIVE BACTEREMIA: Status: ACTIVE | Noted: 2024-03-22

## 2024-03-22 LAB
ALBUMIN SERPL BCP-MCNC: 3.9 G/DL (ref 3.5–5)
ALP SERPL-CCNC: 48 U/L (ref 34–104)
ALT SERPL W P-5'-P-CCNC: 13 U/L (ref 7–52)
ANION GAP SERPL CALCULATED.3IONS-SCNC: 8 MMOL/L (ref 4–13)
ANION GAP SERPL CALCULATED.3IONS-SCNC: 8 MMOL/L (ref 4–13)
AST SERPL W P-5'-P-CCNC: 12 U/L (ref 13–39)
BACTERIA SPEC ANAEROBE CULT: ABNORMAL
BACTERIA SPEC ANAEROBE CULT: ABNORMAL
BILIRUB SERPL-MCNC: 1.29 MG/DL (ref 0.2–1)
BUN SERPL-MCNC: 20 MG/DL (ref 5–25)
BUN SERPL-MCNC: 20 MG/DL (ref 5–25)
CA-I BLD-SCNC: 1.01 MMOL/L (ref 1.12–1.32)
CA-I BLD-SCNC: 1.03 MMOL/L (ref 1.12–1.32)
CALCIUM SERPL-MCNC: 8.4 MG/DL (ref 8.4–10.2)
CALCIUM SERPL-MCNC: 8.5 MG/DL (ref 8.4–10.2)
CHLORIDE SERPL-SCNC: 104 MMOL/L (ref 96–108)
CHLORIDE SERPL-SCNC: 104 MMOL/L (ref 96–108)
CO2 SERPL-SCNC: 35 MMOL/L (ref 21–32)
CO2 SERPL-SCNC: 35 MMOL/L (ref 21–32)
CREAT SERPL-MCNC: 1.04 MG/DL (ref 0.6–1.3)
CREAT SERPL-MCNC: 1.07 MG/DL (ref 0.6–1.3)
ERYTHROCYTE [DISTWIDTH] IN BLOOD BY AUTOMATED COUNT: 19.1 % (ref 11.6–15.1)
GFR SERPL CREATININE-BSD FRML MDRD: 56 ML/MIN/1.73SQ M
GFR SERPL CREATININE-BSD FRML MDRD: 58 ML/MIN/1.73SQ M
GLUCOSE SERPL-MCNC: 106 MG/DL (ref 65–140)
GLUCOSE SERPL-MCNC: 107 MG/DL (ref 65–140)
GLUCOSE SERPL-MCNC: 114 MG/DL (ref 65–140)
GLUCOSE SERPL-MCNC: 95 MG/DL (ref 65–140)
GLUCOSE SERPL-MCNC: 96 MG/DL (ref 65–140)
GLUCOSE SERPL-MCNC: 98 MG/DL (ref 65–140)
GLUCOSE SERPL-MCNC: 99 MG/DL (ref 65–140)
HCT VFR BLD AUTO: 36.1 % (ref 34.8–46.1)
HGB BLD-MCNC: 10.5 G/DL (ref 11.5–15.4)
MAGNESIUM SERPL-MCNC: 2.1 MG/DL (ref 1.9–2.7)
MAGNESIUM SERPL-MCNC: 2.3 MG/DL (ref 1.9–2.7)
MCH RBC QN AUTO: 23.3 PG (ref 26.8–34.3)
MCHC RBC AUTO-ENTMCNC: 29.1 G/DL (ref 31.4–37.4)
MCV RBC AUTO: 80 FL (ref 82–98)
PHOSPHATE SERPL-MCNC: 3.3 MG/DL (ref 2.3–4.1)
PLATELET # BLD AUTO: 136 THOUSANDS/UL (ref 149–390)
PMV BLD AUTO: 9.3 FL (ref 8.9–12.7)
POTASSIUM SERPL-SCNC: 3.3 MMOL/L (ref 3.5–5.3)
POTASSIUM SERPL-SCNC: 3.5 MMOL/L (ref 3.5–5.3)
PROT SERPL-MCNC: 5.6 G/DL (ref 6.4–8.4)
RBC # BLD AUTO: 4.51 MILLION/UL (ref 3.81–5.12)
SODIUM SERPL-SCNC: 147 MMOL/L (ref 135–147)
SODIUM SERPL-SCNC: 147 MMOL/L (ref 135–147)
WBC # BLD AUTO: 13.61 THOUSAND/UL (ref 4.31–10.16)

## 2024-03-22 PROCEDURE — 83735 ASSAY OF MAGNESIUM: CPT

## 2024-03-22 PROCEDURE — 99024 POSTOP FOLLOW-UP VISIT: CPT | Performed by: CLINICAL NURSE SPECIALIST

## 2024-03-22 PROCEDURE — 82948 REAGENT STRIP/BLOOD GLUCOSE: CPT

## 2024-03-22 PROCEDURE — 94640 AIRWAY INHALATION TREATMENT: CPT

## 2024-03-22 PROCEDURE — 85027 COMPLETE CBC AUTOMATED: CPT

## 2024-03-22 PROCEDURE — 94760 N-INVAS EAR/PLS OXIMETRY 1: CPT

## 2024-03-22 PROCEDURE — 80048 BASIC METABOLIC PNL TOTAL CA: CPT | Performed by: NURSE PRACTITIONER

## 2024-03-22 PROCEDURE — 94003 VENT MGMT INPAT SUBQ DAY: CPT

## 2024-03-22 PROCEDURE — C9113 INJ PANTOPRAZOLE SODIUM, VIA: HCPCS

## 2024-03-22 PROCEDURE — 71045 X-RAY EXAM CHEST 1 VIEW: CPT

## 2024-03-22 PROCEDURE — 84100 ASSAY OF PHOSPHORUS: CPT

## 2024-03-22 PROCEDURE — 80053 COMPREHEN METABOLIC PANEL: CPT

## 2024-03-22 PROCEDURE — 83735 ASSAY OF MAGNESIUM: CPT | Performed by: NURSE PRACTITIONER

## 2024-03-22 PROCEDURE — 82330 ASSAY OF CALCIUM: CPT | Performed by: NURSE PRACTITIONER

## 2024-03-22 PROCEDURE — 82330 ASSAY OF CALCIUM: CPT

## 2024-03-22 RX ORDER — POTASSIUM CHLORIDE 14.9 MG/ML
20 INJECTION INTRAVENOUS
Status: COMPLETED | OUTPATIENT
Start: 2024-03-22 | End: 2024-03-22

## 2024-03-22 RX ORDER — ALBUMIN (HUMAN) 12.5 G/50ML
25 SOLUTION INTRAVENOUS EVERY 6 HOURS
Status: COMPLETED | OUTPATIENT
Start: 2024-03-22 | End: 2024-03-24

## 2024-03-22 RX ORDER — POTASSIUM CHLORIDE 14.9 MG/ML
20 INJECTION INTRAVENOUS
Status: COMPLETED | OUTPATIENT
Start: 2024-03-22 | End: 2024-03-23

## 2024-03-22 RX ORDER — FUROSEMIDE 10 MG/ML
20 INJECTION INTRAMUSCULAR; INTRAVENOUS ONCE
Status: COMPLETED | OUTPATIENT
Start: 2024-03-22 | End: 2024-03-22

## 2024-03-22 RX ORDER — FUROSEMIDE 10 MG/ML
10 SYRINGE (ML) INJECTION CONTINUOUS
Status: DISCONTINUED | OUTPATIENT
Start: 2024-03-22 | End: 2024-03-23

## 2024-03-22 RX ORDER — CALCIUM GLUCONATE 20 MG/ML
2 INJECTION, SOLUTION INTRAVENOUS ONCE
Status: COMPLETED | OUTPATIENT
Start: 2024-03-22 | End: 2024-03-22

## 2024-03-22 RX ORDER — CALCIUM GLUCONATE 20 MG/ML
2 INJECTION, SOLUTION INTRAVENOUS ONCE
Status: COMPLETED | OUTPATIENT
Start: 2024-03-22 | End: 2024-03-23

## 2024-03-22 RX ORDER — ALBUMIN, HUMAN INJ 5% 5 %
12.5 SOLUTION INTRAVENOUS ONCE
Status: COMPLETED | OUTPATIENT
Start: 2024-03-22 | End: 2024-03-22

## 2024-03-22 RX ADMIN — PROPOFOL 50 MCG/KG/MIN: 10 INJECTION, EMULSION INTRAVENOUS at 13:03

## 2024-03-22 RX ADMIN — PROPOFOL 50 MCG/KG/MIN: 10 INJECTION, EMULSION INTRAVENOUS at 08:00

## 2024-03-22 RX ADMIN — POTASSIUM CHLORIDE 20 MEQ: 14.9 INJECTION, SOLUTION INTRAVENOUS at 17:02

## 2024-03-22 RX ADMIN — ALBUMIN (HUMAN) 25 G: 0.25 INJECTION, SOLUTION INTRAVENOUS at 00:32

## 2024-03-22 RX ADMIN — Medication 10 MG/HR: at 10:26

## 2024-03-22 RX ADMIN — IPRATROPIUM BROMIDE 0.5 MG: 0.5 SOLUTION RESPIRATORY (INHALATION) at 13:33

## 2024-03-22 RX ADMIN — PIPERACILLIN AND TAZOBACTAM 4.5 G: 36; 4.5 INJECTION, POWDER, FOR SOLUTION INTRAVENOUS at 21:08

## 2024-03-22 RX ADMIN — HYDROMORPHONE HYDROCHLORIDE 1 MG: 1 INJECTION, SOLUTION INTRAMUSCULAR; INTRAVENOUS; SUBCUTANEOUS at 12:27

## 2024-03-22 RX ADMIN — POTASSIUM CHLORIDE 20 MEQ: 14.9 INJECTION, SOLUTION INTRAVENOUS at 09:58

## 2024-03-22 RX ADMIN — METHYLNALTREXONE BROMIDE 20 MG: 12 INJECTION, SOLUTION SUBCUTANEOUS at 18:06

## 2024-03-22 RX ADMIN — CHLORHEXIDINE GLUCONATE 0.12% ORAL RINSE 15 ML: 1.2 LIQUID ORAL at 21:08

## 2024-03-22 RX ADMIN — CALCIUM GLUCONATE 2 G: 20 INJECTION, SOLUTION INTRAVENOUS at 17:03

## 2024-03-22 RX ADMIN — Medication 50 MG: at 09:03

## 2024-03-22 RX ADMIN — POTASSIUM CHLORIDE 20 MEQ: 14.9 INJECTION, SOLUTION INTRAVENOUS at 21:09

## 2024-03-22 RX ADMIN — HYDROMORPHONE HYDROCHLORIDE 1 MG: 1 INJECTION, SOLUTION INTRAMUSCULAR; INTRAVENOUS; SUBCUTANEOUS at 05:27

## 2024-03-22 RX ADMIN — LEVALBUTEROL HYDROCHLORIDE 1.25 MG: 1.25 SOLUTION RESPIRATORY (INHALATION) at 07:28

## 2024-03-22 RX ADMIN — HYDROMORPHONE HYDROCHLORIDE 1 MG: 1 INJECTION, SOLUTION INTRAMUSCULAR; INTRAVENOUS; SUBCUTANEOUS at 19:59

## 2024-03-22 RX ADMIN — LEVALBUTEROL HYDROCHLORIDE 1.25 MG: 1.25 SOLUTION RESPIRATORY (INHALATION) at 19:26

## 2024-03-22 RX ADMIN — IPRATROPIUM BROMIDE 0.5 MG: 0.5 SOLUTION RESPIRATORY (INHALATION) at 07:28

## 2024-03-22 RX ADMIN — FUROSEMIDE 20 MG: 10 INJECTION, SOLUTION INTRAMUSCULAR; INTRAVENOUS at 10:26

## 2024-03-22 RX ADMIN — PROPOFOL 50 MCG/KG/MIN: 10 INJECTION, EMULSION INTRAVENOUS at 00:15

## 2024-03-22 RX ADMIN — HYDROMORPHONE HYDROCHLORIDE 0.5 MG: 1 INJECTION, SOLUTION INTRAMUSCULAR; INTRAVENOUS; SUBCUTANEOUS at 09:03

## 2024-03-22 RX ADMIN — PROPOFOL 50 MCG/KG/MIN: 10 INJECTION, EMULSION INTRAVENOUS at 15:43

## 2024-03-22 RX ADMIN — PIPERACILLIN AND TAZOBACTAM 4.5 G: 36; 4.5 INJECTION, POWDER, FOR SOLUTION INTRAVENOUS at 14:10

## 2024-03-22 RX ADMIN — BUDESONIDE INHALATION 0.5 MG: 0.5 SUSPENSION RESPIRATORY (INHALATION) at 07:28

## 2024-03-22 RX ADMIN — CALCIUM GLUCONATE 2 G: 20 INJECTION, SOLUTION INTRAVENOUS at 07:56

## 2024-03-22 RX ADMIN — PROPOFOL 50 MCG/KG/MIN: 10 INJECTION, EMULSION INTRAVENOUS at 22:48

## 2024-03-22 RX ADMIN — NICOTINE 1 PATCH: 21 PATCH, EXTENDED RELEASE TRANSDERMAL at 09:05

## 2024-03-22 RX ADMIN — PROPOFOL 50 MCG/KG/MIN: 10 INJECTION, EMULSION INTRAVENOUS at 10:26

## 2024-03-22 RX ADMIN — FUROSEMIDE 20 MG: 10 INJECTION, SOLUTION INTRAMUSCULAR; INTRAVENOUS at 09:04

## 2024-03-22 RX ADMIN — HEPARIN SODIUM 7500 UNITS: 5000 INJECTION INTRAVENOUS; SUBCUTANEOUS at 14:10

## 2024-03-22 RX ADMIN — PROPOFOL 50 MCG/KG/MIN: 10 INJECTION, EMULSION INTRAVENOUS at 05:24

## 2024-03-22 RX ADMIN — PIPERACILLIN AND TAZOBACTAM 4.5 G: 36; 4.5 INJECTION, POWDER, FOR SOLUTION INTRAVENOUS at 05:34

## 2024-03-22 RX ADMIN — HEPARIN SODIUM 7500 UNITS: 5000 INJECTION INTRAVENOUS; SUBCUTANEOUS at 05:25

## 2024-03-22 RX ADMIN — CHLORHEXIDINE GLUCONATE 0.12% ORAL RINSE 15 ML: 1.2 LIQUID ORAL at 09:04

## 2024-03-22 RX ADMIN — PANTOPRAZOLE SODIUM 40 MG: 40 INJECTION, POWDER, FOR SOLUTION INTRAVENOUS at 09:05

## 2024-03-22 RX ADMIN — PROPOFOL 50 MCG/KG/MIN: 10 INJECTION, EMULSION INTRAVENOUS at 18:08

## 2024-03-22 RX ADMIN — HYDROMORPHONE HYDROCHLORIDE 0.5 MG: 1 INJECTION, SOLUTION INTRAMUSCULAR; INTRAVENOUS; SUBCUTANEOUS at 03:24

## 2024-03-22 RX ADMIN — LEVALBUTEROL HYDROCHLORIDE 1.25 MG: 1.25 SOLUTION RESPIRATORY (INHALATION) at 13:33

## 2024-03-22 RX ADMIN — IPRATROPIUM BROMIDE 0.5 MG: 0.5 SOLUTION RESPIRATORY (INHALATION) at 19:26

## 2024-03-22 RX ADMIN — POTASSIUM CHLORIDE 20 MEQ: 14.9 INJECTION, SOLUTION INTRAVENOUS at 08:00

## 2024-03-22 RX ADMIN — HEPARIN SODIUM 7500 UNITS: 5000 INJECTION INTRAVENOUS; SUBCUTANEOUS at 21:07

## 2024-03-22 RX ADMIN — HYDROMORPHONE HYDROCHLORIDE 0.5 MG: 1 INJECTION, SOLUTION INTRAMUSCULAR; INTRAVENOUS; SUBCUTANEOUS at 21:43

## 2024-03-22 RX ADMIN — ALBUMIN (HUMAN) 12.5 G: 12.5 INJECTION, SOLUTION INTRAVENOUS at 17:02

## 2024-03-22 RX ADMIN — ALBUMIN (HUMAN) 25 G: 0.25 INJECTION, SOLUTION INTRAVENOUS at 21:39

## 2024-03-22 RX ADMIN — PROPOFOL 50 MCG/KG/MIN: 10 INJECTION, EMULSION INTRAVENOUS at 03:24

## 2024-03-22 RX ADMIN — BUDESONIDE INHALATION 0.5 MG: 0.5 SUSPENSION RESPIRATORY (INHALATION) at 19:44

## 2024-03-22 RX ADMIN — HYDROMORPHONE HYDROCHLORIDE 0.5 MG: 1 INJECTION, SOLUTION INTRAMUSCULAR; INTRAVENOUS; SUBCUTANEOUS at 14:11

## 2024-03-22 RX ADMIN — PROPOFOL 50 MCG/KG/MIN: 10 INJECTION, EMULSION INTRAVENOUS at 19:59

## 2024-03-22 NOTE — ASSESSMENT & PLAN NOTE
3/19 1/2 BC w/ GNR  Body fluid culture and GS w/ E coli  In the setting of bowel perforation   Continue Zosyn  Trend leukocytosis and fever curve

## 2024-03-22 NOTE — RESPIRATORY THERAPY NOTE
RT Ventilator Management Note  Dwaine Gould 63 y.o. female MRN: [id number]  Unit/Bed#: ICU 04 Encounter: [id number]      Daily Screen         3/22/2024  0423 3/22/2024  0799          Patient safety screen outcome:: Failed Failed      Not Ready for Weaning due to:: PEEP > 8cmH2O PEEP > 8cmH2O;Underline problem not resolved                Physical Exam:   Assessment Type: Assess only  General Appearance: Sedated  Respiratory Pattern: Assisted  Chest Assessment: Chest expansion symmetrical  Bilateral Breath Sounds: Diminished  Suction: ET Tube  O2 Device: vent  Subjective Data: sedated      Resp Comments: placed back on full support due to apneia and desaturation         03/22/24 1047   AC/VC+ Settings   Resp Rate (BPM) 14 BPM   VT (mL) 370 mL   Insp Time (S) 0.98 S   FIO2 (%) 50 %   PEEP (cmH2O) 10 cmH2O   Rise Time (%) 20 %   Trigger Sensitivity Flow (LPM) 0.2 LPM   Humidification Heater   Heater Temp 98.6 °F (37 °C)   AC/VC+ Actuals   Resp Rate (BPM) 23 BPM   VT (mL) 357 mL   MV (Obs) 6.22   MAP (cmH2O) 13 cmH2O   Peak Pressure (cmH2O) 20 cmH2O   I:E Ratio (Obs) 1:2.3   Static Compliance (mL/cmH20) 11.4 mL/cmH2O   Plateau Pressure (cm H2O) 18.9 cm H2O   Heater Temperature (Obs) 98.4 °F (36.9 °C)   AC/VC+ ALARMS   High Peak Pressure (cmH2O) 48 cmH2O   High Resp Rate (BPM) 30 BPM   High MV (L/min) 10 L/min   Low MV (L/min) 3 L/min   High VT (mL) 850 mL   Maintenance   Alarm (pink) cable attached Yes   Resuscitation bag with peep valve at bedside Yes   Water bag changed No   Circuit changed No   ETT  Cuffed;Oral;Hi-Lo 7 mm   Placement Date/Time: 03/19/24 2129   Mask Ventilation: Mask ventilation not attempted (0)  Preoxygenated: Yes  Technique: Video laryngoscopy;Rapid sequence  Type: Cuffed;Oral;Hi-Lo  Tube Size: 7 mm  Laryngoscope: Harmon  Blade Size: 3  Location: Oral...   Secured at (cm) 20   Measured from Gums   Secured Location Center   Secured by Commercial tube whitfield   Site Condition Dry   Cuff Pressure  (color) Green   HI-LO Suction  Continuous low suction   HI-LO Secretions Scant;Blood tinged   HI-LO Intervention Patent

## 2024-03-22 NOTE — ASSESSMENT & PLAN NOTE
Pt with COPD and likely RILEY/OHS, D/C home with oxygen 4-5L  Remains intubated postoperatively   Continue mechanical ventilation for now  vent changes as needed  Currently on AC 14/370/40%/+10  Continue scheduled nebs  Propofol for sedation  VAP precautions  Continue diuretics  Will need bipap QHS once extubated

## 2024-03-22 NOTE — RESPIRATORY THERAPY NOTE
RT Ventilator Management Note  Dwaine Gould 63 y.o. female MRN: [id number]  Unit/Bed#: ICU 04 Encounter: [id number]      Daily Screen         3/22/2024  0728 3/22/2024  1933          Patient safety screen outcome:: Failed Failed      Not Ready for Weaning due to:: PEEP > 8cmH2O;Underline problem not resolved Poor inspiratory effort                Physical Exam:   Assessment Type: Assess only  General Appearance: Drowsy, Eyes open/responds to stimulus  Respiratory Pattern: (S) Assisted  Chest Assessment: Chest expansion symmetrical  Bilateral Breath Sounds: Scattered, Coarse, Clear, Diminished  Cough: Productive (assisted)  Suction: Oral, ET Tube  O2 Device: mechanical ventilation  Subjective Data: drowsy      Resp Comments: patient returned to full support per order CRNP to rest overnight nursing made aware. b.s. with scattered coarseness suctioned for  moderate cloudy white pale yellow secretions patient resting without great apparent respiratory distress et tube in place and scure tie tension adeqauet no eveidence of skin breakdown noted at this tiem plan: contineu curre t support overnight and attempt cpap/ps in am for possible liberation tomorrow

## 2024-03-22 NOTE — RESPIRATORY THERAPY NOTE
Fio2 found on 40%   Home Suture Removal Text: Patient was provided instructions on removing sutures and will remove their sutures at home.  If they have any questions or difficulties they will call the office.

## 2024-03-22 NOTE — CASE MANAGEMENT
Case Management Discharge Planning Note    Patient name Dwaine Gould  Location ICU 04/ICU 04 MRN [id number]  : 1960 Date 3/22/2024       Current Admission Date: 3/19/2024  Current Admission Diagnosis:Pneumoperitoneum   Patient Active Problem List    Diagnosis Date Noted    Gram-negative bacteremia 2024    Anemia 2024    Thrombocytopenia (HCC) 2024    Acute on chronic respiratory failure with hypoxia and hypercapnia (HCC) 2024    Stage 3a chronic kidney disease (HCC) 2024    Pneumoperitoneum 2024    Respiratory acidosis 2024    Acute on chronic heart failure with preserved ejection fraction (HCC) 2024    COPD (chronic obstructive pulmonary disease) (HCC) 2024    Leukocytosis 2024    Tobacco abuse 2021    CAD (coronary artery disease) 2021    Essential hypertension 2019    Hyperlipidemia 2019      LOS (days): 3  Geometric Mean LOS (GMLOS) (days): 9.8  Days to GMLOS:6.9     OBJECTIVE:  Risk of Unplanned Readmission Score: 13.51         Current admission status: Inpatient   Preferred Pharmacy:   [id number] - [place] - [address]  [address]  [place] [address]  Phone: [phone] Fax: [phone]    Primary Care Provider: Jordan Ramirez MD    Primary Insurance: [employer]  Secondary Insurance:     DISCHARGE DETAILS:                                          Other Referral/Resources/Interventions Provided:  Referral Comments: Daughter Eva at bedside with questions for CM.  Other daughter Kay told her that pt was going to be moved to a rehab facility in [place] or [place], and she wanted more info about facilities.  Pt remains on vent, POD #1 and #3.  Discussed w daughter the variety of rehab programs available, and that pt would likely need to be off vent and seen by PT/OT for their recommendations before a plan was made.  Reassured her that plan will be discussed w  family as it develops.  Daughter appreciative.  Of note, pt not eligible for LTACH LOC d/t insurance.         Treatment Team Recommendation: Other (none at present)  Discharge Destination Plan:: Other (TBD)  Transport at Discharge : Other (Comment) (TBD)

## 2024-03-22 NOTE — RESPIRATORY THERAPY NOTE
RT Ventilator Management Note  Dwaine Gould 63 y.o. female MRN: [id number]  Unit/Bed#: ICU 04 Encounter: [id number]      Daily Screen         3/22/2024  0423 3/22/2024  0728          Patient safety screen outcome:: Failed Failed      Not Ready for Weaning due to:: PEEP > 8cmH2O PEEP > 8cmH2O;Underline problem not resolved                Physical Exam:   Assessment Type: Pre-treatment  General Appearance: Sleeping, Sedated  Respiratory Pattern: Assisted  Chest Assessment: Chest expansion symmetrical  Bilateral Breath Sounds: Diminished  Suction: ET Tube  O2 Device: vent  Subjective Data: sedated      Resp Comments: placed pt on cpap/ps 10/10       03/22/24 0748   Respiratory Assessment   Resp Comments placed pt on cpap/ps 10/10   Vent Information   Vent ID Ruy   Vent type Drager   Drager Vent Mode CPAP/PS Spont   $ Pulse Oximetry Spot Check Charge Completed   SpO2 92 %   CPAP/PS Spont Settings   FIO2 (%) 40 %   PEEP (cmH2O) 10 cmH2O   Pressure Support (cmH2O) 1 cmH20   Trigger Sensitivity Pressure (cm H2O)  2 cm H2O   CPAP/PS Spont Actuals   Resp Rate (BPM) 13 BPM   VT (mL) 220 mL   MV (Obs) 3.49   MAP (cmH2O) 12 cmH2O   Peak Pressure (cmH2O) 21 cmH2O   I/E Ratio (Obs) 62   CPAP/PS Spont Alarms   High Peak Pressure (cmH20) 48 cmH2O   High Resp Rate (BPM) 30 BPM   High MV (L/min) 10 L/min   Low MV (L/min) 3 L/min   High [place]ingo VTE (mL) 850 mL   Low [place]ingo VTE (mL) 220 mL   High SPONT VTE (mL) 850 mL   Low Spont VTE (mL) 220 mL

## 2024-03-22 NOTE — ASSESSMENT & PLAN NOTE
Pt had abdominal pain 3/19-CT scan showed free intraperitoneal air  Taken urgently to OR s/p diag lap converted to exp lap, right hemicolectomy with anastomosis, washout, Abthera placement  In OR, abdominal cavity was noted to have grossly purulent material in it along with perforation in the cecum at a mass  transferred to ICU postoperatively with abdomen open mechanical ventilation  3/21 OR for washout and closure w/LISSY drain  F/U culture results  Continue Zosyn D3  Dilaudid PRN, ketamine x3 days for pain  Surgery following

## 2024-03-22 NOTE — RESPIRATORY THERAPY NOTE
RT Ventilator Management Note  Dwaine Gould 63 y.o. female MRN: [id number]  Unit/Bed#: ICU 04 Encounter: [id number]      Daily Screen         3/22/2024  0423 3/22/2024  0728          Patient safety screen outcome:: Failed Failed      Not Ready for Weaning due to:: PEEP > 8cmH2O PEEP > 8cmH2O;Underline problem not resolved                Physical Exam:   Assessment Type: Pre-treatment  General Appearance: Sleeping, Sedated  Respiratory Pattern: Assisted  Chest Assessment: Chest expansion symmetrical  Bilateral Breath Sounds: Diminished  Cough: None  Suction: ET Tube  O2 Device: vent  Subjective Data: sedated      Resp Comments: pt remains intubated and on full mechanical support  no changes at this time  unable to wean or place on sbt due to peep requirements  will discuss with critical care.  skin integrity remains intact       03/22/24 0728   Respiratory Assessment   Assessment Type Pre-treatment   General Appearance Sleeping;Sedated   Respiratory Pattern Assisted   Chest Assessment Chest expansion symmetrical   Bilateral Breath Sounds Diminished   Suction ET Tube   Resp Comments pt remains intubated and on full mechanical support  no changes at this time  unable to wean or place on sbt due to peep requirements  will discuss with critical care.  skin integrity remains intact   O2 Device vent   Subjective Data sedated   Vent Information   Vent ID [place]   Vent type Drager   Drager Vent Mode AC/VC+   Is the patient reintubated? No   $ Vent Daily Charge-Subsequent Yes   $ Pulse Oximetry Spot Check Charge Completed   SpO2 92 %   AC/VC+ Settings   Resp Rate (BPM) 14 BPM   VT (mL) 370 mL   Insp Time (S) 0.98 S   FIO2 (%) 40 %   PEEP (cmH2O) 10 cmH2O   Rise Time (%) 20 %   Trigger Sensitivity Flow (LPM) 2 LPM   Humidification Heater   Heater Temp 98.6 °F (37 °C)   AC/VC+ Actuals   Resp Rate (BPM) 14 BPM   VT (mL) 341 mL   MV (Obs) 4.58   MAP (cmH2O) 14 cmH2O   Peak Pressure (cmH2O) 29 cmH2O   I:E Ratio (Obs) 1:3.4   Static  Compliance (mL/cmH20) 31.8 mL/cmH2O   Plateau Pressure (cm H2O) 25 cm H2O   Heater Temperature (Obs) 99.1 °F (37.3 °C)   AC/VC+ ALARMS   High Peak Pressure (cmH2O) 48 cmH2O   High Resp Rate (BPM) 30 BPM   High MV (L/min) 10 L/min   Low MV (L/min) 3 L/min   High VT (mL) 850 mL   Maintenance   Alarm (pink) cable attached Yes   Resuscitation bag with peep valve at bedside Yes   Water bag changed No   Circuit changed No   Daily Screen   Patient safety screen outcome: Failed   Not Ready for Weaning due to: PEEP > 8cmH2O;Underline problem not resolved   IHI Ventilator Associated Pneumonia Bundle   Daily Awakening Trials Performed Yes   Daily Assessment of Readiness to Extubate Yes   Head of Bed Elevated HOB 30   Oral Care Mouth suctioned   ETT  Cuffed;Oral;Hi-Lo 7 mm   Placement Date/Time: 03/19/24 2129   Mask Ventilation: Mask ventilation not attempted (0)  Preoxygenated: Yes  Technique: Video laryngoscopy;Rapid sequence  Type: Cuffed;Oral;Hi-Lo  Tube Size: 7 mm  Laryngoscope: Harmon  Blade Size: 3  Location: Oral...   Secured at (cm) 20   Measured from Gums   Secured Location Right   Repositioned (S)  Right to Center   Secured by Commercial tube whitfield   Site Condition Dry   Cuff Pressure (color) Green   HI-LO Suction  Continuous low suction   HI-LO Secretions Scant;Blood tinged   HI-LO Intervention Patent

## 2024-03-22 NOTE — RESPIRATORY THERAPY NOTE
03/22/24 0423   Respiratory Assessment   General Appearance Eyes open/responds to stimulus   Respiratory Pattern Spontaneous;Assisted   Chest Assessment Chest expansion symmetrical   Bilateral Breath Sounds Diminished   Resp Comments continue to monitor and wean from vent support as tolerated   O2 Device ventilator   Vent Information   Vent ID [place]   Vent type Drager   Drager Vent Mode AC/VC+   $ Pulse Oximetry Spot Check Charge Completed   AC/VC+ Settings   Resp Rate (BPM) 14 BPM   VT (mL) 370 mL   Insp Time (S) 0.98 S   FIO2 (%) 40 %   PEEP (cmH2O) 10 cmH2O   Rise Time (%) 20 %   Trigger Sensitivity Flow (LPM) 2 LPM   Humidification Heater   Heater Temp 98.6 °F (37 °C)   AC/VC+ Actuals   Resp Rate (BPM) 15 BPM   VT (mL) 390 mL   MV (Obs) 4.51   MAP (cmH2O) 15 cmH2O   Peak Pressure (cmH2O) 31 cmH2O   I:E Ratio (Obs) 1:3.1   Static Compliance (mL/cmH20) 28.2 mL/cmH2O   Plateau Pressure (cm H2O) 26.6 cm H2O   Heater Temperature (Obs) 99 °F (37.2 °C)   AC/VC+ ALARMS   High Peak Pressure (cmH2O) 48 cmH2O   High Resp Rate (BPM) 30 BPM   High MV (L/min) 10 L/min   Low MV (L/min) 3 L/min   High VT (mL) 850 mL   Maintenance   Alarm (pink) cable attached Yes   Resuscitation bag with peep valve at bedside Yes   Water bag changed No   Circuit changed No   Daily Screen   Patient safety screen outcome: Failed   Not Ready for Weaning due to: PEEP > 8cmH2O   IHI Ventilator Associated Pneumonia Bundle   Head of Bed Elevated HOB 30   ETT  Cuffed;Oral;Hi-Lo 7 mm   Placement Date/Time: 03/19/24 2129   Mask Ventilation: Mask ventilation not attempted (0)  Preoxygenated: Yes  Technique: Video laryngoscopy;Rapid sequence  Type: Cuffed;Oral;Hi-Lo  Tube Size: 7 mm  Laryngoscope: Harmon  Blade Size: 3  Location: Oral...   Secured at (cm) 20   Measured from Gums   Secured Location (S)  Right   Repositioned Left to Right   Secured by Commercial tube whitfield   Site Condition Dry   Cuff Pressure (cm H2O)   (MLT)   Cuff Pressure (color)  Green   HI-LO Suction  Continuous low suction   HI-LO Secretions Scant   HI-LO Intervention Patent     Pt continues to rest comfortably and remains tolerant of current settings

## 2024-03-22 NOTE — ASSESSMENT & PLAN NOTE
Wt Readings from Last 3 Encounters:   03/21/24 134 kg (295 lb 10.2 oz)   03/19/24 128 kg (282 lb 3 oz)   07/20/21 103 kg (227 lb 8.2 oz)     Pt presented to the hospital 3/11 and was aggressively diuresed  Echo on 3/12 showed EF 60%, mild to moderate concentric hypertrophy, and normal diastolic function  Was discharged home on 3/19 but returned later that day after CT findings of free intraperitoneal air and taken to OR  Diuresis initiated 3/20-pt diuresing well  Monitor daily weights  Strict I/O  Continue daily lasix

## 2024-03-22 NOTE — ANESTHESIA POSTPROCEDURE EVALUATION
Post-Op Assessment Note    CV Status:  Stable    Pain management: adequate       Mental Status:  Alert and sleepy   PONV Controlled:  None   Airway Patency:  Patent  Airway: intubated  Two or more mitigation strategies used for obstructive sleep apnea. There is a medical reason for not screening for obstructive sleep apnea and/or for not using two or more mitigation strategies  No anethesia notable event occurred.    Staff: Anesthesiologist     Reason for prolonged intubation > 24 hours:  See ICU progress notesReason for prolonged intubation > 48 hours:  See ICU progress notes          BP      Temp      Pulse     Resp      SpO2

## 2024-03-22 NOTE — ASSESSMENT & PLAN NOTE
Patricia SOLORIO Neto Hall received the Tetanus and Diphthria Toxoids Adsorbed TENIVAC 7 yrs of age and older immunization today in clinic at the request of Dr. Martha Olmedo. The immunization was given under the supervision of Dr. Dennis Slade if assistance was needed. The immunization site was cleaned with an alcohol prep wipe. The immunization was given without incident--see immunization list for administration details. No swelling or redness was observed at the site of injection after the immunization was given.     Additionally, at the request of Dr. Martha Olmedo, Patricia Hall received the Influenza Vaccine Fluzone Quadrivalent [id number] Formula No Preservative vaccine today in clinic. The flu shot was given under the supervision of Dr. Dennis Slade if assistance was needed. The immunization site was cleaned with an alcohol prep wipe. The flu shot was given without incident--see immunization list for administration details. No swelling or redness was observed at the site of injection after the immunization was given. The patient reported to [address] on the fourth floor of the [facility] for further evaluation in the wound clinic.      Mark Bergman, NADIYA at 10:32 AM on 12/21/2021   Would advise cessation  Nicotine patch while inpatient

## 2024-03-22 NOTE — PROGRESS NOTES
[facility]  Progress Note  Name: Dwaine Gould I  MRN: [id number]  Unit/Bed#: ICU 04 I Date of Admission: 3/19/2024   Date of Service: 3/22/2024 I Hospital Day: 3    Assessment/Plan   * Pneumoperitoneum  Assessment & Plan    Pt had abdominal pain 3/19-CT scan showed free intraperitoneal air  Taken urgently to OR s/p diag lap converted to exp lap, right hemicolectomy with anastomosis, washout, Abthera placement  In OR, abdominal cavity was noted to have grossly purulent material in it along with perforation in the cecum at a mass  transferred to ICU postoperatively with abdomen open mechanical ventilation  3/21 OR for washout and closure w/LISSY drain  F/U culture results  Continue Zosyn D3  Dilaudid PRN, ketamine x3 days for pain  Surgery following      Acute on chronic respiratory failure with hypoxia and hypercapnia (HCC)  Assessment & Plan  Pt with COPD and likely RILEY/OHS, D/C home with oxygen 4-5L  Remains intubated postoperatively   Continue mechanical ventilation for now  vent changes as needed  Currently on AC 14/370/40%/+10  Continue scheduled nebs  Propofol for sedation  VAP precautions  Continue diuretics  Will need bipap QHS once extubated    Acute on chronic heart failure with preserved ejection fraction (HCC)  Assessment & Plan  Wt Readings from Last 3 Encounters:   03/21/24 134 kg (295 lb 10.2 oz)   03/19/24 128 kg (282 lb 3 oz)   07/20/21 103 kg (227 lb 8.2 oz)     Pt presented to the hospital 3/11 and was aggressively diuresed  Echo on 3/12 showed EF 60%, mild to moderate concentric hypertrophy, and normal diastolic function  Was discharged home on 3/19 but returned later that day after CT findings of free intraperitoneal air and taken to OR  Diuresis initiated 3/20-pt diuresing well  Monitor daily weights  Strict I/O  Continue daily lasix            Gram-negative bacteremia  Assessment & Plan  3/19 1/2 BC w/ GNR  Body fluid culture and GS w/ E coli  In the setting of bowel  perforation   Continue Zosyn  Trend leukocytosis and fever curve    Tobacco abuse  Assessment & Plan  Would advise cessation  Nicotine patch while inpatient    Thrombocytopenia (HCC)  Assessment & Plan  Unclear etiology, suspect platelet consumption in setting of surgery and acute illness   Continue trending on daily labs  Will send HIT panel if worsening thrombocytopenia  Monitor for bleeding    Anemia  Assessment & Plan  Microcytic  suspect iron deficiency  Start supplementation with B12 and folate when taking oral   Trend daily CBC    Stage 3a chronic kidney disease (HCC)  Assessment & Plan  Lab Results   Component Value Date    EGFR 52 03/21/2024    EGFR 51 03/20/2024    EGFR 50 03/20/2024    CREATININE 1.14 03/21/2024    CREATININE 1.15 03/20/2024    CREATININE 1.17 03/20/2024   Pt creatinine appears to be at baseline  Will continue to trend  Pt diuresing well, continue   Avoid nephrotoxic agents  Strict I/O    Respiratory acidosis  Assessment & Plan  Pt with chronic resp acidosis 2/2 COPD, likely RILEY, +/- OHS  Currently intubated  Will need bipap once extubated    Hyperlipidemia  Assessment & Plan  Hold statin for now  Restart when able to take po    Essential hypertension  Assessment & Plan  Oral meds on hold  PRN hydralazine for SBP>170  Takes labetolol at home-using hydralazine currently given bradycardia    Leukocytosis  Assessment & Plan  In the setting of pneumoperitoneum  F/U blood and OR cultures  Continue broad spectrum abx  Lactic negative  Trend procal  Trend fever curve and WBC    COPD (chronic obstructive pulmonary disease) ([facility])  Assessment & Plan  Does not appear to be in acute exacerbation  Continue scheduled bronchodilators      CAD (coronary artery disease)  Assessment & Plan  Per [facility] records patient with single-vessel CAD with ostial RPDA lesion not amenable to PCI due to location of lesion October 2014  Continue medical management-po meds on hold currently             Disposition: Critical  care    ICU Core Measures     Vented Patient  VAP Bundle  VAP bundle ordered     A: Assess, Prevent, and Manage Pain Has pain been assessed? Yes  Need for changes to pain regimen? No   B: Both Spontaneous Awakening Trials (SATs) and Spontaneous Breathing Trials (SBTs) Plan to perform spontaneous awakening trial today? Yes   Plan to perform spontaneous breathing trial today? Yes   Obvious barriers to extubation? Yes   C: Choice of Sedation RASS Goal: -1 Drowsy  Need for changes to sedation or analgesia regimen? No   D: Delirium CAM-ICU: Negative   E: Early Mobility  Plan for early mobility? Yes   F: Family Engagement Plan for family engagement today? Yes       Antibiotic Review: Patient on appropriate coverage based on culture data.     Review of Invasive Devices:    Jono Plan: Continue for accurate I/O monitoring for 48 hours    Jess Plan: Keep arterial line for hemodynamic monitoring    Prophylaxis:  VTE VTE covered by:  heparin (porcine), Subcutaneous, 7,500 Units at 03/21/24 2120       Stress Ulcer  covered bypantoprazole (PROTONIX) injection 40 mg [[id number]]         Significant 24hr Events     24hr events: Yesterday to OR for washout, closure, LISSY drain placement. Remained intubated and sedated. Received 20 IV lasix with 1200 ml UO. Remained hemodynamically stable overnight     Subjective   Review of Systems   Unable to perform ROS: Intubated      Objective                            Vitals I/O      Most Recent Min/Max in 24hrs   Temp 97.9 °F (36.6 °C) Temp  Min: 97.9 °F (36.6 °C)  Max: 99.3 °F (37.4 °C)   Pulse 80 Pulse  Min: 66  Max: 82   Resp 15 Resp  Min: 14  Max: 16   /53 BP  Min: 97/54  Max: 143/63   O2 Sat 91 % SpO2  Min: 91 %  Max: 97 %      Intake/Output Summary (Last 24 hours) at 3/22/2024 0243  Last data filed at 3/22/2024 0000  Gross per 24 hour   Intake 1120.76 ml   Output 2860 ml   Net -1739.24 ml       Diet Enteral/Parenteral; Tube Feeding No Oral Diet; Jevity 1.2 Ravi; Continuous;  10    Invasive Monitoring           Physical Exam   Physical Exam  Vitals and nursing note reviewed.   Eyes:      Pupils: Pupils are equal, round, and reactive to light.   Skin:     General: Skin is warm and dry.      Capillary Refill: Capillary refill takes less than 2 seconds.   HENT:      Head: Normocephalic.      Mouth/Throat:      Mouth: Mucous membranes are moist.   Cardiovascular:      Rate and Rhythm: Normal rate and regular rhythm.      Pulses: Normal pulses.      Heart sounds: Normal heart sounds.   Musculoskeletal:      Right lower le+ Edema present.      Left lower le+ Edema present.   Abdominal:      Palpations: Abdomen is soft.   Constitutional:       General: She is not in acute distress.     Interventions: She is sedated, intubated and restrained.   Pulmonary:      Effort: Pulmonary effort is normal. No respiratory distress. She is intubated.      Breath sounds: Normal breath sounds. No wheezing or rales.   Neurological:      Mental Status: She is alert.        Corneal reflex present, cough reflex and gag reflex intact.   Genitourinary/Anorectal:  [place] present.          Diagnostic Studies      EKG: SR  Imaging:  I have personally reviewed pertinent reports.       Medications:  Scheduled PRN   Albumin 25%, 25 g, Q6H  budesonide, 0.5 mg, Q12H  chlorhexidine, 15 mL, Q12H ANGELES  furosemide, 20 mg, Daily  heparin (porcine), 7,500 Units, Q8H ANGELES  HYDROmorphone, 0.5 mg, Q6H  insulin lispro, 1-5 Units, Q6H ANGELES  ipratropium, 0.5 mg, TID  Ketamine HCl, 50 mg, Once  levalbuterol, 1.25 mg, TID  nicotine, 1 patch, Daily  pantoprazole, 40 mg, Q24H ANGELES  piperacillin-tazobactam, 4.5 g, Q8H      hydrALAZINE, 10 mg, Q6H PRN  HYDROmorphone, 1 mg, Q3H PRN       Continuous    propofol, 5-50 mcg/kg/min, Last Rate: 50 mcg/kg/min (24 0015)         Labs:    CBC    Recent Labs     24  0530 24  0518   WBC 12.35* 10.46*   HGB 12.0 10.5*   HCT 40.4 35.1    133*     BMP    Recent Labs      03/20/24 2112 03/21/24  0518   SODIUM 146 146   K 3.2* 3.5    104   CO2 37* 37*   AGAP 7 5   BUN 28* 25   CREATININE 1.15 1.14   CALCIUM 8.5 8.1*       Coags    No recent results     Additional Electrolytes  Recent Labs     03/20/24  0530 03/20/24  1502 03/20/24 2112 03/21/24  0518   MG 2.7   < > 2.3 2.3   PHOS 3.7  --   --  3.3   CAIONIZED 1.09*  --   --  1.04*    < > = values in this interval not displayed.          Blood Gas    Recent Labs     03/20/24  0528   PHART 7.414   [id number] 59.3*   PO2ART 76.8   [id number] 37.1*   BEART 10.3   SOURCE Line, Arterial     Recent Labs     03/20/24  0528   SOURCE Line, Arterial    LFTs  Recent Labs     03/20/24  0530 03/21/24  0518   ALT 24 15   AST 12* 12*   ALKPHOS 63 49   ALB 2.9* 3.5   TBILI 1.03* 0.91       Infectious  Recent Labs     03/20/24  0530 03/21/24  0518   PROCALCITONI 0.40* 0.39*     Glucose  Recent Labs     03/20/24  0530 03/20/24  1502 03/20/24 2112 03/21/24  0518   GLUC 143* 114 114 95               GUSTAVO Avina

## 2024-03-22 NOTE — ASSESSMENT & PLAN NOTE
Unclear etiology, suspect platelet consumption in setting of surgery and acute illness   Continue trending on daily labs  Will send HIT panel if worsening thrombocytopenia  Monitor for bleeding

## 2024-03-22 NOTE — ASSESSMENT & PLAN NOTE
Microcytic  suspect iron deficiency  Start supplementation with B12 and folate when taking oral   Trend daily CBC

## 2024-03-22 NOTE — NUTRITION
03/22/24 1028   Recommendations/Interventions   Interventions/Recommendations Adjust EN/ PN;Initiate daily weight;Lab consider order (Specify);Other (Specify)  (Monitor electrolytes and weight.)   Recommendations to Provider While on current amount of propofol 38.4 ml/hr (providing 1014 calories) suggest adjust EN formula to Vital high protein @ 10 ml/hr with Prosource no carb TID. EN/Prosource will provide: 420 kcal, 66 gms pro, 199 ml tv.

## 2024-03-22 NOTE — ASSESSMENT & PLAN NOTE
Lab Results   Component Value Date    EGFR 52 03/21/2024    EGFR 51 03/20/2024    EGFR 50 03/20/2024    CREATININE 1.14 03/21/2024    CREATININE 1.15 03/20/2024    CREATININE 1.17 03/20/2024   Pt creatinine appears to be at baseline  Will continue to trend  Pt diuresing well, continue   Avoid nephrotoxic agents  Strict I/O

## 2024-03-22 NOTE — PROGRESS NOTES
Progress Note - General Surgery   Dwaine Gould 63 y.o. female MRN: [id number]  Unit/Bed#: ICU 04 Encounter: [id number]      Assessment:   63-year-old female, status post diagnostic diagnostic laparoscopy for pneumoperitoneum, converted to exploratory laparotomy with right hemicolectomy for perforated cecal mass and abdominal washout on 3/19/2024  -Abdominal wall left open    Return to OR for second look, abdominal washout, and abdominal wall closure on 3/21/2024    Plan:  -Continue to monitor LISSY drain, urine output, and NGT output.  -ICU team plans to wean off ventilator as soon as patient is able to tolerate but will continue to diuresis  -Will continue with wound checks    Subjective/Objective       Subjective: Patient awake but intubated.     Objective:     Blood pressure 110/53, pulse 84, temperature 99.1 °F (37.3 °C), temperature source Esophageal, resp. rate 15, height 5' (1.524 m), weight 127 kg (279 lb 5.2 oz), SpO2 95%.  Body mass index is 54.55 kg/m².    I/O         03/20 0701  03/21 0700 03/21 0701  03/22 0700 03/22 0701  03/23 0700    P.O. 0 0     I.V. (mL/kg) 463.6 (3.5) 933.2 (7.3)     IV Piggyback 460 305.8     Feedings  100     Total Intake(mL/kg) 923.6 (6.9) 1339 (10.5)     Urine (mL/kg/hr) 2515 (0.8) 2400 (0.8) 290 (0.5)    Emesis/NG output 50 100     Drains 1875 405 50    Stool 0 0     Blood  20     Total Output 4440 2925 340    Net -3516.4 -1586 -340           Unmeasured Stool Occurrence 0 x 0 x             Invasive Devices       Peripheral Intravenous Line  Duration             Peripheral IV 03/19/24 Distal;Left;Ventral (anterior) Forearm 2 days    Peripheral IV 03/20/24 Right Antecubital 1 day    Peripheral IV 03/21/24 Right;Ventral (anterior) Wrist <1 day              Arterial Line  Duration             Arterial Line 03/19/24 Right Radial 2 days              Drain  Duration             NG/OG/Enteral Tube Nasogastric Left nare 2 days    Urethral Catheter Straight-tip;Non-latex 16 Fr. 2 days     Closed/Suction Drain Left LLQ Bulb <1 day              Airway  Duration             ETT  Cuffed;Oral;Hi-Lo 7 mm 2 days                    Physical Exam: /53   Pulse 84   Temp 99.1 °F (37.3 °C) (Esophageal)   Resp 15   Ht 5' (1.524 m)   Wt 127 kg (279 lb 5.2 oz)   SpO2 95%   BMI 54.55 kg/m²   General appearance:  Alert and intubated, slightly agitated  Lungs:  Mechanically ventilated  Heart: regular rate and rhythm and S1, S2 normal  Abdomen: Obese, distended, absent bowel sounds, midline incision well-approximated with staples and no yelitza-incisional erythema or discharge noted, LISSY drain has serosanguineous output with some drainage around drain incision, abdominal wall continues to be edematous  Extremities: edema 2+    Labs   Recent Results (from the past 24 hour(s))   Fingerstick Glucose (POCT)    Collection Time: 03/21/24  5:46 PM   Result Value Ref Range    POC Glucose 103 65 - 140 mg/dl   Fingerstick Glucose (POCT)    Collection Time: 03/22/24 12:31 AM   Result Value Ref Range    POC Glucose 98 65 - 140 mg/dl   Fingerstick Glucose (POCT)    Collection Time: 03/22/24  5:34 AM   Result Value Ref Range    POC Glucose 99 65 - 140 mg/dl   Comprehensive metabolic panel    Collection Time: 03/22/24  5:36 AM   Result Value Ref Range    Sodium 147 135 - 147 mmol/L    Potassium 3.3 (L) 3.5 - 5.3 mmol/L    Chloride 104 96 - 108 mmol/L    CO2 35 (H) 21 - 32 mmol/L    ANION GAP 8 4 - 13 mmol/L    BUN 20 5 - 25 mg/dL    Creatinine 1.04 0.60 - 1.30 mg/dL    Glucose 95 65 - 140 mg/dL    Calcium 8.4 8.4 - 10.2 mg/dL    AST 12 (L) 13 - 39 U/L    ALT 13 7 - 52 U/L    Alkaline Phosphatase 48 34 - 104 U/L    Total Protein 5.6 (L) 6.4 - 8.4 g/dL    Albumin 3.9 3.5 - 5.0 g/dL    Total Bilirubin 1.29 (H) 0.20 - 1.00 mg/dL    eGFR 58 ml/min/1.73sq m   Magnesium    Collection Time: 03/22/24  5:36 AM   Result Value Ref Range    Magnesium 2.3 1.9 - 2.7 mg/dL   Calcium, ionized    Collection Time: 03/22/24  5:36 AM   Result  Value Ref Range    Calcium, Ionized 1.01 (L) 1.12 - 1.32 mmol/L   Phosphorus    Collection Time: 03/22/24  5:36 AM   Result Value Ref Range    Phosphorus 3.3 2.3 - 4.1 mg/dL   CBC    Collection Time: 03/22/24  5:36 AM   Result Value Ref Range    WBC 13.61 (H) 4.31 - 10.16 Thousand/uL    RBC 4.51 3.81 - 5.12 Million/uL    Hemoglobin 10.5 (L) 11.5 - 15.4 g/dL    Hematocrit 36.1 34.8 - 46.1 %    MCV 80 (L) 82 - 98 fL    MCH 23.3 (L) 26.8 - 34.3 pg    MCHC 29.1 (L) 31.4 - 37.4 g/dL    RDW 19.1 (H) 11.6 - 15.1 %    Platelets 136 (L) 149 - 390 Thousands/uL    MPV 9.3 8.9 - 12.7 fL        Imaging and other studies:  XR chest portable ICU    Result Date: 3/20/2024  Impression: 1. Low-normal positioning of the endotracheal tube, which is 1.9 cm above the nelson (consider withdrawing by 0.5 to 1 cm). Satisfactory positioning of the enteric tube. 2. Scattered subsegmental atelectasis with trace bilateral pleural effusions. Workstation performed: [id number]     CT abdomen pelvis wo contrast    Result Date: 3/19/2024  Impression: 1. Modest amount of pneumoperitoneum. Although the origin is unclear, findings are concerning for ruptured hollow viscus. Surgical consultation is advised. 2. Cluster of nodules in the right lower quadrant mesentery the largest measuring 3.0 x 2.3 cm (2/110) and another 1.7 cm nodule on image 111. Findings may be reactive. Follow-up CT and/or further work-up will be required to exclude possibility of a mesenteric mass such as carcinoid. 3. Small bilateral pleural effusions with mild bibasilar compressive atelectasis. Mild cardiomegaly. 4. Subcutaneous edema and thickening of the lower anterior abdominal wall, findings which may represent panniculitis. I personally discussed this study with Dr. Rashid Bañuelos on 3/19/2024 at 5:33 PM. Workstation performed: [id number]       VTE Pharmacologic Prophylaxis: Heparin  VTE Mechanical Prophylaxis: sequential compression device    Felipe S Shona, PA-C  3/22/2024

## 2024-03-22 NOTE — RESPIRATORY THERAPY NOTE
RT Ventilator Management Note  Dwaine Gould 63 y.o. female MRN: [id number]  Unit/Bed#: ICU 04 Encounter: [id number]      Daily Screen         3/22/2024  0423 3/22/2024  0728          Patient safety screen outcome:: Failed Failed      Not Ready for Weaning due to:: PEEP > 8cmH2O PEEP > 8cmH2O;Underline problem not resolved                Physical Exam:   Assessment Type: Pre-treatment  General Appearance: Sleeping, Sedated  Respiratory Pattern: Assisted  Chest Assessment: Chest expansion symmetrical  Bilateral Breath Sounds: Diminished  Suction: ET Tube  O2 Device: vent  Subjective Data: sedated      Resp Comments: placed back on full support due to apneia and desaturation       03/22/24 0935   Respiratory Assessment   Resp Comments placed back on full support due to apneia and desaturation   O2 Device vent   Subjective Data sedated   Vent Information   Vent ID [place]   Vent type Drager   Drager Vent Mode AC/VC+   $ Pulse Oximetry Spot Check Charge Completed   SpO2 94 %   AC/VC+ Settings   Resp Rate (BPM) 14 BPM   VT (mL) 370 mL   Insp Time (S) 0.98 S   FIO2 (%) (S)  50 %   PEEP (cmH2O) 10 cmH2O   Rise Time (%) 20 %   Trigger Sensitivity Flow (LPM) 0.2 LPM   Humidification Heater   Heater Temp 98.6 °F (37 °C)   AC/VC+ Actuals   Resp Rate (BPM) 14 BPM   VT (mL) 411 mL   MV (Obs) 4.16   MAP (cmH2O) 15 cmH2O   Peak Pressure (cmH2O) 28 cmH2O   I:E Ratio (Obs) 1:3.4   Static Compliance (mL/cmH20) 24.7 mL/cmH2O   Plateau Pressure (cm H2O) 7.3 cm H2O   Heater Temperature (Obs) 98.4 °F (36.9 °C)   AC/VC+ ALARMS   High Peak Pressure (cmH2O) 48 cmH2O   High Resp Rate (BPM) 30 BPM   High MV (L/min) 10 L/min   Low MV (L/min) 3 L/min   High VT (mL) 850 mL

## 2024-03-22 NOTE — RESPIRATORY THERAPY NOTE
RT Ventilator Management Note  Dwiane Gould 63 y.o. female MRN: [id number]  Unit/Bed#: ICU 04 Encounter: [id number]      Daily Screen         3/22/2024  0423 3/22/2024  0728          Patient safety screen outcome:: Failed Failed      Not Ready for Weaning due to:: PEEP > 8cmH2O PEEP > 8cmH2O;Underline problem not resolved                Physical Exam:   Assessment Type: Assess only  General Appearance: Sedated, Sleeping  Respiratory Pattern: Assisted  Chest Assessment: Chest expansion symmetrical  Bilateral Breath Sounds: Diminished  Suction: ET Tube  O2 Device: vent  Subjective Data: sleeping      Resp Comments: placed pt on cpap/ps 10/10       03/22/24 1553   Respiratory Assessment   Assessment Type Assess only   General Appearance Sedated;Sleeping   Respiratory Pattern Assisted   Chest Assessment Chest expansion symmetrical   Bilateral Breath Sounds Diminished   Suction ET Tube   Resp Comments placed pt on cpap/ps 10/10   O2 Device vent   Subjective Data sleeping   Vent Information   Vent ID [place]   Vent type Drager   Drager Vent Mode CPAP/PS Spont   $ Pulse Oximetry Spot Check Charge Completed   SpO2 94 %   CPAP/PS Spont Settings   FIO2 (%) 40 %   PEEP (cmH2O) 10 cmH2O   Pressure Support (cmH2O) 10 cmH20   Trigger Sensitivity Pressure (cm H2O)  2 cm H2O   Rise Time (%) 0.2 %   Humidification Heater   Heater Temp 98.6 °F (37 °C)   CPAP/PS Spont Actuals   Resp Rate (BPM) 17 BPM   VT (mL) 482 mL   MV (Obs) 5.2   MAP (cmH2O) 12 cmH2O   Peak Pressure (cmH2O) 21 cmH2O   RSBI 33   Heater Temperature (Obs) 366 °F (185.6 °C)   CPAP/PS Spont Alarms   High Peak Pressure (cmH20) 48 cmH2O   High Resp Rate (BPM) 30 BPM   High MV (L/min) 10 L/min   Low MV (L/min) 3 L/min   High [place] VTE (mL) 850 mL   Low [place] VTE (mL) 220 mL   High SPONT VTE (mL) 850 mL   Low Spont VTE (mL) 220 mL   Maintenance   Alarm (pink) cable attached Yes   Resuscitation bag with peep valve at bedside Yes   Water bag changed No   Circuit changed No   ETT   Cuffed;Oral;Hi-Lo 7 mm   Placement Date/Time: 03/19/24 2129   Mask Ventilation: Mask ventilation not attempted (0)  Preoxygenated: Yes  Technique: Video laryngoscopy;Rapid sequence  Type: Cuffed;Oral;Hi-Lo  Tube Size: 7 mm  Laryngoscope: [place]  Blade Size: 3  Location: Oral...   Secured at (cm) 20   Measured from Gums   Secured Location Center   Repositioned (S)  Center to Left   Secured by Commercial tube whitfield   Site Condition Dry   Cuff Pressure (color) Green   HI-LO Suction  Continuous low suction   HI-LO Secretions Scant;Blood tinged   HI-LO Intervention Patent

## 2024-03-23 LAB
ANION GAP SERPL CALCULATED.3IONS-SCNC: 8 MMOL/L (ref 4–13)
ANION GAP SERPL CALCULATED.3IONS-SCNC: 8 MMOL/L (ref 4–13)
ANION GAP SERPL CALCULATED.3IONS-SCNC: 9 MMOL/L (ref 4–13)
ANION GAP SERPL CALCULATED.3IONS-SCNC: 9 MMOL/L (ref 4–13)
BACTERIA SPEC BFLD CULT: ABNORMAL
BACTERIA SPEC BFLD CULT: ABNORMAL
BASE EXCESS BLDA CALC-SCNC: 5 MMOL/L
BODY TEMPERATURE: 97.3 DEGREES FEHRENHEIT
BUN SERPL-MCNC: 18 MG/DL (ref 5–25)
BUN SERPL-MCNC: 18 MG/DL (ref 5–25)
BUN SERPL-MCNC: 19 MG/DL (ref 5–25)
BUN SERPL-MCNC: 20 MG/DL (ref 5–25)
CA-I BLD-SCNC: 1.05 MMOL/L (ref 1.12–1.32)
CA-I BLD-SCNC: 1.07 MMOL/L (ref 1.12–1.32)
CA-I BLD-SCNC: 1.14 MMOL/L (ref 1.12–1.32)
CA-I BLD-SCNC: 1.16 MMOL/L (ref 1.12–1.32)
CALCIUM SERPL-MCNC: 8.6 MG/DL (ref 8.4–10.2)
CALCIUM SERPL-MCNC: 9 MG/DL (ref 8.4–10.2)
CALCIUM SERPL-MCNC: 9.6 MG/DL (ref 8.4–10.2)
CALCIUM SERPL-MCNC: 9.9 MG/DL (ref 8.4–10.2)
CHLORIDE SERPL-SCNC: 105 MMOL/L (ref 96–108)
CHLORIDE SERPL-SCNC: 105 MMOL/L (ref 96–108)
CHLORIDE SERPL-SCNC: 106 MMOL/L (ref 96–108)
CHLORIDE SERPL-SCNC: 107 MMOL/L (ref 96–108)
CO2 SERPL-SCNC: 32 MMOL/L (ref 21–32)
CO2 SERPL-SCNC: 33 MMOL/L (ref 21–32)
CO2 SERPL-SCNC: 33 MMOL/L (ref 21–32)
CO2 SERPL-SCNC: 34 MMOL/L (ref 21–32)
CREAT SERPL-MCNC: 0.94 MG/DL (ref 0.6–1.3)
CREAT SERPL-MCNC: 1.03 MG/DL (ref 0.6–1.3)
CREAT SERPL-MCNC: 1.07 MG/DL (ref 0.6–1.3)
CREAT SERPL-MCNC: 1.07 MG/DL (ref 0.6–1.3)
ERYTHROCYTE [DISTWIDTH] IN BLOOD BY AUTOMATED COUNT: 19.2 % (ref 11.6–15.1)
GFR SERPL CREATININE-BSD FRML MDRD: 56 ML/MIN/1.73SQ M
GFR SERPL CREATININE-BSD FRML MDRD: 56 ML/MIN/1.73SQ M
GFR SERPL CREATININE-BSD FRML MDRD: 58 ML/MIN/1.73SQ M
GFR SERPL CREATININE-BSD FRML MDRD: 65 ML/MIN/1.73SQ M
GLUCOSE SERPL-MCNC: 107 MG/DL (ref 65–140)
GLUCOSE SERPL-MCNC: 110 MG/DL (ref 65–140)
GLUCOSE SERPL-MCNC: 118 MG/DL (ref 65–140)
GLUCOSE SERPL-MCNC: 125 MG/DL (ref 65–140)
GLUCOSE SERPL-MCNC: 87 MG/DL (ref 65–140)
GLUCOSE SERPL-MCNC: 94 MG/DL (ref 65–140)
GLUCOSE SERPL-MCNC: 96 MG/DL (ref 65–140)
GLUCOSE SERPL-MCNC: 99 MG/DL (ref 65–140)
GRAM STN SPEC: ABNORMAL
HCO3 BLDA-SCNC: 31.4 MMOL/L (ref 22–28)
HCT VFR BLD AUTO: 32.2 % (ref 34.8–46.1)
HGB BLD-MCNC: 9.4 G/DL (ref 11.5–15.4)
HOROWITZ INDEX BLDA+IHG-RTO: 40 MM[HG]
MAGNESIUM SERPL-MCNC: 2.1 MG/DL (ref 1.9–2.7)
MAGNESIUM SERPL-MCNC: 2.2 MG/DL (ref 1.9–2.7)
MCH RBC QN AUTO: 23.6 PG (ref 26.8–34.3)
MCHC RBC AUTO-ENTMCNC: 29.2 G/DL (ref 31.4–37.4)
MCV RBC AUTO: 81 FL (ref 82–98)
O2 CT BLDA-SCNC: 13.6 ML/DL (ref 16–23)
OXYHGB MFR BLDA: 91 % (ref 94–97)
PCO2 BLDA: 55.9 MM HG (ref 36–44)
PEEP RESPIRATORY: 10 CM[H2O]
PH BLDA: 7.37 [PH] (ref 7.35–7.45)
PLATELET # BLD AUTO: 145 THOUSANDS/UL (ref 149–390)
PMV BLD AUTO: 10 FL (ref 8.9–12.7)
PO2 BLDA: 68.3 MM HG (ref 75–129)
POTASSIUM SERPL-SCNC: 3.5 MMOL/L (ref 3.5–5.3)
POTASSIUM SERPL-SCNC: 3.7 MMOL/L (ref 3.5–5.3)
POTASSIUM SERPL-SCNC: 3.8 MMOL/L (ref 3.5–5.3)
POTASSIUM SERPL-SCNC: 3.8 MMOL/L (ref 3.5–5.3)
RBC # BLD AUTO: 3.99 MILLION/UL (ref 3.81–5.12)
SODIUM SERPL-SCNC: 147 MMOL/L (ref 135–147)
SODIUM SERPL-SCNC: 148 MMOL/L (ref 135–147)
SPECIMEN SOURCE: ABNORMAL
VENT AC: 14
VENT- AC: AC
VT SETTING VENT: 370 ML
WBC # BLD AUTO: 11.18 THOUSAND/UL (ref 4.31–10.16)

## 2024-03-23 PROCEDURE — 94640 AIRWAY INHALATION TREATMENT: CPT

## 2024-03-23 PROCEDURE — 82948 REAGENT STRIP/BLOOD GLUCOSE: CPT

## 2024-03-23 PROCEDURE — 85027 COMPLETE CBC AUTOMATED: CPT | Performed by: NURSE PRACTITIONER

## 2024-03-23 PROCEDURE — 82805 BLOOD GASES W/O2 SATURATION: CPT | Performed by: NURSE PRACTITIONER

## 2024-03-23 PROCEDURE — 94660 CPAP INITIATION&MGMT: CPT

## 2024-03-23 PROCEDURE — 83735 ASSAY OF MAGNESIUM: CPT | Performed by: NURSE PRACTITIONER

## 2024-03-23 PROCEDURE — 80048 BASIC METABOLIC PNL TOTAL CA: CPT | Performed by: NURSE PRACTITIONER

## 2024-03-23 PROCEDURE — C9113 INJ PANTOPRAZOLE SODIUM, VIA: HCPCS

## 2024-03-23 PROCEDURE — 99024 POSTOP FOLLOW-UP VISIT: CPT | Performed by: CLINICAL NURSE SPECIALIST

## 2024-03-23 PROCEDURE — 82330 ASSAY OF CALCIUM: CPT | Performed by: NURSE PRACTITIONER

## 2024-03-23 PROCEDURE — 94760 N-INVAS EAR/PLS OXIMETRY 1: CPT

## 2024-03-23 PROCEDURE — 94003 VENT MGMT INPAT SUBQ DAY: CPT

## 2024-03-23 RX ORDER — POTASSIUM CHLORIDE 29.8 MG/ML
40 INJECTION INTRAVENOUS ONCE
Status: COMPLETED | OUTPATIENT
Start: 2024-03-23 | End: 2024-03-23

## 2024-03-23 RX ORDER — KETAMINE HCL IN NACL, ISO-OSM 100MG/10ML
SYRINGE (ML) INJECTION
Status: COMPLETED
Start: 2024-03-23 | End: 2024-03-23

## 2024-03-23 RX ORDER — KETAMINE HCL IN NACL, ISO-OSM 100MG/10ML
50 SYRINGE (ML) INJECTION ONCE
Status: COMPLETED | OUTPATIENT
Start: 2024-03-23 | End: 2024-03-23

## 2024-03-23 RX ORDER — HYDROMORPHONE HCL/PF 1 MG/ML
0.5 SYRINGE (ML) INJECTION EVERY 4 HOURS PRN
Status: DISCONTINUED | OUTPATIENT
Start: 2024-03-23 | End: 2024-03-23

## 2024-03-23 RX ORDER — POLYETHYLENE GLYCOL 3350 17 G/17G
17 POWDER, FOR SOLUTION ORAL DAILY
Status: DISCONTINUED | OUTPATIENT
Start: 2024-03-23 | End: 2024-03-25

## 2024-03-23 RX ORDER — CALCIUM GLUCONATE 20 MG/ML
2 INJECTION, SOLUTION INTRAVENOUS ONCE
Status: COMPLETED | OUTPATIENT
Start: 2024-03-23 | End: 2024-03-23

## 2024-03-23 RX ORDER — CALCIUM GLUCONATE 20 MG/ML
1 INJECTION, SOLUTION INTRAVENOUS ONCE
Status: COMPLETED | OUTPATIENT
Start: 2024-03-23 | End: 2024-03-23

## 2024-03-23 RX ORDER — HYDROMORPHONE HCL/PF 1 MG/ML
0.5 SYRINGE (ML) INJECTION
Status: DISCONTINUED | OUTPATIENT
Start: 2024-03-23 | End: 2024-03-23

## 2024-03-23 RX ORDER — HYDROMORPHONE HCL/PF 1 MG/ML
0.5 SYRINGE (ML) INJECTION EVERY 2 HOUR PRN
Status: DISCONTINUED | OUTPATIENT
Start: 2024-03-23 | End: 2024-03-24

## 2024-03-23 RX ORDER — POTASSIUM CHLORIDE 14.9 MG/ML
20 INJECTION INTRAVENOUS
Status: DISCONTINUED | OUTPATIENT
Start: 2024-03-23 | End: 2024-03-23

## 2024-03-23 RX ORDER — OXYMETAZOLINE HYDROCHLORIDE 0.05 G/100ML
2 SPRAY NASAL EVERY 12 HOURS SCHEDULED
Status: DISCONTINUED | OUTPATIENT
Start: 2024-03-23 | End: 2024-03-28 | Stop reason: HOSPADM

## 2024-03-23 RX ADMIN — LEVALBUTEROL HYDROCHLORIDE 1.25 MG: 1.25 SOLUTION RESPIRATORY (INHALATION) at 19:23

## 2024-03-23 RX ADMIN — LEVALBUTEROL HYDROCHLORIDE 1.25 MG: 1.25 SOLUTION RESPIRATORY (INHALATION) at 13:01

## 2024-03-23 RX ADMIN — Medication 50 MG: at 07:53

## 2024-03-23 RX ADMIN — ALBUMIN (HUMAN) 25 G: 0.25 INJECTION, SOLUTION INTRAVENOUS at 09:32

## 2024-03-23 RX ADMIN — PROPOFOL 50 MCG/KG/MIN: 10 INJECTION, EMULSION INTRAVENOUS at 06:31

## 2024-03-23 RX ADMIN — HYDROMORPHONE HYDROCHLORIDE 0.5 MG: 1 INJECTION, SOLUTION INTRAMUSCULAR; INTRAVENOUS; SUBCUTANEOUS at 09:43

## 2024-03-23 RX ADMIN — HYDROMORPHONE HYDROCHLORIDE 0.5 MG: 1 INJECTION, SOLUTION INTRAMUSCULAR; INTRAVENOUS; SUBCUTANEOUS at 15:19

## 2024-03-23 RX ADMIN — HYDRALAZINE HYDROCHLORIDE 10 MG: 20 INJECTION INTRAMUSCULAR; INTRAVENOUS at 10:08

## 2024-03-23 RX ADMIN — ALBUMIN (HUMAN) 25 G: 0.25 INJECTION, SOLUTION INTRAVENOUS at 15:19

## 2024-03-23 RX ADMIN — PIPERACILLIN AND TAZOBACTAM 4.5 G: 36; 4.5 INJECTION, POWDER, FOR SOLUTION INTRAVENOUS at 05:22

## 2024-03-23 RX ADMIN — IPRATROPIUM BROMIDE 0.5 MG: 0.5 SOLUTION RESPIRATORY (INHALATION) at 07:13

## 2024-03-23 RX ADMIN — NICOTINE 1 PATCH: 21 PATCH, EXTENDED RELEASE TRANSDERMAL at 09:32

## 2024-03-23 RX ADMIN — HYDROMORPHONE HYDROCHLORIDE 0.5 MG: 1 INJECTION, SOLUTION INTRAMUSCULAR; INTRAVENOUS; SUBCUTANEOUS at 21:22

## 2024-03-23 RX ADMIN — IPRATROPIUM BROMIDE 0.5 MG: 0.5 SOLUTION RESPIRATORY (INHALATION) at 19:23

## 2024-03-23 RX ADMIN — POTASSIUM CHLORIDE 40 MEQ: 29.8 INJECTION, SOLUTION INTRAVENOUS at 06:26

## 2024-03-23 RX ADMIN — IPRATROPIUM BROMIDE 0.5 MG: 0.5 SOLUTION RESPIRATORY (INHALATION) at 13:01

## 2024-03-23 RX ADMIN — OXYMETAZOLINE HYDROCHLORIDE 2 SPRAY: 0.05 SPRAY NASAL at 22:06

## 2024-03-23 RX ADMIN — HEPARIN SODIUM 7500 UNITS: 5000 INJECTION INTRAVENOUS; SUBCUTANEOUS at 22:02

## 2024-03-23 RX ADMIN — CALCIUM GLUCONATE 2 G: 20 INJECTION, SOLUTION INTRAVENOUS at 05:28

## 2024-03-23 RX ADMIN — AMPICILLIN SODIUM AND SULBACTAM SODIUM 3 G: 2; 1 INJECTION, POWDER, FOR SOLUTION INTRAMUSCULAR; INTRAVENOUS at 17:40

## 2024-03-23 RX ADMIN — HEPARIN SODIUM 7500 UNITS: 5000 INJECTION INTRAVENOUS; SUBCUTANEOUS at 15:19

## 2024-03-23 RX ADMIN — PANTOPRAZOLE SODIUM 40 MG: 40 INJECTION, POWDER, FOR SOLUTION INTRAVENOUS at 09:32

## 2024-03-23 RX ADMIN — HYDROMORPHONE HYDROCHLORIDE 0.5 MG: 1 INJECTION, SOLUTION INTRAMUSCULAR; INTRAVENOUS; SUBCUTANEOUS at 11:56

## 2024-03-23 RX ADMIN — AMPICILLIN SODIUM AND SULBACTAM SODIUM 3 G: 2; 1 INJECTION, POWDER, FOR SOLUTION INTRAMUSCULAR; INTRAVENOUS at 11:49

## 2024-03-23 RX ADMIN — PROPOFOL 50 MCG/KG/MIN: 10 INJECTION, EMULSION INTRAVENOUS at 01:14

## 2024-03-23 RX ADMIN — HYDROMORPHONE HYDROCHLORIDE 1 MG: 1 INJECTION, SOLUTION INTRAMUSCULAR; INTRAVENOUS; SUBCUTANEOUS at 01:58

## 2024-03-23 RX ADMIN — HYDROMORPHONE HYDROCHLORIDE 0.5 MG: 1 INJECTION, SOLUTION INTRAMUSCULAR; INTRAVENOUS; SUBCUTANEOUS at 17:40

## 2024-03-23 RX ADMIN — CHLORHEXIDINE GLUCONATE 0.12% ORAL RINSE 15 ML: 1.2 LIQUID ORAL at 09:32

## 2024-03-23 RX ADMIN — AMPICILLIN SODIUM AND SULBACTAM SODIUM 3 G: 2; 1 INJECTION, POWDER, FOR SOLUTION INTRAMUSCULAR; INTRAVENOUS at 23:12

## 2024-03-23 RX ADMIN — CHLORHEXIDINE GLUCONATE 0.12% ORAL RINSE 15 ML: 1.2 LIQUID ORAL at 22:01

## 2024-03-23 RX ADMIN — ALBUMIN (HUMAN) 25 G: 0.25 INJECTION, SOLUTION INTRAVENOUS at 03:41

## 2024-03-23 RX ADMIN — HEPARIN SODIUM 7500 UNITS: 5000 INJECTION INTRAVENOUS; SUBCUTANEOUS at 05:18

## 2024-03-23 RX ADMIN — CALCIUM GLUCONATE 2 G: 20 INJECTION, SOLUTION INTRAVENOUS at 08:02

## 2024-03-23 RX ADMIN — BUDESONIDE INHALATION 0.5 MG: 0.5 SUSPENSION RESPIRATORY (INHALATION) at 07:13

## 2024-03-23 RX ADMIN — LEVALBUTEROL HYDROCHLORIDE 1.25 MG: 1.25 SOLUTION RESPIRATORY (INHALATION) at 07:13

## 2024-03-23 RX ADMIN — HYDROMORPHONE HYDROCHLORIDE 0.5 MG: 1 INJECTION, SOLUTION INTRAMUSCULAR; INTRAVENOUS; SUBCUTANEOUS at 04:00

## 2024-03-23 RX ADMIN — PROPOFOL 50 MCG/KG/MIN: 10 INJECTION, EMULSION INTRAVENOUS at 04:00

## 2024-03-23 RX ADMIN — BUDESONIDE INHALATION 0.5 MG: 0.5 SUSPENSION RESPIRATORY (INHALATION) at 19:32

## 2024-03-23 RX ADMIN — CALCIUM GLUCONATE 1 G: 20 INJECTION, SOLUTION INTRAVENOUS at 02:10

## 2024-03-23 NOTE — ASSESSMENT & PLAN NOTE
Lab Results   Component Value Date    EGFR 56 03/23/2024    EGFR 56 03/22/2024    EGFR 58 03/22/2024    CREATININE 1.07 03/23/2024    CREATININE 1.07 03/22/2024    CREATININE 1.04 03/22/2024   Pt creatinine appears to be at baseline  Will continue to trend  Pt diuresing well, continue   Avoid nephrotoxic agents  Strict I/O

## 2024-03-23 NOTE — RESPIRATORY THERAPY NOTE
RT Ventilator Management Note  Dwaine Gould 63 y.o. female MRN: [id number]  Unit/Bed#: ICU 04 Encounter: [id number]      Daily Screen         3/22/2024  1933 3/23/2024  0713          Patient safety screen outcome:: Failed Failed      Not Ready for Weaning due to:: Poor inspiratory effort Underline problem not resolved                Physical Exam:   Assessment Type: Assess only  General Appearance: Drowsy  Respiratory Pattern: Assisted, Spontaneous  Chest Assessment: Chest expansion symmetrical  Bilateral Breath Sounds: Diminished  L Breath Sounds: Crackles  Cough: Productive (assisted)  Suction: ET Tube  O2 Device: bipap  Subjective Data: awaek      Resp Comments: decreaed to 16/10 40%       03/23/24 1108   Respiratory Assessment   Resp Comments decreaed to 16/10 40%   Non-Invasive Information   SpO2 94 %   $ Pulse Oximetry Spot Check Charge Completed   Non-Invasive Settings   IPAP (cm) (S)  16 cm   FiO2 (%) (S)  40

## 2024-03-23 NOTE — RESPIRATORY THERAPY NOTE
RT Ventilator Management Note  Dwaine Gould 63 y.o. female MRN: [id number]  Unit/Bed#: ICU 04 Encounter: [id number]      Daily Screen         3/22/2024  1933 3/23/2024  0713          Patient safety screen outcome:: Failed Failed      Not Ready for Weaning due to:: Poor inspiratory effort Underline problem not resolved                Physical Exam:   Assessment Type: Pre-treatment  General Appearance: Sleeping  Respiratory Pattern: Assisted, Spontaneous  Chest Assessment: Chest expansion symmetrical  Bilateral Breath Sounds: Diminished  L Breath Sounds: Crackles  Cough: Productive (assisted)  Suction: ET Tube  O2 Device: vent  Subjective Data: sleeping      Resp Comments: pt remains intubated and is now on cpap/ps 10/8  skin integrity remains intact         03/23/24 0713   Respiratory Assessment   Assessment Type Pre-treatment   General Appearance Sleeping   Respiratory Pattern Assisted;Spontaneous   Chest Assessment Chest expansion symmetrical   Bilateral Breath Sounds Diminished   Suction ET Tube   Resp Comments pt remains intubated and is now on cpap/ps 10/8  skin integrity remains intact   O2 Device vent   Subjective Data sleeping   Vent Information   Vent ID [place]   Vent type Drager   Drager Vent Mode CPAP/PS Spont   Is the patient reintubated? No   $ Vent Daily Charge-Subsequent Yes   $ Pulse Oximetry Spot Check Charge Completed   SpO2 93 %   CPAP/PS Spont Settings   FIO2 (%) 40 %   PEEP (cmH2O) (S)  8 cmH2O   Pressure Support (cmH2O) 10 cmH20   Trigger Sensitivity Pressure (cm H2O)  2 cm H2O   Rise Time (%) 0.2 %   Humidification Heater   Heater Temp 98.6 °F (37 °C)   CPAP/PS Spont Actuals   Resp Rate (BPM) 19 BPM   VT (mL) 348 mL   MV (Obs) 4.9   MAP (cmH2O) 10 cmH2O   Peak Pressure (cmH2O) 19 cmH2O   RSBI 54   Heater Temperature (Obs) 99.9 °F (37.7 °C)   CPAP/PS Spont Alarms   High Peak Pressure (cmH20) 40 cmH2O   High Resp Rate (BPM) 30 BPM   High MV (L/min) 10 L/min   Low MV (L/min) 3 L/min   High [place] VTE  (mL) 870 mL   Low [place] VTE (mL) 220 mL   High SPONT VTE (mL) 870 mL   Low Spont VTE (mL) 220 mL   Maintenance   Alarm (pink) cable attached Yes   Resuscitation bag with peep valve at bedside Yes   Water bag changed No   Circuit changed No   Daily Screen   Patient safety screen outcome: Failed   Not Ready for Weaning due to: Underline problem not resolved   IHI Ventilator Associated Pneumonia Bundle   Daily Awakening Trials Performed Yes   Daily Assessment of Readiness to Extubate Yes   Head of Bed Elevated HOB 30   ETT  Cuffed;Oral;Hi-Lo 7 mm   Placement Date/Time: 03/19/24 2129   Mask Ventilation: Mask ventilation not attempted (0)  Preoxygenated: Yes  Technique: Video laryngoscopy;Rapid sequence  Type: Cuffed;Oral;Hi-Lo  Tube Size: 7 mm  Laryngoscope: [employer]  Blade Size: 3  Location: Oral...   Secured at (cm) 20   Measured from Gums   Secured Location Right   Repositioned (S)  Right to Left   Secured by Commercial tube whitfield   Site Condition Dry   Cuff Pressure (color) Green   HI-LO Suction  Continuous low suction   HI-LO Secretions Blood tinged;Small   HI-LO Intervention Patent

## 2024-03-23 NOTE — RESPIRATORY THERAPY NOTE
RT Ventilator Management Note  Dwaine Gould 63 y.o. female MRN: [id number]  Unit/Bed#: ICU 04 Encounter: [id number]      Daily Screen         3/22/2024  1933 3/23/2024  0713          Patient safety screen outcome:: Failed Failed      Not Ready for Weaning due to:: Poor inspiratory effort Underline problem not resolved                Physical Exam:   Assessment Type: Assess only  General Appearance: Awake, Alert  Respiratory Pattern: Assisted, Spontaneous  Chest Assessment: Chest expansion symmetrical  Bilateral Breath Sounds: Diminished  L Breath Sounds: Crackles  Cough: Productive (assisted)  Suction: ET Tube  O2 Device: opti  Subjective Data: awake      Resp Comments: pt remains on HFNC  no changes at this time       03/23/24 1553   Respiratory Assessment   Assessment Type Assess only   General Appearance Awake;Alert   Respiratory Pattern Assisted;Spontaneous   Chest Assessment Chest expansion symmetrical   Bilateral Breath Sounds Diminished   Resp Comments pt remains on HFNC  no changes at this time   O2 Device opti   Subjective Data awake   Non-Invasive Information   O2 Interface Device HFNC prongs   Non-Invasive Ventilation Mode HFNC (High flow)   SpO2 93 %   $ Pulse Oximetry Spot Check Charge Completed   Non-Invasive Settings   FiO2 (%) 40   Temperature (Set) 31   Flow (lpm) 60   Non-Invasive Readings   Total Rate 23   Heater Temperature (Obs) 30.8   Skin Intervention Skin intact   Maintenance   Water bag changed No

## 2024-03-23 NOTE — PLAN OF CARE
Problem: PAIN - ADULT  Goal: Verbalizes/displays adequate comfort level or baseline comfort level  Description: Interventions:  - Encourage patient to monitor pain and request assistance  - Assess pain using appropriate pain scale  - Administer analgesics based on type and severity of pain and evaluate response  - Implement non-pharmacological measures as appropriate and evaluate response  - Consider cultural and social influences on pain and pain management  - Notify physician/advanced practitioner if interventions unsuccessful or patient reports new pain  Outcome: Progressing     Problem: INFECTION - ADULT  Goal: Absence or prevention of progression during hospitalization  Description: INTERVENTIONS:  - Assess and monitor for signs and symptoms of infection  - Monitor lab/diagnostic results  - Monitor all insertion sites, i.e. indwelling lines, tubes, and drains  - Monitor endotracheal if appropriate and nasal secretions for changes in amount and color  - [place] appropriate cooling/warming therapies per order  - Administer medications as ordered  - Instruct and encourage patient and family to use good hand hygiene technique  - Identify and instruct in appropriate isolation precautions for identified infection/condition  Outcome: Progressing     Problem: SAFETY ADULT  Goal: Patient will remain free of falls  Description: INTERVENTIONS:  - Educate patient/family on patient safety including physical limitations  - Instruct patient to call for assistance with activity   - Consult OT/PT to assist with strengthening/mobility   - Keep Call bell within reach  - Keep bed low and locked with side rails adjusted as appropriate  - Keep care items and personal belongings within reach  - Initiate and maintain comfort rounds  - Make Fall Risk Sign visible to staff  - Offer Toileting every 2 Hours, in advance of need  - Initiate/Maintain bed alarm  - Obtain necessary fall risk management equipment: yellow socks and  bracelet  - Apply yellow socks and bracelet for high fall risk patients  - Consider moving patient to room near nurses station  Outcome: Progressing

## 2024-03-23 NOTE — PROGRESS NOTES
[facility]  Progress Note  Name: Dwaine Gould I  MRN: [id number]  Unit/Bed#: ICU 04 I Date of Admission: 3/19/2024   Date of Service: 3/23/2024 I Hospital Day: 4    Assessment/Plan   * Pneumoperitoneum  Assessment & Plan    Pt had abdominal pain 3/19-CT scan showed free intraperitoneal air  Taken urgently to OR s/p diag lap converted to exp lap, right hemicolectomy with anastomosis, washout, Abthera placement  In OR, abdominal cavity was noted to have grossly purulent material in it along with perforation in the cecum at a mass  transferred to ICU postoperatively with abdomen open mechanical ventilation  3/21 OR for washout and closure w/LISSY drain  F/U culture results  Continue Zosyn D4  Dilaudid PRN, ketamine x3 days for pain  Surgery following      Acute on chronic respiratory failure with hypoxia and hypercapnia (HCC)  Assessment & Plan  Pt with COPD and likely RILEY/OHS, D/C home with oxygen 4-5L  Remains intubated postoperatively   Continue mechanical ventilation for now  vent changes as needed  Currently on AC 14/370/40%/+10  Continue scheduled nebs  Propofol for sedation  VAP precautions  Continue diuretics  Will need bipap QHS once extubated    Acute on chronic heart failure with preserved ejection fraction (HCC)  Assessment & Plan  Wt Readings from Last 3 Encounters:   03/22/24 127 kg (279 lb 5.2 oz)   03/19/24 128 kg (282 lb 3 oz)   07/20/21 103 kg (227 lb 8.2 oz)     Pt presented to the hospital 3/11 and was aggressively diuresed  Echo on 3/12 showed EF 60%, mild to moderate concentric hypertrophy, and normal diastolic function  Was discharged home on 3/19 but returned later that day after CT findings of free intraperitoneal air and taken to OR  Diuresis initiated 3/20-pt diuresing well  Monitor daily weights  Strict I/O  Clinically continues to appear fluid overloaded  Continue Lasix with albumin            Gram-negative bacteremia  Assessment & Plan  3/19 1/2 BC w/ GNR  Body fluid  culture and GS w/ E coli  In the setting of bowel perforation   Continue Zosyn  Trend leukocytosis and fever curve    Tobacco abuse  Assessment & Plan  Would advise cessation  Nicotine patch while inpatient    Thrombocytopenia (HCC)  Assessment & Plan  Unclear etiology, suspect platelet consumption in setting of surgery and acute illness   Continue trending on daily labs  Will send HIT panel if worsening thrombocytopenia  Monitor for bleeding    Anemia  Assessment & Plan  Microcytic  suspect iron deficiency  Start supplementation with B12 and folate when taking oral   Trend daily CBC    Stage 3a chronic kidney disease (HCC)  Assessment & Plan  Lab Results   Component Value Date    EGFR 56 03/23/2024    EGFR 56 03/22/2024    EGFR 58 03/22/2024    CREATININE 1.07 03/23/2024    CREATININE 1.07 03/22/2024    CREATININE 1.04 03/22/2024   Pt creatinine appears to be at baseline  Will continue to trend  Pt diuresing well, continue   Avoid nephrotoxic agents  Strict I/O    Respiratory acidosis  Assessment & Plan  Pt with chronic resp acidosis 2/2 COPD, likely RILEY, +/- OHS  Currently intubated  Will need bipap once extubated    Hyperlipidemia  Assessment & Plan  Hold statin for now  Restart when able to take po    Essential hypertension  Assessment & Plan  Oral meds on hold  PRN hydralazine for SBP>170  Takes labetolol at home-using hydralazine prn currently given bradycardia    Leukocytosis  Assessment & Plan  In the setting of pneumoperitoneum  F/U blood and OR cultures  Continue broad spectrum abx  Trend procal  Trend fever curve and WBC    COPD (chronic obstructive pulmonary disease) (HCC)  Assessment & Plan  Does not appear to be in acute exacerbation  Continue scheduled bronchodilators      CAD (coronary artery disease)  Assessment & Plan  Per [facility] records patient with single-vessel CAD with ostial RPDA lesion not amenable to PCI due to location of lesion October 2014  Continue medical management-po meds on hold  currently             Disposition: Critical care    ICU Core Measures     Vented Patient  VAP Bundle  VAP bundle ordered     A: Assess, Prevent, and Manage Pain Has pain been assessed? Yes  Need for changes to pain regimen? No   B: Both Spontaneous Awakening Trials (SATs) and Spontaneous Breathing Trials (SBTs) Plan to perform spontaneous awakening trial today? Yes   Plan to perform spontaneous breathing trial today? Yes   Obvious barriers to extubation? No   C: Choice of Sedation RASS Goal: 0 Alert and Calm  Need for changes to sedation or analgesia regimen? No   D: Delirium CAM-ICU: Negative   E: Early Mobility  Plan for early mobility? Yes   F: Family Engagement Plan for family engagement today? Yes       Antibiotic Review: Continue broad spectrum secondary to severity of illness.     Review of Invasive Devices:    Jono Plan: Continue for accurate I/O monitoring for 48 hours    Jess Plan: Keep arterial line for hemodynamic monitoring, frequent ABGs, and frequent labs    Prophylaxis:  VTE VTE covered by:  heparin (porcine), Subcutaneous, 7,500 Units at 03/22/24 2107       Stress Ulcer  covered bypantoprazole (PROTONIX) injection 40 mg [[id number]]         Significant 24hr Events     24hr events: Hemodynamically stable overnight.  For spontaneous breathing trial this morning.     Subjective   Review of Systems   Objective                            Vitals I/O      Most Recent Min/Max in 24hrs   Temp 99 °F (37.2 °C) Temp  Min: 97.9 °F (36.6 °C)  Max: 99.7 °F (37.6 °C)   Pulse 82 Pulse  Min: 72  Max: 90   Resp 14 Resp  Min: 14  Max: 18   /71 BP  Min: 89/48  Max: 146/66   O2 Sat 95 % SpO2  Min: 90 %  Max: 99 %      Intake/Output Summary (Last 24 hours) at 3/23/2024 0505  Last data filed at 3/23/2024 0401  Gross per 24 hour   Intake 1618.29 ml   Output 1615 ml   Net 3.29 ml       Diet Enteral/Parenteral; Tube Feeding No Oral Diet; Jevity 1.2 Ravi; Continuous; 10    Invasive Monitoring           Physical Exam    Physical Exam  Vitals and nursing note reviewed.   Eyes:      General: No scleral icterus.     Extraocular Movements: Extraocular movements intact.      Conjunctiva/sclera: Conjunctivae normal.   Skin:     General: Skin is warm and dry.      Capillary Refill: Capillary refill takes less than 2 seconds.   HENT:      Head: Normocephalic and atraumatic.      Mouth/Throat:      Mouth: Mucous membranes are moist.   Neck:      Vascular: No JVD.   Cardiovascular:      Rate and Rhythm: Regular rhythm.      Pulses: Normal pulses.      Heart sounds: Normal heart sounds.   Musculoskeletal:         General: Normal range of motion.      Right lower leg: No edema.      Left lower leg: No edema.   Abdominal: General: There is no distension.      Palpations: Abdomen is soft.      Tenderness: There is abdominal tenderness. There is no guarding.      Comments: Silver dressing intact, LISSY intact with serous drainage   Constitutional:       General: She is not in acute distress.     Appearance: She is ill-appearing.      Interventions: She is sedated, intubated and restrained.   Pulmonary:      Effort: Pulmonary effort is normal. No accessory muscle usage, respiratory distress or accessory muscle usage. She is intubated.      Comments: Diminished throughout, no subcutaneous emphysema noted, symmetrical excursion  Secretions are normal.Chest:      Chest wall: No tenderness.   Neurological:      General: No focal deficit present.      Mental Status: She is calm.      Motor: gross motor function is at baseline for patient.        Corneal reflex present, cough reflex and gag reflex intact.      Comments: Eyes open to voice, follows commands, nonverbal secondary to ET tube   Genitourinary/Anorectal:  De La Cruz present.          Diagnostic Studies      EKG: Sinus rhythm  Imaging:  I have personally reviewed pertinent reports.   and I have personally reviewed pertinent films in PACS     Medications:  Scheduled PRN   Albumin 25%, 25 g,  Q6H  budesonide, 0.5 mg, Q12H  chlorhexidine, 15 mL, Q12H ANGELES  heparin (porcine), 7,500 Units, Q8H ANGELES  HYDROmorphone, 0.5 mg, Q6H  insulin lispro, 1-5 Units, Q6H ANGELES  ipratropium, 0.5 mg, TID  levalbuterol, 1.25 mg, TID  nicotine, 1 patch, Daily  pantoprazole, 40 mg, Q24H ANGELES  piperacillin-tazobactam, 4.5 g, Q8H      hydrALAZINE, 10 mg, Q6H PRN  HYDROmorphone, 1 mg, Q3H PRN       Continuous    furosemide, 10 mg/hr, Last Rate: 10 mg/hr (03/22/24 1026)  propofol, 5-50 mcg/kg/min, Last Rate: 50 mcg/kg/min (03/23/24 0400)         Labs:    CBC    Recent Labs     03/21/24  0518 03/22/24  0536   WBC 10.46* 13.61*   HGB 10.5* 10.5*   HCT 35.1 36.1   * 136*     BMP    Recent Labs     03/22/24  1423 03/23/24  0002   SODIUM 147 147   K 3.5 3.7    105   CO2 35* 33*   AGAP 8 9   BUN 20 18   CREATININE 1.07 1.07   CALCIUM 8.5 8.6       Coags    No recent results     Additional Electrolytes  Recent Labs     03/21/24  0518 03/22/24  0536 03/22/24  1423 03/23/24  0002   MG 2.3 2.3 2.1 2.2   PHOS 3.3 3.3  --   --    CAIONIZED 1.04* 1.01* 1.03* 1.05*          Blood Gas    No recent results  No recent results LFTs  Recent Labs     03/21/24  0518 03/22/24  0536   ALT 15 13   AST 12* 12*   ALKPHOS 49 48   ALB 3.5 3.9   TBILI 0.91 1.29*       Infectious  Recent Labs     03/21/24  0518   PROCALCITONI 0.39*     Glucose  Recent Labs     03/21/24  0518 03/22/24  0536 03/22/24  1423 03/23/24  0002   GLUC 95 95 107 94               VANDANA ArchibaldNP

## 2024-03-23 NOTE — ASSESSMENT & PLAN NOTE
Oral meds on hold  PRN hydralazine for SBP>170  Takes labetolol at home-using hydralazine prn currently given bradycardia

## 2024-03-23 NOTE — ASSESSMENT & PLAN NOTE
In the setting of pneumoperitoneum  F/U blood and OR cultures  Continue broad spectrum abx  Trend procal  Trend fever curve and WBC

## 2024-03-23 NOTE — RESPIRATORY THERAPY NOTE
RT Ventilator Management Note  Dwaine Gould 63 y.o. female MRN: [id number]  Unit/Bed#: ICU 04 Encounter: [id number]      Daily Screen         3/22/2024  1933 3/23/2024  0713          Patient safety screen outcome:: Failed Failed      Not Ready for Weaning due to:: Poor inspiratory effort Underline problem not resolved                Physical Exam:   Assessment Type: Assess only  General Appearance: Drowsy  Respiratory Pattern: Assisted, Spontaneous  Chest Assessment: Chest expansion symmetrical  Bilateral Breath Sounds: Diminished  L Breath Sounds: Crackles  Cough: Productive (assisted)  Suction: ET Tube  O2 Device: bipap  Subjective Data: awaek      Resp Comments: extubated pt following cpap/ps support and placed on bipap         03/23/24 0849   Respiratory Assessment   Assessment Type Assess only   General Appearance Drowsy   Respiratory Pattern Assisted;Spontaneous   Chest Assessment Chest expansion symmetrical   Bilateral Breath Sounds Diminished   Non-Invasive Information   O2 Interface Device Face mask   Non-Invasive Ventilation Mode BiPAP   $ Continous NIV Subsequent   Non-Invasive Settings   IPAP (cm) 18 cm   EPAP (cm) 10 cm   Rate (Set) 10   FiO2 (%) 60   Pressure Support (cm H2O) 6   Rise Time 2   Inspiratory Time (Set) 1   Temperature (Set) 31   Non-Invasive Readings   Total Rate 24   Vt (mL) (Mech) 663   MV (Mech) 14.3   Peak Pressure (Obs) 19   Spontaneous Vt (mL) 600   Heater Temperature (Obs) 31.4   Leak (lpm) 8   Skin Intervention Skin intact   Non-Invasive Alarms   Insp Pressure High (cm H20) 30   Insp Pressure Low (cm H20) 6   Low Insp Pressure Time (sec) 20 sec   MV Low (L/min) 3   Vt High (mL) 1000   Vt Low (mL) 200   High Resp Rate (BPM) 35 BPM   Low Resp Rate (BPM) 8 BPM   Apnea Interval (sec) 20   Apnea Rate 12   Apnea Pressure 20   Maintenance   Water bag changed No

## 2024-03-23 NOTE — OCCUPATIONAL THERAPY NOTE
Occupational Therapy Cancellation Note        Patient Name: Dwaine Gould  Today's Date: 3/23/2024       03/23/24 0730   OT Last Visit   OT Visit Date 03/23/24   Note Type   Note type Cancelled Session   Cancel Reasons Intubated/sedated

## 2024-03-23 NOTE — RESPIRATORY THERAPY NOTE
RT Ventilator Management Note  Dwaine Gould 63 y.o. female MRN: [id number]  Unit/Bed#: ICU 04 Encounter: [id number]      Daily Screen         3/22/2024  0728 3/22/2024  1933          Patient safety screen outcome:: Failed Failed      Not Ready for Weaning due to:: PEEP > 8cmH2O;Underline problem not resolved Poor inspiratory effort                Physical Exam:   Assessment Type: Assess only  General Appearance: Drowsy, Eyes open/responds to stimulus  Respiratory Pattern: Assisted  Chest Assessment: Chest expansion symmetrical  Bilateral Breath Sounds: Scattered, Coarse, Clear, Diminished  L Breath Sounds: Crackles  Cough: Productive (assisted)  Suction: Oral, ET Tube  O2 Device: mechanical ventilation  Subjective Data: drowsy      Resp Comments: patient remaisn intubated and sedated s/p being admitted for pneumoperitoneum requiring surgery to repair. patient had returned to OR thursday for washout and closure. cpap/ps attempted with minimla success due to poor inspiratory effort attenpted later in the day with better success placed on full mechanical ventilation overnight for rest SBT will be attempted in am for possible assessment of potential lberation from mechanical ventilation

## 2024-03-23 NOTE — ASSESSMENT & PLAN NOTE
Pt had abdominal pain 3/19-CT scan showed free intraperitoneal air  Taken urgently to OR s/p diag lap converted to exp lap, right hemicolectomy with anastomosis, washout, Abthera placement  In OR, abdominal cavity was noted to have grossly purulent material in it along with perforation in the cecum at a mass  transferred to ICU postoperatively with abdomen open mechanical ventilation  3/21 OR for washout and closure w/LISSY drain  F/U culture results  Continue Zosyn D4  Dilaudid PRN, ketamine x3 days for pain  Surgery following     No pertinent family history in first degree relatives

## 2024-03-23 NOTE — PROGRESS NOTES
Progress Note - General Surgery   Dwaine Gould 63 y.o. female MRN: [id number]  Unit/Bed#: ICU 04 Encounter: [id number]      Assessment:   63-year-old female, status post diagnostic diagnostic laparoscopy for pneumoperitoneum, converted to exploratory laparotomy with right hemicolectomy for perforated cecal mass and abdominal washout on 3/19/2024  -Abdominal wall left open     Return to OR for second look, abdominal washout, and abdominal wall closure on 3/21/2024       Plan:  -Patient extubated, awake, and alert  -Continue to monitor LISSY drain, urine output, and NGT output.  -Serial abdominal exams  -Continue to monitor bowel function  -Continue to monitor vitals and lab results  -Analgesics and antiemetics.  -Remainder of care as per ICU team    Subjective/Objective   Chief Complaint: None    Subjective: No acute events overnight.  Patient was extubated yesterday.  Patient awake and alert.    Objective:     Blood pressure 103/52, pulse 69, temperature 98.6 °F (37 °C), temperature source Esophageal, resp. rate 14, height 5' (1.524 m), weight 125 kg (275 lb 2.2 oz), SpO2 98%.  Body mass index is 53.73 kg/m².    I/O         03/21 0701  03/22 0700 03/22 0701  03/23 0700 03/23 0701  03/24 0700    P.O. 0      I.V. (mL/kg) 933.2 (7.3) 628 (5)     IV Piggyback 305.8 723.3     Feedings 100 357     Total Intake(mL/kg) 1339 (10.5) 1708.3 (13.7)     Urine (mL/kg/hr) 2400 (0.8) 1430 (0.5) 70 (0.1)    Emesis/NG output 100      Drains 405 290     Stool 0      Blood 20      Total Output 2925 1720 70    Net -1586 -11.7 -70           Unmeasured Stool Occurrence 0 x              Invasive Devices       Peripheral Intravenous Line  Duration             Peripheral IV 03/19/24 Distal;Left;Ventral (anterior) Forearm 3 days    Peripheral IV 03/20/24 Right Antecubital 2 days    Peripheral IV 03/21/24 Right;Ventral (anterior) Wrist 1 day              Arterial Line  Duration             Arterial Line 03/19/24 Right Radial 3 days               Drain  Duration             NG/OG/Enteral Tube Nasogastric Left nare 3 days    Urethral Catheter Straight-tip;Non-latex 16 Fr. 3 days    Closed/Suction Drain Left LLQ Bulb 1 day                    Physical Exam: /52 (BP Location: Left arm)   Pulse 69   Temp 98.6 °F (37 °C) (Esophageal)   Resp 14   Ht 5' (1.524 m)   Wt 125 kg (275 lb 2.2 oz)   SpO2 98%   BMI 53.73 kg/m²   General appearance: alert  Lungs: diminished breath sounds and high flow oxygen with facemask  Heart: regular rate and rhythm and S1, S2 normal  Abdomen:  Obese, distended, edematous, incision well-approximated with kindra, LISSY drain has serosanguineous output with drainage around tube insertion site  Extremities: edema 2+    Labs   Recent Results (from the past 24 hour(s))   Fingerstick Glucose (POCT)    Collection Time: 03/22/24 12:37 PM   Result Value Ref Range    POC Glucose 114 65 - 140 mg/dl   Basic metabolic panel    Collection Time: 03/22/24  2:23 PM   Result Value Ref Range    Sodium 147 135 - 147 mmol/L    Potassium 3.5 3.5 - 5.3 mmol/L    Chloride 104 96 - 108 mmol/L    CO2 35 (H) 21 - 32 mmol/L    ANION GAP 8 4 - 13 mmol/L    BUN 20 5 - 25 mg/dL    Creatinine 1.07 0.60 - 1.30 mg/dL    Glucose 107 65 - 140 mg/dL    Calcium 8.5 8.4 - 10.2 mg/dL    eGFR 56 ml/min/1.73sq m   Magnesium    Collection Time: 03/22/24  2:23 PM   Result Value Ref Range    Magnesium 2.1 1.9 - 2.7 mg/dL   Calcium, ionized    Collection Time: 03/22/24  2:23 PM   Result Value Ref Range    Calcium, Ionized 1.03 (L) 1.12 - 1.32 mmol/L   Fingerstick Glucose (POCT)    Collection Time: 03/22/24  5:47 PM   Result Value Ref Range    POC Glucose 106 65 - 140 mg/dl   Fingerstick Glucose (POCT)    Collection Time: 03/22/24 11:53 PM   Result Value Ref Range    POC Glucose 96 65 - 140 mg/dl   Basic metabolic panel    Collection Time: 03/23/24 12:02 AM   Result Value Ref Range    Sodium 147 135 - 147 mmol/L    Potassium 3.7 3.5 - 5.3 mmol/L    Chloride 105 96 - 108  mmol/L    CO2 33 (H) 21 - 32 mmol/L    ANION GAP 9 4 - 13 mmol/L    BUN 18 5 - 25 mg/dL    Creatinine 1.07 0.60 - 1.30 mg/dL    Glucose 94 65 - 140 mg/dL    Calcium 8.6 8.4 - 10.2 mg/dL    eGFR 56 ml/min/1.73sq m   Magnesium    Collection Time: 03/23/24 12:02 AM   Result Value Ref Range    Magnesium 2.2 1.9 - 2.7 mg/dL   Calcium, ionized    Collection Time: 03/23/24 12:02 AM   Result Value Ref Range    Calcium, Ionized 1.05 (L) 1.12 - 1.32 mmol/L   Basic metabolic panel    Collection Time: 03/23/24  4:58 AM   Result Value Ref Range    Sodium 147 135 - 147 mmol/L    Potassium 3.5 3.5 - 5.3 mmol/L    Chloride 105 96 - 108 mmol/L    CO2 34 (H) 21 - 32 mmol/L    ANION GAP 8 4 - 13 mmol/L    BUN 20 5 - 25 mg/dL    Creatinine 1.07 0.60 - 1.30 mg/dL    Glucose 87 65 - 140 mg/dL    Calcium 9.0 8.4 - 10.2 mg/dL    eGFR 56 ml/min/1.73sq m   Magnesium    Collection Time: 03/23/24  4:58 AM   Result Value Ref Range    Magnesium 2.1 1.9 - 2.7 mg/dL   Calcium, ionized    Collection Time: 03/23/24  4:58 AM   Result Value Ref Range    Calcium, Ionized 1.07 (L) 1.12 - 1.32 mmol/L   CBC    Collection Time: 03/23/24  4:58 AM   Result Value Ref Range    WBC 11.18 (H) 4.31 - 10.16 Thousand/uL    RBC 3.99 3.81 - 5.12 Million/uL    Hemoglobin 9.4 (L) 11.5 - 15.4 g/dL    Hematocrit 32.2 (L) 34.8 - 46.1 %    MCV 81 (L) 82 - 98 fL    MCH 23.6 (L) 26.8 - 34.3 pg    MCHC 29.2 (L) 31.4 - 37.4 g/dL    RDW 19.2 (H) 11.6 - 15.1 %    Platelets 145 (L) 149 - 390 Thousands/uL    MPV 10.0 8.9 - 12.7 fL   Blood gas, arterial    Collection Time: 03/23/24  5:06 AM   Result Value Ref Range    pH, Arterial 7.367 7.350 - 7.450    pCO2, Arterial 55.9 (H) 36.0 - 44.0 mm Hg    pO2, Arterial 68.3 (L) 75.0 - 129.0 mm Hg    HCO3, Arterial 31.4 (H) 22.0 - 28.0 mmol/L    Base Excess, Arterial 5.0 mmol/L    O2 Content, Arterial 13.6 (L) 16.0 - 23.0 mL/dL    O2 HGB,Arterial  91.0 (L) 94.0 - 97.0 %    SOURCE Line, Arterial     RADHA TEST      Temperature 97.3 Degrees  Fehrenheit    Vent Type- AC AC     AC Rate 14     Tidal Volume 370 ml    Inspired Air (FIO2) 40     PEEP 10    Fingerstick Glucose (POCT)    Collection Time: 03/23/24  6:21 AM   Result Value Ref Range    POC Glucose 99 65 - 140 mg/dl        Imaging and other studies:  XR chest portable ICU    Result Date: 3/22/2024  Impression: Moderate congestive changes with bilateral small effusions. Workstation performed: [id number]     XR chest portable ICU    Result Date: 3/20/2024  Impression: 1. Low-normal positioning of the endotracheal tube, which is 1.9 cm above the nelson (consider withdrawing by 0.5 to 1 cm). Satisfactory positioning of the enteric tube. 2. Scattered subsegmental atelectasis with trace bilateral pleural effusions. Workstation performed: [id number]     CT abdomen pelvis wo contrast    Result Date: 3/19/2024  Impression: 1. Modest amount of pneumoperitoneum. Although the origin is unclear, findings are concerning for ruptured hollow viscus. Surgical consultation is advised. 2. Cluster of nodules in the right lower quadrant mesentery the largest measuring 3.0 x 2.3 cm (2/110) and another 1.7 cm nodule on image 111. Findings may be reactive. Follow-up CT and/or further work-up will be required to exclude possibility of a mesenteric mass such as carcinoid. 3. Small bilateral pleural effusions with mild bibasilar compressive atelectasis. Mild cardiomegaly. 4. Subcutaneous edema and thickening of the lower anterior abdominal wall, findings which may represent panniculitis. I personally discussed this study with Dr. Rashid Bañuelos on 3/19/2024 at 5:33 PM. Workstation performed: [id number]       VTE Pharmacologic Prophylaxis: Heparin  VTE Mechanical Prophylaxis: sequential compression device    Felipe Nagel PA-C  3/23/2024

## 2024-03-23 NOTE — PLAN OF CARE
Problem: PAIN - ADULT  Goal: Verbalizes/displays adequate comfort level or baseline comfort level  Description: Interventions:  - Encourage patient to monitor pain and request assistance  - Assess pain using appropriate pain scale  - Administer analgesics based on type and severity of pain and evaluate response  - Implement non-pharmacological measures as appropriate and evaluate response  - Consider cultural and social influences on pain and pain management  - Notify physician/advanced practitioner if interventions unsuccessful or patient reports new pain  Outcome: Progressing     Problem: SAFETY ADULT  Goal: Patient will remain free of falls  Description: INTERVENTIONS:  - Educate patient/family on patient safety including physical limitations  - Instruct patient to call for assistance with activity   - Consult OT/PT to assist with strengthening/mobility   - Keep Call bell within reach  - Keep bed low and locked with side rails adjusted as appropriate  - Keep care items and personal belongings within reach  - Initiate and maintain comfort rounds  - Make Fall Risk Sign visible to staff  - Offer Toileting every 2 Hours, in advance of need  - Initiate/Maintain bed alarm  - Apply yellow socks and bracelet for high fall risk patients  - Consider moving patient to room near nurses station  Outcome: Progressing  Goal: Maintain or return to baseline ADL function  Description: INTERVENTIONS:  -  Assess patient's ability to carry out ADLs; assess patient's baseline for ADL function and identify physical deficits which impact ability to perform ADLs (bathing, care of mouth/teeth, toileting, grooming, dressing, etc.)  - Assess/evaluate cause of self-care deficits   - Assess range of motion  - Assess patient's mobility; develop plan if impaired  - Assess patient's need for assistive devices and provide as appropriate  - Encourage maximum independence but intervene and supervise when necessary  - Involve family in performance  of ADLs  - Assess for home care needs following discharge   - Consider OT consult to assist with ADL evaluation and planning for discharge  - Provide patient education as appropriate  Outcome: Progressing    Problem: SAFETY,RESTRAINT: NV/NON-SELF DESTRUCTIVE BEHAVIOR  Goal: Remains free of harm/injury (restraint for non violent/non self-detsructive behavior)  Description: INTERVENTIONS:  - Instruct patient/family regarding restraint use   - Assess and monitor physiologic and psychological status   - Provide interventions and comfort measures to meet assessed patient needs   - Identify and implement measures to help patient regain control  - Assess readiness for release of restraint   Outcome: Progressing     Problem: Prexisting or High Potential for Compromised Skin Integrity  Goal: Skin integrity is maintained or improved  Description: INTERVENTIONS:  - Identify patients at risk for skin breakdown  - Assess and monitor skin integrity  - Assess and monitor nutrition and hydration status  - Monitor labs   - Assess for incontinence   - Turn and reposition patient  - Assist with mobility/ambulation  - Relieve pressure over bony prominences  - Avoid friction and shearing  - Provide appropriate hygiene as needed including keeping skin clean and dry  - Evaluate need for skin moisturizer/barrier cream  - Collaborate with interdisciplinary team   - Patient/family teaching  - Consider wound care consult   Outcome: Progressing

## 2024-03-23 NOTE — RESPIRATORY THERAPY NOTE
RT Protocol Note  Dwaine Gould 63 y.o. female MRN: [id number]  Unit/Bed#: ICU 04 Encounter: [id number]    Assessment    Principal Problem:    Pneumoperitoneum  Active Problems:    Tobacco abuse    CAD (coronary artery disease)    Acute on chronic heart failure with preserved ejection fraction (HCC)    COPD (chronic obstructive pulmonary disease) (HCC)    Leukocytosis    Essential hypertension    Hyperlipidemia    Respiratory acidosis    Acute on chronic respiratory failure with hypoxia and hypercapnia (HCC)    Stage 3a chronic kidney disease (HCC)    Anemia    Thrombocytopenia (HCC)    Gram-negative bacteremia      Home Pulmonary Medications:  Albuterol nebs       Past Medical History:   Diagnosis Date    Acute metabolic encephalopathy     Hyperlipidemia     Hypertension     Transaminitis      Social History     Socioeconomic History    Marital status: /Civil Union     Spouse name: None    Number of children: None    Years of education: None    Highest education level: None   Occupational History    None   Tobacco Use    Smoking status: Every Day     Current packs/day: 0.50     Types: Cigarettes    Smokeless tobacco: Never   Vaping Use    Vaping status: Every Day    Substances: Nicotine   Substance and Sexual Activity    Alcohol use: Never    Drug use: Never    Sexual activity: Not Currently   Other Topics Concern    None   Social History Narrative    None     Social Determinants of Health     Financial Resource Strain: Not on file   Food Insecurity: No Food Insecurity (3/12/2024)    Hunger Vital Sign     Worried About Running Out of Food in the Last Year: Never true     Ran Out of Food in the Last Year: Never true   Transportation Needs: No Transportation Needs (3/12/2024)    PRAPARE - Transportation     Lack of Transportation (Medical): No     Lack of Transportation (Non-Medical): No   Physical Activity: Not on file   Stress: Not on file   Social Connections: Not on file   Intimate Partner Violence: Not on  file   Housing Stability: Low Risk  (3/12/2024)    Housing Stability Vital Sign     Unable to Pay for Housing in the Last Year: No     Number of Places Lived in the Last Year: 1     Unstable Housing in the Last Year: No       Subjective    Subjective Data: awake    Objective    Physical Exam:   Assessment Type: Assess only  General Appearance: Eyes do not open to any stimulus  Respiratory Pattern: Spontaneous, Assisted  Chest Assessment: Chest expansion symmetrical  Bilateral Breath Sounds: Diminished  L Breath Sounds: Crackles  Cough: Productive (assisted)  Suction: ET Tube  O2 Device: opti    Vitals:  Blood pressure 122/58, pulse 87, temperature 98.8 °F (37.1 °C), temperature source Axillary, resp. rate (!) 23, height 5' (1.524 m), weight 125 kg (275 lb 2.2 oz), SpO2 95%.    Results from last 7 days   Lab Units 03/23/24  0506 03/20/24  0528 03/20/24  0201   PH ART  7.367   < > 7.449   PCO2 ART mm Hg 55.9*   < > 53.2*   PO2 ART mm Hg 68.3*   < > 71.8*   HCO3 ART mmol/L 31.4*   < > 36.1*   BASE EXC ART mmol/L 5.0   < > 10.2   O2 CONTENT ART mL/dL 13.6*   < > 17.3   O2 HGB, ARTERIAL % 91.0*   < > 92.5*   ABG SOURCE  Line, Arterial   < > Line, Arterial   RADHA TEST   --   --  Yes    < > = values in this interval not displayed.       Imaging and other studies: I have personally reviewed pertinent reports.      O2 Device: opti     Plan    Respiratory Plan: Moderate/Severe Distress pathway        Resp Comments: pt with hx of copd, uses albuterol nebs @ home,  will continue with xop/atr tid and pulm bid

## 2024-03-23 NOTE — ASSESSMENT & PLAN NOTE
Wt Readings from Last 3 Encounters:   03/22/24 127 kg (279 lb 5.2 oz)   03/19/24 128 kg (282 lb 3 oz)   07/20/21 103 kg (227 lb 8.2 oz)     Pt presented to the hospital 3/11 and was aggressively diuresed  Echo on 3/12 showed EF 60%, mild to moderate concentric hypertrophy, and normal diastolic function  Was discharged home on 3/19 but returned later that day after CT findings of free intraperitoneal air and taken to OR  Diuresis initiated 3/20-pt diuresing well  Monitor daily weights  Strict I/O  Clinically continues to appear fluid overloaded  Continue Lasix with albumin

## 2024-03-23 NOTE — RESPIRATORY THERAPY NOTE
RT Ventilator Management Note  Dwaine Gould 63 y.o. female MRN: [id number]  Unit/Bed#: ICU 04 Encounter: [id number]      Daily Screen         3/22/2024  1933 3/23/2024  0713          Patient safety screen outcome:: Failed Failed      Not Ready for Weaning due to:: Poor inspiratory effort Underline problem not resolved                Physical Exam:   Assessment Type: Assess only  General Appearance: Eyes do not open to any stimulus  Respiratory Pattern: Spontaneous, Assisted  Chest Assessment: Chest expansion symmetrical  Bilateral Breath Sounds: Diminished  L Breath Sounds: Crackles  Cough: Productive (assisted)  Suction: ET Tube  O2 Device: opti  Subjective Data: awake      Resp Comments: placed pt on HFNC       03/23/24 1256   Respiratory Assessment   Assessment Type Assess only   General Appearance Eyes do not open to any stimulus   Respiratory Pattern Spontaneous;Assisted   Chest Assessment Chest expansion symmetrical   Bilateral Breath Sounds Diminished   Resp Comments placed pt on HFNC   O2 Device opti   Subjective Data awake   Non-Invasive Information   O2 Interface Device HFNC prongs   Non-Invasive Ventilation Mode HFNC (High flow)   SpO2 96 %   $ Pulse Oximetry Spot Check Charge Completed   Non-Invasive Settings   FiO2 (%) 40   Temperature (Set) 31   Flow (lpm) 60   Non-Invasive Readings   Heater Temperature (Obs) 28.9   Skin Intervention Skin intact

## 2024-03-24 PROBLEM — E87.29 RESPIRATORY ACIDOSIS: Status: RESOLVED | Noted: 2024-03-14 | Resolved: 2024-03-24

## 2024-03-24 LAB
ALL TARGETS: NOT DETECTED
ANION GAP SERPL CALCULATED.3IONS-SCNC: 9 MMOL/L (ref 4–13)
BACTERIA BLD CULT: ABNORMAL
BACTERIA BLD CULT: NORMAL
BUN SERPL-MCNC: 17 MG/DL (ref 5–25)
CA-I BLD-SCNC: 1.13 MMOL/L (ref 1.12–1.32)
CALCIUM SERPL-MCNC: 9.1 MG/DL (ref 8.4–10.2)
CHLORIDE SERPL-SCNC: 109 MMOL/L (ref 96–108)
CO2 SERPL-SCNC: 32 MMOL/L (ref 21–32)
CREAT SERPL-MCNC: 0.75 MG/DL (ref 0.6–1.3)
ERYTHROCYTE [DISTWIDTH] IN BLOOD BY AUTOMATED COUNT: 19.4 % (ref 11.6–15.1)
GFR SERPL CREATININE-BSD FRML MDRD: 86 ML/MIN/1.73SQ M
GLUCOSE SERPL-MCNC: 100 MG/DL (ref 65–140)
GLUCOSE SERPL-MCNC: 145 MG/DL (ref 65–140)
GLUCOSE SERPL-MCNC: 150 MG/DL (ref 65–140)
GLUCOSE SERPL-MCNC: 92 MG/DL (ref 65–140)
GLUCOSE SERPL-MCNC: 95 MG/DL (ref 65–140)
GRAM STN SPEC: ABNORMAL
HCT VFR BLD AUTO: 35.2 % (ref 34.8–46.1)
HGB BLD-MCNC: 10 G/DL (ref 11.5–15.4)
MAGNESIUM SERPL-MCNC: 2.2 MG/DL (ref 1.9–2.7)
MCH RBC QN AUTO: 23.2 PG (ref 26.8–34.3)
MCHC RBC AUTO-ENTMCNC: 28.4 G/DL (ref 31.4–37.4)
MCV RBC AUTO: 82 FL (ref 82–98)
PHOSPHATE SERPL-MCNC: 2.9 MG/DL (ref 2.3–4.1)
PLATELET # BLD AUTO: 152 THOUSANDS/UL (ref 149–390)
PMV BLD AUTO: 9.6 FL (ref 8.9–12.7)
POTASSIUM SERPL-SCNC: 3.6 MMOL/L (ref 3.5–5.3)
RBC # BLD AUTO: 4.31 MILLION/UL (ref 3.81–5.12)
SODIUM SERPL-SCNC: 150 MMOL/L (ref 135–147)
WBC # BLD AUTO: 11.16 THOUSAND/UL (ref 4.31–10.16)

## 2024-03-24 PROCEDURE — 84100 ASSAY OF PHOSPHORUS: CPT | Performed by: NURSE PRACTITIONER

## 2024-03-24 PROCEDURE — 94664 DEMO&/EVAL PT USE INHALER: CPT

## 2024-03-24 PROCEDURE — 82948 REAGENT STRIP/BLOOD GLUCOSE: CPT

## 2024-03-24 PROCEDURE — 94640 AIRWAY INHALATION TREATMENT: CPT

## 2024-03-24 PROCEDURE — 94003 VENT MGMT INPAT SUBQ DAY: CPT

## 2024-03-24 PROCEDURE — 85027 COMPLETE CBC AUTOMATED: CPT | Performed by: NURSE PRACTITIONER

## 2024-03-24 PROCEDURE — C9113 INJ PANTOPRAZOLE SODIUM, VIA: HCPCS

## 2024-03-24 PROCEDURE — 80048 BASIC METABOLIC PNL TOTAL CA: CPT | Performed by: NURSE PRACTITIONER

## 2024-03-24 PROCEDURE — 94760 N-INVAS EAR/PLS OXIMETRY 1: CPT

## 2024-03-24 PROCEDURE — 94660 CPAP INITIATION&MGMT: CPT

## 2024-03-24 PROCEDURE — 82330 ASSAY OF CALCIUM: CPT | Performed by: NURSE PRACTITIONER

## 2024-03-24 PROCEDURE — 99024 POSTOP FOLLOW-UP VISIT: CPT | Performed by: CLINICAL NURSE SPECIALIST

## 2024-03-24 PROCEDURE — 97163 PT EVAL HIGH COMPLEX 45 MIN: CPT

## 2024-03-24 PROCEDURE — 83735 ASSAY OF MAGNESIUM: CPT | Performed by: NURSE PRACTITIONER

## 2024-03-24 RX ORDER — ECHINACEA PURPUREA EXTRACT 125 MG
1 TABLET ORAL
Status: DISCONTINUED | OUTPATIENT
Start: 2024-03-24 | End: 2024-03-28 | Stop reason: HOSPADM

## 2024-03-24 RX ORDER — ACETAMINOPHEN 325 MG/1
975 TABLET ORAL EVERY 6 HOURS SCHEDULED
Status: DISCONTINUED | OUTPATIENT
Start: 2024-03-24 | End: 2024-03-28 | Stop reason: HOSPADM

## 2024-03-24 RX ORDER — INSULIN LISPRO 100 [IU]/ML
1-6 INJECTION, SOLUTION INTRAVENOUS; SUBCUTANEOUS
Status: DISCONTINUED | OUTPATIENT
Start: 2024-03-24 | End: 2024-03-28 | Stop reason: HOSPADM

## 2024-03-24 RX ORDER — FLUTICASONE FUROATE AND VILANTEROL 100; 25 UG/1; UG/1
1 POWDER RESPIRATORY (INHALATION) DAILY
Status: DISCONTINUED | OUTPATIENT
Start: 2024-03-24 | End: 2024-03-28 | Stop reason: HOSPADM

## 2024-03-24 RX ORDER — TOPIRAMATE 25 MG/1
25 TABLET ORAL 2 TIMES DAILY
Status: DISCONTINUED | OUTPATIENT
Start: 2024-03-24 | End: 2024-03-28 | Stop reason: HOSPADM

## 2024-03-24 RX ORDER — TORSEMIDE 20 MG/1
20 TABLET ORAL DAILY
Status: DISCONTINUED | OUTPATIENT
Start: 2024-03-24 | End: 2024-03-28 | Stop reason: HOSPADM

## 2024-03-24 RX ORDER — INSULIN LISPRO 100 [IU]/ML
1-5 INJECTION, SOLUTION INTRAVENOUS; SUBCUTANEOUS
Status: DISCONTINUED | OUTPATIENT
Start: 2024-03-24 | End: 2024-03-28 | Stop reason: HOSPADM

## 2024-03-24 RX ORDER — DEXTROSE MONOHYDRATE 50 MG/ML
50 INJECTION, SOLUTION INTRAVENOUS CONTINUOUS
Status: DISCONTINUED | OUTPATIENT
Start: 2024-03-24 | End: 2024-03-24

## 2024-03-24 RX ORDER — HYDROMORPHONE HCL/PF 1 MG/ML
0.5 SYRINGE (ML) INJECTION
Status: DISPENSED | OUTPATIENT
Start: 2024-03-24 | End: 2024-03-24

## 2024-03-24 RX ORDER — ATORVASTATIN CALCIUM 40 MG/1
40 TABLET, FILM COATED ORAL
Status: DISCONTINUED | OUTPATIENT
Start: 2024-03-24 | End: 2024-03-28 | Stop reason: HOSPADM

## 2024-03-24 RX ORDER — ESCITALOPRAM OXALATE 10 MG/1
5 TABLET ORAL DAILY
Status: DISCONTINUED | OUTPATIENT
Start: 2024-03-24 | End: 2024-03-28 | Stop reason: HOSPADM

## 2024-03-24 RX ORDER — DOCUSATE SODIUM 100 MG/1
100 CAPSULE, LIQUID FILLED ORAL 2 TIMES DAILY
Status: DISCONTINUED | OUTPATIENT
Start: 2024-03-24 | End: 2024-03-25

## 2024-03-24 RX ORDER — FOLIC ACID 1 MG/1
1 TABLET ORAL DAILY
Status: DISCONTINUED | OUTPATIENT
Start: 2024-03-24 | End: 2024-03-28 | Stop reason: HOSPADM

## 2024-03-24 RX ORDER — ACETAMINOPHEN 10 MG/ML
1000 INJECTION, SOLUTION INTRAVENOUS ONCE
Status: COMPLETED | OUTPATIENT
Start: 2024-03-24 | End: 2024-03-24

## 2024-03-24 RX ORDER — AMLODIPINE BESYLATE 5 MG/1
5 TABLET ORAL DAILY
Status: DISCONTINUED | OUTPATIENT
Start: 2024-03-24 | End: 2024-03-28 | Stop reason: HOSPADM

## 2024-03-24 RX ORDER — HYDROMORPHONE HCL IN WATER/PF 6 MG/30 ML
0.2 PATIENT CONTROLLED ANALGESIA SYRINGE INTRAVENOUS EVERY 4 HOURS PRN
Status: DISCONTINUED | OUTPATIENT
Start: 2024-03-25 | End: 2024-03-25

## 2024-03-24 RX ADMIN — DOCUSATE SODIUM 100 MG: 100 CAPSULE, LIQUID FILLED ORAL at 10:36

## 2024-03-24 RX ADMIN — POLYETHYLENE GLYCOL 3350 17 G: 17 POWDER, FOR SOLUTION ORAL at 08:45

## 2024-03-24 RX ADMIN — HEPARIN SODIUM 7500 UNITS: 5000 INJECTION INTRAVENOUS; SUBCUTANEOUS at 05:16

## 2024-03-24 RX ADMIN — INSULIN LISPRO 1 UNITS: 100 INJECTION, SOLUTION INTRAVENOUS; SUBCUTANEOUS at 18:33

## 2024-03-24 RX ADMIN — HYDROMORPHONE HYDROCHLORIDE 0.5 MG: 1 INJECTION, SOLUTION INTRAMUSCULAR; INTRAVENOUS; SUBCUTANEOUS at 18:35

## 2024-03-24 RX ADMIN — LEVALBUTEROL HYDROCHLORIDE 1.25 MG: 1.25 SOLUTION RESPIRATORY (INHALATION) at 19:25

## 2024-03-24 RX ADMIN — METOPROLOL TARTRATE 25 MG: 25 TABLET, FILM COATED ORAL at 21:41

## 2024-03-24 RX ADMIN — AMPICILLIN SODIUM AND SULBACTAM SODIUM 3 G: 2; 1 INJECTION, POWDER, FOR SOLUTION INTRAMUSCULAR; INTRAVENOUS at 18:33

## 2024-03-24 RX ADMIN — ACETAMINOPHEN 1000 MG: 10 INJECTION INTRAVENOUS at 08:45

## 2024-03-24 RX ADMIN — ACETAMINOPHEN 975 MG: 325 TABLET, FILM COATED ORAL at 15:37

## 2024-03-24 RX ADMIN — IPRATROPIUM BROMIDE 0.5 MG: 0.5 SOLUTION RESPIRATORY (INHALATION) at 13:04

## 2024-03-24 RX ADMIN — NICOTINE 1 PATCH: 21 PATCH, EXTENDED RELEASE TRANSDERMAL at 08:53

## 2024-03-24 RX ADMIN — FOLIC ACID 1 MG: 1 TABLET ORAL at 10:36

## 2024-03-24 RX ADMIN — LEVALBUTEROL HYDROCHLORIDE 1.25 MG: 1.25 SOLUTION RESPIRATORY (INHALATION) at 07:14

## 2024-03-24 RX ADMIN — AMLODIPINE BESYLATE 5 MG: 5 TABLET ORAL at 08:45

## 2024-03-24 RX ADMIN — FLUTICASONE FUROATE AND VILANTEROL TRIFENATATE 1 PUFF: 100; 25 POWDER RESPIRATORY (INHALATION) at 10:41

## 2024-03-24 RX ADMIN — IPRATROPIUM BROMIDE 0.5 MG: 0.5 SOLUTION RESPIRATORY (INHALATION) at 19:25

## 2024-03-24 RX ADMIN — HYDROMORPHONE HYDROCHLORIDE 0.5 MG: 1 INJECTION, SOLUTION INTRAMUSCULAR; INTRAVENOUS; SUBCUTANEOUS at 21:48

## 2024-03-24 RX ADMIN — HYDROMORPHONE HYDROCHLORIDE 0.5 MG: 1 INJECTION, SOLUTION INTRAMUSCULAR; INTRAVENOUS; SUBCUTANEOUS at 00:36

## 2024-03-24 RX ADMIN — HYDROMORPHONE HYDROCHLORIDE 0.5 MG: 1 INJECTION, SOLUTION INTRAMUSCULAR; INTRAVENOUS; SUBCUTANEOUS at 04:10

## 2024-03-24 RX ADMIN — VITAM B12 100 MCG: 100 TAB at 10:41

## 2024-03-24 RX ADMIN — TORSEMIDE 20 MG: 20 TABLET ORAL at 10:36

## 2024-03-24 RX ADMIN — ESCITALOPRAM OXALATE 5 MG: 10 TABLET ORAL at 10:36

## 2024-03-24 RX ADMIN — SALINE NASAL SPRAY 1 SPRAY: 1.5 SOLUTION NASAL at 21:48

## 2024-03-24 RX ADMIN — LEVALBUTEROL HYDROCHLORIDE 1.25 MG: 1.25 SOLUTION RESPIRATORY (INHALATION) at 13:04

## 2024-03-24 RX ADMIN — HEPARIN SODIUM 7500 UNITS: 5000 INJECTION INTRAVENOUS; SUBCUTANEOUS at 13:53

## 2024-03-24 RX ADMIN — CHLORHEXIDINE GLUCONATE 0.12% ORAL RINSE 15 ML: 1.2 LIQUID ORAL at 08:53

## 2024-03-24 RX ADMIN — BUDESONIDE INHALATION 0.5 MG: 0.5 SUSPENSION RESPIRATORY (INHALATION) at 19:53

## 2024-03-24 RX ADMIN — PANTOPRAZOLE SODIUM 40 MG: 40 INJECTION, POWDER, FOR SOLUTION INTRAVENOUS at 08:45

## 2024-03-24 RX ADMIN — HYDROMORPHONE HYDROCHLORIDE 0.5 MG: 1 INJECTION, SOLUTION INTRAMUSCULAR; INTRAVENOUS; SUBCUTANEOUS at 10:46

## 2024-03-24 RX ADMIN — TOPIRAMATE 25 MG: 25 TABLET ORAL at 18:33

## 2024-03-24 RX ADMIN — AMPICILLIN SODIUM AND SULBACTAM SODIUM 3 G: 2; 1 INJECTION, POWDER, FOR SOLUTION INTRAMUSCULAR; INTRAVENOUS at 05:08

## 2024-03-24 RX ADMIN — IPRATROPIUM BROMIDE 0.5 MG: 0.5 SOLUTION RESPIRATORY (INHALATION) at 07:14

## 2024-03-24 RX ADMIN — ATORVASTATIN CALCIUM 40 MG: 40 TABLET, FILM COATED ORAL at 15:38

## 2024-03-24 RX ADMIN — AMPICILLIN SODIUM AND SULBACTAM SODIUM 3 G: 2; 1 INJECTION, POWDER, FOR SOLUTION INTRAMUSCULAR; INTRAVENOUS at 10:36

## 2024-03-24 RX ADMIN — UMECLIDINIUM 1 PUFF: 62.5 AEROSOL, POWDER ORAL at 10:41

## 2024-03-24 RX ADMIN — TOPIRAMATE 25 MG: 25 TABLET ORAL at 10:41

## 2024-03-24 RX ADMIN — DEXTROSE 50 ML/HR: 5 SOLUTION INTRAVENOUS at 06:29

## 2024-03-24 RX ADMIN — BUDESONIDE INHALATION 0.5 MG: 0.5 SUSPENSION RESPIRATORY (INHALATION) at 07:14

## 2024-03-24 RX ADMIN — HEPARIN SODIUM 7500 UNITS: 5000 INJECTION INTRAVENOUS; SUBCUTANEOUS at 21:44

## 2024-03-24 RX ADMIN — SALINE NASAL SPRAY 1 SPRAY: 1.5 SOLUTION NASAL at 04:52

## 2024-03-24 RX ADMIN — DOCUSATE SODIUM 100 MG: 100 CAPSULE, LIQUID FILLED ORAL at 18:33

## 2024-03-24 RX ADMIN — METOPROLOL TARTRATE 25 MG: 25 TABLET, FILM COATED ORAL at 10:36

## 2024-03-24 NOTE — PLAN OF CARE
Problem: PHYSICAL THERAPY ADULT  Goal: Performs mobility at highest level of function for planned discharge setting.  See evaluation for individualized goals.  Description: Treatment/Interventions: Functional transfer training, LE strengthening/ROM, Therapeutic exercise, Endurance training, Patient/family training, Equipment eval/education, Bed mobility, Gait training, Spoke to nursing  Equipment Recommended: Walker       See flowsheet documentation for full assessment, interventions and recommendations.  3/24/2024 1254 by Elyse Whalen PT  Note: Prognosis: Good  Problem List: Decreased strength, Decreased endurance, Impaired balance, Decreased mobility, Decreased safety awareness, Impaired sensation  Assessment: Pt is 63 y.o. female seen for PT evaluation on 3/24/2024 s/p admit to [facility] on 3/19/2024 w/ Pneumoperitoneum. PT was consulted to assess pt's functional mobility and d/c needs. Order placed for PT eval and tx. PTA, pt resides with spouse in [facility], recently requiring RW for ambulation, typically no AD needed; going to be staying at daughters at time of d/c. At time of eval, pt requiring mod A x2 for bed mobility, min a x2 for transfers and short gait trial from EOB to recliner. Upon evaluation, pt presenting with impaired functional mobility d/t decreased strength, decreased endurance, impaired balance, decreased mobility, impaired sensation, obesity c BMI of 52.92 kg/m2, pain, and activity intolerance. Pertinent PMHx and current co-morbidities affecting pt's physical performance at time of assessment include: acute on chronic diastolic heart failure, respiratory acidosis, altered mental status, LETICIA, leukocytosis, CAD. Personal factors affecting pt at time of eval include: lives in 2 story house, ambulating w/ assistive device, inability to navigate community distances, and positive fall history. The following objective measures performed on IE also reveal limitations: Barthel Index: 25/100,  Modified [place]: 4 (moderate/severe disability), and AM-PAC 6-Clicks: 11/24. Pt's clinical presentation is currently unstable/unpredictable seen in pt's presentation of abnormal lab value(s) and ongoing medical assessment. Overall, pt's rehab potential and prognosis to return to PLOF is good as impacted by objective findings, warranting pt to receive further skilled PT interventions to address identified impairments, activity limitation(s), and participation restriction(s). Pt to benefit from continued PT tx to address deficits as defined above and maximize level of functional independent mobility. From PT/mobility standpoint, recommend level 2, moderate resource intensity in order to facilitate return to PLOF.  Barriers to Discharge: Inaccessible home environment, Decreased caregiver support     Rehab Resource Intensity Level, PT: II (Moderate Resource Intensity)    See flowsheet documentation for full assessment.     3/24/2024 1254 by Elyse Whalen PT  Note: Prognosis: Good  Problem List: Decreased strength, Decreased endurance, Impaired balance, Decreased mobility, Decreased safety awareness, Impaired sensation  Assessment: Pt is 63 y.o. female seen for PT evaluation on 3/24/2024 s/p admit to [facility] on 3/19/2024 w/ Pneumoperitoneum. PT was consulted to assess pt's functional mobility and d/c needs. Order placed for PT eval and tx. PTA, pt resides with spouse in [place], recently requiring RW for ambulation, typically no AD needed; going to be staying at daughters at time of d/c. At time of eval, pt requiring mod A x2 for bed mobility, min a x2 for transfers and short gait trial from EOB to recliner. Upon evaluation, pt presenting with impaired functional mobility d/t decreased strength, decreased endurance, impaired balance, decreased mobility, impaired sensation, obesity c BMI of 52.92 kg/m2, pain, and activity intolerance. Pertinent PMHx and current co-morbidities affecting pt's physical performance at  time of assessment include: acute on chronic diastolic heart failure, respiratory acidosis, altered mental status, LETICIA, leukocytosis, CAD. Personal factors affecting pt at time of eval include: lives in 2 story house, ambulating w/ assistive device, inability to navigate community distances, and positive fall history. The following objective measures performed on IE also reveal limitations: Barthel Index: 25/100, Modified [place]: 4 (moderate/severe disability), and AM-PAC 6-Clicks: 11/24. Pt's clinical presentation is currently unstable/unpredictable seen in pt's presentation of abnormal lab value(s) and ongoing medical assessment. Overall, pt's rehab potential and prognosis to return to PLOF is good as impacted by objective findings, warranting pt to receive further skilled PT interventions to address identified impairments, activity limitation(s), and participation restriction(s). Pt to benefit from continued PT tx to address deficits as defined above and maximize level of functional independent mobility. From PT/mobility standpoint, recommend level 2, moderate resource intensity in order to facilitate return to PLOF.  Barriers to Discharge: Inaccessible home environment, Decreased caregiver support     Rehab Resource Intensity Level, PT: II (Moderate Resource Intensity)    See flowsheet documentation for full assessment.

## 2024-03-24 NOTE — ASSESSMENT & PLAN NOTE
Lab Results   Component Value Date    EGFR 65 03/23/2024    EGFR 58 03/23/2024    EGFR 56 03/23/2024    CREATININE 0.94 03/23/2024    CREATININE 1.03 03/23/2024    CREATININE 1.07 03/23/2024   Pt creatinine appears to be at baseline  Will continue to trend  Pt diuresing well, continue   Avoid nephrotoxic agents  Strict I/O

## 2024-03-24 NOTE — PROGRESS NOTES
Progress Note - General Surgery   Dwaine Gould 63 y.o. female MRN: [id number]  Unit/Bed#: ICU 07 Encounter: [id number]      Assessment:   63-year-old female, status post diagnostic diagnostic laparoscopy for pneumoperitoneum, converted to exploratory laparotomy with right hemicolectomy for perforated cecal mass and abdominal washout on 3/19/2024  -Abdominal wall left open     Return to OR for second look, abdominal washout, and abdominal wall closure on 3/21/2024    Plan:  -Patient awake and alert  -Continue to monitor bowel function  -Continue advancing diet as tolerated  -Continue to monitor LISSY drain and urine output  -Serial abdominal exams  -Continue daily dressing changes over LISSY drain site, or as often as needed to keep clean dry and intact  -Continue to monitor vitals and lab results  -Analgesics and antiemetics as needed  -Continue medical management as per primary team    Subjective/Objective   Chief Complaint: None    Subjective: No acute events overnight.  Patient is awake and alert on high flow mask.  Patient is tolerating clear liquids, and reports flatus.  Patient denies any chest pain, shortness of breath, palpitations, fever, chills, lightheadedness, syncope.    Objective:     Blood pressure (!) 175/76, pulse 76, temperature 98.4 °F (36.9 °C), temperature source Axillary, resp. rate 15, height 5' (1.524 m), weight 123 kg (270 lb 15.1 oz), SpO2 (!) 88%.  Body mass index is 52.92 kg/m².    I/O         03/22 0701  03/23 0700 03/23 0701  03/24 0700 03/24 0701  03/25 0700    P.O.       I.V. (mL/kg) 628 (5) 489.9 (4)     IV Piggyback 723.3 650 100    Feedings 357      Total Intake(mL/kg) 1708.3 (13.7) 1139.9 (9.2) 100 (0.8)    Urine (mL/kg/hr) 1430 (0.5) 765 (0.3) 550 (0.6)    Emesis/NG output       Drains 290 120 200    Stool       Blood       Total Output 1720 885 750    Net -11.7 +254.9 -650                   Invasive Devices       Peripheral Intravenous Line  Duration             Peripheral IV 03/20/24  Right Antecubital 3 days    Peripheral IV 03/21/24 Right;Ventral (anterior) Wrist 2 days              Drain  Duration             Closed/Suction Drain Left LLQ Bulb 3 days                    Physical Exam: BP (!) 175/76 (BP Location: Left arm)   Pulse 76   Temp 98.4 °F (36.9 °C) (Axillary)   Resp 15   Ht 5' (1.524 m)   Wt 123 kg (270 lb 15.1 oz)   SpO2 (!) 88%   BMI 52.92 kg/m²   General appearance: alert  Lungs: diminished breath sounds  Heart: regular rate and rhythm  Abdomen:  Obese, soft, mildly distended, midline incision well-approximated without yelitza-incisional erythema or discharge, moderate amount of drainage around LISSY drain site of serosanguineous fluid, hypoactive bowel sounds present  Extremities:  Edematous lower extremities that have slightly improved since admission    Labs   Recent Results (from the past 24 hour(s))   Basic metabolic panel    Collection Time: 03/23/24  5:34 PM   Result Value Ref Range    Sodium 148 (H) 135 - 147 mmol/L    Potassium 3.8 3.5 - 5.3 mmol/L    Chloride 107 96 - 108 mmol/L    CO2 32 21 - 32 mmol/L    ANION GAP 9 4 - 13 mmol/L    BUN 18 5 - 25 mg/dL    Creatinine 0.94 0.60 - 1.30 mg/dL    Glucose 118 65 - 140 mg/dL    Calcium 9.6 8.4 - 10.2 mg/dL    eGFR 65 ml/min/1.73sq m   Magnesium    Collection Time: 03/23/24  5:34 PM   Result Value Ref Range    Magnesium 2.1 1.9 - 2.7 mg/dL   Calcium, ionized    Collection Time: 03/23/24  5:34 PM   Result Value Ref Range    Calcium, Ionized 1.14 1.12 - 1.32 mmol/L   Fingerstick Glucose (POCT)    Collection Time: 03/23/24  5:43 PM   Result Value Ref Range    POC Glucose 125 65 - 140 mg/dl   Fingerstick Glucose (POCT)    Collection Time: 03/23/24 11:33 PM   Result Value Ref Range    POC Glucose 96 65 - 140 mg/dl   Phosphorus    Collection Time: 03/24/24  5:01 AM   Result Value Ref Range    Phosphorus 2.9 2.3 - 4.1 mg/dL   CBC    Collection Time: 03/24/24  5:01 AM   Result Value Ref Range    WBC 11.16 (H) 4.31 - 10.16 Thousand/uL     RBC 4.31 3.81 - 5.12 Million/uL    Hemoglobin 10.0 (L) 11.5 - 15.4 g/dL    Hematocrit 35.2 34.8 - 46.1 %    MCV 82 82 - 98 fL    MCH 23.2 (L) 26.8 - 34.3 pg    MCHC 28.4 (L) 31.4 - 37.4 g/dL    RDW 19.4 (H) 11.6 - 15.1 %    Platelets 152 149 - 390 Thousands/uL    MPV 9.6 8.9 - 12.7 fL   Basic metabolic panel    Collection Time: 03/24/24  5:01 AM   Result Value Ref Range    Sodium 150 (H) 135 - 147 mmol/L    Potassium 3.6 3.5 - 5.3 mmol/L    Chloride 109 (H) 96 - 108 mmol/L    CO2 32 21 - 32 mmol/L    ANION GAP 9 4 - 13 mmol/L    BUN 17 5 - 25 mg/dL    Creatinine 0.75 0.60 - 1.30 mg/dL    Glucose 95 65 - 140 mg/dL    Calcium 9.1 8.4 - 10.2 mg/dL    eGFR 86 ml/min/1.73sq m   Calcium, ionized    Collection Time: 03/24/24  5:01 AM   Result Value Ref Range    Calcium, Ionized 1.13 1.12 - 1.32 mmol/L   Magnesium    Collection Time: 03/24/24  5:01 AM   Result Value Ref Range    Magnesium 2.2 1.9 - 2.7 mg/dL   Fingerstick Glucose (POCT)    Collection Time: 03/24/24  5:52 AM   Result Value Ref Range    POC Glucose 92 65 - 140 mg/dl   Fingerstick Glucose (POCT)    Collection Time: 03/24/24 11:20 AM   Result Value Ref Range    POC Glucose 100 65 - 140 mg/dl        Imaging and other studies:  XR chest portable ICU    Result Date: 3/22/2024  Impression: Moderate congestive changes with bilateral small effusions. Workstation performed: [id number]     XR chest portable ICU    Result Date: 3/20/2024  Impression: 1. Low-normal positioning of the endotracheal tube, which is 1.9 cm above the nelson (consider withdrawing by 0.5 to 1 cm). Satisfactory positioning of the enteric tube. 2. Scattered subsegmental atelectasis with trace bilateral pleural effusions. Workstation performed: [id number]     CT abdomen pelvis wo contrast    Result Date: 3/19/2024  Impression: 1. Modest amount of pneumoperitoneum. Although the origin is unclear, findings are concerning for ruptured hollow viscus. Surgical consultation is advised. 2. Cluster of  nodules in the right lower quadrant mesentery the largest measuring 3.0 x 2.3 cm (2/110) and another 1.7 cm nodule on image 111. Findings may be reactive. Follow-up CT and/or further work-up will be required to exclude possibility of a mesenteric mass such as carcinoid. 3. Small bilateral pleural effusions with mild bibasilar compressive atelectasis. Mild cardiomegaly. 4. Subcutaneous edema and thickening of the lower anterior abdominal wall, findings which may represent panniculitis. I personally discussed this study with Dr. Rashid Bañuelos on 3/19/2024 at 5:33 PM. Workstation performed: [id number]       VTE Pharmacologic Prophylaxis: Heparin  VTE Mechanical Prophylaxis: sequential compression device    Felipe Nagel PA-C  3/24/2024

## 2024-03-24 NOTE — ASSESSMENT & PLAN NOTE
Pt had abdominal pain 3/19-CT scan showed free intraperitoneal air  Taken urgently to OR s/p diag lap converted to exp lap, right hemicolectomy with anastomosis, washout, Abthera placement  In OR, abdominal cavity was noted to have grossly purulent material in it along with perforation in the cecum at a mass  transferred to ICU postoperatively with abdomen open mechanical ventilation  3/21 OR for washout and closure w/LISSY drain  F/U culture results  Continue antibiotics  Dilaudid PRN, ketamine x3 days for pain  Surgery following

## 2024-03-24 NOTE — RESPIRATORY THERAPY NOTE
RT Protocol Note  Dwaine Gould 63 y.o. female MRN: [id number]  Unit/Bed#: ICU 07 Encounter: [id number]    Assessment    Principal Problem:    Pneumoperitoneum  Active Problems:    Tobacco abuse    CAD (coronary artery disease)    Acute on chronic heart failure with preserved ejection fraction (HCC)    COPD (chronic obstructive pulmonary disease) (HCC)    Leukocytosis    Essential hypertension    Hyperlipidemia    Acute on chronic respiratory failure with hypoxia and hypercapnia (HCC)    Stage 3a chronic kidney disease (HCC)    Anemia    Thrombocytopenia (HCC)    Gram-negative bacteremia      Home Pulmonary Medications:         Past Medical History:   Diagnosis Date    Acute metabolic encephalopathy     Hyperlipidemia     Hypertension     Transaminitis      Social History     Socioeconomic History    Marital status: /Civil Union     Spouse name: None    Number of children: None    Years of education: None    Highest education level: None   Occupational History    None   Tobacco Use    Smoking status: Every Day     Current packs/day: 0.50     Types: Cigarettes    Smokeless tobacco: Never   Vaping Use    Vaping status: Every Day    Substances: Nicotine   Substance and Sexual Activity    Alcohol use: Never    Drug use: Never    Sexual activity: Not Currently   Other Topics Concern    None   Social History Narrative    None     Social Determinants of Health     Financial Resource Strain: Not on file   Food Insecurity: No Food Insecurity (3/12/2024)    Hunger Vital Sign     Worried About Running Out of Food in the Last Year: Never true     Ran Out of Food in the Last Year: Never true   Transportation Needs: No Transportation Needs (3/12/2024)    PRAPARE - Transportation     Lack of Transportation (Medical): No     Lack of Transportation (Non-Medical): No   Physical Activity: Not on file   Stress: Not on file   Social Connections: Not on file   Intimate Partner Violence: Not on file   Housing Stability: Low Risk   (3/12/2024)    Housing Stability Vital Sign     Unable to Pay for Housing in the Last Year: No     Number of Places Lived in the Last Year: 1     Unstable Housing in the Last Year: No       Subjective    Subjective Data: awake and alert    Objective    Physical Exam:   Assessment Type: Assess only  General Appearance: Alert, Awake  Respiratory Pattern: Spontaneous  Chest Assessment: Chest expansion symmetrical  Bilateral Breath Sounds: Clear, Diminished  Cough: None  O2 Device: (S) [facility]    Vitals:  Blood pressure (!) 194/80, pulse 80, temperature 98.5 °F (36.9 °C), temperature source Axillary, resp. rate 22, height 5' (1.524 m), weight 124 kg (272 lb 14.9 oz), SpO2 (!) 89%.    Results from last 7 days   Lab Units 03/23/24  0506 03/20/24  0528 03/20/24  0201   PH ART  7.367   < > 7.449   PCO2 ART mm Hg 55.9*   < > 53.2*   PO2 ART mm Hg 68.3*   < > 71.8*   HCO3 ART mmol/L 31.4*   < > 36.1*   BASE EXC ART mmol/L 5.0   < > 10.2   O2 CONTENT ART mL/dL 13.6*   < > 17.3   O2 HGB, ARTERIAL % 91.0*   < > 92.5*   ABG SOURCE  Line, Arterial   < > Line, Arterial   RADHA TEST   --   --  Yes    < > = values in this interval not displayed.       Imaging and other studies: I have personally reviewed pertinent reports.      O2 Device: (S) [facility]     Plan    Respiratory Plan: Moderate/Severe Distress pathway        Resp Comments: will continue with current tx of xop/atr tid pulm bid

## 2024-03-24 NOTE — ASSESSMENT & PLAN NOTE
3/19 1/2 BC w/ GNR  Body fluid culture and GS w/ E coli  In the setting of bowel perforation   Continue Unasyn  Trend leukocytosis and fever curve

## 2024-03-24 NOTE — ASSESSMENT & PLAN NOTE
Wt Readings from Last 3 Encounters:   03/23/24 125 kg (275 lb 2.2 oz)   03/19/24 128 kg (282 lb 3 oz)   07/20/21 103 kg (227 lb 8.2 oz)     Pt presented to the hospital 3/11 and was aggressively diuresed  Echo on 3/12 showed EF 60%, mild to moderate concentric hypertrophy, and normal diastolic function  Was discharged home on 3/19 but returned later that day after CT findings of free intraperitoneal air and taken to OR  Diuresis initiated 3/20-pt diuresing well  Monitor daily weights  Strict I/O  Remains net negative

## 2024-03-24 NOTE — ASSESSMENT & PLAN NOTE
Unclear etiology, suspect platelet consumption in setting of surgery and acute illness   Continue trending on daily labs  Monitor for bleeding  Improving

## 2024-03-24 NOTE — RESPIRATORY THERAPY NOTE
RT Ventilator Management Note  Dwaine Gould 63 y.o. female MRN: [id number]  Unit/Bed#: ICU 07 Encounter: [id number]      Daily Screen         3/22/2024  1933 3/23/2024  0713          Patient safety screen outcome:: Failed Failed      Not Ready for Weaning due to:: Poor inspiratory effort Underline problem not resolved                Physical Exam:   Assessment Type: Assess only  General Appearance: Awake  Respiratory Pattern: Assisted  Chest Assessment: Chest expansion symmetrical  Bilateral Breath Sounds: Diminished  Cough: None  O2 Device: v60  Subjective Data: awake      Resp Comments: placed pt on bipap for nap       03/24/24 1440   Respiratory Assessment   Assessment Type Assess only   General Appearance Awake   Respiratory Pattern Assisted   Chest Assessment Chest expansion symmetrical   Bilateral Breath Sounds Diminished   Resp Comments placed pt on bipap for nap   O2 Device v60   Subjective Data awake   Non-Invasive Information   O2 Interface Device Face mask   Non-Invasive Ventilation Mode BiPAP   $ Continous NIV Subsequent   SpO2 96 %   $ Pulse Oximetry Spot Check Charge Completed   Non-Invasive Settings   IPAP (cm) 16 cm   EPAP (cm) 10 cm   Rate (Set) 10   FiO2 (%) 40   Pressure Support (cm H2O) 6   Rise Time 2   Inspiratory Time (Set) 1   Non-Invasive Readings   Total Rate 20   Vt (mL) (Mech) 473   MV (Mech) 12   Peak Pressure (Obs) 17   Spontaneous Vt (mL) 523   Leak (lpm) 43   Skin Intervention Skin intact   Non-Invasive Alarms   Insp Pressure High (cm H20) 30   Insp Pressure Low (cm H20) 6   Low Insp Pressure Time (sec) 20 sec   MV Low (L/min) 3   Vt High (mL) 1000   Vt Low (mL) 200   High Resp Rate (BPM) 35 BPM   Low Resp Rate (BPM) 8 BPM   Apnea Interval (sec) 20   Apnea Rate 10   Apnea Pressure 16   Maintenance   Water bag changed No

## 2024-03-24 NOTE — ASSESSMENT & PLAN NOTE
Pt with COPD and likely RILEY/OHS, D/C home with oxygen 4-5L  Extubated 3/23  Continue scheduled nebs  BiPAP at bedtime  Supplemental oxygen to maintain oxygen saturations greater than 90%.  Encourage incentive spirometry and pulmonary toileting  Early mobilization

## 2024-03-24 NOTE — ASSESSMENT & PLAN NOTE
Oral meds on hold  PRN hydralazine for SBP>170  Takes labetolol at home-using hydralazine prn currently given bradycardia  Start Norvasc

## 2024-03-24 NOTE — PHYSICAL THERAPY NOTE
Physical Therapy Evaluation     Patient's Name: Dwaine Gould    Admitting Diagnosis  Pneumoperitoneum    Problem List  Patient Active Problem List   Diagnosis    Tobacco abuse    CAD (coronary artery disease)    Acute on chronic heart failure with preserved ejection fraction (HCC)    COPD (chronic obstructive pulmonary disease) (HCC)    Leukocytosis    Essential hypertension    Hyperlipidemia    Pneumoperitoneum    Acute on chronic respiratory failure with hypoxia and hypercapnia (HCC)    Stage 3a chronic kidney disease (HCC)    Anemia    Thrombocytopenia (HCC)    Gram-negative bacteremia       Past Medical History  Past Medical History:   Diagnosis Date    Acute metabolic encephalopathy     Hyperlipidemia     Hypertension     Transaminitis        Past Surgical History  Past Surgical History:   Procedure Laterality Date    CARDIAC CATHETERIZATION      GANGLION CYST EXCISION      LAPAROTOMY N/A 3/21/2024    Procedure: LAPAROTOMY EXPLORATORY, washout, complex closure, LISSY drain;  Surgeon: Francine Reid MD;  Location: [facility];  Service: General    GA LAPS ABD PRTM&OMENTUM DX W/WO SPEC BR/WA SPX N/A 3/19/2024    Procedure: DIAGNOSTIC LAPAROSCOPY converted to Exploratory Laparotomy, Right Hemicolectomy, Abdominal wash-out, Abthera Placement;  Surgeon: Roger Joel DO;  Location: [facility];  Service: General    TYMPANOSTOMY TUBE PLACEMENT            03/24/24 0948   PT Last Visit   PT Visit Date 03/24/24   Note Type   Note type Evaluation   Pain Assessment   Pain Assessment Tool 0-10   Pain Score 6   Pain Location/Orientation Location: Abdomen   Restrictions/Precautions   Weight Bearing Precautions Per Order No   Other Precautions Chair Alarm;Bed Alarm;Telemetry;Fall Risk;Pain;O2;Multiple lines  (5L O2 NC, abdominal precautions, log roll)   Home Living   Type of Home House   Home Layout One level;Ramped entrance   Home Equipment Walker  (using recently)   Additional Comments home set up listed above is  daughter's layout - where she is going at time of d/c   Prior Function   Level of [place] Independent with functional mobility;Needs assistance with ADLs;Independent with IADLS   Lives With Spouse  (going to stay at daughters)   Receives Help From Family   IADLs Independent with driving;Independent with meal prep;Independent with medication management   Falls in the last 6 months 1 to 4   Vocational Retired   General   Family/Caregiver Present No   Cognition   Overall Cognitive Status   (questionable)   Arousal/Participation Alert   Attention Within functional limits   Orientation Level Oriented to person;Oriented to place;Oriented to time;Oriented to situation  (oriented to month and year)   Memory Within functional limits   Following Commands Follows all commands and directions without difficulty   Comments pt agreeable to PT evaluation   RLE Assessment   RLE Assessment   (grossly 3+/5)   LLE Assessment   LLE Assessment   (grossly 3+/5)   Coordination   Movements are Fluid and Coordinated 1   Sensation X   Bed Mobility   Supine to Sit 3  Moderate assistance   Additional items Assist x 2;HOB elevated;Bedrails;Increased time required;Verbal cues   Transfers   Sit to Stand 4  Minimal assistance   Additional items Assist x 2;Armrests;Increased time required;Verbal cues   Stand to Sit 4  Minimal assistance   Additional items Assist x 2;Armrests;Increased time required;Verbal cues   Ambulation/Elevation   Gait pattern Improper Weight shift;Decreased foot clearance;Forward Flexion;Wide GIUSEPPE   Gait Assistance 4  Minimal assist   Additional items Assist x 2;Verbal cues;Tactile cues   Assistive Device Rolling walker   Distance 3' from EOB to recliner   Balance   Static Sitting Fair   Dynamic Sitting Fair -   Static Standing Poor +   Dynamic Standing Poor +   Ambulatory Poor +   Endurance Deficit   Endurance Deficit Yes   Endurance Deficit Description SpO2 dropping to high 70s on 5L during functional mobility, increased  following seated rest break to > 90% in 1-2min with vc for proper breathing technique. NSG present and aware   Activity Tolerance   Activity Tolerance Patient limited by fatigue   Nurse Made Aware RN Joyce   Assessment   Prognosis Good   Problem List Decreased strength;Decreased endurance;Impaired balance;Decreased mobility;Decreased safety awareness;Impaired sensation   Assessment Pt is 63 y.o. female seen for PT evaluation on 3/24/2024 s/p admit to [facility] on 3/19/2024 w/ Pneumoperitoneum. PT was consulted to assess pt's functional mobility and d/c needs. Order placed for PT eval and tx. PTA, pt resides with spouse in 2SH, recently requiring RW for ambulation, typically no AD needed; going to be staying at daughters at time of d/c. At time of eval, pt requiring mod A x2 for bed mobility, min a x2 for transfers and short gait trial from EOB to recliner. Upon evaluation, pt presenting with impaired functional mobility d/t decreased strength, decreased endurance, impaired balance, decreased mobility, impaired sensation, obesity c BMI of 52.92 kg/m2, pain, and activity intolerance. Pertinent PMHx and current co-morbidities affecting pt's physical performance at time of assessment include: acute on chronic diastolic heart failure, respiratory acidosis, altered mental status, LETICIA, leukocytosis, CAD. Personal factors affecting pt at time of eval include: lives in 2 story house, ambulating w/ assistive device, inability to navigate community distances, and positive fall history. The following objective measures performed on IE also reveal limitations: Barthel Index: 25/100, Modified Tom: 4 (moderate/severe disability), and AM-PAC 6-Clicks: 11/24. Pt's clinical presentation is currently unstable/unpredictable seen in pt's presentation of abnormal lab value(s) and ongoing medical assessment. Overall, pt's rehab potential and prognosis to return to PLOF is good as impacted by objective findings, warranting pt  to receive further skilled PT interventions to address identified impairments, activity limitation(s), and participation restriction(s). Pt to benefit from continued PT tx to address deficits as defined above and maximize level of functional independent mobility. From PT/mobility standpoint, recommend level 2, moderate resource intensity in order to facilitate return to PLOF.   Barriers to Discharge Inaccessible home environment;Decreased caregiver support   Goals   STG Expiration Date 04/03/24   Short Term Goal #1 In 7-10 days: Increase bilateral LE strength 1/2 grade to facilitate independent mobility, Perform all bed mobility tasks with min A of 1 to decrease caregiver burden, Perform all transfers with min A of 1 to improve independence, Ambulate > 50 ft. with least restrictive assistive device with min A of 1 w/o LOB and w/ normalized gait pattern 100% of the time, Increase all balance 1/2 grade to decrease risk for falls, Improve Barthel Index score to 40 or greater to facilitate independence, and PT provider will perform functional balance assessment to determine fall risk   PT Treatment Day 0   Plan   Treatment/Interventions Functional transfer training;LE strengthening/ROM;Therapeutic exercise;Endurance training;Patient/family training;Equipment eval/education;Bed mobility;Gait training;Spoke to nursing   PT Frequency 3-5x/wk   Discharge Recommendation   Rehab Resource Intensity Level, PT II (Moderate Resource Intensity)   Equipment Recommended Walker   AM-PAC Basic Mobility Inpatient   Turning in Flat Bed Without Bedrails 2   Lying on Back to Sitting on Edge of Flat Bed Without Bedrails 2   Moving Bed to Chair 2   Standing Up From Chair Using Arms 2   Walk in Room 2   Climb 3-5 Stairs With Railing 1   Basic Mobility Inpatient Raw Score 11   Basic Mobility Standardized Score 30.25   [facility] Highest Level Of Mobility   -[facility] Goal 4: Move to chair/commode   -[facility] Achieved 4: Move to chair/commode    Modified Lawrence Scale   Modified Lawrence Scale 4   Barthel Index   Feeding 5   Bathing 0   Grooming Score 0   Dressing Score 5   Bladder Score 0   Bowels Score 5   Toilet Use Score 5   Transfers (Bed/Chair) Score 5   Mobility (Level Surface) Score 0   Stairs Score 0   Barthel Index Score 25         Elyse Whalen, PT, DPT

## 2024-03-24 NOTE — PROGRESS NOTES
[facility]  Progress Note  Name: Dwaine Gould I  MRN: [id number]  Unit/Bed#: ICU 07 I Date of Admission: 3/19/2024   Date of Service: 3/24/2024 I Hospital Day: 5    Assessment/Plan   * Pneumoperitoneum  Assessment & Plan  Pt had abdominal pain 3/19-CT scan showed free intraperitoneal air  Taken urgently to OR s/p diag lap converted to exp lap, right hemicolectomy with anastomosis, washout, Abthera placement  In OR, abdominal cavity was noted to have grossly purulent material in it along with perforation in the cecum at a mass  transferred to ICU postoperatively with abdomen open mechanical ventilation  3/21 OR for washout and closure w/LISSY drain  F/U culture results  Continue antibiotics  Dilaudid PRN, ketamine x3 days for pain  Surgery following      Acute on chronic respiratory failure with hypoxia and hypercapnia (HCC)  Assessment & Plan  Pt with COPD and likely RILEY/OHS, D/C home with oxygen 4-5L  Extubated 3/23  Continue scheduled nebs  BiPAP at bedtime  Supplemental oxygen to maintain oxygen saturations greater than 90%.  Encourage incentive spirometry and pulmonary toileting  Early mobilization    Acute on chronic heart failure with preserved ejection fraction (HCC)  Assessment & Plan  Wt Readings from Last 3 Encounters:   03/23/24 125 kg (275 lb 2.2 oz)   03/19/24 128 kg (282 lb 3 oz)   07/20/21 103 kg (227 lb 8.2 oz)     Pt presented to the hospital 3/11 and was aggressively diuresed  Echo on 3/12 showed EF 60%, mild to moderate concentric hypertrophy, and normal diastolic function  Was discharged home on 3/19 but returned later that day after CT findings of free intraperitoneal air and taken to OR  Diuresis initiated 3/20-pt diuresing well  Monitor daily weights  Strict I/O  Remains net negative              Gram-negative bacteremia  Assessment & Plan  3/19 1/2 BC w/ GNR  Body fluid culture and GS w/ E coli  In the setting of bowel perforation   Continue Unasyn  Trend leukocytosis and  fever curve    Tobacco abuse  Assessment & Plan  Would advise cessation  Nicotine patch while inpatient    Thrombocytopenia (HCC)  Assessment & Plan  Unclear etiology, suspect platelet consumption in setting of surgery and acute illness   Continue trending on daily labs  Monitor for bleeding  Improving    Anemia  Assessment & Plan  Microcytic  suspect iron deficiency  Start supplementation with B12 and folate when taking oral   Trend daily CBC    Stage 3a chronic kidney disease (HCC)  Assessment & Plan  Lab Results   Component Value Date    EGFR 65 03/23/2024    EGFR 58 03/23/2024    EGFR 56 03/23/2024    CREATININE 0.94 03/23/2024    CREATININE 1.03 03/23/2024    CREATININE 1.07 03/23/2024   Pt creatinine appears to be at baseline  Will continue to trend  Pt diuresing well, continue   Avoid nephrotoxic agents  Strict I/O    Hyperlipidemia  Assessment & Plan  Resume home Lipitor    Essential hypertension  Assessment & Plan  Oral meds on hold  PRN hydralazine for SBP>170  Takes labetolol at home-using hydralazine prn currently given bradycardia  Start Norvasc    Leukocytosis  Assessment & Plan  In the setting of pneumoperitoneum  F/U blood and OR cultures  Continue broad spectrum abx  Trend procal  Trend fever curve and WBC    COPD (chronic obstructive pulmonary disease) ([facility])  Assessment & Plan  Does not appear to be in acute exacerbation  Continue scheduled bronchodilators      CAD (coronary artery disease)  Assessment & Plan  Per [facility] records patient with single-vessel CAD with ostial RPDA lesion not amenable to PCI due to location of lesion October 2014  Continue medical management-po meds on hold currently    Respiratory acidosis-resolved as of 3/24/2024  Assessment & Plan  Pt with chronic resp acidosis 2/2 COPD, likely RILEY, +/- OHS  Currently intubated  Will need bipap once extubated             Disposition: Stepdown Level 1    ICU Core Measures     A: Assess, Prevent, and Manage Pain Has pain been assessed?  Yes  Need for changes to pain regimen? No   B: Both SAT/SAT  N/A   C: Choice of Sedation RASS Goal: N/A patient not on sedation  Need for changes to sedation or analgesia regimen? NA   D: Delirium CAM-ICU: Negative   E: Early Mobility  Plan for early mobility? Yes   F: Family Engagement Plan for family engagement today? Yes       Antibiotic Review: Patient on appropriate coverage based on culture data.  and Infectious disease consulted    Review of Invasive Devices:    [place] Plan: Continue for accurate I/O monitoring for 48 hours    [place] Plan: Discontinue arterial line    Prophylaxis:  VTE VTE covered by:  heparin (porcine), Subcutaneous, 7,500 Units at 03/23/24 2202       Stress Ulcer  covered bypantoprazole (PROTONIX) injection 40 mg [[id number]]         Significant 24hr Events     24hr events: Extubated on 3/23.  Utilizing BiPAP at bedtime.  Hemodynamically stable overnight.     Subjective   Review of Systems   Objective                            Vitals I/O      Most Recent Min/Max in 24hrs   Temp 97.6 °F (36.4 °C) Temp  Min: 97.5 °F (36.4 °C)  Max: 99.1 °F (37.3 °C)   Pulse 80 Pulse  Min: 69  Max: 99   Resp (!) 28 Resp  Min: 14  Max: 28   /69 BP  Min: 103/52  Max: 158/69   O2 Sat 97 % SpO2  Min: 92 %  Max: 99 %      Intake/Output Summary (Last 24 hours) at 3/24/2024 0122  Last data filed at 3/24/2024 0000  Gross per 24 hour   Intake 1259.92 ml   Output 945 ml   Net 314.92 ml       Diet Enteral/Parenteral; Tube Feeding No Oral Diet; Jevity 1.2 Ravi; Continuous; 20    Invasive Monitoring           Physical Exam   Physical Exam  Vitals and nursing note reviewed.   Eyes:      General: No scleral icterus.     Extraocular Movements: Extraocular movements intact.      Conjunctiva/sclera: Conjunctivae normal.   Skin:     General: Skin is warm and dry.      Capillary Refill: Capillary refill takes less than 2 seconds.   HENT:      Head: Normocephalic and atraumatic.      Mouth/Throat:      Mouth: Mucous membranes  are moist.   Neck:      Vascular: No JVD.   Cardiovascular:      Rate and Rhythm: Normal rate and regular rhythm.      Pulses: Normal pulses.      Heart sounds: Normal heart sounds.   Musculoskeletal:         General: Normal range of motion.      Right lower leg: Trace Edema present.      Left lower leg: Trace Edema present.   Abdominal: General: There is no distension.      Palpations: Abdomen is soft.      Tenderness: There is abdominal tenderness. There is no guarding.      Comments: Midline dressing dry and intact   Constitutional:       General: She is not in acute distress.     Appearance: She is ill-appearing.   Pulmonary:      Effort: Pulmonary effort is normal. No accessory muscle usage, respiratory distress or accessory muscle usage.   Secretions are normal.Chest:      Chest wall: No tenderness.   Neurological:      General: No focal deficit present.      Mental Status: She is alert and oriented to person, place and time. Mental status is at baseline. She is calm.      Motor: gross motor function is at baseline for patient.   Genitourinary/Anorectal:  [place] present.          Diagnostic Studies      EKG: Sinus rhythm  Imaging:  I have personally reviewed pertinent reports.   and I have personally reviewed pertinent films in PACS     Medications:  Scheduled PRN   amLODIPine, 5 mg, Daily  ampicillin-sulbactam, 3 g, Q6H  atorvastatin, 40 mg, Daily With Dinner  budesonide, 0.5 mg, Q12H  chlorhexidine, 15 mL, Q12H ANGELES  heparin (porcine), 7,500 Units, Q8H ANGELES  insulin lispro, 1-5 Units, Q6H ANGELES  ipratropium, 0.5 mg, TID  levalbuterol, 1.25 mg, TID  nicotine, 1 patch, Daily  oxymetazoline, 2 spray, Q12H ANGELES  pantoprazole, 40 mg, Q24H ANGELES  polyethylene glycol, 17 g, Daily      hydrALAZINE, 10 mg, Q6H PRN  HYDROmorphone, 0.5 mg, Q2H PRN       Continuous          Labs:    CBC    Recent Labs     03/22/24  0536 03/23/24  0458   WBC 13.61* 11.18*   HGB 10.5* 9.4*   HCT 36.1 32.2*   * 145*     BMP    Recent Labs      03/23/24  1148 03/23/24  1734   SODIUM 147 148*   K 3.8 3.8    107   CO2 33* 32   AGAP 8 9   BUN 19 18   CREATININE 1.03 0.94   CALCIUM 9.9 9.6       Coags    No recent results     Additional Electrolytes  Recent Labs     03/22/24  0536 03/22/24  1423 03/23/24  1148 03/23/24  1734   MG 2.3   < > 2.1 2.1   PHOS 3.3  --   --   --    CAIONIZED 1.01*   < > 1.16 1.14    < > = values in this interval not displayed.          Blood Gas    Recent Labs     03/23/24  0506   PHART 7.367   [id number] 55.9*   PO2ART 68.3*   [id number] 31.4*   BEART 5.0   SOURCE Line, Arterial     Recent Labs     03/23/24  0506   SOURCE Line, Arterial    LFTs  Recent Labs     03/22/24  0536   ALT 13   AST 12*   ALKPHOS 48   ALB 3.9   TBILI 1.29*       Infectious  No recent results  Glucose  Recent Labs     03/23/24  0002 03/23/24  0458 03/23/24  1148 03/23/24  1734   GLUC 94 87 107 118               GUSTAVO Archibald

## 2024-03-25 ENCOUNTER — APPOINTMENT (INPATIENT)
Dept: RADIOLOGY | Facility: HOSPITAL | Age: 64
DRG: 230 | End: 2024-03-25
Payer: COMMERCIAL

## 2024-03-25 LAB
ANION GAP SERPL CALCULATED.3IONS-SCNC: 6 MMOL/L (ref 4–13)
BUN SERPL-MCNC: 19 MG/DL (ref 5–25)
CA-I BLD-SCNC: 1.06 MMOL/L (ref 1.12–1.32)
CALCIUM SERPL-MCNC: 8.7 MG/DL (ref 8.4–10.2)
CHLORIDE SERPL-SCNC: 103 MMOL/L (ref 96–108)
CO2 SERPL-SCNC: 35 MMOL/L (ref 21–32)
CREAT SERPL-MCNC: 0.68 MG/DL (ref 0.6–1.3)
ERYTHROCYTE [DISTWIDTH] IN BLOOD BY AUTOMATED COUNT: 19.2 % (ref 11.6–15.1)
GFR SERPL CREATININE-BSD FRML MDRD: 94 ML/MIN/1.73SQ M
GLUCOSE SERPL-MCNC: 105 MG/DL (ref 65–140)
GLUCOSE SERPL-MCNC: 126 MG/DL (ref 65–140)
GLUCOSE SERPL-MCNC: 128 MG/DL (ref 65–140)
GLUCOSE SERPL-MCNC: 136 MG/DL (ref 65–140)
GLUCOSE SERPL-MCNC: 149 MG/DL (ref 65–140)
HCT VFR BLD AUTO: 38.6 % (ref 34.8–46.1)
HGB BLD-MCNC: 11.2 G/DL (ref 11.5–15.4)
MAGNESIUM SERPL-MCNC: 2 MG/DL (ref 1.9–2.7)
MCH RBC QN AUTO: 23.2 PG (ref 26.8–34.3)
MCHC RBC AUTO-ENTMCNC: 29 G/DL (ref 31.4–37.4)
MCV RBC AUTO: 80 FL (ref 82–98)
PHOSPHATE SERPL-MCNC: 2.8 MG/DL (ref 2.3–4.1)
PLATELET # BLD AUTO: 203 THOUSANDS/UL (ref 149–390)
PMV BLD AUTO: 9.8 FL (ref 8.9–12.7)
POTASSIUM SERPL-SCNC: 3.2 MMOL/L (ref 3.5–5.3)
RBC # BLD AUTO: 4.82 MILLION/UL (ref 3.81–5.12)
SODIUM SERPL-SCNC: 144 MMOL/L (ref 135–147)
WBC # BLD AUTO: 15.73 THOUSAND/UL (ref 4.31–10.16)

## 2024-03-25 PROCEDURE — 99024 POSTOP FOLLOW-UP VISIT: CPT | Performed by: STUDENT IN AN ORGANIZED HEALTH CARE EDUCATION/TRAINING PROGRAM

## 2024-03-25 PROCEDURE — 82330 ASSAY OF CALCIUM: CPT | Performed by: NURSE PRACTITIONER

## 2024-03-25 PROCEDURE — 83735 ASSAY OF MAGNESIUM: CPT | Performed by: NURSE PRACTITIONER

## 2024-03-25 PROCEDURE — 88309 TISSUE EXAM BY PATHOLOGIST: CPT | Performed by: PATHOLOGY

## 2024-03-25 PROCEDURE — 80048 BASIC METABOLIC PNL TOTAL CA: CPT | Performed by: NURSE PRACTITIONER

## 2024-03-25 PROCEDURE — 94760 N-INVAS EAR/PLS OXIMETRY 1: CPT

## 2024-03-25 PROCEDURE — 94640 AIRWAY INHALATION TREATMENT: CPT

## 2024-03-25 PROCEDURE — 88342 IMHCHEM/IMCYTCHM 1ST ANTB: CPT | Performed by: PATHOLOGY

## 2024-03-25 PROCEDURE — 97116 GAIT TRAINING THERAPY: CPT

## 2024-03-25 PROCEDURE — 97167 OT EVAL HIGH COMPLEX 60 MIN: CPT

## 2024-03-25 PROCEDURE — 97110 THERAPEUTIC EXERCISES: CPT

## 2024-03-25 PROCEDURE — 84100 ASSAY OF PHOSPHORUS: CPT | Performed by: NURSE PRACTITIONER

## 2024-03-25 PROCEDURE — 99233 SBSQ HOSP IP/OBS HIGH 50: CPT | Performed by: INTERNAL MEDICINE

## 2024-03-25 PROCEDURE — 82948 REAGENT STRIP/BLOOD GLUCOSE: CPT

## 2024-03-25 PROCEDURE — 85027 COMPLETE CBC AUTOMATED: CPT | Performed by: NURSE PRACTITIONER

## 2024-03-25 PROCEDURE — 88341 IMHCHEM/IMCYTCHM EA ADD ANTB: CPT | Performed by: PATHOLOGY

## 2024-03-25 PROCEDURE — 94660 CPAP INITIATION&MGMT: CPT

## 2024-03-25 PROCEDURE — 74022 RADEX COMPL AQT ABD SERIES: CPT

## 2024-03-25 RX ORDER — OXYCODONE HYDROCHLORIDE 5 MG/1
5 TABLET ORAL EVERY 6 HOURS PRN
Status: DISCONTINUED | OUTPATIENT
Start: 2024-03-25 | End: 2024-03-28 | Stop reason: HOSPADM

## 2024-03-25 RX ORDER — SODIUM CHLORIDE, SODIUM GLUCONATE, SODIUM ACETATE, POTASSIUM CHLORIDE, MAGNESIUM CHLORIDE, SODIUM PHOSPHATE, DIBASIC, AND POTASSIUM PHOSPHATE .53; .5; .37; .037; .03; .012; .00082 G/100ML; G/100ML; G/100ML; G/100ML; G/100ML; G/100ML; G/100ML
60 INJECTION, SOLUTION INTRAVENOUS CONTINUOUS
Status: DISCONTINUED | OUTPATIENT
Start: 2024-03-25 | End: 2024-03-28 | Stop reason: HOSPADM

## 2024-03-25 RX ORDER — HYDROMORPHONE HCL IN WATER/PF 6 MG/30 ML
0.2 PATIENT CONTROLLED ANALGESIA SYRINGE INTRAVENOUS EVERY 4 HOURS PRN
Status: DISCONTINUED | OUTPATIENT
Start: 2024-03-25 | End: 2024-03-28 | Stop reason: HOSPADM

## 2024-03-25 RX ADMIN — AMLODIPINE BESYLATE 5 MG: 5 TABLET ORAL at 08:29

## 2024-03-25 RX ADMIN — AMPICILLIN SODIUM AND SULBACTAM SODIUM 3 G: 2; 1 INJECTION, POWDER, FOR SOLUTION INTRAMUSCULAR; INTRAVENOUS at 12:19

## 2024-03-25 RX ADMIN — ACETAMINOPHEN 975 MG: 325 TABLET, FILM COATED ORAL at 12:18

## 2024-03-25 RX ADMIN — AMPICILLIN SODIUM AND SULBACTAM SODIUM 3 G: 2; 1 INJECTION, POWDER, FOR SOLUTION INTRAMUSCULAR; INTRAVENOUS at 22:15

## 2024-03-25 RX ADMIN — HEPARIN SODIUM 7500 UNITS: 5000 INJECTION INTRAVENOUS; SUBCUTANEOUS at 05:32

## 2024-03-25 RX ADMIN — IPRATROPIUM BROMIDE 0.5 MG: 0.5 SOLUTION RESPIRATORY (INHALATION) at 07:25

## 2024-03-25 RX ADMIN — OXYMETAZOLINE HYDROCHLORIDE 2 SPRAY: 0.05 SPRAY NASAL at 08:32

## 2024-03-25 RX ADMIN — BUDESONIDE INHALATION 0.5 MG: 0.5 SUSPENSION RESPIRATORY (INHALATION) at 07:24

## 2024-03-25 RX ADMIN — DOCUSATE SODIUM 100 MG: 100 CAPSULE, LIQUID FILLED ORAL at 08:29

## 2024-03-25 RX ADMIN — HEPARIN SODIUM 7500 UNITS: 5000 INJECTION INTRAVENOUS; SUBCUTANEOUS at 21:40

## 2024-03-25 RX ADMIN — SODIUM CHLORIDE, SODIUM GLUCONATE, SODIUM ACETATE, POTASSIUM CHLORIDE, MAGNESIUM CHLORIDE, SODIUM PHOSPHATE, DIBASIC, AND POTASSIUM PHOSPHATE 60 ML/HR: .53; .5; .37; .037; .03; .012; .00082 INJECTION, SOLUTION INTRAVENOUS at 16:21

## 2024-03-25 RX ADMIN — UMECLIDINIUM 1 PUFF: 62.5 AEROSOL, POWDER ORAL at 12:20

## 2024-03-25 RX ADMIN — BUDESONIDE INHALATION 0.5 MG: 0.5 SUSPENSION RESPIRATORY (INHALATION) at 20:02

## 2024-03-25 RX ADMIN — POLYETHYLENE GLYCOL 3350 17 G: 17 POWDER, FOR SOLUTION ORAL at 08:30

## 2024-03-25 RX ADMIN — AMPICILLIN SODIUM AND SULBACTAM SODIUM 3 G: 2; 1 INJECTION, POWDER, FOR SOLUTION INTRAMUSCULAR; INTRAVENOUS at 00:41

## 2024-03-25 RX ADMIN — ESCITALOPRAM OXALATE 5 MG: 10 TABLET ORAL at 08:29

## 2024-03-25 RX ADMIN — FOLIC ACID 1 MG: 1 TABLET ORAL at 08:29

## 2024-03-25 RX ADMIN — TOPIRAMATE 25 MG: 25 TABLET ORAL at 19:22

## 2024-03-25 RX ADMIN — TOPIRAMATE 25 MG: 25 TABLET ORAL at 08:31

## 2024-03-25 RX ADMIN — ACETAMINOPHEN 975 MG: 325 TABLET, FILM COATED ORAL at 05:31

## 2024-03-25 RX ADMIN — OXYMETAZOLINE HYDROCHLORIDE 2 SPRAY: 0.05 SPRAY NASAL at 00:40

## 2024-03-25 RX ADMIN — ACETAMINOPHEN 975 MG: 325 TABLET, FILM COATED ORAL at 19:22

## 2024-03-25 RX ADMIN — HYDROMORPHONE HYDROCHLORIDE 0.2 MG: 0.2 INJECTION, SOLUTION INTRAMUSCULAR; INTRAVENOUS; SUBCUTANEOUS at 05:00

## 2024-03-25 RX ADMIN — HYDROMORPHONE HYDROCHLORIDE 0.2 MG: 0.2 INJECTION, SOLUTION INTRAMUSCULAR; INTRAVENOUS; SUBCUTANEOUS at 11:20

## 2024-03-25 RX ADMIN — NICOTINE 1 PATCH: 21 PATCH, EXTENDED RELEASE TRANSDERMAL at 08:29

## 2024-03-25 RX ADMIN — ATORVASTATIN CALCIUM 40 MG: 40 TABLET, FILM COATED ORAL at 16:21

## 2024-03-25 RX ADMIN — LEVALBUTEROL HYDROCHLORIDE 1.25 MG: 1.25 SOLUTION RESPIRATORY (INHALATION) at 20:02

## 2024-03-25 RX ADMIN — IPRATROPIUM BROMIDE 0.5 MG: 0.5 SOLUTION RESPIRATORY (INHALATION) at 20:02

## 2024-03-25 RX ADMIN — AMPICILLIN SODIUM AND SULBACTAM SODIUM 3 G: 2; 1 INJECTION, POWDER, FOR SOLUTION INTRAMUSCULAR; INTRAVENOUS at 16:36

## 2024-03-25 RX ADMIN — METOPROLOL TARTRATE 25 MG: 25 TABLET, FILM COATED ORAL at 08:29

## 2024-03-25 RX ADMIN — METOPROLOL TARTRATE 25 MG: 25 TABLET, FILM COATED ORAL at 21:41

## 2024-03-25 RX ADMIN — OXYMETAZOLINE HYDROCHLORIDE 2 SPRAY: 0.05 SPRAY NASAL at 21:42

## 2024-03-25 RX ADMIN — FLUTICASONE FUROATE AND VILANTEROL TRIFENATATE 1 PUFF: 100; 25 POWDER RESPIRATORY (INHALATION) at 08:32

## 2024-03-25 RX ADMIN — LEVALBUTEROL HYDROCHLORIDE 1.25 MG: 1.25 SOLUTION RESPIRATORY (INHALATION) at 07:24

## 2024-03-25 RX ADMIN — ACETAMINOPHEN 975 MG: 325 TABLET, FILM COATED ORAL at 00:35

## 2024-03-25 RX ADMIN — HEPARIN SODIUM 7500 UNITS: 5000 INJECTION INTRAVENOUS; SUBCUTANEOUS at 16:21

## 2024-03-25 RX ADMIN — TORSEMIDE 20 MG: 20 TABLET ORAL at 08:29

## 2024-03-25 RX ADMIN — VITAM B12 100 MCG: 100 TAB at 08:31

## 2024-03-25 RX ADMIN — AMPICILLIN SODIUM AND SULBACTAM SODIUM 3 G: 2; 1 INJECTION, POWDER, FOR SOLUTION INTRAMUSCULAR; INTRAVENOUS at 05:28

## 2024-03-25 NOTE — PROGRESS NOTES
[facility]  Progress Note  Name: Dwaine Gould I  MRN: [id number]  Unit/Bed#: ICU 07 I Date of Admission: 3/19/2024   Date of Service: 3/25/2024 I Hospital Day: 6    Assessment/Plan   Gram-negative bacteremia  Assessment & Plan  3/19 1/2 BC w/ GNR  Body fluid culture and GS w/ E coli  In the setting of bowel perforation   Continue Unasyn  Trend leukocytosis and fever curve    Stage 3a chronic kidney disease (HCC)  Assessment & Plan  Lab Results   Component Value Date    EGFR 94 03/25/2024    EGFR 86 03/24/2024    EGFR 65 03/23/2024    CREATININE 0.68 03/25/2024    CREATININE 0.75 03/24/2024    CREATININE 0.94 03/23/2024     Creatinine back to baseline.  Avoid nephrotoxic medications.     Acute on chronic respiratory failure with hypoxia and hypercapnia (Formerly [facility])  Assessment & Plan  Pt with COPD and likely RILEY/OHS, D/C home with oxygen 4-5L  Extubated 3/23  Continue scheduled nebs  BiPAP at bedtime  Supplemental oxygen to maintain oxygen saturations greater than 90%.  Encourage incentive spirometry and pulmonary toileting  Early mobilization    Hyperlipidemia  Assessment & Plan  On Statins    Essential hypertension  Assessment & Plan  Present on admission with history of hypertension.  Oral meds on hold  PRN hydralazine for SBP>170  Takes labetolol at home-using hydralazine prn currently given bradycardia    COPD (chronic obstructive pulmonary disease) (Formerly [facility])  Assessment & Plan  Present on admission history of COPD.  Patient was discharged home today with home oxygen 4 to 5 L which is new for her.  She was not on home o2 prior.   Currently not in acute exacerbation.  O2 saturation 92 to 94% on nasal cannula.      Acute on chronic heart failure with preserved ejection fraction (HCC)  Assessment & Plan  Wt Readings from Last 3 Encounters:   03/25/24 124 kg (272 lb 4.3 oz)   03/19/24 128 kg (282 lb 3 oz)   07/20/21 103 kg (227 lb 8.2 oz)     Echo report from 03/12/24 noted with EF of 60%.  Pt presented  to the hospital 3/11 and was aggressively diuresed  Echo on 3/12 showed EF 60%, mild to moderate concentric hypertrophy, and normal diastolic function  Was discharged home on 3/19 but returned later that day after CT findings of free intraperitoneal air and taken to OR  Diuresis initiated 3/20-pt diuresing well  Monitor daily weights  Strict I/O  Remains net negative          Tobacco abuse  Assessment & Plan  Patient used to smoke 2 packs a day.  Reports last cigarette was before hospitalization on 3/12/24.  Counseled on continuous cessation..    * Pneumoperitoneum  Assessment & Plan  Pt had abdominal pain 3/19-CT scan showed free intraperitoneal air  Taken urgently to OR s/p diag lap converted to exp lap, right hemicolectomy with anastomosis, washout, Abthera placement  In OR, abdominal cavity was noted to have grossly purulent material in it along with perforation in the cecum at a mass  transferred to ICU postoperatively with abdomen open mechanical ventilation  3/21 OR for washout and closure w/LISSY drain  F/U culture results  Continue antibiotics  Surgery following-given the increasing abdominal pain on 3/25/2024 I spoke with surgery.  Patient is being kept n.p.o. again.                 TE Pharmacologic Prophylaxis: Heparin    Patient Centered Rounds: I have performed bedside rounds with nursing staff today.  Discussions with Specialists or Other Care Team Provider: Surgery  Education and Discussions with Family / Patient: Patient    I discussed with surgery in detail about the fact that the patient has increased abdominal pain.  We decided to keep the patient n.p.o. and the concern is that the patient's condition is worsening.  Will closely follow.  Total time spent on patient management by me at the patient's bedside is 50 minutes    Current Length of Stay: 6 day(s)  Current Patient Status: Inpatient     Mobility:   Basic Mobility Inpatient Raw Score: 15  -M Goal: 4: Move to chair/commode  -HLM Achieved:  6: Walk 10 steps or more  JH-HLM Goal NOT achieved. Continue with multidisciplinary rounding and encourage appropriate mobility to improve upon JH-HLM goals.    Certification Statement: The patient will continue to require additional inpatient hospital stay due to Pneumoperitoneum  Discharge Plan / Estimated Discharge Date: 2-3 more days    Code Status: Level 1 - Full Code  ______________________________________________________________________________    Subjective:   Patient seen and examined by me.  No chest pain, palpitations or diaphoresis.  No fever or chills at this point of time.  Patient does have weakness and the weakness is generalized.  No dysuria or diarrhea.  No chest pain, palpitations or diaphoresis    Objective:   Vitals: Blood pressure 168/75, pulse 73, temperature 98.7 °F (37.1 °C), temperature source Oral, resp. rate 17, height 5' (1.524 m), weight 124 kg (272 lb 4.3 oz), SpO2 91%.    Physical Exam:   General appearance: alert, fatigued, appears stated age, and cooperative  Constitutional- looks a little weak  HEENT - atraumatic and normocephalic  Neck- supple  Skin - no fresh rash  Extremities no fresh focal deformities  Cardiovascular- S1-S2 heard  Respiratory- bilateral air entry present, no crackles or rhonchi  Skin - no fresh rash  Abdomen - normal bowel sounds present, no rebound tenderness  CNS- No fresh focal deficits  Psych- no acute psychosis     Additional Data:   Labs:  Results from last 7 days   Lab Units 03/25/24  0530 03/24/24  0501 03/23/24  0458 03/22/24  0536 03/21/24  0518 03/20/24  0530 03/20/24  0144 03/19/24  2225 03/19/24 2016   WBC Thousand/uL 15.73* 11.16* 11.18* 13.61* 10.46* 12.35* 10.50*  --  14.80*   HEMOGLOBIN g/dL 11.2* 10.0* 9.4* 10.5* 10.5* 12.0 13.0  --  13.1   I STAT HEMOGLOBIN g/dl  --   --   --   --   --   --   --  13.3  --    HEMATOCRIT % 38.6 35.2 32.2* 36.1 35.1 40.4 43.6  --  44.9   HEMATOCRIT, ISTAT %  --   --   --   --   --   --   --  39  --    MCV fL  80* 82 81* 80* 79* 79* 78*  --  80*   PLATELETS Thousands/uL 203 152 145* 136* 133* 153 159  --  162     Results from last 7 days   Lab Units 03/25/24  0530 03/24/24  0501 03/23/24  1734 03/23/24  1148 03/23/24  0458 03/23/24  0002 03/22/24  1423 03/22/24  0536 03/21/24  0518 03/20/24  2112 03/20/24  1502 03/20/24  0530 03/19/24 2225 03/19/24 2016   SODIUM mmol/L 144 150* 148* 147 147 147 147 147 146 146   < > 144   < > 143   POTASSIUM mmol/L 3.2* 3.6 3.8 3.8 3.5 3.7 3.5 3.3* 3.5 3.2*   < > 4.0   < > 3.9   CHLORIDE mmol/L 103 109* 107 106 105 105 104 104 104 102   < > 103   < > 99   CO2 mmol/L 35* 32 32 33* 34* 33* 35* 35* 37* 37*   < > 36*   < > 39*   CO2, I-STAT   --   --   --   --   --   --   --   --   --   --   --   --    < >  --    ANION GAP mmol/L 6 9 9 8 8 9 8 8 5 7   < > 5   < > 5   BUN mg/dL 19 17 18 19 20 18 20 20 25 28*   < > 35*   < > 40*   CREATININE mg/dL 0.68 0.75 0.94 1.03 1.07 1.07 1.07 1.04 1.14 1.15   < > 1.03   < > 1.17   CALCIUM mg/dL 8.7 9.1 9.6 9.9 9.0 8.6 8.5 8.4 8.1* 8.5   < > 8.4   < > 8.5   ALBUMIN g/dL  --   --   --   --   --   --   --  3.9 3.5  --   --  2.9*  --  3.3*   TOTAL BILIRUBIN mg/dL  --   --   --   --   --   --   --  1.29* 0.91  --   --  1.03*  --  0.78   ALK PHOS U/L  --   --   --   --   --   --   --  48 49  --   --  63  --  84   ALT U/L  --   --   --   --   --   --   --  13 15  --   --  24  --  33   AST U/L  --   --   --   --   --   --   --  12* 12*  --   --  12*  --  15   EGFR ml/min/1.73sq m 94 86 65 58 56 56 56 58 52 51   < > 58   < > 50   GLUCOSE RANDOM mg/dL 126 95 118 107 87 94 107 95 95 114   < > 143*   < > 150*    < > = values in this interval not displayed.     Results from last 7 days   Lab Units 03/25/24  0530 03/24/24  0501 03/23/24  1734 03/23/24  1148 03/23/24  0458 03/22/24  1423 03/22/24  0536 03/21/24  0518 03/20/24  1502 03/20/24  0530   MAGNESIUM mg/dL 2.0 2.2 2.1 2.1 2.1   < > 2.3 2.3   < > 2.7   PHOSPHORUS mg/dL 2.8 2.9  --   --   --   --  3.3 3.3  --   3.7    < > = values in this interval not displayed.              Results from last 7 days   Lab Units 03/21/24  0518 03/20/24  0530 03/20/24  0144 03/19/24 2016   LACTIC ACID mmol/L  --   --  1.0 1.1   PROCALCITONIN ng/ml 0.39*   < >  --   --     < > = values in this interval not displayed.     Results from last 7 days   Lab Units 03/25/24  1151 03/25/24  0728 03/24/24  2123 03/24/24  1614 03/24/24  1120 03/24/24  0552 03/23/24  2333 03/23/24  1743 03/23/24  1144 03/23/24  0621 03/22/24  2353 03/22/24  1747   POC GLUCOSE mg/dl 128 136 145* 150* 100 92 96 125 110 99 96 106     Results from last 7 days   Lab Units 03/20/24  0530   HEMOGLOBIN A1C % 6.7*         * I Have Reviewed All Lab Data Listed Above.    Cultures:   Results from last 7 days   Lab Units 03/19/24  2200 03/19/24  2017   BLOOD CULTURE   --  No Growth After 5 Days.  Bacteroides thetaiotaomicron group*   GRAM STAIN RESULT  3+ Polys  1+ Mononuclear Cells  No bacteria seen Gram negative rods*   BODY FLUID CULTURE, STERILE  2+ Growth of Escherichia coli*  1+ Growth of Enterococcus hirae*  --              Imaging:  Imaging Reports Reviewed Today Include:   XR chest portable ICU    Result Date: 3/22/2024  Impression: Moderate congestive changes with bilateral small effusions. Workstation performed: [id number]     XR chest portable ICU    Result Date: 3/20/2024  Impression: 1. Low-normal positioning of the endotracheal tube, which is 1.9 cm above the nelson (consider withdrawing by 0.5 to 1 cm). Satisfactory positioning of the enteric tube. 2. Scattered subsegmental atelectasis with trace bilateral pleural effusions. Workstation performed: [id number]     CT abdomen pelvis wo contrast    Result Date: 3/19/2024  Impression: 1. Modest amount of pneumoperitoneum. Although the origin is unclear, findings are concerning for ruptured hollow viscus. Surgical consultation is advised. 2. Cluster of nodules in the right lower quadrant mesentery the largest measuring  3.0 x 2.3 cm (2/110) and another 1.7 cm nodule on image 111. Findings may be reactive. Follow-up CT and/or further work-up will be required to exclude possibility of a mesenteric mass such as carcinoid. 3. Small bilateral pleural effusions with mild bibasilar compressive atelectasis. Mild cardiomegaly. 4. Subcutaneous edema and thickening of the lower anterior abdominal wall, findings which may represent panniculitis. I personally discussed this study with Dr. Rashid Bañuelos on 3/19/2024 at 5:33 PM. Workstation performed: [id number]     Scheduled Meds:  Current Facility-Administered Medications   Medication Dose Route Frequency Provider Last Rate    acetaminophen  975 mg Oral Q6H ANGELES Laine Henderson, VANDANANP      amLODIPine  5 mg Oral Daily Laine Henderson, CRNP      ampicillin-sulbactam  3 g Intravenous Q6H Laine Henderson, CRNP 3 g (03/25/24 1219)    atorvastatin  40 mg Oral Daily With Dinner Laine Henderson, CRNP      budesonide  0.5 mg Nebulization Q12H Laine Henderson, CRNP      cyanocobalamin  100 mcg Oral Daily Laine Henderson, CRNP      escitalopram  5 mg Oral Daily Laine Henderson, CRNP      Fluticasone Furoate-Vilanterol  1 puff Inhalation Daily Laine Henderson, CRNP      And    umeclidinium  1 puff Inhalation Daily Laine Henderson, CRNP      folic acid  1 mg Oral Daily Laine Henderson, CRNP      heparin (porcine)  7,500 Units Subcutaneous Q8H ANGELES Laine Henderson, CRNP      hydrALAZINE  10 mg Intravenous Q6H PRN Laine Henderson, CRNP      HYDROmorphone  0.2 mg Intravenous Q4H PRN Derick Steen MD      insulin lispro  1-5 Units Subcutaneous HS Laine Henderson, CRNP      insulin lispro  1-6 Units Subcutaneous TID AC Laine Henderson, CRBECKY      ipratropium  0.5 mg Nebulization TID Laine Henderson, CRNP      levalbuterol  1.25 mg Nebulization TID Laine Henderson, CRNP      metoprolol tartrate  25 mg Oral Q12H ANGLEES Laine Beatriz, CRNP      multi-electrolyte  60 mL/hr Intravenous Continuous Derick Steen MD       nicotine  1 patch Transdermal Daily GUSTAVO Rangel      oxyCODONE  5 mg Oral Q6H PRN Derick Steen MD      oxymetazoline  2 spray Each Nare Q12H ANGELES Derick Steen MD      sodium chloride  1 spray Each Nare Q1H PRN GUSTAVO Rangel      topiramate  25 mg Oral BID GUSTAVO Rangel      torsemide  20 mg Oral Daily GUSTAVO Rangel MD  [facility] Internal Medicine      ** Please Note: This note has been constructed using a voice recognition system. **

## 2024-03-25 NOTE — ASSESSMENT & PLAN NOTE
Pt had abdominal pain 3/19-CT scan showed free intraperitoneal air  Taken urgently to OR s/p diag lap converted to exp lap, right hemicolectomy with anastomosis, washout, Abthera placement  In OR, abdominal cavity was noted to have grossly purulent material in it along with perforation in the cecum at a mass  transferred to ICU postoperatively with abdomen open mechanical ventilation  3/21 OR for washout and closure w/LISSY drain  F/U culture results  Continue antibiotics  Surgery following-given the increasing abdominal pain on 3/25/2024 I spoke with surgery.  Patient is being kept n.p.o. again.

## 2024-03-25 NOTE — ASSESSMENT & PLAN NOTE
Patient used to smoke 2 packs a day.  Reports last cigarette was before hospitalization on 3/12/24.  Counseled on continuous cessation..

## 2024-03-25 NOTE — PROGRESS NOTES
"Progress Note - General Surgery   Dwaine Gould 63 y.o. female MRN: [id number]  Unit/Bed#: ICU 07 Encounter: [id number]    Assessment/Plan  POD 6 3/19 status post diagnostic diagnostic laparoscopy for pneumoperitoneum, converted to exploratory laparotomy with right hemicolectomy for perforated cecal mass and abdominal. Side to side anastomosis. LISSY drain x 2 Abthera dressing     POD 4 3/21 Take Back, Wash Out, Fascial Closure     VSS  UO 2100  LISSY 650  K+3.2  Leukocytosis 15.7  Hbg 11.2     Pt complaining of increased pain in right and left lower abdomen that radiates into mid abdomen. She is passing gas, no BM. She feels full. Pain wakes her up, pain meds are not sufficient with increasing pain.  No nausea or vomiting  Antibiotic has been changed to Unasyn from Zosyn.     -d/c Miralax  -obtain Obstruction Series   -NPO  -IVF rate per SLIM   -cont pain control  -cont LISSY drains  -cont OOb and ambulation  -cont to monitor abdominal exam, bowel function, blood work              Chief Complaint: I have had more pain in my lower abdomen, it will go into the middle.   I feel full, and it wakes me up, the pain meds are not working anymore  I am passing gas but no bowel movement,   No nausea or vomiting        Objective/Exam: Blood pressure 168/75, pulse 62, temperature 98.7 °F (37.1 °C), temperature source Oral, resp. rate 17, height 5' (1.524 m), weight 124 kg (272 lb 4.3 oz), SpO2 97%.    Wound Culure: No results found for: \"WOUNDCULT\"      General Appearance:    Alert and orientated x 3, cooperative, no distress   Lungs:     Clear to auscultation bilaterally, respirations unlabored    Heart:    Regular rate and rhythm   Abdomen:    Tense, tenderness in lower left and right quads, yelitza umbilical  Incision is healing well.   LISSY drain s/s           Extremities:   Extremities normal,  no cyanosis or edema   Pulses:   2+ and symmetric all extremities, no calf tenderness   Skin:   Skin color, texture, turgor normal, no rashes or " "lesions   Neurologic:   CNII-XII intact, normal strength, sensation and reflexes     Throughout, affect appropriate       ,      Intake/Output Summary (Last 24 hours) at 3/25/2024 0853  Last data filed at 3/25/2024 0545  Gross per 24 hour   Intake 934.17 ml   Output 2555 ml   Net -1620.83 ml       Invasive Devices       Peripheral Intravenous Line  Duration             Peripheral IV 03/25/24 Left;Ventral (anterior) Forearm <1 day              Drain  Duration             Closed/Suction Drain Left LLQ Bulb 3 days    External Urinary Catheter <1 day                                            Labs: CBC with diff: @RESUFAST(WBC,HGB,HCT,MCV,PLT,ADJUSTEDWBC,   RBC,MCH,MCHC,RDW,MPV,NRBC,TOTALCELLSCOUNTED,SEGS%,GRANS%,LYMPHS%,EOS%,BASO%,ABNEUT,ABGRANS,ABLYMPHS,ABMOMOS,ABEOS,ABBASO)@,   BMP/CMP:  Lab Results   Component Value Date    K 3.2 (L) 03/25/2024    K 4.5 12/01/2021     03/25/2024     12/01/2021    CO2 35 (H) 03/25/2024    CO2 36 (H) 03/19/2024    CO2 28 12/01/2021    BUN 19 03/25/2024    BUN 22 12/01/2021    CREATININE 0.68 03/25/2024    CREATININE 0.84 12/01/2021    GLUCOSE 152 (H) 03/19/2024    CALCIUM 8.7 03/25/2024    CALCIUM 9.0 12/01/2021    AST 12 (L) 03/22/2024    AST 13 12/01/2021    ALT 13 03/22/2024    ALT 30 12/01/2021    ALKPHOS 48 03/22/2024    ALKPHOS 143 (H) 12/01/2021    EGFR 94 03/25/2024    EGFR 65 07/09/2019   ,   Lipid Panel: No results found for: \"CHOL\",   Coags: No results found for: \"PT\", \"PTT\", \"INR\",     Blood Culture:   Lab Results   Component Value Date    BLOODCX No Growth After 5 Days. 03/19/2024    BLOODCX Bacteroides thetaiotaomicron group (A) 03/19/2024   ,   Urinalysis:   Lab Results   Component Value Date    COLORU Light Yellow 03/19/2024    CLARITYU Clear 03/19/2024    SPECGRAV 1.022 03/19/2024    PHUR 8.0 03/19/2024    LEUKOCYTESUR Negative 03/19/2024    NITRITE Negative 03/19/2024    GLUCOSEU Negative 03/19/2024    KETONESU Negative 03/19/2024    BILIRUBINUR Negative " "03/19/2024    BLOODU Negative 03/19/2024   ,   Urine Culture: No results found for: \"URINECX\",         Imaging: XR chest portable ICU    Result Date: 3/22/2024  Impression: Moderate congestive changes with bilateral small effusions. Workstation performed: [id number]     XR chest portable ICU    Result Date: 3/20/2024  Impression: 1. Low-normal positioning of the endotracheal tube, which is 1.9 cm above the nelson (consider withdrawing by 0.5 to 1 cm). Satisfactory positioning of the enteric tube. 2. Scattered subsegmental atelectasis with trace bilateral pleural effusions. Workstation performed: [id number]     CT abdomen pelvis wo contrast    Result Date: 3/19/2024  Impression: 1. Modest amount of pneumoperitoneum. Although the origin is unclear, findings are concerning for ruptured hollow viscus. Surgical consultation is advised. 2. Cluster of nodules in the right lower quadrant mesentery the largest measuring 3.0 x 2.3 cm (2/110) and another 1.7 cm nodule on image 111. Findings may be reactive. Follow-up CT and/or further work-up will be required to exclude possibility of a mesenteric mass such as carcinoid. 3. Small bilateral pleural effusions with mild bibasilar compressive atelectasis. Mild cardiomegaly. 4. Subcutaneous edema and thickening of the lower anterior abdominal wall, findings which may represent panniculitis. I personally discussed this study with Dr. Rashid Bañuelos on 3/19/2024 at 5:33 PM. Workstation performed: [id number]         Susi Schwartz PA-C  3/25/2024      "

## 2024-03-25 NOTE — PLAN OF CARE
Problem: PAIN - ADULT  Goal: Verbalizes/displays adequate comfort level or baseline comfort level  Description: Interventions:  - Encourage patient to monitor pain and request assistance  - Assess pain using appropriate pain scale  - Administer analgesics based on type and severity of pain and evaluate response  - Implement non-pharmacological measures as appropriate and evaluate response  - Consider cultural and social influences on pain and pain management  - Notify physician/advanced practitioner if interventions unsuccessful or patient reports new pain  Outcome: Progressing     Problem: INFECTION - ADULT  Goal: Absence or prevention of progression during hospitalization  Description: INTERVENTIONS:  - Assess and monitor for signs and symptoms of infection  - Monitor lab/diagnostic results  - Monitor all insertion sites, i.e. indwelling lines, tubes, and drains  - Monitor endotracheal if appropriate and nasal secretions for changes in amount and color  - Stendal appropriate cooling/warming therapies per order  - Administer medications as ordered  - Instruct and encourage patient and family to use good hand hygiene technique  - Identify and instruct in appropriate isolation precautions for identified infection/condition  Outcome: Progressing     Problem: SAFETY ADULT  Goal: Patient will remain free of falls  Description: INTERVENTIONS:  - Educate patient/family on patient safety including physical limitations  - Instruct patient to call for assistance with activity   - Consult OT/PT to assist with strengthening/mobility   - Keep Call bell within reach  - Keep bed low and locked with side rails adjusted as appropriate  - Keep care items and personal belongings within reach  - Initiate and maintain comfort rounds  - Make Fall Risk Sign visible to staff  - Offer Toileting every 2 Hours, in advance of need  - Initiate/Maintain bed  alarm  - Obtain necessary fall risk management equipment: yellow identifiers  -  Apply yellow socks and bracelet for high fall risk patients  - Consider moving patient to room near nurses station  Outcome: Progressing     Problem: Prexisting or High Potential for Compromised Skin Integrity  Goal: Skin integrity is maintained or improved  Description: INTERVENTIONS:  - Identify patients at risk for skin breakdown  - Assess and monitor skin integrity  - Assess and monitor nutrition and hydration status  - Monitor labs   - Assess for incontinence   - Turn and reposition patient  - Assist with mobility/ambulation  - Relieve pressure over bony prominences  - Avoid friction and shearing  - Provide appropriate hygiene as needed including keeping skin clean and dry  - Evaluate need for skin moisturizer/barrier cream  - Collaborate with interdisciplinary team   - Patient/family teaching  - Consider wound care consult   Outcome: Progressing     Problem: Nutrition/Hydration-ADULT  Goal: Nutrient/Hydration intake appropriate for improving, restoring or maintaining nutritional needs  Description: Monitor and assess patient's nutrition/hydration status for malnutrition. Collaborate with interdisciplinary team and initiate plan and interventions as ordered.  Monitor patient's weight and dietary intake as ordered or per policy. Utilize nutrition screening tool and intervene as necessary. Determine patient's food preferences and provide high-protein, high-caloric foods as appropriate.     INTERVENTIONS:  - Monitor oral intake, urinary output, labs, and treatment plans  - Assess nutrition and hydration status and recommend course of action  - Evaluate amount of meals eaten  - Assist patient with eating if necessary   - Allow adequate time for meals  - Recommend/ encourage appropriate diets, oral nutritional supplements, and vitamin/mineral supplements  - Order, calculate, and assess calorie counts as needed  - Recommend, monitor, and adjust tube feedings and TPN/PPN based on assessed needs  - Assess need for  intravenous fluids  - Provide specific nutrition/hydration education as appropriate  - Include patient/family/caregiver in decisions related to nutrition  Outcome: Progressing

## 2024-03-25 NOTE — CASE MANAGEMENT
Case Management Discharge Planning Note    Patient name Dwaine Gould  Location ICU 07/ICU 07 MRN [id number]  : 1960 Date 3/25/2024       Current Admission Date: 3/19/2024  Current Admission Diagnosis:Pneumoperitoneum   Patient Active Problem List    Diagnosis Date Noted    Gram-negative bacteremia 2024    Anemia 2024    Thrombocytopenia (HCC) 2024    Acute on chronic respiratory failure with hypoxia and hypercapnia (HCC) 2024    Stage 3a chronic kidney disease (HCC) 2024    Pneumoperitoneum 2024    Acute on chronic heart failure with preserved ejection fraction (HCC) 2024    COPD (chronic obstructive pulmonary disease) (HCC) 2024    Leukocytosis 2024    Tobacco abuse 2021    CAD (coronary artery disease) 2021    Essential hypertension 2019    Hyperlipidemia 2019      LOS (days): 6  Geometric Mean LOS (GMLOS) (days): 9.8  Days to GMLOS:3.8     OBJECTIVE:  Risk of Unplanned Readmission Score: 15.02         Current admission status: Inpatient   Preferred Pharmacy:   [id number] - [place] - [address]  [place] [address]  Phone: [phone] Fax: [phone]    Primary Care Provider: Jordan Ramirez MD    Primary Insurance: [employer]  Secondary Insurance:     DISCHARGE DETAILS:                                          Other Referral/Resources/Interventions Provided:  Referral Comments: Pt presently extubated, OOB to chair.  O2 4L NC, IV abx, nebs, IV dilaudid, blub drain to LLQ.  No BM as of yet;  pt to have obstruction series today.  PT/OT recommending Level II for pt, home health vs STR/SNF;  blanket referrals pending.  Per MD nataly anticipated pt d/c in 48 hrs.         Treatment Team Recommendation: Short Term Rehab, SNF, Home with Home Health Care  Discharge Destination Plan:: SNF, Short Term Rehab, Home with Home Health Care  Transport at Discharge : Other (Comment)  (TBD)

## 2024-03-25 NOTE — PLAN OF CARE
Problem: PHYSICAL THERAPY ADULT  Goal: Performs mobility at highest level of function for planned discharge setting.  See evaluation for individualized goals.  Description: Treatment/Interventions: Functional transfer training, LE strengthening/ROM, Therapeutic exercise, Endurance training, Patient/family training, Equipment eval/education, Bed mobility, Gait training, Spoke to nursing  Equipment Recommended: Walker       See flowsheet documentation for full assessment, interventions and recommendations.  Outcome: Progressing  Note: Prognosis: Good  Problem List: Decreased strength, Decreased endurance, Impaired balance, Decreased mobility, Decreased safety awareness, Impaired sensation  Assessment: Pt seen for PT treatment session this date with interventions consisting of gait training w/ emphasis on improving pt's ability to ambulate level surfaces x 15' with min A provided by therapist with RW, Therapeutic exercise consisting of: AROM 15 reps B LE in sitting and supine position, and therapeutic activity consisting of training: supine<>sit transfers and sit<>stand transfers. Pt agreeable to PT treatment session upon arrival, pt found supine in bed w/ HOB elevated, in no apparent distress. In comparison to previous session, pt with improvements in ambulation distance, activity tolerance, improving level of assist . Post session: pt returned BTB, all needs in reach, and RN notified of session findings/recommendations. Continue to recommend Moderate Resource intensity at time of d/c in order to maximize pt's functional independence and safety w/ mobility. Pt continues to be functioning below baseline level, and remains limited 2* factors listed above and including strength, balance, mobility deficits, reduced activity tolerance, pain. PT will continue to see pt during current hospitalization in order to address the deficits listed above and provide interventions consistent w/ POC in effort to achieve  STGs.  Barriers to Discharge: Inaccessible home environment, Decreased caregiver support     Rehab Resource Intensity Level, PT: II (Moderate Resource Intensity)    See flowsheet documentation for full assessment.

## 2024-03-25 NOTE — PHYSICAL THERAPY NOTE
Physical Therapy Treatment Note    Patient's Name: Dwaine Gould    Admitting Diagnosis  Pneumoperitoneum    Problem List  Patient Active Problem List   Diagnosis    Tobacco abuse    CAD (coronary artery disease)    Acute on chronic heart failure with preserved ejection fraction (HCC)    COPD (chronic obstructive pulmonary disease) (HCC)    Leukocytosis    Essential hypertension    Hyperlipidemia    Pneumoperitoneum    Acute on chronic respiratory failure with hypoxia and hypercapnia (HCC)    Stage 3a chronic kidney disease (HCC)    Anemia    Thrombocytopenia (HCC)    Gram-negative bacteremia       Past Medical History  Past Medical History:   Diagnosis Date    Acute metabolic encephalopathy     Hyperlipidemia     Hypertension     Transaminitis        Past Surgical History  Past Surgical History:   Procedure Laterality Date    CARDIAC CATHETERIZATION      GANGLION CYST EXCISION      LAPAROTOMY N/A 3/21/2024    Procedure: LAPAROTOMY EXPLORATORY, washout, complex closure, LISSY drain;  Surgeon: Francine Reid MD;  Location: [facility];  Service: General    MA LAPS ABD PRTM&OMENTUM DX W/WO SPEC BR/WA SPX N/A 3/19/2024    Procedure: DIAGNOSTIC LAPAROSCOPY converted to Exploratory Laparotomy, Right Hemicolectomy, Abdominal wash-out, Abthera Placement;  Surgeon: Roger Joel DO;  Location: [facility];  Service: General    TYMPANOSTOMY TUBE PLACEMENT          03/25/24 1035   PT Last Visit   PT Visit Date 03/25/24   Note Type   Note Type Treatment   Pain Assessment   Pain Assessment Tool 0-10   Pain Score 8   Pain Location/Orientation Location: Abdomen;Orientation: Lower   Restrictions/Precautions   Weight Bearing Precautions Per Order No   Braces or Orthoses   (none reported)   Other Precautions Chair Alarm;Bed Alarm;O2;Fall Risk;Pain  (LISSY drain)   General   Chart Reviewed Yes   Family/Caregiver Present No   Cognition   Arousal/Participation Alert;Responsive   Attention Within functional limits   Orientation Level  Oriented to person;Oriented to place;Oriented to time;Oriented to situation   Memory Within functional limits   Following Commands Follows all commands and directions without difficulty   Subjective   Subjective I am doing ok. My butt hurt from sitting in that chair yesterday.   Bed Mobility   Supine to Sit 3  Moderate assistance   Additional items Assist x 2;Increased time required;Verbal cues;HOB elevated   Sit to Supine   (DNT: pt seated OOB in recliner at end of session)   Transfers   Sit to Stand 4  Minimal assistance   Additional items Assist x 1;Increased time required;Verbal cues   Stand to Sit 4  Minimal assistance   Additional items Assist x 1;Increased time required;Verbal cues   Additional Comments RW used for mobility and transfers   Ambulation/Elevation   Gait pattern Decreased foot clearance;Short stride;Excessively slow;Foward flexed;Wide GIUSEPPE   Gait Assistance 4  Minimal assist   Additional items Assist x 1;Verbal cues;Tactile cues   Assistive Device Rolling walker   Distance 15'   Stair Management Assistance Not tested   Balance   Static Sitting Fair   Dynamic Sitting Fair -   Static Standing Fair -   Dynamic Standing Poor +   Ambulatory Fair -   Endurance Deficit   Endurance Deficit Yes   Endurance Deficit Description decreased activity tolerance, fatigue   Activity Tolerance   Activity Tolerance Patient limited by fatigue   Nurse Made Aware RN ok to see   Exercises   Quad Sets Sitting;15 reps;Bilateral   Hip Flexion Sitting;15 reps;Bilateral   Knee AROM Long Arc Quad Sitting;15 reps;Bilateral   Ankle Pumps Sitting;15 reps;Bilateral   Assessment   Prognosis Good   Problem List Decreased strength;Decreased endurance;Impaired balance;Decreased mobility;Decreased safety awareness;Impaired sensation   Assessment Pt seen for PT treatment session this date with interventions consisting of gait training w/ emphasis on improving pt's ability to ambulate level surfaces x 15' with min A provided by therapist  with RW, Therapeutic exercise consisting of: AROM 15 reps B LE in sitting and supine position, and therapeutic activity consisting of training: supine<>sit transfers and sit<>stand transfers. Pt agreeable to PT treatment session upon arrival, pt found supine in bed w/ HOB elevated, in no apparent distress. In comparison to previous session, pt with improvements in ambulation distance, activity tolerance, improving level of assist . Post session: pt returned BTB, all needs in reach, and RN notified of session findings/recommendations. Continue to recommend Moderate Resource intensity at time of d/c in order to maximize pt's functional independence and safety w/ mobility. Pt continues to be functioning below baseline level, and remains limited 2* factors listed above and including strength, balance, mobility deficits, reduced activity tolerance, pain. PT will continue to see pt during current hospitalization in order to address the deficits listed above and provide interventions consistent w/ POC in effort to achieve STGs.   Barriers to Discharge Inaccessible home environment;Decreased caregiver support   Goals   Patient Goals to get better   STG Expiration Date 04/03/24   Short Term Goal #1 In 10 days: Increase bilateral LE strength 1/2 grade to facilitate independent mobility, Perform all bed mobility tasks modified independent to decrease caregiver burden, Perform all transfers modified independent to improve independence, Ambulate > 100 ft. with RW modified independent w/o LOB and w/ normalized gait pattern 100% of the time, Increase all balance 1/2 grade to decrease risk for falls, Tolerate standing 3-5 minutes to facilitate functional task performance, and PT to see and establish goals for stairs when appropriate   PT Treatment Day 1   Plan   Treatment/Interventions Functional transfer training;LE strengthening/ROM;Therapeutic exercise;Endurance training;Equipment eval/education;Bed mobility;Gait training;Spoke  to nursing;Spoke to MD;OT;Patient/family training   Progress Progressing toward goals   PT Frequency 3-5x/wk   Discharge Recommendation   Rehab Resource Intensity Level, PT II (Moderate Resource Intensity)   Equipment Recommended Walker   Walker Package Recommended Wheeled walker   AM-PAC Basic Mobility Inpatient   Turning in Flat Bed Without Bedrails 2   Lying on Back to Sitting on Edge of Flat Bed Without Bedrails 2   Moving Bed to Chair 3   Standing Up From Chair Using Arms 3   Walk in Room 3   Climb 3-5 Stairs With Railing 2   Basic Mobility Inpatient Raw Score 15   Basic Mobility Standardized Score 36.97   [facility] Highest Level Of Mobility   -HLM Goal 4: Move to chair/commode   -HLM Achieved 6: Walk 10 steps or more           Arleth Pichardo, PT

## 2024-03-25 NOTE — ASSESSMENT & PLAN NOTE
Present on admission with history of hypertension.  Oral meds on hold  PRN hydralazine for SBP>170  Takes labetolol at home-using hydralazine prn currently given bradycardia

## 2024-03-25 NOTE — ASSESSMENT & PLAN NOTE
Present on admission history of COPD.  Patient was discharged home today with home oxygen 4 to 5 L which is new for her.  She was not on home o2 prior.   Currently not in acute exacerbation.  O2 saturation 92 to 94% on nasal cannula.

## 2024-03-25 NOTE — ASSESSMENT & PLAN NOTE
Lab Results   Component Value Date    EGFR 94 03/25/2024    EGFR 86 03/24/2024    EGFR 65 03/23/2024    CREATININE 0.68 03/25/2024    CREATININE 0.75 03/24/2024    CREATININE 0.94 03/23/2024     Creatinine back to baseline.  Avoid nephrotoxic medications.

## 2024-03-25 NOTE — PLAN OF CARE
Problem: OCCUPATIONAL THERAPY ADULT  Goal: Performs self-care activities at highest level of function for planned discharge setting.  See evaluation for individualized goals.  Description: Treatment Interventions: ADL retraining, Functional transfer training, UE strengthening/ROM, Endurance training, Cognitive reorientation, Patient/family training, Equipment evaluation/education, Fine motor coordination activities, Compensatory technique education, Continued evaluation, Energy conservation, Activityengagement          See flowsheet documentation for full assessment, interventions and recommendations.   Note: Limitation: Decreased ADL status, Decreased UE strength, Decreased Safe judgement during ADL, Decreased endurance, Decreased fine motor control, Decreased self-care trans, Decreased high-level ADLs  Prognosis: Good  Assessment: Patient is a 63 y.o. female seen for OT evaluation s/p admit to [facility] on 3/19/2024 w/Pneumoperitoneum. Commorbidities affecting patient's functional performance at time of assessment include: Respiratory acidosis, essential HTN, COPD, Chronic heart failure with preserved ejection fraction, tobacco abuse, presented to ED right lower quadrant pain.  Orders placed for OT evaluation and treatment.  Performed at least two patient identifiers during session including name and wristband.  Prior to admission,  Patient lives with her  in a 2 story house , 5 MEGA and full flight to second floor bedroom. Patient was independnet with ADLs/  assists with IADLs.  Personal factors affecting patient at time of initial evaluation include: steps to enter, difficulty performing ADLs, and difficulty performing IADLs. Upon evaluation, patient requires moderate assist for UB ADLs, maximal assist for LB ADLs,     Occupational performance is affected by the following deficits: decreased functional use of BUEs, in hand manipulation deficit with impaired reach, grasp and  coordination, decreased muscle strength, degenerative arthritic joint changes, dynamic sit/ stand balance deficit with poor standing tolerance time for self care and functional mobility, decreased activity tolerance, decreased safety awareness, increased pain, and postural control and postural alignment deficit, requiring external assistance to complete transitional movements.  Patient to benefit from continued Occupational Therapy treatment while in the hospital to address deficits as defined above and maximize level of functional independence with ADLs and functional mobility. Occupational Performance areas to address include: bathing/ shower, toilet hygiene, transfer to all surfaces, functional mobility, emergency response, medication routine/ management, IADLs: safety procedures, and Leisure Participation. From OT standpoint, recommendation at time of d/c would be Level 2.     Rehab Resource Intensity Level, OT: II (Moderate Resource Intensity)

## 2024-03-25 NOTE — RESPIRATORY THERAPY NOTE
RT Protocol Note  Dwaine Gould 63 y.o. female MRN: [id number]  Unit/Bed#: ICU 07 Encounter: [id number]    Assessment    Principal Problem:    Pneumoperitoneum  Active Problems:    Tobacco abuse    CAD (coronary artery disease)    Acute on chronic heart failure with preserved ejection fraction (HCC)    COPD (chronic obstructive pulmonary disease) (HCC)    Leukocytosis    Essential hypertension    Hyperlipidemia    Acute on chronic respiratory failure with hypoxia and hypercapnia (HCC)    Stage 3a chronic kidney disease (HCC)    Anemia    Thrombocytopenia (HCC)    Gram-negative bacteremia      Home Pulmonary Medications:     03/25/24 0728   Respiratory Protocol   Protocol Initiated? No   Protocol Selection Respiratory   Language Barrier? No   Medical & Social History Reviewed? Yes   Diagnostic Studies Reviewed? Yes   Physical Assessment Performed? Yes   Respiratory Plan Mild Distress pathway   Respiratory Assessment   Assessment Type Pre-treatment   General Appearance Alert;Awake   Respiratory Pattern Normal   Chest Assessment Chest expansion symmetrical   Bilateral Breath Sounds Diminished   Resp Comments lowered O2 to 4L   O2 Device nc   Additional Assessments   Pulse 70   SpO2 97 %            Past Medical History:   Diagnosis Date    Acute metabolic encephalopathy     Hyperlipidemia     Hypertension     Transaminitis      Social History     Socioeconomic History    Marital status: /Civil Union     Spouse name: None    Number of children: None    Years of education: None    Highest education level: None   Occupational History    None   Tobacco Use    Smoking status: Every Day     Current packs/day: 0.50     Types: Cigarettes    Smokeless tobacco: Never   Vaping Use    Vaping status: Every Day    Substances: Nicotine   Substance and Sexual Activity    Alcohol use: Never    Drug use: Never    Sexual activity: Not Currently   Other Topics Concern    None   Social History Narrative    None     Social Determinants  of Health     Financial Resource Strain: Not on file   Food Insecurity: No Food Insecurity (3/12/2024)    Hunger Vital Sign     Worried About Running Out of Food in the Last Year: Never true     Ran Out of Food in the Last Year: Never true   Transportation Needs: No Transportation Needs (3/12/2024)    PRAPARE - Transportation     Lack of Transportation (Medical): No     Lack of Transportation (Non-Medical): No   Physical Activity: Not on file   Stress: Not on file   Social Connections: Not on file   Intimate Partner Violence: Not on file   Housing Stability: Low Risk  (3/12/2024)    Housing Stability Vital Sign     Unable to Pay for Housing in the Last Year: No     Number of Places Lived in the Last Year: 1     Unstable Housing in the Last Year: No       Subjective    Subjective Data: sleeping    Objective    Physical Exam:   Assessment Type: Pre-treatment  General Appearance: Alert, Awake  Respiratory Pattern: Normal  Chest Assessment: Chest expansion symmetrical  Bilateral Breath Sounds: Diminished  Cough: None  O2 Device: nc    Vitals:  Blood pressure 168/75, pulse 70, temperature 98.7 °F (37.1 °C), temperature source Oral, resp. rate 17, height 5' (1.524 m), weight 124 kg (272 lb 4.3 oz), SpO2 97%.    Results from last 7 days   Lab Units 03/23/24  0506 03/20/24  0528 03/20/24  0201   PH ART  7.367   < > 7.449   PCO2 ART mm Hg 55.9*   < > 53.2*   PO2 ART mm Hg 68.3*   < > 71.8*   HCO3 ART mmol/L 31.4*   < > 36.1*   BASE EXC ART mmol/L 5.0   < > 10.2   O2 CONTENT ART mL/dL 13.6*   < > 17.3   O2 HGB, ARTERIAL % 91.0*   < > 92.5*   ABG SOURCE  Line, Arterial   < > Line, Arterial   RADHA TEST   --   --  Yes    < > = values in this interval not displayed.       Imaging and other studies: I have personally reviewed pertinent reports.      O2 Device: nc     Plan    Respiratory Plan: Mild Distress pathway        Resp Comments: lowered O2 to 4L

## 2024-03-25 NOTE — ASSESSMENT & PLAN NOTE
Wt Readings from Last 3 Encounters:   03/25/24 124 kg (272 lb 4.3 oz)   03/19/24 128 kg (282 lb 3 oz)   07/20/21 103 kg (227 lb 8.2 oz)     Echo report from 03/12/24 noted with EF of 60%.  Pt presented to the hospital 3/11 and was aggressively diuresed  Echo on 3/12 showed EF 60%, mild to moderate concentric hypertrophy, and normal diastolic function  Was discharged home on 3/19 but returned later that day after CT findings of free intraperitoneal air and taken to OR  Diuresis initiated 3/20-pt diuresing well  Monitor daily weights  Strict I/O  Remains net negative

## 2024-03-25 NOTE — OCCUPATIONAL THERAPY NOTE
Occupational Therapy Evaluation        Patient Name: Dwaine Gould  Today's Date: 3/25/2024         03/25/24 1008   OT Last Visit   OT Visit Date 03/25/24   Note Type   Note type Evaluation   Pain Assessment   Pain Assessment Tool 0-10   Pain Score 8   Pain Location/Orientation Location: Abdomen   Restrictions/Precautions   Weight Bearing Precautions Per Order No   Braces or Orthoses Other (Comment)   Other Precautions Chair Alarm;Bed Alarm   Home Living   Type of Home House   Home Layout Two level;Bed/bath upstairs;Stairs to enter with rails  (5 MEGA and full flight to second floor bedroom)   Bathroom Shower/Tub   (sponge bathing)   Bathroom Toilet Standard   Bathroom Equipment Other (Comment)  (none reported)   Bathroom Accessibility Accessible   Home Equipment Walker   Additional Comments recently utilizing RW for the past 2 days; no AD used at baseline   Prior Function   Level of [place] Independent with functional mobility;Needs assistance with ADLs;Independent with IADLS   Lives With Spouse  (going to stay at daughters)   Receives Help From Family   IADLs Independent with driving;Independent with meal prep;Independent with medication management   Falls in the last 6 months 1 to 4   Vocational Retired   Lifestyle   Autonomy Patient lives with her  in a 2 story house , 5 MEGA and full flight to second floor bedroom. Patient was independnet with ADLs/  assists with IADLs.   Reciprocal Relationships Supportive Family   Service to Others Retired   General   Family/Caregiver Present No   ADL   Where Assessed Other (Comment)  (ADL levels based on functional performance during OT eval)   Eating Assistance 6  Modified independent   Grooming Assistance 5  Supervision/Setup   UB Bathing Assistance 4  Minimal Assistance   LB Bathing Assistance 2  Maximal Assistance   UB Dressing Assistance 4  Minimal Assistance   LB Dressing Assistance 2  Maximal Assistance   Toileting Assistance  2  Maximal  Assistance   Functional Assistance 3  Moderate Assistance   Bed Mobility   Supine to Sit 3  Moderate assistance   Additional items Assist x 2;Increased time required;Verbal cues;LE management   Transfers   Sit to Stand 4  Minimal assistance   Additional items Assist x 1;Increased time required;Verbal cues   Stand to Sit 4  Minimal assistance   Additional items Assist x 1;Increased time required;Verbal cues   Additional Comments RW used for mobility and transfers   Balance   Static Sitting Fair   Dynamic Sitting Fair -   Static Standing Fair -   Dynamic Standing Poor +   Ambulatory Fair -   Activity Tolerance   Activity Tolerance Patient limited by fatigue   Medical Staff Made Aware Pt seen as a co-eval with PT due to the patient's co-morbidities, clinically unstable presentation, and present impairments which are a regression from the patient's baseline.   RUE Assessment   RUE Assessment X   RUE Strength   RUE Overall Strength Deficits  (3+/5)   LUE Assessment   LUE Assessment X   LUE Strength   LUE Overall Strength Deficits  (3+/5)   Hand Function   Gross Motor Coordination Functional   Fine Motor Coordination Functional   Sensation   Light Touch No apparent deficits  (BUEs)   Proprioception   Proprioception No apparent deficits  (BUEs)   Vision-Basic Assessment   Current Vision Wears glasses all the time   Psychosocial   Psychosocial (WDL) WDL   Patient Behaviors/Mood Calm;Cooperative   Cognition   Overall Cognitive Status WFL   Arousal/Participation Alert;Responsive;Cooperative   Attention Within functional limits   Orientation Level Oriented to person;Oriented to place;Oriented to time;Oriented to situation  (oriented to month and year)   Memory Within functional limits   Following Commands Follows all commands and directions without difficulty   Assessment   Limitation Decreased ADL status;Decreased UE strength;Decreased Safe judgement during ADL;Decreased endurance;Decreased fine motor control;Decreased  self-care trans;Decreased high-level ADLs   Prognosis Good   Assessment Patient is a 63 y.o. female seen for OT evaluation s/p admit to [facility] on 3/19/2024 w/Pneumoperitoneum. Commorbidities affecting patient's functional performance at time of assessment include: Respiratory acidosis, essential HTN, COPD, Chronic heart failure with preserved ejection fraction, tobacco abuse, presented to ED right lower quadrant pain.  Orders placed for OT evaluation and treatment.  Performed at least two patient identifiers during session including name and wristband.  Prior to admission,  Patient lives with her  in a 2 story house , 5 MEGA and full flight to second floor bedroom. Patient was independnet with ADLs/  assists with IADLs.  Personal factors affecting patient at time of initial evaluation include: steps to enter, difficulty performing ADLs, and difficulty performing IADLs. Upon evaluation, patient requires moderate assist for UB ADLs, maximal assist for LB ADLs,     Occupational performance is affected by the following deficits: decreased functional use of BUEs, in hand manipulation deficit with impaired reach, grasp and coordination, decreased muscle strength, degenerative arthritic joint changes, dynamic sit/ stand balance deficit with poor standing tolerance time for self care and functional mobility, decreased activity tolerance, decreased safety awareness, increased pain, and postural control and postural alignment deficit, requiring external assistance to complete transitional movements.  Patient to benefit from continued Occupational Therapy treatment while in the hospital to address deficits as defined above and maximize level of functional independence with ADLs and functional mobility. Occupational Performance areas to address include: bathing/ shower, toilet hygiene, transfer to all surfaces, functional mobility, emergency response, medication routine/ management, IADLs: safety  procedures, and Leisure Participation. From OT standpoint, recommendation at time of d/c would be Level 2.   Plan   Treatment Interventions ADL retraining;Functional transfer training;UE strengthening/ROM;Endurance training;Cognitive reorientation;Patient/family training;Equipment evaluation/education;Fine motor coordination activities;Compensatory technique education;Continued evaluation;Energy conservation;Activityengagement   Goal Expiration Date 04/08/24   OT Frequency 3-5x/wk   Discharge Recommendation   Rehab Resource Intensity Level, OT II (Moderate Resource Intensity)   AM-PAC Daily Activity Inpatient   Lower Body Dressing 1   Bathing 1   Toileting 1   Upper Body Dressing 3   Grooming 3   Eating 4   Daily Activity Raw Score 13   Daily Activity Standardized Score (Calc for Raw Score >=11) 32.03   AM-PAC Applied Cognition Inpatient   Following a Speech/Presentation 3   Understanding Ordinary Conversation 3   Taking Medications 3   Remembering Where Things Are Placed or Put Away 3   Remembering List of 4-5 Errands 3   Taking Care of Complicated Tasks 3   Applied Cognition Raw Score 18   Applied Cognition Standardized Score 38.07   Barthel Index   Feeding 10   Bathing 0   Grooming Score 0   Dressing Score 5   Bladder Score 5   Bowels Score 10   Toilet Use Score 5   Transfers (Bed/Chair) Score 5   Mobility (Level Surface) Score 0   Stairs Score 0   Barthel Index Score 40         1 - Patient will verbalize and demonstrate use of energy conservation/ deep breathing technique and work simplification skills during functional activity with no verbal cues.    2 - Patient will verbalize and demonstrate good body mechanics and joint protection techniques during  ADLs/ IADLs with no verbal cues.    3 - Patient will increase OOB/ sitting tolerance to 2-4 hours per day for increased participation in self care and leisure tasks with no s/s of exertion.    4 - Patient will increase standing tolerance time to 5  minutes with  unilateral UE support to complete sink level ADLs@ mod I level.    5 - Patient will increase sitting tolerance at edge of bed to 20 minutes to complete UB ADLs @ set up assist level.    6 - Patient will transfer bed to Chair / toilet at Set up assist level with AD as indicated.     7 - Patient will complete UB ADLs with set up assist.     8 - Patient will complete LB ADLs with min assist with the use of adaptive equipment.     9 - Patient will complete toileting hygiene with set up assist/ supervision for thoroughness    10 - Patient/ Family  will demonstrate competency with UE Home Exercise Program.

## 2024-03-26 DIAGNOSIS — C18.0 CANCER OF CECUM (HCC): Primary | ICD-10-CM

## 2024-03-26 LAB
ANION GAP SERPL CALCULATED.3IONS-SCNC: 8 MMOL/L (ref 4–13)
BUN SERPL-MCNC: 15 MG/DL (ref 5–25)
CA-I BLD-SCNC: 1.06 MMOL/L (ref 1.12–1.32)
CALCIUM SERPL-MCNC: 8 MG/DL (ref 8.4–10.2)
CHLORIDE SERPL-SCNC: 104 MMOL/L (ref 96–108)
CO2 SERPL-SCNC: 32 MMOL/L (ref 21–32)
CREAT SERPL-MCNC: 0.71 MG/DL (ref 0.6–1.3)
ERYTHROCYTE [DISTWIDTH] IN BLOOD BY AUTOMATED COUNT: 19.3 % (ref 11.6–15.1)
GFR SERPL CREATININE-BSD FRML MDRD: 92 ML/MIN/1.73SQ M
GLUCOSE SERPL-MCNC: 100 MG/DL (ref 65–140)
GLUCOSE SERPL-MCNC: 104 MG/DL (ref 65–140)
GLUCOSE SERPL-MCNC: 106 MG/DL (ref 65–140)
GLUCOSE SERPL-MCNC: 111 MG/DL (ref 65–140)
GLUCOSE SERPL-MCNC: 97 MG/DL (ref 65–140)
HCT VFR BLD AUTO: 39.5 % (ref 34.8–46.1)
HGB BLD-MCNC: 11.6 G/DL (ref 11.5–15.4)
MAGNESIUM SERPL-MCNC: 1.9 MG/DL (ref 1.9–2.7)
MCH RBC QN AUTO: 23.1 PG (ref 26.8–34.3)
MCHC RBC AUTO-ENTMCNC: 29.4 G/DL (ref 31.4–37.4)
MCV RBC AUTO: 79 FL (ref 82–98)
PHOSPHATE SERPL-MCNC: 2.6 MG/DL (ref 2.3–4.1)
PLATELET # BLD AUTO: 238 THOUSANDS/UL (ref 149–390)
PMV BLD AUTO: 9.8 FL (ref 8.9–12.7)
POTASSIUM SERPL-SCNC: 2.7 MMOL/L (ref 3.5–5.3)
RBC # BLD AUTO: 5.02 MILLION/UL (ref 3.81–5.12)
SODIUM SERPL-SCNC: 144 MMOL/L (ref 135–147)
WBC # BLD AUTO: 16.11 THOUSAND/UL (ref 4.31–10.16)

## 2024-03-26 PROCEDURE — 84100 ASSAY OF PHOSPHORUS: CPT | Performed by: NURSE PRACTITIONER

## 2024-03-26 PROCEDURE — 94640 AIRWAY INHALATION TREATMENT: CPT

## 2024-03-26 PROCEDURE — 82330 ASSAY OF CALCIUM: CPT | Performed by: NURSE PRACTITIONER

## 2024-03-26 PROCEDURE — 99233 SBSQ HOSP IP/OBS HIGH 50: CPT | Performed by: FAMILY MEDICINE

## 2024-03-26 PROCEDURE — 80048 BASIC METABOLIC PNL TOTAL CA: CPT | Performed by: INTERNAL MEDICINE

## 2024-03-26 PROCEDURE — 94664 DEMO&/EVAL PT USE INHALER: CPT

## 2024-03-26 PROCEDURE — 85027 COMPLETE CBC AUTOMATED: CPT | Performed by: INTERNAL MEDICINE

## 2024-03-26 PROCEDURE — 94760 N-INVAS EAR/PLS OXIMETRY 1: CPT

## 2024-03-26 PROCEDURE — 83735 ASSAY OF MAGNESIUM: CPT | Performed by: NURSE PRACTITIONER

## 2024-03-26 PROCEDURE — 82948 REAGENT STRIP/BLOOD GLUCOSE: CPT

## 2024-03-26 PROCEDURE — 99024 POSTOP FOLLOW-UP VISIT: CPT | Performed by: SURGERY

## 2024-03-26 PROCEDURE — 94660 CPAP INITIATION&MGMT: CPT

## 2024-03-26 RX ORDER — ALBUTEROL SULFATE 2.5 MG/3ML
2.5 SOLUTION RESPIRATORY (INHALATION) EVERY 6 HOURS PRN
Status: DISCONTINUED | OUTPATIENT
Start: 2024-03-26 | End: 2024-03-28 | Stop reason: HOSPADM

## 2024-03-26 RX ORDER — POTASSIUM CHLORIDE 14.9 MG/ML
20 INJECTION INTRAVENOUS
Qty: 200 ML | Refills: 0 | Status: COMPLETED | OUTPATIENT
Start: 2024-03-26 | End: 2024-03-26

## 2024-03-26 RX ORDER — POTASSIUM CHLORIDE 14.9 MG/ML
20 INJECTION INTRAVENOUS ONCE
Status: COMPLETED | OUTPATIENT
Start: 2024-03-26 | End: 2024-03-27

## 2024-03-26 RX ORDER — POTASSIUM CHLORIDE 14.9 MG/ML
20 INJECTION INTRAVENOUS
Qty: 200 ML | Refills: 0 | Status: DISPENSED | OUTPATIENT
Start: 2024-03-26 | End: 2024-03-26

## 2024-03-26 RX ADMIN — HEPARIN SODIUM 7500 UNITS: 5000 INJECTION INTRAVENOUS; SUBCUTANEOUS at 06:00

## 2024-03-26 RX ADMIN — OXYMETAZOLINE HYDROCHLORIDE 2 SPRAY: 0.05 SPRAY NASAL at 21:57

## 2024-03-26 RX ADMIN — LEVALBUTEROL HYDROCHLORIDE 1.25 MG: 1.25 SOLUTION RESPIRATORY (INHALATION) at 13:34

## 2024-03-26 RX ADMIN — LEVALBUTEROL HYDROCHLORIDE 1.25 MG: 1.25 SOLUTION RESPIRATORY (INHALATION) at 20:09

## 2024-03-26 RX ADMIN — ATORVASTATIN CALCIUM 40 MG: 40 TABLET, FILM COATED ORAL at 17:50

## 2024-03-26 RX ADMIN — OXYMETAZOLINE HYDROCHLORIDE 2 SPRAY: 0.05 SPRAY NASAL at 09:12

## 2024-03-26 RX ADMIN — POTASSIUM CHLORIDE 20 MEQ: 14.9 INJECTION, SOLUTION INTRAVENOUS at 09:18

## 2024-03-26 RX ADMIN — NICOTINE 1 PATCH: 21 PATCH, EXTENDED RELEASE TRANSDERMAL at 09:08

## 2024-03-26 RX ADMIN — TORSEMIDE 20 MG: 20 TABLET ORAL at 09:09

## 2024-03-26 RX ADMIN — IPRATROPIUM BROMIDE 0.5 MG: 0.5 SOLUTION RESPIRATORY (INHALATION) at 20:09

## 2024-03-26 RX ADMIN — AMLODIPINE BESYLATE 5 MG: 5 TABLET ORAL at 09:10

## 2024-03-26 RX ADMIN — METOPROLOL TARTRATE 25 MG: 25 TABLET, FILM COATED ORAL at 21:57

## 2024-03-26 RX ADMIN — TOPIRAMATE 25 MG: 25 TABLET ORAL at 09:11

## 2024-03-26 RX ADMIN — IPRATROPIUM BROMIDE 0.5 MG: 0.5 SOLUTION RESPIRATORY (INHALATION) at 13:34

## 2024-03-26 RX ADMIN — VITAM B12 100 MCG: 100 TAB at 09:11

## 2024-03-26 RX ADMIN — ACETAMINOPHEN 975 MG: 325 TABLET, FILM COATED ORAL at 06:00

## 2024-03-26 RX ADMIN — HEPARIN SODIUM 7500 UNITS: 5000 INJECTION INTRAVENOUS; SUBCUTANEOUS at 14:41

## 2024-03-26 RX ADMIN — POTASSIUM CHLORIDE 20 MEQ: 14.9 INJECTION, SOLUTION INTRAVENOUS at 14:41

## 2024-03-26 RX ADMIN — SODIUM CHLORIDE, SODIUM GLUCONATE, SODIUM ACETATE, POTASSIUM CHLORIDE, MAGNESIUM CHLORIDE, SODIUM PHOSPHATE, DIBASIC, AND POTASSIUM PHOSPHATE 60 ML/HR: .53; .5; .37; .037; .03; .012; .00082 INJECTION, SOLUTION INTRAVENOUS at 09:37

## 2024-03-26 RX ADMIN — AMPICILLIN SODIUM AND SULBACTAM SODIUM 3 G: 2; 1 INJECTION, POWDER, FOR SOLUTION INTRAMUSCULAR; INTRAVENOUS at 04:44

## 2024-03-26 RX ADMIN — ACETAMINOPHEN 975 MG: 325 TABLET, FILM COATED ORAL at 17:50

## 2024-03-26 RX ADMIN — TOPIRAMATE 25 MG: 25 TABLET ORAL at 18:04

## 2024-03-26 RX ADMIN — POTASSIUM CHLORIDE 20 MEQ: 14.9 INJECTION, SOLUTION INTRAVENOUS at 22:06

## 2024-03-26 RX ADMIN — AMPICILLIN SODIUM AND SULBACTAM SODIUM 3 G: 2; 1 INJECTION, POWDER, FOR SOLUTION INTRAMUSCULAR; INTRAVENOUS at 17:56

## 2024-03-26 RX ADMIN — POTASSIUM CHLORIDE 20 MEQ: 14.9 INJECTION, SOLUTION INTRAVENOUS at 06:00

## 2024-03-26 RX ADMIN — SALINE NASAL SPRAY 1 SPRAY: 1.5 SOLUTION NASAL at 09:12

## 2024-03-26 RX ADMIN — ACETAMINOPHEN 975 MG: 325 TABLET, FILM COATED ORAL at 23:15

## 2024-03-26 RX ADMIN — BUDESONIDE INHALATION 0.5 MG: 0.5 SUSPENSION RESPIRATORY (INHALATION) at 07:23

## 2024-03-26 RX ADMIN — AMPICILLIN SODIUM AND SULBACTAM SODIUM 3 G: 2; 1 INJECTION, POWDER, FOR SOLUTION INTRAMUSCULAR; INTRAVENOUS at 10:13

## 2024-03-26 RX ADMIN — METOPROLOL TARTRATE 25 MG: 25 TABLET, FILM COATED ORAL at 09:09

## 2024-03-26 RX ADMIN — LEVALBUTEROL HYDROCHLORIDE 1.25 MG: 1.25 SOLUTION RESPIRATORY (INHALATION) at 07:23

## 2024-03-26 RX ADMIN — ACETAMINOPHEN 975 MG: 325 TABLET, FILM COATED ORAL at 14:59

## 2024-03-26 RX ADMIN — FLUTICASONE FUROATE AND VILANTEROL TRIFENATATE 1 PUFF: 100; 25 POWDER RESPIRATORY (INHALATION) at 09:11

## 2024-03-26 RX ADMIN — ESCITALOPRAM OXALATE 5 MG: 10 TABLET ORAL at 09:09

## 2024-03-26 RX ADMIN — AMPICILLIN SODIUM AND SULBACTAM SODIUM 3 G: 2; 1 INJECTION, POWDER, FOR SOLUTION INTRAMUSCULAR; INTRAVENOUS at 23:03

## 2024-03-26 RX ADMIN — BUDESONIDE INHALATION 0.5 MG: 0.5 SUSPENSION RESPIRATORY (INHALATION) at 20:09

## 2024-03-26 RX ADMIN — OXYCODONE HYDROCHLORIDE 5 MG: 5 TABLET ORAL at 18:09

## 2024-03-26 RX ADMIN — HEPARIN SODIUM 7500 UNITS: 5000 INJECTION INTRAVENOUS; SUBCUTANEOUS at 21:57

## 2024-03-26 RX ADMIN — IPRATROPIUM BROMIDE 0.5 MG: 0.5 SOLUTION RESPIRATORY (INHALATION) at 07:23

## 2024-03-26 RX ADMIN — UMECLIDINIUM 1 PUFF: 62.5 AEROSOL, POWDER ORAL at 09:10

## 2024-03-26 RX ADMIN — FOLIC ACID 1 MG: 1 TABLET ORAL at 09:09

## 2024-03-26 NOTE — ASSESSMENT & PLAN NOTE
Wt Readings from Last 3 Encounters:   03/26/24 121 kg (266 lb 8.6 oz)   03/19/24 128 kg (282 lb 3 oz)   07/20/21 103 kg (227 lb 8.2 oz)     Echo report from 03/12/24 noted with EF of 60%.  Pt presented to the hospital 3/11 and was aggressively diuresed  Echo on 3/12 showed EF 60%, mild to moderate concentric hypertrophy, and normal diastolic function  Was discharged home on 3/19 but returned later that day after CT findings of free intraperitoneal air and taken to OR  Diuresis initiated 3/20-pt diuresing well  Monitor daily weights  Strict I/O  Remains net negative  Continue with p.o. torsemide

## 2024-03-26 NOTE — RESPIRATORY THERAPY NOTE
RT Protocol Note  Dwaine Gould 63 y.o. female MRN: [id number]  Unit/Bed#: ICU 07 Encounter: [id number]    Assessment    Principal Problem:    Pneumoperitoneum  Active Problems:    Tobacco abuse    CAD (coronary artery disease)    Acute on chronic heart failure with preserved ejection fraction (HCC)    COPD (chronic obstructive pulmonary disease) (HCC)    Leukocytosis    Essential hypertension    Hyperlipidemia    Acute on chronic respiratory failure with hypoxia and hypercapnia (HCC)    Stage 3a chronic kidney disease (HCC)    Anemia    Thrombocytopenia (HCC)    Gram-negative bacteremia      Home Pulmonary Medications:     03/26/24 0725   Respiratory Protocol   Protocol Initiated? No   Protocol Selection Respiratory   Language Barrier? No   Medical & Social History Reviewed? Yes   Diagnostic Studies Reviewed? Yes   Physical Assessment Performed? Yes   Respiratory Plan No distress/Pulmonary history   Respiratory Assessment   Assessment Type Pre-treatment   General Appearance Drowsy   Respiratory Pattern Normal   Chest Assessment Chest expansion symmetrical   Bilateral Breath Sounds Diminished   Resp Comments lowered to 3L NC   O2 Device nc   Additional Assessments   Pulse 60   SpO2 97 %            Past Medical History:   Diagnosis Date    Acute metabolic encephalopathy     Hyperlipidemia     Hypertension     Transaminitis      Social History     Socioeconomic History    Marital status: /Civil Union     Spouse name: None    Number of children: None    Years of education: None    Highest education level: None   Occupational History    None   Tobacco Use    Smoking status: Every Day     Current packs/day: 0.50     Types: Cigarettes    Smokeless tobacco: Never   Vaping Use    Vaping status: Every Day    Substances: Nicotine   Substance and Sexual Activity    Alcohol use: Never    Drug use: Never    Sexual activity: Not Currently   Other Topics Concern    None   Social History Narrative    None     Social  Determinants of Health     Financial Resource Strain: Not on file   Food Insecurity: No Food Insecurity (3/12/2024)    Hunger Vital Sign     Worried About Running Out of Food in the Last Year: Never true     Ran Out of Food in the Last Year: Never true   Transportation Needs: No Transportation Needs (3/12/2024)    PRAPARE - Transportation     Lack of Transportation (Medical): No     Lack of Transportation (Non-Medical): No   Physical Activity: Not on file   Stress: Not on file   Social Connections: Not on file   Intimate Partner Violence: Not on file   Housing Stability: Low Risk  (3/12/2024)    Housing Stability Vital Sign     Unable to Pay for Housing in the Last Year: No     Number of Places Lived in the Last Year: 1     Unstable Housing in the Last Year: No       Subjective    Subjective Data: sleeping    Objective    Physical Exam:   Assessment Type: Pre-treatment  General Appearance: Drowsy  Respiratory Pattern: Normal  Chest Assessment: Chest expansion symmetrical  Bilateral Breath Sounds: Diminished  O2 Device: nc    Vitals:  Blood pressure (!) 174/77, pulse 60, temperature 98.4 °F (36.9 °C), temperature source Oral, resp. rate 16, height 5' (1.524 m), weight 121 kg (266 lb 8.6 oz), SpO2 97%.    Results from last 7 days   Lab Units 03/23/24  0506 03/20/24  0528 03/20/24  0201   PH ART  7.367   < > 7.449   PCO2 ART mm Hg 55.9*   < > 53.2*   PO2 ART mm Hg 68.3*   < > 71.8*   HCO3 ART mmol/L 31.4*   < > 36.1*   BASE EXC ART mmol/L 5.0   < > 10.2   O2 CONTENT ART mL/dL 13.6*   < > 17.3   O2 HGB, ARTERIAL % 91.0*   < > 92.5*   ABG SOURCE  Line, Arterial   < > Line, Arterial   RADHA TEST   --   --  Yes    < > = values in this interval not displayed.       Imaging and other studies:     O2 Device: nc     Plan    Respiratory Plan: No distress/Pulmonary history        Resp Comments: lowered to 3L NC

## 2024-03-26 NOTE — PROGRESS NOTES
Progress Note - General Surgery   Dwaine Gould 63 y.o. female MRN: [id number]  Unit/Bed#: ICU 07 Encounter: [id number]    Assessment/Plan  Dwaine Gould is a 63 y.o. female     POD6 sp diagnostic laparoscopic converted to exploratory laparotomy for perforated cecal adenocarcinoma , placement of ABThera wound vac  POD5 takeback to OR, washout, closure of abdomen   AVSS, WBC 16.11 from 15, Hb 11.6  K 2.7  UOP 1.5L/24hr, Cr 0.71  LISSY 720cc serosang output recorded  Pathology results showing adenocarcinoma, patient was informed of this result by attending during rounds yesterday   Operative cultures growing E. Coli, Enterococcus hirae, Clostridium bifermentans, Bacteroides   Markedly improved abdominal pain, + flatus/multiple episodes of loose diarrhea, abdomen morbidly obese, softening, appropriate incisional tenderness to palpation, midline incision c/d/I closed with nylon suture and staples     Continue current care, given improvement of abdominal pain and increased bowel function compared to prior, will trial clear liquid diet as tolerated   Continue to monitor WBC, the patient remains hemodynamically stable, afebrile, with decreasing O2 requirements, abdominal exam has improved with almost complete resolution of abdominal pain, LISSY drain remains serosanguinous   IV Unasyn for antimicrobial coverage   Trend labs, monitor Hb, WBC, and vitals   I/Os  Serial abdominal exams  Encourage ambulation/OOB  Pain control/Antiemetics PRN  IS 10x an hour  DVT prophylaxis   SLIM primary, General Surgery will continue to follow along       Subjective/Objective    Subjective: No acute events overnight, feeling much improved     Objective:     Blood pressure (!) 174/77, pulse 60, temperature 98.4 °F (36.9 °C), temperature source Oral, resp. rate 16, height 5' (1.524 m), weight 121 kg (266 lb 8.6 oz), SpO2 96%.,Body mass index is 52.05 kg/m².      Intake/Output Summary (Last 24 hours) at 3/26/2024 1213  Last data filed at 3/26/2024  0600  Gross per 24 hour   Intake 819 ml   Output 1870 ml   Net -1051 ml       Invasive Devices       Peripheral Intravenous Line  Duration             Peripheral IV 03/25/24 Left;Ventral (anterior) Forearm 1 day              Drain  Duration             Closed/Suction Drain Left LLQ Bulb 5 days    External Urinary Catheter <1 day                    Physical Exam: BP (!) 174/77   Pulse 60   Temp 98.4 °F (36.9 °C) (Oral)   Resp 16   Ht 5' (1.524 m)   Wt 121 kg (266 lb 8.6 oz)   SpO2 96%   BMI 52.05 kg/m²   General appearance: alert and oriented, in no acute distress  Lungs: diminished breath sounds  Heart: regular rate and rhythm, S1, S2 normal, no murmur, click, rub or gallop  Abdomen:  morbidly obese, abdomen rounded with anasarca, appears to be softening, appropriate incisional tenderness to palpation, LISSY drain serosanguinous, incision c/d/i  Extremities: extremities normal, warm and well-perfused; no cyanosis, clubbing, or edema    Lab, Imaging and other studies:I have personally reviewed pertinent lab results.     VTE Pharmacologic Prophylaxis: Heparin  VTE Mechanical Prophylaxis: sequential compression device    Recent Results (from the past 36 hour(s))   Phosphorus    Collection Time: 03/25/24  5:30 AM   Result Value Ref Range    Phosphorus 2.8 2.3 - 4.1 mg/dL   CBC    Collection Time: 03/25/24  5:30 AM   Result Value Ref Range    WBC 15.73 (H) 4.31 - 10.16 Thousand/uL    RBC 4.82 3.81 - 5.12 Million/uL    Hemoglobin 11.2 (L) 11.5 - 15.4 g/dL    Hematocrit 38.6 34.8 - 46.1 %    MCV 80 (L) 82 - 98 fL    MCH 23.2 (L) 26.8 - 34.3 pg    MCHC 29.0 (L) 31.4 - 37.4 g/dL    RDW 19.2 (H) 11.6 - 15.1 %    Platelets 203 149 - 390 Thousands/uL    MPV 9.8 8.9 - 12.7 fL   Basic metabolic panel    Collection Time: 03/25/24  5:30 AM   Result Value Ref Range    Sodium 144 135 - 147 mmol/L    Potassium 3.2 (L) 3.5 - 5.3 mmol/L    Chloride 103 96 - 108 mmol/L    CO2 35 (H) 21 - 32 mmol/L    ANION GAP 6 4 - 13 mmol/L    BUN  19 5 - 25 mg/dL    Creatinine 0.68 0.60 - 1.30 mg/dL    Glucose 126 65 - 140 mg/dL    Calcium 8.7 8.4 - 10.2 mg/dL    eGFR 94 ml/min/1.73sq m   Calcium, ionized    Collection Time: 03/25/24  5:30 AM   Result Value Ref Range    Calcium, Ionized 1.06 (L) 1.12 - 1.32 mmol/L   Magnesium    Collection Time: 03/25/24  5:30 AM   Result Value Ref Range    Magnesium 2.0 1.9 - 2.7 mg/dL   Fingerstick Glucose (POCT)    Collection Time: 03/25/24  7:28 AM   Result Value Ref Range    POC Glucose 136 65 - 140 mg/dl   Fingerstick Glucose (POCT)    Collection Time: 03/25/24 11:51 AM   Result Value Ref Range    POC Glucose 128 65 - 140 mg/dl   Fingerstick Glucose (POCT)    Collection Time: 03/25/24  4:06 PM   Result Value Ref Range    POC Glucose 149 (H) 65 - 140 mg/dl   Fingerstick Glucose (POCT)    Collection Time: 03/25/24  8:59 PM   Result Value Ref Range    POC Glucose 105 65 - 140 mg/dl   Phosphorus    Collection Time: 03/26/24  4:43 AM   Result Value Ref Range    Phosphorus 2.6 2.3 - 4.1 mg/dL   Calcium, ionized    Collection Time: 03/26/24  4:43 AM   Result Value Ref Range    Calcium, Ionized 1.06 (L) 1.12 - 1.32 mmol/L   Magnesium    Collection Time: 03/26/24  4:43 AM   Result Value Ref Range    Magnesium 1.9 1.9 - 2.7 mg/dL   CBC    Collection Time: 03/26/24  4:43 AM   Result Value Ref Range    WBC 16.11 (H) 4.31 - 10.16 Thousand/uL    RBC 5.02 3.81 - 5.12 Million/uL    Hemoglobin 11.6 11.5 - 15.4 g/dL    Hematocrit 39.5 34.8 - 46.1 %    MCV 79 (L) 82 - 98 fL    MCH 23.1 (L) 26.8 - 34.3 pg    MCHC 29.4 (L) 31.4 - 37.4 g/dL    RDW 19.3 (H) 11.6 - 15.1 %    Platelets 238 149 - 390 Thousands/uL    MPV 9.8 8.9 - 12.7 fL   Basic metabolic panel    Collection Time: 03/26/24  4:43 AM   Result Value Ref Range    Sodium 144 135 - 147 mmol/L    Potassium 2.7 (LL) 3.5 - 5.3 mmol/L    Chloride 104 96 - 108 mmol/L    CO2 32 21 - 32 mmol/L    ANION GAP 8 4 - 13 mmol/L    BUN 15 5 - 25 mg/dL    Creatinine 0.71 0.60 - 1.30 mg/dL    Glucose  100 65 - 140 mg/dL    Calcium 8.0 (L) 8.4 - 10.2 mg/dL    eGFR 92 ml/min/1.73sq m   Fingerstick Glucose (POCT)    Collection Time: 03/26/24  7:07 AM   Result Value Ref Range    POC Glucose 104 65 - 140 mg/dl

## 2024-03-26 NOTE — RESPIRATORY THERAPY NOTE
03/25/24 2002   Respiratory Protocol   Protocol Initiated? No   Protocol Selection Respiratory   Language Barrier? No   Medical & Social History Reviewed? Yes   Diagnostic Studies Reviewed? Yes   Physical Assessment Performed? Yes   Respiratory Plan No distress/Pulmonary history   Respiratory Assessment   General Appearance Awake;Alert   Respiratory Pattern Normal   Chest Assessment Chest expansion symmetrical   Bilateral Breath Sounds Diminished   Resp Comments resting quietly with even non labored respirations   O2 Device 4L nc   Additional Assessments   SpO2 97 %

## 2024-03-26 NOTE — ASSESSMENT & PLAN NOTE
3/19 1/2 BC w/ GNR  Body fluid culture and GS w/ E coli  In the setting of bowel perforation   Continue Unasyn  Trend leukocytosis and fever curve  Monitor WBC count

## 2024-03-26 NOTE — ASSESSMENT & PLAN NOTE
Per [facility] records patient with single-vessel CAD with ostial RPDA lesion not amenable to PCI due to location of lesion October 2014  Continue medical management-po meds on hold currently but likely restart today

## 2024-03-26 NOTE — ASSESSMENT & PLAN NOTE
Pt with COPD and likely RILEY/OHS, D/C home with oxygen 4-5L  Extubated 3/23  Continue scheduled nebs  BiPAP at bedtime  Supplemental oxygen to maintain oxygen saturations greater than 90%.  Will need to make sure that BiPAP is set up for home

## 2024-03-26 NOTE — PROGRESS NOTES
[facility]  Progress Note  Name: Dwaine Gould I  MRN: [id number]  Unit/Bed#: ICU 07 I Date of Admission: 3/19/2024   Date of Service: 3/26/2024 I Hospital Day: 7    Assessment/Plan   Gram-negative bacteremia  Assessment & Plan  3/19 1/2 BC w/ GNR  Body fluid culture and GS w/ E coli  In the setting of bowel perforation   Continue Unasyn  Trend leukocytosis and fever curve  Monitor WBC count    Anemia  Assessment & Plan  Hemoglobin is stable    Acute on chronic respiratory failure with hypoxia and hypercapnia (HCC)  Assessment & Plan  Pt with COPD and likely RILEY/OHS, D/C home with oxygen 4-5L  Extubated 3/23  Continue scheduled nebs  BiPAP at bedtime  Supplemental oxygen to maintain oxygen saturations greater than 90%.  Will need to make sure that BiPAP is set up for home    Hyperlipidemia  Assessment & Plan  On Statins    Essential hypertension  Assessment & Plan  Present on admission with history of hypertension.  Oral meds on hold  PRN hydralazine for SBP>170  Takes labetolol at home-using hydralazine prn currently given bradycardia    Leukocytosis  Assessment & Plan  In the setting of pneumoperitoneum  F/U blood and OR cultures  Continue broad spectrum abx  Trend procal  Trend fever curve and WBC    COPD (chronic obstructive pulmonary disease) (HCC)  Assessment & Plan  Present on admission history of COPD.  Patient was discharged home today with home oxygen 4 to 5 L which is new for her.  She was not on home o2 prior.   Currently not in acute exacerbation.  O2 saturation 92 to 94% on nasal cannula.      Acute on chronic heart failure with preserved ejection fraction (HCC)  Assessment & Plan  Wt Readings from Last 3 Encounters:   03/26/24 121 kg (266 lb 8.6 oz)   03/19/24 128 kg (282 lb 3 oz)   07/20/21 103 kg (227 lb 8.2 oz)     Echo report from 03/12/24 noted with EF of 60%.  Pt presented to the hospital 3/11 and was aggressively diuresed  Echo on 3/12 showed EF 60%, mild to moderate  concentric hypertrophy, and normal diastolic function  Was discharged home on 3/19 but returned later that day after CT findings of free intraperitoneal air and taken to OR  Diuresis initiated 3/20-pt diuresing well  Monitor daily weights  Strict I/O  Remains net negative  Continue with p.o. torsemide          CAD (coronary artery disease)  Assessment & Plan  Per [facility] records patient with single-vessel CAD with ostial RPDA lesion not amenable to PCI due to location of lesion October 2014  Continue medical management-po meds on hold currently but likely restart today    * Pneumoperitoneum  Assessment & Plan  Pt had abdominal pain 3/19-CT scan showed free intraperitoneal air  Taken urgently to OR s/p diag lap converted to exp lap, right hemicolectomy with anastomosis, washout, Abthera placement  In OR, abdominal cavity was noted to have grossly purulent material in it along with perforation in the cecum at a mass  transferred to ICU postoperatively with abdomen open mechanical ventilation  3/21 OR for washout and closure w/LISSY drain  F/U culture results  Continue antibiotics  Surgery following-given the increasing abdominal pain on 3/25/2024 I spoke with surgery.  May be able to advance diet slowly today.  Monitor leukocytosis since it is worsening.                     VTE Pharmacologic Prophylaxis: VTE Score: 1 Moderate Risk (Score 3-4) - Pharmacological DVT Prophylaxis Ordered: heparin.    Mobility:   Basic Mobility Inpatient Raw Score: 15  JH-HLM Goal: 4: Move to chair/commode  JH-HLM Achieved: 6: Walk 10 steps or more  JH-HLM Goal achieved. Continue to encourage appropriate mobility.    Patient Centered Rounds: I performed bedside rounds with nursing staff today.   Discussions with Specialists or Other Care Team Provider: Surgery    Education and Discussions with Family / Patient: Updated  (daughter) via phone.    Total Time Spent on Date of Encounter in care of patient: 35 mins. This time was spent on  one or more of the following: performing physical exam; counseling and coordination of care; obtaining or reviewing history; documenting in the medical record; reviewing/ordering tests, medications or procedures; communicating with other healthcare professionals and discussing with patient's family/caregivers.    Current Length of Stay: 7 day(s)  Current Patient Status: Inpatient   Certification Statement: The patient will continue to require additional inpatient hospital stay due to having a leukocytosis and also having ileus in need of slow advancing diet  Discharge Plan: Anticipate discharge in 48-72 hrs to discharge location to be determined pending rehab evaluations.    Code Status: Level 1 - Full Code    Subjective:   Patient seen and examined.  States she has been having a lot of loose stools since yesterday.  No abdominal pain    Objective:     Vitals:   Temp (24hrs), Av.4 °F (36.9 °C), Min:98.4 °F (36.9 °C), Max:98.4 °F (36.9 °C)    Temp:  [98.4 °F (36.9 °C)] 98.4 °F (36.9 °C)  HR:  [60] 60  Resp:  [16] 16  BP: (174)/(77) 174/77  SpO2:  [96 %-98 %] 96 %  Body mass index is 52.05 kg/m².     Input and Output Summary (last 24 hours):     Intake/Output Summary (Last 24 hours) at 3/26/2024 1151  Last data filed at 3/26/2024 0600  Gross per 24 hour   Intake 819 ml   Output 1870 ml   Net -1051 ml       Physical Exam:   Physical Exam   General Appearance:    Alert, cooperative, no distress, appears stated age                               Lungs:     Clear to auscultation bilaterally, respirations unlabored       Heart:    Regular rate and rhythm, S1 and S2 normal, no murmur, rub    or gallop   Abdomen:   Bowel sounds present.  Abdomen is soft nontender           Extremities:   Extremities normal, atraumatic, no cyanosis or edema                         Additional Data:     Labs:  Results from last 7 days   Lab Units 24  0443 24  0518 24  0530   WBC Thousand/uL 16.11*   < > 12.35*   HEMOGLOBIN  g/dL 11.6   < > 12.0   HEMATOCRIT % 39.5   < > 40.4   PLATELETS Thousands/uL 238   < > 153   NEUTROS PCT %  --   --  88*   LYMPHS PCT %  --   --  7*   MONOS PCT %  --   --  5   EOS PCT %  --   --  0    < > = values in this interval not displayed.     Results from last 7 days   Lab Units 03/26/24  0443 03/22/24  1423 03/22/24  0536   SODIUM mmol/L 144   < > 147   POTASSIUM mmol/L 2.7*   < > 3.3*   CHLORIDE mmol/L 104   < > 104   CO2 mmol/L 32   < > 35*   BUN mg/dL 15   < > 20   CREATININE mg/dL 0.71   < > 1.04   ANION GAP mmol/L 8   < > 8   CALCIUM mg/dL 8.0*   < > 8.4   ALBUMIN g/dL  --   --  3.9   TOTAL BILIRUBIN mg/dL  --   --  1.29*   ALK PHOS U/L  --   --  48   ALT U/L  --   --  13   AST U/L  --   --  12*   GLUCOSE RANDOM mg/dL 100   < > 95    < > = values in this interval not displayed.         Results from last 7 days   Lab Units 03/26/24  0707 03/25/24  2059 03/25/24  1606 03/25/24  1151 03/25/24  0728 03/24/24  2123 03/24/24  1614 03/24/24  1120 03/24/24  0552 03/23/24  2333 03/23/24  1743 03/23/24  1144   POC GLUCOSE mg/dl 104 105 149* 128 136 145* 150* 100 92 96 125 110     Results from last 7 days   Lab Units 03/20/24  0530   HEMOGLOBIN A1C % 6.7*     Results from last 7 days   Lab Units 03/21/24  0518 03/20/24  0530 03/20/24  0144 03/19/24 2016   LACTIC ACID mmol/L  --   --  1.0 1.1   PROCALCITONIN ng/ml 0.39* 0.40*  --   --        Lines/Drains:  Invasive Devices       Peripheral Intravenous Line  Duration             Peripheral IV 03/25/24 Left;Ventral (anterior) Forearm 1 day              Drain  Duration             Closed/Suction Drain Left LLQ Bulb 4 days    External Urinary Catheter <1 day                          Imaging: Reviewed radiology reports from this admission including: xray(s) and No pertinent imaging reviewed.    Recent Cultures (last 7 days):   Results from last 7 days   Lab Units 03/19/24  2200 03/19/24  2017   BLOOD CULTURE   --  No Growth After 5 Days.  Bacteroides thetaiotaomicron  group*   GRAM STAIN RESULT  3+ Polys  1+ Mononuclear Cells  No bacteria seen Gram negative rods*   BODY FLUID CULTURE, STERILE  2+ Growth of Escherichia coli*  1+ Growth of Enterococcus hirae*  --        Last 24 Hours Medication List:   Current Facility-Administered Medications   Medication Dose Route Frequency Provider Last Rate    acetaminophen  975 mg Oral Q6H [facility] Laine Henderson, CRNP      amLODIPine  5 mg Oral Daily Laine Henderson, CRNP      ampicillin-sulbactam  3 g Intravenous Q6H Laine Henderson, CRNP 3 g (03/26/24 1013)    atorvastatin  40 mg Oral Daily With Dinner Laine Henderson, CRNP      budesonide  0.5 mg Nebulization Q12H Laine Henderson, CRNP      cyanocobalamin  100 mcg Oral Daily Laine Henderson, CRNP      escitalopram  5 mg Oral Daily Laine Henderson, CRNP      Fluticasone Furoate-Vilanterol  1 puff Inhalation Daily Laine Henderson, CRNP      And    umeclidinium  1 puff Inhalation Daily Laine Henderson, CRNP      folic acid  1 mg Oral Daily Laine Henderson, CRNP      heparin (porcine)  7,500 Units Subcutaneous Q8H [facility] Laine Henderson, CRNP      hydrALAZINE  10 mg Intravenous Q6H PRN Laine Henderson, GUSTAVO      HYDROmorphone  0.2 mg Intravenous Q4H PRN Derick Steen MD      insulin lispro  1-5 Units Subcutaneous HS Laine Henderson, CRNP      insulin lispro  1-6 Units Subcutaneous TID AC Laine Henderson, CRBECKY      ipratropium  0.5 mg Nebulization TID Laine Henderson, CRNP      levalbuterol  1.25 mg Nebulization TID Laine Henderson, CRNP      metoprolol tartrate  25 mg Oral Q12H [facility] Laine Henderson, GUSTAVO      multi-electrolyte  60 mL/hr Intravenous Continuous Derick Steen MD 60 mL/hr (03/26/24 0937)    nicotine  1 patch Transdermal Daily Laine Henderson, GUSTAVO      oxyCODONE  5 mg Oral Q6H PRN Derick Steen MD      oxymetazoline  2 spray Each Nare Q12H ANGELES Steen MD      potassium chloride  20 mEq Intravenous Once Micheal Nba Bañuelos MD      potassium chloride  40 mEq  Intravenous Once Micheal Bañuelos MD      sodium chloride  1 spray Each Nare Q1H PRN GUSTAVO Rangel      topiramate  25 mg Oral BID GUSTAVO Rangel      torsemide  20 mg Oral Daily GUSTAVO Rangel          Today, Patient Was Seen By: Micheal Bañuelos MD    **Please Note: This note may have been constructed using a voice recognition system.**

## 2024-03-26 NOTE — RESPIRATORY THERAPY NOTE
03/25/24 0030   Respiratory Assessment   Resp Comments placed pt on bipap for HS use,RN aware   O2 Device v60 Eleven   Non-Invasive Information   O2 Interface Device Face mask   Non-Invasive Ventilation Mode BiPAP   $ Intermittent NIV Yes   SpO2 97 %   $ Pulse Oximetry Spot Check Charge Completed   Non-Invasive Settings   IPAP (cm) 16 cm   EPAP (cm) 10 cm   Rate (Set) 10   FiO2 (%) 40   Rise Time 2  (with 15 min ramp applied)   Inspiratory Time (Set) 1   Humidification   (passover)   Non-Invasive Readings   Total Rate 22   Vt (mL) (Mech) 581   MV (Mech) 12.5   Peak Pressure (Obs) 11   Heater Temperature (Obs)   (n/a)   Leak (lpm) 0   Skin Intervention Skin intact   Non-Invasive Alarms   Insp Pressure High (cm H20) 30   Insp Pressure Low (cm H20) 8   Low Insp Pressure Time (sec) 20 sec   MV Low (L/min) 3   Vt High (mL) 1200   Vt Low (mL) 200   High Resp Rate (BPM) 40 BPM   Low Resp Rate (BPM) 8 BPM   Maintenance   Water bag changed No

## 2024-03-26 NOTE — PLAN OF CARE
Problem: PAIN - ADULT  Goal: Verbalizes/displays adequate comfort level or baseline comfort level  Description: Interventions:  - Encourage patient to monitor pain and request assistance  - Assess pain using appropriate pain scale  - Administer analgesics based on type and severity of pain and evaluate response  - Implement non-pharmacological measures as appropriate and evaluate response  - Consider cultural and social influences on pain and pain management  - Notify physician/advanced practitioner if interventions unsuccessful or patient reports new pain  Outcome: Progressing     Problem: INFECTION - ADULT  Goal: Absence or prevention of progression during hospitalization  Description: INTERVENTIONS:  - Assess and monitor for signs and symptoms of infection  - Monitor lab/diagnostic results  - Monitor all insertion sites, i.e. indwelling lines, tubes, and drains  - Monitor endotracheal if appropriate and nasal secretions for changes in amount and color  - Richardson appropriate cooling/warming therapies per order  - Administer medications as ordered  - Instruct and encourage patient and family to use good hand hygiene technique  - Identify and instruct in appropriate isolation precautions for identified infection/condition  Outcome: Progressing  Goal: Absence of fever/infection during neutropenic period  Description: INTERVENTIONS:  - Monitor WBC    Outcome: Progressing     Problem: SAFETY ADULT  Goal: Patient will remain free of falls  Description: INTERVENTIONS:  - Educate patient/family on patient safety including physical limitations  - Instruct patient to call for assistance with activity   - Consult OT/PT to assist with strengthening/mobility   - Keep Call bell within reach  - Keep bed low and locked with side rails adjusted as appropriate  - Keep care items and personal belongings within reach  - Initiate and maintain comfort rounds  - Make Fall Risk Sign visible to staff  - Offer Toileting every 2 Hours,  in advance of need  - Initiate/Maintain bed alarm  - Obtain necessary fall risk management equipment: yes   - Apply yellow socks and bracelet for high fall risk patients  - Consider moving patient to room near nurses station  Outcome: Progressing     Problem: DISCHARGE PLANNING  Goal: Discharge to home or other facility with appropriate resources  Description: INTERVENTIONS:  - Identify barriers to discharge w/patient and caregiver  - Arrange for needed discharge resources and transportation as appropriate  - Identify discharge learning needs (meds, wound care, etc.)  - Arrange for interpretive services to assist at discharge as needed  - Refer to Case Management Department for coordinating discharge planning if the patient needs post-hospital services based on physician/advanced practitioner order or complex needs related to functional status, cognitive ability, or social support system  Outcome: Progressing     Problem: Knowledge Deficit  Goal: Patient/family/caregiver demonstrates understanding of disease process, treatment plan, medications, and discharge instructions  Description: Complete learning assessment and assess knowledge base.  Interventions:  - Provide teaching at level of understanding  - Provide teaching via preferred learning methods  Outcome: Progressing     Problem: SAFETY,RESTRAINT: NV/NON-SELF DESTRUCTIVE BEHAVIOR  Goal: Remains free of harm/injury (restraint for non violent/non self-detsructive behavior)  Description: INTERVENTIONS:  - Instruct patient/family regarding restraint use   - Assess and monitor physiologic and psychological status   - Provide interventions and comfort measures to meet assessed patient needs   - Identify and implement measures to help patient regain control  - Assess readiness for release of restraint   Outcome: Progressing  Goal: Returns to optimal restraint-free functioning  Description: INTERVENTIONS:  - Assess the patient's behavior and symptoms that indicate  continued need for restraint  - Identify and implement measures to help patient regain control  - Assess readiness for release of restraint   Outcome: Progressing     Problem: Prexisting or High Potential for Compromised Skin Integrity  Goal: Skin integrity is maintained or improved  Description: INTERVENTIONS:  - Identify patients at risk for skin breakdown  - Assess and monitor skin integrity  - Assess and monitor nutrition and hydration status  - Monitor labs   - Assess for incontinence   - Turn and reposition patient  - Assist with mobility/ambulation  - Relieve pressure over bony prominences  - Avoid friction and shearing  - Provide appropriate hygiene as needed including keeping skin clean and dry  - Evaluate need for skin moisturizer/barrier cream  - Collaborate with interdisciplinary team   - Patient/family teaching  - Consider wound care consult   Outcome: Progressing     Problem: Nutrition/Hydration-ADULT  Goal: Nutrient/Hydration intake appropriate for improving, restoring or maintaining nutritional needs  Description: Monitor and assess patient's nutrition/hydration status for malnutrition. Collaborate with interdisciplinary team and initiate plan and interventions as ordered.  Monitor patient's weight and dietary intake as ordered or per policy. Utilize nutrition screening tool and intervene as necessary. Determine patient's food preferences and provide high-protein, high-caloric foods as appropriate.     INTERVENTIONS:  - Monitor oral intake, urinary output, labs, and treatment plans  - Assess nutrition and hydration status and recommend course of action  - Evaluate amount of meals eaten  - Assist patient with eating if necessary   - Allow adequate time for meals  - Recommend/ encourage appropriate diets, oral nutritional supplements, and vitamin/mineral supplements  - Order, calculate, and assess calorie counts as needed  - Recommend, monitor, and adjust tube feedings and TPN/PPN based on assessed  needs  - Assess need for intravenous fluids  - Provide specific nutrition/hydration education as appropriate  - Include patient/family/caregiver in decisions related to nutrition  Outcome: Progressing

## 2024-03-26 NOTE — ASSESSMENT & PLAN NOTE
Pt had abdominal pain 3/19-CT scan showed free intraperitoneal air  Taken urgently to OR s/p diag lap converted to exp lap, right hemicolectomy with anastomosis, washout, Abthera placement  In OR, abdominal cavity was noted to have grossly purulent material in it along with perforation in the cecum at a mass  transferred to ICU postoperatively with abdomen open mechanical ventilation  3/21 OR for washout and closure w/LISSY drain  F/U culture results  Continue antibiotics  Surgery following-given the increasing abdominal pain on 3/25/2024 I spoke with surgery.  May be able to advance diet slowly today.  Monitor leukocytosis since it is worsening.

## 2024-03-27 LAB
ANION GAP SERPL CALCULATED.3IONS-SCNC: 8 MMOL/L (ref 4–13)
ARTERIAL PATENCY WRIST A: YES
BASE EXCESS BLDA CALC-SCNC: 4.2 MMOL/L
BUN SERPL-MCNC: 13 MG/DL (ref 5–25)
CALCIUM SERPL-MCNC: 7.5 MG/DL (ref 8.4–10.2)
CHLORIDE SERPL-SCNC: 105 MMOL/L (ref 96–108)
CO2 SERPL-SCNC: 29 MMOL/L (ref 21–32)
CREAT SERPL-MCNC: 0.69 MG/DL (ref 0.6–1.3)
ERYTHROCYTE [DISTWIDTH] IN BLOOD BY AUTOMATED COUNT: 18.7 % (ref 11.6–15.1)
GFR SERPL CREATININE-BSD FRML MDRD: 94 ML/MIN/1.73SQ M
GLUCOSE SERPL-MCNC: 122 MG/DL (ref 65–140)
GLUCOSE SERPL-MCNC: 143 MG/DL (ref 65–140)
GLUCOSE SERPL-MCNC: 88 MG/DL (ref 65–140)
GLUCOSE SERPL-MCNC: 93 MG/DL (ref 65–140)
GLUCOSE SERPL-MCNC: 99 MG/DL (ref 65–140)
HCO3 BLDA-SCNC: 29.4 MMOL/L (ref 22–28)
HCT VFR BLD AUTO: 35.4 % (ref 34.8–46.1)
HGB BLD-MCNC: 10.5 G/DL (ref 11.5–15.4)
MCH RBC QN AUTO: 23.3 PG (ref 26.8–34.3)
MCHC RBC AUTO-ENTMCNC: 29.7 G/DL (ref 31.4–37.4)
MCV RBC AUTO: 79 FL (ref 82–98)
NASAL CANNULA: ABNORMAL
O2 CT BLDA-SCNC: 15.4 ML/DL (ref 16–23)
OXYHGB MFR BLDA: 95.2 % (ref 94–97)
PCO2 BLDA: 46.3 MM HG (ref 36–44)
PH BLDA: 7.42 [PH] (ref 7.35–7.45)
PLATELET # BLD AUTO: 247 THOUSANDS/UL (ref 149–390)
PMV BLD AUTO: 9.4 FL (ref 8.9–12.7)
PO2 BLDA: 88.7 MM HG (ref 75–129)
POTASSIUM SERPL-SCNC: 2.8 MMOL/L (ref 3.5–5.3)
RBC # BLD AUTO: 4.51 MILLION/UL (ref 3.81–5.12)
SODIUM SERPL-SCNC: 142 MMOL/L (ref 135–147)
SPECIMEN SOURCE: ABNORMAL
WBC # BLD AUTO: 14.34 THOUSAND/UL (ref 4.31–10.16)

## 2024-03-27 PROCEDURE — 94760 N-INVAS EAR/PLS OXIMETRY 1: CPT

## 2024-03-27 PROCEDURE — 99024 POSTOP FOLLOW-UP VISIT: CPT | Performed by: STUDENT IN AN ORGANIZED HEALTH CARE EDUCATION/TRAINING PROGRAM

## 2024-03-27 PROCEDURE — 97535 SELF CARE MNGMENT TRAINING: CPT

## 2024-03-27 PROCEDURE — 99255 IP/OBS CONSLTJ NEW/EST HI 80: CPT | Performed by: INTERNAL MEDICINE

## 2024-03-27 PROCEDURE — 85027 COMPLETE CBC AUTOMATED: CPT | Performed by: FAMILY MEDICINE

## 2024-03-27 PROCEDURE — 80048 BASIC METABOLIC PNL TOTAL CA: CPT | Performed by: INTERNAL MEDICINE

## 2024-03-27 PROCEDURE — 97530 THERAPEUTIC ACTIVITIES: CPT

## 2024-03-27 PROCEDURE — 87040 BLOOD CULTURE FOR BACTERIA: CPT | Performed by: INTERNAL MEDICINE

## 2024-03-27 PROCEDURE — 82948 REAGENT STRIP/BLOOD GLUCOSE: CPT

## 2024-03-27 PROCEDURE — 97116 GAIT TRAINING THERAPY: CPT

## 2024-03-27 PROCEDURE — 94664 DEMO&/EVAL PT USE INHALER: CPT

## 2024-03-27 PROCEDURE — 99232 SBSQ HOSP IP/OBS MODERATE 35: CPT | Performed by: FAMILY MEDICINE

## 2024-03-27 PROCEDURE — 82805 BLOOD GASES W/O2 SATURATION: CPT | Performed by: INTERNAL MEDICINE

## 2024-03-27 RX ORDER — POTASSIUM CHLORIDE 14.9 MG/ML
20 INJECTION INTRAVENOUS
Qty: 200 ML | Refills: 0 | Status: COMPLETED | OUTPATIENT
Start: 2024-03-27 | End: 2024-03-27

## 2024-03-27 RX ORDER — ALBUTEROL SULFATE 90 UG/1
2 AEROSOL, METERED RESPIRATORY (INHALATION) EVERY 6 HOURS PRN
Status: DISCONTINUED | OUTPATIENT
Start: 2024-03-27 | End: 2024-03-27

## 2024-03-27 RX ORDER — POTASSIUM CHLORIDE 20 MEQ/1
40 TABLET, EXTENDED RELEASE ORAL ONCE
Status: COMPLETED | OUTPATIENT
Start: 2024-03-27 | End: 2024-03-27

## 2024-03-27 RX ADMIN — HEPARIN SODIUM 7500 UNITS: 5000 INJECTION INTRAVENOUS; SUBCUTANEOUS at 05:37

## 2024-03-27 RX ADMIN — HEPARIN SODIUM 7500 UNITS: 5000 INJECTION INTRAVENOUS; SUBCUTANEOUS at 22:21

## 2024-03-27 RX ADMIN — ACETAMINOPHEN 975 MG: 325 TABLET, FILM COATED ORAL at 06:39

## 2024-03-27 RX ADMIN — FOLIC ACID 1 MG: 1 TABLET ORAL at 08:48

## 2024-03-27 RX ADMIN — METOPROLOL TARTRATE 25 MG: 25 TABLET, FILM COATED ORAL at 08:48

## 2024-03-27 RX ADMIN — HEPARIN SODIUM 7500 UNITS: 5000 INJECTION INTRAVENOUS; SUBCUTANEOUS at 15:06

## 2024-03-27 RX ADMIN — AMLODIPINE BESYLATE 5 MG: 5 TABLET ORAL at 08:49

## 2024-03-27 RX ADMIN — POTASSIUM CHLORIDE 40 MEQ: 1500 TABLET, EXTENDED RELEASE ORAL at 12:43

## 2024-03-27 RX ADMIN — AMPICILLIN SODIUM AND SULBACTAM SODIUM 3 G: 2; 1 INJECTION, POWDER, FOR SOLUTION INTRAMUSCULAR; INTRAVENOUS at 12:43

## 2024-03-27 RX ADMIN — METOPROLOL TARTRATE 25 MG: 25 TABLET, FILM COATED ORAL at 22:20

## 2024-03-27 RX ADMIN — ATORVASTATIN CALCIUM 40 MG: 40 TABLET, FILM COATED ORAL at 18:07

## 2024-03-27 RX ADMIN — ESCITALOPRAM OXALATE 5 MG: 10 TABLET ORAL at 08:49

## 2024-03-27 RX ADMIN — OXYMETAZOLINE HYDROCHLORIDE 2 SPRAY: 0.05 SPRAY NASAL at 08:51

## 2024-03-27 RX ADMIN — VITAM B12 100 MCG: 100 TAB at 08:51

## 2024-03-27 RX ADMIN — AMPICILLIN SODIUM AND SULBACTAM SODIUM 3 G: 2; 1 INJECTION, POWDER, FOR SOLUTION INTRAMUSCULAR; INTRAVENOUS at 18:07

## 2024-03-27 RX ADMIN — NICOTINE 1 PATCH: 21 PATCH, EXTENDED RELEASE TRANSDERMAL at 08:50

## 2024-03-27 RX ADMIN — OXYCODONE HYDROCHLORIDE 5 MG: 5 TABLET ORAL at 05:37

## 2024-03-27 RX ADMIN — POTASSIUM CHLORIDE 20 MEQ: 14.9 INJECTION, SOLUTION INTRAVENOUS at 15:06

## 2024-03-27 RX ADMIN — FLUTICASONE FUROATE AND VILANTEROL TRIFENATATE 1 PUFF: 100; 25 POWDER RESPIRATORY (INHALATION) at 08:50

## 2024-03-27 RX ADMIN — AMPICILLIN SODIUM AND SULBACTAM SODIUM 3 G: 2; 1 INJECTION, POWDER, FOR SOLUTION INTRAMUSCULAR; INTRAVENOUS at 04:23

## 2024-03-27 RX ADMIN — TOPIRAMATE 25 MG: 25 TABLET ORAL at 08:51

## 2024-03-27 RX ADMIN — OXYMETAZOLINE HYDROCHLORIDE 2 SPRAY: 0.05 SPRAY NASAL at 22:25

## 2024-03-27 RX ADMIN — POTASSIUM CHLORIDE 20 MEQ: 14.9 INJECTION, SOLUTION INTRAVENOUS at 12:44

## 2024-03-27 RX ADMIN — AMPICILLIN SODIUM AND SULBACTAM SODIUM 3 G: 2; 1 INJECTION, POWDER, FOR SOLUTION INTRAMUSCULAR; INTRAVENOUS at 23:13

## 2024-03-27 RX ADMIN — TOPIRAMATE 25 MG: 25 TABLET ORAL at 18:08

## 2024-03-27 RX ADMIN — ACETAMINOPHEN 975 MG: 325 TABLET, FILM COATED ORAL at 20:24

## 2024-03-27 RX ADMIN — OXYCODONE HYDROCHLORIDE 5 MG: 5 TABLET ORAL at 22:21

## 2024-03-27 RX ADMIN — SODIUM CHLORIDE, SODIUM GLUCONATE, SODIUM ACETATE, POTASSIUM CHLORIDE, MAGNESIUM CHLORIDE, SODIUM PHOSPHATE, DIBASIC, AND POTASSIUM PHOSPHATE 60 ML/HR: .53; .5; .37; .037; .03; .012; .00082 INJECTION, SOLUTION INTRAVENOUS at 23:13

## 2024-03-27 RX ADMIN — TORSEMIDE 20 MG: 20 TABLET ORAL at 08:49

## 2024-03-27 RX ADMIN — ACETAMINOPHEN 975 MG: 325 TABLET, FILM COATED ORAL at 15:06

## 2024-03-27 RX ADMIN — UMECLIDINIUM 1 PUFF: 62.5 AEROSOL, POWDER ORAL at 08:51

## 2024-03-27 RX ADMIN — SODIUM CHLORIDE, SODIUM GLUCONATE, SODIUM ACETATE, POTASSIUM CHLORIDE, MAGNESIUM CHLORIDE, SODIUM PHOSPHATE, DIBASIC, AND POTASSIUM PHOSPHATE 60 ML/HR: .53; .5; .37; .037; .03; .012; .00082 INJECTION, SOLUTION INTRAVENOUS at 04:23

## 2024-03-27 NOTE — PLAN OF CARE
Problem: PAIN - ADULT  Goal: Verbalizes/displays adequate comfort level or baseline comfort level  Description: Interventions:  - Encourage patient to monitor pain and request assistance  - Assess pain using appropriate pain scale  - Administer analgesics based on type and severity of pain and evaluate response  - Implement non-pharmacological measures as appropriate and evaluate response  - Consider cultural and social influences on pain and pain management  - Notify physician/advanced practitioner if interventions unsuccessful or patient reports new pain  3/27/2024 1729 by Matilde Saul RN  Outcome: Progressing  3/27/2024 1729 by Matilde Saul RN  Outcome: Progressing

## 2024-03-27 NOTE — ASSESSMENT & PLAN NOTE
Wt Readings from Last 3 Encounters:   03/27/24 121 kg (266 lb 8.6 oz)   03/19/24 128 kg (282 lb 3 oz)   07/20/21 103 kg (227 lb 8.2 oz)     Echo report from 03/12/24 noted with EF of 60%.  Pt presented to the hospital 3/11 and was aggressively diuresed  Echo on 3/12 showed EF 60%, mild to moderate concentric hypertrophy, and normal diastolic function  Was discharged home on 3/19 but returned later that day after CT findings of free intraperitoneal air and taken to OR  Diuresis initiated 3/20-pt diuresing well  Monitor daily weights  Strict I/O  Remains net negative  Continue with p.o. torsemide

## 2024-03-27 NOTE — PLAN OF CARE
Problem: PAIN - ADULT  Goal: Verbalizes/displays adequate comfort level or baseline comfort level  Description: Interventions:  - Encourage patient to monitor pain and request assistance  - Assess pain using appropriate pain scale  - Administer analgesics based on type and severity of pain and evaluate response  - Implement non-pharmacological measures as appropriate and evaluate response  - Consider cultural and social influences on pain and pain management  - Notify physician/advanced practitioner if interventions unsuccessful or patient reports new pain  Outcome: Progressing     Problem: INFECTION - ADULT  Goal: Absence or prevention of progression during hospitalization  Description: INTERVENTIONS:  - Assess and monitor for signs and symptoms of infection  - Monitor lab/diagnostic results  - Monitor all insertion sites, i.e. indwelling lines, tubes, and drains  - Monitor endotracheal if appropriate and nasal secretions for changes in amount and color  - [place] appropriate cooling/warming therapies per order  - Administer medications as ordered  - Instruct and encourage patient and family to use good hand hygiene technique  - Identify and instruct in appropriate isolation precautions for identified infection/condition  Outcome: Progressing  Goal: Absence of fever/infection during neutropenic period  Description: INTERVENTIONS:  - Monitor WBC    Outcome: Progressing     Problem: SAFETY ADULT  Goal: Patient will remain free of falls  Description: INTERVENTIONS:  - Educate patient/family on patient safety including physical limitations  - Instruct patient to call for assistance with activity   - Consult OT/PT to assist with strengthening/mobility   - Keep Call bell within reach  - Keep bed low and locked with side rails adjusted as appropriate  - Keep care items and personal belongings within reach  - Initiate and maintain comfort rounds  - Make Fall Risk Sign visible to staff  - Offer Toileting every 2 Hours,  in advance of need  - Initiate/Maintain bed alarm  - Obtain necessary fall risk management equipment: yes   - Apply yellow socks and bracelet for high fall risk patients  - Consider moving patient to room near nurses station  Outcome: Progressing     Problem: DISCHARGE PLANNING  Goal: Discharge to home or other facility with appropriate resources  Description: INTERVENTIONS:  - Identify barriers to discharge w/patient and caregiver  - Arrange for needed discharge resources and transportation as appropriate  - Identify discharge learning needs (meds, wound care, etc.)  - Arrange for interpretive services to assist at discharge as needed  - Refer to Case Management Department for coordinating discharge planning if the patient needs post-hospital services based on physician/advanced practitioner order or complex needs related to functional status, cognitive ability, or social support system  Outcome: Progressing     Problem: Knowledge Deficit  Goal: Patient/family/caregiver demonstrates understanding of disease process, treatment plan, medications, and discharge instructions  Description: Complete learning assessment and assess knowledge base.  Interventions:  - Provide teaching at level of understanding  - Provide teaching via preferred learning methods  Outcome: Progressing     Problem: SAFETY,RESTRAINT: NV/NON-SELF DESTRUCTIVE BEHAVIOR  Goal: Remains free of harm/injury (restraint for non violent/non self-detsructive behavior)  Description: INTERVENTIONS:  - Instruct patient/family regarding restraint use   - Assess and monitor physiologic and psychological status   - Provide interventions and comfort measures to meet assessed patient needs   - Identify and implement measures to help patient regain control  - Assess readiness for release of restraint   Outcome: Progressing  Goal: Returns to optimal restraint-free functioning  Description: INTERVENTIONS:  - Assess the patient's behavior and symptoms that indicate  continued need for restraint  - Identify and implement measures to help patient regain control  - Assess readiness for release of restraint   Outcome: Progressing     Problem: Prexisting or High Potential for Compromised Skin Integrity  Goal: Skin integrity is maintained or improved  Description: INTERVENTIONS:  - Identify patients at risk for skin breakdown  - Assess and monitor skin integrity  - Assess and monitor nutrition and hydration status  - Monitor labs   - Assess for incontinence   - Turn and reposition patient  - Assist with mobility/ambulation  - Relieve pressure over bony prominences  - Avoid friction and shearing  - Provide appropriate hygiene as needed including keeping skin clean and dry  - Evaluate need for skin moisturizer/barrier cream  - Collaborate with interdisciplinary team   - Patient/family teaching  - Consider wound care consult   Outcome: Progressing     Problem: Nutrition/Hydration-ADULT  Goal: Nutrient/Hydration intake appropriate for improving, restoring or maintaining nutritional needs  Description: Monitor and assess patient's nutrition/hydration status for malnutrition. Collaborate with interdisciplinary team and initiate plan and interventions as ordered.  Monitor patient's weight and dietary intake as ordered or per policy. Utilize nutrition screening tool and intervene as necessary. Determine patient's food preferences and provide high-protein, high-caloric foods as appropriate.     INTERVENTIONS:  - Monitor oral intake, urinary output, labs, and treatment plans  - Assess nutrition and hydration status and recommend course of action  - Evaluate amount of meals eaten  - Assist patient with eating if necessary   - Allow adequate time for meals  - Recommend/ encourage appropriate diets, oral nutritional supplements, and vitamin/mineral supplements  - Order, calculate, and assess calorie counts as needed  - Recommend, monitor, and adjust tube feedings and TPN/PPN based on assessed  needs  - Assess need for intravenous fluids  - Provide specific nutrition/hydration education as appropriate  - Include patient/family/caregiver in decisions related to nutrition  Outcome: Progressing

## 2024-03-27 NOTE — RESPIRATORY THERAPY NOTE
03/26/24 4848   Respiratory Assessment   Resp Comments placed pt on bipap for HS use, RN aware   O2 Device v60   Non-Invasive Information   O2 Interface Device Face mask   Non-Invasive Ventilation Mode BiPAP   $ Intermittent NIV Yes   SpO2 98 %   $ Pulse Oximetry Spot Check Charge Completed   Non-Invasive Settings   IPAP (cm) 16 cm   EPAP (cm) 10 cm   Rate (Set) 10   FiO2 (%) 40   Rise Time 2  (15 min ramp applied)   Inspiratory Time (Set) 1   Humidification   (passover)   Non-Invasive Readings   Total Rate 19   Vt (mL) (Mech) 576   MV (Mech) 11.2   Peak Pressure (Obs) 11   Heater Temperature (Obs)   (n/a)   Leak (lpm) 0   Skin Intervention Skin intact   Non-Invasive Alarms   Insp Pressure High (cm H20) 30   Insp Pressure Low (cm H20) 8   Low Insp Pressure Time (sec) 20 sec   MV Low (L/min) 3   Vt High (mL) 1200   Vt Low (mL) 200   High Resp Rate (BPM) 40 BPM   Low Resp Rate (BPM) 8 BPM   Maintenance   Water bag changed Yes

## 2024-03-27 NOTE — PHYSICAL THERAPY NOTE
PHYSICAL THERAPY NOTE          Patient Name: Dwaine Gould  Today's Date: 3/27/2024           03/27/24 1108   PT Last Visit   PT Visit Date 03/27/24   Note Type   Note Type Treatment   Pain Assessment   Pain Assessment Tool 0-10   Pain Score No Pain   Patient's Stated Pain Goal No pain   Hospital Pain Intervention(s) Repositioned;Ambulation/increased activity;Emotional support;Rest   Multiple Pain Sites No   Pain Rating: FLACC (Rest) - Face 0   Pain Rating: FLACC (Rest) - Legs 0   Pain Rating: FLACC (Rest) - Activity 0   Pain Rating: FLACC (Rest) - Cry 0   Pain Rating: FLACC (Rest) - Consolability 0   Score: FLACC (Rest) 0   Pain Rating: FLACC (Activity) - Face 0   Pain Rating: FLACC (Activity) - Legs 0   Pain Rating: FLACC (Activity) - Activity 0   Pain Rating: FLACC (Activity) - Cry 0   Pain Rating: FLACC (Activity) - Consolability 0   Score: FLACC (Activity) 0   Restrictions/Precautions   Weight Bearing Precautions Per Order No   Other Precautions Chair Alarm;Bed Alarm;O2;Fall Risk;Pain  (LISSY drain)   General   Chart Reviewed Yes   Response to Previous Treatment Patient with no complaints from previous session.   Family/Caregiver Present No   Cognition   Overall Cognitive Status WFL   Arousal/Participation Alert;Responsive;Cooperative   Attention Within functional limits   Orientation Level Oriented X4   Memory Within functional limits   Following Commands Follows all commands and directions without difficulty   Comments pt was pleasant, cooperative and motivated throughout PT intervention   Subjective   Subjective pt was agreeable to participate in PT intervention and stated 0/10 pre/post tx session   Bed Mobility   Supine to Sit 4  Minimal assistance   Additional items Assist x 1;HOB elevated;Bedrails;Increased time required;Verbal cues;Other  (trunk managment)   Sit to Supine Unable to assess   Additional Comments pt seated OOB in  the recliner post tx session with call bell, chair alarm activated and all pt needs met   Transfers   Sit to Stand 4  Minimal assistance   Additional items Assist x 1;Increased time required;Verbal cues   Stand to Sit 4  Minimal assistance   Additional items Assist x 1;Armrests;Increased time required;Verbal cues   Stand pivot 4  Minimal assistance   Additional items Assist x 1;Armrests;Increased time required;Verbal cues   Additional Comments pt continues to require min ax1 for all functional transfers to and from RW with VC's for hand placement in order to increase safety and balance w/ transfers   Ambulation/Elevation   Gait pattern Decreased foot clearance;Short stride;Foward flexed;Wide GIUSEPPE;Excessively slow;Decreased hip extension;Decreased heel strike;Decreased toe off   Gait Assistance 4  Minimal assist   Additional items Assist x 1;Verbal cues   Assistive Device Rolling walker   Distance 50'x1 RW   Stair Management Assistance Not tested   Ambulation/Elevation Additional Comments Additional ambulation not possibel due to fatigue as pt required a seated therapeutic rest break in the recliner   Balance   Static Sitting Fair   Dynamic Sitting Fair -   Static Standing Fair -   Dynamic Standing Poor +   Ambulatory Poor +  (w/ RW)   Endurance Deficit   Endurance Deficit Yes   Endurance Deficit Description limited activity tolerance and ambulation distance   Activity Tolerance   Activity Tolerance Patient limited by fatigue   Nurse Made Aware Spoke to RN   Assessment   Prognosis Good   Problem List Decreased strength;Decreased endurance;Impaired balance;Decreased mobility;Decreased safety awareness;Impaired sensation   Assessment pt began tx session lying supine in the bed and was agreeable to participate in PT intervention. PT intervention to focus on bed mobility, transfer training, increasing endurance and posture/balance w/ gait. Since last PT intervention progess was not as pt was able to complete a supine<>sit  EOB transfer with a decrease in level of assistance compared to previous tx sessions min ax1 with trunk management. pt was able to sit EOB 3 minutes w/o LOB while being educated on all functional transfers to and from RW. pt continues to remain consistant for requiring min Ax1 for all functional transfers to and from RW w/ Vc's for hand placement. Post tx pt continues to be limited with activity tolerance and ambulation distance due to fatigue and generalized weakness. pt would benefit from continued skilled Pt intervention in order to address deficits listed above. Continue to recommedn DC w/ level 2 moderate rehab resource intensity when medically cleared   Barriers to Discharge Inaccessible home environment;Decreased caregiver support   Goals   Patient Goals to walk more   STG Expiration Date 04/03/24   PT Treatment Day 2   Plan   Treatment/Interventions Functional transfer training;LE strengthening/ROM;Therapeutic exercise;Endurance training;Patient/family training;Bed mobility;Equipment eval/education;Gait training;Spoke to nursing   Progress Slow progress, decreased activity tolerance   PT Frequency 3-5x/wk   Discharge Recommendation   Rehab Resource Intensity Level, PT II (Moderate Resource Intensity)   Equipment Recommended Walker   Walker Package Recommended Wheeled walker   AM-PAC Basic Mobility Inpatient   Turning in Flat Bed Without Bedrails 3   Lying on Back to Sitting on Edge of Flat Bed Without Bedrails 3   Moving Bed to Chair 3   Standing Up From Chair Using Arms 3   Walk in Room 3   Climb 3-5 Stairs With Railing 3   Basic Mobility Inpatient Raw Score 18   Basic Mobility Standardized Score 41.05   [facility] Highest Level Of Mobility   JH-HLM Goal 6: Walk 10 steps or more   JH-HLM Achieved 7: Walk 25 feet or more   Education   Education Provided Mobility training;Assistive device   Patient Demonstrates acceptance/verbal understanding   End of Consult   Patient Position at End of Consult Bedside  chair;Bed/Chair alarm activated;All needs within reach   The patient's AM-PAC Basic Mobility Inpatient Short Form Raw Score is 18. A Raw score of greater than 16 suggests the patient may benefit from discharge to home. Please also refer to the recommendation of the Physical Therapist for safe discharge planning.    Pt continues to be limited with activity tolerance, bed mobility and ambulation distance. Pt sandro benefit from continued skilled PT intervention in order to address deficits listed above     Kirby Aguilar

## 2024-03-27 NOTE — PLAN OF CARE
Problem: OCCUPATIONAL THERAPY ADULT  Goal: Performs self-care activities at highest level of function for planned discharge setting.  See evaluation for individualized goals.  Description: Treatment Interventions: ADL retraining, Functional transfer training, Endurance training, Patient/family training          See flowsheet documentation for full assessment, interventions and recommendations.   Outcome: Progressing  Note: Limitation: Decreased ADL status, Decreased UE strength, Decreased Safe judgement during ADL, Decreased endurance, Decreased fine motor control, Decreased self-care trans, Decreased high-level ADLs  Prognosis: Good  Assessment: Pt seen this date for skilled OT session focused on ADLs, functional transfers and mobility, safety education. The patient was received seated in bed side chair, NAD, PIV access, O2 NC. She participated in functional ambulation in the room, BSC transfer, toileting, and sit to stand transfers.  Pt required Minimal Assistance x1 with RW for mobility, verbal cues for safety and walker navigation, Maximal Assistance for toileting. At end of session the patient was located seated in bed side chair with call bell in reach and all needs met. Overall the patient remains below her functional baseline, and is primarily limited at this time due to post op pain, mildly impaired balance, and decreased activity tolerance. OT will continue to follow while acute to address POC. At this time, recommend Level 2 Moderate Resource Intensity upon d/c.     Rehab Resource Intensity Level, OT: II (Moderate Resource Intensity)        CHARLEY Nguyen/L

## 2024-03-27 NOTE — ASSESSMENT & PLAN NOTE
Lab Results   Component Value Date    EGFR 94 03/27/2024    EGFR 92 03/26/2024    EGFR 94 03/25/2024    CREATININE 0.69 03/27/2024    CREATININE 0.71 03/26/2024    CREATININE 0.68 03/25/2024     Creatinine back to baseline.  Avoid nephrotoxic medications.

## 2024-03-27 NOTE — CONSULTS
Consultation - Infectious Disease   Dwaine Gould 63 y.o. female MRN: [id number]  Unit/Bed#: ICU 07 Encounter: [id number]      IMPRESSION & RECOMMENDATIONS:   1.  Bacteroides bacteremia.  Appears to be all secondary to peritonitis after a bowel perforation.  No other clear source appreciated.  The patient is clinically improved but with ongoing leukocytosis.  She seems to be tolerating the antibiotics without difficulty.  She has received more than a week of appropriate intravenous antibiotics  -Discontinue Unasyn after last dose today  -No additional antibiotics needed for this problem  -No additional workup needed  -Source control measures as below    2.  Peritonitis.  Secondary to a perforated cecal mass with purulent peritonitis found operatively.  The patient underwent exploratory laparotomy on 3/19/2024 with right hemicolectomy for perforated cecal mass.  She was taken back to the OR on 3/21/2024 for washout and closure.  She is clinically much improved.  She continues to have a bit of a leukocytosis.  -Discontinue Unasyn after last dose today  -Recheck CBC with differential to make sure white sound is coming down.  -If white cell count remains elevated, consider repeat CT of the abdomen pelvis    3.  Acute on chronic respiratory failure with hypoxia and hypercapnia.  All in the setting of COPD and likely obstructive sleep apnea/obesity hypoventilation syndrome.  No radiographic evidence of pneumonia  -Breathing treatments  -Oxygen support  -Monitor respiratory status    4.  COPD.  No current clinical evidence of an acute exacerbation at this time    5.  Morbid obesity.  As a risk factor for recurrent infection.    Discussed with the primary service the plan to discontinue all antibiotics after the last dose today and they agree with the plan.    Extensive review of the medical records in epic including review of the notes, radiographs, and laboratory results     HISTORY OF PRESENT ILLNESS:  Reason for Consult:  Gram-negative bacteremia  HPI: Dwaine Gould is a 63 y.o. year old female with morbid obesity, COPD, CHF, recent hospitalization for acute hypoxic respiratory failure due to acute on chronic heart failure with volume overload and acute kidney injury readmitted on 3/19/2024 with abdominal pain and abnormal CT scan revealing pneumoperitoneum who we are asked to assist with antibiotic management for gram-negative bacteremia.  The patient was taken urgently to the OR on 3/19/2024 and underwent diagnostic laparoscopy for pneumoperitoneum converted to an exploratory laparotomy with right hemicolectomy for a perforated cecal mass.  She was taken back to the OR on 3/21/2024 for washout and fascial closure.  Patient was maintained on piperacillin/tazobactam with wound cultures growing Bacteroides, Clostridium, pansensitive E. coli, and Enterococcus sensitive to ampicillin.  Her blood cultures have grown Bacteroides thetaiotaomicron .  Antibiotics have been simplified to Unasyn since 3/23/2024.  She has remained afebrile and hemodynamically stable.  She is just started having bowel movements.  She is able to take orals without difficulty.  She admits to cough with scant whitish sputum.  Denies any nausea or vomiting, denies any increased abdominal pain, denies any dysuria or hematuria.    REVIEW OF SYSTEMS:  A complete review of systems is negative other than that noted in the HPI.    PAST MEDICAL HISTORY:  Past Medical History:   Diagnosis Date    Acute metabolic encephalopathy     Hyperlipidemia     Hypertension     Transaminitis      Past Surgical History:   Procedure Laterality Date    CARDIAC CATHETERIZATION      GANGLION CYST EXCISION      LAPAROTOMY N/A 3/21/2024    Procedure: LAPAROTOMY EXPLORATORY, washout, complex closure, LISSY drain;  Surgeon: Francine Reid MD;  Location: [facility];  Service: General    MS LAPS ABD PRTM&OMENTUM DX W/WO SPEC BR/WA SPX N/A 3/19/2024    Procedure: DIAGNOSTIC LAPAROSCOPY converted to  Exploratory Laparotomy, Right Hemicolectomy, Abdominal wash-out, Abthera Placement;  Surgeon: Roger Joel DO;  Location: [facility];  Service: General    TYMPANOSTOMY TUBE PLACEMENT         FAMILY HISTORY:  Non-contributory    SOCIAL HISTORY:  Social History   Social History     Substance and Sexual Activity   Alcohol Use Never     Social History     Substance and Sexual Activity   Drug Use Never     Social History     Tobacco Use   Smoking Status Every Day    Current packs/day: 0.50    Types: Cigarettes   Smokeless Tobacco Never       ALLERGIES:  Allergies   Allergen Reactions    Codeine Anaphylaxis    Contrast [Iodinated Contrast Media] GI Intolerance    Prednisone Irritability     Increase in anger/abusive behaviors       MEDICATIONS:  All current active medications have been reviewed.  Antibiotics: Unasyn    PHYSICAL EXAM:  Temp:  [98.6 °F (37 °C)] 98.6 °F (37 °C)  HR:  [62-68] 68  Resp:  [16-19] 16  BP: (118-149)/(58-69) 118/58  SpO2:  [96 %-99 %] 98 %  Temp (24hrs), Av.6 °F (37 °C), Min:98.6 °F (37 °C), Max:98.6 °F (37 °C)  Current: Temperature: 98.6 °F (37 °C)    Intake/Output Summary (Last 24 hours) at 3/27/2024 0925  Last data filed at 3/27/2024 0600  Gross per 24 hour   Intake 1780 ml   Output 1870 ml   Net -90 ml       General Appearance:  Appearing chronically ill, nontoxic, and in no distress   Head:  Normocephalic, without obvious abnormality, atraumatic   Eyes:  Conjunctiva pink and sclera anicteric, both eyes   Nose: Nares normal, mucosa normal, no drainage   Throat: Oropharynx moist without lesions   Neck: Supple, symmetrical, no adenopathy, no tenderness/mass/nodules   Back:   Symmetric, no curvature, ROM normal, no CVA tenderness   Lungs:   Decreased breath sounds bilaterally, respirations unlabored   Chest Wall:  No tenderness or deformity   Heart:  RRR; no murmur, rub or gallop   Abdomen:   Soft, mildly distended but nontender, left-sided drains in place.  Incision  well-approximated without erythema or drainage, positive bowel sounds    Extremities: No cyanosis, clubbing or edema   Skin: No rashes or lesions. No draining wounds noted.   Lymph nodes: Cervical, supraclavicular nodes normal   Neurologic: Alert, answer simple questions appropriately, able to move all 4 extremities       LABS, IMAGING, & OTHER STUDIES:  Lab Results:  I have personally reviewed pertinent labs.  Results from last 7 days   Lab Units 03/27/24  0440 03/26/24  0443 03/25/24  0530   WBC Thousand/uL 14.34* 16.11* 15.73*   HEMOGLOBIN g/dL 10.5* 11.6 11.2*   PLATELETS Thousands/uL 247 238 203     Results from last 7 days   Lab Units 03/27/24  0440 03/26/24  0443 03/25/24  0530 03/22/24  1423 03/22/24  0536 03/21/24  0518   SODIUM mmol/L 142 144 144   < > 147 146   POTASSIUM mmol/L 2.8* 2.7* 3.2*   < > 3.3* 3.5   CHLORIDE mmol/L 105 104 103   < > 104 104   CO2 mmol/L 29 32 35*   < > 35* 37*   BUN mg/dL 13 15 19   < > 20 25   CREATININE mg/dL 0.69 0.71 0.68   < > 1.04 1.14   EGFR ml/min/1.73sq m 94 92 94   < > 58 52   CALCIUM mg/dL 7.5* 8.0* 8.7   < > 8.4 8.1*   AST U/L  --   --   --   --  12* 12*   ALT U/L  --   --   --   --  13 15   ALK PHOS U/L  --   --   --   --  48 49    < > = values in this interval not displayed.     Results from last 7 days   Lab Units 03/20/24  1138   MRSA CULTURE ONLY  No Methicillin Resistant Staphlyococcus aureus (MRSA) isolated     Results from last 7 days   Lab Units 03/21/24  0518   PROCALCITONIN ng/ml 0.39*                   Imaging Studies:     Abdominal series.  Mildly dilated loops of bowel.    Chest x-ray.  Congestive changes suggesting volume overload.    CT abdomen pelvis.  Pneumoperitoneum.  Small bilateral effusions with compressive atelectasis.  Subcutaneous edema.    Images personally reviewed by me in PACS

## 2024-03-27 NOTE — DISCHARGE INSTR - AVS FIRST PAGE
Thank you, it has been a pleasure taking care of you.     Call the Surgical office to make appointment for 2 weeks   Call your PCP for appointment in 1 week, please have this appointment completed prior to surgery follow up in case there were any changes to your medication while inpatient.    Meds:  If you do not need strong pain medicine you may take Tylenol 650 mg every 4 hours as needed for pain.Do not take Tylenol if you have liver disease.   If your pain is not relieved, then take the prescription pain medications. Do not take ibuprofen due to having kidney disease.  Do not take acetaminophen (Tylenol) WITH  pain meds that contain acetaminophen. Take one or the other.  Do not exceed more than 4000 mg of acetaminophen in 24 hours  or 3000 mg if you have liver disease.     No driving until seen in the office in 2 weeks   No driving while taking pain meds.   No heavy lifting or strenuous exercise until you are cleared in the surgery office in 2 weeks   Continue to walk and resume mobile activities to avoid forming a blood clot in your legs.   You may shower when the site from the drain forms a scab and no longer is draining. Keep dressings on this site until it stops draining.   You will be able to get the stapled incision wet, pat dry.  Keep the abdominal binder on, wear over a light top to avoid it irritating your skin. This can be put in the washer. Keep this clean.     Call the office if you have increased pain not relieved with pain medicine.  Call the office if you have a fever,redness, the wound opens up, you have pus draining from your incision.   Take colace 100 mg daily to avoid constipation.   Any medications given at discharge you will need to have your PCP refill them.        Deep Vein Thrombosis        Report to ED or Call your local emergency number (911 in the [place]) if:   You feel lightheaded, short of breath, and have chest pain.     You cough up blood.     When should I call my doctor?   Your arm  or leg feels warm, tender, and painful. It may look swollen and red.    WHAT YOU NEED TO KNOW:   What is a deep vein thrombosis (DVT)? A DVT is a blood clot that forms in a deep vein of the body. The deep veins in the legs, thighs, and hips are the most common sites for DVT. A DVT can also occur in a deep vein within your arms. The clot prevents the normal flow of blood in the vein. The blood backs up and causes pain and swelling. The DVT can break into smaller pieces and travel to your lungs and cause a blockage called a pulmonary embolism (PE). A PE can become life-threatening.     What increases my risk for a DVT? After you have a DVT, your risk for another increases. A DVT can happen to anyone, but any of the following may increase your risk:  A family history of blood clots     Limited activity caused by bed rest, a leg cast, or sitting for long periods     Injury to a deep vein, or surgery     A blood disorder that makes your blood clot faster than normal, such as factor V Leiden mutation     Age older than 60 years     Hormone replacement therapy     Birth control pills, especially in women who smoke or are older than 35 years     Pregnancy, and for 6 weeks after childbirth     Cancer or heart failure     A catheter placed in a large vein     Smoking cigarettes     Obesity or varicose veins     What are the signs and symptoms of a DVT?   Swelling     Redness     Warmth, pain, or tenderness     How is a DVT diagnosed?   A D-dimer blood test may be done to check for signs of a blood clot.     An ultrasound uses sound waves to show pictures on a monitor. An ultrasound may be done to show a clot in your vein.     Contrast venography is an x-ray of a vein. Contrast liquid is used to make the vein easier to see on the x-ray. Tell a healthcare provider if you have ever had an allergic reaction to contrast liquid.            What can I do to prevent a DVT?  Walking and being mobile is very important after surgery to  prevent a blood clot from forming even if you are having surgical discomfort. You need to walk.  If you are on a car ride, you must stop, get out and walk around every hour,  flex your ankle frequently while riding in the car. If you must travel by airplane you will need to get up and walk the isle every hour, flex your ankles frequently while sitting. Compression socks 20-30 mm Hg are recommended as well.  You can buy these in a pharmacy.     Exercise regularly to help increase your blood flow. Walking is a good low-impact exercise. Talk to your healthcare provider about the best exercise plan for you.          Change your body position or move around often. Move and stretch in your seat several times each hour if you travel by car or work at a desk. In an airplane, get up and walk every hour. Move your legs by tightening and releasing your leg muscles while sitting. You can move your legs while sitting by raising and lowering your heels. Keep your toes on the floor while you do this. You can also raise and lower your toes while keeping your heels on the floor.                  Maintain a healthy weight. Ask your healthcare provider what a healthy weight is for you. Ask him or her to help you create a weight loss plan if you are overweight.     Do not smoke. Nicotine and other chemicals in cigarettes and cigars can damage blood vessels and make it more difficult to manage your DVT. Ask your healthcare provider for information if you currently smoke and need help to quit. E-cigarettes or smokeless tobacco still contain nicotine. Talk to your healthcare provider before you use these products.     Ask about birth control if you are a woman who takes the pill. A birth control pill increases the risk for PE in certain women. The risk is higher if you are also older than 35, smoke cigarettes, or have a blood clotting disorder. Talk to your healthcare provider about other ways to prevent pregnancy, such as a cervical cap  or intrauterine device (IUD).

## 2024-03-27 NOTE — PROGRESS NOTES
[facility]  Progress Note  Name: Dwaine Gould I  MRN: [id number]  Unit/Bed#: ICU 07 I Date of Admission: 3/19/2024   Date of Service: 3/27/2024 I Hospital Day: 8    Assessment/Plan   Gram-negative bacteremia  Assessment & Plan  3/19 1/2 BC w/ GNR  Body fluid culture and GS w/ E coli  In the setting of bowel perforation   Continue Unasyn  D consultation appreciated.  1 more day of antibiotics through tomorrow    Thrombocytopenia ([facility])  Assessment & Plan  Resolved could be reactive    Anemia  Assessment & Plan  Hemoglobin is stable    Stage 3a chronic kidney disease ([facility])  Assessment & Plan  Lab Results   Component Value Date    EGFR 94 03/27/2024    EGFR 92 03/26/2024    EGFR 94 03/25/2024    CREATININE 0.69 03/27/2024    CREATININE 0.71 03/26/2024    CREATININE 0.68 03/25/2024     Creatinine back to baseline.  Avoid nephrotoxic medications.     Acute on chronic respiratory failure with hypoxia and hypercapnia ([facility])  Assessment & Plan  Pt with COPD and likely RILEY/OHS, D/C home with oxygen 4-5L  Extubated 3/23  Continue scheduled nebs  BiPAP at bedtime  Supplemental oxygen to maintain oxygen saturations greater than 90%.  Will need to make sure that BiPAP is set up for home.  I did reach out to case management    Hyperlipidemia  Assessment & Plan  On Statins    Essential hypertension  Assessment & Plan  Present on admission with history of hypertension.  Oral meds on hold  PRN hydralazine for SBP>170  Takes labetolol at home-using hydralazine prn currently given bradycardia    Leukocytosis  Assessment & Plan  In the setting of pneumoperitoneum  F/U blood and OR cultures  Improving    COPD (chronic obstructive pulmonary disease) ([facility])  Assessment & Plan  Present on admission history of COPD.  Patient was discharged home today with home oxygen 4 to 5 L which is new for her.  She was not on home o2 prior.   Currently not in acute exacerbation.  O2 saturation 92 to 94% on nasal cannula.      Acute  on chronic heart failure with preserved ejection fraction (HCC)  Assessment & Plan  Wt Readings from Last 3 Encounters:   03/27/24 121 kg (266 lb 8.6 oz)   03/19/24 128 kg (282 lb 3 oz)   07/20/21 103 kg (227 lb 8.2 oz)     Echo report from 03/12/24 noted with EF of 60%.  Pt presented to the hospital 3/11 and was aggressively diuresed  Echo on 3/12 showed EF 60%, mild to moderate concentric hypertrophy, and normal diastolic function  Was discharged home on 3/19 but returned later that day after CT findings of free intraperitoneal air and taken to OR  Diuresis initiated 3/20-pt diuresing well  Monitor daily weights  Strict I/O  Remains net negative  Continue with p.o. torsemide          CAD (coronary artery disease)  Assessment & Plan  Per [facility] records patient with single-vessel CAD with ostial RPDA lesion not amenable to PCI due to location of lesion October 2014  Continue medical management-po meds     Tobacco abuse  Assessment & Plan  Patient used to smoke 2 packs a day.  Reports last cigarette was before hospitalization on 3/12/24.  Counseled on continuous cessation..    * Pneumoperitoneum  Assessment & Plan  Pt had abdominal pain 3/19-CT scan showed free intraperitoneal air  Taken urgently to OR s/p diag lap converted to exp lap, right hemicolectomy with anastomosis, washout, Abthera placement  In OR, abdominal cavity was noted to have grossly purulent material in it along with perforation in the cecum at a mass  transferred to ICU postoperatively with abdomen open mechanical ventilation  3/21 OR for washout and closure w/LISSY drain  F/U culture results  Diet advance per surgery.  Patient had a postop ileus but seems to be improving                     VTE Pharmacologic Prophylaxis: VTE Score: 1 Moderate Risk (Score 3-4) - Pharmacological DVT Prophylaxis Ordered: heparin.    Mobility:   Basic Mobility Inpatient Raw Score: 18  JH-HLM Goal: 6: Walk 10 steps or more  JH-HLM Achieved: 3: Sit at edge of bed  JH-HLM Goal  NOT achieved. Continue with multidisciplinary rounding and encourage appropriate mobility to improve upon -[facility] goals.    Patient Centered Rounds: I performed bedside rounds with nursing staff today.   Discussions with Specialists or Other Care Team Provider: Infectious disease    Education and Discussions with Family / Patient: Updated  (daughter) via phone.    Total Time Spent on Date of Encounter in care of patient: 25 mins. This time was spent on one or more of the following: performing physical exam; counseling and coordination of care; obtaining or reviewing history; documenting in the medical record; reviewing/ordering tests, medications or procedures; communicating with other healthcare professionals and discussing with patient's family/caregivers.    Current Length of Stay: 8 day(s)  Current Patient Status: Inpatient   Certification Statement: The patient will continue to require additional inpatient hospital stay due to monitoring advance in diet  Discharge Plan: Anticipate discharge in 48-72 hrs to home with home services.    Code Status: Level 1 - Full Code    Subjective:   Patient seen and examined.  States she is feeling a little better.  Abdominal pain is almost completely gone    Objective:     Vitals:   Temp (24hrs), Av.6 °F (37 °C), Min:98.6 °F (37 °C), Max:98.6 °F (37 °C)    Temp:  [98.6 °F (37 °C)] 98.6 °F (37 °C)  HR:  [62-68] 65  Resp:  [16-19] 16  BP: (118-149)/(58-69) 118/58  SpO2:  [93 %-99 %] 93 %  Body mass index is 52.05 kg/m².     Input and Output Summary (last 24 hours):     Intake/Output Summary (Last 24 hours) at 3/27/2024 1201  Last data filed at 3/27/2024 1100  Gross per 24 hour   Intake 1660 ml   Output 1700 ml   Net -40 ml       Physical Exam:   Physical Exam   General Appearance:    Alert, cooperative, no distress, appears stated age                               Lungs:     Clear to auscultation bilaterally, respirations unlabored       Heart:    Regular rate  and rhythm, S1 and S2 normal, no murmur, rub    or gallop   Abdomen:     Soft, non-tender, bowel sounds active all four quadrants,     no masses, no organomegaly           Extremities:   Extremities normal, atraumatic, no cyanosis or edema                         Additional Data:     Labs:  Results from last 7 days   Lab Units 03/27/24  0440   WBC Thousand/uL 14.34*   HEMOGLOBIN g/dL 10.5*   HEMATOCRIT % 35.4   PLATELETS Thousands/uL 247     Results from last 7 days   Lab Units 03/27/24  0440 03/22/24  1423 03/22/24  0536   SODIUM mmol/L 142   < > 147   POTASSIUM mmol/L 2.8*   < > 3.3*   CHLORIDE mmol/L 105   < > 104   CO2 mmol/L 29   < > 35*   BUN mg/dL 13   < > 20   CREATININE mg/dL 0.69   < > 1.04   ANION GAP mmol/L 8   < > 8   CALCIUM mg/dL 7.5*   < > 8.4   ALBUMIN g/dL  --   --  3.9   TOTAL BILIRUBIN mg/dL  --   --  1.29*   ALK PHOS U/L  --   --  48   ALT U/L  --   --  13   AST U/L  --   --  12*   GLUCOSE RANDOM mg/dL 88   < > 95    < > = values in this interval not displayed.         Results from last 7 days   Lab Units 03/27/24  1112 03/27/24  0733 03/26/24  2103 03/26/24  1614 03/26/24  1215 03/26/24  0707 03/25/24  2059 03/25/24  1606 03/25/24  1151 03/25/24  0728 03/24/24  2123 03/24/24  1614   POC GLUCOSE mg/dl 99 93 111 97 106 104 105 149* 128 136 145* 150*         Results from last 7 days   Lab Units 03/21/24  0518   PROCALCITONIN ng/ml 0.39*       Lines/Drains:  Invasive Devices       Peripheral Intravenous Line  Duration             Peripheral IV 03/25/24 Left;Ventral (anterior) Forearm 2 days              Drain  Duration             Closed/Suction Drain Left LLQ Bulb 6 days    External Urinary Catheter 1 day                          Imaging: No pertinent imaging reviewed.    Recent Cultures (last 7 days):         Last 24 Hours Medication List:   Current Facility-Administered Medications   Medication Dose Route Frequency Provider Last Rate    acetaminophen  975 mg Oral Q6H GUSTAVO Miranda       albuterol  2.5 mg Nebulization Q6H PRN Micheal Bañuelos MD      amLODIPine  5 mg Oral Daily Laine Henderson, CRNP      ampicillin-sulbactam  3 g Intravenous Q6H Laine Henderson, CRNP 3 g (03/27/24 0423)    atorvastatin  40 mg Oral Daily With Dinner Laine Henderson, CRNP      cyanocobalamin  100 mcg Oral Daily Laine Henderson, CRNP      escitalopram  5 mg Oral Daily Laine Henderson, CRNP      Fluticasone Furoate-Vilanterol  1 puff Inhalation Daily Laine Henderson, CRNP      And    umeclidinium  1 puff Inhalation Daily Laine Henderson, CRNP      folic acid  1 mg Oral Daily Laine Henderson, CRNP      heparin (porcine)  7,500 Units Subcutaneous Q8H [facility] Laine Henderson, CRNP      hydrALAZINE  10 mg Intravenous Q6H PRN Laine Henderson, CRNP      HYDROmorphone  0.2 mg Intravenous Q4H PRN Derick Steen MD      insulin lispro  1-5 Units Subcutaneous HS Laine Henderson, CRNP      insulin lispro  1-6 Units Subcutaneous TID AC Laine Henderson, GUSTAVO      metoprolol tartrate  25 mg Oral Q12H [facility] Laine Henderson, GUSTAVO      multi-electrolyte  60 mL/hr Intravenous Continuous Derick Steen MD 60 mL/hr (03/27/24 0423)    nicotine  1 patch Transdermal Daily Laine Henderson, GUSTAVO      oxyCODONE  5 mg Oral Q6H PRN Derick Steen MD      oxymetazoline  2 spray Each Nare Q12H [facility] Derick Steen MD      potassium chloride  40 mEq Oral Once Micheal Bañuelos MD      potassium chloride  40 mEq Intravenous Q2H Micheal Bañuelos MD      sodium chloride  1 spray Each Nare Q1H PRN Laine Henderson, GUSTAVO      topiramate  25 mg Oral BID Laine Henderson, GUSTAVO      torsemide  20 mg Oral Daily Laine Henderson, GUSTAVO          Today, Patient Was Seen By: Micheal Bañuelos MD    **Please Note: This note may have been constructed using a voice recognition system.**

## 2024-03-27 NOTE — ASSESSMENT & PLAN NOTE
Per [facility] records patient with single-vessel CAD with ostial RPDA lesion not amenable to PCI due to location of lesion October 2014  Continue medical management-po meds

## 2024-03-27 NOTE — ASSESSMENT & PLAN NOTE
3/19 1/2 BC w/ GNR  Body fluid culture and GS w/ E coli  In the setting of bowel perforation   Continue Unasyn  D consultation appreciated.  1 more day of antibiotics through tomorrow

## 2024-03-27 NOTE — OCCUPATIONAL THERAPY NOTE
Occupational Therapy Progress Note     Patient Name: Dwaine Gould  Today's Date: 3/27/2024  Problem List  Principal Problem:    Pneumoperitoneum  Active Problems:    Tobacco abuse    CAD (coronary artery disease)    Acute on chronic heart failure with preserved ejection fraction (HCC)    COPD (chronic obstructive pulmonary disease) (HCC)    Leukocytosis    Essential hypertension    Hyperlipidemia    Acute on chronic respiratory failure with hypoxia and hypercapnia (HCC)    Stage 3a chronic kidney disease (HCC)    Anemia    Thrombocytopenia (HCC)    Gram-negative bacteremia          03/27/24 1135   OT Last Visit   OT Visit Date 03/27/24   Note Type   Note Type Treatment   Pain Assessment   Pain Assessment Tool 0-10   Pain Score 2   Pain Location/Orientation Orientation: Lower;Location: Abdomen   Patient's Stated Pain Goal No pain   Hospital Pain Intervention(s) Repositioned;Ambulation/increased activity   Restrictions/Precautions   Weight Bearing Precautions Per Order No   Other Precautions Chair Alarm;Bed Alarm;Multiple lines;O2;Fall Risk;Pain  (post op abdominal precautions)   ADL   Where Assessed Other (Comment)  (Assist levels for some self care tasks are based on functional assessment of performance skills and deficits observed during session.)   Eating Assistance 5  Supervision/Setup   Eating Deficit Setup   Grooming Assistance 4  Minimal Assistance   Grooming Deficit Setup;Supervision/safety;Increased time to complete  (seated)   UB Dressing Assistance 4  Minimal Assistance   UB Dressing Deficit Setup;Supervision/safety;Increased time to complete   LB Dressing Assistance 2  Maximal Assistance   LB Dressing Deficit Setup;Supervision/safety;Increased time to complete;Don/doff R sock;Don/doff L sock   Toileting Assistance  2  Maximal Assistance   Toileting Deficit Setup;Steadying;Supervison/safety;Increased time to complete;Perineal hygiene;Clothing management up;Clothing management down   Toileting Comments pt  assisted by holdin clothing, maintaining balance during standin for hygiene   Bed Mobility   Additional Comments pt received seated in bed side chair at start of session, pt remained in chair at end of session   Transfers   Sit to Stand 4  Minimal assistance   Additional items Assist x 1;Increased time required;Verbal cues   Stand to Sit 4  Minimal assistance   Additional items Assist x 1;Armrests;Increased time required;Verbal cues   Stand pivot 4  Minimal assistance   Additional items Assist x 1;Increased time required;Verbal cues   Toilet transfer 4  Minimal assistance   Additional items Assist x 1;Armrests;Increased time required;Commode   Additional Comments with RW, VCs for walker management and safety   Functional Mobility   Functional Mobility 4  Minimal assistance   Additional Comments assist x1   Additional items Rolling walker   Subjective   Subjective Pt agreeable to therapy session   Cognition   Overall Cognitive Status WFL   Arousal/Participation Alert;Cooperative   Attention Within functional limits   Orientation Level Oriented X4   Memory Within functional limits   Following Commands Follows all commands and directions without difficulty   Activity Tolerance   Activity Tolerance Patient tolerated treatment well   Medical Staff Made Aware LAURA Felton, PCA   Assessment   Assessment Pt seen this date for skilled OT session focused on ADLs, functional transfers and mobility, safety education. The patient was received seated in bed side chair, NAD, PIV access, O2 NC. She participated in functional ambulation in the room, BSC transfer, toileting, and sit to stand transfers.  Pt required Minimal Assistance x1 with RW for mobility, verbal cues for safety and walker navigation, Maximal Assistance for toileting. At end of session the patient was located seated in bed side chair with call bell in reach and all needs met. Overall the patient remains below her functional baseline, and is primarily limited at this  time due to post op pain, mildly impaired balance, and decreased activity tolerance. OT will continue to follow while acute to address POC. At this time, recommend Level 2 Moderate Resource Intensity upon d/c.   Plan   Treatment Interventions ADL retraining;Functional transfer training;Endurance training;Patient/family training   Goal Expiration Date 04/08/24   OT Treatment Day 1   OT Frequency 3-5x/wk   Discharge Recommendation   Rehab Resource Intensity Level, OT II (Moderate Resource Intensity)   AM-PAC Daily Activity Inpatient   Lower Body Dressing 2   Bathing 2   Toileting 2   Upper Body Dressing 2   Grooming 3   Eating 4   Daily Activity Raw Score 15   Daily Activity Standardized Score (Calc for Raw Score >=11) 34.69   AM-PAC Applied Cognition Inpatient   Following a Speech/Presentation 4   Understanding Ordinary Conversation 4   Taking Medications 3   Remembering Where Things Are Placed or Put Away 4   Remembering List of 4-5 Errands 3   Taking Care of Complicated Tasks 3   Applied Cognition Raw Score 21   Applied Cognition Standardized Score 44.3       Mason Hughes, OTR/L

## 2024-03-27 NOTE — CASE MANAGEMENT
Case Management Discharge Planning Note    Patient name Dwaine Gould  Location ICU 07/ICU 07 MRN [id number]  : 1960 Date 3/27/2024       Current Admission Date: 3/19/2024  Current Admission Diagnosis:Pneumoperitoneum   Patient Active Problem List    Diagnosis Date Noted    Gram-negative bacteremia 2024    Anemia 2024    Thrombocytopenia (HCC) 2024    Acute on chronic respiratory failure with hypoxia and hypercapnia (HCC) 2024    Stage 3a chronic kidney disease (HCC) 2024    Pneumoperitoneum 2024    Acute on chronic heart failure with preserved ejection fraction (HCC) 2024    COPD (chronic obstructive pulmonary disease) (HCC) 2024    Leukocytosis 2024    Tobacco abuse 2021    CAD (coronary artery disease) 2021    Essential hypertension 2019    Hyperlipidemia 2019      LOS (days): 8  Geometric Mean LOS (GMLOS) (days): 9.8  Days to GMLOS:1.9     OBJECTIVE:  Risk of Unplanned Readmission Score: 17.92         Current admission status: Inpatient   Preferred Pharmacy:   [id number] - [place] - [address]  [address]  [place] [id number]  Phone: [phone] Fax: [phone]    Primary Care Provider: Jordan Ramirez MD    Primary Insurance: N-Trig  Secondary Insurance:     DISCHARGE DETAILS:                                          Other Referral/Resources/Interventions Provided:  Referral Comments: Discussed pt at [facility] rounds this AM.  Pt on Bipap HS, IV abxs, IV KCl, IVF at 60, O2 4L NC.  Attempted to see pt today to discuss d/c plan;  pt receiving care.  Will visit again.  Diet being advanced slowly;  surgical soft/lite meal/ lo residue diet ordered today.         Treatment Team Recommendation: Short Term Rehab, SNF, Home with Home Health Care  Discharge Destination Plan:: SNF, Short Term Rehab, Home with Home Health Care, Acute Rehab  Transport at Discharge : Other (Comment) (TBD)

## 2024-03-27 NOTE — PLAN OF CARE
Problem: PHYSICAL THERAPY ADULT  Goal: Performs mobility at highest level of function for planned discharge setting.  See evaluation for individualized goals.  Description: Treatment/Interventions: Functional transfer training, LE strengthening/ROM, Therapeutic exercise, Endurance training, Patient/family training, Equipment eval/education, Bed mobility, Gait training, Spoke to nursing  Equipment Recommended: Walker       See flowsheet documentation for full assessment, interventions and recommendations.  Outcome: Progressing  Note: Prognosis: Good  Problem List: Decreased strength, Decreased endurance, Impaired balance, Decreased mobility, Decreased safety awareness, Impaired sensation  Assessment: pt began tx session lying supine in the bed and was agreeable to participate in PT intervention. PT intervention to focus on bed mobility, transfer training, increasing endurance and posture/balance w/ gait. Since last PT intervention progess was not as pt was able to complete a supine<>sit EOB transfer with a decrease in level of assistance compared to previous tx sessions min ax1 with trunk management. pt was able to sit EOB 3 minutes w/o LOB while being educated on all functional transfers to and from RW. pt continues to remain consistant for requiring min Ax1 for all functional transfers to and from RW w/ Vc's for hand placement. Post tx pt continues to be limited with activity tolerance and ambulation distance due to fatigue and generalized weakness. pt would benefit from continued skilled Pt intervention in order to address deficits listed above. Continue to recommedn DC w/ level 2 moderate rehab resource intensity when medically cleared  Barriers to Discharge: Inaccessible home environment, Decreased caregiver support     Rehab Resource Intensity Level, PT: II (Moderate Resource Intensity)    See flowsheet documentation for full assessment.

## 2024-03-27 NOTE — ASSESSMENT & PLAN NOTE
Pt had abdominal pain 3/19-CT scan showed free intraperitoneal air  Taken urgently to OR s/p diag lap converted to exp lap, right hemicolectomy with anastomosis, washout, Abthera placement  In OR, abdominal cavity was noted to have grossly purulent material in it along with perforation in the cecum at a mass  transferred to ICU postoperatively with abdomen open mechanical ventilation  3/21 OR for washout and closure w/LISSY drain  F/U culture results  Diet advance per surgery.  Patient had a postop ileus but seems to be improving

## 2024-03-27 NOTE — PROGRESS NOTES
"Progress Note - General Surgery   Dwaine Gould 63 y.o. female MRN: [id number]  Unit/Bed#: ICU 07 Encounter: [id number]    Assessment/Plan  POD 7sp diagnostic laparoscopic converted to exploratory laparotomy for perforated cecal adenocarcinoma , placement of ABThera wound vac  POD 5 take back to OR, washout, closure of abdomen   Leukocytosis 14. 16,   Bacteroides Bacteremia  Purulent Peritonitis       VSS  UO 2050  LISSY 420 serous drainage   K+2.8, 2.7, 3.2  Stool, 250, pt adds he had 6-7 watery diarrhea yesterday.  WBC 14, 16, 11  Unasyn to finish today, repeat blood cultures obtained today.  Pt is ambulating, IS    Multiple watery diarrhea yesterday and today, no nausea or vomiting. Pain is less, now cramping and relieved with bowel movements. Mild incisional tenderness. LISSY drain serous , incision healing well    -will advance to soft surgical diet, start slowly  -cont to monitor LISSY output and appearance   -cont to monitor leukocytosis  -appreciate ID recommendation. Unasyn to finish today, repeat follow up blood cultures  -cont OOB, ambulation   -cont IS  -cont to monitor exam, bowel function, blood work  -will d/c drain upon discharge  -hypokalemia replete per SLIM           Chief Complaint: no complaints.  Pt has had 6-7 watery diarrhea yesterday and once today  No nausea or vomiting,  Pain is much less, cramping across right and left lower abdomen, relieved with bowel movement  I am getting into the chair, PT walked with me in the halls.       Objective/Exam: Blood pressure 118/58, pulse 68, temperature 98.6 °F (37 °C), temperature source Oral, resp. rate 16, height 5' (1.524 m), weight 121 kg (266 lb 8.6 oz), SpO2 98%.    Wound Culure: No results found for: \"WOUNDCULT\"      General Appearance:    Alert and orientated x 3, cooperative, no distress   Lungs:     Clear to auscultation bilaterally, respirations unlabored, O2 NC     Heart:    Regular rate and rhythm   Abdomen:     Soft, appropriate incisional " "tenderness  Incision closed with staples and reinforce suture, no erythema or drainage.   LISSY drain with serous drainage          Extremities:   Extremities normal,  no cyanosis or edema   Pulses:   2+ and symmetric all extremities, no calf tenderness   Skin:   Skin color, texture, turgor normal, no rashes or lesions   Neurologic:   CNII-XII intact, normal strength, sensation and reflexes     Throughout, affect appropriate       ,      Intake/Output Summary (Last 24 hours) at 3/27/2024 0931  Last data filed at 3/27/2024 0600  Gross per 24 hour   Intake 1780 ml   Output 1870 ml   Net -90 ml       Invasive Devices       Peripheral Intravenous Line  Duration             Peripheral IV 03/25/24 Left;Ventral (anterior) Forearm 2 days              Drain  Duration             Closed/Suction Drain Left LLQ Bulb 5 days    External Urinary Catheter 1 day                                            Labs: CBC with diff: @RESUFAST(WBC,HGB,HCT,MCV,PLT,ADJUSTEDWBC,   RBC,MCH,MCHC,RDW,MPV,NRBC,TOTALCELLSCOUNTED,SEGS%,GRANS%,LYMPHS%,EOS%,BASO%,ABNEUT,ABGRANS,ABLYMPHS,ABMOMOS,ABEOS,ABBASO)@,   BMP/CMP:  Lab Results   Component Value Date    K 2.8 (L) 03/27/2024    K 4.5 12/01/2021     03/27/2024     12/01/2021    CO2 29 03/27/2024    CO2 36 (H) 03/19/2024    CO2 28 12/01/2021    BUN 13 03/27/2024    BUN 22 12/01/2021    CREATININE 0.69 03/27/2024    CREATININE 0.84 12/01/2021    GLUCOSE 152 (H) 03/19/2024    CALCIUM 7.5 (L) 03/27/2024    CALCIUM 9.0 12/01/2021    AST 12 (L) 03/22/2024    AST 13 12/01/2021    ALT 13 03/22/2024    ALT 30 12/01/2021    ALKPHOS 48 03/22/2024    ALKPHOS 143 (H) 12/01/2021    EGFR 94 03/27/2024    EGFR 65 07/09/2019   ,   Lipid Panel: No results found for: \"CHOL\",   Coags: No results found for: \"PT\", \"PTT\", \"INR\",     Blood Culture:   Lab Results   Component Value Date    BLOODCX No Growth After 5 Days. 03/19/2024    BLOODCX Bacteroides thetaiotaomicron group (A) 03/19/2024   ,   Urinalysis:   Lab " "Results   Component Value Date    COLORU Light Yellow 03/19/2024    CLARITYU Clear 03/19/2024    SPECGRAV 1.022 03/19/2024    PHUR 8.0 03/19/2024    LEUKOCYTESUR Negative 03/19/2024    NITRITE Negative 03/19/2024    GLUCOSEU Negative 03/19/2024    KETONESU Negative 03/19/2024    BILIRUBINUR Negative 03/19/2024    BLOODU Negative 03/19/2024   ,   Urine Culture: No results found for: \"URINECX\",         Imaging: XR chest portable ICU    Result Date: 3/22/2024  Impression: Moderate congestive changes with bilateral small effusions. Workstation performed: [id number]     XR chest portable ICU    Result Date: 3/20/2024  Impression: 1. Low-normal positioning of the endotracheal tube, which is 1.9 cm above the nelson (consider withdrawing by 0.5 to 1 cm). Satisfactory positioning of the enteric tube. 2. Scattered subsegmental atelectasis with trace bilateral pleural effusions. Workstation performed: [id number]     CT abdomen pelvis wo contrast    Result Date: 3/19/2024  Impression: 1. Modest amount of pneumoperitoneum. Although the origin is unclear, findings are concerning for ruptured hollow viscus. Surgical consultation is advised. 2. Cluster of nodules in the right lower quadrant mesentery the largest measuring 3.0 x 2.3 cm (2/110) and another 1.7 cm nodule on image 111. Findings may be reactive. Follow-up CT and/or further work-up will be required to exclude possibility of a mesenteric mass such as carcinoid. 3. Small bilateral pleural effusions with mild bibasilar compressive atelectasis. Mild cardiomegaly. 4. Subcutaneous edema and thickening of the lower anterior abdominal wall, findings which may represent panniculitis. I personally discussed this study with Dr. Rashid Bañuelos on 3/19/2024 at 5:33 PM. Workstation performed: [id number]         Susi Schwartz PA-C  3/27/2024      "

## 2024-03-27 NOTE — ASSESSMENT & PLAN NOTE
Pt with COPD and likely RILEY/OHS, D/C home with oxygen 4-5L  Extubated 3/23  Continue scheduled nebs  BiPAP at bedtime  Supplemental oxygen to maintain oxygen saturations greater than 90%.  Will need to make sure that BiPAP is set up for home.  I did reach out to case management

## 2024-03-27 NOTE — CONSULTS
Consultation - Pulmonary Medicine   Dwaine Gould 63 y.o. female MRN: [id number]  Unit/Bed#: ICU 07 Encounter: [id number]      Assessment:  Acute on chronic hypoxemic and hypercapnic respiratory failure  Likely COPD not in any exacerbation  Obesity hypoventilation syndrome  Pulmonary hypertension  Morbid obesity  Active tobacco use    Plan:   Acute on chronic hypoxemic and hypercapnic respiratory failure in the setting of likely COPD and obesity hypoventilation   currently on 3 L of oxygen by nasal cannula saturating well  Likely COPD with extensive smoking history although no PFTs are available, she has been on Trelegy 1 puff daily and albuterol MDI following up at [facility] pulmonology   Continue with her home maintenance inhalers  For the evaluation for the nocturnal BiPAP, will get a nocturnal pulse oximetry at her baseline requirement of oxygen at 3 L and an ABG in AM.  Also her recent echocardiogram demonstrated pulmonary hypertension estimated right ventricular systolic pressure of around 40 mmHg, right atrium and right ventricular moderately dilated, likely in the setting of chronic hypoxemia likely COPD CT of the chest also demonstrating with moderate centrilobular and panlobular emphysema and likely obesity hypoventilation  Candidate for lung cancer screening recent CT of the chest done on 3/14 with mild interstitial edema no suspicious lung nodules would need a repeat in March 2025 with a CT lung screening  Assessment and evaluation of her home oxygen needs at time of discharge  Outpatient pulmonary follow-up for PFTs optimization of her likely COPD and management of her PAP machine  Smoking cessation continue with her NicoDerm patches  Discussed with [facility] attending Dr. Bañuelos        History of Present Illness   Physician Requesting Consult: Micheal Bañuelos MD  Reason for Consult / Principal Problem: Acute on chronic hypoxemic and hypercapnic respiratory failure for nocturnal BiPAP  Hx and PE limited  by: None  HPI: Dwaine Gould is a 63 y.o. year old female who presents with significant smoking history she states she was smoking 2 packs for several years in 2022 she cut it down to 1-1/2 packs currently smoking a pack a day until last week before she came into the hospital she states.  She was diagnosed with COPD she states I do not see any PFTs but she is was following up with Dr. Amor  at [facility], she states she has been on Trelegy 1 puff daily and albuterol MDI which she has been using once occasionally twice a day.  She was not on any home oxygen she states.  She also had a nebulizer machine she states when she uses occasionally once or twice a week.  Reviewed notes from [facility] and orders seen for Trelegy as well as albuterol MDI.  She states she has never been admitted for any COPD flareup in the past.  She was recently admitted 3/12 at [facility] for shortness of breath found to be in volume overload, and was then discharged with 3 L of oxygen she states.  She came back into the hospital on 3/19 with abdominal pain found to have pneumoperitoneum and she was taken to the OR and had exploratory laparotomy on 3/19 with right hemicolectomy for the perforated cecal mass,  Patient was found to be in acute on chronic respiratory failure with hypoxia and hypercapnia has been placed on BiPAP at nighttime while she is here in the hospital.  Pulmonary has been consulted for the nocturnal BiPAP for home evaluation.    Inpatient consult to Pulmonology  Consult performed by: Betty Pierce MD  Consult ordered by: Micheal Bañuelos MD          Review of Systems   Constitutional:  Positive for fatigue.   HENT: Negative.     Eyes: Negative.    Respiratory:  Positive for shortness of breath.    Cardiovascular: Negative.    Gastrointestinal: Negative.    Endocrine: Negative.    Genitourinary: Negative.    Musculoskeletal:  Positive for back pain.   Neurological: Negative.     Psychiatric/Behavioral: Negative.         Historical Information   Past Medical History:   Diagnosis Date    Acute metabolic encephalopathy     Hyperlipidemia     Hypertension     Transaminitis      Past Surgical History:   Procedure Laterality Date    CARDIAC CATHETERIZATION      GANGLION CYST EXCISION      LAPAROTOMY N/A 3/21/2024    Procedure: LAPAROTOMY EXPLORATORY, washout, complex closure, LISSY drain;  Surgeon: Francine Reid MD;  Location: [facility];  Service: General    ID LAPS ABD PRTM&OMENTUM DX W/WO SPEC BR/WA SPX N/A 3/19/2024    Procedure: DIAGNOSTIC LAPAROSCOPY converted to Exploratory Laparotomy, Right Hemicolectomy, Abdominal wash-out, Abthera Placement;  Surgeon: Roger Joel DO;  Location: [facility];  Service: General    TYMPANOSTOMY TUBE PLACEMENT       Social History   Social History     Substance and Sexual Activity   Alcohol Use Never     Social History     Substance and Sexual Activity   Drug Use Never     E-Cigarette/Vaping    E-Cigarette Use Current Every Day User      E-Cigarette/Vaping Substances    Nicotine Yes     THC No     CBD No     Flavoring No     Other No     Unknown No      Social History     Tobacco Use   Smoking Status Every Day    Current packs/day: 0.50    Types: Cigarettes   Smokeless Tobacco Never         Family History: non-contributory    Meds/Allergies   current meds:   Current Facility-Administered Medications   Medication Dose Route Frequency    acetaminophen (TYLENOL) tablet 975 mg  975 mg Oral Q6H ANGELES    albuterol inhalation solution 2.5 mg  2.5 mg Nebulization Q6H PRN    amLODIPine (NORVASC) tablet 5 mg  5 mg Oral Daily    ampicillin-sulbactam (UNASYN) 3 g in sodium chloride 0.9 % 100 mL IVPB  3 g Intravenous Q6H    atorvastatin (LIPITOR) tablet 40 mg  40 mg Oral Daily With Dinner    cyanocobalamin (VITAMIN B-12) tablet 100 mcg  100 mcg Oral Daily    escitalopram (LEXAPRO) tablet 5 mg  5 mg Oral Daily    Fluticasone Furoate-Vilanterol 100-25  mcg/actuation 1 puff  1 puff Inhalation Daily    And    umeclidinium 62.5 mcg/actuation inhaler AEPB 1 puff  1 puff Inhalation Daily    folic acid (FOLVITE) tablet 1 mg  1 mg Oral Daily    heparin (porcine) subcutaneous injection 7,500 Units  7,500 Units Subcutaneous Q8H ANGELES    hydrALAZINE (APRESOLINE) injection 10 mg  10 mg Intravenous Q6H PRN    HYDROmorphone HCl (DILAUDID) injection 0.2 mg  0.2 mg Intravenous Q4H PRN    insulin lispro (HumALOG/ADMELOG) 100 units/mL subcutaneous injection 1-5 Units  1-5 Units Subcutaneous HS    insulin lispro (HumALOG/ADMELOG) 100 units/mL subcutaneous injection 1-6 Units  1-6 Units Subcutaneous TID AC    metoprolol tartrate (LOPRESSOR) tablet 25 mg  25 mg Oral Q12H ANGELES    multi-electrolyte (PLASMALYTE-A/ISOLYTE-S PH 7.4) IV solution  60 mL/hr Intravenous Continuous    nicotine (NICODERM CQ) 21 mg/24 hr TD 24 hr patch 1 patch  1 patch Transdermal Daily    oxyCODONE (ROXICODONE) IR tablet 5 mg  5 mg Oral Q6H PRN    oxymetazoline (AFRIN) 0.05 % nasal spray 2 spray  2 spray Each Nare Q12H ANGELES    potassium chloride 20 mEq IVPB (premix)  20 mEq Intravenous Q2H    sodium chloride (OCEAN) 0.65 % nasal spray 1 spray  1 spray Each Nare Q1H PRN    topiramate (TOPAMAX) tablet 25 mg  25 mg Oral BID    torsemide (DEMADEX) tablet 20 mg  20 mg Oral Daily       Allergies   Allergen Reactions    Codeine Anaphylaxis    Contrast [Iodinated Contrast Media] GI Intolerance    Prednisone Irritability     Increase in anger/abusive behaviors       Objective   Vitals: Blood pressure 128/60, pulse 62, temperature 98.1 °F (36.7 °C), temperature source Oral, resp. rate 20, height 5' (1.524 m), weight 121 kg (266 lb 8.6 oz), SpO2 97%.,Body mass index is 52.05 kg/m².    Intake/Output Summary (Last 24 hours) at 3/27/2024 1648  Last data filed at 3/27/2024 1100  Gross per 24 hour   Intake 959 ml   Output 1545 ml   Net -586 ml     Invasive Devices       Peripheral Intravenous Line  Duration             Peripheral  IV 03/25/24 Left;Ventral (anterior) Forearm 2 days              Drain  Duration             Closed/Suction Drain Left LLQ Bulb 6 days    External Urinary Catheter 1 day                    Physical Exam  Currently in bed in no acute respiratory distress currently on 3 L of oxygen by nasal cannula saturating around 93%.  Eyes anicteric Sleeter, conjunctiva is clear  ENT nares is patent, no evidence of any discharge  Neck no JVD no cervical lymphadenopathy  Lungs diminished breath sounds bilaterally no rhonchi  Heart first and second heart sounds are heard no murmur or gallop is heard  Abdomen soft nontender  Extremities bilateral pedal edema  CNS awake alert oriented x 3.  Lab Results: CBC:   Lab Results   Component Value Date    WBC 14.34 (H) 03/27/2024    HGB 10.5 (L) 03/27/2024    HCT 35.4 03/27/2024    MCV 79 (L) 03/27/2024     03/27/2024    RBC 4.51 03/27/2024    MCH 23.3 (L) 03/27/2024    MCHC 29.7 (L) 03/27/2024    RDW 18.7 (H) 03/27/2024    MPV 9.4 03/27/2024     Imaging Studies: I have personally reviewed pertinent films in PACS  EKG, Pathology, and Other Studies: I have personally reviewed pertinent reports.    VTE Prophylaxis: Sequential compression device (Venodyne)     Code Status: Level 1 - Full Code  Advance Directive and Living Will:      Power of :    POLST:

## 2024-03-27 NOTE — RESPIRATORY THERAPY NOTE
03/27/24 0330   Respiratory Assessment   General Appearance Sleeping   Respiratory Pattern Assisted;Spontaneous   Chest Assessment Chest expansion symmetrical   Resp Comments pt asleep and remains on bipap   O2 Device v60 Eleven   Non-Invasive Information   O2 Interface Device Face mask   Non-Invasive Ventilation Mode BiPAP   SpO2 99 %   $ Pulse Oximetry Spot Check Charge Completed   Non-Invasive Settings   IPAP (cm) 16 cm   EPAP (cm) 10 cm   Rate (Set) 10   FiO2 (%) 40   Rise Time 2   Inspiratory Time (Set) 1   Humidification   (passover)   Non-Invasive Readings   Total Rate 18   Vt (mL) (Mech) 638   MV (Mech) 11.4   Peak Pressure (Obs) 17   Leak (lpm) 5   Skin Intervention Skin intact;Mask rotated   Non-Invasive Alarms   Insp Pressure High (cm H20) 30   Insp Pressure Low (cm H20) 8   Low Insp Pressure Time (sec) 20 sec   MV Low (L/min) 3   Vt High (mL) 1200   Vt Low (mL) 200   High Resp Rate (BPM) 40 BPM   Low Resp Rate (BPM) 8 BPM

## 2024-03-28 VITALS
TEMPERATURE: 97.8 F | SYSTOLIC BLOOD PRESSURE: 128 MMHG | WEIGHT: 268.52 LBS | OXYGEN SATURATION: 98 % | HEART RATE: 70 BPM | HEIGHT: 60 IN | RESPIRATION RATE: 18 BRPM | DIASTOLIC BLOOD PRESSURE: 60 MMHG | BODY MASS INDEX: 52.72 KG/M2

## 2024-03-28 DIAGNOSIS — G47.33 OSA (OBSTRUCTIVE SLEEP APNEA): Primary | ICD-10-CM

## 2024-03-28 PROBLEM — K66.8 PNEUMOPERITONEUM: Status: RESOLVED | Noted: 2024-03-19 | Resolved: 2024-03-28

## 2024-03-28 PROBLEM — R78.81 GRAM-NEGATIVE BACTEREMIA: Status: RESOLVED | Noted: 2024-03-22 | Resolved: 2024-03-28

## 2024-03-28 LAB
ANION GAP SERPL CALCULATED.3IONS-SCNC: 9 MMOL/L (ref 4–13)
ARTERIAL PATENCY WRIST A: YES
BASE EXCESS BLDA CALC-SCNC: -0.4 MMOL/L
BUN SERPL-MCNC: 12 MG/DL (ref 5–25)
CALCIUM SERPL-MCNC: 7.4 MG/DL (ref 8.4–10.2)
CHLORIDE SERPL-SCNC: 107 MMOL/L (ref 96–108)
CO2 SERPL-SCNC: 27 MMOL/L (ref 21–32)
CREAT SERPL-MCNC: 0.7 MG/DL (ref 0.6–1.3)
ERYTHROCYTE [DISTWIDTH] IN BLOOD BY AUTOMATED COUNT: 19.1 % (ref 11.6–15.1)
GFR SERPL CREATININE-BSD FRML MDRD: 93 ML/MIN/1.73SQ M
GLUCOSE SERPL-MCNC: 109 MG/DL (ref 65–140)
GLUCOSE SERPL-MCNC: 94 MG/DL (ref 65–140)
GLUCOSE SERPL-MCNC: 95 MG/DL (ref 65–140)
HCO3 BLDA-SCNC: 25.1 MMOL/L (ref 22–28)
HCT VFR BLD AUTO: 35.6 % (ref 34.8–46.1)
HGB BLD-MCNC: 10.4 G/DL (ref 11.5–15.4)
MCH RBC QN AUTO: 23.2 PG (ref 26.8–34.3)
MCHC RBC AUTO-ENTMCNC: 29.2 G/DL (ref 31.4–37.4)
MCV RBC AUTO: 80 FL (ref 82–98)
NASAL CANNULA: ABNORMAL
O2 CT BLDA-SCNC: 14.7 ML/DL (ref 16–23)
OXYHGB MFR BLDA: 91.9 % (ref 94–97)
PCO2 BLDA: 45.1 MM HG (ref 36–44)
PH BLDA: 7.36 [PH] (ref 7.35–7.45)
PLATELET # BLD AUTO: 261 THOUSANDS/UL (ref 149–390)
PMV BLD AUTO: 9.6 FL (ref 8.9–12.7)
PO2 BLDA: 69.8 MM HG (ref 75–129)
POTASSIUM SERPL-SCNC: 3.1 MMOL/L (ref 3.5–5.3)
RBC # BLD AUTO: 4.48 MILLION/UL (ref 3.81–5.12)
SODIUM SERPL-SCNC: 143 MMOL/L (ref 135–147)
SPECIMEN SOURCE: ABNORMAL
WBC # BLD AUTO: 13.09 THOUSAND/UL (ref 4.31–10.16)

## 2024-03-28 PROCEDURE — 94762 N-INVAS EAR/PLS OXIMTRY CONT: CPT

## 2024-03-28 PROCEDURE — 99232 SBSQ HOSP IP/OBS MODERATE 35: CPT | Performed by: INTERNAL MEDICINE

## 2024-03-28 PROCEDURE — 85027 COMPLETE CBC AUTOMATED: CPT | Performed by: FAMILY MEDICINE

## 2024-03-28 PROCEDURE — 80048 BASIC METABOLIC PNL TOTAL CA: CPT | Performed by: INTERNAL MEDICINE

## 2024-03-28 PROCEDURE — 99239 HOSP IP/OBS DSCHRG MGMT >30: CPT | Performed by: FAMILY MEDICINE

## 2024-03-28 PROCEDURE — 82948 REAGENT STRIP/BLOOD GLUCOSE: CPT

## 2024-03-28 PROCEDURE — 82805 BLOOD GASES W/O2 SATURATION: CPT | Performed by: INTERNAL MEDICINE

## 2024-03-28 RX ORDER — OXYCODONE HYDROCHLORIDE 5 MG/1
5 TABLET ORAL EVERY 6 HOURS PRN
Qty: 10 TABLET | Refills: 0 | Status: SHIPPED | OUTPATIENT
Start: 2024-03-28 | End: 2024-04-07

## 2024-03-28 RX ORDER — POTASSIUM CHLORIDE 20 MEQ/1
40 TABLET, EXTENDED RELEASE ORAL ONCE
Status: COMPLETED | OUTPATIENT
Start: 2024-03-28 | End: 2024-03-28

## 2024-03-28 RX ORDER — AMLODIPINE BESYLATE 5 MG/1
5 TABLET ORAL DAILY
Qty: 30 TABLET | Refills: 0 | Status: SHIPPED | OUTPATIENT
Start: 2024-03-29

## 2024-03-28 RX ADMIN — VITAM B12 100 MCG: 100 TAB at 08:28

## 2024-03-28 RX ADMIN — TOPIRAMATE 25 MG: 25 TABLET ORAL at 08:28

## 2024-03-28 RX ADMIN — FLUTICASONE FUROATE AND VILANTEROL TRIFENATATE 1 PUFF: 100; 25 POWDER RESPIRATORY (INHALATION) at 08:28

## 2024-03-28 RX ADMIN — METOPROLOL TARTRATE 25 MG: 25 TABLET, FILM COATED ORAL at 08:28

## 2024-03-28 RX ADMIN — AMLODIPINE BESYLATE 5 MG: 5 TABLET ORAL at 08:28

## 2024-03-28 RX ADMIN — TORSEMIDE 20 MG: 20 TABLET ORAL at 08:29

## 2024-03-28 RX ADMIN — OXYCODONE HYDROCHLORIDE 5 MG: 5 TABLET ORAL at 13:56

## 2024-03-28 RX ADMIN — POTASSIUM CHLORIDE 40 MEQ: 1500 TABLET, EXTENDED RELEASE ORAL at 08:29

## 2024-03-28 RX ADMIN — ACETAMINOPHEN 975 MG: 325 TABLET, FILM COATED ORAL at 08:28

## 2024-03-28 RX ADMIN — POTASSIUM CHLORIDE 40 MEQ: 1500 TABLET, EXTENDED RELEASE ORAL at 09:02

## 2024-03-28 RX ADMIN — FOLIC ACID 1 MG: 1 TABLET ORAL at 08:28

## 2024-03-28 RX ADMIN — NICOTINE 1 PATCH: 21 PATCH, EXTENDED RELEASE TRANSDERMAL at 08:28

## 2024-03-28 RX ADMIN — UMECLIDINIUM 1 PUFF: 62.5 AEROSOL, POWDER ORAL at 08:28

## 2024-03-28 RX ADMIN — HEPARIN SODIUM 7500 UNITS: 5000 INJECTION INTRAVENOUS; SUBCUTANEOUS at 05:56

## 2024-03-28 RX ADMIN — AMPICILLIN SODIUM AND SULBACTAM SODIUM 3 G: 2; 1 INJECTION, POWDER, FOR SOLUTION INTRAMUSCULAR; INTRAVENOUS at 04:26

## 2024-03-28 RX ADMIN — ESCITALOPRAM OXALATE 5 MG: 10 TABLET ORAL at 08:28

## 2024-03-28 NOTE — PROGRESS NOTES
Progress Note - Pulmonary   Dwaine Gould 63 y.o. female MRN: [id number]  Unit/Bed#: ICU 07 Encounter: [id number]    Assessment:  Acute on chronic hypoxemic and hypercapnic respiratory failure  Likely COPD not in exacerbation  Pulmonary hypertension  Morbid obesity  Active tobacco use    Plan:  Acute on chronic hypoxemic and hypercapnic respiratory failure in the setting of likely COPD and obesity hypoventilation  Currently on 3L NC which had been her recent baseline  Overnight pulse ox did not drop below 88% and her pCO2 is 45  She does not qualify for BiPAP based on these results.   She will need outpatient sleep study which we will order  She is a candidate for lung cancer screening which would be due 3/2025  She is planned for discharge home today, can follow up with us in the office in 1-2 weeks    Subjective:   Patient resting in the chair. She is doing ok with her breathing.     Objective:     Vitals: Blood pressure 149/69, pulse 69, temperature 97.7 °F (36.5 °C), temperature source Oral, resp. rate 20, height 5' (1.524 m), weight 122 kg (268 lb 8.3 oz), SpO2 95%.,Body mass index is 52.44 kg/m².      Intake/Output Summary (Last 24 hours) at 3/28/2024 0947  Last data filed at 3/27/2024 2040  Gross per 24 hour   Intake 601 ml   Output 670 ml   Net -69 ml       Invasive Devices       Peripheral Intravenous Line  Duration             Peripheral IV 03/25/24 Left;Ventral (anterior) Forearm 3 days              Drain  Duration             Closed/Suction Drain Left LLQ Bulb 6 days                    Physical Exam: /69 (BP Location: Right arm)   Pulse 69   Temp 97.7 °F (36.5 °C) (Oral)   Resp 20   Ht 5' (1.524 m)   Wt 122 kg (268 lb 8.3 oz)   SpO2 95%   BMI 52.44 kg/m²   General appearance: alert and oriented, in no acute distress  Head: Normocephalic, without obvious abnormality, atraumatic  Eyes: negative findings: conjunctivae and sclerae normal  Lungs:  diminished breath sounds  Heart: regular rate and  rhythm  Abdomen: normal findings: soft, non-tender  Extremities:  bilateral LE edema  Skin:  warm and dry  Neurologic: Mental status: Alert, oriented, thought content appropriate     Labs: I have personally reviewed pertinent lab results., CBC:   Lab Results   Component Value Date    WBC 13.09 (H) 03/28/2024    HGB 10.4 (L) 03/28/2024    HCT 35.6 03/28/2024    MCV 80 (L) 03/28/2024     03/28/2024    RBC 4.48 03/28/2024    MCH 23.2 (L) 03/28/2024    MCHC 29.2 (L) 03/28/2024    RDW 19.1 (H) 03/28/2024    MPV 9.6 03/28/2024   , CMP:   Lab Results   Component Value Date    SODIUM 143 03/28/2024    K 3.1 (L) 03/28/2024     03/28/2024    CO2 27 03/28/2024    BUN 12 03/28/2024    CREATININE 0.70 03/28/2024    CALCIUM 7.4 (L) 03/28/2024    EGFR 93 03/28/2024     Imaging and other studies: I have personally reviewed pertinent reports.   and I have personally reviewed pertinent films in PACS

## 2024-03-28 NOTE — ASSESSMENT & PLAN NOTE
On prior hospitalization she was noted to have an episode of encephalopathy due to respiratory acidosis, hypercapnia which was treated with CPAP.  Currently she is awake, alert, oriented x 3.  Overnight pulse oximetry patient did not drop while she was on 3 L so currently BiPAP is not indicated at night

## 2024-03-28 NOTE — ARC ADMISSION
Referral reviewed with [facility] physician and patient has been pre-approved pending medical stability, insurance auth and bed availability at campus of choice.  CM has been updated in [facility].

## 2024-03-28 NOTE — ASSESSMENT & PLAN NOTE
3/19 1/2 BC w/ GNR  Body fluid culture and GS w/ E coli  In the setting of bowel perforation   Status post antibiotics

## 2024-03-28 NOTE — PLAN OF CARE
Problem: PAIN - ADULT  Goal: Verbalizes/displays adequate comfort level or baseline comfort level  Description: Interventions:  - Encourage patient to monitor pain and request assistance  - Assess pain using appropriate pain scale  - Administer analgesics based on type and severity of pain and evaluate response  - Implement non-pharmacological measures as appropriate and evaluate response  - Consider cultural and social influences on pain and pain management  - Notify physician/advanced practitioner if interventions unsuccessful or patient reports new pain  Outcome: Progressing     Problem: INFECTION - ADULT  Goal: Absence or prevention of progression during hospitalization  Description: INTERVENTIONS:  - Assess and monitor for signs and symptoms of infection  - Monitor lab/diagnostic results  - Monitor all insertion sites, i.e. indwelling lines, tubes, and drains  - Monitor endotracheal if appropriate and nasal secretions for changes in amount and color  - [place] appropriate cooling/warming therapies per order  - Administer medications as ordered  - Instruct and encourage patient and family to use good hand hygiene technique  - Identify and instruct in appropriate isolation precautions for identified infection/condition  Outcome: Progressing  Goal: Absence of fever/infection during neutropenic period  Description: INTERVENTIONS:  - Monitor WBC    Outcome: Progressing     Problem: SAFETY ADULT  Goal: Patient will remain free of falls  Description: INTERVENTIONS:  - Educate patient/family on patient safety including physical limitations  - Instruct patient to call for assistance with activity   - Consult OT/PT to assist with strengthening/mobility   - Keep Call bell within reach  - Keep bed low and locked with side rails adjusted as appropriate  - Keep care items and personal belongings within reach  - Initiate and maintain comfort rounds  - Make Fall Risk Sign visible to staff  - Offer Toileting every 2 Hours,  in advance of need  - Initiate/Maintain bed alarm  - Obtain necessary fall risk management equipment: yes   - Apply yellow socks and bracelet for high fall risk patients  - Consider moving patient to room near nurses station  Outcome: Progressing     Problem: DISCHARGE PLANNING  Goal: Discharge to home or other facility with appropriate resources  Description: INTERVENTIONS:  - Identify barriers to discharge w/patient and caregiver  - Arrange for needed discharge resources and transportation as appropriate  - Identify discharge learning needs (meds, wound care, etc.)  - Arrange for interpretive services to assist at discharge as needed  - Refer to Case Management Department for coordinating discharge planning if the patient needs post-hospital services based on physician/advanced practitioner order or complex needs related to functional status, cognitive ability, or social support system  Outcome: Progressing     Problem: Knowledge Deficit  Goal: Patient/family/caregiver demonstrates understanding of disease process, treatment plan, medications, and discharge instructions  Description: Complete learning assessment and assess knowledge base.  Interventions:  - Provide teaching at level of understanding  - Provide teaching via preferred learning methods  Outcome: Progressing     Problem: SAFETY,RESTRAINT: NV/NON-SELF DESTRUCTIVE BEHAVIOR  Goal: Remains free of harm/injury (restraint for non violent/non self-detsructive behavior)  Description: INTERVENTIONS:  - Instruct patient/family regarding restraint use   - Assess and monitor physiologic and psychological status   - Provide interventions and comfort measures to meet assessed patient needs   - Identify and implement measures to help patient regain control  - Assess readiness for release of restraint   Outcome: Progressing     Problem: Prexisting or High Potential for Compromised Skin Integrity  Goal: Skin integrity is maintained or improved  Description:  INTERVENTIONS:  - Identify patients at risk for skin breakdown  - Assess and monitor skin integrity  - Assess and monitor nutrition and hydration status  - Monitor labs   - Assess for incontinence   - Turn and reposition patient  - Assist with mobility/ambulation  - Relieve pressure over bony prominences  - Avoid friction and shearing  - Provide appropriate hygiene as needed including keeping skin clean and dry  - Evaluate need for skin moisturizer/barrier cream  - Collaborate with interdisciplinary team   - Patient/family teaching  - Consider wound care consult   Outcome: Progressing     Problem: Nutrition/Hydration-ADULT  Goal: Nutrient/Hydration intake appropriate for improving, restoring or maintaining nutritional needs  Description: Monitor and assess patient's nutrition/hydration status for malnutrition. Collaborate with interdisciplinary team and initiate plan and interventions as ordered.  Monitor patient's weight and dietary intake as ordered or per policy. Utilize nutrition screening tool and intervene as necessary. Determine patient's food preferences and provide high-protein, high-caloric foods as appropriate.     INTERVENTIONS:  - Monitor oral intake, urinary output, labs, and treatment plans  - Assess nutrition and hydration status and recommend course of action  - Evaluate amount of meals eaten  - Assist patient with eating if necessary   - Allow adequate time for meals  - Recommend/ encourage appropriate diets, oral nutritional supplements, and vitamin/mineral supplements  - Order, calculate, and assess calorie counts as needed  - Recommend, monitor, and adjust tube feedings and TPN/PPN based on assessed needs  - Assess need for intravenous fluids  - Provide specific nutrition/hydration education as appropriate  - Include patient/family/caregiver in decisions related to nutrition  Outcome: Progressing

## 2024-03-28 NOTE — ASSESSMENT & PLAN NOTE
Wt Readings from Last 3 Encounters:   03/28/24 122 kg (268 lb 8.3 oz)   03/19/24 128 kg (282 lb 3 oz)   07/20/21 103 kg (227 lb 8.2 oz)     Echo report from 03/12/24 noted with EF of 60%.  Pt presented to the hospital 3/11 and was aggressively diuresed  Echo on 3/12 showed EF 60%, mild to moderate concentric hypertrophy, and normal diastolic function  Was discharged home on 3/19 but returned later that day after CT findings of free intraperitoneal air and taken to OR  Diuresis initiated 3/20-pt diuresing well  Monitor daily weights  Strict I/O  Remains net negative  Continue with p.o. torsemide

## 2024-03-28 NOTE — ASSESSMENT & PLAN NOTE
Lab Results   Component Value Date    EGFR 93 03/28/2024    EGFR 94 03/27/2024    EGFR 92 03/26/2024    CREATININE 0.70 03/28/2024    CREATININE 0.69 03/27/2024    CREATININE 0.71 03/26/2024     Creatinine back to baseline.  Avoid nephrotoxic medications.

## 2024-03-28 NOTE — DISCHARGE SUMMARY
[facility]  Discharge- Dwaine Gould 1960, 63 y.o. female MRN: [id number]  Unit/Bed#: ICU 07 Encounter: [id number]  Primary Care Provider: Jordan Ramirez MD   Date and time admitted to hospital: 3/19/2024  7:05 PM    Thrombocytopenia (HCC)  Assessment & Plan  Resolved could be reactive    Anemia  Assessment & Plan  Hemoglobin is stable    Stage 3a chronic kidney disease (HCC)  Assessment & Plan  Lab Results   Component Value Date    EGFR 93 03/28/2024    EGFR 94 03/27/2024    EGFR 92 03/26/2024    CREATININE 0.70 03/28/2024    CREATININE 0.69 03/27/2024    CREATININE 0.71 03/26/2024     Creatinine back to baseline.  Avoid nephrotoxic medications.     Acute on chronic respiratory failure with hypoxia and hypercapnia (HCC)  Assessment & Plan  Pt with COPD and likely RILEY/OHS, D/C home with oxygen 4-5L  Extubated 3/23  Continue scheduled nebs  BiPAP at bedtime  Supplemental oxygen to maintain oxygen saturations greater than 90%.  Continue with 3 L.  Patient does not qualify for BiPAP at this time.  Will need an official sleep study as an outpatient    Hyperlipidemia  Assessment & Plan  On Statins    Essential hypertension  Assessment & Plan  Present on admission with history of hypertension.  Oral meds on hold  PRN hydralazine for SBP>170  Takes labetolol at home-using hydralazine prn currently given bradycardia    Leukocytosis  Assessment & Plan  Improving    COPD (chronic obstructive pulmonary disease) (HCC)  Assessment & Plan  Present on admission history of COPD.  Patient was discharged home today with home oxygen 4 to 5 L which is new for her.  She was not on home o2 prior.   Currently not in acute exacerbation.  O2 saturation 92 to 94% on nasal cannula.      Acute on chronic heart failure with preserved ejection fraction (HCC)  Assessment & Plan  Wt Readings from Last 3 Encounters:   03/28/24 122 kg (268 lb 8.3 oz)   03/19/24 128 kg (282 lb 3 oz)   07/20/21 103 kg (227 lb 8.2 oz)      Echo report from 03/12/24 noted with EF of 60%.  Pt presented to the hospital 3/11 and was aggressively diuresed  Echo on 3/12 showed EF 60%, mild to moderate concentric hypertrophy, and normal diastolic function  Was discharged home on 3/19 but returned later that day after CT findings of free intraperitoneal air and taken to OR  Diuresis initiated 3/20-pt diuresing well  Monitor daily weights  Strict I/O  Remains net negative  Continue with p.o. torsemide          CAD (coronary artery disease)  Assessment & Plan  Per [facility] records patient with single-vessel CAD with ostial RPDA lesion not amenable to PCI due to location of lesion October 2014  Continue medical management-po meds     Tobacco abuse  Assessment & Plan  Patient used to smoke 2 packs a day.  Reports last cigarette was before hospitalization on 3/12/24.  Counseled on continuous cessation..    Gram-negative bacteremia-resolved as of 3/28/2024  Assessment & Plan  3/19 1/2 BC w/ GNR  Body fluid culture and GS w/ E coli  In the setting of bowel perforation   Status post antibiotics    Respiratory acidosis-resolved as of 3/24/2024  Assessment & Plan  On prior hospitalization she was noted to have an episode of encephalopathy due to respiratory acidosis, hypercapnia which was treated with CPAP.  Currently she is awake, alert, oriented x 3.  Overnight pulse oximetry patient did not drop while she was on 3 L so currently BiPAP is not indicated at night      * Pneumoperitoneum-resolved as of 3/28/2024  Assessment & Plan  Pt had abdominal pain 3/19-CT scan showed free intraperitoneal air  Taken urgently to OR s/p diag lap converted to exp lap, right hemicolectomy with anastomosis, washout, Abthera placement  In OR, abdominal cavity was noted to have grossly purulent material in it along with perforation in the cecum at a mass  transferred to ICU postoperatively with abdomen open mechanical ventilation  3/21 OR for washout and closure w/LISSY drain  F/U culture  results  Diet advance per surgery.  Patient had a postop ileus but seems to be improving              Medical Problems       Resolved Problems  Date Reviewed: 3/28/2024            Resolved    Respiratory acidosis 3/24/2024     Resolved by  GUSTAVO Archibald    * (Principal) Pneumoperitoneum 3/28/2024     Resolved by  Micheal Bañuelos MD    Gram-negative bacteremia 3/28/2024     Resolved by  Micheal Bañuelos MD        Discharging Physician / Practitioner: Micheal Bañuelos MD  PCP: Jordan Ramirez MD  Admission Date:   Admission Orders (From admission, onward)       Ordered        03/19/24 2052  Inpatient Admission  Once                          Discharge Date: 03/28/24    Consultations During Hospital Stay:  Infectious disease  Surgery    Procedures Performed:   DIAGNOSTIC LAPAROSCOPY converted to Exploratory Laparotomy. Right Hemicolectomy. Abdominal wash-out. Abthera Placement     Significant Findings / Test Results:   CT scan: This was done on 319 where it showed a modest amount of pneumoperitoneum    Incidental Findings:   Cluster of nodules in the right lower quadrant  I reviewed the above mentioned incidental findings with the patient and/or family and they expressed understanding.    Test Results Pending at Discharge (will require follow up):   None     Outpatient Tests Requested:  CBC and BMP in 1 week    Complications: None    Reason for Admission: Perforated viscus    Hospital Course:   Dwaine Gould is a 63 y.o. female patient who originally presented to the hospital on 3/19/2024 due to perforated viscus.    History presenting illness:magdaleno Gould is a 63 y.o. female  patient with past medical history of morbid obesity, COPD, CHF, hypertension, was hospitalized on 03/12/2024 secondary to acute respiratory failure with hypoxia due to acute on chronic HFpEF, volume overload, hospitalization was complicated by acute kidney injury which was treated with holding diuretics and nephrology consult  and LETICIA had resolved, was also complicated by encephalopathy due to hypercapnia which was treated with CPAP.  Patient was discharged home today on home oxygen 4 L which is new for her.  Patient had complained of having abdominal pain and abdominal CT scan had been ordered by primary team however patient was discharged prior to CT abdomen pelvis result and subsequently later in the day on-call provider was contacted and informed by radiology regarding abnormal CT abdomen pelvis finding concerning for pneumoperitoneum and possible perforated viscus therefore patient was called back by on-call provider as a direct admit.on my encounter patient appears comfortable not in distress.  Currently she is complaining of right lower quadrant abdominal pain which is worsening with food.  Currently she denies chest pain, dyspnea, fever, chills, nausea, vomiting, diarrhea, dysuria, hematuria, melena, hematochezia, any other new complaints.  No other events reported.  Last cigarette was before hospitalization on 03/12/24.  Currently she denies any other new complaints.  No other events were.  Patient and family member are extremely appreciative of care.  Daughter at the bedside reports that she is glad that her mother had complaint of abdominal pain which led for the provider to follow-up with CT scan results otherwise she usually does not complain.     Total course: Patient was in for the reasons.  He was taken to the OR expeditiously with the above procedure done.  The patient has been doing good since then.  She did have a postop ileus but that did resolve and the patient is doing well she was bacteremic as well and has been treated with 7 days of IV antibiotics.  The patient was seen by physical therapy and was noted to have level 2 which is moderate resource intensity.  Patient is medically stable for discharge at this time.  Infectious disease saw the patient the patient did have appropriate and adequate doses of antibiotics.   Her leukocytosis is resolving.  At 1 point she had a thrombocytopenia which resolved which is likely reactive in nature.  She is okay from the surgical standpoint to be discharged.  The patient has been tolerating food and has been having bowel movements.  I will replete her potassium prior to being discharged and she should get a CBC and a BMP in 1 week.        Please see above list of diagnoses and related plan for additional information.     Condition at Discharge: good    Discharge Day Visit / Exam:   Subjective: Patient seen and examined.  States she is doing well today.  No complaints  Vitals: Blood Pressure: 149/69 (03/28/24 0800)  Pulse: 69 (03/28/24 0800)  Temperature: 97.7 °F (36.5 °C) (03/28/24 0800)  Temp Source: Oral (03/28/24 0800)  Respirations: 20 (03/28/24 0800)  Height: 5' (152.4 cm) (03/20/24 0159)  Weight - Scale: 122 kg (268 lb 8.3 oz) (03/28/24 0558)  SpO2: 95 % (03/28/24 0800)  Exam:   Physical Exam   General Appearance:    Alert, cooperative, no distress, appears stated age                               Lungs:     Clear to auscultation bilaterally, respirations unlabored       Heart:    Regular rate and rhythm, S1 and S2 normal, no murmur, rub    or gallop   Abdomen:     Soft, non-tender, bowel sounds active all four quadrants,     no masses, no organomegaly           Extremities:   Extremities normal, atraumatic, no cyanosis or edema                         Discussion with Family: Updated  (daughter) via phone.    Discharge instructions/Information to patient and family:   See after visit summary for information provided to patient and family.      Provisions for Follow-Up Care:  See after visit summary for information related to follow-up care and any pertinent home health orders.      Mobility at time of Discharge:   Basic Mobility Inpatient Raw Score: 18  JH-HLM Goal: 6: Walk 10 steps or more  JH-HLM Achieved: 7: Walk 25 feet or more  HLM Goal achieved. Continue to  encourage appropriate mobility.     Disposition:   Home with VNA Services (Reminder: Complete face to face encounter)    Planned Readmission: Not anticipated     Discharge Statement:  I spent 35 minutes discharging the patient. This time was spent on the day of discharge. I had direct contact with the patient on the day of discharge. Greater than 50% of the total time was spent examining patient, answering all patient questions, arranging and discussing plan of care with patient as well as directly providing post-discharge instructions.  Additional time then spent on discharge activities.    Discharge Medications:  See after visit summary for reconciled discharge medications provided to patient and/or family.      **Please Note: This note may have been constructed using a voice recognition system**

## 2024-03-28 NOTE — PROGRESS NOTES
Progress Note - Infectious Disease   Dwaine Gould 63 y.o. female MRN: [id number]  Unit/Bed#: ICU 07 Encounter: [id number]      Impression/Plan:  1.  Bacteroides bacteremia.  Appears to be all secondary to peritonitis after a bowel perforation.  No other clear source appreciated.  The patient is clinically improved but with ongoing leukocytosis.  She seems to be tolerating the antibiotics without difficulty.  She has received more than a week of appropriate intravenous antibiotics.  Patient has now completed more than a week of intravenous antibiotics  -No additional antibiotics needed for this problem  -No additional workup needed  -Source control measures as below     2.  Peritonitis.  Secondary to a perforated cecal mass with purulent peritonitis found operatively.  The patient underwent exploratory laparotomy on 3/19/2024 with right hemicolectomy for perforated cecal mass.  She was taken back to the OR on 3/21/2024 for washout and closure.  She is clinically much improved.  She continues to have a bit of a leukocytosis.  She is completed more than a week of intravenous antibiotics postoperatively  -No additional antibiotics  -Recheck CBC with differential to make sure white sound is coming down.  -If white cell count remains elevated, consider repeat CT of the abdomen pelvis     3.  Acute on chronic respiratory failure with hypoxia and hypercapnia.  All in the setting of COPD and likely obstructive sleep apnea/obesity hypoventilation syndrome.  No radiographic evidence of pneumonia  -Breathing treatments  -Oxygen support  -Monitor respiratory status     4.  COPD.  No current clinical evidence of an acute exacerbation at this time     5.  Morbid obesity.  As a risk factor for recurrent infection.    Discussed with the primary service the plan to monitor off all antibiotics and possible discharge home soon and they agree with the plan.    Antibiotics:  Unasyn     Subjective:  Patient has no fever, chills, sweats; no  nausea, vomiting, diarrhea; no cough, shortness of breath; no pain. No new symptoms.    Objective:  Vitals:  Temp:  [97.7 °F (36.5 °C)-98.2 °F (36.8 °C)] 97.7 °F (36.5 °C)  HR:  [62-70] 69  Resp:  [18-20] 20  BP: (126-149)/(60-69) 149/69  SpO2:  [93 %-98 %] 95 %  Temp (24hrs), Av °F (36.7 °C), Min:97.7 °F (36.5 °C), Max:98.2 °F (36.8 °C)  Current: Temperature: 97.7 °F (36.5 °C)    Physical Exam:   General Appearance:  Alert, interactive, nontoxic, no acute distress.   Throat: Oropharynx moist without lesions.    Lungs:   Clear to auscultation bilaterally; no wheezes, rhonchi or rales; respirations unlabored   Heart:  RRR; no murmur, rub or gallop   Abdomen:   Soft, non-tender, non-distended, positive bowel sounds.     Extremities: No clubbing, cyanosis or edema   Skin: No new rashes or lesions. No draining wounds noted.       Labs, Imaging, & Other studies:   All pertinent labs and imaging studies were personally reviewed  Results from last 7 days   Lab Units 24  0506 24  0440 24  0443   WBC Thousand/uL 13.09* 14.34* 16.11*   HEMOGLOBIN g/dL 10.4* 10.5* 11.6   PLATELETS Thousands/uL 261 247 238     Results from last 7 days   Lab Units 24  0506 24  0440 24  0443 24  1423 24  0536   SODIUM mmol/L 143 142 144   < > 147   POTASSIUM mmol/L 3.1* 2.8* 2.7*   < > 3.3*   CHLORIDE mmol/L 107 105 104   < > 104   CO2 mmol/L 27 29 32   < > 35*   BUN mg/dL 12 13 15   < > 20   CREATININE mg/dL 0.70 0.69 0.71   < > 1.04   EGFR ml/min/1.73sq m 93 94 92   < > 58   CALCIUM mg/dL 7.4* 7.5* 8.0*   < > 8.4   AST U/L  --   --   --   --  12*   ALT U/L  --   --   --   --  13   ALK PHOS U/L  --   --   --   --  48    < > = values in this interval not displayed.     Results from last 7 days   Lab Units 24  1034 24  1028   BLOOD CULTURE  Received in Microbiology Lab. Culture in Progress. Received in Microbiology Lab. Culture in Progress.

## 2024-03-28 NOTE — INCIDENTAL FINDINGS
The following findings require follow up:  Radiographic finding   Finding: Nodules in the right lower quadrant   Follow up required: CT scan of the abdomen pelvis   Follow up should be done within 1-2 months month(s)    Please notify the following clinician to assist with the follow up:   Dr. Ramirez

## 2024-03-28 NOTE — ASSESSMENT & PLAN NOTE
Pt with COPD and likely RILEY/OHS, D/C home with oxygen 4-5L  Extubated 3/23  Continue scheduled nebs  BiPAP at bedtime  Supplemental oxygen to maintain oxygen saturations greater than 90%.  Continue with 3 L.  Patient does not qualify for BiPAP at this time.  Will need an official sleep study as an outpatient

## 2024-03-29 ENCOUNTER — TELEPHONE (OUTPATIENT)
Dept: CARDIOLOGY CLINIC | Facility: CLINIC | Age: 64
End: 2024-03-29

## 2024-03-29 DIAGNOSIS — G47.33 OSA (OBSTRUCTIVE SLEEP APNEA): Primary | ICD-10-CM

## 2024-03-29 DIAGNOSIS — R06.83 SNORING: ICD-10-CM

## 2024-03-29 DIAGNOSIS — G47.19 EXCESSIVE DAYTIME SLEEPINESS: ICD-10-CM

## 2024-03-29 NOTE — OP NOTE
OPERATIVE REPORT  PATIENT NAME: Dwaine Gould    :  1960  MRN: [id number]  Pt Location: [facility] OR ROOM 01    SURGERY DATE: 3/21/2024    Surgeons and Role:     * Francine Reid MD - Primary     * Josefina Hernandez PA-C - Assisting    Preop Diagnosis:  Colon perforation (HCC) [K63.1]    Post-Op Diagnosis Codes:     * Colon perforation (HCC) [K63.1]    Procedure(s):  LAPAROTOMY EXPLORATORY. washout. complex closure. LISSY drain    Specimen(s):  * No specimens in log *    Estimated Blood Loss:   20 mL    Drains:  [REMOVED] Closed/Suction Drain Left LLQ Bulb (Removed)   Site Description Unable to view 24 06   Dressing Status Clean;Dry;Intact 24 06   Drainage Appearance Serosanguineous 24 0947   Status To bulb suction 24 06   Output (mL) 70 mL 24 1100   Number of days: 7       Anesthesia Type:   General    Operative Indications:  Colon perforation (HCC) [K63.1]  64 yo F s/p diagnostic lap converted to exlap and R hemicolectomywith primary anastamosis, washout, open abdomen for perforated right colon mass     Operative Findings:  Healthy ileal to colic anastamosis in the right flank/gutter  No evidence of ongoing contamination or leakage  Mild increase in peak pressures with closure    Complications:   None    Procedure and Technique:    After completion of the time out, the inner layer of drape from the abthera was removed. The abdomen was inspected. The ileal to colic anastamosis was healthy in appearance. The crotch stitch had pulled through and a 3-0 vicryl was used to place a new one to alleviate tension on the staple line. The abdomen was washed out and irrigated with warm saline in all four quadrants. A 15 round LISSY drain was placed in the LLQ via the prior 5 port incision and sutured to the skin using 2-0 nylon and was left in the resection bed near the anastamosis.     The abdomen was then inspected and again appeared clean. Attention was then turned to wound closure. The fascia was  mobilized by raising subcutaneous flaps over the anterior sheath. The fascia was re-approximated with a series of figure of eight with 1 PDS in order to close serially and reassess the tension and airway pressures. The skin closed with 2-0 nylon vertical mattress sutures and staples. A sterile dressing was applied and the patient was brought to the surgical intensive care unit in stable and satisfactory condition.     I was present for the entire procedure., A qualified resident physician was not available., and A physician assistant was required during the procedure for retraction, tissue handling, dissection and suturing.    Patient Disposition:  Critical Care Unit and intubated and hemodynamically stable.    This procedure was not performed to treat colon cancer through resection      SIGNATURE: Francine Reid MD  DATE: March 28, 2024  TIME: 10:24 PM

## 2024-03-29 NOTE — TELEPHONE ENCOUNTER
Called patient to F/U but could not leave message at her phone number or her husbands.    Called daughter Eva's phone & left a message to have patient return call to nurse.

## 2024-03-31 LAB
BACTERIA BLD CULT: NORMAL
BACTERIA BLD CULT: NORMAL

## 2024-04-01 ENCOUNTER — TELEPHONE (OUTPATIENT)
Dept: SURGERY | Facility: CLINIC | Age: 64
End: 2024-04-01

## 2024-04-01 LAB
BACTERIA BLD CULT: NORMAL
BACTERIA BLD CULT: NORMAL

## 2024-04-01 NOTE — TELEPHONE ENCOUNTER
Tried calling patient out of service is her number in patient's chart. Tried calling patient's spouse could not leave a message for her post op appointment.

## 2024-04-04 ENCOUNTER — TELEPHONE (OUTPATIENT)
Dept: OBGYN CLINIC | Facility: HOSPITAL | Age: 64
End: 2024-04-04

## 2024-04-04 NOTE — TELEPHONE ENCOUNTER
Caller: Daughter    Doctor/Office: Cardiology [facility]    Call regarding :  Daughter calling to reschedule her moms cardiology appointment but she reached the Orthopedic Office on accident.      Call was transferred to: [phone] Cardiology [place]

## 2024-04-08 ENCOUNTER — OFFICE VISIT (OUTPATIENT)
Dept: CARDIOLOGY CLINIC | Facility: CLINIC | Age: 64
End: 2024-04-08
Payer: COMMERCIAL

## 2024-04-08 VITALS
RESPIRATION RATE: 16 BRPM | WEIGHT: 241 LBS | OXYGEN SATURATION: 95 % | HEIGHT: 60 IN | BODY MASS INDEX: 47.32 KG/M2 | SYSTOLIC BLOOD PRESSURE: 132 MMHG | DIASTOLIC BLOOD PRESSURE: 82 MMHG | HEART RATE: 76 BPM

## 2024-04-08 DIAGNOSIS — I25.10 CORONARY ARTERY DISEASE INVOLVING NATIVE HEART WITHOUT ANGINA PECTORIS, UNSPECIFIED VESSEL OR LESION TYPE: ICD-10-CM

## 2024-04-08 DIAGNOSIS — I10 ESSENTIAL HYPERTENSION: ICD-10-CM

## 2024-04-08 DIAGNOSIS — J96.11 CHRONIC RESPIRATORY FAILURE WITH HYPOXIA AND HYPERCAPNIA (HCC): ICD-10-CM

## 2024-04-08 DIAGNOSIS — J96.12 CHRONIC RESPIRATORY FAILURE WITH HYPOXIA AND HYPERCAPNIA (HCC): ICD-10-CM

## 2024-04-08 DIAGNOSIS — I50.32 CHRONIC HEART FAILURE WITH PRESERVED EJECTION FRACTION (HCC): Primary | ICD-10-CM

## 2024-04-08 DIAGNOSIS — J44.9 CHRONIC OBSTRUCTIVE PULMONARY DISEASE, UNSPECIFIED COPD TYPE (HCC): ICD-10-CM

## 2024-04-08 DIAGNOSIS — E78.2 MIXED HYPERLIPIDEMIA: ICD-10-CM

## 2024-04-08 PROCEDURE — 99214 OFFICE O/P EST MOD 30 MIN: CPT

## 2024-04-08 RX ORDER — OXYMETAZOLINE HYDROCHLORIDE 0.05 G/100ML
2 SPRAY NASAL 2 TIMES DAILY
COMMUNITY

## 2024-04-08 NOTE — PROGRESS NOTES
[facility] Cardiology   Office Visit    Dwaine Gould 63 y.o. female MRN: [id number]    04/08/24        Assessment/Plan:  1.  Chronic HFpEF/RV failure  Euvolemic on exam.  Recent TTE showed EF 60%, mild to moderately dilated RV with mildly reduced systolic RV function, mild biatrial dilation, and mild to moderate TR.  Continue torsemide 20 mg daily.  Encourage low-sodium diet, daily weights, LE compression stockings, and LE elevation.  Advised to notify office for weight gain or any new/worsening symptoms.     2.  Remote history of CAD  Cardiac catheterization in 2014 showed ostial PDA lesion not amenable to PCI.  Reportedly not on ASA due to anemia.  Continue atorvastatin and Lopressor.     3.  Hypertension  BP is well-controlled.  Continue Lopressor, amlodipine, and torsemide.     4.  Hyperlipidemia  Continue atorvastatin and dietary control.    5.  Chronic hypoxic and hypercapnic respiratory failure - on supplemental O2  6.  Tobacco use - recently quit  7.  COPD  8.  Anemia        HPI: Dwaine Gould is a 63 y.o. year old female with history of chronic HFpEF/RV failure, CAD, hypertension, hyperlipidemia, chronic hypoxic and hypercapnic respiratory failure, COPD, and anemia who presents for office visit.  Patient was recently evaluated by cardiology at [facility] for acute on chronic HFpEF/RV failure.  At that time patient was also noted to be in LETICIA, volume management was deferred to nephrology.  Of note, patient was also noted to have transaminitis upon admission prompting hold of statin therapy.  LFTs improved and statin therapy was reinitiated prior to discharge.  Since time of discharge patient reports she has been well overall from a cardiac standpoint.  She has a scale at home to monitor weight which has been stable.  Endorses strict compliance to all medications.  Denies adverse effects to medications.  Patient tries to follow a low-sodium diet.  History of tobacco use noted, patient recently quit  with assistance of nicotine patch.  She is on supplemental O2 for chronic hypoxic/hypercapnic respiratory failure.  Endorses stable SOB which is at baseline.  Denies any additional cardiac complaints at this time.  Previously followed with primary cardiologist at [facility].      Review of Systems:  Review of Systems   Constitutional:  Negative for chills and fever.   HENT:  Negative for ear pain and sore throat.    Eyes:  Negative for pain and visual disturbance.   Respiratory:  Negative for cough. Shortness of breath: stable, on supplemental O2.   Cardiovascular:  Negative for chest pain, palpitations and leg swelling.   Gastrointestinal:  Negative for abdominal pain and vomiting.   Genitourinary:  Negative for dysuria and hematuria.   Musculoskeletal:  Negative for arthralgias and back pain.   Skin:  Negative for color change and rash.   Neurological:  Negative for seizures and syncope.   All other systems reviewed and are negative.      PHYSICAL EXAM:  Vitals:   Vitals:    04/08/24 1603   BP: 132/82   BP Location: Right arm   Patient Position: Sitting   Cuff Size: Standard   Pulse: 76   Resp: 16   SpO2: 95%   Weight: 109 kg (241 lb)   Height: 5' (1.524 m)        Physical Exam:  GEN: Alert and oriented x 3, in no acute distress.  Well appearing, obese, and well nourished.  Supplemental O2 via NC in place.  HEENT: Sclera anicteric, conjunctivae pink, mucous membranes moist. Oropharynx clear.   NECK: Supple, no carotid bruits, no significant JVD. Trachea midline, no thyromegaly.   HEART: Regular rhythm, normal S1 and S2, no murmurs, clicks, gallops or rubs. PMI nondisplaced, no thrills.   LUNGS: Clear to auscultation bilaterally; no wheezes, rales, or rhonchi. No increased work of breathing or signs of respiratory distress.   ABDOMEN: Soft, nontender, nondistended, normoactive bowel sounds.   EXTREMITIES: Skin warm and well perfused, no clubbing, cyanosis, or edema.  NEURO: No focal findings. Normal speech. Mood and  affect normal.   SKIN: Normal without suspicious lesions on exposed skin.    Follow up: 2 weeks or sooner as needed    Allergies   Allergen Reactions    Codeine Anaphylaxis    Contrast [Iodinated Contrast Media] GI Intolerance    Prednisone Irritability     Increase in anger/abusive behaviors         Current Outpatient Medications:     albuterol (2.5 mg/3 mL) 0.083 % nebulizer solution, Take 2.5 mg by nebulization every 6 (six) hours as needed for wheezing or shortness of breath, Disp: , Rfl:     amLODIPine (NORVASC) 5 mg tablet, Take 1 tablet (5 mg total) by mouth daily, Disp: 30 tablet, Rfl: 0    atorvastatin (Lipitor) 40 mg tablet, Take 40 mg by mouth daily at bedtime, Disp: , Rfl:     fluticasone-umeclidinium-vilanterol (Trelegy Ellipta) 100-62.5-25 mcg/actuation inhaler, Inhale 1 puff daily Rinse mouth after use., Disp: , Rfl:     metoprolol tartrate (LOPRESSOR) 25 mg tablet, Take 1 tablet (25 mg total) by mouth every 12 (twelve) hours, Disp: 60 tablet, Rfl: 0    nicotine (NICODERM CQ) 21 mg/24 hr TD 24 hr patch, Place 1 patch on the skin over 24 hours daily, Disp: 28 patch, Rfl: 0    oxymetazoline (AFRIN) 0.05 % nasal spray, 2 sprays by Each Nare route 2 (two) times a day, Disp: , Rfl:     rOPINIRole (REQUIP) 0.5 mg tablet, Take 1 tablet (0.5 mg total) by mouth 3 (three) times a day, Disp: 90 tablet, Rfl: 0    torsemide (DEMADEX) 20 mg tablet, Take 1 tablet (20 mg total) by mouth daily, Disp: 30 tablet, Rfl: 0    Past Medical History:   Diagnosis Date    Acute metabolic encephalopathy     Hyperlipidemia     Hypertension     Transaminitis        History reviewed. No pertinent family history.    Past Medical History:   Diagnosis Date    Acute metabolic encephalopathy     Hyperlipidemia     Hypertension     Transaminitis        Past Surgical History:   Procedure Laterality Date    CARDIAC CATHETERIZATION      GANGLION CYST EXCISION      LAPAROTOMY N/A 3/21/2024    Procedure: LAPAROTOMY EXPLORATORY, washout,  complex closure, LISSY drain;  Surgeon: Francine Reid MD;  Location: [facility] MAIN OR;  Service: General    ID LAPS ABD PRTM&OMENTUM DX W/WO SPEC BR/WA SPX N/A 3/19/2024    Procedure: DIAGNOSTIC LAPAROSCOPY converted to Exploratory Laparotomy, Right Hemicolectomy, Abdominal wash-out, Abthera Placement;  Surgeon: Roger Joel DO;  Location: [facility];  Service: General    TYMPANOSTOMY TUBE PLACEMENT         Social History     Socioeconomic History    Marital status: /Civil Union     Spouse name: Not on file    Number of children: Not on file    Years of education: Not on file    Highest education level: Not on file   Occupational History    Not on file   Tobacco Use    Smoking status: Every Day     Current packs/day: 0.50     Types: Cigarettes    Smokeless tobacco: Never   Vaping Use    Vaping status: Every Day    Substances: Nicotine   Substance and Sexual Activity    Alcohol use: Never    Drug use: Never    Sexual activity: Not Currently   Other Topics Concern    Not on file   Social History Narrative    Not on file     Social Determinants of Health     Financial Resource Strain: Not on file   Food Insecurity: No Food Insecurity (3/12/2024)    Hunger Vital Sign     Worried About Running Out of Food in the Last Year: Never true     Ran Out of Food in the Last Year: Never true   Transportation Needs: No Transportation Needs (3/12/2024)    PRAPARE - Transportation     Lack of Transportation (Medical): No     Lack of Transportation (Non-Medical): No   Physical Activity: Not on file   Stress: Not on file   Social Connections: Not on file   Intimate Partner Violence: Not on file   Housing Stability: Low Risk  (3/12/2024)    Housing Stability Vital Sign     Unable to Pay for Housing in the Last Year: No     Number of Places Lived in the Last Year: 1     Unstable Housing in the Last Year: No             LABORATORY RESULTS:    Lab Results   Component Value Date    WBC 13.09 (H) 03/28/2024    HGB 10.4 (L)  "03/28/2024    HCT 35.6 03/28/2024    MCV 80 (L) 03/28/2024     03/28/2024     Lab Results   Component Value Date    GLUCOSE 152 (H) 03/19/2024    CALCIUM 7.4 (L) 03/28/2024    K 3.1 (L) 03/28/2024    CO2 27 03/28/2024     03/28/2024    BUN 12 03/28/2024    CREATININE 0.70 03/28/2024     Lab Results   Component Value Date    HGBA1C 6.7 (H) 03/20/2024       Lipid Profile:   No results found for: \"CHOL\"  No results found for: \"HDL\"  No results found for: \"LDLCALC\"  No results found for: \"TRIG\"    The ASCVD Risk score (Ulises DK, et al., 2019) failed to calculate for the following reasons:    The patient has a prior MI or stroke diagnosis    1. Chronic heart failure with preserved ejection fraction (HCC)        2. Coronary artery disease involving native heart without angina pectoris, unspecified vessel or lesion type        3. Essential hypertension        4. Mixed hyperlipidemia        5. Chronic respiratory failure with hypoxia and hypercapnia (HCC)        6. Chronic obstructive pulmonary disease, unspecified COPD type (HCC)            Imaging: I have personally reviewed pertinent reports.        Recommend aggressive risk factor modification and therapeutic lifestyle changes.  Low-salt, low-calorie, low-fat, low-cholesterol diet with regular exercise and to optimize weight.    Discussed concepts of atherosclerosis, including signs and symptoms of cardiac disease.    Medications reviewed and possible side effects discussed.  Previous studies were reviewed.    Safety measures were reviewed.  All questions and concerns addressed.  Patient was advised to report any problems requiring medical attention.    Follow-up with PCP and appropriate specialist and lab work as discussed.    Return for follow up visit as scheduled or earlier, if needed.  Thank you for allowing me to participate in the care and evaluation of your patient.  Should you have any questions, please feel free to contact me.    Abhijeet ACEVES" DOMINICK Viveros  4/8/2024,4:36 PM

## 2024-04-11 LAB
CARIS GENOMIC LOH - EXOME: NORMAL
CARIS HER2/NEU: POSITIVE
CARIS MISMATCH REPAIR STATUS: NORMAL
CARIS MLH1: NORMAL
CARIS MSH2: NORMAL
CARIS MSH6: NORMAL
CARIS MSI - EXOME: NORMAL
CARIS PD-L1 (SP142): NEGATIVE
CARIS PMS2: NORMAL
CARIS PTEN: NEGATIVE
CARIS TMB - EXOME: NORMAL

## 2024-04-12 ENCOUNTER — TELEPHONE (OUTPATIENT)
Dept: CARDIOLOGY CLINIC | Facility: CLINIC | Age: 64
End: 2024-04-12

## 2024-04-12 NOTE — TELEPHONE ENCOUNTER
Called patient & spoke to her daughter Eva who stated patient has no C/O chest pain or discomfort. Her level of SOB is normal for her, on  supplemental O2. Stated taking her medications.    Encouraged to follow low sodium diet for patient & for her to weigh herself daily & keep a log.    Advised to call office with increased SOB, chest pain or discomfort, weight gain of 3 lb yaritza day, 5 lbs in a week.

## 2024-04-16 ENCOUNTER — OFFICE VISIT (OUTPATIENT)
Dept: SURGERY | Facility: CLINIC | Age: 64
End: 2024-04-16

## 2024-04-16 VITALS
HEIGHT: 60 IN | OXYGEN SATURATION: 96 % | SYSTOLIC BLOOD PRESSURE: 128 MMHG | WEIGHT: 235.6 LBS | HEART RATE: 70 BPM | RESPIRATION RATE: 19 BRPM | TEMPERATURE: 97.6 F | DIASTOLIC BLOOD PRESSURE: 82 MMHG | BODY MASS INDEX: 46.26 KG/M2

## 2024-04-16 DIAGNOSIS — Z76.89 ENCOUNTER TO ESTABLISH CARE: ICD-10-CM

## 2024-04-16 DIAGNOSIS — C18.9 ADENOCARCINOMA, COLON (HCC): Primary | ICD-10-CM

## 2024-04-16 PROCEDURE — 99024 POSTOP FOLLOW-UP VISIT: CPT | Performed by: STUDENT IN AN ORGANIZED HEALTH CARE EDUCATION/TRAINING PROGRAM

## 2024-04-16 RX ORDER — NYSTATIN 100000 [USP'U]/G
POWDER TOPICAL 2 TIMES DAILY
COMMUNITY
Start: 2024-04-12 | End: 2024-04-22 | Stop reason: ALTCHOICE

## 2024-04-16 RX ORDER — ZOLPIDEM TARTRATE 5 MG/1
5 TABLET ORAL DAILY PRN
COMMUNITY
Start: 2024-04-10 | End: 2024-10-07

## 2024-04-16 NOTE — PROGRESS NOTES
Assessment/Plan:  63-year-old female status post diagnostic laparoscopy converted to exploratory laparotomy, right hemicolectomy, abdominal washout on 3/19/2024, status post exploratory laparotomy, abdominal washout, complex closure on 3/21/2024  -Patient has been tolerating a diet and having bowel function  -She states she has had her first solid stool today, before that she was having loose bowel movements  -Is having some discomfort in midline incision from the staples and stitches  -On exam midline incision is healing well without erythema induration or drainage, stitches and staples intact  -Stitches and staples removed  -Pathology report reviewed  -Recommend dressing to the midline incision as the area is healing, once everything has scabbed up and there is no drainage the dressing is no longer needed  -Recommend no heavy lifting greater than 15 to 20 pounds for total of 6 weeks after surgery  -Referral placed to Family medicine at patient's request for a new primary care physician  -Referral placed for Oncology for her colon adenocarcinoma  -The nodule/lymph nodes in the right lower quadrant are most likely lymph nodes from the adenocarcinoma, the grossly palpable lymph nodes were excised with the specimen during her procedure  -Follow-up in office in 4 weeks for wound check    Pathology Results:  Final Diagnosis   A. Right colon with terminal ileum and appendix, right hemicolectomy:  -   Invasive adenocarcinoma of cecum, moderately differentiated, grossly perforated, extending to appendix and terminal ileum.  -   MMR (MLH1, MSH2, MSH6 and PMS2) studies by IHC on A11 are pending.    -   A14 is sent to [facility] for MI Profile Testing.   -   See synoptic report.     I reviewed the pathology report and discussed the findings with the patient and their accompanying family or caregiver, if present. All questions were answered to satisfaction and the patient along with family or caregiver, if present,  voiced understanding.     1. Adenocarcinoma, colon (HCC)  -     Ambulatory Referral to Hematology / Oncology; Future    2. Encounter to establish care  -     Ambulatory Referral to Family Practice; Future               Subjective:      Patient ID: Dwaine Gould is a 63 y.o. female.    Triage Notes:    Patient is a 63-year-old female who presents to office for evaluation status post exploratory laparotomy, right hemicolectomy, abdominal washout.  She is tolerating a diet and having bowel function.  She states that she initially had a lot of diarrhea but now she just started having more solid stool.  She has been recovering slowly overall.  She notes that there is some crusting and irritation around her stitches and staples in her midline incision.        The following portions of the patient's history were reviewed and updated as appropriate: allergies, current medications, past family history, past medical history, past social history, past surgical history and problem list.    Review of Systems   Constitutional:  Positive for fatigue. Negative for chills and fever.   HENT:  Negative for congestion, hearing loss, rhinorrhea and sore throat.    Eyes:  Negative for pain and discharge.   Respiratory:  Positive for shortness of breath. Negative for cough and chest tightness.    Cardiovascular:  Negative for chest pain and palpitations.   Gastrointestinal:  Positive for diarrhea. Negative for abdominal pain, constipation, nausea and vomiting.   Endocrine: Negative for cold intolerance and heat intolerance.   Genitourinary:  Negative for difficulty urinating and dysuria.   Skin:  Negative for color change and rash.   Allergic/Immunologic: Negative for environmental allergies and food allergies.   Neurological:  Negative for seizures and headaches.   Hematological:  Negative for adenopathy. Does not bruise/bleed easily.   Psychiatric/Behavioral:  Negative for confusion and hallucinations.          Objective:      /82  (BP Location: Left arm, Patient Position: Sitting, Cuff Size: Large)   Pulse 70   Temp 97.6 °F (36.4 °C) (Temporal)   Resp 19   Ht 5' (1.524 m)   Wt 107 kg (235 lb 9.6 oz)   SpO2 96%   BMI 46.01 kg/m²     Below is the patient's most recent value for Albumin, ALT, AST, BUN, Calcium, Chloride, Cholesterol, CO2, Creatinine, GFR, Glucose, HDL, Hematocrit, Hemoglobin, Hemoglobin A1C, LDL, Magnesium, Phosphorus, Platelets, Potassium, PSA, Sodium, Triglycerides, and WBC.   Lab Results   Component Value Date    ALT 13 03/22/2024    AST 12 (L) 03/22/2024    BUN 12 03/28/2024    CALCIUM 7.4 (L) 03/28/2024     03/28/2024    CO2 27 03/28/2024    CREATININE 0.70 03/28/2024    HCT 35.6 03/28/2024    HGB 10.4 (L) 03/28/2024    HGBA1C 6.7 (H) 03/20/2024    MG 1.9 03/26/2024    PHOS 2.6 03/26/2024     03/28/2024    K 3.1 (L) 03/28/2024    WBC 13.09 (H) 03/28/2024     Note: for a comprehensive list of the patient's lab results, access the Results Review activity.     Physical Exam  Constitutional:       Appearance: Normal appearance.   HENT:      Head: Normocephalic and atraumatic.      Nose: Nose normal.   Eyes:      General: No scleral icterus.     Conjunctiva/sclera: Conjunctivae normal.   Cardiovascular:      Rate and Rhythm: Normal rate.   Pulmonary:      Effort: Pulmonary effort is normal.   Abdominal:      General: There is no distension.      Palpations: Abdomen is soft.      Tenderness: There is no abdominal tenderness.      Comments: Midline incision healing well without erythema induration or drainage, stitches and staples intact   Musculoskeletal:         General: No signs of injury.   Skin:     General: Skin is warm.      Coloration: Skin is not jaundiced.   Neurological:      General: No focal deficit present.      Mental Status: She is alert and oriented to person, place, and time.   Psychiatric:         Mood and Affect: Mood normal.         Behavior: Behavior normal.             Suture  removal    Date/Time: 4/16/2024 1:30 PM    Performed by: Roger Joel DO  Authorized by: Roger Joel DO  Universal Protocol:  Consent: Verbal consent obtained.  Risks and benefits: risks, benefits and alternatives were discussed  Consent given by: patient  Patient understanding: patient states understanding of the procedure being performed  Patient identity confirmed: verbally with patient      Patient location:  Clinic  Location:     Laterality:  Median    Location:  Trunk    Trunk location:  Abdomen  Procedure details:     Tools used:  Suture removal kit and other (comment) (Staple remover)    Wound appearance:  No sign(s) of infection, good wound healing and clean  Post-procedure details:     Post-removal:  Dressing applied    Patient tolerance of procedure:  Tolerated well, no immediate complications

## 2024-04-16 NOTE — PATIENT INSTRUCTIONS
THANK YOU for your confidence in our team.     Our Patients Are Important.   We want to improve, and you can help.   You may receive a survey asking you about your visit.   We would appreciate if you would take a few moments to let us know how we are doing.

## 2024-04-17 ENCOUNTER — DOCUMENTATION (OUTPATIENT)
Dept: HEMATOLOGY ONCOLOGY | Facility: CLINIC | Age: 64
End: 2024-04-17

## 2024-04-17 NOTE — PROGRESS NOTES
Intake received/ Chart reviewed for services completed outside of [facility]    Pathology completed: 3/19/2024    Imaging completed: 3/19/2024    All records needed are in patients chart. No records retrieval needed at this time.

## 2024-04-18 ENCOUNTER — TELEPHONE (OUTPATIENT)
Dept: CARDIOLOGY CLINIC | Facility: CLINIC | Age: 64
End: 2024-04-18

## 2024-04-19 NOTE — PROGRESS NOTES
Hematology/Oncology Outpatient Office Note    Date of Service: 2024    [facility]  [address]  [facility] [place] [id number]  [phone]    Reason for Consultation: No chief complaint on file.      Cancer Stage at diagnosis: pT4a pN2b cMx    Referral Physician: Roger Joel, *    Primary Care Physician:  Jordan Ramirez MD     Nickname: Dwaine    Spouse:     Original ECO    Today's ECOG: 3    Goals and Barriers:  Current Goal: Minimize effects of disease burden, extend life.   Barriers to accomplishing this: None    Patient's Capacity to Self Care:  Patient is able to self care.      ASSESSMENT & PLAN      Diagnosis ICD-10-CM Associated Orders   1. Stage 3a chronic kidney disease (HCC)  N18.31       2. Thrombocytopenia (HCC)  D69.6       3. Adenocarcinoma, colon (HCC)  C18.9 CT chest wo contrast     lidocaine-prilocaine (EMLA) cream     Ambulatory referral to Interventional Radiology     Ambulatory Referral to Hematology / Oncology     Infusion Calculated Appointment Request     Infusion Calculated Appointment Request     CBC and differential     Comprehensive metabolic panel     Infusion Calculated Appointment Request     Infusion Calculated Appointment Request     CBC and differential     Comprehensive metabolic panel      4. Chemotherapy induced nausea and vomiting  R11.2 ondansetron (ZOFRAN-ODT) 8 mg disintegrating tablet    T45.1X5A             This is a 63 y.o. c PMHx notable for HLP, HTN, HFpEF, CAD being seen in consultation for advanced colon adenocarcinoma      Discussion of decision making  Oncology history updated, accordingly, during this visit  Goals of care/patient communication  I discussed with the patient the clinical course leading up to their cancer diagnosis. I reviewed relevant office notes, imaging reports and pathology result as well.  I told the patient that this is a case of curable vs incurable disease and what  this means. We discussed that the goal of anti-cancer therapy is to provide best quality of life, extend overall survival, and progression free survival as shown in clinical trials. We also discussed that there might be a point when the cancer will no longer respond to this anti-neoplastic therapy. As a result, we also discussed the role of the palliative care team being introduced early in the treatment course. We will be making this referral if metastasis is confirmed  I explained the risks/benefits of the proposed cancer therapy: mFOLFOX6 +/- further agents and after discussion including understanding risks of possible life-threatening complications and therapy-related malignancy development, informed consent for blood products and treatment has been signed and obtained.  TNM/Staging At Diagnosis  Cancer Staging   Adenocarcinoma, colon (HCC)  Staging form: Colon and Rectum, AJCC 8th Edition  - Pathologic: pT4a, pN2b - Unsigned  Histologic grading system: 4 grade system  Histologic grade (G): G2  Residual tumor (R): R0 - None  Disease Features/Tumor Markers/Genetics  Tumor Marker: CEA  Notable Path Features:   3/19/2024: G2, margins negative, 10/25 LN, [id number], MMR proficient  Treatment: mFOLFOX6   Other Supportive care:   Treatment Team Members  Surgeon: Dr. Joel  Rad Onc  Palliative  Labs  Diagnostics  CARIS NGS: KRAS exon 2 mutation, HER2 +3  3/19/2024 CT Abd/pelv w/o c: Cluster of nodules in the right lower quadrant mesentery the largest measuring 3.0 x 2.3 cm (2/110) and another 1.7 cm nodule on image 111     Discussion of decision making    I personally reviewed the following lab results, the image studies, pathology, other specialty/physicians consult notes and recommendations, and outside medical records. I had a lengthy discussion with the patient and shared the work-up findings. We discussed the diagnosis and management plan as below. I spent 62 minutes reviewing the records (labs, clinician notes,  outside records, medical history, ordering medicine/tests/procedures, monitoring of anti-neoplastic toxicities, interpreting the imaging/labs previously done) and coordination of care as well as direct time with the patient today, of which greater than 50% of the time was spent in counseling and coordination of care with the patient/family.    Can't use Tucatinib + Herceptin as [facility] enrolled KRAS WT, TMB 7 muts/Mb, no other actionable biomarkers    Plan/Labs  Zofran 8mg q8 prn nausea/vomiting to be sent home  EMLA cream  IR referral port placement  Iron panel to w/u microcytic anemia and potential need for IV iron  Add CEA to the FOLFOX plan C1  CT Chest w/o c ordered to trend lung nodules, then will order restaging CT CAP thereafter  Infusion chairs ordered for mFOLFOX6 should we obtain cardiac clearance due to her overall tenuous health status and co-morbidities  Consider clinical trial consideration with the HER +2 status if metastatic disease is confirmed     I discussed the risks of ongoing cigarette smoking and reviewed the benefits of quitting and risk of new lung primary, heart disease, stroke, among other risks and cancers. Reviewed options including support, quit date, medications, and family support. Patient voiced understanding and willingness to try to quit. Patient was told to notify [facility] family practitioner for additional management. Greater than 3 minutes were needed for discussion of tobacco dependence and quitting counselling.        Follow Up: q4 weeks while on systemic therapy    All questions were answered to the patient's satisfaction during this encounter. The patient knows the contact information for our office and knows to reach out for any relevant concerns related to this encounter. They are to call for any temperature 100.4 or higher, new symptoms including but not restricted to shaking chills, decreased appetite, nausea, vomiting, diarrhea, increased fatigue,  shortness of breath or chest pain, confusion, and not feeling the strength to come to the clinic. For all other listed problems and medical diagnosis in their chart - they are managed by PCP and/or other specialists, which the patient acknowledges. Thank you very much for your consultation and making us a part of this patient's care. We are continuing to follow closely with you. Please do not hesitate to reach out to me with any additional questions or concerns.    Carmina Gallego MD  Hematology & Medical Oncology Staff Physician             Disclaimer: This document was prepared using [employer] technology. If a word or phrase is confusing, or does not make sense, this is likely due to recognition error which was not discovered during this clinician's review. If you believe an error has occurred, please contact me through [facility] for fatoumata?cation.      ONCOLOGY HISTORY OF PRESENT ILLNESS        Oncology History   Adenocarcinoma, colon (HCC)   4/16/2024 Initial Diagnosis    Adenocarcinoma, colon (HCC)     5/7/2024 -  Chemotherapy    alteplase (CATHFLO), 2 mg, Intracatheter, Every 1 Minute as needed, 0 of 12 cycles  fluorouracil (ADRUCIL), 400 mg/m2, Intravenous, Once, 0 of 12 cycles  leucovorin calcium IVPB, 400 mg/m2, Intravenous, Once, 0 of 12 cycles  oxaliplatin (ELOXATIN) chemo infusion, 85 mg/m2, Intravenous, Once, 0 of 12 cycles  fluorouracil (ADRUCIL) ambulatory infusion Soln, 1,200 mg/m2/day, Intravenous, Over 46 hours, 0 of 12 cycles           SUBJECTIVE  (INTERVAL HISTORY)      Clotting History None   Bleeding History None   Cancer History Colon   Family Cancer History Father (Liver cancer) Grandmother (colon cancer)   H/O Blood/Plt Transfusion None   Tobacco/etoh/drug abuse 2 ppd per day for 50 years. Occasionally smokes 1 cigarette.  Trying to quit smoking. Past history of recreational drug use 10 years ago.            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   She has low appetite and taste changes for a month. She had dizziness a couple of times past few months.     I have reviewed the relevant past medical, surgical, social and family history. I have also reviewed allergies and medications for this patient.    Review of Systems  Baseline weight:     Denies F/C, N/V, SOB, CP, LH, HA, rash, itching, gen weakness, melena, hematuria, hematochezia, falls, diarrhea, or constipation       A 10-point review of system was performed, pertinent positive and negative were detailed as above. Otherwise, the 10-point review of system was negative.      Past Medical History:   Diagnosis Date    Acute metabolic encephalopathy     Hyperlipidemia     Hypertension     Transaminitis        Past Surgical History:   Procedure Laterality Date    CARDIAC CATHETERIZATION      GANGLION CYST EXCISION      LAPAROTOMY N/A 3/21/2024    Procedure: LAPAROTOMY EXPLORATORY, washout, complex closure, LISSY drain;  Surgeon: Francine Reid MD;  Location: [facility];  Service: General    OK LAPS ABD PRTM&OMENTUM DX W/WO SPEC BR/WA SPX N/A 3/19/2024    Procedure: DIAGNOSTIC LAPAROSCOPY converted to Exploratory Laparotomy, Right Hemicolectomy, Abdominal wash-out, Abthera Placement;  Surgeon: Roger Jole DO;  Location: [facility];  Service: General    TYMPANOSTOMY TUBE PLACEMENT         No family history on file.    Social History     Socioeconomic History    Marital status: /Civil Union     Spouse name: Not on file    Number of children: Not on file    Years of education: Not on file    Highest education level: Not on file   Occupational History    Not on file   Tobacco Use    Smoking status: Former     Current packs/day: 0.00     Average packs/day: 2.0 packs/day for 50.5 years (100.9 ttl pk-yrs)     Types: Cigarettes     Start date: 10/1973      Quit date: 3/18/2024     Years since quittin.1    Smokeless tobacco: Never   Vaping Use    Vaping status: Every Day    Substances: Nicotine   Substance and Sexual Activity    Alcohol use: Never    Drug use: Never    Sexual activity: Not Currently   Other Topics Concern    Not on file   Social History Narrative    Not on file     Social Determinants of Health     Financial Resource Strain: Not on file   Food Insecurity: No Food Insecurity (3/12/2024)    Hunger Vital Sign     Worried About Running Out of Food in the Last Year: Never true     Ran Out of Food in the Last Year: Never true   Transportation Needs: No Transportation Needs (3/12/2024)    PRAPARE - Transportation     Lack of Transportation (Medical): No     Lack of Transportation (Non-Medical): No   Physical Activity: Not on file   Stress: Not on file   Social Connections: Not on file   Intimate Partner Violence: Not on file   Housing Stability: Low Risk  (3/12/2024)    Housing Stability Vital Sign     Unable to Pay for Housing in the Last Year: No     Number of Places Lived in the Last Year: 1     Unstable Housing in the Last Year: No       Allergies   Allergen Reactions    Codeine Anaphylaxis    Wound Dressing Adhesive Blisters     ALL WOUND DRESSINGS     Contrast [Iodinated Contrast Media] GI Intolerance    Prednisone Irritability     Increase in anger/abusive behaviors    Ibuprofen Rash       Current Outpatient Medications   Medication Sig Dispense Refill    lidocaine-prilocaine (EMLA) cream Apply topically as needed for mild pain or moderate pain 50 g 1    ondansetron (ZOFRAN-ODT) 8 mg disintegrating tablet Take 1 tablet (8 mg total) by mouth every 8 (eight) hours as needed for vomiting or nausea 20 tablet 1    albuterol (2.5 mg/3 mL) 0.083 % nebulizer solution Take 2.5 mg by nebulization every 6 (six) hours as needed for wheezing or shortness of breath      amLODIPine (NORVASC) 5 mg tablet Take 1 tablet (5 mg total) by mouth daily 90 tablet 3     atorvastatin (Lipitor) 40 mg tablet Take 1 tablet (40 mg total) by mouth daily at bedtime 90 tablet 3    fluticasone-umeclidinium-vilanterol (Trelegy Ellipta) 100-62.5-25 mcg/actuation inhaler Inhale 1 puff daily Rinse mouth after use.      lisinopril (ZESTRIL) 10 mg tablet Take 1 tablet (10 mg total) by mouth daily 30 tablet 2    metoprolol tartrate (LOPRESSOR) 25 mg tablet Take 1 tablet (25 mg total) by mouth every 12 (twelve) hours 60 tablet 3    nicotine (NICODERM CQ) 21 mg/24 hr TD 24 hr patch Place 1 patch on the skin over 24 hours daily (Patient not taking: Reported on 4/22/2024) 28 patch 0    oxymetazoline (AFRIN) 0.05 % nasal spray 2 sprays by Each Nare route 2 (two) times a day      rOPINIRole (REQUIP) 0.5 mg tablet Take 1 tablet (0.5 mg total) by mouth 3 (three) times a day 90 tablet 0    torsemide (DEMADEX) 20 mg tablet Take 1 tablet (20 mg total) by mouth daily 90 tablet 3    zolpidem (AMBIEN) 5 mg tablet Take 5 mg by mouth daily as needed       No current facility-administered medications for this visit.       (Not in a hospital admission)      Objective:     24 Hour Vitals Assessment:     There were no vitals filed for this visit.    PHYSICIAN EXAM:    General: Appearance: alert, cooperative, no distress.  HEENT: Normocephalic, atraumatic. No scleral icterus. conjunctivae clear. EOMI.  Chest: No tenderness to palpation. No open wound noted.  Lungs: Clear to auscultation bilaterally, Respirations unlabored.  Cardiac: Regular rate, +S1and S2  Abdomen: Soft, non-tender, non-distended. Bowel sounds are normal.  Extremities:  No edema, cyanosis, clubbing.  Skin: Skin color, turgor are normal. No rashes.  Neurologic: Awake, Alert, and oriented, no gross focal deficits noted b/l.       DATA REVIEW:    Pathology Result:    Final Diagnosis   Date Value Ref Range Status   03/19/2024   Final    A. Right colon with terminal ileum and appendix, right hemicolectomy:  -   Invasive adenocarcinoma of cecum, moderately  differentiated, grossly perforated, extending to appendix and terminal ileum.  -   MMR (MLH1, MSH2, MSH6 and PMS2) studies by IHC on A11 are pending.    -   A14 is sent to [facility] for MI Profile Testing.   -   See synoptic report.    Select slide A14 is reviewed by Dr. Siomara Montgomery who concurs with the diagnosis.    Interpretation performed at [facility] [address] [address]            Image Results:   Image result are reviewed and documented in Hematology/Oncology history    XR abdomen obstruction series  Narrative: XR ABDOMEN OBSTRUCTION SERIES    INDICATION: s/p bowel resection, increased pain, no bowel movement.    COMPARISON: None    FINDINGS:    Gas-filled small and large bowel loops are seen. Rectal air is seen  A drain is seen terminating in the right lower abdominal  Few air-fluid levels are seen    No pneumoperitoneum.    No pathologic calcification or soft tissue mass.    Bones are unremarkable for patient's age.    Examination of the chest reveals small bilateral effusion with bibasilar densities  Mild increased lung markings may be due to mild congestion, improved from the previous study of March 22, 2024  Impression: Mildly dilated small bowel loops along with gas-filled colonic loops with few air-fluid levels, suggestive of ileus. Correlate clinically if concern for obstruction    Workstation performed: [id number]      LABS:  Lab data are reviewed and documented in HemOnc history.       Lab Results   Component Value Date    HGB 10.4 (L) 03/28/2024    HCT 35.6 03/28/2024    MCV 80 (L) 03/28/2024     03/28/2024    WBC 13.09 (H) 03/28/2024    NRBC 0 03/20/2024     Lab Results   Component Value Date    K 3.1 (L) 03/28/2024     03/28/2024    CO2 27 03/28/2024    BUN 12 03/28/2024    CREATININE 0.70 03/28/2024    GLUCOSE 152 (H) 03/19/2024    GLUF 111 (H) 07/20/2021    CALCIUM 7.4 (L) 03/28/2024    CORRECTEDCA 9.3 03/20/2024    AST 12 (L) 03/22/2024    ALT  "13 03/22/2024    ALKPHOS 48 03/22/2024    EGFR 93 03/28/2024       No results found for: \"IRON\", \"TIBC\", \"FERRITIN\"    Lab Results   Component Value Date    NMQMABRS68 261 03/20/2024       No results for input(s): \"WBC\", \"CREAT\", \"PLT\" in the last 72 hours.    By:  Carmina Gallego MD, 4/24/2024, 4:06 PM                                  "

## 2024-04-19 NOTE — H&P (VIEW-ONLY)
Hematology/Oncology Outpatient Office Note    Date of Service: 2024    [facility]  [address]  [facility] [place] [address]  [phone]    Reason for Consultation: No chief complaint on file.      Cancer Stage at diagnosis: pT4a pN2b cMx    Referral Physician: Roger Joel, *    Primary Care Physician:  Jordan Ramirez MD     Nickname: Dwaine    Spouse:     Original ECO    Today's ECOG: 3    Goals and Barriers:  Current Goal: Minimize effects of disease burden, extend life.   Barriers to accomplishing this: None    Patient's Capacity to Self Care:  Patient is able to self care.      ASSESSMENT & PLAN      Diagnosis ICD-10-CM Associated Orders   1. Stage 3a chronic kidney disease (HCC)  N18.31       2. Thrombocytopenia (HCC)  D69.6       3. Adenocarcinoma, colon (HCC)  C18.9 CT chest wo contrast     lidocaine-prilocaine (EMLA) cream     Ambulatory referral to Interventional Radiology     Ambulatory Referral to Hematology / Oncology     Infusion Calculated Appointment Request     Infusion Calculated Appointment Request     CBC and differential     Comprehensive metabolic panel     Infusion Calculated Appointment Request     Infusion Calculated Appointment Request     CBC and differential     Comprehensive metabolic panel      4. Chemotherapy induced nausea and vomiting  R11.2 ondansetron (ZOFRAN-ODT) 8 mg disintegrating tablet    T45.1X5A             This is a 63 y.o. c PMHx notable for HLP, HTN, HFpEF, CAD being seen in consultation for advanced colon adenocarcinoma      Discussion of decision making  Oncology history updated, accordingly, during this visit  Goals of care/patient communication  I discussed with the patient the clinical course leading up to their cancer diagnosis. I reviewed relevant office notes, imaging reports and pathology result as well.  I told the patient that this is a case of curable vs incurable disease and what  this means. We discussed that the goal of anti-cancer therapy is to provide best quality of life, extend overall survival, and progression free survival as shown in clinical trials. We also discussed that there might be a point when the cancer will no longer respond to this anti-neoplastic therapy. As a result, we also discussed the role of the palliative care team being introduced early in the treatment course. We will be making this referral if metastasis is confirmed  I explained the risks/benefits of the proposed cancer therapy: mFOLFOX6 +/- further agents and after discussion including understanding risks of possible life-threatening complications and therapy-related malignancy development, informed consent for blood products and treatment has been signed and obtained.  TNM/Staging At Diagnosis  Cancer Staging   Adenocarcinoma, colon (HCC)  Staging form: Colon and Rectum, AJCC 8th Edition  - Pathologic: pT4a, pN2b - Unsigned  Histologic grading system: 4 grade system  Histologic grade (G): G2  Residual tumor (R): R0 - None  Disease Features/Tumor Markers/Genetics  Tumor Marker: CEA  Notable Path Features:   3/19/2024: G2, margins negative, 10/25 LN, lY5ndB1a, MMR proficient  Treatment: mFOLFOX6   Other Supportive care:   Treatment Team Members  Surgeon: Dr. Joel  Rad Onc  Palliative  Labs  Diagnostics  CARIS NGS: KRAS exon 2 mutation, HER2 +3  3/19/2024 CT Abd/pelv w/o c: Cluster of nodules in the right lower quadrant mesentery the largest measuring 3.0 x 2.3 cm (2/110) and another 1.7 cm nodule on image 111     Discussion of decision making    I personally reviewed the following lab results, the image studies, pathology, other specialty/physicians consult notes and recommendations, and outside medical records. I had a lengthy discussion with the patient and shared the work-up findings. We discussed the diagnosis and management plan as below. I spent 62 minutes reviewing the records (labs, clinician notes,  outside records, medical history, ordering medicine/tests/procedures, monitoring of anti-neoplastic toxicities, interpreting the imaging/labs previously done) and coordination of care as well as direct time with the patient today, of which greater than 50% of the time was spent in counseling and coordination of care with the patient/family.    Can't use Tucatinib + Herceptin as [facility] only enrolled KRAS WT, TMB 7 muts/Mb, no other actionable biomarkers    Plan/Labs  Zofran 8mg q8 prn nausea/vomiting to be sent home  EMLA cream  IR referral port placement  Iron panel to w/u microcytic anemia and potential need for IV iron  Add CEA to the FOLFOX plan C1  CT Chest w/o c ordered to trend lung nodules, then will order restaging CT CAP thereafter  Infusion chairs ordered for mFOLFOX6 should we obtain cardiac clearance due to her overall tenuous health status and co-morbidities  Consider clinical trial consideration with the HER +2 status if metastatic disease is confirmed     I discussed the risks of ongoing cigarette smoking and reviewed the benefits of quitting and risk of new lung primary, heart disease, stroke, among other risks and cancers. Reviewed options including support, quit date, medications, and family support. Patient voiced understanding and willingness to try to quit. Patient was told to notify [facility] family practitioner for additional management. Greater than 3 minutes were needed for discussion of tobacco dependence and quitting counselling.        Follow Up: q4 weeks while on systemic therapy    All questions were answered to the patient's satisfaction during this encounter. The patient knows the contact information for our office and knows to reach out for any relevant concerns related to this encounter. They are to call for any temperature 100.4 or higher, new symptoms including but not restricted to shaking chills, decreased appetite, nausea, vomiting, diarrhea, increased fatigue,  shortness of breath or chest pain, confusion, and not feeling the strength to come to the clinic. For all other listed problems and medical diagnosis in their chart - they are managed by PCP and/or other specialists, which the patient acknowledges. Thank you very much for your consultation and making us a part of this patient's care. We are continuing to follow closely with you. Please do not hesitate to reach out to me with any additional questions or concerns.    Carmina Gallego MD  Hematology & Medical Oncology Staff Physician             Disclaimer: This document was prepared using [employer] technology. If a word or phrase is confusing, or does not make sense, this is likely due to recognition error which was not discovered during this clinician's review. If you believe an error has occurred, please contact me through [facility] for fatoumata?cation.      ONCOLOGY HISTORY OF PRESENT ILLNESS        Oncology History   Adenocarcinoma, colon (HCC)   4/16/2024 Initial Diagnosis    Adenocarcinoma, colon (HCC)     5/7/2024 -  Chemotherapy    alteplase (CATHFLO), 2 mg, Intracatheter, Every 1 Minute as needed, 0 of 12 cycles  fluorouracil (ADRUCIL), 400 mg/m2, Intravenous, Once, 0 of 12 cycles  leucovorin calcium IVPB, 400 mg/m2, Intravenous, Once, 0 of 12 cycles  oxaliplatin (ELOXATIN) chemo infusion, 85 mg/m2, Intravenous, Once, 0 of 12 cycles  fluorouracil (ADRUCIL) ambulatory infusion Soln, 1,200 mg/m2/day, Intravenous, Over 46 hours, 0 of 12 cycles           SUBJECTIVE  (INTERVAL HISTORY)      Clotting History None   Bleeding History None   Cancer History Colon   Family Cancer History Father (Liver cancer) Grandmother (colon cancer)   H/O Blood/Plt Transfusion None   Tobacco/etoh/drug abuse 2 ppd per day for 50 years. Occasionally smokes 1 cigarette.  Trying to quit smoking. Past history of recreational drug use 10 years ago.            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   She has low appetite and taste changes for a month. She had dizziness a couple of times past few months.     I have reviewed the relevant past medical, surgical, social and family history. I have also reviewed allergies and medications for this patient.    Review of Systems  Baseline weight:     Denies F/C, N/V, SOB, CP, LH, HA, rash, itching, gen weakness, melena, hematuria, hematochezia, falls, diarrhea, or constipation       A 10-point review of system was performed, pertinent positive and negative were detailed as above. Otherwise, the 10-point review of system was negative.      Past Medical History:   Diagnosis Date    Acute metabolic encephalopathy     Hyperlipidemia     Hypertension     Transaminitis        Past Surgical History:   Procedure Laterality Date    CARDIAC CATHETERIZATION      GANGLION CYST EXCISION      LAPAROTOMY N/A 3/21/2024    Procedure: LAPAROTOMY EXPLORATORY, washout, complex closure, LISSY drain;  Surgeon: Francine Reid MD;  Location: [facility];  Service: General    NM LAPS ABD PRTM&OMENTUM DX W/WO SPEC BR/WA SPX N/A 3/19/2024    Procedure: DIAGNOSTIC LAPAROSCOPY converted to Exploratory Laparotomy, Right Hemicolectomy, Abdominal wash-out, Abthera Placement;  Surgeon: Roger Joel DO;  Location: [facility];  Service: General    TYMPANOSTOMY TUBE PLACEMENT         No family history on file.    Social History     Socioeconomic History    Marital status: /Civil Union     Spouse name: Not on file    Number of children: Not on file    Years of education: Not on file    Highest education level: Not on file   Occupational History    Not on file   Tobacco Use    Smoking status: Former     Current packs/day: 0.00     Average packs/day: 2.0 packs/day for 50.5 years (100.9 ttl pk-yrs)     Types: Cigarettes     Start date: 10/1973      Quit date: 3/18/2024     Years since quittin.1    Smokeless tobacco: Never   Vaping Use    Vaping status: Every Day    Substances: Nicotine   Substance and Sexual Activity    Alcohol use: Never    Drug use: Never    Sexual activity: Not Currently   Other Topics Concern    Not on file   Social History Narrative    Not on file     Social Determinants of Health     Financial Resource Strain: Not on file   Food Insecurity: No Food Insecurity (3/12/2024)    Hunger Vital Sign     Worried About Running Out of Food in the Last Year: Never true     Ran Out of Food in the Last Year: Never true   Transportation Needs: No Transportation Needs (3/12/2024)    PRAPARE - Transportation     Lack of Transportation (Medical): No     Lack of Transportation (Non-Medical): No   Physical Activity: Not on file   Stress: Not on file   Social Connections: Not on file   Intimate Partner Violence: Not on file   Housing Stability: Low Risk  (3/12/2024)    Housing Stability Vital Sign     Unable to Pay for Housing in the Last Year: No     Number of Places Lived in the Last Year: 1     Unstable Housing in the Last Year: No       Allergies   Allergen Reactions    Codeine Anaphylaxis    Wound Dressing Adhesive Blisters     ALL WOUND DRESSINGS     Contrast [Iodinated Contrast Media] GI Intolerance    Prednisone Irritability     Increase in anger/abusive behaviors    Ibuprofen Rash       Current Outpatient Medications   Medication Sig Dispense Refill    lidocaine-prilocaine (EMLA) cream Apply topically as needed for mild pain or moderate pain 50 g 1    ondansetron (ZOFRAN-ODT) 8 mg disintegrating tablet Take 1 tablet (8 mg total) by mouth every 8 (eight) hours as needed for vomiting or nausea 20 tablet 1    albuterol (2.5 mg/3 mL) 0.083 % nebulizer solution Take 2.5 mg by nebulization every 6 (six) hours as needed for wheezing or shortness of breath      amLODIPine (NORVASC) 5 mg tablet Take 1 tablet (5 mg total) by mouth daily 90 tablet 3     atorvastatin (Lipitor) 40 mg tablet Take 1 tablet (40 mg total) by mouth daily at bedtime 90 tablet 3    fluticasone-umeclidinium-vilanterol (Trelegy Ellipta) 100-62.5-25 mcg/actuation inhaler Inhale 1 puff daily Rinse mouth after use.      lisinopril (ZESTRIL) 10 mg tablet Take 1 tablet (10 mg total) by mouth daily 30 tablet 2    metoprolol tartrate (LOPRESSOR) 25 mg tablet Take 1 tablet (25 mg total) by mouth every 12 (twelve) hours 60 tablet 3    nicotine (NICODERM CQ) 21 mg/24 hr TD 24 hr patch Place 1 patch on the skin over 24 hours daily (Patient not taking: Reported on 4/22/2024) 28 patch 0    oxymetazoline (AFRIN) 0.05 % nasal spray 2 sprays by Each Nare route 2 (two) times a day      rOPINIRole (REQUIP) 0.5 mg tablet Take 1 tablet (0.5 mg total) by mouth 3 (three) times a day 90 tablet 0    torsemide (DEMADEX) 20 mg tablet Take 1 tablet (20 mg total) by mouth daily 90 tablet 3    zolpidem (AMBIEN) 5 mg tablet Take 5 mg by mouth daily as needed       No current facility-administered medications for this visit.       (Not in a hospital admission)      Objective:     24 Hour Vitals Assessment:     There were no vitals filed for this visit.    PHYSICIAN EXAM:    General: Appearance: alert, cooperative, no distress.  HEENT: Normocephalic, atraumatic. No scleral icterus. conjunctivae clear. EOMI.  Chest: No tenderness to palpation. No open wound noted.  Lungs: Clear to auscultation bilaterally, Respirations unlabored.  Cardiac: Regular rate, +S1and S2  Abdomen: Soft, non-tender, non-distended. Bowel sounds are normal.  Extremities:  No edema, cyanosis, clubbing.  Skin: Skin color, turgor are normal. No rashes.  Neurologic: Awake, Alert, and oriented, no gross focal deficits noted b/l.       DATA REVIEW:    Pathology Result:    Final Diagnosis   Date Value Ref Range Status   03/19/2024   Final    A. Right colon with terminal ileum and appendix, right hemicolectomy:  -   Invasive adenocarcinoma of cecum, moderately  differentiated, grossly perforated, extending to appendix and terminal ileum.  -   MMR (MLH1, MSH2, MSH6 and PMS2) studies by IHC on A11 are pending.    -   A14 is sent to [facility] for MI Profile Testing.   -   See synoptic report.    Select slide A14 is reviewed by Dr. Siomara Montgomery who concurs with the diagnosis.    Interpretation performed at [facility] [address] [facility] [address]            Image Results:   Image result are reviewed and documented in Hematology/Oncology history    XR abdomen obstruction series  Narrative: XR ABDOMEN OBSTRUCTION SERIES    INDICATION: s/p bowel resection, increased pain, no bowel movement.    COMPARISON: None    FINDINGS:    Gas-filled small and large bowel loops are seen. Rectal air is seen  A drain is seen terminating in the right lower abdominal  Few air-fluid levels are seen    No pneumoperitoneum.    No pathologic calcification or soft tissue mass.    Bones are unremarkable for patient's age.    Examination of the chest reveals small bilateral effusion with bibasilar densities  Mild increased lung markings may be due to mild congestion, improved from the previous study of March 22, 2024  Impression: Mildly dilated small bowel loops along with gas-filled colonic loops with few air-fluid levels, suggestive of ileus. Correlate clinically if concern for obstruction    Workstation performed: [id number]      LABS:  Lab data are reviewed and documented in HemOnc history.       Lab Results   Component Value Date    HGB 10.4 (L) 03/28/2024    HCT 35.6 03/28/2024    MCV 80 (L) 03/28/2024     03/28/2024    WBC 13.09 (H) 03/28/2024    NRBC 0 03/20/2024     Lab Results   Component Value Date    K 3.1 (L) 03/28/2024     03/28/2024    CO2 27 03/28/2024    BUN 12 03/28/2024    CREATININE 0.70 03/28/2024    GLUCOSE 152 (H) 03/19/2024    GLUF 111 (H) 07/20/2021    CALCIUM 7.4 (L) 03/28/2024    CORRECTEDCA 9.3 03/20/2024    AST 12 (L) 03/22/2024    ALT  "13 03/22/2024    ALKPHOS 48 03/22/2024    EGFR 93 03/28/2024       No results found for: \"IRON\", \"TIBC\", \"FERRITIN\"    Lab Results   Component Value Date    QUUUZMIP03 261 03/20/2024       No results for input(s): \"WBC\", \"CREAT\", \"PLT\" in the last 72 hours.    By:  Carmina Gallego MD, 4/24/2024, 4:06 PM                                  "

## 2024-04-22 ENCOUNTER — PATIENT OUTREACH (OUTPATIENT)
Dept: HEMATOLOGY ONCOLOGY | Facility: CLINIC | Age: 64
End: 2024-04-22

## 2024-04-22 ENCOUNTER — TELEPHONE (OUTPATIENT)
Dept: NEUROLOGY | Facility: CLINIC | Age: 64
End: 2024-04-22

## 2024-04-22 ENCOUNTER — OFFICE VISIT (OUTPATIENT)
Dept: CARDIOLOGY CLINIC | Facility: CLINIC | Age: 64
End: 2024-04-22
Payer: COMMERCIAL

## 2024-04-22 VITALS
RESPIRATION RATE: 16 BRPM | DIASTOLIC BLOOD PRESSURE: 80 MMHG | BODY MASS INDEX: 45.75 KG/M2 | WEIGHT: 233 LBS | OXYGEN SATURATION: 99 % | HEIGHT: 60 IN | SYSTOLIC BLOOD PRESSURE: 142 MMHG | HEART RATE: 82 BPM

## 2024-04-22 DIAGNOSIS — I25.10 CORONARY ARTERY DISEASE INVOLVING NATIVE HEART WITHOUT ANGINA PECTORIS, UNSPECIFIED VESSEL OR LESION TYPE: ICD-10-CM

## 2024-04-22 DIAGNOSIS — E78.2 MIXED HYPERLIPIDEMIA: ICD-10-CM

## 2024-04-22 DIAGNOSIS — I10 ESSENTIAL HYPERTENSION: ICD-10-CM

## 2024-04-22 DIAGNOSIS — I10 PRIMARY HYPERTENSION: ICD-10-CM

## 2024-04-22 DIAGNOSIS — I50.32 CHRONIC HEART FAILURE WITH PRESERVED EJECTION FRACTION (HFPEF) (HCC): Primary | ICD-10-CM

## 2024-04-22 PROCEDURE — 99214 OFFICE O/P EST MOD 30 MIN: CPT

## 2024-04-22 RX ORDER — LISINOPRIL 10 MG/1
10 TABLET ORAL DAILY
Qty: 30 TABLET | Refills: 2 | Status: SHIPPED | OUTPATIENT
Start: 2024-04-22

## 2024-04-22 RX ORDER — TORSEMIDE 20 MG/1
20 TABLET ORAL DAILY
Qty: 90 TABLET | Refills: 3 | Status: SHIPPED | OUTPATIENT
Start: 2024-04-22

## 2024-04-22 RX ORDER — AMLODIPINE BESYLATE 5 MG/1
5 TABLET ORAL DAILY
Qty: 90 TABLET | Refills: 3 | Status: SHIPPED | OUTPATIENT
Start: 2024-04-22

## 2024-04-22 RX ORDER — ATORVASTATIN CALCIUM 40 MG/1
40 TABLET, FILM COATED ORAL
Qty: 90 TABLET | Refills: 3 | Status: SHIPPED | OUTPATIENT
Start: 2024-04-22

## 2024-04-22 NOTE — TELEPHONE ENCOUNTER
1ST ATTEMPT,     Called pt no answer, LMOM.    Thank you,     Isidra OLIVARES/ ROBERTO RIZZO/ Altered mental status     DC- HOME- 3/28/2024      She can follow up at least once with our general neurology AP or attending physician within 12 weeks time after OP MRI brain.

## 2024-04-22 NOTE — PROGRESS NOTES
CARDIOLOGY OFFICE VISIT  [facility]  [address] [place], [place] [id number]  [address] [place] [address], [place] [address]  Tel: [phone]      NAME: Dwaine Gould  AGE: 63 y.o.  SEX: female  : 1960  MRN: [id number]      Chief Complaint:  Chief Complaint   Patient presents with    Follow-up       Assessment and Plan:    1.  Chronic HFpEF/RV failure-EF 60%  Appears euvolemic on exam  Medication Regiment Includes:   Diuretic: Continue torsemide 20mg daily  Strongly encourage low sodium diet.   Recommend checking weight daily. Please call our office if you notice a 3lb gain in one day or 5lb gain over 1 week.   BMP ordered to be completed in 1 week    2.  Remote history of CAD  Cardiac cath 2014: Single-vessel CAD with an ostial RPDA lesion not amenable to PCI due to location of the lesion  Continue statin, Lopressor    3.  Hypertension  BP elevated today, patient reports elevated Bps at home as well  Begin lisinopril 10mg daily.  Continue amlodipine 5 mg daily and Lopressor 25 mg twice daily, continue torsemide 20 mg daily.   Ambulatory BP monitoring and low sodium diet advised  Patient to return in 1 week for nursing BP appointment.    4.  Hyperlipidemia  Continue statin  FLP ordered    5.  Chronic hypoxic and hypercapnic respiratory failure  On home O2    6.  History of Tobacco use  Continued cessation advised and encouraged.    7.  Mild to moderate TR, pulmonary hypertension-estimated RVSP 40 mmHg  Periodic outpatient surveillance with [facility] recommended      Please return for nursing BP visit in 1 week, for regular follow-up in 6 weeks or sooner as needed.     1. Chronic heart failure with preserved ejection fraction (HFpEF) (HCC)  Basic metabolic panel    torsemide (DEMADEX) 20 mg tablet      2. Coronary artery disease involving native heart without angina pectoris, unspecified vessel or lesion type        3. Primary hypertension  lisinopril (ZESTRIL) 10 mg tablet      4.  Mixed hyperlipidemia  Lipid Panel with Direct LDL reflex    atorvastatin (Lipitor) 40 mg tablet      5. Essential hypertension  amLODIPine (NORVASC) 5 mg tablet    metoprolol tartrate (LOPRESSOR) 25 mg tablet          History of Present Illness:     Dwaine Gould is a 63 y.o. female with PMHx of chronic HFpEF, hypertension, CAD (per [facility] records cardiac cath 10/2014 with single-vessel CAD-ostial RPDA lesion not amenable to PCI), hyperlipidemia, CKD, anemia, chronic respiratory failure, COPD, adenocarcinoma of colon, colon perforation s/p right hemicolectomy 3/2024 who presents for follow-up.    She was last seen in our office on 4/8/2024 for hospital follow-up.  She was hospitalized at [facility] in March with acute on chronic HFpEF, RV failure, pneumoperitoneum, acute on chronic respiratory failure.  During that admission, she stated she had history of chronic lower extremity edema and takes Lasix as needed.  She experienced lower extremity edema, increasing abdominal girth, and admitted to dietary indiscretion.  During admission, she did experience transaminitis for which statins were held and resumed prior to discharge, as well as LETICIA on CKD and low blood pressure for which ACE inhibitor was discontinued, kidney function has since improved.  Diuresis was deferred to nephrology given LETICIA on CKD.    She is following with general surgery for her history of colon perforation s/p right hemicolectomy 3/2024.  They placed referral for oncology given diagnosis of colon adenocarcinoma.    She states she is overall doing well since last time she was seen in our office.  States her breathing is stable and at her baseline.  She does have a history of chronic respiratory failure and is on oxygen.  Denies worsening shortness of breath.  She states she does historically have orthopnea, and states this is no change from her baseline.  Denies PND, denies leg swelling.  She reports she weighs herself daily at home and that it  has been overall stable since she was last seen in our office.  Reports dry weight of 235 pounds.  Her weight is 233 pounds today.  She states she has been trying to follow a low-sodium diet but does admit to occasional dietary indiscretion.    Today she does mention a pain on the right side of her torso along her rib cage.  States it feels like a pulled muscle, that is similar to discomfort she is experienced with pulling muscles in the past.  States it started this a.m.  States she only notices the discomfort with movement such as bending, twisting, getting out of a bed or car.  She states it does not radiate anywhere.  She does additionally note a burning sensation in her left armpit that has been going on for the past couple days.  States she experienced a similar sensation after her cardiac catheterization in 2014.  She states it is not happening on a daily basis, has occurred 3 times now.  She states happens while she is sitting resting, lasts for 10 to 15 seconds, and then resolves on its own.  She notes no associated rashes.  She states it does not radiate anywhere.  Neither of these discomforts are related to exertion, neither improved with rest.  She is advised to reach out to her PCP regarding the symptoms for further evaluation.  She was advised to continue to monitor her symptoms and reach out to our office if she experiences associated chest pain/pressure, worsening shortness of breath, or exertional symptoms.    She denies chest pain/pressure.  She denies palpitations, lightheadedness, dizziness, syncope.    She states she checks her home blood pressures and it typically ranges from the 140s to 160s.      Social History: Quit smoking when admitted in March.  States she has not smoked since then.  Continued cessation advised and encouraged.  States she is still experiencing secondhand smoke exposure.       She previously followed with [facility] cardiology.    Recent Cardiac Work-Up:  Echo  3/12/2024    Left Ventricle: Left ventricular cavity size is normal. Wall thickness is increased. There is mild to moderate concentric hypertrophy. The left ventricular ejection fraction is 60%. Systolic function is normal. Although no diagnostic regional wall motion abnormality was identified, this possibility cannot be completely excluded on the basis of this study. Diastolic function is normal.    Right Ventricle: Right ventricular cavity size is mild to moderately dilated. Systolic function is mildly reduced.    Left Atrium: The atrium is mildly dilated.    Right Atrium: The atrium is mildly dilated.    Tricuspid Valve: There is mild to moderate regurgitation. Estimated RVSP 40 mmHg.  Cardiac cath 10/2014  Single-vessel CAD with an ostial RPDA lesion not amenable to PCI due to location of the lesion      Review of Systems:   Review of Systems   Constitutional:  Negative for chills, diaphoresis, fever and unexpected weight change.   HENT:  Negative for ear pain and sore throat.    Eyes:  Negative for pain and redness.   Respiratory:  Positive for shortness of breath (At her baseline). Negative for cough.    Cardiovascular:  Negative for chest pain, palpitations and leg swelling.   Gastrointestinal:  Negative for abdominal pain, nausea and vomiting.   Genitourinary:  Negative for dysuria.   Musculoskeletal:         Discomfort along R side of torso   Skin:  Negative for color change and rash.   Neurological:  Negative for dizziness, syncope and light-headedness.   Hematological:  Does not bruise/bleed easily.   Psychiatric/Behavioral:  Negative for agitation and behavioral problems.    All other systems reviewed and are negative.        Vitals:  Vitals:    04/22/24 1506 04/22/24 1530   BP: 142/80 142/80   BP Location: Left arm    Patient Position: Sitting    Cuff Size: Standard    Pulse: 82    Resp: 16    SpO2: 99%    Weight: 106 kg (233 lb)    Height: 5' (1.524 m)         Body mass index is 45.5 kg/m².    Weight  (last 2 days)       Date/Time Weight    04/22/24 1506 106 (233)              Physical Exam:   GEN: Alert and oriented x 3, in no acute distress.  Well appearing and well nourished. On oxygen via NC.   HEENT: Sclera anicteric, conjunctivae pink, mucous membranes moist. Oropharynx clear.   NECK: Supple, no carotid bruits, no significant JVD. Trachea midline, no thyromegaly.   HEART: Regular rhythm, normal S1 and S2, no murmurs, clicks, gallops or rubs. PMI nondisplaced, no thrills.   LUNGS: Clear to auscultation bilaterally; no wheezes, rales, or rhonchi. No increased work of breathing or signs of respiratory distress.   EXTREMITIES: Skin warm and well perfused, no clubbing, cyanosis, or edema.  NEURO: No focal findings. Normal speech. Mood and affect normal.   SKIN: Normal without suspicious lesions on exposed skin.      EKG Reviewed Personally: 3/20/2024: Sinus bradycardia, left axis deviation, prolonged Qtc, diffuse nonspecific ST-T wave abnormality        Active Problems:  Patient Active Problem List   Diagnosis    Tobacco abuse    CAD (coronary artery disease)    Chronic heart failure with preserved ejection fraction (HCC)    COPD (chronic obstructive pulmonary disease) (HCC)    Leukocytosis    Essential hypertension    Hyperlipidemia    Chronic respiratory failure with hypoxia and hypercapnia (HCC)    Stage 3a chronic kidney disease (HCC)    Anemia    Thrombocytopenia (HCC)    Adenocarcinoma, colon (HCC)    Encounter to establish care       Past Medical History:  Past Medical History:   Diagnosis Date    Acute metabolic encephalopathy     Hyperlipidemia     Hypertension     Transaminitis          Past Surgical History:  Past Surgical History:   Procedure Laterality Date    CARDIAC CATHETERIZATION      GANGLION CYST EXCISION      LAPAROTOMY N/A 3/21/2024    Procedure: LAPAROTOMY EXPLORATORY, washout, complex closure, LISSY drain;  Surgeon: Francine Reid MD;  Location: [facility] OR;  Service: General    MS LAPS ABD  PRTM&OMENTUM DX W/WO SPEC BR/WA SPX N/A 3/19/2024    Procedure: DIAGNOSTIC LAPAROSCOPY converted to Exploratory Laparotomy, Right Hemicolectomy, Abdominal wash-out, Abthera Placement;  Surgeon: Roger Joel DO;  Location: [facility];  Service: General    TYMPANOSTOMY TUBE PLACEMENT           Family History:  History reviewed. No pertinent family history.      Social History:  Social History     Socioeconomic History    Marital status: /Civil Union     Spouse name: None    Number of children: None    Years of education: None    Highest education level: None   Occupational History    None   Tobacco Use    Smoking status: Former     Current packs/day: 0.00     Average packs/day: 2.0 packs/day for 50.5 years (100.9 ttl pk-yrs)     Types: Cigarettes     Start date: 10/1973     Quit date: 3/18/2024     Years since quittin.0    Smokeless tobacco: Never   Vaping Use    Vaping status: Every Day    Substances: Nicotine   Substance and Sexual Activity    Alcohol use: Never    Drug use: Never    Sexual activity: Not Currently   Other Topics Concern    None   Social History Narrative    None     Social Determinants of Health     Financial Resource Strain: Not on file   Food Insecurity: No Food Insecurity (3/12/2024)    Hunger Vital Sign     Worried About Running Out of Food in the Last Year: Never true     Ran Out of Food in the Last Year: Never true   Transportation Needs: No Transportation Needs (3/12/2024)    PRAPARE - Transportation     Lack of Transportation (Medical): No     Lack of Transportation (Non-Medical): No   Physical Activity: Not on file   Stress: Not on file   Social Connections: Not on file   Intimate Partner Violence: Not on file   Housing Stability: Low Risk  (3/12/2024)    Housing Stability Vital Sign     Unable to Pay for Housing in the Last Year: No     Number of Places Lived in the Last Year: 1     Unstable Housing in the Last Year: No           The following portions of the patient's  "history were reviewed and updated as appropriate: past medical history, past surgical history, past family history,  past social history, current medications, allergies and problem list.        Laboratory Results:  CBC with diff:   Lab Results   Component Value Date    WBC 13.09 (H) 03/28/2024    RBC 4.48 03/28/2024    HGB 10.4 (L) 03/28/2024    HCT 35.6 03/28/2024    MCV 80 (L) 03/28/2024    MCH 23.2 (L) 03/28/2024    RDW 19.1 (H) 03/28/2024     03/28/2024       CMP:  Lab Results   Component Value Date    CREATININE 0.70 03/28/2024    CREATININE 0.84 12/01/2021    BUN 12 03/28/2024    BUN 22 12/01/2021    K 3.1 (L) 03/28/2024    K 4.5 12/01/2021     03/28/2024     12/01/2021    CO2 27 03/28/2024    CO2 36 (H) 03/19/2024    CO2 28 12/01/2021    GLUCOSE 152 (H) 03/19/2024    ALKPHOS 48 03/22/2024    ALKPHOS 143 (H) 12/01/2021    ALT 13 03/22/2024    ALT 30 12/01/2021    AST 12 (L) 03/22/2024    AST 13 12/01/2021    BILIDIR 0.18 03/14/2024       Lab Results   Component Value Date    HGBA1C 6.7 (H) 03/20/2024    MG 1.9 03/26/2024    PHOS 2.6 03/26/2024       Lab Results   Component Value Date    TROPONINI <0.02 07/19/2021    TROPONINI <0.02 07/19/2021    TROPONINI 0.03 07/17/2021    TROPONINI <0.02 02/03/2020       Lipid Profile:   No results found for: \"CHOL\"  No results found for: \"HDL\"  No results found for: \"LDLCALC\"  No results found for: \"TRIG\"    Cardiac testing:   Results for orders placed during the hospital encounter of 03/11/24    Echo complete w/ contrast if indicated    Interpretation Summary    Left Ventricle: Left ventricular cavity size is normal. Wall thickness is increased. There is mild to moderate concentric hypertrophy. The left ventricular ejection fraction is 60%. Systolic function is normal. Although no diagnostic regional wall motion abnormality was identified, this possibility cannot be completely excluded on the basis of this study. Diastolic function is normal.    Right " Ventricle: Right ventricular cavity size is mild to moderately dilated. Systolic function is mildly reduced.    Left Atrium: The atrium is mildly dilated.    Right Atrium: The atrium is mildly dilated.    Tricuspid Valve: There is mild to moderate regurgitation. Estimated RVSP 40 mmHg.    No results found for this or any previous visit.    No results found for this or any previous visit.    No results found for this or any previous visit.    No results found for this or any previous visit.    No results found for this or any previous visit.    No results found for this or any previous visit.    No results found for this or any previous visit.        Medications:    Current Outpatient Medications:     albuterol (2.5 mg/3 mL) 0.083 % nebulizer solution, Take 2.5 mg by nebulization every 6 (six) hours as needed for wheezing or shortness of breath, Disp: , Rfl:     amLODIPine (NORVASC) 5 mg tablet, Take 1 tablet (5 mg total) by mouth daily, Disp: 90 tablet, Rfl: 3    atorvastatin (Lipitor) 40 mg tablet, Take 1 tablet (40 mg total) by mouth daily at bedtime, Disp: 90 tablet, Rfl: 3    fluticasone-umeclidinium-vilanterol (Trelegy Ellipta) 100-62.5-25 mcg/actuation inhaler, Inhale 1 puff daily Rinse mouth after use., Disp: , Rfl:     lisinopril (ZESTRIL) 10 mg tablet, Take 1 tablet (10 mg total) by mouth daily, Disp: 30 tablet, Rfl: 2    metoprolol tartrate (LOPRESSOR) 25 mg tablet, Take 1 tablet (25 mg total) by mouth every 12 (twelve) hours, Disp: 60 tablet, Rfl: 3    oxymetazoline (AFRIN) 0.05 % nasal spray, 2 sprays by Each Nare route 2 (two) times a day, Disp: , Rfl:     rOPINIRole (REQUIP) 0.5 mg tablet, Take 1 tablet (0.5 mg total) by mouth 3 (three) times a day, Disp: 90 tablet, Rfl: 0    torsemide (DEMADEX) 20 mg tablet, Take 1 tablet (20 mg total) by mouth daily, Disp: 90 tablet, Rfl: 3    zolpidem (AMBIEN) 5 mg tablet, Take 5 mg by mouth daily as needed, Disp: , Rfl:     nicotine (NICODERM CQ) 21 mg/24 hr TD 24  hr patch, Place 1 patch on the skin over 24 hours daily (Patient not taking: Reported on 4/22/2024), Disp: 28 patch, Rfl: 0      Allergies:  Allergies   Allergen Reactions    Codeine Anaphylaxis    Wound Dressing Adhesive Blisters     ALL WOUND DRESSINGS     Contrast [Iodinated Contrast Media] GI Intolerance    Prednisone Irritability     Increase in anger/abusive behaviors    Ibuprofen Rash           Recommend aggressive risk factor modification and therapeutic lifestyle changes.  Low-salt, low-calorie, low-fat, low-cholesterol diet with regular exercise and to optimize weight.    Discussed concepts of cardiovascular disease, including signs and symptoms of heart disease.    Previous studies were reviewed.    Safety measures were reviewed.  Questions were entertained and answered.  Patient was advised to report any problems requiring medical attention.    Follow-up with PCP and appropriate specialist and lab work/testing as discussed.    Return for follow up visit as scheduled or earlier, if needed.  Thank you for allowing me to participate in the care and evaluation of your patient.  Should you have any questions, please feel free to contact me.    Lance Altamirano PA-C  4/22/2024,9:22 PM

## 2024-04-22 NOTE — PROGRESS NOTES
NN initial phone outreach to the pt, she is a there cardiology appt and in the room waiting for the physician to come in and see her at this time, I asked if I could call her tomorrow again and she said that would be fine. I will make outreach again tomorrow.

## 2024-04-23 ENCOUNTER — TRANSCRIBE ORDERS (OUTPATIENT)
Dept: RADIOLOGY | Facility: HOSPITAL | Age: 64
End: 2024-04-23

## 2024-04-23 ENCOUNTER — PATIENT OUTREACH (OUTPATIENT)
Dept: HEMATOLOGY ONCOLOGY | Facility: CLINIC | Age: 64
End: 2024-04-23

## 2024-04-23 ENCOUNTER — PATIENT OUTREACH (OUTPATIENT)
Dept: CASE MANAGEMENT | Facility: HOSPITAL | Age: 64
End: 2024-04-23

## 2024-04-23 DIAGNOSIS — C18.9 ADENOCARCINOMA, COLON (HCC): Primary | ICD-10-CM

## 2024-04-23 NOTE — PROGRESS NOTES
NN phone outreach to the pt again this morning, she said that she was able to speak today. She is a very pleasant 62 yo F who is s/p colon resection, her post surgical path is pT4a, pN2b and she has been referred to medical oncology. She is scheduled to see Dr. Gallego tomorrow 4/24/2024 in [facility] for 3PM, pt aware of appt details.    We discussed how she is doing since her surgery, she reports having some discomfort but not true pain and healing up, staples were recently removed about three days ago. She admits to having pain for about 5 years but she admits to letting it go too far. She said the pain would come and go in the rib cage, she said it felt like a baby moving, especially when bending down. She attributed it to her gallbladder since others in her family had theirs removed.  She had a large amount of fluid build up, and upon her visit to the ER she was told that she had heart and kidney failure. She is doing much better since her discharge, she is having regular BM, going daily, off and on between diarrhea and solid stools.  She says her appetite is odd, but she is eating just not the way that she used to, Provided emotional support to the pt, she at one     We completed the general assessment and referrals placed for onc genetics (extensive family history) and onc SW.    We touched on the possibility for the pt needing a port. She has some knowlegde of what a port is being that she has had multiple family members with cancer. I reviewed w her that if she should have one scheduled, she would have sedation and need a DD. I will call her once I hear if she should have port scheduled or not.       She thanked me for calling her and the information we discussed. I provided her w my contact info and explained that once she is seen in office the RN and physician in addition to Donna Carl from Oncology Navigation team will be making outreach to her and following up with her as she is on treatment. She can call  me in the mean time w further questions/concerns.

## 2024-04-23 NOTE — PROGRESS NOTES
OncSW referral received, chart review completed.  Pt will consult with Dr. Gallego this week, MSW will outreach to assess for needs when a tx plan is in place.  No immediate needs noted on chart review.

## 2024-04-24 ENCOUNTER — TELEPHONE (OUTPATIENT)
Dept: HEMATOLOGY ONCOLOGY | Facility: CLINIC | Age: 64
End: 2024-04-24

## 2024-04-24 ENCOUNTER — CONSULT (OUTPATIENT)
Dept: HEMATOLOGY ONCOLOGY | Facility: CLINIC | Age: 64
End: 2024-04-24
Payer: COMMERCIAL

## 2024-04-24 DIAGNOSIS — R11.2 CHEMOTHERAPY INDUCED NAUSEA AND VOMITING: ICD-10-CM

## 2024-04-24 DIAGNOSIS — D69.6 THROMBOCYTOPENIA (HCC): ICD-10-CM

## 2024-04-24 DIAGNOSIS — N18.31 STAGE 3A CHRONIC KIDNEY DISEASE (HCC): Primary | ICD-10-CM

## 2024-04-24 DIAGNOSIS — D50.9 MICROCYTIC ANEMIA: ICD-10-CM

## 2024-04-24 DIAGNOSIS — T45.1X5A CHEMOTHERAPY INDUCED NAUSEA AND VOMITING: ICD-10-CM

## 2024-04-24 DIAGNOSIS — C18.9 ADENOCARCINOMA, COLON (HCC): ICD-10-CM

## 2024-04-24 DIAGNOSIS — Z71.6 ENCOUNTER FOR COUNSELING FOR TOBACCO USE DISORDER: ICD-10-CM

## 2024-04-24 PROCEDURE — 99406 BEHAV CHNG SMOKING 3-10 MIN: CPT | Performed by: INTERNAL MEDICINE

## 2024-04-24 PROCEDURE — 99245 OFF/OP CONSLTJ NEW/EST HI 55: CPT | Performed by: INTERNAL MEDICINE

## 2024-04-24 RX ORDER — LIDOCAINE AND PRILOCAINE 25; 25 MG/G; MG/G
CREAM TOPICAL AS NEEDED
Qty: 50 G | Refills: 1 | Status: SHIPPED | OUTPATIENT
Start: 2024-04-24

## 2024-04-24 RX ORDER — ONDANSETRON 8 MG/1
8 TABLET, ORALLY DISINTEGRATING ORAL EVERY 8 HOURS PRN
Qty: 20 TABLET | Refills: 1 | Status: SHIPPED | OUTPATIENT
Start: 2024-04-24

## 2024-04-24 NOTE — TELEPHONE ENCOUNTER
Appointment Confirmation   Who are you speaking with? Patient   If it is not the patient, are they listed on an active communication consent form? Yes   Which provider is the appointment scheduled with?  Dr. Gallego   When is the appointment scheduled?  Please list date and time 4/24 3pm   At which location is the appointment scheduled to take place? [facility]   Did caller verbalize understanding of appointment details? Yes  STUCK IN TRAFFIC, ALMOST THERE!

## 2024-04-24 NOTE — TELEPHONE ENCOUNTER
I called Dwaine in response to a referral that was received for patient to establish care with [facility].     Outreach was made to schedule a consultation.    Patient declined scheduling at this time.  Patient states she received a letter in the mail stating that her insurance denied covering the genetic testing.  Patient has her consultation scheduled for today at 3:00pm with Dr Gallego and she states she will bring the letter to the office to discuss.  Patient states she is unable to pay for the testing at this time. Patient was informed the referral is good for one year.   The referral has been closed.

## 2024-04-25 ENCOUNTER — TELEPHONE (OUTPATIENT)
Dept: CARDIOLOGY CLINIC | Facility: CLINIC | Age: 64
End: 2024-04-25

## 2024-04-26 ENCOUNTER — TELEPHONE (OUTPATIENT)
Dept: CARDIOLOGY CLINIC | Facility: CLINIC | Age: 64
End: 2024-04-26

## 2024-04-26 NOTE — TELEPHONE ENCOUNTER
----- Message from Lance Altamirano PA-C sent at 4/25/2024  4:52 PM EDT -----  Please schedule this patient to be seen by a physician in our office for cardiac clearance for chemotherapy treatment.   Thanks!    ----- Message -----  From: Chaparrita Rucker RN  Sent: 4/25/2024   9:38 AM EDT  To: Carmina Gallego MD; #    Good morning,    I am reaching out in regards to mutual patient Dwaine Gould. Patient is recommended to under go chemotherapy and is recommended to have cardiac clearance prior to starting treatment. Also, is recommended to have a port placed for treatment.       Thank you for your time  LAURA Irby  Hematology Oncology

## 2024-04-29 RX ORDER — SODIUM CHLORIDE 9 MG/ML
30 INJECTION, SOLUTION INTRAVENOUS CONTINUOUS
Status: CANCELLED | OUTPATIENT
Start: 2024-04-29

## 2024-04-29 RX ORDER — CEFAZOLIN SODIUM 2 G/50ML
2000 SOLUTION INTRAVENOUS ONCE
Status: CANCELLED | OUTPATIENT
Start: 2024-04-29 | End: 2024-04-29

## 2024-04-29 NOTE — TELEPHONE ENCOUNTER
Patient has called back, asked for evan office only. Scheduled with Evan Perez, 9/23/2024, 1 pm and placed on the waiting list.    Thank you,     Isidra

## 2024-04-29 NOTE — TELEPHONE ENCOUNTER
SPOKE TO PT & OFFERED DR MARIA ON 4/30/24 & PT DECLINED  PT IS GOING TO [facility] ON 5/08/24 TO SEE DR MARIA

## 2024-04-30 ENCOUNTER — HOSPITAL ENCOUNTER (OUTPATIENT)
Dept: CT IMAGING | Facility: CLINIC | Age: 64
Discharge: HOME | End: 2024-04-30
Payer: COMMERCIAL

## 2024-04-30 DIAGNOSIS — C18.9 ADENOCARCINOMA, COLON (HCC): ICD-10-CM

## 2024-04-30 PROCEDURE — 71250 CT THORAX DX C-: CPT

## 2024-05-02 ENCOUNTER — TELEPHONE (OUTPATIENT)
Dept: HEMATOLOGY ONCOLOGY | Facility: CLINIC | Age: 64
End: 2024-05-02

## 2024-05-03 ENCOUNTER — OFFICE VISIT (OUTPATIENT)
Age: 64
End: 2024-05-03
Payer: COMMERCIAL

## 2024-05-03 ENCOUNTER — HOSPITAL ENCOUNTER (OUTPATIENT)
Dept: NON INVASIVE DIAGNOSTICS | Facility: HOSPITAL | Age: 64
Discharge: HOME | End: 2024-05-03
Attending: INTERNAL MEDICINE
Payer: COMMERCIAL

## 2024-05-03 VITALS
HEART RATE: 95 BPM | OXYGEN SATURATION: 93 % | HEIGHT: 60 IN | SYSTOLIC BLOOD PRESSURE: 142 MMHG | BODY MASS INDEX: 46.92 KG/M2 | DIASTOLIC BLOOD PRESSURE: 81 MMHG | WEIGHT: 239 LBS | RESPIRATION RATE: 18 BRPM | TEMPERATURE: 97.9 F

## 2024-05-03 VITALS
BODY MASS INDEX: 47.09 KG/M2 | OXYGEN SATURATION: 99 % | WEIGHT: 239.86 LBS | HEIGHT: 60 IN | RESPIRATION RATE: 18 BRPM | TEMPERATURE: 97.7 F | HEART RATE: 60 BPM | SYSTOLIC BLOOD PRESSURE: 140 MMHG | DIASTOLIC BLOOD PRESSURE: 69 MMHG

## 2024-05-03 DIAGNOSIS — J96.11 CHRONIC HYPOXEMIC RESPIRATORY FAILURE (HCC): ICD-10-CM

## 2024-05-03 DIAGNOSIS — C18.9 ADENOCARCINOMA, COLON (HCC): ICD-10-CM

## 2024-05-03 DIAGNOSIS — E66.2 OBESITY HYPOVENTILATION SYNDROME (HCC): ICD-10-CM

## 2024-05-03 DIAGNOSIS — J43.2 CENTRILOBULAR EMPHYSEMA (HCC): Primary | ICD-10-CM

## 2024-05-03 LAB
ANION GAP SERPL CALCULATED.3IONS-SCNC: 8 MMOL/L (ref 4–13)
BUN SERPL-MCNC: 18 MG/DL (ref 5–25)
CALCIUM SERPL-MCNC: 9 MG/DL (ref 8.4–10.2)
CHLORIDE SERPL-SCNC: 105 MMOL/L (ref 96–108)
CO2 SERPL-SCNC: 27 MMOL/L (ref 21–32)
CREAT SERPL-MCNC: 0.81 MG/DL (ref 0.6–1.3)
ERYTHROCYTE [DISTWIDTH] IN BLOOD BY AUTOMATED COUNT: 21 % (ref 11.6–15.1)
GFR SERPL CREATININE-BSD FRML MDRD: 78 ML/MIN/1.73SQ M
GLUCOSE SERPL-MCNC: 105 MG/DL (ref 65–140)
HCT VFR BLD AUTO: 43.8 % (ref 34.8–46.1)
HGB BLD-MCNC: 13.1 G/DL (ref 11.5–15.4)
MCH RBC QN AUTO: 23.6 PG (ref 26.8–34.3)
MCHC RBC AUTO-ENTMCNC: 29.9 G/DL (ref 31.4–37.4)
MCV RBC AUTO: 79 FL (ref 82–98)
PLATELET # BLD AUTO: 326 THOUSANDS/UL (ref 149–390)
PMV BLD AUTO: 9 FL (ref 8.9–12.7)
POTASSIUM SERPL-SCNC: 3.7 MMOL/L (ref 3.5–5.3)
RBC # BLD AUTO: 5.54 MILLION/UL (ref 3.81–5.12)
SODIUM SERPL-SCNC: 140 MMOL/L (ref 135–147)
WBC # BLD AUTO: 12.7 THOUSAND/UL (ref 4.31–10.16)

## 2024-05-03 PROCEDURE — 76937 US GUIDE VASCULAR ACCESS: CPT

## 2024-05-03 PROCEDURE — 80048 BASIC METABOLIC PNL TOTAL CA: CPT | Performed by: RADIOLOGY

## 2024-05-03 PROCEDURE — C1788 PORT, INDWELLING, IMP: HCPCS

## 2024-05-03 PROCEDURE — 36561 INSERT TUNNELED CV CATH: CPT

## 2024-05-03 PROCEDURE — 99153 MOD SED SAME PHYS/QHP EA: CPT

## 2024-05-03 PROCEDURE — 76937 US GUIDE VASCULAR ACCESS: CPT | Performed by: RADIOLOGY

## 2024-05-03 PROCEDURE — 36561 INSERT TUNNELED CV CATH: CPT | Performed by: RADIOLOGY

## 2024-05-03 PROCEDURE — C1769 GUIDE WIRE: HCPCS

## 2024-05-03 PROCEDURE — 99214 OFFICE O/P EST MOD 30 MIN: CPT | Performed by: PHYSICIAN ASSISTANT

## 2024-05-03 PROCEDURE — 77001 FLUOROGUIDE FOR VEIN DEVICE: CPT | Performed by: RADIOLOGY

## 2024-05-03 PROCEDURE — 99152 MOD SED SAME PHYS/QHP 5/>YRS: CPT

## 2024-05-03 PROCEDURE — 77001 FLUOROGUIDE FOR VEIN DEVICE: CPT

## 2024-05-03 PROCEDURE — 85027 COMPLETE CBC AUTOMATED: CPT | Performed by: RADIOLOGY

## 2024-05-03 RX ORDER — ASPIRIN 325 MG
1 TABLET ORAL DAILY
COMMUNITY

## 2024-05-03 RX ORDER — MELOXICAM 15 MG/1
15 TABLET ORAL DAILY
COMMUNITY
Start: 2024-04-24

## 2024-05-03 RX ORDER — SODIUM CHLORIDE 9 MG/ML
30 INJECTION, SOLUTION INTRAVENOUS CONTINUOUS
Status: DISCONTINUED | OUTPATIENT
Start: 2024-05-03 | End: 2024-05-04 | Stop reason: HOSPADM

## 2024-05-03 RX ORDER — LIDOCAINE HYDROCHLORIDE AND EPINEPHRINE 10; 10 MG/ML; UG/ML
INJECTION, SOLUTION INFILTRATION; PERINEURAL AS NEEDED
Status: COMPLETED | OUTPATIENT
Start: 2024-05-03 | End: 2024-05-03

## 2024-05-03 RX ORDER — CEFAZOLIN SODIUM 2 G/50ML
2000 SOLUTION INTRAVENOUS ONCE
Status: COMPLETED | OUTPATIENT
Start: 2024-05-03 | End: 2024-05-03

## 2024-05-03 RX ORDER — FENTANYL CITRATE 50 UG/ML
INJECTION, SOLUTION INTRAMUSCULAR; INTRAVENOUS AS NEEDED
Status: COMPLETED | OUTPATIENT
Start: 2024-05-03 | End: 2024-05-03

## 2024-05-03 RX ORDER — MIDAZOLAM HYDROCHLORIDE 2 MG/2ML
INJECTION, SOLUTION INTRAMUSCULAR; INTRAVENOUS AS NEEDED
Status: COMPLETED | OUTPATIENT
Start: 2024-05-03 | End: 2024-05-03

## 2024-05-03 RX ADMIN — LIDOCAINE HYDROCHLORIDE,EPINEPHRINE BITARTRATE 5 ML: 10; .01 INJECTION, SOLUTION INFILTRATION; PERINEURAL at 09:24

## 2024-05-03 RX ADMIN — CEFAZOLIN SODIUM 2000 MG: 2 SOLUTION INTRAVENOUS at 09:00

## 2024-05-03 RX ADMIN — MIDAZOLAM HYDROCHLORIDE 1 MG: 1 INJECTION, SOLUTION INTRAMUSCULAR; INTRAVENOUS at 09:26

## 2024-05-03 RX ADMIN — FENTANYL CITRATE 50 MCG: 50 INJECTION, SOLUTION INTRAMUSCULAR; INTRAVENOUS at 09:26

## 2024-05-03 RX ADMIN — FENTANYL CITRATE 50 MCG: 50 INJECTION, SOLUTION INTRAMUSCULAR; INTRAVENOUS at 09:07

## 2024-05-03 RX ADMIN — MIDAZOLAM HYDROCHLORIDE 1 MG: 1 INJECTION, SOLUTION INTRAMUSCULAR; INTRAVENOUS at 09:07

## 2024-05-03 NOTE — INTERVAL H&P NOTE
Patient arrived to IR for port placement    The procedure and risks were discussed with the patient.  All questions were answered. Informed consent was obtained.     Patient re-evaluated. Accept as history and physical.    Brandon Mcmahon, DO/May 3, 2024/8:30 AM

## 2024-05-03 NOTE — PROGRESS NOTES
"Assessment/Plan:   Diagnoses and all orders for this visit:    Centrilobular emphysema (HCC)  -     Complete PFT with post Bronchodilator and Six Minute walk; Future    Chronic hypoxemic respiratory failure (HCC)    Obesity hypoventilation syndrome (HCC)        Patient is here today for hospital follow-up.  She was recently hospitalized with shortness of breath found to be fluid overloaded.  Was then hospitalized with bowel perforation and underwent bowel resection.  She was hypercapnic and hypoxemic postoperatively which did improve.  She is currently on 3 L nasal cannula which had been her baseline from March.  She continues with Trelegy daily with albuterol usually twice per day.  She did find she slept much better with the BiPAP while in the hospital, did not qualify based on overnight pulse ox and ABG.  She is already ordered for a split-night study which she will schedule.  Will also order her baseline PFTs with a repeat 6-minute walk.  Continue cutting down on smoking, down to 1-3 cigarettes per day.     She will follow-up with us in about 4 months or sooner if necessary.  Return in about 4 months (around 9/3/2024).  All questions are answered to the patient's satisfaction and understanding.  She verbalizes understanding.  She is encouraged to call with any further questions or concerns.    Portions of the record may have been created with voice recognition software.  Occasional wrong word or \"sound a like\" substitutions may have occurred due to the inherent limitations of voice recognition software.  Read the chart carefully and recognize, using context, where substitutions have occurred.    Electronically Signed by Gianluca Paz PA-C    ______________________________________________________________________    Chief Complaint: No chief complaint on file.      Patient ID: Dwaine is a 63 y.o. y.o. female has a past medical history of Acute metabolic encephalopathy, Asthma, CHF (congestive heart failure) " (HCC), Chronic kidney disease, COPD (chronic obstructive pulmonary disease) (HCC), Hyperlipidemia, Hypertension, Liver disease, Myocardial infarction (HCC), Seizures (HCC), Sleep apnea, and Transaminitis.    5/3/2024  Patient presents today for follow-up visit.  Patient is a 63-year-old female current smoker with past medical history of COPD, CAD, CHF, hypertension, CKD, colon cancer.  She was recently hospitalized with shortness of breath and initially treated for volume overload.  She had rehospitalization for abdominal pain and was found to have pneumoperitoneum and underwent surgery.  She was hypoxic and hypercapnic postoperatively and did require BiPAP as well as oxygen.    She is here today for hospital follow-up.  She continues on her oxygen which she has been on since her hospitalization in early March.  She continues with her Trelegy daily, using the albuterol usually twice per day.  She has cut down on her smoking and only smoking 1 to 3 cigarettes/day.  She did not qualify for BiPAP prior to hospital discharge but did feel that she slept much better with the BiPAP.          Review of Systems   Constitutional: Negative.    HENT: Negative.     Respiratory:  Positive for shortness of breath.    Cardiovascular: Negative.    Gastrointestinal: Negative.    Genitourinary: Negative.    Musculoskeletal: Negative.    Skin: Negative.    Allergic/Immunologic: Negative.    Neurological: Negative.    Psychiatric/Behavioral: Negative.         Smoking history: She reports that she quit smoking about 6 weeks ago. Her smoking use included cigarettes. She started smoking about 50 years ago. She has a 100.9 pack-year smoking history. She has never used smokeless tobacco.    The following portions of the patient's history were reviewed and updated as appropriate: allergies, current medications, past family history, past medical history, past social history, past surgical history, and problem list.      There is no  immunization history on file for this patient.  Current Outpatient Medications   Medication Sig Dispense Refill    albuterol (2.5 mg/3 mL) 0.083 % nebulizer solution Take 2.5 mg by nebulization every 6 (six) hours as needed for wheezing or shortness of breath      amLODIPine (NORVASC) 5 mg tablet Take 1 tablet (5 mg total) by mouth daily 90 tablet 3    aspirin 325 mg tablet Take 1 tablet by mouth daily      atorvastatin (Lipitor) 40 mg tablet Take 1 tablet (40 mg total) by mouth daily at bedtime 90 tablet 3    fluticasone-umeclidinium-vilanterol (Trelegy Ellipta) 100-62.5-25 mcg/actuation inhaler Inhale 1 puff daily Rinse mouth after use.      lidocaine-prilocaine (EMLA) cream Apply topically as needed for mild pain or moderate pain 50 g 1    lisinopril (ZESTRIL) 10 mg tablet Take 1 tablet (10 mg total) by mouth daily 30 tablet 2    meloxicam (MOBIC) 15 mg tablet Take 15 mg by mouth daily      metoprolol tartrate (LOPRESSOR) 25 mg tablet Take 1 tablet (25 mg total) by mouth every 12 (twelve) hours 60 tablet 3    ondansetron (ZOFRAN-ODT) 8 mg disintegrating tablet Take 1 tablet (8 mg total) by mouth every 8 (eight) hours as needed for vomiting or nausea 20 tablet 1    oxymetazoline (AFRIN) 0.05 % nasal spray 2 sprays by Each Nare route 2 (two) times a day      rOPINIRole (REQUIP) 0.5 mg tablet Take 1 tablet (0.5 mg total) by mouth 3 (three) times a day 90 tablet 0    torsemide (DEMADEX) 20 mg tablet Take 1 tablet (20 mg total) by mouth daily 90 tablet 3    zolpidem (AMBIEN) 5 mg tablet Take 5 mg by mouth daily as needed       No current facility-administered medications for this visit.     Facility-Administered Medications Ordered in Other Visits   Medication Dose Route Frequency Provider Last Rate Last Admin    sodium chloride 0.9 % infusion  30 mL/hr Intravenous Continuous Brandon Mcmahon DO         Allergies: Codeine, Wound dressing adhesive, Contrast [iodinated contrast media], Prednisone, and  Ibuprofen    Objective:  Vitals:    05/03/24 1537 05/03/24 1538   BP: 142/81    BP Location: Left arm    Patient Position: Sitting    Cuff Size: Large    Pulse: 95    Resp: 18    Temp: 97.9 °F (36.6 °C)    SpO2: 93% 93%   Weight: 108 kg (239 lb)    Height: 5' (1.524 m)    Oxygen Therapy  SpO2: 93 %  Oxygen Therapy: None (Room air)  .  Wt Readings from Last 3 Encounters:   05/03/24 108 kg (239 lb)   05/03/24 109 kg (239 lb 13.8 oz)   04/22/24 106 kg (233 lb)     Body mass index is 46.68 kg/m².    Physical Exam  Constitutional:       General: She is not in acute distress.     Appearance: Normal appearance. She is well-developed. She is not ill-appearing.   HENT:      Head: Normocephalic and atraumatic.      Mouth/Throat:      Pharynx: Oropharynx is clear.   Eyes:      Pupils: Pupils are equal, round, and reactive to light.   Cardiovascular:      Rate and Rhythm: Normal rate and regular rhythm.   Pulmonary:      Effort: Pulmonary effort is normal. No respiratory distress.      Breath sounds: Normal breath sounds. No decreased breath sounds, wheezing, rhonchi or rales.   Abdominal:      General: Abdomen is flat. There is no distension.   Musculoskeletal:         General: Normal range of motion.      Cervical back: Normal range of motion.      Right lower leg: No edema.      Left lower leg: No edema.   Skin:     General: Skin is warm and dry.      Findings: No rash.   Neurological:      Mental Status: She is alert and oriented to person, place, and time.   Psychiatric:         Mood and Affect: Mood normal.         Behavior: Behavior normal.         Lab Review:   Lab Results   Component Value Date    K 3.7 05/03/2024    K 4.5 12/01/2021     05/03/2024     12/01/2021    CO2 27 05/03/2024    CO2 36 (H) 03/19/2024    CO2 28 12/01/2021    BUN 18 05/03/2024    BUN 22 12/01/2021    CREATININE 0.81 05/03/2024    CREATININE 0.84 12/01/2021    GLUCOSE 152 (H) 03/19/2024    CALCIUM 9.0 05/03/2024    CALCIUM 9.0  12/01/2021     Lab Results   Component Value Date    WBC 12.70 (H) 05/03/2024    HGB 13.1 05/03/2024    HCT 43.8 05/03/2024    MCV 79 (L) 05/03/2024     05/03/2024       Diagnostics:  I have personally reviewed pertinent reports.   and I have personally reviewed pertinent films in PACS  Reviewed hospital imaging  Office Spirometry Results:     ESS:    IR port placement    Result Date: 5/3/2024  Narrative: Chest port placement 5/3/2024 8:59 AM History: Colon carcinoma Contrast: None Fluoroscopy time: 17.5 seconds Number of images: 2 Radiation dose: 3.37 mGy Conscious sedation time: 30 minutes Procedure: The patient was identified verbally and by wristband. Timeout was performed.  Informed consent was obtained. All elements of maximal sterile barrier technique were followed (cap, mask, sterile gown, sterile gloves, large sterile sheet, hand hygiene and 2% chlorhexidine for cutaneous antisepsis). Following obtaining informed consent, the patient was prepped and draped in the usual sterile fashion.  Using ultrasound guidance, access was gained to the patient's right internal jugular vein using a micropuncture system.  The micropuncture wire was removed and a .035 wire was advanced to the level of the inferior vena cava using fluoroscopic guidance. Using 1% lidocaine, the region of the right anterior chest was was anesthetized.  A small incision was made using a 15 blade scalpel.  The port pocket was created using blunt dissection. The catheter tubing was tunneled from the incision to the venotomy.  The micropuncture dilator was exchanged for a peel-away sheath, using fluoroscopic guidance.  The catheter was placed through the peel-away sheath. The catheter was measured and cut to size.  The catheter was attached to the port.  The port was flushed with saline to ensure patency without evidence of leakage.  The port was placed in the port pocket. The port was sutured in the pocket using 3-0 Vicryl. The incisions  were approximated with 3-0 Vicryl and tissue adhesive.  The patient tolerated the procedure well without apparent immediate complications.  The patient left the IR department in unchanged condition. Dr. Mcmahon performed and directly supervised the entire procedure. Findings: Using ultrasound guidance, the right internal jugular vein was cannulated using Seldinger technique. The right internal jugular vein was evaluated as a potential access site.  The right internal jugular vein was patent, and free of thrombus. Static images of the vessel was obtained. Visualization of real time needle entry into the vessel was obtained. Fluoroscopic spot image demonstrates a newly placed single lumen chest port via the right internal jugular vein with the most central aspect at the SVC/RA junction.  The catheter tubing is smooth in contour.     Impression: Impression: Successful placement of a single lumen chest port via the right internal jugular vein. Workstation performed: [id number]     CT chest wo contrast    Result Date: 4/30/2024  Narrative: CT CHEST WITHOUT IV CONTRAST INDICATION: C18.9: Malignant neoplasm of colon, unspecified. COMPARISON: CT chest 3/14/2024 TECHNIQUE: CT examination of the chest was performed without intravenous contrast. Multiplanar 2D reformatted images were created from the source data. This examination, like all CT scans performed in the [facility], was performed utilizing techniques to minimize radiation dose exposure, including the use of iterative reconstruction and automated exposure control. Radiation dose length product (DLP) for this visit: 433 mGy-cm FINDINGS: LUNGS: Mild to moderate emphysema. Mild basilar predominant subpleural groundglass opacity/reticulation. This may reflect some component of hypoventilatory changes, overall aeration improved from the previous CT study. Element of interstitial disease not entirely excluded. 3 mm juxtapleural left lower lobe nodule  (206/51), may have been obscured on previous study due to under aeration. No consolidation or dominant mass. AIRWAYS: No significant filling defect. PLEURA: Unremarkable. HEART/GREAT VESSELS: Heart is unremarkable for patient's age. Atherosclerotic changes thoracic aorta and coronary arteries. No thoracic aortic aneurysm. MEDIASTINUM AND DUANE: Top normal sized pretracheal/precarinal lymph nodes, grossly stable. Evaluation of the duane limited without IV contrast. The esophagus is unremarkable. CHEST WALL AND LOWER NECK: Unremarkable. VISUALIZED STRUCTURES IN THE UPPER ABDOMEN: The liver appears prominent and low density in comparison to the spleen, the latter suggesting fatty infiltration. Mild thickening of the left adrenal gland, unchanged. OSSEOUS STRUCTURES: No acute fracture or destructive osseous lesion. Degenerative changes of the spine.     Impression: 1. No definitive evidence for metastatic disease in the chest. 2. 3 mm juxtapleural left lower lobe nodule (206/51), may have been obscured on previous study due to under aeration. This has configuration suggestive of intrapulmonary lymph node. Attention on follow-up advised. 3. Mild basilar predominant subpleural groundglass opacity/reticulation. This may reflect some component of hypoventilatory changes, overall aeration improved from the previous CT study. Element of interstitial disease not entirely excluded. 4. Fatty liver. Workstation performed: [id number]     CT renal stone study abdomen pelvis wo contrast    Result Date: 4/22/2024  Narrative: EXAMINATION: Noncontrast CT of the abdomen and pelvis HISTORY: Colon carcinoma.   TECHNIQUE: CT of the abdomen and pelvis was performed from the lung bases to the ischial tuberosities without intravenous and without oral contrast with coronal and sagittal reformats. COMPARISON: CT of the chest from 8/3/2022. FINDINGS: ABDOMEN: LUNG BASES: The lung bases demonstrate subpleural atelectasis and/or scarring. Coronary  calcifications. Minimal hiatal hernia. Intravenous contrast was not administered per request of the ordering provider. The evaluation of solid abdominal organs is limited without intravenous contrast. LIVER: Hepatic steatosis. GALLBLADDER/BILE DUCTS: The gallbladder is intact. No intrahepatic or extrahepatic biliary ductal dilatation. SPLEEN: Unremarkable. PANCREAS: Unremarkable. ADRENAL GLANDS: Some nodularity seen of the bilateral adrenal glands. KIDNEYS/URETERS: No calculi. No obstructive uropathy. In the left kidney at the lower pole posteriorly demonstrates cortical scarring. MESENTERY: No abdominal ascites. LYMPH NODES: No enlarged abdominal adenopathy. VESSELS: Diffuse atherosclerotic calcifications in the abdominal aorta without aneurysm. ABDOMINAL WALL: In the midline of the anterior abdominal wall is a fluid collection measuring 2.2 x 2.0 x 6.7 cm likely representing a postoperative hematoma or seroma. PELVIS: BOWEL: Evidence of right hemicolectomy. Mild scattered stool volume. No evidence of bowel obstruction. MESENTERY: No free fluid or loculated fluid collections. No free air. LYMPH NODES: No enlarged pelvic adenopathy. URINARY BLADDER: Unremarkable. PELVIC WALL: In the anterior pelvic wall at the midline is a 3.1 x 3.0 x 5.5 cm fluid collection likely representing a postoperative hematoma or seroma. BONES: No acute osseous abnormality or aggressive lesion.    Impression: IMPRESSION: 1. Evidence of right hemicolectomy with no evidence of recurrence or metastatic disease within the limitations of this unenhanced study. 2. In the midline of the anterior abdominal wall is a fluid collection measuring 2.2 x 2.0 x 6.7 cm likely representing a postoperative hematoma or seroma. 3. In the anterior pelvic wall at the midline is a 3.1 x 3.0 x 5.5 cm fluid collection likely representing a postoperative hematoma or seroma. 4. Hepatic steatosis. CT examination performed with dose lowering protocol in accordance with  [facility]RIEL[facility]. Workstation:[id number]

## 2024-05-03 NOTE — DISCHARGE INSTRUCTIONS
Implanted Venous Access Port     WHAT YOU NEED TO KNOW:   An implanted venous access port is a device used to give treatments and take blood. It may also be called a central venous access device (CVAD). The port is a small container that is placed under your skin, usually in your upper chest. The port is attached to a catheter that enters a large vein.   DISCHARGE INSTRUCTIONS:   Resume your normal diet. Small sips of flat soda will help with mild nausea.  Prevent an infection:   Wash your hands often.  Use soap and water. Clean your hands before and after you care for your port. Remind everyone who cares for your port to wash their hands.   Check your skin for infection every day.  Look for redness, swelling, or fluid oozing from the port site.  Care for your port:   1. You may shower beginning 48 hours after procedure.     2.  Leave glue in place.    3. It is normal for some bruising to occur.    4. Use Tylenol for pain.    5. Limit use of arm on the side that your port was placed. Lift nothing heavier than 5 pounds for 1 week, and then gradually increase activity as tolerated.    6. DO NOT apply ointment, lotion or cream to port site until incision is healed. Allow glue to fall off. DO NOT attempt to peel glue from skin even it it begins to flake.     7. After the port incision is healed you may swim, bathe.  Notify the Interventional Radiologist if you have any of the followin. Fever above 101 F    2. Increased redness or swelling after 1st day.     3. Increased pain after 1st day.    4. Any sign of infection (drainage from port site, skin separation, hot to touch).    5. Persistent nausea or vomiting.    Contact Interventional Radiology at [phone].

## 2024-05-07 ENCOUNTER — PATIENT OUTREACH (OUTPATIENT)
Dept: HEMATOLOGY ONCOLOGY | Facility: CLINIC | Age: 64
End: 2024-05-07

## 2024-05-07 NOTE — PROGRESS NOTES
Spoke with pt, introduced myself and my role in her care. She is aware her treatments will be scheduled following her cardiac clearance. She is aware of all upcoming appointments. She stated she has a great support system with her  and 4 daughters. She does not need transportation assistance at this time. She stated she is very pleases with her care at [facility] so far.  I explained I will check in once her treatment has been scheduled to confirm all scheduled treatments. She was thankful for the call, and has my contact information if she needs anything

## 2024-05-08 ENCOUNTER — OFFICE VISIT (OUTPATIENT)
Dept: CARDIOLOGY CLINIC | Facility: CLINIC | Age: 64
End: 2024-05-08
Payer: COMMERCIAL

## 2024-05-08 ENCOUNTER — PATIENT OUTREACH (OUTPATIENT)
Dept: HEMATOLOGY ONCOLOGY | Facility: CLINIC | Age: 64
End: 2024-05-08

## 2024-05-08 VITALS
DIASTOLIC BLOOD PRESSURE: 96 MMHG | HEART RATE: 75 BPM | WEIGHT: 237 LBS | RESPIRATION RATE: 16 BRPM | SYSTOLIC BLOOD PRESSURE: 140 MMHG | HEIGHT: 60 IN | OXYGEN SATURATION: 98 % | BODY MASS INDEX: 46.53 KG/M2

## 2024-05-08 DIAGNOSIS — R94.31 ABNORMAL ELECTROCARDIOGRAM (ECG) (EKG): Primary | ICD-10-CM

## 2024-05-08 DIAGNOSIS — I50.30 DIASTOLIC HEART FAILURE, UNSPECIFIED HF CHRONICITY (HCC): ICD-10-CM

## 2024-05-08 DIAGNOSIS — J44.9 CHRONIC OBSTRUCTIVE PULMONARY DISEASE, UNSPECIFIED COPD TYPE (HCC): ICD-10-CM

## 2024-05-08 DIAGNOSIS — I25.10 CORONARY ARTERY DISEASE INVOLVING NATIVE HEART WITHOUT ANGINA PECTORIS, UNSPECIFIED VESSEL OR LESION TYPE: ICD-10-CM

## 2024-05-08 PROCEDURE — 93000 ELECTROCARDIOGRAM COMPLETE: CPT | Performed by: INTERNAL MEDICINE

## 2024-05-08 PROCEDURE — 99213 OFFICE O/P EST LOW 20 MIN: CPT | Performed by: INTERNAL MEDICINE

## 2024-05-08 NOTE — PROGRESS NOTES
Pt called stating she is in traffic and running late for her cardiac clearance appointment. Spoke with the office, and let them know she was on her way. She was thankful for the assistance

## 2024-05-08 NOTE — PROGRESS NOTES
Cardiology Follow Up    Dwaine Gould  1960  [id number]  [facility]  [address] [address] [phone]    1. Abnormal electrocardiogram (ECG) (EKG)  -     POCT ECG    2. Coronary artery disease involving native heart without angina pectoris, unspecified vessel or lesion type    3. Chronic obstructive pulmonary disease, unspecified COPD type (HCC)    4. Diastolic heart failure, unspecified HF chronicity ([facility], located within [facility])          Interval History: 63-year-old patient recently in the hospital with perforated colon due to 2 adenocarcinoma, COPD, diastolic heart failure.  She has a history of a cardiac cath 10 years ago which showed a posterior descending artery lesion which has been treated medically.    She is very limited due to COPD and oxygen dependence.  She does do dishes and laundry and has not noted exertional related discomfort relieved by rest.    While she was in the hospital with the perforated colon she also had shortness of breath and was diagnosed with heart failure with preserved ejection fraction.  She was diuresed and improved.    She does not awaken with shortness of breath.  Her weight has been steady for about a week.  She is on a low-salt diet and she is on diuretics.    She has a port in place and chemotherapy has been contemplated with Tucatinib and Herceptin.    She has been a very heavy smoker but has cut down.      Patient Active Problem List   Diagnosis    Tobacco abuse    CAD (coronary artery disease)    Chronic heart failure with preserved ejection fraction (HCC)    COPD (chronic obstructive pulmonary disease) (HCC)    Leukocytosis    Essential hypertension    Hyperlipidemia    Chronic respiratory failure with hypoxia and hypercapnia (HCC)    Stage 3a chronic kidney disease (HCC)    Anemia    Thrombocytopenia (HCC)    Adenocarcinoma, colon (HCC)    Encounter to establish  care    Encounter for counseling for tobacco use disorder     Past Medical History:   Diagnosis Date    Acute metabolic encephalopathy     Asthma     CHF (congestive heart failure) (HCC)     Chronic kidney disease     COPD (chronic obstructive pulmonary disease) (HCC)     Hyperlipidemia     Hypertension     Liver disease     Myocardial infarction (HCC)     Seizures (HCC)     Sleep apnea     Transaminitis      Social History     Socioeconomic History    Marital status: /Civil Union     Spouse name: Not on file    Number of children: Not on file    Years of education: Not on file    Highest education level: Not on file   Occupational History    Not on file   Tobacco Use    Smoking status: Former     Current packs/day: 0.00     Average packs/day: 2.0 packs/day for 50.5 years (100.9 ttl pk-yrs)     Types: Cigarettes     Start date: 10/1973     Quit date: 3/18/2024     Years since quittin.1    Smokeless tobacco: Never   Vaping Use    Vaping status: Never Used   Substance and Sexual Activity    Alcohol use: Yes     Comment: A couple times a year    Drug use: Never    Sexual activity: Not Currently   Other Topics Concern    Not on file   Social History Narrative    Not on file     Social Determinants of Health     Financial Resource Strain: Not on file   Food Insecurity: No Food Insecurity (3/12/2024)    Hunger Vital Sign     Worried About Running Out of Food in the Last Year: Never true     Ran Out of Food in the Last Year: Never true   Transportation Needs: No Transportation Needs (3/12/2024)    PRAPARE - Transportation     Lack of Transportation (Medical): No     Lack of Transportation (Non-Medical): No   Physical Activity: Not on file   Stress: Not on file   Social Connections: Not on file   Intimate Partner Violence: Not on file   Housing Stability: Low Risk  (3/12/2024)    Housing Stability Vital Sign     Unable to Pay for Housing in the Last Year: No     Number of Places Lived in the Last Year: 1      Unstable Housing in the Last Year: No      History reviewed. No pertinent family history.  Past Surgical History:   Procedure Laterality Date    APPENDECTOMY      CARDIAC CATHETERIZATION      GANGLION CYST EXCISION      IR PORT PLACEMENT  5/3/2024    LAPAROTOMY N/A 03/21/2024    Procedure: LAPAROTOMY EXPLORATORY, washout, complex closure, LISSY drain;  Surgeon: Francine Reid MD;  Location: [facility];  Service: General    NJ LAPS ABD PRTM&OMENTUM DX W/WO SPEC BR/WA SPX N/A 03/19/2024    Procedure: DIAGNOSTIC LAPAROSCOPY converted to Exploratory Laparotomy, Right Hemicolectomy, Abdominal wash-out, Abthera Placement;  Surgeon: Roger Joel DO;  Location: [facility];  Service: General    TYMPANOSTOMY TUBE PLACEMENT         Current Outpatient Medications:     albuterol (2.5 mg/3 mL) 0.083 % nebulizer solution, Take 2.5 mg by nebulization every 6 (six) hours as needed for wheezing or shortness of breath, Disp: , Rfl:     amLODIPine (NORVASC) 5 mg tablet, Take 1 tablet (5 mg total) by mouth daily, Disp: 90 tablet, Rfl: 3    aspirin 325 mg tablet, Take 1 tablet by mouth daily, Disp: , Rfl:     atorvastatin (Lipitor) 40 mg tablet, Take 1 tablet (40 mg total) by mouth daily at bedtime, Disp: 90 tablet, Rfl: 3    fluticasone-umeclidinium-vilanterol (Trelegy Ellipta) 100-62.5-25 mcg/actuation inhaler, Inhale 1 puff daily Rinse mouth after use., Disp: , Rfl:     lidocaine-prilocaine (EMLA) cream, Apply topically as needed for mild pain or moderate pain, Disp: 50 g, Rfl: 1    lisinopril (ZESTRIL) 10 mg tablet, Take 1 tablet (10 mg total) by mouth daily, Disp: 30 tablet, Rfl: 2    meloxicam (MOBIC) 15 mg tablet, Take 15 mg by mouth daily, Disp: , Rfl:     metoprolol tartrate (LOPRESSOR) 25 mg tablet, Take 1 tablet (25 mg total) by mouth every 12 (twelve) hours, Disp: 60 tablet, Rfl: 3    ondansetron (ZOFRAN-ODT) 8 mg disintegrating tablet, Take 1 tablet (8 mg total) by mouth every 8 (eight) hours as needed for vomiting  or nausea, Disp: 20 tablet, Rfl: 1    oxymetazoline (AFRIN) 0.05 % nasal spray, 2 sprays by Each Nare route 2 (two) times a day, Disp: , Rfl:     rOPINIRole (REQUIP) 0.5 mg tablet, Take 1 tablet (0.5 mg total) by mouth 3 (three) times a day, Disp: 90 tablet, Rfl: 0    torsemide (DEMADEX) 20 mg tablet, Take 1 tablet (20 mg total) by mouth daily, Disp: 90 tablet, Rfl: 3    zolpidem (AMBIEN) 5 mg tablet, Take 5 mg by mouth daily as needed, Disp: , Rfl:   Allergies   Allergen Reactions    Codeine Anaphylaxis    Wound Dressing Adhesive Blisters     ALL WOUND DRESSINGS     Contrast [Iodinated Contrast Media] GI Intolerance    Prednisone Irritability     Increase in anger/abusive behaviors    Ibuprofen Rash       Labs:  Hospital Outpatient Visit on 05/03/2024   Component Date Value    Sodium 05/03/2024 140     Potassium 05/03/2024 3.7     Chloride 05/03/2024 105     CO2 05/03/2024 27     ANION GAP 05/03/2024 8     BUN 05/03/2024 18     Creatinine 05/03/2024 0.81     Glucose 05/03/2024 105     Calcium 05/03/2024 9.0     eGFR 05/03/2024 78     WBC 05/03/2024 12.70 (H)     RBC 05/03/2024 5.54 (H)     Hemoglobin 05/03/2024 13.1     Hematocrit 05/03/2024 43.8     MCV 05/03/2024 79 (L)     MCH 05/03/2024 23.6 (L)     MCHC 05/03/2024 29.9 (L)     RDW 05/03/2024 21.0 (H)     Platelets 05/03/2024 326     MPV 05/03/2024 9.0    No results displayed because visit has over 200 results.      Orders Only on 03/26/2024   Component Date Value    CARIS Mismatch Repair St* 03/19/2024 Proficient (Intact)     CARIS Genomic Loss of He* 03/19/2024 Low 9%     CARIS Microsatellite Ins* 03/19/2024 Stable     CARIS Tumor Mutational B* 03/19/2024 Low 7 per Mb     CARIS Her2/Xiomara 03/19/2024 Positive     CARIS PTEN 03/19/2024 Negative     CARIS PD-L1 () 03/19/2024 Negative     CARIS MLH1 03/19/2024 Intact nuclear expression     CARIS MSH2 03/19/2024 Intact nuclear expression     CARIS MSH6 03/19/2024 Intact nuclear expression     CARIS PMS2  03/19/2024 Intact nuclear expression    No results displayed because visit has over 200 results.        Imaging: IR port placement    Result Date: 5/3/2024  Narrative: Chest port placement 5/3/2024 8:59 AM History: Colon carcinoma Contrast: None Fluoroscopy time: 17.5 seconds Number of images: 2 Radiation dose: 3.37 mGy Conscious sedation time: 30 minutes Procedure: The patient was identified verbally and by wristband. Timeout was performed.  Informed consent was obtained. All elements of maximal sterile barrier technique were followed (cap, mask, sterile gown, sterile gloves, large sterile sheet, hand hygiene and 2% chlorhexidine for cutaneous antisepsis). Following obtaining informed consent, the patient was prepped and draped in the usual sterile fashion.  Using ultrasound guidance, access was gained to the patient's right internal jugular vein using a micropuncture system.  The micropuncture wire was removed and a .035 wire was advanced to the level of the inferior vena cava using fluoroscopic guidance. Using 1% lidocaine, the region of the right anterior chest was was anesthetized.  A small incision was made using a 15 blade scalpel.  The port pocket was created using blunt dissection. The catheter tubing was tunneled from the incision to the venotomy.  The micropuncture dilator was exchanged for a peel-away sheath, using fluoroscopic guidance.  The catheter was placed through the peel-away sheath. The catheter was measured and cut to size.  The catheter was attached to the port.  The port was flushed with saline to ensure patency without evidence of leakage.  The port was placed in the port pocket. The port was sutured in the pocket using 3-0 Vicryl. The incisions were approximated with 3-0 Vicryl and tissue adhesive.  The patient tolerated the procedure well without apparent immediate complications.  The patient left the IR department in unchanged condition. Dr. Mcmahon performed and directly supervised the  entire procedure. Findings: Using ultrasound guidance, the right internal jugular vein was cannulated using Seldinger technique. The right internal jugular vein was evaluated as a potential access site.  The right internal jugular vein was patent, and free of thrombus. Static images of the vessel was obtained. Visualization of real time needle entry into the vessel was obtained. Fluoroscopic spot image demonstrates a newly placed single lumen chest port via the right internal jugular vein with the most central aspect at the SVC/RA junction.  The catheter tubing is smooth in contour.     Impression: Impression: Successful placement of a single lumen chest port via the right internal jugular vein. Workstation performed: [id number]     CT chest wo contrast    Result Date: 4/30/2024  Narrative: CT CHEST WITHOUT IV CONTRAST INDICATION: C18.9: Malignant neoplasm of colon, unspecified. COMPARISON: CT chest 3/14/2024 TECHNIQUE: CT examination of the chest was performed without intravenous contrast. Multiplanar 2D reformatted images were created from the source data. This examination, like all CT scans performed in the [facility], was performed utilizing techniques to minimize radiation dose exposure, including the use of iterative reconstruction and automated exposure control. Radiation dose length product (DLP) for this visit: 433 mGy-cm FINDINGS: LUNGS: Mild to moderate emphysema. Mild basilar predominant subpleural groundglass opacity/reticulation. This may reflect some component of hypoventilatory changes, overall aeration improved from the previous CT study. Element of interstitial disease not entirely excluded. 3 mm juxtapleural left lower lobe nodule (206/51), may have been obscured on previous study due to under aeration. No consolidation or dominant mass. AIRWAYS: No significant filling defect. PLEURA: Unremarkable. HEART/GREAT VESSELS: Heart is unremarkable for patient's age. Atherosclerotic  changes thoracic aorta and coronary arteries. No thoracic aortic aneurysm. MEDIASTINUM AND DUANE: Top normal sized pretracheal/precarinal lymph nodes, grossly stable. Evaluation of the duane limited without IV contrast. The esophagus is unremarkable. CHEST WALL AND LOWER NECK: Unremarkable. VISUALIZED STRUCTURES IN THE UPPER ABDOMEN: The liver appears prominent and low density in comparison to the spleen, the latter suggesting fatty infiltration. Mild thickening of the left adrenal gland, unchanged. OSSEOUS STRUCTURES: No acute fracture or destructive osseous lesion. Degenerative changes of the spine.     Impression: 1. No definitive evidence for metastatic disease in the chest. 2. 3 mm juxtapleural left lower lobe nodule (206/51), may have been obscured on previous study due to under aeration. This has configuration suggestive of intrapulmonary lymph node. Attention on follow-up advised. 3. Mild basilar predominant subpleural groundglass opacity/reticulation. This may reflect some component of hypoventilatory changes, overall aeration improved from the previous CT study. Element of interstitial disease not entirely excluded. 4. Fatty liver. Workstation performed: [id number]     CT renal stone study abdomen pelvis wo contrast    Result Date: 4/22/2024  Narrative: EXAMINATION: Noncontrast CT of the abdomen and pelvis HISTORY: Colon carcinoma.   TECHNIQUE: CT of the abdomen and pelvis was performed from the lung bases to the ischial tuberosities without intravenous and without oral contrast with coronal and sagittal reformats. COMPARISON: CT of the chest from 8/3/2022. FINDINGS: ABDOMEN: LUNG BASES: The lung bases demonstrate subpleural atelectasis and/or scarring. Coronary calcifications. Minimal hiatal hernia. Intravenous contrast was not administered per request of the ordering provider. The evaluation of solid abdominal organs is limited without intravenous contrast. LIVER: Hepatic steatosis. GALLBLADDER/BILE DUCTS:  The gallbladder is intact. No intrahepatic or extrahepatic biliary ductal dilatation. SPLEEN: Unremarkable. PANCREAS: Unremarkable. ADRENAL GLANDS: Some nodularity seen of the bilateral adrenal glands. KIDNEYS/URETERS: No calculi. No obstructive uropathy. In the left kidney at the lower pole posteriorly demonstrates cortical scarring. MESENTERY: No abdominal ascites. LYMPH NODES: No enlarged abdominal adenopathy. VESSELS: Diffuse atherosclerotic calcifications in the abdominal aorta without aneurysm. ABDOMINAL WALL: In the midline of the anterior abdominal wall is a fluid collection measuring 2.2 x 2.0 x 6.7 cm likely representing a postoperative hematoma or seroma. PELVIS: BOWEL: Evidence of right hemicolectomy. Mild scattered stool volume. No evidence of bowel obstruction. MESENTERY: No free fluid or loculated fluid collections. No free air. LYMPH NODES: No enlarged pelvic adenopathy. URINARY BLADDER: Unremarkable. PELVIC WALL: In the anterior pelvic wall at the midline is a 3.1 x 3.0 x 5.5 cm fluid collection likely representing a postoperative hematoma or seroma. BONES: No acute osseous abnormality or aggressive lesion.    Impression: IMPRESSION: 1. Evidence of right hemicolectomy with no evidence of recurrence or metastatic disease within the limitations of this unenhanced study. 2. In the midline of the anterior abdominal wall is a fluid collection measuring 2.2 x 2.0 x 6.7 cm likely representing a postoperative hematoma or seroma. 3. In the anterior pelvic wall at the midline is a 3.1 x 3.0 x 5.5 cm fluid collection likely representing a postoperative hematoma or seroma. 4. Hepatic steatosis. CT examination performed with dose lowering protocol in accordance with RAJRA. Workstation:[id number]      Review of Systems:  Review of Systems    Physical Exam:  She is morbidly obese.  Blood pressure 140/96.  Heart rate 73 and regular.  Carotids 2+ without bruits.  Lungs are clear but with diminished breath sounds.  No  "clear hepatojugular reflux.  Regular rhythm.  No murmurs or gallops.  No organomegaly.  No edema.    Discussion/Summary:    1.  History of diastolic heart failure-compensated at present  2.  Coronary artery disease without angina pectoralis    Recommendations:    1.  Clearly she is at increased risk of developing heart failure and other cardiac complications with chemotherapy but she appears to be stable at the present time.  But she would be \"cleared\" for surgery with usual monitoring.  She weighs herself daily in the morning after urinating and, other than symptoms, increase in weight of 3 to 5 pounds or more would suggest a need for diuresis.        Todd Armstrong MD  "

## 2024-05-09 ENCOUNTER — TELEPHONE (OUTPATIENT)
Dept: CARDIOLOGY CLINIC | Facility: CLINIC | Age: 64
End: 2024-05-09

## 2024-05-09 NOTE — TELEPHONE ENCOUNTER
F/U call to patient who had cardiology OV yesterday.    Stated she continues to experience exertional dyspnea with minimal exertion, On 3 L nasal canula. Not waking during the night w/SOB.    Denied chest pain or discomfort.    Stated EDWIN is gone today after taking dose of Torsemide 20 mg this AM. Stated missing dose yesterday due to OV.    Weighing herself daily. Following 2 gr sodium/day diet. Taking all prescribed cardiac medications.    Reinforced call office with 3 lb Wt gain overnight, 5 lb Wt gain in one Wk.

## 2024-05-13 ENCOUNTER — TELEPHONE (OUTPATIENT)
Dept: HEMATOLOGY ONCOLOGY | Facility: CLINIC | Age: 64
End: 2024-05-13

## 2024-05-13 PROBLEM — Z95.828 PORT-A-CATH IN PLACE: Status: ACTIVE | Noted: 2024-05-13

## 2024-05-13 NOTE — TELEPHONE ENCOUNTER
Please schedule patient treatment. Per Dr. Gallego patient can be scheduled. Patient had cardiology appointment on 5/8/24.

## 2024-05-14 DIAGNOSIS — J44.9 CHRONIC OBSTRUCTIVE PULMONARY DISEASE, UNSPECIFIED COPD TYPE (HCC): Primary | ICD-10-CM

## 2024-05-14 RX ORDER — SODIUM CHLORIDE 9 MG/ML
20 INJECTION, SOLUTION INTRAVENOUS ONCE AS NEEDED
Status: CANCELLED | OUTPATIENT
Start: 2024-06-07

## 2024-05-14 RX ORDER — FLUOROURACIL 50 MG/ML
400 INJECTION, SOLUTION INTRAVENOUS ONCE
Status: CANCELLED | OUTPATIENT
Start: 2024-06-21

## 2024-05-14 RX ORDER — FLUOROURACIL 50 MG/ML
400 INJECTION, SOLUTION INTRAVENOUS ONCE
Status: CANCELLED | OUTPATIENT
Start: 2024-06-07

## 2024-05-14 RX ORDER — SODIUM CHLORIDE 9 MG/ML
20 INJECTION, SOLUTION INTRAVENOUS ONCE AS NEEDED
Status: CANCELLED | OUTPATIENT
Start: 2024-06-21

## 2024-05-14 RX ORDER — ALBUTEROL SULFATE 2.5 MG/3ML
2.5 SOLUTION RESPIRATORY (INHALATION) EVERY 6 HOURS PRN
Qty: 360 ML | Refills: 2 | Status: SHIPPED | OUTPATIENT
Start: 2024-05-14

## 2024-05-14 RX ORDER — DEXTROSE MONOHYDRATE 50 MG/ML
20 INJECTION, SOLUTION INTRAVENOUS ONCE
Status: CANCELLED | OUTPATIENT
Start: 2024-06-07

## 2024-05-14 RX ORDER — DEXTROSE MONOHYDRATE 50 MG/ML
20 INJECTION, SOLUTION INTRAVENOUS ONCE
Status: CANCELLED | OUTPATIENT
Start: 2024-06-21

## 2024-05-15 ENCOUNTER — OFFICE VISIT (OUTPATIENT)
Dept: SURGERY | Facility: CLINIC | Age: 64
End: 2024-05-15

## 2024-05-15 VITALS
HEIGHT: 60 IN | WEIGHT: 234.4 LBS | TEMPERATURE: 97.6 F | RESPIRATION RATE: 18 BRPM | HEART RATE: 102 BPM | OXYGEN SATURATION: 92 % | DIASTOLIC BLOOD PRESSURE: 94 MMHG | BODY MASS INDEX: 46.02 KG/M2 | SYSTOLIC BLOOD PRESSURE: 138 MMHG

## 2024-05-15 DIAGNOSIS — C18.9 ADENOCARCINOMA, COLON (HCC): Primary | ICD-10-CM

## 2024-05-15 PROCEDURE — 99024 POSTOP FOLLOW-UP VISIT: CPT | Performed by: STUDENT IN AN ORGANIZED HEALTH CARE EDUCATION/TRAINING PROGRAM

## 2024-05-15 NOTE — PROGRESS NOTES
Assessment/Plan:  63-year-old female status post diagnostic laparoscopy converted to exploratory laparotomy, right hemicolectomy, abdominal washout on 3/19/2024, status post exploratory laparotomy, abdominal washout, complex closure on 3/21/2024   -Patient is tolerating a diet and having bowel function  -She denies any abdominal pain  -Incisions are well-healed without erythema induration or drainage  -Patient recently had a port placed, she is concerned that she is having little bit of drainage from the wound  -On exam the incision at the port has surgical glue intact, no erythema or induration, there is a small amount of serosanguineous drainage coming from underneath the glue, no purulence or signs of infection  -Discussed with patient that she should call the Interventional radiology department just to let them know about her concerns with the port  -Follow-up in office as needed       1. Adenocarcinoma, colon (HCC)             Subjective:      Patient ID: Dwaine Gould is a 63 y.o. female.    Triage Notes:    Patient is a 63-year-old female who presents the office for evaluation status post diagnostic laparoscopy converted to exploratory laparotomy, right hemicolectomy, abdominal washout; status post exploratory laparotomy, abdominal washout, complex closure.  She is tolerating a diet and having bowel function.  She had a port placed.  She is concerned that she is having some drainage from her port incision.        The following portions of the patient's history were reviewed and updated as appropriate: allergies, current medications, past family history, past medical history, past social history, past surgical history and problem list.    Review of Systems   Constitutional:  Positive for fatigue. Negative for chills and fever.   HENT:  Negative for congestion, hearing loss, rhinorrhea and sore throat.    Eyes:  Negative for pain and discharge.   Respiratory:  Negative for cough and shortness of breath.     Cardiovascular:  Negative for chest pain and palpitations.   Gastrointestinal:  Negative for abdominal pain, constipation, diarrhea, nausea and vomiting.   Endocrine: Negative for cold intolerance and heat intolerance.   Genitourinary:  Negative for difficulty urinating and dysuria.   Musculoskeletal:  Negative for back pain and neck pain.   Skin:  Negative for color change and rash.   Allergic/Immunologic: Negative for environmental allergies and food allergies.   Neurological:  Negative for seizures and headaches.   Hematological:  Negative for adenopathy. Does not bruise/bleed easily.   Psychiatric/Behavioral:  Negative for confusion and hallucinations.          Objective:      /94   Pulse 102   Temp 97.6 °F (36.4 °C) (Temporal)   Resp 18   Ht 5' (1.524 m)   Wt 106 kg (234 lb 6.4 oz)   SpO2 92%   BMI 45.78 kg/m²     Below is the patient's most recent value for Albumin, ALT, AST, BUN, Calcium, Chloride, Cholesterol, CO2, Creatinine, GFR, Glucose, HDL, Hematocrit, Hemoglobin, Hemoglobin A1C, LDL, Magnesium, Phosphorus, Platelets, Potassium, PSA, Sodium, Triglycerides, and WBC.   Lab Results   Component Value Date    ALT 13 03/22/2024    AST 12 (L) 03/22/2024    BUN 18 05/03/2024    CALCIUM 9.0 05/03/2024     05/03/2024    CO2 27 05/03/2024    CREATININE 0.81 05/03/2024    HCT 43.8 05/03/2024    HGB 13.1 05/03/2024    HGBA1C 6.7 (H) 03/20/2024    MG 1.9 03/26/2024    PHOS 2.6 03/26/2024     05/03/2024    K 3.7 05/03/2024    WBC 12.70 (H) 05/03/2024     Note: for a comprehensive list of the patient's lab results, access the Results Review activity.     Physical Exam  Constitutional:       Appearance: Normal appearance.   HENT:      Head: Normocephalic and atraumatic.      Nose: Nose normal.   Eyes:      General: No scleral icterus.     Conjunctiva/sclera: Conjunctivae normal.   Cardiovascular:      Rate and Rhythm: Tachycardia present.   Pulmonary:      Effort: Pulmonary effort is normal.    Abdominal:      General: There is no distension.      Palpations: Abdomen is soft.      Tenderness: There is no abdominal tenderness.      Comments: Incisions well-healed without erythema induration or drainage   Musculoskeletal:         General: No signs of injury.   Skin:     General: Skin is warm.      Coloration: Skin is not jaundiced.      Comments: incision at the port has surgical glue intact, no erythema or induration, there is a small amount of serosanguineous drainage coming from underneath the glue, no purulence or signs of infection   Neurological:      General: No focal deficit present.      Mental Status: She is alert and oriented to person, place, and time.   Psychiatric:         Mood and Affect: Mood normal.         Behavior: Behavior normal.

## 2024-05-16 ENCOUNTER — APPOINTMENT (EMERGENCY)
Dept: CT IMAGING | Facility: HOSPITAL | Age: 64
End: 2024-05-16
Payer: COMMERCIAL

## 2024-05-16 ENCOUNTER — HOSPITAL ENCOUNTER (INPATIENT)
Facility: HOSPITAL | Age: 64
LOS: 8 days | Discharge: HOME HEALTH | End: 2024-05-24
Attending: EMERGENCY MEDICINE | Admitting: STUDENT IN AN ORGANIZED HEALTH CARE EDUCATION/TRAINING PROGRAM
Payer: COMMERCIAL

## 2024-05-16 ENCOUNTER — APPOINTMENT (EMERGENCY)
Dept: VASCULAR ULTRASOUND | Facility: HOSPITAL | Age: 64
End: 2024-05-16
Payer: COMMERCIAL

## 2024-05-16 ENCOUNTER — APPOINTMENT (OUTPATIENT)
Dept: NON INVASIVE DIAGNOSTICS | Facility: HOSPITAL | Age: 64
End: 2024-05-16
Payer: COMMERCIAL

## 2024-05-16 DIAGNOSIS — T80.212A PORT OR RESERVOIR INFECTION: ICD-10-CM

## 2024-05-16 DIAGNOSIS — B95.61 STAPHYLOCOCCUS AUREUS BACTEREMIA: ICD-10-CM

## 2024-05-16 DIAGNOSIS — A41.9 SEPSIS (HCC): Primary | ICD-10-CM

## 2024-05-16 DIAGNOSIS — R78.81 STAPHYLOCOCCUS AUREUS BACTEREMIA: ICD-10-CM

## 2024-05-16 DIAGNOSIS — Z95.828 PORT-A-CATH IN PLACE: ICD-10-CM

## 2024-05-16 LAB
ALBUMIN SERPL BCP-MCNC: 3.6 G/DL (ref 3.5–5)
ALP SERPL-CCNC: 161 U/L (ref 34–104)
ALT SERPL W P-5'-P-CCNC: 27 U/L (ref 7–52)
ANION GAP SERPL CALCULATED.3IONS-SCNC: 11 MMOL/L (ref 4–13)
APTT PPP: 27 SECONDS (ref 23–37)
AST SERPL W P-5'-P-CCNC: 30 U/L (ref 13–39)
ATRIAL RATE: 105 BPM
BASOPHILS # BLD AUTO: 0.06 THOUSANDS/ÂΜL (ref 0–0.1)
BASOPHILS NFR BLD AUTO: 0 % (ref 0–1)
BILIRUB SERPL-MCNC: 0.55 MG/DL (ref 0.2–1)
BUN SERPL-MCNC: 14 MG/DL (ref 5–25)
CALCIUM SERPL-MCNC: 8.7 MG/DL (ref 8.4–10.2)
CHLORIDE SERPL-SCNC: 99 MMOL/L (ref 96–108)
CO2 SERPL-SCNC: 24 MMOL/L (ref 21–32)
CREAT SERPL-MCNC: 0.94 MG/DL (ref 0.6–1.3)
D DIMER PPP FEU-MCNC: 3.66 UG/ML FEU
EOSINOPHIL # BLD AUTO: 0.04 THOUSAND/ÂΜL (ref 0–0.61)
EOSINOPHIL NFR BLD AUTO: 0 % (ref 0–6)
ERYTHROCYTE [DISTWIDTH] IN BLOOD BY AUTOMATED COUNT: 21.8 % (ref 11.6–15.1)
GFR SERPL CREATININE-BSD FRML MDRD: 65 ML/MIN/1.73SQ M
GLUCOSE SERPL-MCNC: 148 MG/DL (ref 65–140)
HCT VFR BLD AUTO: 42.9 % (ref 34.8–46.1)
HGB BLD-MCNC: 13.3 G/DL (ref 11.5–15.4)
IMM GRANULOCYTES # BLD AUTO: 0.23 THOUSAND/UL (ref 0–0.2)
IMM GRANULOCYTES NFR BLD AUTO: 1 % (ref 0–2)
INR PPP: 1.03 (ref 0.84–1.19)
LACTATE SERPL-SCNC: 1.8 MMOL/L (ref 0.5–2)
LACTATE SERPL-SCNC: 2.3 MMOL/L (ref 0.5–2)
LYMPHOCYTES # BLD AUTO: 0.72 THOUSANDS/ÂΜL (ref 0.6–4.47)
LYMPHOCYTES NFR BLD AUTO: 3 % (ref 14–44)
MCH RBC QN AUTO: 24.7 PG (ref 26.8–34.3)
MCHC RBC AUTO-ENTMCNC: 31 G/DL (ref 31.4–37.4)
MCV RBC AUTO: 80 FL (ref 82–98)
MONOCYTES # BLD AUTO: 1.29 THOUSAND/ÂΜL (ref 0.17–1.22)
MONOCYTES NFR BLD AUTO: 5 % (ref 4–12)
NEUTROPHILS # BLD AUTO: 21.68 THOUSANDS/ÂΜL (ref 1.85–7.62)
NEUTS SEG NFR BLD AUTO: 91 % (ref 43–75)
NRBC BLD AUTO-RTO: 0 /100 WBCS
P AXIS: 67 DEGREES
PLATELET # BLD AUTO: 201 THOUSANDS/UL (ref 149–390)
PMV BLD AUTO: 9 FL (ref 8.9–12.7)
POTASSIUM SERPL-SCNC: 3.2 MMOL/L (ref 3.5–5.3)
PR INTERVAL: 142 MS
PROCALCITONIN SERPL-MCNC: 0.36 NG/ML
PROT SERPL-MCNC: 7.3 G/DL (ref 6.4–8.4)
PROTHROMBIN TIME: 14.1 SECONDS (ref 11.6–14.5)
QRS AXIS: 56 DEGREES
QRSD INTERVAL: 76 MS
QT INTERVAL: 334 MS
QTC INTERVAL: 441 MS
RBC # BLD AUTO: 5.39 MILLION/UL (ref 3.81–5.12)
SODIUM SERPL-SCNC: 134 MMOL/L (ref 135–147)
T WAVE AXIS: 95 DEGREES
VENTRICULAR RATE: 105 BPM
WBC # BLD AUTO: 24.02 THOUSAND/UL (ref 4.31–10.16)

## 2024-05-16 PROCEDURE — 87186 SC STD MICRODIL/AGAR DIL: CPT

## 2024-05-16 PROCEDURE — 87070 CULTURE OTHR SPECIMN AEROBIC: CPT

## 2024-05-16 PROCEDURE — 85025 COMPLETE CBC W/AUTO DIFF WBC: CPT | Performed by: EMERGENCY MEDICINE

## 2024-05-16 PROCEDURE — 93010 ELECTROCARDIOGRAM REPORT: CPT | Performed by: INTERNAL MEDICINE

## 2024-05-16 PROCEDURE — 0JPT0WZ REMOVAL OF TOTALLY IMPLANTABLE VASCULAR ACCESS DEVICE FROM TRUNK SUBCUTANEOUS TISSUE AND FASCIA, OPEN APPROACH: ICD-10-PCS | Performed by: RADIOLOGY

## 2024-05-16 PROCEDURE — 96365 THER/PROPH/DIAG IV INF INIT: CPT

## 2024-05-16 PROCEDURE — 87077 CULTURE AEROBIC IDENTIFY: CPT | Performed by: EMERGENCY MEDICINE

## 2024-05-16 PROCEDURE — 85610 PROTHROMBIN TIME: CPT | Performed by: EMERGENCY MEDICINE

## 2024-05-16 PROCEDURE — 87147 CULTURE TYPE IMMUNOLOGIC: CPT | Performed by: RADIOLOGY

## 2024-05-16 PROCEDURE — 10030 IMG GID FLU COLL DRG SFT TIS: CPT

## 2024-05-16 PROCEDURE — 87147 CULTURE TYPE IMMUNOLOGIC: CPT

## 2024-05-16 PROCEDURE — 77001 FLUOROGUIDE FOR VEIN DEVICE: CPT | Performed by: RADIOLOGY

## 2024-05-16 PROCEDURE — 36415 COLL VENOUS BLD VENIPUNCTURE: CPT | Performed by: EMERGENCY MEDICINE

## 2024-05-16 PROCEDURE — 85379 FIBRIN DEGRADATION QUANT: CPT | Performed by: EMERGENCY MEDICINE

## 2024-05-16 PROCEDURE — 93005 ELECTROCARDIOGRAM TRACING: CPT

## 2024-05-16 PROCEDURE — 99223 1ST HOSP IP/OBS HIGH 75: CPT | Performed by: STUDENT IN AN ORGANIZED HEALTH CARE EDUCATION/TRAINING PROGRAM

## 2024-05-16 PROCEDURE — 99285 EMERGENCY DEPT VISIT HI MDM: CPT | Performed by: EMERGENCY MEDICINE

## 2024-05-16 PROCEDURE — 93971 EXTREMITY STUDY: CPT

## 2024-05-16 PROCEDURE — 99285 EMERGENCY DEPT VISIT HI MDM: CPT

## 2024-05-16 PROCEDURE — 87205 SMEAR GRAM STAIN: CPT | Performed by: RADIOLOGY

## 2024-05-16 PROCEDURE — 83605 ASSAY OF LACTIC ACID: CPT | Performed by: EMERGENCY MEDICINE

## 2024-05-16 PROCEDURE — 87070 CULTURE OTHR SPECIMN AEROBIC: CPT | Performed by: RADIOLOGY

## 2024-05-16 PROCEDURE — 85730 THROMBOPLASTIN TIME PARTIAL: CPT | Performed by: EMERGENCY MEDICINE

## 2024-05-16 PROCEDURE — 87040 BLOOD CULTURE FOR BACTERIA: CPT | Performed by: EMERGENCY MEDICINE

## 2024-05-16 PROCEDURE — 36590 REMOVAL TUNNELED CV CATH: CPT | Performed by: RADIOLOGY

## 2024-05-16 PROCEDURE — 87154 CUL TYP ID BLD PTHGN 6+ TRGT: CPT | Performed by: EMERGENCY MEDICINE

## 2024-05-16 PROCEDURE — NC001 PR NO CHARGE

## 2024-05-16 PROCEDURE — 87186 SC STD MICRODIL/AGAR DIL: CPT | Performed by: EMERGENCY MEDICINE

## 2024-05-16 PROCEDURE — 96361 HYDRATE IV INFUSION ADD-ON: CPT

## 2024-05-16 PROCEDURE — C1729 CATH, DRAINAGE: HCPCS

## 2024-05-16 PROCEDURE — 99255 IP/OBS CONSLTJ NEW/EST HI 80: CPT | Performed by: INTERNAL MEDICINE

## 2024-05-16 PROCEDURE — 71250 CT THORAX DX C-: CPT

## 2024-05-16 PROCEDURE — 87186 SC STD MICRODIL/AGAR DIL: CPT | Performed by: RADIOLOGY

## 2024-05-16 PROCEDURE — 80053 COMPREHEN METABOLIC PANEL: CPT | Performed by: EMERGENCY MEDICINE

## 2024-05-16 PROCEDURE — 74176 CT ABD & PELVIS W/O CONTRAST: CPT

## 2024-05-16 PROCEDURE — 36590 REMOVAL TUNNELED CV CATH: CPT

## 2024-05-16 PROCEDURE — 84145 PROCALCITONIN (PCT): CPT | Performed by: EMERGENCY MEDICINE

## 2024-05-16 RX ORDER — ROPINIROLE 0.25 MG/1
0.5 TABLET, FILM COATED ORAL 3 TIMES DAILY
Status: DISCONTINUED | OUTPATIENT
Start: 2024-05-16 | End: 2024-05-24 | Stop reason: HOSPADM

## 2024-05-16 RX ORDER — MELOXICAM 7.5 MG/1
15 TABLET ORAL DAILY
Status: DISCONTINUED | OUTPATIENT
Start: 2024-05-17 | End: 2024-05-16

## 2024-05-16 RX ORDER — ATORVASTATIN CALCIUM 40 MG/1
40 TABLET, FILM COATED ORAL
Status: DISCONTINUED | OUTPATIENT
Start: 2024-05-16 | End: 2024-05-24 | Stop reason: HOSPADM

## 2024-05-16 RX ORDER — TORSEMIDE 20 MG/1
20 TABLET ORAL DAILY
Status: DISCONTINUED | OUTPATIENT
Start: 2024-05-17 | End: 2024-05-24 | Stop reason: HOSPADM

## 2024-05-16 RX ORDER — ALBUTEROL SULFATE 2.5 MG/3ML
2.5 SOLUTION RESPIRATORY (INHALATION) EVERY 6 HOURS PRN
Status: DISCONTINUED | OUTPATIENT
Start: 2024-05-16 | End: 2024-05-24 | Stop reason: HOSPADM

## 2024-05-16 RX ORDER — ACETAMINOPHEN 325 MG/1
650 TABLET ORAL EVERY 6 HOURS PRN
Status: DISCONTINUED | OUTPATIENT
Start: 2024-05-16 | End: 2024-05-18

## 2024-05-16 RX ORDER — LISINOPRIL 10 MG/1
10 TABLET ORAL DAILY
Status: DISCONTINUED | OUTPATIENT
Start: 2024-05-17 | End: 2024-05-24 | Stop reason: HOSPADM

## 2024-05-16 RX ORDER — ENOXAPARIN SODIUM 100 MG/ML
40 INJECTION SUBCUTANEOUS EVERY 12 HOURS
Status: DISCONTINUED | OUTPATIENT
Start: 2024-05-16 | End: 2024-05-24 | Stop reason: HOSPADM

## 2024-05-16 RX ORDER — ACETAMINOPHEN 325 MG/1
650 TABLET ORAL EVERY 6 HOURS PRN
Status: DISCONTINUED | OUTPATIENT
Start: 2024-05-16 | End: 2024-05-16

## 2024-05-16 RX ORDER — ONDANSETRON 4 MG/1
8 TABLET, ORALLY DISINTEGRATING ORAL EVERY 8 HOURS PRN
Status: DISCONTINUED | OUTPATIENT
Start: 2024-05-16 | End: 2024-05-24 | Stop reason: HOSPADM

## 2024-05-16 RX ORDER — ASPIRIN 325 MG
325 TABLET ORAL DAILY
Status: DISCONTINUED | OUTPATIENT
Start: 2024-05-17 | End: 2024-05-24 | Stop reason: HOSPADM

## 2024-05-16 RX ORDER — ZOLPIDEM TARTRATE 5 MG/1
5 TABLET ORAL DAILY PRN
Status: DISCONTINUED | OUTPATIENT
Start: 2024-05-16 | End: 2024-05-24 | Stop reason: HOSPADM

## 2024-05-16 RX ORDER — FENTANYL CITRATE 50 UG/ML
INJECTION, SOLUTION INTRAMUSCULAR; INTRAVENOUS AS NEEDED
Status: COMPLETED | OUTPATIENT
Start: 2024-05-16 | End: 2024-05-16

## 2024-05-16 RX ORDER — KETOROLAC TROMETHAMINE 30 MG/ML
15 INJECTION, SOLUTION INTRAMUSCULAR; INTRAVENOUS EVERY 6 HOURS PRN
Status: DISCONTINUED | OUTPATIENT
Start: 2024-05-16 | End: 2024-05-18

## 2024-05-16 RX ORDER — POTASSIUM CHLORIDE 20 MEQ/1
40 TABLET, EXTENDED RELEASE ORAL 2 TIMES DAILY
Status: DISCONTINUED | OUTPATIENT
Start: 2024-05-16 | End: 2024-05-24 | Stop reason: HOSPADM

## 2024-05-16 RX ORDER — AMLODIPINE BESYLATE 5 MG/1
5 TABLET ORAL DAILY
Status: DISCONTINUED | OUTPATIENT
Start: 2024-05-17 | End: 2024-05-24 | Stop reason: HOSPADM

## 2024-05-16 RX ORDER — MIDAZOLAM HYDROCHLORIDE 2 MG/2ML
INJECTION, SOLUTION INTRAMUSCULAR; INTRAVENOUS AS NEEDED
Status: COMPLETED | OUTPATIENT
Start: 2024-05-16 | End: 2024-05-16

## 2024-05-16 RX ADMIN — KETOROLAC TROMETHAMINE 15 MG: 30 INJECTION, SOLUTION INTRAMUSCULAR; INTRAVENOUS at 20:26

## 2024-05-16 RX ADMIN — FENTANYL CITRATE 50 MCG: 50 INJECTION, SOLUTION INTRAMUSCULAR; INTRAVENOUS at 11:37

## 2024-05-16 RX ADMIN — MIDAZOLAM HYDROCHLORIDE 1 MG: 1 INJECTION, SOLUTION INTRAMUSCULAR; INTRAVENOUS at 11:39

## 2024-05-16 RX ADMIN — METOPROLOL TARTRATE 25 MG: 25 TABLET, FILM COATED ORAL at 20:35

## 2024-05-16 RX ADMIN — Medication 10 ML: at 11:31

## 2024-05-16 RX ADMIN — FENTANYL CITRATE 50 MCG: 50 INJECTION, SOLUTION INTRAMUSCULAR; INTRAVENOUS at 11:30

## 2024-05-16 RX ADMIN — CEFTRIAXONE SODIUM 2000 MG: 10 INJECTION, POWDER, FOR SOLUTION INTRAVENOUS at 05:24

## 2024-05-16 RX ADMIN — MIDAZOLAM HYDROCHLORIDE 1 MG: 1 INJECTION, SOLUTION INTRAMUSCULAR; INTRAVENOUS at 11:23

## 2024-05-16 RX ADMIN — ACETAMINOPHEN 650 MG: 325 TABLET, FILM COATED ORAL at 13:02

## 2024-05-16 RX ADMIN — VANCOMYCIN HYDROCHLORIDE 1500 MG: 1 INJECTION, POWDER, LYOPHILIZED, FOR SOLUTION INTRAVENOUS at 08:05

## 2024-05-16 RX ADMIN — SODIUM CHLORIDE 1000 ML: 0.9 INJECTION, SOLUTION INTRAVENOUS at 05:24

## 2024-05-16 RX ADMIN — POTASSIUM CHLORIDE 40 MEQ: 1500 TABLET, EXTENDED RELEASE ORAL at 20:32

## 2024-05-16 RX ADMIN — ATORVASTATIN CALCIUM 40 MG: 40 TABLET, FILM COATED ORAL at 22:06

## 2024-05-16 RX ADMIN — FENTANYL CITRATE 50 MCG: 50 INJECTION, SOLUTION INTRAMUSCULAR; INTRAVENOUS at 11:23

## 2024-05-16 RX ADMIN — ENOXAPARIN SODIUM 40 MG: 40 INJECTION SUBCUTANEOUS at 20:35

## 2024-05-16 RX ADMIN — ROPINIROLE 0.5 MG: 0.25 TABLET, FILM COATED ORAL at 20:35

## 2024-05-16 RX ADMIN — CEFEPIME 2000 MG: 2 INJECTION, POWDER, FOR SOLUTION INTRAVENOUS at 07:47

## 2024-05-16 RX ADMIN — ACETAMINOPHEN 650 MG: 325 TABLET, FILM COATED ORAL at 19:01

## 2024-05-16 RX ADMIN — MIDAZOLAM HYDROCHLORIDE 1 MG: 1 INJECTION, SOLUTION INTRAMUSCULAR; INTRAVENOUS at 11:30

## 2024-05-16 RX ADMIN — Medication 10 ML: at 11:36

## 2024-05-16 RX ADMIN — ZOLPIDEM TARTRATE 5 MG: 5 TABLET, COATED ORAL at 20:32

## 2024-05-16 NOTE — ASSESSMENT & PLAN NOTE
Present on admission with history of COPD.  Chronic home O2 dependent 3L currently stable at baseline.  Currently not in acute exacerbation.

## 2024-05-16 NOTE — H&P
[facility]  H&P  Name: Dwaine Gould 63 y.o. female I MRN: [id number]  Unit/Bed#: -01 I Date of Admission: 5/16/2024   Date of Service: 5/16/2024  Hospital Day: 0      Assessment & Plan   * Sepsis (HCC)  Assessment & Plan  63-year-old female patient with past medical history of adenocarcinoma of colon s/p hemicolectomy, had Port-A-Cath placed on May 3, 2024 with planning to start chemotherapy now hospitalized due to sepsis due to infected port.    Initially patient had leukocytosis, lactic acidosis however evolved to sepsis subsequently with fever, tachycardia.  Lactic acidosis has resolved.    Likely source infected port  On my encounter patient appears comfortable not in distress.  Currently hemodynamically stable, acutely nontoxic appearing.  Continue IV cefepime and vancomycin for now.  Follow-up with IR consult for port removal and culture.  Follow-up with 2 sets of cultures sent in the emergency room.  Follow-up with ID consultation.  Spoke with multiple family/daughters and son in law at bed side as well as daughter Eva over the phone.     Port-A-Cath in place  Assessment & Plan  female with carcinoma of the colon post right hemicolectomy.  Patient had Port-A-Cath placed on 05/03/2024 with planning to start on chemotherapy.  Hospitalized due to infected port.  Consulted IR for port removal.  Plan as above.    Stage 3a chronic kidney disease (HCC)  Assessment & Plan  Lab Results   Component Value Date    EGFR 65 05/16/2024    EGFR 78 05/03/2024    EGFR 93 03/28/2024    CREATININE 0.94 05/16/2024    CREATININE 0.81 05/03/2024    CREATININE 0.70 03/28/2024     Present on admission history of CKD stage III.  Currently creatinine stable at baseline.    Essential hypertension  Assessment & Plan  Blood pressure currently stable.  Resume home dose of lisinopril, Norvasc, Lopressor, Demadex.    COPD (chronic obstructive pulmonary disease) (HCC)  Assessment & Plan  Present on admission  with history of COPD.  Chronic home O2 dependent 3L currently stable at baseline.  Currently not in acute exacerbation.      Chronic heart failure with preserved ejection fraction (HCC)  Assessment & Plan  Wt Readings from Last 3 Encounters:   05/15/24 106 kg (234 lb 6.4 oz)   05/08/24 108 kg (237 lb)   05/03/24 109 kg (239 lb 13.8 oz)       Present on admission with history of chronic heart failure with preserved ejection fraction.  Most recent echo from 03/2024 noted with EF of 60%.  Does have lower extremity edema.  Not in respiratory distress.   Resume home dose of Demadex.  Low salt fluid restricted diet.             CAD (coronary artery disease)  Assessment & Plan  Present on admission history of CAD.  Currently asymptomatic.  Resume home dose of aspirin, statin, metoprolol, Norvasc, Demadex.         VTE Prophylaxis: Enoxaparin (Lovenox)    Code Status: Level 1 Full Code  Discussion with family: spoke with 2 daughters and son in law at bedside and daughter Eva over the phone.     Anticipated Length of Stay:  Patient will be admitted on an Inpatient basis with an anticipated length of stay of  > 2 midnights.   Justification for Hospital Stay: Sepsis     Total Time for Visit, including Counseling / Coordination of Care: 90 minutes.  Greater than 50% of this total time spent on direct patient counseling and coordination of care.    Chief Complaint:  Fever, drainage from port.    History of Present Illness:    Dwaine Gould is a 63 y.o. female with past medical history of CKD stage III, COPD chronic home O2 dependent 3 L, CAD, CHF, history of carcinoma of the colon s/p right hemicolectomy, patient had recent hospitalization in 03/2024 with secondary peritonitis from perforated cecal mass complicated by bacteroids bacteremia.  She underwent exploratory laparotomy with right hemicraniectomy and subsequently taken back to the OR for abdominal washout and closure in 03/2024. She had port placed by IR on 05/03/2024  who presents with fever, complaining of purulent drainage from recently placed port.  Patient does report checking temperature at home and noted with fever of 104F, at times which improved with Tylenol.  Imaging report noted with finding concerning of port infection with possible abscess, soft tissue hypodensity anterior to manubrium . currently on my encounter patient appears comfortable not in distress.  No other events reported.    Review of Systems:    Review of Systems   Constitutional:  Positive for chills and fever.   HENT:  Negative for congestion.    Eyes:  Negative for visual disturbance.   Respiratory:  Positive for cough. Negative for shortness of breath.    Cardiovascular:  Negative for chest pain.   Gastrointestinal:  Negative for abdominal pain.   Endocrine: Negative for polyuria.   Genitourinary:  Negative for dysuria.   Psychiatric/Behavioral:  Negative for agitation.        Past Medical and Surgical History:     Past Medical History:   Diagnosis Date    Acute metabolic encephalopathy     Asthma     CHF (congestive heart failure) (HCC)     Chronic kidney disease     COPD (chronic obstructive pulmonary disease) (HCC)     Hyperlipidemia     Hypertension     Liver disease     Myocardial infarction (HCC)     Seizures (HCC)     Sleep apnea     Transaminitis        Past Surgical History:   Procedure Laterality Date    APPENDECTOMY      CARDIAC CATHETERIZATION      GANGLION CYST EXCISION      IR PORT PLACEMENT  5/3/2024    IR PORT REMOVAL  5/16/2024    LAPAROTOMY N/A 03/21/2024    Procedure: LAPAROTOMY EXPLORATORY, washout, complex closure, LISSY drain;  Surgeon: Francine Reid MD;  Location: [facility];  Service: General    [place] LAPS ABD PRTM&OMENTUM DX W/WO SPEC BR/WA SPX N/A 03/19/2024    Procedure: DIAGNOSTIC LAPAROSCOPY converted to Exploratory Laparotomy, Right Hemicolectomy, Abdominal wash-out, Abthera Placement;  Surgeon: Roger Joel DO;  Location: [facility];  Service: General     TYMPANOSTOMY TUBE PLACEMENT         Meds/Allergies:    Prior to Admission medications    Medication Sig Start Date End Date Taking? Authorizing Provider   albuterol (2.5 mg/3 mL) 0.083 % nebulizer solution Take 3 mL (2.5 mg total) by nebulization every 6 (six) hours as needed for wheezing or shortness of breath 5/14/24  Yes Gianluca Paz PA-C   amLODIPine (NORVASC) 5 mg tablet Take 1 tablet (5 mg total) by mouth daily 4/22/24  Yes Lance Altamirano PA-C   aspirin 325 mg tablet Take 1 tablet by mouth daily   Yes Historical Provider, MD   atorvastatin (Lipitor) 40 mg tablet Take 1 tablet (40 mg total) by mouth daily at bedtime 4/22/24  Yes Lance Altamirano PA-C   fluorouracil 4,825 mg in CADD/Elastomeric Infusion Device Infuse 4,825 mg (1,200 mg/m2/day x 2.01 m2) into a catheter in a vein via infusion device over 46 hours for 2 days  Infusion planned for May 20, 2024. 5/20/24 5/22/24 Yes Carmina Gallego MD   lisinopril (ZESTRIL) 10 mg tablet Take 1 tablet (10 mg total) by mouth daily 4/22/24  Yes Lance Altamirano PA-C   metoprolol tartrate (LOPRESSOR) 25 mg tablet Take 1 tablet (25 mg total) by mouth every 12 (twelve) hours 4/22/24  Yes Lance Altamirano PA-C   ondansetron (ZOFRAN-ODT) 8 mg disintegrating tablet Take 1 tablet (8 mg total) by mouth every 8 (eight) hours as needed for vomiting or nausea 4/24/24  Yes Carmina Gallego MD   rOPINIRole (REQUIP) 0.5 mg tablet Take 1 tablet (0.5 mg total) by mouth 3 (three) times a day 3/19/24  Yes Milagro Mike PA-C   torsemide (DEMADEX) 20 mg tablet Take 1 tablet (20 mg total) by mouth daily 4/22/24  Yes Lance Altamirano PA-C   fluticasone-umeclidinium-vilanterol (Trelegy Ellipta) 100-62.5-25 mcg/actuation inhaler Inhale 1 puff daily Rinse mouth after use.    Historical Provider, MD   lidocaine-prilocaine (EMLA) cream Apply topically as needed for mild pain or moderate pain 4/24/24   Carmina Gallego MD   meloxicam (MOBIC) 15 mg  tablet Take 15 mg by mouth daily 24   Historical Provider, MD   oxymetazoline (AFRIN) 0.05 % nasal spray 2 sprays by Each Nare route 2 (two) times a day    Historical Provider, MD   zolpidem (AMBIEN) 5 mg tablet Take 5 mg by mouth daily as needed 4/10/24 10/7/24  Historical Provider, MD     I have reviewed home medications with a medical source (PCP, Pharmacy, other).    Allergies:   Allergies   Allergen Reactions    Codeine Anaphylaxis    Wound Dressing Adhesive Blisters     ALL WOUND DRESSINGS     Contrast [Iodinated Contrast Media] GI Intolerance    Prednisone Irritability     Increase in anger/abusive behaviors    Ibuprofen Rash       Social History:     Marital Status: /Civil Union     Substance Use History:   Social History     Substance and Sexual Activity   Alcohol Use Yes    Comment: socially     Social History     Tobacco Use   Smoking Status Former    Current packs/day: 0.00    Average packs/day: 2.0 packs/day for 50.5 years (100.9 ttl pk-yrs)    Types: Cigarettes    Start date: 10/1973    Quit date: 3/18/2024    Years since quittin.1    Passive exposure: Current   Smokeless Tobacco Never     Social History     Substance and Sexual Activity   Drug Use Never       Family History:    History reviewed. No pertinent family history.    Physical Exam:     Vitals:   Blood Pressure: 120/67 (24 1512)  Pulse: (!) 113 (24 1512)  Temperature: 98.6 °F (37 °C) (24 1512)  Temp Source: Oral (24 1448)  Respirations: 20 (24 1330)  SpO2: 94 % (24 1512)    Physical Exam  Constitutional:       General: She is not in acute distress.     Appearance: Normal appearance. She is obese. She is not ill-appearing, toxic-appearing or diaphoretic.   HENT:      Head: Normocephalic and atraumatic.   Eyes:      Pupils: Pupils are equal, round, and reactive to light.   Cardiovascular:      Rate and Rhythm: Tachycardia present.      Pulses: Normal pulses.   Pulmonary:      Effort: Pulmonary  effort is normal. No respiratory distress.      Breath sounds: No wheezing.      Comments: O2 saturation well on 3 to 4 L nasal cannula at baseline.  Abdominal:      General: Bowel sounds are normal. There is no distension.      Palpations: Abdomen is soft.      Tenderness: There is no abdominal tenderness.   Musculoskeletal:      Right lower leg: Edema present.      Left lower leg: Edema present.   Neurological:      Mental Status: She is alert and oriented to person, place, and time. Mental status is at baseline.   Psychiatric:         Mood and Affect: Mood normal.         Additional Data:     Lab Results: I have personally reviewed pertinent reports.      Results from last 7 days   Lab Units 05/16/24  0445   WBC Thousand/uL 24.02*   HEMOGLOBIN g/dL 13.3   HEMATOCRIT % 42.9   PLATELETS Thousands/uL 201   SEGS PCT % 91*   LYMPHO PCT % 3*   MONO PCT % 5   EOS PCT % 0     Results from last 7 days   Lab Units 05/16/24  0445   SODIUM mmol/L 134*   POTASSIUM mmol/L 3.2*   CHLORIDE mmol/L 99   CO2 mmol/L 24   BUN mg/dL 14   CREATININE mg/dL 0.94   ANION GAP mmol/L 11   CALCIUM mg/dL 8.7   ALBUMIN g/dL 3.6   TOTAL BILIRUBIN mg/dL 0.55   ALK PHOS U/L 161*   ALT U/L 27   AST U/L 30   GLUCOSE RANDOM mg/dL 148*     Results from last 7 days   Lab Units 05/16/24  0533   INR  1.03             Results from last 7 days   Lab Units 05/16/24  0719 05/16/24  0445   LACTIC ACID mmol/L 1.8 2.3*   PROCALCITONIN ng/ml  --  0.36*       Imaging: I have personally reviewed pertinent reports.      IR port removal   Final Result by Oliva Woodruff MD (05/16 6314)   Impression:   1. Successful fluoroscopically guided port removal as described. There was clear evidence of infection with fluid in the pocket which was sent for culture. The port and catheter were also both sent for culture.   2. A drain was left in the pocket which will be flushed 3 times a day.               Workstation performed: [id number]         CT chest abdomen pelvis wo  contrast   Final Result by Gabe Eller MD (05/16 3913)      CT chest:      Trace fat stranding adjacent to the right anterior chest wall port without loculated fluid collection. Hypodensity immediately anterior to the manubrium measuring approximately 4.1 x 1.2 x 3.1 cm likely confluent edema. No soft tissue gas. Evaluation    slightly limited without IV contrast. This could be followed up with soft tissue ultrasound if clinically warranted.      Trace bibasilar dependent subsegmental atelectasis. Otherwise, no evidence of acute intrathoracic process.      Stable 5 mm juxtapleural nodule in the left lower lobe when measured in the same fashion. Noncontrast chest CT follow-up in 3 months is advised.      Additional chronic findings and negatives as above.      CT abdomen and pelvis:      Interval right hemicolectomy. Pneumoperitoneum has resolved. No bowel obstruction.      Interval enlargement of few small mesenteric lymph nodes and left anterior pelvic nodule. Mild mesenteric edema and trace pelvic free fluid. Cannot exclude early peritoneal carcinomatosis. Consider follow-up with noncontrast CT PET if it would alter    patient management.      Indeterminate left lower quadrant anterior deep subcutaneous tubular density. Surgical tract tumor seeding or metastatic disease is not excluded. This could be followed up with nonemergent soft tissue ultrasound. Attention on CT PET if obtained is    advised.      Hepatic steatosis.      Additional findings and negatives as above.      The study was marked in EPIC for immediate notification.         Workstation performed: [id number]         VAS upper limb venous duplex scan, unilateral/limited    (Results Pending)       EKG, Pathology, and Other Studies Reviewed on Admission:   EKG: Sinus tachycardia        ** Please Note: This note has been constructed using a voice recognition system. **

## 2024-05-16 NOTE — ASSESSMENT & PLAN NOTE
Present on admission history of CAD.  Currently asymptomatic.  Resume home dose of aspirin, statin, metoprolol, Norvasc, Demadex.

## 2024-05-16 NOTE — ASSESSMENT & PLAN NOTE
Lab Results   Component Value Date    EGFR 65 05/16/2024    EGFR 78 05/03/2024    EGFR 93 03/28/2024    CREATININE 0.94 05/16/2024    CREATININE 0.81 05/03/2024    CREATININE 0.70 03/28/2024     Present on admission history of CKD stage III.  Currently creatinine stable at baseline.

## 2024-05-16 NOTE — CONSULTS
Consultation - Infectious Disease   Dwaine Gould 63 y.o. female MRN: [id number]  Unit/Bed#: FT 01 Encounter: [id number]      IMPRESSION & RECOMMENDATIONS:     Port infection  -Port placed on 5/3 with plans for future chemotherapy for adenocarcinoma of colon.  Patient developed acute drainage from the port, CT scan noting possibly developing abscess/edema noted to port.  Consideration for secondary bacteremia. IR removed port 5/16 and placed drain into port pocket. Cultures sent from port pocket (cloudy fluid) and from port tip.   -Start empiric IV Vancomycin (pharmacy consult) and IV Ceftriaxone 2g every 24 hours  -Follow-up blood cultures  -Follow up IR cultures  -Repeat CBC tomorrow to monitor WBC  -Trend fever curve and hemodynamics    2. Soft tissue hypodensity anterior to manubrium  -Seen on CT scan, read as likely confluent edema measuring 4.1 x 3.1 cm.  This is likely related to the port infection.  Consideration for early developing abscess.  -Follow up IR cultures    3. Adenocarcinoma of colon  -Diagnosed in March, status post right hemicolectomy. Eventual plans to start FOLFOX.  -Close Oncology follow up    4. Chronic hypoxic respiraotry failure  -Noted. On home 3L NC.    I have discussed the above management plan in detail with the primary service    I have performed an extensive review of the medical records in Epic including review of the notes, radiographs, and laboratory results     HISTORY OF PRESENT ILLNESS:  Reason for Consult: Port infection    HPI: Dwaine Gould is a 63 y.o. year old female with carcinoma of the colon post right hemicolectomy, stage III CKD, COPD on chronic 3 L nasal cannula, coronary artery disease, congestive heart failure.  She was recently admitted in March with secondary peritonitis from perforated cecal mass complicated by Bacteroides bacteremia.  She underwent exploratory laparotomy on 3/19 with right hemicolectomy and taken back to OR on 3/21 for abdominal washout and  closure. She had a port placed by interventional radiology on 5/3/2024 and plans were to start chemotherapy with FOLFOX.  Presented to the emergency room on 5/16 after she noted purulent drainage coming from her port he also reported fever the night before up to 104 at home.  She had a leukocytosis of 24,000 on arrival.  CT scan found weakness fat stranding adjacent to the port and a new focal hypodensity anterior to the manubrium measuring 4.1 x 3.1 cm.    REVIEW OF SYSTEMS:  A complete review of systems is negative other than that noted in the HPI.    PAST MEDICAL HISTORY:  Past Medical History:   Diagnosis Date    Acute metabolic encephalopathy     Asthma     CHF (congestive heart failure) (HCC)     Chronic kidney disease     COPD (chronic obstructive pulmonary disease) (HCC)     Hyperlipidemia     Hypertension     Liver disease     Myocardial infarction (HCC)     Seizures (HCC)     Sleep apnea     Transaminitis      Past Surgical History:   Procedure Laterality Date    APPENDECTOMY      CARDIAC CATHETERIZATION      GANGLION CYST EXCISION      IR PORT PLACEMENT  5/3/2024    LAPAROTOMY N/A 03/21/2024    Procedure: LAPAROTOMY EXPLORATORY, washout, complex closure, LISSY drain;  Surgeon: Francine Reid MD;  Location: [facility];  Service: General    [place] LAPS ABD PRTM&OMENTUM DX W/WO SPEC BR/WA SPX N/A 03/19/2024    Procedure: DIAGNOSTIC LAPAROSCOPY converted to Exploratory Laparotomy, Right Hemicolectomy, Abdominal wash-out, Abthera Placement;  Surgeon: Roger Joel DO;  Location: [facility];  Service: General    TYMPANOSTOMY TUBE PLACEMENT         FAMILY HISTORY:  Non-contributory    SOCIAL HISTORY:  Social History   Social History     Substance and Sexual Activity   Alcohol Use Yes    Comment: socially     Social History     Substance and Sexual Activity   Drug Use Never     Social History     Tobacco Use   Smoking Status Former    Current packs/day: 0.00    Average packs/day: 2.0 packs/day for 50.5 years  (100.9 ttl pk-yrs)    Types: Cigarettes    Start date: 10/1973    Quit date: 3/18/2024    Years since quittin.1    Passive exposure: Current   Smokeless Tobacco Never       ALLERGIES:  Allergies   Allergen Reactions    Codeine Anaphylaxis    Wound Dressing Adhesive Blisters     ALL WOUND DRESSINGS     Contrast [Iodinated Contrast Media] GI Intolerance    Prednisone Irritability     Increase in anger/abusive behaviors    Ibuprofen Rash       MEDICATIONS:  All current active medications have been reviewed.      PHYSICAL EXAM:  Temp:  [98.3 °F (36.8 °C)] 98.3 °F (36.8 °C)  HR:  [] 94  Resp:  [18-22] 18  BP: (116-157)/(57-82) 157/82  SpO2:  [93 %-98 %] 98 %  Temp (24hrs), Av.3 °F (36.8 °C), Min:98.3 °F (36.8 °C), Max:98.3 °F (36.8 °C)  Current: Temperature: 98.3 °F (36.8 °C)    Intake/Output Summary (Last 24 hours) at 2024 0946  Last data filed at 2024 0554  Gross per 24 hour   Intake 50 ml   Output --   Net 50 ml       General Appearance:  Appearing well, nontoxic, and in no distress   Head:  Normocephalic, without obvious abnormality, atraumatic   Eyes:  Conjunctiva pink and sclera anicteric, both eyes   Nose: Nares normal, mucosa normal, no drainage   Throat: Oropharynx moist without lesions   Neck: Supple   Back:   Symmetric   Lungs:   Clear to auscultation bilaterally, respirations unlabored   Chest Wall:  No tenderness or deformity   Heart:  RRR; no murmur   Abdomen:   Soft, non-tender, well healed laparotomy scar    Extremities: No cyanosis, mild pitting edema of both legs noted   Skin: No rashes, bandage over prior port site intact   Lymph nodes: Cervical, supraclavicular nodes normal   Neurologic: Alert and oriented        LABS, IMAGING, & OTHER STUDIES:  Lab Results:  I have personally reviewed pertinent labs.  Results from last 7 days   Lab Units 24  0445   WBC Thousand/uL 24.02*   HEMOGLOBIN g/dL 13.3   PLATELETS Thousands/uL 201     Results from last 7 days   Lab Units  05/16/24  0445   SODIUM mmol/L 134*   POTASSIUM mmol/L 3.2*   CHLORIDE mmol/L 99   CO2 mmol/L 24   BUN mg/dL 14   CREATININE mg/dL 0.94   EGFR ml/min/1.73sq m 65   CALCIUM mg/dL 8.7   AST U/L 30   ALT U/L 27   ALK PHOS U/L 161*         Results from last 7 days   Lab Units 05/16/24  0445   PROCALCITONIN ng/ml 0.36*             Results from last 7 days   Lab Units 05/16/24  0533   D-DIMER QUANTITATIVE ug/ml FEU 3.66*       Imaging Studies:   I have personally reviewed pertinent imaging study reports and images in PACS.      Other Studies:   I have personally reviewed pertinent reports.      Pa Barnard MD  [facility]

## 2024-05-16 NOTE — BRIEF OP NOTE (RAD/CATH)
INTERVENTIONAL RADIOLOGY PROCEDURE NOTE    Date: 5/16/2024    Procedure: IR PORT REMOVAL     Preoperative diagnosis:   1. Sepsis (HCC)    2. Port-A-Cath in place         Postoperative diagnosis: Same.    Surgeon: Oliva Woodruff MD     Assistant: None. No qualified resident was available.    Blood loss: Trace    Specimens: Port as well as fluid sent for culture.    Findings: Cloudy fluid found in port pocket.  This was sent for culture.  The port was removed and also sent for culture.  A drain was placed into the pocket.  The pocket was closed with Vicryl suture and Steri-Strips.    Complications: None immediate.    Anesthesia: conscious sedation

## 2024-05-16 NOTE — PLAN OF CARE
Problem: SAFETY ADULT  Goal: Maintains/Returns to pre admission functional level  Description: INTERVENTIONS:  - Perform AM-PAC 6 Click Basic Mobility/ Daily Activity assessment daily.  - Set and communicate daily mobility goal to care team and patient/family/caregiver.   - Collaborate with rehabilitation services on mobility goals if consulted  - Record patient progress and toleration of activity level   Outcome: Progressing     Problem: Knowledge Deficit  Goal: Patient/family/caregiver demonstrates understanding of disease process, treatment plan, medications, and discharge instructions  Description: Complete learning assessment and assess knowledge base.  Interventions:  - Provide teaching at level of understanding  - Provide teaching via preferred learning methods  Outcome: Progressing

## 2024-05-16 NOTE — ASSESSMENT & PLAN NOTE
female with carcinoma of the colon post right hemicolectomy.  Patient had Port-A-Cath placed on 05/03/2024 with planning to start on chemotherapy.  Hospitalized due to infected port.  Consulted IR for port removal.  Plan as above.

## 2024-05-16 NOTE — PROGRESS NOTES
Dwaine Gould is a 63 y.o. female who is currently ordered Vancomycin IV with management by the Pharmacy Consult service.  Relevant clinical data and objective / subjective history reviewed.  Vancomycin Assessment:  Indication and Goal AUC/Trough: Soft tissue (goal -600, trough >10)  Clinical Status: New Start  Micro:     Renal Function:  SCr: 0.94 mg/dL  CrCl: 69.2 mL/min  Renal replacement: Not on dialysis  Days of Therapy: 1  Current Dose: 1500mg x1LD, then 1250mg IV q12h  Vancomycin Plan:  New Dosinmg x1LD, then 1500mg IV q24h  Estimated AUC: 537 mcg*hr/mL  Estimated Trough: 14.3 mcg/mL  Next Level: 24 with AM labs  Renal Function Monitoring: Daily BMP and UOP  Pharmacy will continue to follow closely for s/sx of nephrotoxicity, infusion reactions and appropriateness of therapy.  BMP and CBC will be ordered per protocol. We will continue to follow the patient’s culture results and clinical progress daily.    Kira Norris, Pharmacist

## 2024-05-16 NOTE — TELEMEDICINE
e-Consult (IPC)  - Interventional Radiology  Dwaine Gould 63 y.o. female MRN: [id number]  Unit/Bed#: FT 01 Encounter: [id number]          Interventional Radiology has been consulted to evaluate Dwaine Gould    We were consulted by Internal Medicine concerning this patient with possible implanted port infection.    Inpatient Consult to IR  Consult performed by: GUSTAVO Rodriguez  Consult ordered by: Michael FOWLER MD        05/16/24    Assessment/Recommendation:   Dwaine Gould, 63y female with CAD, HF, COPD, HTN, HLD, CKD stage IIIa, Anemia and Colon cancer.    Patient is s/p right chest implanted port placement on 5/3/24.    Patient presented to the ER this morning for c/o fever x one day along with some purulent drainage from her port site.    Patient afebrile upon arrival.  Patient + leukocytosis 24.02, no tachycardia.    Blood cultures pending.    Infectious Disease recommending port removal, catheter tip culture and if abscess at insertion site - aspiration and culture/gram stain.    Plan -  Right chest port removal today - timing to be determined by team availability.  NPO now  Catheter tip culture, wound culture, possible aspiration, culture/gram stain if abscess.    Please contact IR with any questions or concerns.         21-30 minutes, >50% of the total time devoted to medical consultative verbal/EMR discussion between providers. Written report will be generated in the EMR.     Thank you for allowing Interventional Radiology to participate in the care of Dwaine Gould. Please don't hesitate to call or TigerText us with any questions.     GUSTAVO Rodriguez

## 2024-05-16 NOTE — ED PROVIDER NOTES
"History  Chief Complaint   Patient presents with    Fever     C/o 104 fever at home since 4 pm yesterday, stated she thinks her chemo port is infected.     63-year-old female presents to the emergency room with a chief complaint of \"I think my ports infected.\"    Patient states she noted some purulent drainage from the recently placed port in the morning and though tonight \"when I took the Band-Aid off, it was like fluorescent yellow.\"  Patient states she had been checking her temperature at home and tonight she noted that she had a fever with a temperature of 104 °F.  Patient took acetaminophen prior to coming to the emergency room and she is currently afebrile.    Patient notes a chronic cough that she describes as a \"smoker's cough.\"  Patient affirms pain in the area of her port \"when I cough, it goes up my neck.\"  Patient denies any vomiting or diarrhea.  Patient denies any abdominal pain.    Patient denies other infectious symptoms.  Patient denies tick bites.    Impression and plan: Multiple symptoms with a broad differential though based on patient's history and physical, clinically concerning for sepsis secondary to infected port.  Patient denies other clear inciting events that we will obtain evaluation for alternative etiologies.  Will initiate sepsis evaluation, place patient on broad-spectrum antibiotics, and plan for admission for continued monitoring and evaluation.        Prior to Admission Medications   Prescriptions Last Dose Informant Patient Reported? Taking?   albuterol (2.5 mg/3 mL) 0.083 % nebulizer solution 5/15/2024 Self No Yes   Sig: Take 3 mL (2.5 mg total) by nebulization every 6 (six) hours as needed for wheezing or shortness of breath   amLODIPine (NORVASC) 5 mg tablet 5/15/2024 Self No Yes   Sig: Take 1 tablet (5 mg total) by mouth daily   aspirin 325 mg tablet 5/15/2024 Self Yes Yes   Sig: Take 1 tablet by mouth daily   atorvastatin (Lipitor) 40 mg tablet 5/15/2024 Self No Yes   Sig: " Take 1 tablet (40 mg total) by mouth daily at bedtime   lisinopril (ZESTRIL) 10 mg tablet 5/15/2024 Self No Yes   Sig: Take 1 tablet (10 mg total) by mouth daily   meloxicam (MOBIC) 15 mg tablet Unknown Self Yes No   Sig: Take 15 mg by mouth daily   metoprolol tartrate (LOPRESSOR) 25 mg tablet 5/15/2024 Self No Yes   Sig: Take 1 tablet (25 mg total) by mouth every 12 (twelve) hours   ondansetron (ZOFRAN-ODT) 8 mg disintegrating tablet 5/15/2024 Self No Yes   Sig: Take 1 tablet (8 mg total) by mouth every 8 (eight) hours as needed for vomiting or nausea   rOPINIRole (REQUIP) 0.5 mg tablet 5/15/2024 Self No Yes   Sig: Take 1 tablet (0.5 mg total) by mouth 3 (three) times a day   torsemide (DEMADEX) 20 mg tablet 5/14/2024 Self No Yes   Sig: Take 1 tablet (20 mg total) by mouth daily   zolpidem (AMBIEN) 5 mg tablet Unknown Self Yes No   Sig: Take 5 mg by mouth daily as needed      Facility-Administered Medications: None       Past Medical History:   Diagnosis Date    Acute metabolic encephalopathy     Asthma     CHF (congestive heart failure) (HCC)     Chronic kidney disease     COPD (chronic obstructive pulmonary disease) (HCC)     Hyperlipidemia     Hypertension     Liver disease     Myocardial infarction (HCC)     Seizures (HCC)     Sleep apnea     Transaminitis        Past Surgical History:   Procedure Laterality Date    APPENDECTOMY      CARDIAC CATHETERIZATION      GANGLION CYST EXCISION      IR PORT PLACEMENT  5/3/2024    IR PORT REMOVAL  5/16/2024    LAPAROTOMY N/A 03/21/2024    Procedure: LAPAROTOMY EXPLORATORY, washout, complex closure, LISSY drain;  Surgeon: Francine Reid MD;  Location: [facility];  Service: General    IA LAPS ABD PRTM&OMENTUM DX W/WO SPEC BR/WA SPX N/A 03/19/2024    Procedure: DIAGNOSTIC LAPAROSCOPY converted to Exploratory Laparotomy, Right Hemicolectomy, Abdominal wash-out, Abthera Placement;  Surgeon: Roger Joel DO;  Location: [facility];  Service: General    TYMPANOSTOMY TUBE  PLACEMENT         History reviewed. No pertinent family history.  I have reviewed and agree with the history as documented.    E-Cigarette/Vaping    E-Cigarette Use Never User      E-Cigarette/Vaping Substances    Nicotine No     THC No     CBD No     Flavoring No     Other No     Unknown No      Social History     Tobacco Use    Smoking status: Former     Current packs/day: 0.00     Average packs/day: 2.0 packs/day for 50.5 years (100.9 ttl pk-yrs)     Types: Cigarettes     Start date: 10/1973     Quit date: 3/18/2024     Years since quittin.1     Passive exposure: Current    Smokeless tobacco: Never   Vaping Use    Vaping status: Never Used   Substance Use Topics    Alcohol use: Yes     Comment: socially    Drug use: Never       Review of Systems    Physical Exam  Physical Exam  Vitals reviewed.   HENT:      Head: Atraumatic.   Eyes:      Pupils: Pupils are equal, round, and reactive to light.   Cardiovascular:      Rate and Rhythm: Normal rate and regular rhythm.   Pulmonary:      Effort: Pulmonary effort is normal.      Breath sounds: Normal breath sounds.      Comments: Right chest wall with port without active drainage though there is purulent drainage on the bandage.   minimal surrounding erythema and discrete tenderness to palpation over the area with concerns for potential fluctuance.  Abdominal:      General: There is no distension.      Palpations: Abdomen is soft.      Tenderness: There is no abdominal tenderness. There is no guarding or rebound.   Musculoskeletal:         General: No deformity.      Cervical back: Neck supple.   Skin:     General: Skin is dry.   Neurological:      Mental Status: She is alert.         Vital Signs  ED Triage Vitals   Temperature Pulse Respirations Blood Pressure SpO2   24 0425 24 0425 24 0425 24 0425 24 0425   98.3 °F (36.8 °C) 105 18 138/65 94 %      Temp Source Heart Rate Source Patient Position - Orthostatic VS BP Location FiO2 (%)    05/16/24 0425 05/16/24 0425 05/16/24 0600 05/16/24 0600 --   Oral Monitor Sitting Right arm       Pain Score       05/16/24 1120       5           Vitals:    05/20/24 1438 05/20/24 1438 05/20/24 2147 05/20/24 2150   BP: 138/79 138/79 143/93 143/93   Pulse: 77 81 86 86   Patient Position - Orthostatic VS:             Visual Acuity      ED Medications  Medications   amLODIPine (NORVASC) tablet 5 mg (5 mg Oral Given 5/20/24 0935)   aspirin tablet 325 mg (325 mg Oral Given 5/20/24 0935)   atorvastatin (LIPITOR) tablet 40 mg (40 mg Oral Given 5/20/24 2147)   lisinopril (ZESTRIL) tablet 10 mg (10 mg Oral Given 5/20/24 0935)   metoprolol tartrate (LOPRESSOR) tablet 25 mg (25 mg Oral Given 5/20/24 2147)   rOPINIRole (REQUIP) tablet 0.5 mg (0.5 mg Oral Given 5/20/24 2146)   torsemide (DEMADEX) tablet 20 mg (20 mg Oral Not Given 5/20/24 0934)   albuterol inhalation solution 2.5 mg (has no administration in time range)   ondansetron (ZOFRAN-ODT) dispersible tablet 8 mg (has no administration in time range)   zolpidem (AMBIEN) tablet 5 mg (5 mg Oral Given 5/16/24 2032)   enoxaparin (LOVENOX) subcutaneous injection 40 mg (40 mg Subcutaneous Not Given 5/20/24 2147)   potassium chloride (Klor-Con M20) CR tablet 40 mEq (40 mEq Oral Given 5/20/24 1708)   ceFAZolin (ANCEF) IVPB (premix in dextrose) 2,000 mg 50 mL (2,000 mg Intravenous New Bag 5/21/24 0004)   acetaminophen (Ofirmev) injection 1,000 mg (1,000 mg Intravenous Not Given 5/19/24 2337)   oxyCODONE (ROXICODONE) split tablet 2.5 mg (2.5 mg Oral Given 5/20/24 2147)   senna-docusate sodium (SENOKOT S) 8.6-50 mg per tablet 1 tablet (1 tablet Oral Given 5/20/24 2017)   ceftriaxone (ROCEPHIN) 2 g/50 mL in dextrose IVPB (0 mg Intravenous Stopped 5/16/24 0554)   sodium chloride 0.9 % bolus 1,000 mL (0 mL Intravenous Stopped 5/16/24 0640)   cefepime (MAXIPIME) 2 g/50 mL dextrose IVPB (0 mg Intravenous Stopped 5/16/24 0805)   vancomycin (VANCOCIN) 1500 mg in sodium chloride 0.9% 250  mL IVPB (0 mg Intravenous Stopped 5/16/24 0935)   midazolam (VERSED) injection (1 mg Intravenous Given 5/16/24 1139)   fentaNYL injection (50 mcg Intravenous Given 5/16/24 1137)   lidocaine-epinephrine 1%-1:456246 buffered (10 mL Infiltration Given 5/16/24 1136)       Diagnostic Studies  Results Reviewed       Procedure Component Value Units Date/Time    Body fluid culture and Gram stain [[id number]]  (Abnormal)  (Susceptibility) Collected: 05/16/24 1142    Lab Status: Final result Specimen: Body Fluid from Abscess Updated: 05/19/24 0757     Body Fluid Culture, Sterile 2+ Growth of Staphylococcus aureus     Gram Stain Result 1+ Polys      No bacteria seen    Susceptibility       Staphylococcus aureus (1)       Antibiotic Interpretation Microscan   Method Status    Cefazolin ($) Susceptible <=8.00 ug/ml JAN Final    Clindamycin ($) Susceptible 0.50 ug/ml JAN Final    Erythromycin ($$$$) Susceptible <=0.25 ug/ml JAN Final    Gentamicin ($$) Susceptible <=4 ug/ml JAN Final    Oxacillin Susceptible 0.50 ug/ml JAN Final    Penicillin Resistant >2.000 ug/ml JAN Final    Tetracycline Susceptible <=4 ug/ml JAN Final    Trimethoprim + Sulfamethoxazole ($$$) Susceptible <=0.5/9.5 ug/ml JAN Final    Vancomycin ($) Susceptible 1.00 ug/ml JAN Final                       Culture, Catheter Tip [[id number]]  (Abnormal)  (Susceptibility) Collected: 05/16/24 1139    Lab Status: Final result Specimen: Catheter Tip from CVC Updated: 05/19/24 0758     Catheter Tip Culture >100 colonies Staphylococcus aureus    Susceptibility       Staphylococcus aureus (1)       Antibiotic Interpretation Microscan   Method Status    Cefazolin ($) Susceptible <=8.00 ug/ml JAN Final    Clindamycin ($) Susceptible 0.50 ug/ml JAN Final    Erythromycin ($$$$) Susceptible <=0.25 ug/ml JAN Final    Gentamicin ($$) Susceptible <=4 ug/ml JAN Final    Oxacillin Susceptible 0.50 ug/ml JAN Final    Penicillin Resistant >2.000 ug/ml JAN Final    Tetracycline  Susceptible <=4 ug/ml JAN Final    Trimethoprim + Sulfamethoxazole ($$$) Susceptible <=0.5/9.5 ug/ml JAN Final    Vancomycin ($) Susceptible 1.00 ug/ml JAN Final                       Blood culture #2 [[id number]]  (Abnormal)  (Susceptibility) Collected: 05/16/24 0445    Lab Status: Final result Specimen: Blood from Arm, Left Updated: 05/18/24 0838     Blood Culture Staphylococcus aureus     Gram Stain Result Gram positive cocci in clusters    Susceptibility       Staphylococcus aureus (1)       Antibiotic Interpretation Microscan Method Status    ZID Performed  Yes JAN Final                       Blood Culture Identification Panel [[id number]]  (Abnormal) Collected: 05/16/24 0445    Lab Status: Final result Specimen: Blood from Arm, Right Updated: 05/18/24 0837     Staphylococcus aureus Detected    Narrative:      Routine culture and susceptiblity to follow for confirmation.    Film Array panel tests for 11 gram positive organisms, 15 gram negative organisms, 7 yeast species and 10 resistance genes.     Blood culture #1 [[id number]]  (Abnormal)  (Susceptibility) Collected: 05/16/24 0445    Lab Status: Final result Specimen: Blood from Arm, Right Updated: 05/18/24 0837     Blood Culture Staphylococcus aureus     Gram Stain Result Gram positive cocci in clusters    Susceptibility       Staphylococcus aureus (1)       Antibiotic Interpretation Microscan   Method Status    Cefazolin ($) Susceptible <=8.00 ug/ml JAN Final    Clindamycin ($) Susceptible <=0.25 ug/ml JAN Final    Erythromycin ($$$$) Susceptible <=0.25 ug/ml JAN Final    Gentamicin ($$) Susceptible <=4 ug/ml JAN Final    Oxacillin Susceptible <=0.25 ug/ml JAN Final    Penicillin Resistant 0.500 ug/ml JAN Final    Tetracycline Susceptible <=4 ug/ml JAN Final    Trimethoprim + Sulfamethoxazole ($$$) Susceptible <=0.5/9.5 ug/ml JAN Final    Vancomycin ($) Susceptible 1.00 ug/ml JAN Final                       Lactic acid 2 Hours [[id number]]  (Normal) Collected:  05/16/24 0719    Lab Status: Final result Specimen: Blood from Hand, Right Updated: 05/16/24 0741     LACTIC ACID 1.8 mmol/L     Narrative:      Result may be elevated if tourniquet was used during collection.    D-dimer, quantitative [[id number]]  (Abnormal) Collected: 05/16/24 0533    Lab Status: Final result Specimen: Blood from Arm, Left Updated: 05/16/24 0556     D-Dimer, Quant 3.66 ug/ml FEU     Narrative:      In the evaluation for possible pulmonary embolism, in the appropriate (Well's Score of 4 or less) patient, the age adjusted d-dimer cutoff for this patient can be calculated as:    Age x 0.01 (in ug/mL) for Age-adjusted D-dimer exclusion threshold for a patient over 50 years.    Protime-INR [[id number]]  (Normal) Collected: 05/16/24 0533    Lab Status: Final result Specimen: Blood from Arm, Left Updated: 05/16/24 0553     Protime 14.1 seconds      INR 1.03    APTT [[id number]]  (Normal) Collected: 05/16/24 0533    Lab Status: Final result Specimen: Blood from Arm, Left Updated: 05/16/24 0553     PTT 27 seconds     Procalcitonin [[id number]]  (Abnormal) Collected: 05/16/24 0445    Lab Status: Final result Specimen: Blood from Arm, Left Updated: 05/16/24 0521     Procalcitonin 0.36 ng/ml     Lactic acid [[id number]]  (Abnormal) Collected: 05/16/24 0445    Lab Status: Final result Specimen: Blood from Arm, Left Updated: 05/16/24 0516     LACTIC ACID 2.3 mmol/L     Narrative:      Result may be elevated if tourniquet was used during collection.    Comprehensive metabolic panel [[id number]]  (Abnormal) Collected: 05/16/24 0445    Lab Status: Final result Specimen: Blood from Arm, Left Updated: 05/16/24 0515     Sodium 134 mmol/L      Potassium 3.2 mmol/L      Chloride 99 mmol/L      CO2 24 mmol/L      ANION GAP 11 mmol/L      BUN 14 mg/dL      Creatinine 0.94 mg/dL      Glucose 148 mg/dL      Calcium 8.7 mg/dL      AST 30 U/L      ALT 27 U/L      Alkaline Phosphatase 161 U/L      Total Protein 7.3 g/dL       Albumin 3.6 g/dL      Total Bilirubin 0.55 mg/dL      eGFR 65 ml/min/1.73sq m     Narrative:      [employer] guidelines for Chronic Kidney Disease (CKD):     Stage 1 with normal or high GFR (GFR > 90 mL/min/1.73 square meters)    Stage 2 Mild CKD (GFR = 60-89 mL/min/1.73 square meters)    Stage 3A Moderate CKD (GFR = 45-59 mL/min/1.73 square meters)    Stage 3B Moderate CKD (GFR = 30-44 mL/min/1.73 square meters)    Stage 4 Severe CKD (GFR = 15-29 mL/min/1.73 square meters)    Stage 5 End Stage CKD (GFR <15 mL/min/1.73 square meters)  Note: GFR calculation is accurate only with a steady state creatinine    CBC and differential [[id number]]  (Abnormal) Collected: 05/16/24 0445    Lab Status: Final result Specimen: Blood from Arm, Left Updated: 05/16/24 0508     WBC 24.02 Thousand/uL      RBC 5.39 Million/uL      Hemoglobin 13.3 g/dL      Hematocrit 42.9 %      MCV 80 fL      MCH 24.7 pg      MCHC 31.0 g/dL      RDW 21.8 %      MPV 9.0 fL      Platelets 201 Thousands/uL      nRBC 0 /100 WBCs      Segmented % 91 %      Immature Grans % 1 %      Lymphocytes % 3 %      Monocytes % 5 %      Eosinophils Relative 0 %      Basophils Relative 0 %      Absolute Neutrophils 21.68 Thousands/µL      Absolute Immature Grans 0.23 Thousand/uL      Absolute Lymphocytes 0.72 Thousands/µL      Absolute Monocytes 1.29 Thousand/µL      Eosinophils Absolute 0.04 Thousand/µL      Basophils Absolute 0.06 Thousands/µL     Narrative:      This is an appended report.  These results have been appended to a previously verified report.                   IR port removal   Final Result by Oliva Woodruff MD (05/16 8139)   Impression:   1. Successful fluoroscopically guided port removal as described. There was clear evidence of infection with fluid in the pocket which was sent for culture. The port and catheter were also both sent for culture.   2. A drain was left in the pocket which will be flushed 3 times a day.                Workstation performed: [id number]         VAS upper limb venous duplex scan, unilateral/limited   Final Result by Mack Gonsalez MD (05/17 2153)      CT chest abdomen pelvis wo contrast   Final Result by Gabe Eller MD (05/16 8243)      CT chest:      Trace fat stranding adjacent to the right anterior chest wall port without loculated fluid collection. Hypodensity immediately anterior to the manubrium measuring approximately 4.1 x 1.2 x 3.1 cm likely confluent edema. No soft tissue gas. Evaluation    slightly limited without IV contrast. This could be followed up with soft tissue ultrasound if clinically warranted.      Trace bibasilar dependent subsegmental atelectasis. Otherwise, no evidence of acute intrathoracic process.      Stable 5 mm juxtapleural nodule in the left lower lobe when measured in the same fashion. Noncontrast chest CT follow-up in 3 months is advised.      Additional chronic findings and negatives as above.      CT abdomen and pelvis:      Interval right hemicolectomy. Pneumoperitoneum has resolved. No bowel obstruction.      Interval enlargement of few small mesenteric lymph nodes and left anterior pelvic nodule. Mild mesenteric edema and trace pelvic free fluid. Cannot exclude early peritoneal carcinomatosis. Consider follow-up with noncontrast CT PET if it would alter    patient management.      Indeterminate left lower quadrant anterior deep subcutaneous tubular density. Surgical tract tumor seeding or metastatic disease is not excluded. This could be followed up with nonemergent soft tissue ultrasound. Attention on CT PET if obtained is    advised.      Hepatic steatosis.      Additional findings and negatives as above.      The study was marked in EPIC for immediate notification.         Workstation performed: [id number]         US abdominal wall    (Results Pending)              Procedures  Procedures         ED Course                               SBIRT 22yo+       Flowsheet Row Most Recent Value   Initial Alcohol Screen: US AUDIT-C     1. How often do you have a drink containing alcohol? 0 Filed at: 05/16/2024 0426   2. How many drinks containing alcohol do you have on a typical day you are drinking?  0 Filed at: 05/16/2024 0426   3b. FEMALE Any Age, or MALE 65+: How often do you have 4 or more drinks on one occassion? 0 Filed at: 05/16/2024 0426   Audit-C Score 0 Filed at: 05/16/2024 0426   NADEEN: How many times in the past year have you...    Used an illegal drug or used a prescription medication for non-medical reasons? Never Filed at: 05/16/2024 0426                      Medical Decision Making  Amount and/or Complexity of Data Reviewed  Labs: ordered.  Radiology: ordered.    Risk  Decision regarding hospitalization.             Disposition  Final diagnoses:   Sepsis (HCC)     Time reflects when diagnosis was documented in both MDM as applicable and the Disposition within this note       Time User Action Codes Description Comment    5/16/2024  7:42 AM Roberto Carlos Carrillo Add [A41.9] Sepsis (HCC)     5/16/2024  8:10 AM Michael Cleaning Add [Z95.828] Port-A-Cath in place     5/16/2024  2:20 PM Pa Barnard Add [T80.212A] Port or reservoir infection     5/20/2024  5:38 PM Priti Reed Add [R78.81,  B95.61] Staphylococcus aureus bacteremia           ED Disposition       ED Disposition   Admit    Condition   Stable    Date/Time   u May 16, 2024 0761    Comment   Case was discussed with LAURIE and the patient's admission status was agreed to be Admission Status: inpatient status to the service of Dr. Cleaning .               Follow-up Information    None         Current Discharge Medication List        CONTINUE these medications which have NOT CHANGED    Details   albuterol (2.5 mg/3 mL) 0.083 % nebulizer solution Take 3 mL (2.5 mg total) by nebulization every 6 (six) hours as needed for wheezing or shortness of breath  Qty: 360 mL, Refills: 2    Associated Diagnoses: Chronic  obstructive pulmonary disease, unspecified COPD type ([facility])      amLODIPine (NORVASC) 5 mg tablet Take 1 tablet (5 mg total) by mouth daily  Qty: 90 tablet, Refills: 3    Associated Diagnoses: Essential hypertension      aspirin 325 mg tablet Take 1 tablet by mouth daily      atorvastatin (Lipitor) 40 mg tablet Take 1 tablet (40 mg total) by mouth daily at bedtime  Qty: 90 tablet, Refills: 3    Associated Diagnoses: Mixed hyperlipidemia      lisinopril (ZESTRIL) 10 mg tablet Take 1 tablet (10 mg total) by mouth daily  Qty: 30 tablet, Refills: 2    Associated Diagnoses: Primary hypertension      metoprolol tartrate (LOPRESSOR) 25 mg tablet Take 1 tablet (25 mg total) by mouth every 12 (twelve) hours  Qty: 60 tablet, Refills: 3    Associated Diagnoses: Essential hypertension      ondansetron (ZOFRAN-ODT) 8 mg disintegrating tablet Take 1 tablet (8 mg total) by mouth every 8 (eight) hours as needed for vomiting or nausea  Qty: 20 tablet, Refills: 1    Associated Diagnoses: Chemotherapy induced nausea and vomiting      rOPINIRole (REQUIP) 0.5 mg tablet Take 1 tablet (0.5 mg total) by mouth 3 (three) times a day  Qty: 90 tablet, Refills: 0    Associated Diagnoses: RLS (restless legs syndrome)      torsemide (DEMADEX) 20 mg tablet Take 1 tablet (20 mg total) by mouth daily  Qty: 90 tablet, Refills: 3    Associated Diagnoses: Chronic heart failure with preserved ejection fraction (HFpEF) ([facility])      meloxicam (MOBIC) 15 mg tablet Take 15 mg by mouth daily      zolpidem (AMBIEN) 5 mg tablet Take 5 mg by mouth daily as needed             No discharge procedures on file.    PDMP Review       None            ED Provider  Electronically Signed by             Roberto Carlos Carrillo MD  05/21/24 0657

## 2024-05-16 NOTE — ASSESSMENT & PLAN NOTE
63-year-old female patient with past medical history of adenocarcinoma of colon s/p hemicolectomy, had Port-A-Cath placed on May 3, 2024 with planning to start chemotherapy now hospitalized due to sepsis due to infected port.    Initially patient had leukocytosis, lactic acidosis however evolved to sepsis subsequently with fever, tachycardia.  Lactic acidosis has resolved.    Likely source infected port  On my encounter patient appears comfortable not in distress.  Currently hemodynamically stable, acutely nontoxic appearing.  Continue IV cefepime and vancomycin for now.  Follow-up with IR consult for port removal and culture.  Follow-up with 2 sets of cultures sent in the emergency room.  Follow-up with ID consultation.  Spoke with multiple family/daughters and son in law at bed side as well as daughter Eva over the phone.

## 2024-05-16 NOTE — ASSESSMENT & PLAN NOTE
Wt Readings from Last 3 Encounters:   05/15/24 106 kg (234 lb 6.4 oz)   05/08/24 108 kg (237 lb)   05/03/24 109 kg (239 lb 13.8 oz)       Present on admission with history of chronic heart failure with preserved ejection fraction.  Most recent echo from 03/2024 noted with EF of 60%.  Does have lower extremity edema.  Not in respiratory distress.   Resume home dose of Demadex.  Low salt fluid restricted diet.

## 2024-05-17 ENCOUNTER — APPOINTMENT (INPATIENT)
Dept: NON INVASIVE DIAGNOSTICS | Facility: HOSPITAL | Age: 64
End: 2024-05-17
Payer: COMMERCIAL

## 2024-05-17 ENCOUNTER — TELEPHONE (OUTPATIENT)
Dept: HEMATOLOGY ONCOLOGY | Facility: CLINIC | Age: 64
End: 2024-05-17

## 2024-05-17 DIAGNOSIS — C18.9 ADENOCARCINOMA, COLON (HCC): Primary | ICD-10-CM

## 2024-05-17 PROBLEM — R78.81 STAPHYLOCOCCUS AUREUS BACTEREMIA: Status: ACTIVE | Noted: 2024-05-17

## 2024-05-17 PROBLEM — B95.61 STAPHYLOCOCCUS AUREUS BACTEREMIA: Status: ACTIVE | Noted: 2024-05-17

## 2024-05-17 LAB
ANION GAP SERPL CALCULATED.3IONS-SCNC: 6 MMOL/L (ref 4–13)
AORTIC ROOT: 3.2 CM
APICAL FOUR CHAMBER EJECTION FRACTION: 62 %
ASCENDING AORTA: 3 CM
AV LVOT MEAN GRADIENT: 3 MMHG
AV LVOT PEAK GRADIENT: 5 MMHG
BSA FOR ECHO PROCEDURE: 2 M2
BUN SERPL-MCNC: 10 MG/DL (ref 5–25)
CALCIUM SERPL-MCNC: 8.5 MG/DL (ref 8.4–10.2)
CHLORIDE SERPL-SCNC: 101 MMOL/L (ref 96–108)
CO2 SERPL-SCNC: 29 MMOL/L (ref 21–32)
CREAT SERPL-MCNC: 0.86 MG/DL (ref 0.6–1.3)
DOP CALC LVOT PEAK VEL VTI: 20.3 CM
DOP CALC LVOT PEAK VEL: 1.1 M/S
E WAVE DECELERATION TIME: 222 MS
E/A RATIO: 0.9
ERYTHROCYTE [DISTWIDTH] IN BLOOD BY AUTOMATED COUNT: 21.5 % (ref 11.6–15.1)
FRACTIONAL SHORTENING: 29 (ref 28–44)
GFR SERPL CREATININE-BSD FRML MDRD: 73 ML/MIN/1.73SQ M
GLUCOSE SERPL-MCNC: 133 MG/DL (ref 65–140)
HCT VFR BLD AUTO: 43.2 % (ref 34.8–46.1)
HGB BLD-MCNC: 12.7 G/DL (ref 11.5–15.4)
INTERVENTRICULAR SEPTUM IN DIASTOLE (PARASTERNAL SHORT AXIS VIEW): 1.2 CM
INTERVENTRICULAR SEPTUM: 1.2 CM (ref 0.6–1.1)
LA/AORTA RATIO 2D: 1.22
LAAS-AP2: 17.6 CM2
LAAS-AP4: 20.1 CM2
LEFT ATRIUM SIZE: 3.9 CM
LEFT ATRIUM VOLUME (MOD BIPLANE): 51 ML
LEFT ATRIUM VOLUME INDEX (MOD BIPLANE): 25.5 ML/M2
LEFT INTERNAL DIMENSION IN SYSTOLE: 3.2 CM (ref 2.1–4)
LEFT VENTRICULAR INTERNAL DIMENSION IN DIASTOLE: 4.5 CM (ref 3.5–6)
LEFT VENTRICULAR POSTERIOR WALL IN END DIASTOLE: 1.2 CM
LEFT VENTRICULAR STROKE VOLUME: 52 ML
LVSV (TEICH): 52 ML
MAGNESIUM SERPL-MCNC: 1.9 MG/DL (ref 1.9–2.7)
MCH RBC QN AUTO: 24.3 PG (ref 26.8–34.3)
MCHC RBC AUTO-ENTMCNC: 29.4 G/DL (ref 31.4–37.4)
MCV RBC AUTO: 83 FL (ref 82–98)
MV E'TISSUE VEL-SEP: 9 CM/S
MV PEAK A VEL: 0.8 M/S
MV PEAK E VEL: 72 CM/S
MV STENOSIS PRESSURE HALF TIME: 65 MS
MV VALVE AREA P 1/2 METHOD: 3.38
PLATELET # BLD AUTO: 164 THOUSANDS/UL (ref 149–390)
PMV BLD AUTO: 9 FL (ref 8.9–12.7)
POTASSIUM SERPL-SCNC: 4 MMOL/L (ref 3.5–5.3)
PROCALCITONIN SERPL-MCNC: 1.09 NG/ML
RBC # BLD AUTO: 5.22 MILLION/UL (ref 3.81–5.12)
RIGHT VENTRICLE ID DIMENSION: 3 CM
SL CV LV EF: 65
SL CV PED ECHO LEFT VENTRICLE DIASTOLIC VOLUME (MOD BIPLANE) 2D: 91 ML
SL CV PED ECHO LEFT VENTRICLE SYSTOLIC VOLUME (MOD BIPLANE) 2D: 39 ML
SODIUM SERPL-SCNC: 136 MMOL/L (ref 135–147)
TRICUSPID ANNULAR PLANE SYSTOLIC EXCURSION: 2.3 CM
WBC # BLD AUTO: 9.7 THOUSAND/UL (ref 4.31–10.16)

## 2024-05-17 PROCEDURE — 93306 TTE W/DOPPLER COMPLETE: CPT

## 2024-05-17 PROCEDURE — 85027 COMPLETE CBC AUTOMATED: CPT | Performed by: STUDENT IN AN ORGANIZED HEALTH CARE EDUCATION/TRAINING PROGRAM

## 2024-05-17 PROCEDURE — 80048 BASIC METABOLIC PNL TOTAL CA: CPT | Performed by: STUDENT IN AN ORGANIZED HEALTH CARE EDUCATION/TRAINING PROGRAM

## 2024-05-17 PROCEDURE — 99233 SBSQ HOSP IP/OBS HIGH 50: CPT | Performed by: INTERNAL MEDICINE

## 2024-05-17 PROCEDURE — 93306 TTE W/DOPPLER COMPLETE: CPT | Performed by: INTERNAL MEDICINE

## 2024-05-17 PROCEDURE — 99233 SBSQ HOSP IP/OBS HIGH 50: CPT | Performed by: STUDENT IN AN ORGANIZED HEALTH CARE EDUCATION/TRAINING PROGRAM

## 2024-05-17 PROCEDURE — 84145 PROCALCITONIN (PCT): CPT | Performed by: STUDENT IN AN ORGANIZED HEALTH CARE EDUCATION/TRAINING PROGRAM

## 2024-05-17 PROCEDURE — 93971 EXTREMITY STUDY: CPT | Performed by: SURGERY

## 2024-05-17 PROCEDURE — 83735 ASSAY OF MAGNESIUM: CPT | Performed by: STUDENT IN AN ORGANIZED HEALTH CARE EDUCATION/TRAINING PROGRAM

## 2024-05-17 RX ORDER — CEFAZOLIN SODIUM 2 G/50ML
2000 SOLUTION INTRAVENOUS EVERY 8 HOURS
Status: DISCONTINUED | OUTPATIENT
Start: 2024-05-17 | End: 2024-05-24 | Stop reason: HOSPADM

## 2024-05-17 RX ADMIN — ROPINIROLE 0.5 MG: 0.25 TABLET, FILM COATED ORAL at 21:56

## 2024-05-17 RX ADMIN — CEFAZOLIN SODIUM 2000 MG: 2 SOLUTION INTRAVENOUS at 10:29

## 2024-05-17 RX ADMIN — ROPINIROLE 0.5 MG: 0.25 TABLET, FILM COATED ORAL at 15:46

## 2024-05-17 RX ADMIN — ATORVASTATIN CALCIUM 40 MG: 40 TABLET, FILM COATED ORAL at 21:56

## 2024-05-17 RX ADMIN — METOPROLOL TARTRATE 25 MG: 25 TABLET, FILM COATED ORAL at 09:12

## 2024-05-17 RX ADMIN — ENOXAPARIN SODIUM 40 MG: 40 INJECTION SUBCUTANEOUS at 21:56

## 2024-05-17 RX ADMIN — ENOXAPARIN SODIUM 40 MG: 40 INJECTION SUBCUTANEOUS at 09:11

## 2024-05-17 RX ADMIN — ROPINIROLE 0.5 MG: 0.25 TABLET, FILM COATED ORAL at 09:12

## 2024-05-17 RX ADMIN — POTASSIUM CHLORIDE 40 MEQ: 1500 TABLET, EXTENDED RELEASE ORAL at 09:12

## 2024-05-17 RX ADMIN — CEFAZOLIN SODIUM 2000 MG: 2 SOLUTION INTRAVENOUS at 15:46

## 2024-05-17 RX ADMIN — POTASSIUM CHLORIDE 40 MEQ: 1500 TABLET, EXTENDED RELEASE ORAL at 18:37

## 2024-05-17 RX ADMIN — METOPROLOL TARTRATE 25 MG: 25 TABLET, FILM COATED ORAL at 21:57

## 2024-05-17 RX ADMIN — AMLODIPINE BESYLATE 5 MG: 5 TABLET ORAL at 09:12

## 2024-05-17 RX ADMIN — TORSEMIDE 20 MG: 20 TABLET ORAL at 09:11

## 2024-05-17 RX ADMIN — ASPIRIN 325 MG ORAL TABLET 325 MG: 325 PILL ORAL at 09:11

## 2024-05-17 RX ADMIN — LISINOPRIL 10 MG: 10 TABLET ORAL at 09:12

## 2024-05-17 NOTE — TELEPHONE ENCOUNTER
Pt is inhouse. Alerted that someone had discontinued the CADD pump. Reached out to Dr. Gallego team and he will respond for how long pt should be deferred. Please cancel pt for Monday as infection is in port area and we will not be able to use. TY

## 2024-05-17 NOTE — PROGRESS NOTES
Review patient inpatient at this time. Reviewed port was removed. Message out to Dr. Gallego in regards to deferral time frame due to hospitalization and also port replacement.

## 2024-05-17 NOTE — PLAN OF CARE
Problem: PAIN - ADULT  Goal: Verbalizes/displays adequate comfort level or baseline comfort level  Description: Interventions:  - Encourage patient to monitor pain and request assistance  - Assess pain using appropriate pain scale  - Administer analgesics based on type and severity of pain and evaluate response  - Implement non-pharmacological measures as appropriate and evaluate response  - Consider cultural and social influences on pain and pain management  - Notify physician/advanced practitioner if interventions unsuccessful or patient reports new pain  5/17/2024 0456 by Shakira Barnes RN  Outcome: Progressing  5/17/2024 0450 by Shakira Barnes RN  Outcome: Progressing     Problem: SAFETY ADULT  Goal: Maintain or return to baseline ADL function  Description: INTERVENTIONS:  -  Assess patient's ability to carry out ADLs; assess patient's baseline for ADL function and identify physical deficits which impact ability to perform ADLs (bathing, care of mouth/teeth, toileting, grooming, dressing, etc.)  - Assess/evaluate cause of self-care deficits   - Assess range of motion  - Assess patient's mobility; develop plan if impaired  - Assess patient's need for assistive devices and provide as appropriate  - Encourage maximum independence but intervene and supervise when necessary  - Involve family in performance of ADLs  - Assess for home care needs following discharge   - Consider OT consult to assist with ADL evaluation and planning for discharge  - Provide patient education as appropriate  5/17/2024 0456 by Shakira Barnes RN  Outcome: Progressing  5/17/2024 0450 by Shakira Barnes RN  Outcome: Progressing     Problem: SAFETY ADULT  Goal: Maintains/Returns to pre admission functional level  Description: INTERVENTIONS:  - Perform AM-PAC 6 Click Basic Mobility/ Daily Activity assessment daily.  - Set and communicate daily mobility goal to care team and patient/family/caregiver.   - Collaborate with rehabilitation  services on mobility goals if consulted  - Out of bed for toileting  - Record patient progress and toleration of activity level   5/17/2024 0456 by Shakira Barnes RN  Outcome: Progressing  5/17/2024 0450 by Shakira Barnes RN  Outcome: Progressing     Problem: DISCHARGE PLANNING  Goal: Discharge to home or other facility with appropriate resources  Description: INTERVENTIONS:  - Identify barriers to discharge w/patient and caregiver  - Arrange for needed discharge resources and transportation as appropriate  - Identify discharge learning needs (meds, wound care, etc.)  - Arrange for interpretive services to assist at discharge as needed  - Refer to Case Management Department for coordinating discharge planning if the patient needs post-hospital services based on physician/advanced practitioner order or complex needs related to functional status, cognitive ability, or social support system  5/17/2024 0456 by Shakira Barnes RN  Outcome: Progressing  5/17/2024 0450 by Shakira Barnes RN  Outcome: Progressing     Problem: Knowledge Deficit  Goal: Patient/family/caregiver demonstrates understanding of disease process, treatment plan, medications, and discharge instructions  Description: Complete learning assessment and assess knowledge base.  Interventions:  - Provide teaching at level of understanding  - Provide teaching via preferred learning methods  5/17/2024 0456 by Shakira Barnes RN  Outcome: Progressing  5/17/2024 0450 by Shakira Barnes RN  Outcome: Progressing

## 2024-05-17 NOTE — UTILIZATION REVIEW
NOTIFICATION OF INPATIENT ADMISSION   AUTHORIZATION REQUEST   SERVICING FACILITY:   [place] [address]  Tax ID: [id number]  NPI: [id number] ATTENDING PROVIDER:  Attending Name and NPI#: Priti Reed Do [[id number]]  Address: [address] [place] [address]  Phone: [phone]     ADMISSION INFORMATION:  Place of Service: Inpatient [facility]  Place of Service Code: 21  Inpatient Admission Date/Time: 5/16/24  7:43 AM  Discharge Date/Time: No discharge date for patient encounter.  Admitting Diagnosis Code/Description:  Port-A-Cath in place [Z95.828]  Sepsis (HCC) [A41.9]     UTILIZATION REVIEW CONTACT:  Mishel Warren Utilization   Network Utilization Review Department  Phone: [phone]  Fax [phone]  Email: [email or web address]  Contact for approvals/pending authorizations, clinical reviews, and discharge.     PHYSICIAN ADVISORY SERVICES:  Medical Necessity Denial & Bieg-wl-Cryg Review  Phone: [phone]  Fax: [phone]  Email: [email or web address]     DISCHARGE SUPPORT TEAM:  For Patients Discharge Needs & Updates  Phone: [phone] opt. 2 Fax: [phone]  Email: [email or web address]

## 2024-05-17 NOTE — PROGRESS NOTES
Formerly [facility], later [facility]  Progress Note  Name: Dwaine Gould I  MRN: [id number]  Unit/Bed#: -01 I Date of Admission: 5/16/2024   Date of Service: 5/17/2024 I Hospital Day: 1    Assessment & Plan   Staphylococcus aureus bacteremia  Assessment & Plan  2/2 blood cultures + for MSSA 2/2 to infected port  Switched to IV ancef  ID following  EDWIN pending  Repeat blood cultures pending    Port-A-Cath in place  Assessment & Plan  female with carcinoma of the colon post right hemicolectomy.  Patient had Port-A-Cath placed on 05/03/2024 with planning to start on chemotherapy.  S/p port removal 5/16    Stage 3a chronic kidney disease (HCC)  Assessment & Plan  Lab Results   Component Value Date    EGFR 73 05/17/2024    EGFR 65 05/16/2024    EGFR 78 05/03/2024    CREATININE 0.86 05/17/2024    CREATININE 0.94 05/16/2024    CREATININE 0.81 05/03/2024     Present on admission history of CKD stage III.  Currently creatinine stable at baseline.    Essential hypertension  Assessment & Plan  Blood pressure currently stable.  Resume home dose of lisinopril, Norvasc, Lopressor, Demadex.    COPD (chronic obstructive pulmonary disease) ([facility])  Assessment & Plan  Present on admission with history of COPD.  Chronic home O2 dependent 3L currently stable at baseline.  Currently not in acute exacerbation.      Chronic heart failure with preserved ejection fraction (HCC)  Assessment & Plan  Wt Readings from Last 3 Encounters:   05/17/24 106 kg (234 lb)   05/15/24 106 kg (234 lb 6.4 oz)   05/08/24 108 kg (237 lb)       Present on admission with history of chronic heart failure with preserved ejection fraction.  Most recent echo from 03/2024 noted with EF of 60%.  Does have lower extremity edema.  continue home dose of Demadex.  Low salt fluid restricted diet.             CAD (coronary artery disease)  Assessment & Plan  Present on admission history of CAD.  Currently asymptomatic.  Resume home dose of aspirin, statin, metoprolol,  Norvasc, Demadex.      * Sepsis (HCC)  Assessment & Plan  63-year-old female patient with past medical history of adenocarcinoma of colon s/p hemicolectomy, had Port-A-Cath placed on May 3, 2024 with planning to start chemotherapy now hospitalized due to sepsis due to infected port and MSSA bacteremia  Continue IV ancef  S/p port removal with IR 5/16  Monitor fever curve and wbc count               VTE Pharmacologic Prophylaxis:   Moderate Risk (Score 3-4) - Pharmacological DVT Prophylaxis Ordered: enoxaparin (Lovenox).    Mobility:   Basic Mobility Inpatient Raw Score: 24  JH-HLM Goal: 8: Walk 250 feet or more  JH-HLM Achieved: 2: Bed activities/Dependent transfer  JH-HLM Goal NOT achieved. Continue with multidisciplinary rounding and encourage appropriate mobility to improve upon JH-HLM goals.    Patient Centered Rounds: I performed bedside rounds with nursing staff today.   Discussions with Specialists or Other Care Team Provider: CM    Education and Discussions with Family / Patient: Attempted to update  (daughter) via phone. Left voicemail.     Total Time Spent on Date of Encounter in care of patient: 60 mins. This time was spent on one or more of the following: performing physical exam; counseling and coordination of care; obtaining or reviewing history; documenting in the medical record; reviewing/ordering tests, medications or procedures; communicating with other healthcare professionals and discussing with patient's family/caregivers.    Current Length of Stay: 1 day(s)  Current Patient Status: Inpatient   Certification Statement: The patient will continue to require additional inpatient hospital stay due to bacteremia  Discharge Plan: Anticipate discharge in 24-48 hrs to home.    Code Status: Level 1 - Full Code    Subjective:   Pt seen and examined at bedside. No acute complaints.     Objective:     Vitals:   Temp (24hrs), Av.7 °F (37.6 °C), Min:97.8 °F (36.6 °C), Max:102 °F (38.9  °C)    Temp:  [97.8 °F (36.6 °C)-102 °F (38.9 °C)] 98.4 °F (36.9 °C)  HR:  [] 95  BP: (104-120)/(58-74) 116/74  SpO2:  [93 %-97 %] 93 %  Body mass index is 45.7 kg/m².     Input and Output Summary (last 24 hours):   No intake or output data in the 24 hours ending 05/17/24 1442    Physical Exam:   Physical Exam  Constitutional:       General: She is not in acute distress.     Appearance: She is well-developed. She is obese. She is not diaphoretic.   HENT:      Head: Normocephalic and atraumatic.      Nose: Nose normal.      Mouth/Throat:      Pharynx: No oropharyngeal exudate.   Eyes:      General: No scleral icterus.        Right eye: No discharge.         Left eye: No discharge.      Conjunctiva/sclera: Conjunctivae normal.   Cardiovascular:      Rate and Rhythm: Normal rate and regular rhythm.      Heart sounds: Normal heart sounds. No murmur heard.     No friction rub. No gallop.   Pulmonary:      Effort: Pulmonary effort is normal. No respiratory distress.      Breath sounds: Normal breath sounds. No wheezing or rales.   Abdominal:      General: Bowel sounds are normal. There is no distension.      Palpations: Abdomen is soft.      Tenderness: There is no abdominal tenderness. There is no guarding.   Musculoskeletal:      Cervical back: Normal range of motion and neck supple.      Comments: Port site inspected, dry guaze   Skin:     General: Skin is warm.      Coloration: Skin is pale.      Findings: No erythema.   Neurological:      Mental Status: She is oriented to person, place, and time.   Psychiatric:         Behavior: Behavior normal.          Additional Data:     Labs:  Results from last 7 days   Lab Units 05/17/24 0457 05/16/24  0445   WBC Thousand/uL 9.70 24.02*   HEMOGLOBIN g/dL 12.7 13.3   HEMATOCRIT % 43.2 42.9   PLATELETS Thousands/uL 164 201   SEGS PCT %  --  91*   LYMPHO PCT %  --  3*   MONO PCT %  --  5   EOS PCT %  --  0     Results from last 7 days   Lab Units 05/17/24  0458  05/16/24  0445   SODIUM mmol/L 136 134*   POTASSIUM mmol/L 4.0 3.2*   CHLORIDE mmol/L 101 99   CO2 mmol/L 29 24   BUN mg/dL 10 14   CREATININE mg/dL 0.86 0.94   ANION GAP mmol/L 6 11   CALCIUM mg/dL 8.5 8.7   ALBUMIN g/dL  --  3.6   TOTAL BILIRUBIN mg/dL  --  0.55   ALK PHOS U/L  --  161*   ALT U/L  --  27   AST U/L  --  30   GLUCOSE RANDOM mg/dL 133 148*     Results from last 7 days   Lab Units 05/16/24  0533   INR  1.03             Results from last 7 days   Lab Units 05/17/24  0457 05/16/24  0719 05/16/24  0445   LACTIC ACID mmol/L  --  1.8 2.3*   PROCALCITONIN ng/ml 1.09*  --  0.36*       Lines/Drains:  Invasive Devices       Peripheral Intravenous Line  Duration             Peripheral IV 05/16/24 Right;Ventral (anterior) Hand 1 day              Drain  Duration             Abscess Drain Chest 1 day                          Imaging: No pertinent imaging reviewed.    Recent Cultures (last 7 days):   Results from last 7 days   Lab Units 05/16/24  1142 05/16/24  0445   BLOOD CULTURE   --  Staphylococcus aureus*   GRAM STAIN RESULT  1+ Polys  No bacteria seen Gram positive cocci in clusters*  Gram positive cocci in clusters*   BODY FLUID CULTURE, STERILE  Culture too young- will reincubate  --        Last 24 Hours Medication List:   Current Facility-Administered Medications   Medication Dose Route Frequency Provider Last Rate    acetaminophen  650 mg Oral Q6H PRN Michael FOWLER MD      albuterol  2.5 mg Nebulization Q6H PRN Michael FOWLER MD      amLODIPine  5 mg Oral Daily Michael FOWLER MD      aspirin  325 mg Oral Daily Michael FOWLER MD      atorvastatin  40 mg Oral HS Michael FOWLER MD      cefazolin  2,000 mg Intravenous Q8H Pa Barnard MD 2,000 mg (05/17/24 1029)    enoxaparin  40 mg Subcutaneous Q12H Michael FOWLER MD      ketorolac  15 mg Intravenous Q6H PRN Lynda Price PA-C      lisinopril  10 mg Oral Daily Michael FOWLER MD      metoprolol tartrate  25 mg Oral Q12H [facility] Michael Cleaning  MD MALCOLM      ondansetron  8 mg Oral Q8H PRN Michael Cleaning V, MD      potassium chloride  40 mEq Oral BID Michael FOWLER MD      rOPINIRole  0.5 mg Oral TID Michael Cleaning V, MD      torsemide  20 mg Oral Daily Michael Cleaning V, MD      zolpidem  5 mg Oral Daily PRN Michael FOWLER MD          Today, Patient Was Seen By: Priti Reed DO    **Please Note: This note may have been constructed using a voice recognition system.**

## 2024-05-17 NOTE — ASSESSMENT & PLAN NOTE
female with carcinoma of the colon post right hemicolectomy.  Patient had Port-A-Cath placed on 05/03/2024 with planning to start on chemotherapy.  S/p port removal 5/16

## 2024-05-17 NOTE — PROGRESS NOTES
Progress Note - Infectious Disease   Dwaine Gould 63 y.o. female MRN: [id number]  Unit/Bed#: -01 Encounter: [id number]      Impression/Plan:  Port infection. Port placed on 5/3 with plans for future chemotherapy for adenocarcinoma of colon.  Patient developed acute drainage from the port, CT scan noting possibly developing abscess/edema noted adjacent to port. IR removed port 5/16 and placed drain into port pocket. Cultures sent from port pocket (cloudy fluid) and from port tip. Now course complicated by MSSA bacteremia as below.   -Stop vancomycin/CTX  -Started patient on IV cefazolin 2g q8h for targeted MSSA coverage  -Follow up IR cultures  -Management of #2 below  -Follow CBCD and BMP with AM labs to assess for potential antimicrobial toxicities  -Trend fever curve and hemodynamics    2. MSSA bacteremia. 2/2 blood cultures on admission positive for MSSA. Likely 2/2 to port infection as above. S/p port removal. Does not have any further indwelling hardware or prosthetic material. TTE pending.   -Continue cefazolin as above  -Obtain a TTE  -Repeat blood cultures (2 sets) to assess for culture clearance  -Follow-up 5/16 blood cultures until finalized  -Antibiotic duration pending repeat cultures and TTE findings     3. Soft tissue hypodensity anterior to manubrium. Seen on CT scan, read as likely confluent edema measuring 4.1 x 3.1 cm.  This is likely related to the port infection.  Consideration for early developing abscess.  -Follow up IR cultures as above  -Serial examinations.      4. Adenocarcinoma of colon. Diagnosed in March 2024, status post right hemicolectomy. Eventual plans to start FOLFOX.  -Close Oncology follow up     5. Chronic hypoxic respiraotry failure. On home 3L NC. Respiratory status remains at baseline.     Above plan was discussed in detail with patient at bedside.   Above plan was discussed in detail with primary service, who agrees with the plan to transition antibiotics to cefazolin,  "obtain a TTE, and repeat blood cultures.    Antibiotics:  Cefazolin D1  Antibiotic D2    Subjective:  Patient states she feels \"sad\". Reports that she was really worried about the infection spreading to her bloodstream and further delaying chemotherapy. Reports some chest soreness at the site of her previous port. Otherwise, denies having any chest pain or SOB. States that her breathing is stable. No nausea, vomiting, diarrhea, or abdominal pain. Has had fevers/chills, but not since yesterday.  No new infectious symptoms.    Objective:  Vitals:  Temp:  [97.8 °F (36.6 °C)-102 °F (38.9 °C)] 98.4 °F (36.9 °C)  HR:  [] 95  Resp:  [20-22] 20  BP: (104-167)/(57-74) 116/74  SpO2:  [93 %-98 %] 93 %  Temp (24hrs), Av.8 °F (37.7 °C), Min:97.8 °F (36.6 °C), Max:102 °F (38.9 °C)  Current: Temperature: 98.4 °F (36.9 °C)    Physical Exam:   General Appearance:  Alert, interactive, nontoxic, no acute distress.   Throat: Oropharynx moist without lesions.    Lungs:   Grossly clear to auscultation anteriorly bilaterally; no wheezes, rhonchi or rales; respirations unlabored. On chronic 3L NC.   Heart:  RRR; no murmur, rub or gallop.   Abdomen:   Soft, non-tender, non-distended. Midline well-approximated and healing laparotomy scar.    Extremities: No clubbing or cyanosis. Mild pitting edema of both legs.    Skin: No new rashes, lesions, or draining wounds noted on exposed skin. Anterior chest wall with bandages in place at previous port site. Drain with serosanguineous output.      Labs, Imaging, & Other studies:   All pertinent labs and imaging studies were personally reviewed  Results from last 7 days   Lab Units 24  0457 24  0445   WBC Thousand/uL 9.70 24.02*   HEMOGLOBIN g/dL 12.7 13.3   PLATELETS Thousands/uL 164 201     Results from last 7 days   Lab Units 24  0457 24  0445   POTASSIUM mmol/L 4.0 3.2*   CHLORIDE mmol/L 101 99   CO2 mmol/L 29 24   BUN mg/dL 10 14   CREATININE mg/dL 0.86 0.94 "   EGFR ml/min/1.73sq m 73 65   CALCIUM mg/dL 8.5 8.7   AST U/L  --  30   ALT U/L  --  27   ALK PHOS U/L  --  161*     Results from last 7 days   Lab Units 05/16/24  1142 05/16/24  0445   BLOOD CULTURE   --  Staphylococcus aureus*   GRAM STAIN RESULT  1+ Polys  No bacteria seen Gram positive cocci in clusters*  Gram positive cocci in clusters*     Results from last 7 days   Lab Units 05/17/24  0457 05/16/24  0445   PROCALCITONIN ng/ml 1.09* 0.36*             Results from last 7 days   Lab Units 05/16/24  0533   D-DIMER QUANTITATIVE ug/ml FEU 3.66*         Imaging Studies:  I have personally reviewed pertinent imaging study reports and images in PACS.       Laine Ferrari PA-C  [facility]

## 2024-05-17 NOTE — ASSESSMENT & PLAN NOTE
Wt Readings from Last 3 Encounters:   05/17/24 106 kg (234 lb)   05/15/24 106 kg (234 lb 6.4 oz)   05/08/24 108 kg (237 lb)       Present on admission with history of chronic heart failure with preserved ejection fraction.  Most recent echo from 03/2024 noted with EF of 60%.  Does have lower extremity edema.  continue home dose of Demadex.  Low salt fluid restricted diet.

## 2024-05-17 NOTE — UTILIZATION REVIEW
Initial Clinical Review    Admission: Date/Time/Statement:   Admission Orders (From admission, onward)       Ordered        05/16/24 0743  INPATIENT ADMISSION  Once                          Orders Placed This Encounter   Procedures    INPATIENT ADMISSION     Standing Status:   Standing     Number of Occurrences:   1     Order Specific Question:   Level of Care     Answer:   Med Surg [16]     Order Specific Question:   Estimated length of stay     Answer:   More than 2 Midnights     Order Specific Question:   Certification     Answer:   I certify that inpatient services are medically necessary for this patient for a duration of greater than two midnights. See H&P and MD Progress Notes for additional information about the patient's course of treatment.     ED Arrival Information       Expected   -    Arrival   5/16/2024 04:14    Acuity   Urgent              Means of arrival   Wheelchair    Escorted by   Family Member    Service   Hospitalist    Admission type   Emergency              Arrival complaint   Fever/Cough/Congestion             Chief Complaint   Patient presents with    Fever     C/o 104 fever at home since 4 pm yesterday, stated she thinks her chemo port is infected.       Initial Presentation: 63 y.o. female to ED from home w/ PMHX CKD stage III, COPD chronic home O2 dependent 3 L, CAD, CHF, history of carcinoma of the colon s/p right hemicolectomy, patient had recent hospitalization in 03/2024 with secondary peritonitis from perforated cecal mass complicated by bacteroids bacteremia.  She underwent exploratory laparotomy with right hemicraniectomy and subsequently taken back to the OR for abdominal washout and closure in 03/2024. She had port placed by IR on 05/03/2024 who presents with fever, complaining of purulent drainage from recently placed port.  Patient does report checking temperature at home and noted with fever of 104F, at times which improved with Tylenol. Imaging concerning of port infection  with possible abscess, soft tissue hypodensity anterior to manubrium . Admitted IP status w/ sepsis sec to infected port . Plan for ID consult , IR consult , IV cefepime , vanco , f/u culture . CKD at baseline . HTN Resume home dose of lisinopril, Norvasc, Lopressor, Demadex. COPD on 3l . CHF resume demadex , low salt diet . CAD Resume home dose of aspirin, statin, metoprolol, Norvasc, Demadex.      Anticipated Length of Stay/Certification Statement: Patient will be admitted on an Inpatient basis with an anticipated length of stay of  > 2 midnights.   Justification for Hospital Stay: Sepsis      5/16 OP Note   Procedure: IR PORT REMOVAL   Findings: Cloudy fluid found in port pocket.  This was sent for culture.  The port was removed and also sent for culture.  A drain was placed into the pocket.  The pocket was closed with Vicryl suture and Steri-Strips.    5/16 ID Consult   Port infection . Start empiric IV Vancomycin (pharmacy consult) and IV Ceftriaxone 2g every 24 hours, -Follow-up blood cultures, -Follow up IR cultures, -Repeat CBC tomorrow to monitor WBC, -Trend fever curve and hemodynamics     Date:   5/17 Day 2: cont stay for bacteremia .  EDWIN pending , repeat BC are pending . IV abx switched to ancef . S/p port removal on 5/16. On 3l NC , baseline .     5/17 ID Note  Port infection. Port placed on 5/3 with plans for future chemotherapy for adenocarcinoma of colon.  Stop vanco and start iv cefazolin . F/u IR cx, cbc , bmp . MSSA bacteremia obtain TTE , repeat BC and f/u .   ED Triage Vitals   Temperature Pulse Respirations Blood Pressure SpO2   05/16/24 0425 05/16/24 0425 05/16/24 0425 05/16/24 0425 05/16/24 0425   98.3 °F (36.8 °C) 105 18 138/65 94 %      Temp Source Heart Rate Source Patient Position - Orthostatic VS BP Location FiO2 (%)   05/16/24 0425 05/16/24 0425 05/16/24 0600 05/16/24 0600 --   Oral Monitor Sitting Right arm       Pain Score       05/16/24 1120       5          Wt Readings from Last 1  Encounters:   05/15/24 106 kg (234 lb 6.4 oz)     Additional Vital Signs:   05/17/24 07:19:24 98.4 °F (36.9 °C) 95 -- 116/74 88 93 % -- -- -- --   05/16/24 23:31:18 -- 75 -- 104/58 73 95 % -- -- -- --   05/16/24 23:21:11 97.8 °F (36.6 °C) 78 -- -- -- 97 % -- -- -- --   05/16/24 2300 -- -- -- -- -- -- 32 3 L/min None (Room air) --   05/16/24 2026 100.8 °F (38.2 °C) Abnormal  -- -- -- -- -- -- -- -- --   05/16/24 1900 102 °F (38.9 °C) Abnormal  -- -- -- -- -- -- -- -- --   05/16/24 15:12:36 98.6 °F (37 °C) 113 Abnormal  -- 120/67 85 94 % -- -- -- --   05/16/24 1448 100.7 °F (38.2 °C) Abnormal  -- -- -- -- -- -- -- -- --   05/16/24 1421 -- -- -- -- -- -- 36 4 L/min Nasal cannula --   05/16/24 14:00:32 98.8 °F (37.1 °C) 110 Abnormal  -- 121/74 90 94 % -- -- -- --   05/16/24 1330 -- 115 Abnormal  20 137/63 -- 95 % -- -- None (Room air) --   05/16/24 1300 -- 116 Abnormal  22 135/70 -- 96 % -- -- None (Room air) --   05/16/24 1245 -- 111 Abnormal  21 139/65 -- 97 % -- -- None (Room air) --   05/16/24 1230 -- 95 22 147/69 -- 94 % 36 4 L/min Nasal cannula --   05/16/24 1215 101.3 °F (38.5 °C) Abnormal  121 Abnormal  21 155/72 -- 94 % 36 4 L/min Nasal cannula --   05/16/24 11:58:24 -- 125 Abnormal  20 137/66 -- 98 % -- -- -- --   05/16/24 11:52:27 -- 122 Abnormal  22 132/57 -- 95 % -- -- -- --   05/16/24 11:43:28 -- 121 Abnormal  22 167/66 -- 93 % -- -- -- --   05/16/24 11:37:17 -- 124 Abnormal  25 Abnormal  169/75 -- 98 % -- -- -- --   05/16/24 11:27:48 -- 124 Abnormal  22 182/77 Abnormal  -- 97 % -- -- -- --   05/16/24 11:22:15 -- -- -- -- -- 99 % 36 4 L/min Nasal cannula --   05/16/24 11:22:02 -- 128 Abnormal  24 Abnormal  194/90 Abnormal  -- 99 % -- -- -- --   05/16/24 0900 -- 94 18 157/82 109 98 % -- -- -- --   05/16/24 0800 -- 94 18 132/58 -- 98 % -- -- -- --   05/16/24 0745 -- 89 18 134/62 -- 98 % -- -- -- --   05/16/24 0730 -- 76 18 134/62 -- 97 % 32 3 L/min -- --   05/16/24 0715 -- 93 18 132/59 80 97 % -- -- -- --    05/16/24 0700 -- 92 18 156/71 102 94 % -- -- -- --   05/16/24 0630 -- 92 18 145/66 95 94 % 32 3 L/min Nasal cannula --   05/16/24 0600 -- 95 18 124/64 89 95 % -- -- None (Room air) Sitting   05/16/24 0544 -- -- -- -- -- -- -- -- Nasal cannula --   05/16/24 0530 -- 96 22 116/57 77 93 % 32 3 L/min  Nasal can      Pertinent Labs/Diagnostic Test Results:   5/16 EKG Sinus tachycardia  Nonspecific ST and T wave abnormality  Abnormal ECG  IR port removal   Final Result by Oliva Woodruff MD (05/16 0161)   Impression:   1. Successful fluoroscopically guided port removal as described. There was clear evidence of infection with fluid in the pocket which was sent for culture. The port and catheter were also both sent for culture.   2. A drain was left in the pocket which will be flushed 3 times a day.               Workstation performed: [id number]         CT chest abdomen pelvis wo contrast   Final Result by Gabe Eller MD (05/16 2642)      CT chest:      Trace fat stranding adjacent to the right anterior chest wall port without loculated fluid collection. Hypodensity immediately anterior to the manubrium measuring approximately 4.1 x 1.2 x 3.1 cm likely confluent edema. No soft tissue gas. Evaluation    slightly limited without IV contrast. This could be followed up with soft tissue ultrasound if clinically warranted.      Trace bibasilar dependent subsegmental atelectasis. Otherwise, no evidence of acute intrathoracic process.      Stable 5 mm juxtapleural nodule in the left lower lobe when measured in the same fashion. Noncontrast chest CT follow-up in 3 months is advised.      Additional chronic findings and negatives as above.      CT abdomen and pelvis:      Interval right hemicolectomy. Pneumoperitoneum has resolved. No bowel obstruction.      Interval enlargement of few small mesenteric lymph nodes and left anterior pelvic nodule. Mild mesenteric edema and trace pelvic free fluid. Cannot exclude  early peritoneal carcinomatosis. Consider follow-up with noncontrast CT PET if it would alter    patient management.      Indeterminate left lower quadrant anterior deep subcutaneous tubular density. Surgical tract tumor seeding or metastatic disease is not excluded. This could be followed up with nonemergent soft tissue ultrasound. Attention on CT PET if obtained is    advised.      Hepatic steatosis.      Additional findings and negatives as above.      The study was marked in EPIC for immediate notification.         Workstation performed: [id number]         VAS upper limb venous duplex scan, unilateral/limited    (Results Pending)         Results from last 7 days   Lab Units 05/17/24 0457 05/16/24  0445   WBC Thousand/uL 9.70 24.02*   HEMOGLOBIN g/dL 12.7 13.3   HEMATOCRIT % 43.2 42.9   PLATELETS Thousands/uL 164 201   TOTAL NEUT ABS Thousands/µL  --  21.68*         Results from last 7 days   Lab Units 05/17/24 0457 05/16/24 0445   SODIUM mmol/L 136 134*   POTASSIUM mmol/L 4.0 3.2*   CHLORIDE mmol/L 101 99   CO2 mmol/L 29 24   ANION GAP mmol/L 6 11   BUN mg/dL 10 14   CREATININE mg/dL 0.86 0.94   EGFR ml/min/1.73sq m 73 65   CALCIUM mg/dL 8.5 8.7   MAGNESIUM mg/dL 1.9  --      Results from last 7 days   Lab Units 05/16/24  0445   AST U/L 30   ALT U/L 27   ALK PHOS U/L 161*   TOTAL PROTEIN g/dL 7.3   ALBUMIN g/dL 3.6   TOTAL BILIRUBIN mg/dL 0.55         Results from last 7 days   Lab Units 05/17/24 0457 05/16/24  0445   GLUCOSE RANDOM mg/dL 133 148*       Results from last 7 days   Lab Units 05/16/24  0533   D-DIMER QUANTITATIVE ug/ml FEU 3.66*     Results from last 7 days   Lab Units 05/16/24  0533   PROTIME seconds 14.1   INR  1.03   PTT seconds 27         Results from last 7 days   Lab Units 05/17/24  0457 05/16/24  0445   PROCALCITONIN ng/ml 1.09* 0.36*     Results from last 7 days   Lab Units 05/16/24  0719 05/16/24  0445   LACTIC ACID mmol/L 1.8 2.3*     Results from last 7 days   Lab Units 05/16/24  1142  05/16/24  0445   GRAM STAIN RESULT  1+ Polys  No bacteria seen Gram positive cocci in clusters*  Gram positive cocci in clusters*     ED Treatment:   Medication Administration from 05/16/2024 0414 to 05/16/2024 1351         Date/Time Order Dose Route Action     05/16/2024 0524 EDT ceftriaxone (ROCEPHIN) 2 g/50 mL in dextrose IVPB 2,000 mg Intravenous New Bag     05/16/2024 0524 EDT sodium chloride 0.9 % bolus 1,000 mL 1,000 mL Intravenous New Bag     05/16/2024 0747 EDT cefepime (MAXIPIME) 2 g/50 mL dextrose IVPB 2,000 mg Intravenous New Bag     05/16/2024 0805 EDT vancomycin (VANCOCIN) 1500 mg in sodium chloride 0.9% 250 mL IVPB 1,500 mg Intravenous New Bag     05/16/2024 1139 EDT midazolam (VERSED) injection 1 mg Intravenous Given     05/16/2024 1130 EDT midazolam (VERSED) injection 1 mg Intravenous Given     05/16/2024 1123 EDT midazolam (VERSED) injection 1 mg Intravenous Given     05/16/2024 1137 EDT fentaNYL injection 50 mcg Intravenous Given     05/16/2024 1130 EDT fentaNYL injection 50 mcg Intravenous Given     05/16/2024 1123 EDT fentaNYL injection 50 mcg Intravenous Given     05/16/2024 1136 EDT lidocaine-epinephrine 1%-1:657720 buffered 10 mL Infiltration Given     05/16/2024 1131 EDT lidocaine-epinephrine 1%-1:676280 buffered 10 mL Infiltration Given     05/16/2024 1302 EDT acetaminophen (TYLENOL) tablet 650 mg 650 mg Oral Given          Past Medical History:   Diagnosis Date    Acute metabolic encephalopathy     Asthma     CHF (congestive heart failure) (HCC)     Chronic kidney disease     COPD (chronic obstructive pulmonary disease) (HCC)     Hyperlipidemia     Hypertension     Liver disease     Myocardial infarction (HCC)     Seizures (HCC)     Sleep apnea     Transaminitis      Present on Admission:   CAD (coronary artery disease)   COPD (chronic obstructive pulmonary disease) (HCC)   Chronic heart failure with preserved ejection fraction (HCC)   Essential hypertension   Stage 3a chronic kidney  disease (HCC)      Admitting Diagnosis: Port-A-Cath in place [Z95.828]  Sepsis (HCC) [A41.9]  Age/Sex: 63 y.o. female  Admission Orders:  Scheduled Medications:  amLODIPine, 5 mg, Oral, Daily  aspirin, 325 mg, Oral, Daily  atorvastatin, 40 mg, Oral, HS  cefTRIAXone, 2,000 mg, Intravenous, Q24H  enoxaparin, 40 mg, Subcutaneous, Q12H  lisinopril, 10 mg, Oral, Daily  metoprolol tartrate, 25 mg, Oral, Q12H ANGELES  potassium chloride, 40 mEq, Oral, BID  rOPINIRole, 0.5 mg, Oral, TID  torsemide, 20 mg, Oral, Daily  vancomycin, 1,500 mg, Intravenous, Q24H      Continuous IV Infusions:     PRN Meds:  acetaminophen, 650 mg, Oral, Q6H PRN  albuterol, 2.5 mg, Nebulization, Q6H PRN  ketorolac, 15 mg, Intravenous, Q6H PRN  5/16 x1  ondansetron, 8 mg, Oral, Q8H PRN  zolpidem, 5 mg, Oral, Daily PRN    Fld restriction   NPO to reg diet   Tele     IP CONSULT TO INFECTIOUS DISEASES  INPATIENT CONSULT TO IR  IP CONSULT TO PHARMACY    Network Utilization Review Department  ATTENTION: Please call with any questions or concerns to [phone] and carefully listen to the prompts so that you are directed to the right person. All voicemails are confidential.   For Discharge needs, contact Care Management DC Support Team at [phone] opt. 2  Send all requests for admission clinical reviews, approved or denied determinations and any other requests to dedicated fax number below belonging to the [facility] where the patient is receiving treatment. List of dedicated fax numbers for the Facilities:  FACILITY NAME UR FAX NUMBER   ADMISSION DENIALS (Administrative/Medical Necessity) [phone]   DISCHARGE SUPPORT TEAM (NETWORK) [phone]   PARENT CHILD HEALTH (Maternity/NICU/Pediatrics) [phone]   [facility] [phone]   [facility] [phone]   [facility] [phone]   [facility] [phone]   [facility]  [phone]   [facility] [phone]   [facility] [phone]   [facility] [phone]   [facility] [phone]   [facility] [phone]   [facility] [phone]   [facility] [phone]

## 2024-05-17 NOTE — PROGRESS NOTES
Due to port infection/removal and inpatient status, Dr. Gallego is recommending patient treatment to be deferred after she is seen by Dr. Black on 5/31/24.    Plan updated and reviewed with LAURA Tai

## 2024-05-17 NOTE — CONSULTS
The patient's Vancomycin therapy has been completed / discontinued. Thank you for this consult; Pharmacy will sign-off now.

## 2024-05-17 NOTE — ASSESSMENT & PLAN NOTE
Lab Results   Component Value Date    EGFR 73 05/17/2024    EGFR 65 05/16/2024    EGFR 78 05/03/2024    CREATININE 0.86 05/17/2024    CREATININE 0.94 05/16/2024    CREATININE 0.81 05/03/2024     Present on admission history of CKD stage III.  Currently creatinine stable at baseline.

## 2024-05-17 NOTE — CASE MANAGEMENT
Case Management Assessment & Discharge Planning Note    Patient name Dwaine Gould  Location /-01 MRN [id number]  : 1960 Date 2024       Current Admission Date: 2024  Current Admission Diagnosis:Sepsis (HCC)   Patient Active Problem List    Diagnosis Date Noted Date Diagnosed    Staphylococcus aureus bacteremia 2024     Sepsis (HCC) 2024     Port-A-Cath in place 2024     Encounter for counseling for tobacco use disorder 2024     Adenocarcinoma, colon (HCC) 2024     Encounter to establish care 2024     Anemia 2024     Thrombocytopenia (HCC) 2024     Chronic respiratory failure with hypoxia and hypercapnia (HCC) 2024     Stage 3a chronic kidney disease (HCC) 2024     Chronic heart failure with preserved ejection fraction (HCC) 2024     COPD (chronic obstructive pulmonary disease) ([facility]) 2024     Leukocytosis 2024     Tobacco abuse 2021     CAD (coronary artery disease) 2021     Essential hypertension 2019     Hyperlipidemia 2019       LOS (days): 1  Geometric Mean LOS (GMLOS) (days):   Days to GMLOS:     OBJECTIVE:    Risk of Unplanned Readmission Score: 14.09         Current admission status: Inpatient       Preferred Pharmacy:   [id number] - [place] - [address]  [place] [address]  Phone: [phone] Fax: [phone]    Primary Care Provider: Jordan Ramirez MD    Primary Insurance: [employer]  Secondary Insurance:     ASSESSMENT:  Active Health Care Proxies       MARLI GOULD [facility] Care Representative - Spouse   Primary Phone: [phone] (Mobile)                 Advance Directives  Does patient have a Health Care POA?: No  Was patient offered paperwork?: Yes (declined)  Does patient currently have a Health Care decision maker?: Yes, please see Health Care Proxy section  Does patient have Advance Directives?: No  Was  patient offered paperwork?: Yes (declined)  Primary Contact: Joel         Readmission Root Cause  30 Day Readmission: No    Patient Information  Admitted from:: Home  Mental Status: Alert  During Assessment patient was accompanied by: Not accompanied during assessment  Assessment information provided by:: Patient  Primary Caregiver: Self  Support Systems: Spouse/significant other  County of Residence: [place]  What city do you live in?: [place]  Home entry access options. Select all that apply.: Stairs  Number of steps to enter home.: 3  Type of Current Residence: 2 story home  Upon entering residence, is there a bedroom on the main floor (no further steps)?: No  A bedroom is located on the following floor levels of residence (select all that apply):: 2nd Floor (Pt sleeps in a hospital bed on the first floor)  Number of steps to 2nd floor from main floor: One Flight  Living Arrangements: Lives w/ Spouse/significant other  Is patient a ?: No    Activities of Daily Living Prior to Admission  Functional Status: Independent (pt stated she was independent prior to this admission.)  Completes ADLs independently?: Yes  Ambulates independently?: Yes  Does patient use assisted devices?: Yes  Assisted Devices (DME) used: Bedside Commode, Walker, Wheelchair, Portable Oxygen concentrator, Straight Cane  DME Company Name (respiratory supplies): [employer]  O2 Rate(s): 4L  Does patient currently own DME?: Yes  What DME does the patient currently own?: Bedside Commode, Walker, Wheelchair, Straight Cane  Does patient have a history of Outpatient Therapy (PT/OT)?: No  Does the patient have a history of Short-Term Rehab?: No  Does patient have a history of HHC?: Yes (traditional)  Does patient currently have HHC?: No         Patient Information Continued  Income Source: Unemployed  Does patient have prescription coverage?: Yes  Does patient receive dialysis treatments?: No  Does patient have a history of substance abuse?:  Yes  Historical substance use preference: Methamphetamines (pain meds)  History of Withdrawal Symptoms: Denies past symptoms  Is patient currently in treatment for substance abuse?: N/A - sober  Does patient have a history of Mental Health Diagnosis?: No    PHQ 2/9 Screening   Reviewed PHQ 2/9 Depression Screening Score?: No    Means of Transportation  Means of Transport to Appts:: Family transport      Social Determinants of Health (SDOH)      Flowsheet Row Most Recent Value   Housing Stability    In the last 12 months, was there a time when you were not able to pay the mortgage or rent on time? N   In the past 12 months, how many times have you moved where you were living? 1   At any time in the past 12 months, were you homeless or living in a shelter (including now)? N   Transportation Needs    In the past 12 months, has lack of transportation kept you from medical appointments or from getting medications? no   In the past 12 months, has lack of transportation kept you from meetings, work, or from getting things needed for daily living? No   Food Insecurity    Within the past 12 months, you worried that your food would run out before you got the money to buy more. Never true   Within the past 12 months, the food you bought just didn't last and you didn't have money to get more. Never true   Utilities    In the past 12 months has the electric, gas, oil, or water company threatened to shut off services in your home? No            DISCHARGE DETAILS:    Discharge planning discussed with:: Patient at bedside  Freedom of Choice: Yes  Comments - Freedom of Choice: CM discussed freedom of choice as it pertains to discharge planning. Patient declined hhc and rehab.  CM contacted family/caregiver?: No- see comments (A/O)  Were Treatment Team discharge recommendations reviewed with patient/caregiver?: Yes  Did patient/caregiver verbalize understanding of patient care needs?: Yes  Were patient/caregiver advised of the risks  associated with not following Treatment Team discharge recommendations?: Yes         Requested Home Health Care         Is the patient interested in HHC at discharge?: No    DME Referral Provided  Referral made for DME?: No    Other Referral/Resources/Interventions Provided:  Interventions: None Indicated    Would you like to participate in our [facility] service program?  : No - Declined    Treatment Team Recommendation: Home  Discharge Destination Plan:: Home  Transport at Discharge : Family

## 2024-05-17 NOTE — ASSESSMENT & PLAN NOTE
63-year-old female patient with past medical history of adenocarcinoma of colon s/p hemicolectomy, had Port-A-Cath placed on May 3, 2024 with planning to start chemotherapy now hospitalized due to sepsis due to infected port and MSSA bacteremia  Continue IV ancef  S/p port removal with IR 5/16  Monitor fever curve and wbc count

## 2024-05-17 NOTE — PROGRESS NOTES
Spoke with Abida RN - office RN, pt hospitalized and appt for Monday 5/20/24 can be cancelled. Inbasket will be sent to techs. Maris infusion tech made aware.

## 2024-05-18 LAB
ANION GAP SERPL CALCULATED.3IONS-SCNC: 6 MMOL/L (ref 4–13)
BACTERIA BLD CULT: ABNORMAL
BACTERIA BLD CULT: ABNORMAL
BUN SERPL-MCNC: 11 MG/DL (ref 5–25)
CALCIUM SERPL-MCNC: 8.2 MG/DL (ref 8.4–10.2)
CHLORIDE SERPL-SCNC: 98 MMOL/L (ref 96–108)
CO2 SERPL-SCNC: 31 MMOL/L (ref 21–32)
CREAT SERPL-MCNC: 0.68 MG/DL (ref 0.6–1.3)
ERYTHROCYTE [DISTWIDTH] IN BLOOD BY AUTOMATED COUNT: 20.9 % (ref 11.6–15.1)
GFR SERPL CREATININE-BSD FRML MDRD: 94 ML/MIN/1.73SQ M
GLUCOSE SERPL-MCNC: 110 MG/DL (ref 65–140)
GRAM STN SPEC: ABNORMAL
GRAM STN SPEC: ABNORMAL
HCT VFR BLD AUTO: 39.5 % (ref 34.8–46.1)
HGB BLD-MCNC: 12.1 G/DL (ref 11.5–15.4)
MCH RBC QN AUTO: 24.6 PG (ref 26.8–34.3)
MCHC RBC AUTO-ENTMCNC: 30.6 G/DL (ref 31.4–37.4)
MCV RBC AUTO: 80 FL (ref 82–98)
PLATELET # BLD AUTO: 164 THOUSANDS/UL (ref 149–390)
PMV BLD AUTO: 8.7 FL (ref 8.9–12.7)
POTASSIUM SERPL-SCNC: 3.7 MMOL/L (ref 3.5–5.3)
RBC # BLD AUTO: 4.91 MILLION/UL (ref 3.81–5.12)
S AUREUS DNA BLD POS QL NAA+NON-PROBE: DETECTED
SODIUM SERPL-SCNC: 135 MMOL/L (ref 135–147)
VANCOMYCIN SERPL-MCNC: <3 UG/ML (ref 10–20)
WBC # BLD AUTO: 8.62 THOUSAND/UL (ref 4.31–10.16)

## 2024-05-18 PROCEDURE — 87040 BLOOD CULTURE FOR BACTERIA: CPT | Performed by: INTERNAL MEDICINE

## 2024-05-18 PROCEDURE — 80202 ASSAY OF VANCOMYCIN: CPT | Performed by: STUDENT IN AN ORGANIZED HEALTH CARE EDUCATION/TRAINING PROGRAM

## 2024-05-18 PROCEDURE — 99232 SBSQ HOSP IP/OBS MODERATE 35: CPT | Performed by: STUDENT IN AN ORGANIZED HEALTH CARE EDUCATION/TRAINING PROGRAM

## 2024-05-18 PROCEDURE — 85027 COMPLETE CBC AUTOMATED: CPT | Performed by: STUDENT IN AN ORGANIZED HEALTH CARE EDUCATION/TRAINING PROGRAM

## 2024-05-18 PROCEDURE — 80048 BASIC METABOLIC PNL TOTAL CA: CPT | Performed by: STUDENT IN AN ORGANIZED HEALTH CARE EDUCATION/TRAINING PROGRAM

## 2024-05-18 RX ORDER — ACETAMINOPHEN 10 MG/ML
1000 INJECTION, SOLUTION INTRAVENOUS ONCE
Status: DISCONTINUED | OUTPATIENT
Start: 2024-05-18 | End: 2024-05-18

## 2024-05-18 RX ORDER — ACETAMINOPHEN 10 MG/ML
1000 INJECTION, SOLUTION INTRAVENOUS EVERY 6 HOURS PRN
Status: DISCONTINUED | OUTPATIENT
Start: 2024-05-18 | End: 2024-05-24 | Stop reason: HOSPADM

## 2024-05-18 RX ADMIN — CEFAZOLIN SODIUM 2000 MG: 2 SOLUTION INTRAVENOUS at 00:58

## 2024-05-18 RX ADMIN — TORSEMIDE 20 MG: 20 TABLET ORAL at 09:07

## 2024-05-18 RX ADMIN — ROPINIROLE 0.5 MG: 0.25 TABLET, FILM COATED ORAL at 09:07

## 2024-05-18 RX ADMIN — ROPINIROLE 0.5 MG: 0.25 TABLET, FILM COATED ORAL at 18:05

## 2024-05-18 RX ADMIN — ENOXAPARIN SODIUM 40 MG: 40 INJECTION SUBCUTANEOUS at 21:32

## 2024-05-18 RX ADMIN — ASPIRIN 325 MG ORAL TABLET 325 MG: 325 PILL ORAL at 09:07

## 2024-05-18 RX ADMIN — Medication 2.5 MG: at 21:32

## 2024-05-18 RX ADMIN — METOPROLOL TARTRATE 25 MG: 25 TABLET, FILM COATED ORAL at 09:07

## 2024-05-18 RX ADMIN — CEFAZOLIN SODIUM 2000 MG: 2 SOLUTION INTRAVENOUS at 18:05

## 2024-05-18 RX ADMIN — POTASSIUM CHLORIDE 40 MEQ: 1500 TABLET, EXTENDED RELEASE ORAL at 09:07

## 2024-05-18 RX ADMIN — CEFAZOLIN SODIUM 2000 MG: 2 SOLUTION INTRAVENOUS at 09:04

## 2024-05-18 RX ADMIN — POTASSIUM CHLORIDE 40 MEQ: 1500 TABLET, EXTENDED RELEASE ORAL at 18:05

## 2024-05-18 RX ADMIN — METOPROLOL TARTRATE 25 MG: 25 TABLET, FILM COATED ORAL at 21:32

## 2024-05-18 RX ADMIN — ATORVASTATIN CALCIUM 40 MG: 40 TABLET, FILM COATED ORAL at 21:32

## 2024-05-18 RX ADMIN — AMLODIPINE BESYLATE 5 MG: 5 TABLET ORAL at 09:08

## 2024-05-18 RX ADMIN — ENOXAPARIN SODIUM 40 MG: 40 INJECTION SUBCUTANEOUS at 09:07

## 2024-05-18 RX ADMIN — LISINOPRIL 10 MG: 10 TABLET ORAL at 09:08

## 2024-05-18 RX ADMIN — ROPINIROLE 0.5 MG: 0.25 TABLET, FILM COATED ORAL at 21:31

## 2024-05-18 NOTE — ASSESSMENT & PLAN NOTE
2/2 blood cultures + for MSSA 2/2 to infected port  Switched to IV ancef  ID following  TTE EF 60%, no valvular vegetation noted   Repeat blood cultures pending

## 2024-05-18 NOTE — PROGRESS NOTES
[facility]  Progress Note  Name: Dwaine Gould I  MRN: [id number]  Unit/Bed#: -01 I Date of Admission: 5/16/2024   Date of Service: 5/18/2024 I Hospital Day: 2    Assessment & Plan   Staphylococcus aureus bacteremia  Assessment & Plan  2/2 blood cultures + for MSSA 2/2 to infected port  Switched to IV ancef  ID following  TTE EF 60%, no valvular vegetation noted   Repeat blood cultures pending    Port-A-Cath in place  Assessment & Plan  female with carcinoma of the colon post right hemicolectomy.  Patient had Port-A-Cath placed on 05/03/2024 with planning to start on chemotherapy.  S/p port removal 5/16    Stage 3a chronic kidney disease (HCC)  Assessment & Plan  Lab Results   Component Value Date    EGFR 94 05/18/2024    EGFR 73 05/17/2024    EGFR 65 05/16/2024    CREATININE 0.68 05/18/2024    CREATININE 0.86 05/17/2024    CREATININE 0.94 05/16/2024     Present on admission history of CKD stage III.  Currently creatinine stable at baseline.    Essential hypertension  Assessment & Plan  Blood pressure currently stable.  Resume home dose of lisinopril, Norvasc, Lopressor, Demadex.    COPD (chronic obstructive pulmonary disease) ([facility])  Assessment & Plan  Present on admission with history of COPD.  Chronic home O2 dependent 3L currently stable at baseline.  Currently not in acute exacerbation.      Chronic heart failure with preserved ejection fraction (HCC)  Assessment & Plan  Wt Readings from Last 3 Encounters:   05/17/24 106 kg (234 lb)   05/15/24 106 kg (234 lb 6.4 oz)   05/08/24 108 kg (237 lb)       Present on admission with history of chronic heart failure with preserved ejection fraction.  Most recent echo from 03/2024 noted with EF of 60%.  Does have lower extremity edema.  continue home dose of Demadex.  Low salt fluid restricted diet.             CAD (coronary artery disease)  Assessment & Plan  Present on admission history of CAD.  Currently asymptomatic.  Resume home dose of  aspirin, statin, metoprolol, Norvasc, Demadex.      * Sepsis (HCC)  Assessment & Plan  63-year-old female patient with past medical history of adenocarcinoma of colon s/p hemicolectomy, had Port-A-Cath placed on May 3, 2024 with planning to start chemotherapy now hospitalized due to sepsis due to infected port and MSSA bacteremia  Continue IV ancef  S/p port removal with IR 5/16  Monitor fever curve and wbc count               VTE Pharmacologic Prophylaxis:   Low Risk (Score 0-2) - Encourage Ambulation.    Mobility:   Basic Mobility Inpatient Raw Score: 24  JH-HLM Goal: 8: Walk 250 feet or more  JH-HLM Achieved: 7: Walk 25 feet or more  JH-HLM Goal NOT achieved. Continue with multidisciplinary rounding and encourage appropriate mobility to improve upon JH-HLM goals.    Patient Centered Rounds: I performed bedside rounds with nursing staff today.   Discussions with Specialists or Other Care Team Provider: CM    Education and Discussions with Family / Patient:  PT.     Total Time Spent on Date of Encounter in care of patient: 60 mins. This time was spent on one or more of the following: performing physical exam; counseling and coordination of care; obtaining or reviewing history; documenting in the medical record; reviewing/ordering tests, medications or procedures; communicating with other healthcare professionals and discussing with patient's family/caregivers.    Current Length of Stay: 2 day(s)  Current Patient Status: Inpatient   Certification Statement: The patient will continue to require additional inpatient hospital stay due to IV abx, bacteremia  Discharge Plan: Anticipate discharge in 48-72 hrs to home with home services.    Code Status: Level 1 - Full Code    Subjective:   Pt reports generalized fatigue but overall improving compared to admission. Denies nausea, vomiting, chest pain sob or fever or chills.     Objective:     Vitals:   Temp (24hrs), Av.8 °F (36.6 °C), Min:97.8 °F (36.6 °C), Max:97.8 °F  (36.6 °C)    Temp:  [97.8 °F (36.6 °C)] 97.8 °F (36.6 °C)  HR:  [80-95] 80  Resp:  [22] 22  BP: (110-141)/(74-77) 110/76  SpO2:  [90 %-96 %] 92 %  Body mass index is 45.7 kg/m².     Input and Output Summary (last 24 hours):   No intake or output data in the 24 hours ending 05/18/24 0901    Physical Exam:   Physical Exam  Constitutional:       General: She is not in acute distress.     Appearance: She is well-developed. She is not diaphoretic.   HENT:      Head: Normocephalic and atraumatic.      Nose: Nose normal.      Mouth/Throat:      Pharynx: No oropharyngeal exudate.   Eyes:      General: No scleral icterus.        Right eye: No discharge.         Left eye: No discharge.      Conjunctiva/sclera: Conjunctivae normal.   Cardiovascular:      Rate and Rhythm: Normal rate and regular rhythm.      Heart sounds: Normal heart sounds. No murmur heard.     No friction rub. No gallop.   Pulmonary:      Effort: Pulmonary effort is normal. No respiratory distress.      Breath sounds: Normal breath sounds. No wheezing or rales.   Abdominal:      General: Bowel sounds are normal. There is no distension.      Palpations: Abdomen is soft.      Tenderness: There is no abdominal tenderness. There is no guarding.   Musculoskeletal:      Cervical back: Normal range of motion and neck supple.   Skin:     General: Skin is warm.      Coloration: Skin is pale.      Findings: No erythema.   Neurological:      Mental Status: She is oriented to person, place, and time.   Psychiatric:         Behavior: Behavior normal.          Additional Data:     Labs:  Results from last 7 days   Lab Units 05/18/24  0453 05/17/24  0457 05/16/24  0445   WBC Thousand/uL 8.62   < > 24.02*   HEMOGLOBIN g/dL 12.1   < > 13.3   HEMATOCRIT % 39.5   < > 42.9   PLATELETS Thousands/uL 164   < > 201   SEGS PCT %  --   --  91*   LYMPHO PCT %  --   --  3*   MONO PCT %  --   --  5   EOS PCT %  --   --  0    < > = values in this interval not displayed.     Results from  last 7 days   Lab Units 05/18/24  0453 05/17/24  0457 05/16/24  0445   SODIUM mmol/L 135   < > 134*   POTASSIUM mmol/L 3.7   < > 3.2*   CHLORIDE mmol/L 98   < > 99   CO2 mmol/L 31   < > 24   BUN mg/dL 11   < > 14   CREATININE mg/dL 0.68   < > 0.94   ANION GAP mmol/L 6   < > 11   CALCIUM mg/dL 8.2*   < > 8.7   ALBUMIN g/dL  --   --  3.6   TOTAL BILIRUBIN mg/dL  --   --  0.55   ALK PHOS U/L  --   --  161*   ALT U/L  --   --  27   AST U/L  --   --  30   GLUCOSE RANDOM mg/dL 110   < > 148*    < > = values in this interval not displayed.     Results from last 7 days   Lab Units 05/16/24  0533   INR  1.03             Results from last 7 days   Lab Units 05/17/24  0457 05/16/24  0719 05/16/24  0445   LACTIC ACID mmol/L  --  1.8 2.3*   PROCALCITONIN ng/ml 1.09*  --  0.36*       Lines/Drains:  Invasive Devices       Peripheral Intravenous Line  Duration             Peripheral IV 05/16/24 Right;Ventral (anterior) Hand 2 days              Drain  Duration             Abscess Drain Chest 1 day                          Imaging: No pertinent imaging reviewed.    Recent Cultures (last 7 days):   Results from last 7 days   Lab Units 05/16/24  1142 05/16/24  0445   BLOOD CULTURE   --  Staphylococcus aureus*  Staphylococcus aureus*   GRAM STAIN RESULT  1+ Polys  No bacteria seen Gram positive cocci in clusters*  Gram positive cocci in clusters*   BODY FLUID CULTURE, STERILE  2+ Growth of Staphylococcus aureus*  --        Last 24 Hours Medication List:   Current Facility-Administered Medications   Medication Dose Route Frequency Provider Last Rate    acetaminophen  1,000 mg Intravenous Q6H PRN Priti Reed DO      albuterol  2.5 mg Nebulization Q6H PRN Michael FOWLER MD      amLODIPine  5 mg Oral Daily Michael FOWLER MD      aspirin  325 mg Oral Daily Michael FOWLER MD      atorvastatin  40 mg Oral HS Michael FOWLER MD      cefazolin  2,000 mg Intravenous Q8H Pa Barnard MD 2,000 mg (05/18/24 0058)    enoxaparin   40 mg Subcutaneous Q12H Michael FOWLER MD      lisinopril  10 mg Oral Daily Michael FOWLER MD      metoprolol tartrate  25 mg Oral Q12H ANGELES Michael FOWLER MD      ondansetron  8 mg Oral Q8H PRN Michael Cleaning V, MD      oxyCODONE  2.5 mg Oral Q6H PRN Priti Reed DO      potassium chloride  40 mEq Oral BID Michael FOWLER MD      rOPINIRole  0.5 mg Oral TID Michael FOWLER MD      torsemide  20 mg Oral Daily Michael FOWLER MD      zolpidem  5 mg Oral Daily PRN Michael FOWLER MD          Today, Patient Was Seen By: Priti Reed DO    **Please Note: This note may have been constructed using a voice recognition system.**

## 2024-05-18 NOTE — ASSESSMENT & PLAN NOTE
Lab Results   Component Value Date    EGFR 94 05/18/2024    EGFR 73 05/17/2024    EGFR 65 05/16/2024    CREATININE 0.68 05/18/2024    CREATININE 0.86 05/17/2024    CREATININE 0.94 05/16/2024     Present on admission history of CKD stage III.  Currently creatinine stable at baseline.

## 2024-05-18 NOTE — MALNUTRITION/BMI
This medical record reflects one or more clinical indicators suggestive of malnutrition and/or morbid obesity.                         360 Statement: Morbid obesity class III r/t energy intake > energy output over time as evidenced by BMI of 45.70.    BMI Findings:  Adult BMI Classifications: Morbid Obesity 45-49.9        Body mass index is 45.7 kg/m².     See Nutrition note dated 5/18/24 for additional details.  Completed nutrition assessment is viewable in the nutrition documentation.

## 2024-05-19 LAB
ANION GAP SERPL CALCULATED.3IONS-SCNC: 7 MMOL/L (ref 4–13)
BACTERIA CATH TIP CULT: ABNORMAL
BACTERIA SPEC BFLD CULT: ABNORMAL
BUN SERPL-MCNC: 14 MG/DL (ref 5–25)
CALCIUM SERPL-MCNC: 8.7 MG/DL (ref 8.4–10.2)
CHLORIDE SERPL-SCNC: 98 MMOL/L (ref 96–108)
CO2 SERPL-SCNC: 32 MMOL/L (ref 21–32)
CREAT SERPL-MCNC: 0.67 MG/DL (ref 0.6–1.3)
ERYTHROCYTE [DISTWIDTH] IN BLOOD BY AUTOMATED COUNT: 21.2 % (ref 11.6–15.1)
GFR SERPL CREATININE-BSD FRML MDRD: 95 ML/MIN/1.73SQ M
GLUCOSE SERPL-MCNC: 108 MG/DL (ref 65–140)
GRAM STN SPEC: ABNORMAL
GRAM STN SPEC: ABNORMAL
HCT VFR BLD AUTO: 43 % (ref 34.8–46.1)
HGB BLD-MCNC: 12.3 G/DL (ref 11.5–15.4)
MCH RBC QN AUTO: 23.3 PG (ref 26.8–34.3)
MCHC RBC AUTO-ENTMCNC: 28.6 G/DL (ref 31.4–37.4)
MCV RBC AUTO: 82 FL (ref 82–98)
PLATELET # BLD AUTO: 191 THOUSANDS/UL (ref 149–390)
PMV BLD AUTO: 9 FL (ref 8.9–12.7)
POTASSIUM SERPL-SCNC: 4 MMOL/L (ref 3.5–5.3)
RBC # BLD AUTO: 5.27 MILLION/UL (ref 3.81–5.12)
SODIUM SERPL-SCNC: 137 MMOL/L (ref 135–147)
WBC # BLD AUTO: 8.42 THOUSAND/UL (ref 4.31–10.16)

## 2024-05-19 PROCEDURE — 99232 SBSQ HOSP IP/OBS MODERATE 35: CPT | Performed by: STUDENT IN AN ORGANIZED HEALTH CARE EDUCATION/TRAINING PROGRAM

## 2024-05-19 PROCEDURE — 80048 BASIC METABOLIC PNL TOTAL CA: CPT | Performed by: STUDENT IN AN ORGANIZED HEALTH CARE EDUCATION/TRAINING PROGRAM

## 2024-05-19 PROCEDURE — 85027 COMPLETE CBC AUTOMATED: CPT | Performed by: STUDENT IN AN ORGANIZED HEALTH CARE EDUCATION/TRAINING PROGRAM

## 2024-05-19 RX ORDER — FLUOROURACIL 50 MG/ML
400 INJECTION, SOLUTION INTRAVENOUS ONCE
Status: CANCELLED | OUTPATIENT
Start: 2024-06-12

## 2024-05-19 RX ORDER — DEXTROSE MONOHYDRATE 50 MG/ML
20 INJECTION, SOLUTION INTRAVENOUS ONCE
Status: CANCELLED | OUTPATIENT
Start: 2024-06-12

## 2024-05-19 RX ORDER — SODIUM CHLORIDE 9 MG/ML
20 INJECTION, SOLUTION INTRAVENOUS ONCE AS NEEDED
Status: CANCELLED | OUTPATIENT
Start: 2024-06-12

## 2024-05-19 RX ADMIN — METOPROLOL TARTRATE 25 MG: 25 TABLET, FILM COATED ORAL at 21:42

## 2024-05-19 RX ADMIN — ROPINIROLE 0.5 MG: 0.25 TABLET, FILM COATED ORAL at 21:41

## 2024-05-19 RX ADMIN — ENOXAPARIN SODIUM 40 MG: 40 INJECTION SUBCUTANEOUS at 08:53

## 2024-05-19 RX ADMIN — AMLODIPINE BESYLATE 5 MG: 5 TABLET ORAL at 08:50

## 2024-05-19 RX ADMIN — CEFAZOLIN SODIUM 2000 MG: 2 SOLUTION INTRAVENOUS at 08:53

## 2024-05-19 RX ADMIN — POTASSIUM CHLORIDE 40 MEQ: 1500 TABLET, EXTENDED RELEASE ORAL at 18:14

## 2024-05-19 RX ADMIN — POTASSIUM CHLORIDE 40 MEQ: 1500 TABLET, EXTENDED RELEASE ORAL at 08:52

## 2024-05-19 RX ADMIN — ATORVASTATIN CALCIUM 40 MG: 40 TABLET, FILM COATED ORAL at 21:42

## 2024-05-19 RX ADMIN — ROPINIROLE 0.5 MG: 0.25 TABLET, FILM COATED ORAL at 08:51

## 2024-05-19 RX ADMIN — ROPINIROLE 0.5 MG: 0.25 TABLET, FILM COATED ORAL at 16:39

## 2024-05-19 RX ADMIN — TORSEMIDE 20 MG: 20 TABLET ORAL at 08:52

## 2024-05-19 RX ADMIN — CEFAZOLIN SODIUM 2000 MG: 2 SOLUTION INTRAVENOUS at 16:39

## 2024-05-19 RX ADMIN — LISINOPRIL 10 MG: 10 TABLET ORAL at 08:52

## 2024-05-19 RX ADMIN — ENOXAPARIN SODIUM 40 MG: 40 INJECTION SUBCUTANEOUS at 21:41

## 2024-05-19 RX ADMIN — ASPIRIN 325 MG ORAL TABLET 325 MG: 325 PILL ORAL at 08:51

## 2024-05-19 RX ADMIN — METOPROLOL TARTRATE 25 MG: 25 TABLET, FILM COATED ORAL at 08:54

## 2024-05-19 RX ADMIN — Medication 2.5 MG: at 21:49

## 2024-05-19 RX ADMIN — CEFAZOLIN SODIUM 2000 MG: 2 SOLUTION INTRAVENOUS at 00:52

## 2024-05-19 NOTE — ASSESSMENT & PLAN NOTE
Lab Results   Component Value Date    EGFR 95 05/19/2024    EGFR 94 05/18/2024    EGFR 73 05/17/2024    CREATININE 0.67 05/19/2024    CREATININE 0.68 05/18/2024    CREATININE 0.86 05/17/2024     Present on admission history of CKD stage III.  Currently creatinine stable at baseline.

## 2024-05-19 NOTE — PROGRESS NOTES
[facility]  Progress Note  Name: Dwaine Gould I  MRN: [id number]  Unit/Bed#: -01 I Date of Admission: 5/16/2024   Date of Service: 5/19/2024 I Hospital Day: 3    Assessment & Plan   Staphylococcus aureus bacteremia  Assessment & Plan  2/2 blood cultures + for MSSA 2/2 to infected port  IV ancef  ID following  TTE EF 60%, no valvular vegetation noted   Repeat blood cultures pending    Port-A-Cath in place  Assessment & Plan  female with carcinoma of the colon post right hemicolectomy.  Patient had Port-A-Cath placed on 05/03/2024 with planning to start on chemotherapy.  S/p port removal 5/16    Stage 3a chronic kidney disease (HCC)  Assessment & Plan  Lab Results   Component Value Date    EGFR 95 05/19/2024    EGFR 94 05/18/2024    EGFR 73 05/17/2024    CREATININE 0.67 05/19/2024    CREATININE 0.68 05/18/2024    CREATININE 0.86 05/17/2024     Present on admission history of CKD stage III.  Currently creatinine stable at baseline.    Essential hypertension  Assessment & Plan  Blood pressure currently stable.  Resume home dose of lisinopril, Norvasc, Lopressor, Demadex.    COPD (chronic obstructive pulmonary disease) (HCC)  Assessment & Plan  Present on admission with history of COPD.  Chronic home O2 dependent 3L currently stable at baseline.  Currently not in acute exacerbation.      Chronic heart failure with preserved ejection fraction (HCC)  Assessment & Plan  Wt Readings from Last 3 Encounters:   05/17/24 106 kg (234 lb)   05/15/24 106 kg (234 lb 6.4 oz)   05/08/24 108 kg (237 lb)       Present on admission with history of chronic heart failure with preserved ejection fraction.  Most recent echo from 03/2024 noted with EF of 60%.  continue home dose of Demadex.  Low salt fluid restricted diet.             CAD (coronary artery disease)  Assessment & Plan  Present on admission history of CAD.  Currently asymptomatic.  Resume home dose of aspirin, statin, metoprolol, Norvasc,  Demadex.      * Sepsis (HCC)  Assessment & Plan  63-year-old female patient with past medical history of adenocarcinoma of colon s/p hemicolectomy, had Port-A-Cath placed on May 3, 2024 with planning to start chemotherapy now hospitalized due to sepsis due to infected port and MSSA bacteremia  Continue IV ancef  S/p port removal with IR 5/16  Monitor fever curve and wbc count                 VTE Pharmacologic Prophylaxis:   Low Risk (Score 0-2) - Encourage Ambulation.    Mobility:   Basic Mobility Inpatient Raw Score: 24  JH-HLM Goal: 8: Walk 250 feet or more  JH-HLM Achieved: 8: Walk 250 feet ot more  JH-HLM Goal achieved. Continue to encourage appropriate mobility.    Patient Centered Rounds: I performed bedside rounds with nursing staff today.   Discussions with Specialists or Other Care Team Provider: STEPHANIE    Education and Discussions with Family / Patient:  PT.     Total Time Spent on Date of Encounter in care of patient: 30 mins. This time was spent on one or more of the following: performing physical exam; counseling and coordination of care; obtaining or reviewing history; documenting in the medical record; reviewing/ordering tests, medications or procedures; communicating with other healthcare professionals and discussing with patient's family/caregivers.    Current Length of Stay: 3 day(s)  Current Patient Status: Inpatient   Certification Statement: The patient will continue to require additional inpatient hospital stay due to bacteremia  Discharge Plan: Anticipate discharge in 48 hrs to discharge location to be determined pending rehab evaluations.    Code Status: Level 1 - Full Code    Subjective:   Is very tearful on exam today.  States all this is a lot and has been 1 thing after another.  States she feels depressed denies suicidal ideations or homicidal ideations.  States she is okay and does not need additional resources or support at this time.  Denies nausea, vomiting, fevers or chills.  Overall  feeling well.    Objective:     Vitals:   Temp (24hrs), Av.8 °F (36.6 °C), Min:97.6 °F (36.4 °C), Max:98 °F (36.7 °C)    Temp:  [97.6 °F (36.4 °C)-98 °F (36.7 °C)] 97.7 °F (36.5 °C)  HR:  [61-73] 73  Resp:  [17-20] 20  BP: (100-139)/(68-72) 131/70  SpO2:  [96 %-97 %] 97 %  Body mass index is 45.7 kg/m².     Input and Output Summary (last 24 hours):     Intake/Output Summary (Last 24 hours) at 2024 1254  Last data filed at 2024 0808  Gross per 24 hour   Intake 300 ml   Output 15 ml   Net 285 ml       Physical Exam:   Physical Exam  Constitutional:       General: She is not in acute distress.     Appearance: She is well-developed. She is not diaphoretic.   HENT:      Head: Normocephalic and atraumatic.      Nose: Nose normal.      Mouth/Throat:      Pharynx: No oropharyngeal exudate.   Eyes:      General: No scleral icterus.        Right eye: No discharge.         Left eye: No discharge.      Conjunctiva/sclera: Conjunctivae normal.   Cardiovascular:      Rate and Rhythm: Normal rate and regular rhythm.      Heart sounds: Normal heart sounds. No murmur heard.     No friction rub. No gallop.   Pulmonary:      Effort: Pulmonary effort is normal. No respiratory distress.      Breath sounds: Normal breath sounds. No wheezing or rales.      Comments: 3 L nasal cannula which is baseline  Abdominal:      General: Bowel sounds are normal. There is no distension.      Palpations: Abdomen is soft.      Tenderness: There is no abdominal tenderness. There is no guarding.   Musculoskeletal:         General: Normal range of motion.      Cervical back: Normal range of motion and neck supple.   Skin:     General: Skin is warm.      Findings: No erythema.   Neurological:      Mental Status: She is oriented to person, place, and time.   Psychiatric:         Behavior: Behavior normal.          Additional Data:     Labs:  Results from last 7 days   Lab Units 24  0530 24  0457 24  0445   WBC Thousand/uL  8.42   < > 24.02*   HEMOGLOBIN g/dL 12.3   < > 13.3   HEMATOCRIT % 43.0   < > 42.9   PLATELETS Thousands/uL 191   < > 201   SEGS PCT %  --   --  91*   LYMPHO PCT %  --   --  3*   MONO PCT %  --   --  5   EOS PCT %  --   --  0    < > = values in this interval not displayed.     Results from last 7 days   Lab Units 05/19/24  0530 05/17/24  0457 05/16/24 0445   SODIUM mmol/L 137   < > 134*   POTASSIUM mmol/L 4.0   < > 3.2*   CHLORIDE mmol/L 98   < > 99   CO2 mmol/L 32   < > 24   BUN mg/dL 14   < > 14   CREATININE mg/dL 0.67   < > 0.94   ANION GAP mmol/L 7   < > 11   CALCIUM mg/dL 8.7   < > 8.7   ALBUMIN g/dL  --   --  3.6   TOTAL BILIRUBIN mg/dL  --   --  0.55   ALK PHOS U/L  --   --  161*   ALT U/L  --   --  27   AST U/L  --   --  30   GLUCOSE RANDOM mg/dL 108   < > 148*    < > = values in this interval not displayed.     Results from last 7 days   Lab Units 05/16/24  0533   INR  1.03             Results from last 7 days   Lab Units 05/17/24  0457 05/16/24  0719 05/16/24 0445   LACTIC ACID mmol/L  --  1.8 2.3*   PROCALCITONIN ng/ml 1.09*  --  0.36*       Lines/Drains:  Invasive Devices       Peripheral Intravenous Line  Duration             Peripheral IV 05/16/24 Right;Ventral (anterior) Hand 3 days              Drain  Duration             Abscess Drain Chest 3 days                          Imaging: No pertinent imaging reviewed.    Recent Cultures (last 7 days):   Results from last 7 days   Lab Units 05/18/24  0504 05/18/24  0452 05/16/24  1142 05/16/24  0445   BLOOD CULTURE  Received in Microbiology Lab. Culture in Progress. Received in Microbiology Lab. Culture in Progress.  --  Staphylococcus aureus*  Staphylococcus aureus*   GRAM STAIN RESULT   --   --  1+ Polys  No bacteria seen Gram positive cocci in clusters*  Gram positive cocci in clusters*   BODY FLUID CULTURE, STERILE   --   --  2+ Growth of Staphylococcus aureus*  --        Last 24 Hours Medication List:   Current Facility-Administered Medications    Medication Dose Route Frequency Provider Last Rate    acetaminophen  1,000 mg Intravenous Q6H PRN Priti Reed DO      albuterol  2.5 mg Nebulization Q6H PRN Michael FOWLER MD      amLODIPine  5 mg Oral Daily Michael FOWLER MD      aspirin  325 mg Oral Daily Michael FOWLER MD      atorvastatin  40 mg Oral HS Michael FOWLER MD      cefazolin  2,000 mg Intravenous Q8H Pa Barnard MD 2,000 mg (05/19/24 0853)    enoxaparin  40 mg Subcutaneous Q12H Michael FOWLER MD      lisinopril  10 mg Oral Daily Michael FOWLER MD      metoprolol tartrate  25 mg Oral Q12H ANGELES Michael FOWLER MD      ondansetron  8 mg Oral Q8H PRN Michael FOWLER MD      oxyCODONE  2.5 mg Oral Q6H PRN Priti Reed DO      potassium chloride  40 mEq Oral BID Michael FOWLER MD      rOPINIRole  0.5 mg Oral TID Michael FOWLER MD      torsemide  20 mg Oral Daily Michael FOWLER MD      zolpidem  5 mg Oral Daily PRN Michael FOWLER MD          Today, Patient Was Seen By: Priti Reed DO    **Please Note: This note may have been constructed using a voice recognition system.**

## 2024-05-19 NOTE — ASSESSMENT & PLAN NOTE
2/2 blood cultures + for MSSA 2/2 to infected port  IV ancef  ID following  TTE EF 60%, no valvular vegetation noted   Repeat blood cultures pending

## 2024-05-19 NOTE — PLAN OF CARE
Problem: Potential for Falls  Goal: Patient will remain free of falls  Description: INTERVENTIONS:  - Educate patient/family on patient safety including physical limitations  - Instruct patient to call for assistance with activity   - Consult OT/PT to assist with strengthening/mobility   - Keep Call bell within reach  - Keep bed low and locked with side rails adjusted as appropriate  - Keep care items and personal belongings within reach  - Initiate and maintain comfort rounds  - Make Fall Risk Sign visible to staff  - Offer Toileting every 2 Hours, in advance of need  - Initiate/Maintain bed alarm  - Obtain necessary fall risk management equipment:   - Apply yellow socks and bracelet for high fall risk patients  - Consider moving patient to room near nurses station  Outcome: Progressing     Problem: SAFETY ADULT  Goal: Patient will remain free of falls  Description: INTERVENTIONS:  - Educate patient/family on patient safety including physical limitations  - Instruct patient to call for assistance with activity   - Consult OT/PT to assist with strengthening/mobility   - Keep Call bell within reach  - Keep bed low and locked with side rails adjusted as appropriate  - Keep care items and personal belongings within reach  - Initiate and maintain comfort rounds  - Make Fall Risk Sign visible to staff  - Offer Toileting every 2 Hours, in advance of need  - Initiate/Maintain bed alarm  - Obtain necessary fall risk management equipment:   - Apply yellow socks and bracelet for high fall risk patients  - Consider moving patient to room near nurses station  Outcome: Progressing

## 2024-05-19 NOTE — ASSESSMENT & PLAN NOTE
Wt Readings from Last 3 Encounters:   05/17/24 106 kg (234 lb)   05/15/24 106 kg (234 lb 6.4 oz)   05/08/24 108 kg (237 lb)       Present on admission with history of chronic heart failure with preserved ejection fraction.  Most recent echo from 03/2024 noted with EF of 60%.  continue home dose of Demadex.  Low salt fluid restricted diet.

## 2024-05-20 ENCOUNTER — HOSPITAL ENCOUNTER (OUTPATIENT)
Dept: INFUSION CENTER | Facility: CLINIC | Age: 64
Discharge: HOME | End: 2024-05-20

## 2024-05-20 ENCOUNTER — APPOINTMENT (INPATIENT)
Dept: ULTRASOUND IMAGING | Facility: HOSPITAL | Age: 64
End: 2024-05-20
Payer: COMMERCIAL

## 2024-05-20 LAB
ANION GAP SERPL CALCULATED.3IONS-SCNC: 6 MMOL/L (ref 4–13)
BASOPHILS # BLD AUTO: 0.04 THOUSANDS/ÂΜL (ref 0–0.1)
BASOPHILS NFR BLD AUTO: 0 % (ref 0–1)
BUN SERPL-MCNC: 18 MG/DL (ref 5–25)
CALCIUM SERPL-MCNC: 9.2 MG/DL (ref 8.4–10.2)
CHLORIDE SERPL-SCNC: 100 MMOL/L (ref 96–108)
CO2 SERPL-SCNC: 32 MMOL/L (ref 21–32)
CREAT SERPL-MCNC: 0.79 MG/DL (ref 0.6–1.3)
EOSINOPHIL # BLD AUTO: 0.21 THOUSAND/ÂΜL (ref 0–0.61)
EOSINOPHIL NFR BLD AUTO: 2 % (ref 0–6)
ERYTHROCYTE [DISTWIDTH] IN BLOOD BY AUTOMATED COUNT: 21.6 % (ref 11.6–15.1)
GFR SERPL CREATININE-BSD FRML MDRD: 81 ML/MIN/1.73SQ M
GLUCOSE SERPL-MCNC: 109 MG/DL (ref 65–140)
HCT VFR BLD AUTO: 44.1 % (ref 34.8–46.1)
HGB BLD-MCNC: 13.1 G/DL (ref 11.5–15.4)
IMM GRANULOCYTES # BLD AUTO: 0.06 THOUSAND/UL (ref 0–0.2)
IMM GRANULOCYTES NFR BLD AUTO: 1 % (ref 0–2)
LYMPHOCYTES # BLD AUTO: 2.19 THOUSANDS/ÂΜL (ref 0.6–4.47)
LYMPHOCYTES NFR BLD AUTO: 24 % (ref 14–44)
MCH RBC QN AUTO: 23.9 PG (ref 26.8–34.3)
MCHC RBC AUTO-ENTMCNC: 29.7 G/DL (ref 31.4–37.4)
MCV RBC AUTO: 80 FL (ref 82–98)
MONOCYTES # BLD AUTO: 0.87 THOUSAND/ÂΜL (ref 0.17–1.22)
MONOCYTES NFR BLD AUTO: 10 % (ref 4–12)
NEUTROPHILS # BLD AUTO: 5.59 THOUSANDS/ÂΜL (ref 1.85–7.62)
NEUTS SEG NFR BLD AUTO: 63 % (ref 43–75)
NRBC BLD AUTO-RTO: 0 /100 WBCS
PLATELET # BLD AUTO: 221 THOUSANDS/UL (ref 149–390)
PMV BLD AUTO: 9.6 FL (ref 8.9–12.7)
POTASSIUM SERPL-SCNC: 4.4 MMOL/L (ref 3.5–5.3)
RBC # BLD AUTO: 5.49 MILLION/UL (ref 3.81–5.12)
SODIUM SERPL-SCNC: 138 MMOL/L (ref 135–147)
WBC # BLD AUTO: 8.96 THOUSAND/UL (ref 4.31–10.16)

## 2024-05-20 PROCEDURE — 99233 SBSQ HOSP IP/OBS HIGH 50: CPT | Performed by: INTERNAL MEDICINE

## 2024-05-20 PROCEDURE — 99232 SBSQ HOSP IP/OBS MODERATE 35: CPT | Performed by: STUDENT IN AN ORGANIZED HEALTH CARE EDUCATION/TRAINING PROGRAM

## 2024-05-20 PROCEDURE — 85025 COMPLETE CBC W/AUTO DIFF WBC: CPT | Performed by: STUDENT IN AN ORGANIZED HEALTH CARE EDUCATION/TRAINING PROGRAM

## 2024-05-20 PROCEDURE — 80048 BASIC METABOLIC PNL TOTAL CA: CPT | Performed by: STUDENT IN AN ORGANIZED HEALTH CARE EDUCATION/TRAINING PROGRAM

## 2024-05-20 PROCEDURE — 76705 ECHO EXAM OF ABDOMEN: CPT

## 2024-05-20 RX ORDER — AMOXICILLIN 250 MG
1 CAPSULE ORAL 2 TIMES DAILY
Status: DISCONTINUED | OUTPATIENT
Start: 2024-05-20 | End: 2024-05-24 | Stop reason: HOSPADM

## 2024-05-20 RX ADMIN — CEFAZOLIN SODIUM 2000 MG: 2 SOLUTION INTRAVENOUS at 00:56

## 2024-05-20 RX ADMIN — Medication 2.5 MG: at 21:47

## 2024-05-20 RX ADMIN — ENOXAPARIN SODIUM 40 MG: 40 INJECTION SUBCUTANEOUS at 09:35

## 2024-05-20 RX ADMIN — POTASSIUM CHLORIDE 40 MEQ: 1500 TABLET, EXTENDED RELEASE ORAL at 09:34

## 2024-05-20 RX ADMIN — ASPIRIN 325 MG ORAL TABLET 325 MG: 325 PILL ORAL at 09:35

## 2024-05-20 RX ADMIN — AMLODIPINE BESYLATE 5 MG: 5 TABLET ORAL at 09:35

## 2024-05-20 RX ADMIN — SENNOSIDES AND DOCUSATE SODIUM 1 TABLET: 50; 8.6 TABLET ORAL at 17:08

## 2024-05-20 RX ADMIN — ROPINIROLE 0.5 MG: 0.25 TABLET, FILM COATED ORAL at 09:35

## 2024-05-20 RX ADMIN — POTASSIUM CHLORIDE 40 MEQ: 1500 TABLET, EXTENDED RELEASE ORAL at 17:08

## 2024-05-20 RX ADMIN — METOPROLOL TARTRATE 25 MG: 25 TABLET, FILM COATED ORAL at 21:47

## 2024-05-20 RX ADMIN — CEFAZOLIN SODIUM 2000 MG: 2 SOLUTION INTRAVENOUS at 16:49

## 2024-05-20 RX ADMIN — CEFAZOLIN SODIUM 2000 MG: 2 SOLUTION INTRAVENOUS at 09:31

## 2024-05-20 RX ADMIN — LISINOPRIL 10 MG: 10 TABLET ORAL at 09:35

## 2024-05-20 RX ADMIN — ROPINIROLE 0.5 MG: 0.25 TABLET, FILM COATED ORAL at 21:46

## 2024-05-20 RX ADMIN — ROPINIROLE 0.5 MG: 0.25 TABLET, FILM COATED ORAL at 17:08

## 2024-05-20 RX ADMIN — METOPROLOL TARTRATE 25 MG: 25 TABLET, FILM COATED ORAL at 09:35

## 2024-05-20 RX ADMIN — ATORVASTATIN CALCIUM 40 MG: 40 TABLET, FILM COATED ORAL at 21:47

## 2024-05-20 NOTE — PLAN OF CARE
Problem: INFECTION - ADULT  Goal: Absence or prevention of progression during hospitalization  Description: INTERVENTIONS:  - Assess and monitor for signs and symptoms of infection  - Monitor lab/diagnostic results  - Monitor all insertion sites, i.e. indwelling lines, tubes, and drains  - Monitor endotracheal if appropriate and nasal secretions for changes in amount and color  - [place] appropriate cooling/warming therapies per order  - Administer medications as ordered  - Instruct and encourage patient and family to use good hand hygiene technique  - Identify and instruct in appropriate isolation precautions for identified infection/condition  Outcome: Progressing

## 2024-05-20 NOTE — QUICK NOTE
Right chest wall drain removed at bedside.  Gauze and Tegaderm applied to right chest wall.  Dressing can be removed in two days.   Change dressing if needed.  May place additional steri-strip to right anterior wound if needed.

## 2024-05-20 NOTE — PROGRESS NOTES
[facility]  Progress Note  Name: Dwaine Gould I  MRN: [id number]  Unit/Bed#: -01 I Date of Admission: 5/16/2024   Date of Service: 5/20/2024 I Hospital Day: 4    Assessment & Plan   Staphylococcus aureus bacteremia  Assessment & Plan  2/2 blood cultures + for MSSA 2/2 to infected port  IV ancef  ID following  TTE EF 60%, no valvular vegetation noted   Repeat blood cultures negative x 24 hours  Will need PICC line once okay per ID    Port-A-Cath in place  Assessment & Plan  female with carcinoma of the colon post right hemicolectomy.  Patient had Port-A-Cath placed on 05/03/2024 with planning to start on chemotherapy.  S/p port removal 5/16    Stage 3a chronic kidney disease (HCC)  Assessment & Plan  Lab Results   Component Value Date    EGFR 81 05/20/2024    EGFR 95 05/19/2024    EGFR 94 05/18/2024    CREATININE 0.79 05/20/2024    CREATININE 0.67 05/19/2024    CREATININE 0.68 05/18/2024     Present on admission history of CKD stage III.  Currently creatinine stable at baseline.    Essential hypertension  Assessment & Plan  Blood pressure currently stable.  Resume home dose of lisinopril, Norvasc, Lopressor, Demadex.    COPD (chronic obstructive pulmonary disease) (HCC)  Assessment & Plan  Present on admission with history of COPD.  Chronic home O2 dependent 3L currently stable at baseline.  Currently not in acute exacerbation.      Chronic heart failure with preserved ejection fraction (HCC)  Assessment & Plan  Wt Readings from Last 3 Encounters:   05/17/24 106 kg (234 lb)   05/15/24 106 kg (234 lb 6.4 oz)   05/08/24 108 kg (237 lb)       Present on admission with history of chronic heart failure with preserved ejection fraction.  Most recent echo from 03/2024 noted with EF of 60%.  continue home dose of Demadex.  Low salt fluid restricted diet.             CAD (coronary artery disease)  Assessment & Plan  Present on admission history of CAD.  Currently asymptomatic.  Resume home dose  of aspirin, statin, metoprolol, Norvasc, Demadex.      * Sepsis (HCC)  Assessment & Plan  63-year-old female patient with past medical history of adenocarcinoma of colon s/p hemicolectomy, had Port-A-Cath placed on May 3, 2024 with planning to start chemotherapy now hospitalized due to sepsis due to infected port and MSSA bacteremia  Continue IV ancef  S/p port removal with IR 5/16  Monitor fever curve and wbc count               VTE Pharmacologic Prophylaxis:   Moderate Risk (Score 3-4) - Pharmacological DVT Prophylaxis Ordered: enoxaparin (Lovenox).    Mobility:   Basic Mobility Inpatient Raw Score: 24  JH-HLM Goal: 8: Walk 250 feet or more  JH-HLM Achieved: 8: Walk 250 feet ot more  JH-HLM Goal achieved. Continue to encourage appropriate mobility.    Patient Centered Rounds: I performed bedside rounds with nursing staff today.   Discussions with Specialists or Other Care Team Provider: CM    Education and Discussions with Family / Patient:  PT.     Total Time Spent on Date of Encounter in care of patient: 30 mins. This time was spent on one or more of the following: performing physical exam; counseling and coordination of care; obtaining or reviewing history; documenting in the medical record; reviewing/ordering tests, medications or procedures; communicating with other healthcare professionals and discussing with patient's family/caregivers.    Current Length of Stay: 4 day(s)  Current Patient Status: Inpatient   Certification Statement: The patient will continue to require additional inpatient hospital stay due to bacteremia  Discharge Plan: Anticipate discharge in 24-48 hrs to discharge location to be determined pending rehab evaluations.    Code Status: Level 1 - Full Code    Subjective:   Has no acute complaints.  Denies fevers or chills, chest pain or shortness of breath drain was removed yesterday.    Objective:     Vitals:   Temp (24hrs), Av.9 °F (36.6 °C), Min:97.8 °F (36.6 °C), Max:98 °F (36.7  °C)    Temp:  [97.8 °F (36.6 °C)-98 °F (36.7 °C)] 97.8 °F (36.6 °C)  HR:  [58-78] 78  Resp:  [20] 20  BP: (126-137)/(66-79) 134/79  SpO2:  [92 %-99 %] 97 %  Body mass index is 45.7 kg/m².     Input and Output Summary (last 24 hours):     Intake/Output Summary (Last 24 hours) at 5/20/2024 1043  Last data filed at 5/20/2024 0835  Gross per 24 hour   Intake 120 ml   Output --   Net 120 ml       Physical Exam:   Physical Exam  Constitutional:       General: She is not in acute distress.     Appearance: She is well-developed. She is obese. She is not diaphoretic.   HENT:      Head: Normocephalic and atraumatic.      Nose: Nose normal.      Mouth/Throat:      Pharynx: No oropharyngeal exudate.   Eyes:      General: No scleral icterus.        Right eye: No discharge.         Left eye: No discharge.      Conjunctiva/sclera: Conjunctivae normal.   Cardiovascular:      Rate and Rhythm: Normal rate and regular rhythm.      Heart sounds: Normal heart sounds. No murmur heard.     No friction rub. No gallop.   Pulmonary:      Effort: Pulmonary effort is normal. No respiratory distress.      Breath sounds: Normal breath sounds. No wheezing or rales.   Abdominal:      General: Bowel sounds are normal. There is no distension.      Palpations: Abdomen is soft.      Tenderness: There is no abdominal tenderness. There is no guarding.   Musculoskeletal:      Cervical back: Normal range of motion and neck supple.   Skin:     General: Skin is warm.      Coloration: Skin is pale.      Findings: No erythema.   Neurological:      Mental Status: Mental status is at baseline.   Psychiatric:         Behavior: Behavior normal.          Additional Data:     Labs:  Results from last 7 days   Lab Units 05/20/24  0447   WBC Thousand/uL 8.96   HEMOGLOBIN g/dL 13.1   HEMATOCRIT % 44.1   PLATELETS Thousands/uL 221   SEGS PCT % 63   LYMPHO PCT % 24   MONO PCT % 10   EOS PCT % 2     Results from last 7 days   Lab Units 05/20/24 0447 05/17/24  0457  05/16/24  0445   SODIUM mmol/L 138   < > 134*   POTASSIUM mmol/L 4.4   < > 3.2*   CHLORIDE mmol/L 100   < > 99   CO2 mmol/L 32   < > 24   BUN mg/dL 18   < > 14   CREATININE mg/dL 0.79   < > 0.94   ANION GAP mmol/L 6   < > 11   CALCIUM mg/dL 9.2   < > 8.7   ALBUMIN g/dL  --   --  3.6   TOTAL BILIRUBIN mg/dL  --   --  0.55   ALK PHOS U/L  --   --  161*   ALT U/L  --   --  27   AST U/L  --   --  30   GLUCOSE RANDOM mg/dL 109   < > 148*    < > = values in this interval not displayed.     Results from last 7 days   Lab Units 05/16/24  0533   INR  1.03             Results from last 7 days   Lab Units 05/17/24  0457 05/16/24  0719 05/16/24  0445   LACTIC ACID mmol/L  --  1.8 2.3*   PROCALCITONIN ng/ml 1.09*  --  0.36*       Lines/Drains:  Invasive Devices       Peripheral Intravenous Line  Duration             Peripheral IV 05/16/24 Right;Ventral (anterior) Hand 4 days                          Imaging: No pertinent imaging reviewed.    Recent Cultures (last 7 days):   Results from last 7 days   Lab Units 05/18/24  0504 05/18/24  0452 05/16/24  1142 05/16/24  0445   BLOOD CULTURE  No Growth at 24 hrs. No Growth at 24 hrs.  --  Staphylococcus aureus*  Staphylococcus aureus*   GRAM STAIN RESULT   --   --  1+ Polys  No bacteria seen Gram positive cocci in clusters*  Gram positive cocci in clusters*   BODY FLUID CULTURE, STERILE   --   --  2+ Growth of Staphylococcus aureus*  --        Last 24 Hours Medication List:   Current Facility-Administered Medications   Medication Dose Route Frequency Provider Last Rate    acetaminophen  1,000 mg Intravenous Q6H PRN Priti Reed DO      albuterol  2.5 mg Nebulization Q6H PRN Michael FOWLER MD      amLODIPine  5 mg Oral Daily Michael FOWLER MD      aspirin  325 mg Oral Daily Michael FOWLER MD      atorvastatin  40 mg Oral HS Michael FOWLER MD      cefazolin  2,000 mg Intravenous Q8H Pa Barnard MD 2,000 mg (05/20/24 0931)    enoxaparin  40 mg Subcutaneous Q12H Michael  Hermila FOWLER MD      lisinopril  10 mg Oral Daily Michael FOWLER MD      metoprolol tartrate  25 mg Oral Q12H ANGELES Michael FOWLER MD      ondansetron  8 mg Oral Q8H PRN Michael FOWLER MD      oxyCODONE  2.5 mg Oral Q6H PRN Priti Reed DO      potassium chloride  40 mEq Oral BID Michael FOWLER MD      rOPINIRole  0.5 mg Oral TID Michael FOWLER MD      torsemide  20 mg Oral Daily Michael FOWLER MD      zolpidem  5 mg Oral Daily PRN Michael FOWLER MD          Today, Patient Was Seen By: Priti Reed DO    **Please Note: This note may have been constructed using a voice recognition system.**

## 2024-05-20 NOTE — ASSESSMENT & PLAN NOTE
2/2 blood cultures + for MSSA 2/2 to infected port  IV ancef  ID following  TTE EF 60%, no valvular vegetation noted   Repeat blood cultures negative x 24 hours  Will need PICC line once okay per ID

## 2024-05-20 NOTE — PLAN OF CARE
Problem: PAIN - ADULT  Goal: Verbalizes/displays adequate comfort level or baseline comfort level  Description: Interventions:  - Encourage patient to monitor pain and request assistance  - Assess pain using appropriate pain scale  - Administer analgesics based on type and severity of pain and evaluate response  - Implement non-pharmacological measures as appropriate and evaluate response  - Consider cultural and social influences on pain and pain management  - Notify physician/advanced practitioner if interventions unsuccessful or patient reports new pain  Outcome: Progressing     Problem: INFECTION - ADULT  Goal: Absence or prevention of progression during hospitalization  Description: INTERVENTIONS:  - Assess and monitor for signs and symptoms of infection  - Monitor lab/diagnostic results  - Monitor all insertion sites, i.e. indwelling lines, tubes, and drains  - Monitor endotracheal if appropriate and nasal secretions for changes in amount and color  - [place] appropriate cooling/warming therapies per order  - Administer medications as ordered  - Instruct and encourage patient and family to use good hand hygiene technique  - Identify and instruct in appropriate isolation precautions for identified infection/condition  Outcome: Progressing     Problem: Knowledge Deficit  Goal: Patient/family/caregiver demonstrates understanding of disease process, treatment plan, medications, and discharge instructions  Description: Complete learning assessment and assess knowledge base.  Interventions:  - Provide teaching at level of understanding  - Provide teaching via preferred learning methods  Outcome: Progressing     Problem: DISCHARGE PLANNING  Goal: Discharge to home or other facility with appropriate resources  Description: INTERVENTIONS:  - Identify barriers to discharge w/patient and caregiver  - Arrange for needed discharge resources and transportation as appropriate  - Identify discharge learning needs (meds,  wound care, etc.)  - Arrange for interpretive services to assist at discharge as needed  - Refer to Case Management Department for coordinating discharge planning if the patient needs post-hospital services based on physician/advanced practitioner order or complex needs related to functional status, cognitive ability, or social support system  Outcome: Progressing

## 2024-05-20 NOTE — ASSESSMENT & PLAN NOTE
Lab Results   Component Value Date    EGFR 81 05/20/2024    EGFR 95 05/19/2024    EGFR 94 05/18/2024    CREATININE 0.79 05/20/2024    CREATININE 0.67 05/19/2024    CREATININE 0.68 05/18/2024     Present on admission history of CKD stage III.  Currently creatinine stable at baseline.

## 2024-05-20 NOTE — PROGRESS NOTES
Progress Note - Infectious Disease   Dwaine Gould 63 y.o. female MRN: [id number]  Unit/Bed#: -01 Encounter: [id number]      Impression/Plan:    MSSA bacteremia  -2/2 blood cultures growing MSSA. Source is infected port as below with resultant soft tissue chest wall infection. Cultures of both port tip and port pocket also growing MSSA. She has no cardiac devices. Consider endocarditis, though TTE (adequate study) without valvular vegetation. Repeat blood cultures 5/18 negative thus far.  -Continue IV Cefazolin 2g every 8 hours  -Follow up repeat blood cultures 5/18 for clearance  -Once blood cultures 5/18 negative X 72 hours, OK from ID perspective to place PICC  -As there may be metastatic foci of infection (with chest wall soft tissue infection and inflammation close to manubrium) and patient will also be immunocompromised after starting chemotherapy in near future, would recommend EDWIN to better rule out endocarditis  -Recommend chest US as below to better assess hypodensity anterior to manubrium  -Antibiotic duration pending EDWIN     Port infection  -Port placed on 5/3 with plans for future chemotherapy for adenocarcinoma of colon.  Patient developed acute drainage from the port, CT scan noting possibly developing abscess/edema noted to port and complicated by MSSA bacteremia. IR removed port 5/16 and placed drain into port pocket. Cultures sent from port pocket (cloudy fluid) and from port tip both growing MSSA matching the blood cultures. As above, TTE negative for vegetation. IR removed port site drain on 5/20.  -Antibiotic plan as above  -Recommend EDWIN as above  -Repeat CBC tomorrow to monitor WBC  -Trend fever curve and hemodynamics     3. Soft tissue hypodensity anterior to manubrium  -Seen on CT scan, read as likely confluent edema measuring 4.1 x 3.1 cm.  This is likely related to the port infection.  Consideration for early developing abscess. CT without osseous destructive changes of manubrium. IR  placed drain into port pocket on .  -Antibiotic plan as above  -Would obtain follow up US to reassess this area     4. Adenocarcinoma of colon  -Diagnosed in March, status post right hemicolectomy. Eventual plans to start FOLFOX.  -Close Oncology follow up     5. Chronic hypoxic respiraotry failure  -Noted. On home 3L NC.    Plan and recommendations were discussed with primary team.  They agree with plan to continue IV Cefazolin.      Antibiotics:  Cefazolin: 4    24 Hour Events:  Afebrile, WBC normal this AM.     Subjective:  Patient has no fever, chills, sweats; no nausea, vomiting, diarrhea.    Objective:  Vitals:  Temp:  [97.8 °F (36.6 °C)-98 °F (36.7 °C)] 97.8 °F (36.6 °C)  HR:  [58-78] 78  Resp:  [20] 20  BP: (126-137)/(66-79) 134/79  SpO2:  [92 %-99 %] 97 %  Temp (24hrs), Av.9 °F (36.6 °C), Min:97.8 °F (36.6 °C), Max:98 °F (36.7 °C)  Current: Temperature: 97.8 °F (36.6 °C)    Physical Exam:   General Appearance:  Alert, interactive, nontoxic, no acute distress.   Throat: Oropharynx moist without lesions.    Lungs:   Clear to auscultation bilaterally; no wheezes, rhonchi or rales; respirations unlabored   Heart:  RRR; no murmur   Abdomen:   Soft, non-tender.     Extremities: Mild bilateral pitting edema of both legs   Skin: No new rashes         Labs:   All pertinent labs and imaging studies were personally reviewed  Results from last 7 days   Lab Units 24  0453   WBC Thousand/uL 8.96 8.42 8.62   HEMOGLOBIN g/dL 13.1 12.3 12.1   PLATELETS Thousands/uL 221 191 164     Results from last 7 days   Lab Units 247 24  0530 24  0453 24  04524  0445   SODIUM mmol/L 138 137 135   < > 134*   POTASSIUM mmol/L 4.4 4.0 3.7   < > 3.2*   CHLORIDE mmol/L 100 98 98   < > 99   CO2 mmol/L 32 32 31   < > 24   BUN mg/dL 18 14 11   < > 14   CREATININE mg/dL 0.79 0.67 0.68   < > 0.94   EGFR ml/min/1.73sq m 81 95 94   < > 65   CALCIUM mg/dL 9.2 8.7 8.2*    < > 8.7   AST U/L  --   --   --   --  30   ALT U/L  --   --   --   --  27   ALK PHOS U/L  --   --   --   --  161*    < > = values in this interval not displayed.     Results from last 7 days   Lab Units 05/17/24  0457 05/16/24  0445   PROCALCITONIN ng/ml 1.09* 0.36*             Results from last 7 days   Lab Units 05/16/24  0533   D-DIMER QUANTITATIVE ug/ml FEU 3.66*       Micro:  Results from last 7 days   Lab Units 05/18/24  0504 05/18/24  0452 05/16/24  1142 05/16/24  0445   BLOOD CULTURE  No Growth at 24 hrs. No Growth at 24 hrs.  --  Staphylococcus aureus*  Staphylococcus aureus*   GRAM STAIN RESULT   --   --  1+ Polys  No bacteria seen Gram positive cocci in clusters*  Gram positive cocci in clusters*   BODY FLUID CULTURE, STERILE   --   --  2+ Growth of Staphylococcus aureus*  --        Imaging:          Pa Barnard MD  [facility]

## 2024-05-20 NOTE — CASE MANAGEMENT
Case Management Discharge Planning Note    Patient name Dwaine Gould  Location /-01 MRN [id number]  : 1960 Date 2024       Current Admission Date: 2024  Current Admission Diagnosis:Sepsis (HCC)   Patient Active Problem List    Diagnosis Date Noted Date Diagnosed    Staphylococcus aureus bacteremia 2024     Sepsis (HCC) 2024     Port-A-Cath in place 2024     Encounter for counseling for tobacco use disorder 2024     Adenocarcinoma, colon (HCC) 2024     Encounter to establish care 2024     Anemia 2024     Thrombocytopenia (HCC) 2024     Chronic respiratory failure with hypoxia and hypercapnia (HCC) 2024     Stage 3a chronic kidney disease (HCC) 2024     Chronic heart failure with preserved ejection fraction (HCC) 2024     COPD (chronic obstructive pulmonary disease) ([facility]) 2024     Leukocytosis 2024     Tobacco abuse 2021     CAD (coronary artery disease) 2021     Essential hypertension 2019     Hyperlipidemia 2019       LOS (days): 4  Geometric Mean LOS (GMLOS) (days):   Days to GMLOS:     OBJECTIVE:  Risk of Unplanned Readmission Score: 13.85         Current admission status: Inpatient   Preferred Pharmacy:   [facility]/pharmacy #[id number] - [place] - [address]  [address]  [place] [id number]  Phone: [phone] Fax: [phone]    Primary Care Provider: Jordan Ramirez MD    Primary Insurance: [employer]  Secondary Insurance:     DISCHARGE DETAILS:     Per rounding with slim, patient is due to be discharged in 24 - 48 hrs pending medical clearance. Patient will be discharging with [facility].

## 2024-05-21 LAB
ANION GAP SERPL CALCULATED.3IONS-SCNC: 7 MMOL/L (ref 4–13)
BASOPHILS # BLD AUTO: 0.06 THOUSANDS/ÂΜL (ref 0–0.1)
BASOPHILS NFR BLD AUTO: 1 % (ref 0–1)
BUN SERPL-MCNC: 18 MG/DL (ref 5–25)
CALCIUM SERPL-MCNC: 9.5 MG/DL (ref 8.4–10.2)
CHLORIDE SERPL-SCNC: 100 MMOL/L (ref 96–108)
CO2 SERPL-SCNC: 31 MMOL/L (ref 21–32)
CREAT SERPL-MCNC: 0.82 MG/DL (ref 0.6–1.3)
EOSINOPHIL # BLD AUTO: 0.19 THOUSAND/ÂΜL (ref 0–0.61)
EOSINOPHIL NFR BLD AUTO: 2 % (ref 0–6)
ERYTHROCYTE [DISTWIDTH] IN BLOOD BY AUTOMATED COUNT: 22.1 % (ref 11.6–15.1)
GFR SERPL CREATININE-BSD FRML MDRD: 77 ML/MIN/1.73SQ M
GLUCOSE SERPL-MCNC: 107 MG/DL (ref 65–140)
HCT VFR BLD AUTO: 45.1 % (ref 34.8–46.1)
HGB BLD-MCNC: 13 G/DL (ref 11.5–15.4)
IMM GRANULOCYTES # BLD AUTO: 0.1 THOUSAND/UL (ref 0–0.2)
IMM GRANULOCYTES NFR BLD AUTO: 1 % (ref 0–2)
LYMPHOCYTES # BLD AUTO: 2.6 THOUSANDS/ÂΜL (ref 0.6–4.47)
LYMPHOCYTES NFR BLD AUTO: 25 % (ref 14–44)
MCH RBC QN AUTO: 23.8 PG (ref 26.8–34.3)
MCHC RBC AUTO-ENTMCNC: 28.8 G/DL (ref 31.4–37.4)
MCV RBC AUTO: 82 FL (ref 82–98)
MONOCYTES # BLD AUTO: 0.88 THOUSAND/ÂΜL (ref 0.17–1.22)
MONOCYTES NFR BLD AUTO: 9 % (ref 4–12)
NEUTROPHILS # BLD AUTO: 6.56 THOUSANDS/ÂΜL (ref 1.85–7.62)
NEUTS SEG NFR BLD AUTO: 62 % (ref 43–75)
NRBC BLD AUTO-RTO: 0 /100 WBCS
PLATELET # BLD AUTO: 238 THOUSANDS/UL (ref 149–390)
PMV BLD AUTO: 9 FL (ref 8.9–12.7)
POTASSIUM SERPL-SCNC: 5 MMOL/L (ref 3.5–5.3)
RBC # BLD AUTO: 5.47 MILLION/UL (ref 3.81–5.12)
SODIUM SERPL-SCNC: 138 MMOL/L (ref 135–147)
WBC # BLD AUTO: 10.39 THOUSAND/UL (ref 4.31–10.16)

## 2024-05-21 PROCEDURE — 99232 SBSQ HOSP IP/OBS MODERATE 35: CPT | Performed by: FAMILY MEDICINE

## 2024-05-21 PROCEDURE — 80048 BASIC METABOLIC PNL TOTAL CA: CPT | Performed by: STUDENT IN AN ORGANIZED HEALTH CARE EDUCATION/TRAINING PROGRAM

## 2024-05-21 PROCEDURE — 99233 SBSQ HOSP IP/OBS HIGH 50: CPT | Performed by: INTERNAL MEDICINE

## 2024-05-21 PROCEDURE — 85025 COMPLETE CBC W/AUTO DIFF WBC: CPT | Performed by: STUDENT IN AN ORGANIZED HEALTH CARE EDUCATION/TRAINING PROGRAM

## 2024-05-21 PROCEDURE — 99223 1ST HOSP IP/OBS HIGH 75: CPT | Performed by: INTERNAL MEDICINE

## 2024-05-21 PROCEDURE — NC001 PR NO CHARGE: Performed by: INTERNAL MEDICINE

## 2024-05-21 RX ADMIN — CEFAZOLIN SODIUM 2000 MG: 2 SOLUTION INTRAVENOUS at 08:59

## 2024-05-21 RX ADMIN — METOPROLOL TARTRATE 25 MG: 25 TABLET, FILM COATED ORAL at 22:52

## 2024-05-21 RX ADMIN — ROPINIROLE 0.5 MG: 0.25 TABLET, FILM COATED ORAL at 09:02

## 2024-05-21 RX ADMIN — METOPROLOL TARTRATE 25 MG: 25 TABLET, FILM COATED ORAL at 09:02

## 2024-05-21 RX ADMIN — CEFAZOLIN SODIUM 2000 MG: 2 SOLUTION INTRAVENOUS at 00:04

## 2024-05-21 RX ADMIN — ROPINIROLE 0.5 MG: 0.25 TABLET, FILM COATED ORAL at 17:17

## 2024-05-21 RX ADMIN — ASPIRIN 325 MG ORAL TABLET 325 MG: 325 PILL ORAL at 09:01

## 2024-05-21 RX ADMIN — AMLODIPINE BESYLATE 5 MG: 5 TABLET ORAL at 09:02

## 2024-05-21 RX ADMIN — POTASSIUM CHLORIDE 40 MEQ: 1500 TABLET, EXTENDED RELEASE ORAL at 17:17

## 2024-05-21 RX ADMIN — Medication 2.5 MG: at 22:52

## 2024-05-21 RX ADMIN — LISINOPRIL 10 MG: 10 TABLET ORAL at 09:02

## 2024-05-21 RX ADMIN — ATORVASTATIN CALCIUM 40 MG: 40 TABLET, FILM COATED ORAL at 22:52

## 2024-05-21 RX ADMIN — SENNOSIDES AND DOCUSATE SODIUM 1 TABLET: 50; 8.6 TABLET ORAL at 09:02

## 2024-05-21 RX ADMIN — ROPINIROLE 0.5 MG: 0.25 TABLET, FILM COATED ORAL at 22:52

## 2024-05-21 RX ADMIN — CEFAZOLIN SODIUM 2000 MG: 2 SOLUTION INTRAVENOUS at 16:21

## 2024-05-21 RX ADMIN — POTASSIUM CHLORIDE 40 MEQ: 1500 TABLET, EXTENDED RELEASE ORAL at 09:02

## 2024-05-21 RX ADMIN — SENNOSIDES AND DOCUSATE SODIUM 1 TABLET: 50; 8.6 TABLET ORAL at 17:17

## 2024-05-21 NOTE — PLAN OF CARE
Problem: INFECTION - ADULT  Goal: Absence or prevention of progression during hospitalization  Description: INTERVENTIONS:  - Assess and monitor for signs and symptoms of infection  - Monitor lab/diagnostic results  - Monitor all insertion sites, i.e. indwelling lines, tubes, and drains  - Monitor endotracheal if appropriate and nasal secretions for changes in amount and color  - [place] appropriate cooling/warming therapies per order  - Administer medications as ordered  - Instruct and encourage patient and family to use good hand hygiene technique  - Identify and instruct in appropriate isolation precautions for identified infection/condition  5/21/2024 1018 by Nida Woodard LPN  Outcome: Progressing  5/21/2024 1018 by Nida Woodard LPN  Outcome: Progressing

## 2024-05-21 NOTE — ASSESSMENT & PLAN NOTE
H/O carcinoma of the colon post right hemicolectomy.  Patient had Port-A-Cath placed on 05/03/2024 with planning to start on chemotherapy.  S/p port removal 5/16

## 2024-05-21 NOTE — ASSESSMENT & PLAN NOTE
2/2 blood cultures(5/16) + for MSSA 2/2 to infected port  2/2 blood cultures(5/18) neg so far  Catheter tip c/s + MSSA  IV ancef  ID following  TTE EF 60%, no valvular vegetation noted , EDWIN recommended (likely 5/22)  PICC line ordered

## 2024-05-21 NOTE — ASSESSMENT & PLAN NOTE
Lab Results   Component Value Date    EGFR 77 05/21/2024    EGFR 81 05/20/2024    EGFR 95 05/19/2024    CREATININE 0.82 05/21/2024    CREATININE 0.79 05/20/2024    CREATININE 0.67 05/19/2024     Present on admission history of CKD stage III.  Currently creatinine stable at baseline.

## 2024-05-21 NOTE — UTILIZATION REVIEW
Continued Stay Review    Date: 5/21                          Current Patient Class: IP  Current Level of Care: MS    HPI:63 y.o. female initially admitted on 5/16 MSSA bacteremia, port infection     Assessment/Plan:   Sepsis - Pt continues on IV antibiotics for MSSA bacteremia.  Blood cultures negative, can place PICC line.  Will get EDWIN to r/o vegetation. On exam lungs are clear, mild pitting edema BLE.  Has mild increase in WBC today. No other focal deficits.      5/21 Cardio Consult - consult for EDWIN to r/o endocarditis in pt with MSSA bacteremia - still pending this afternoon.  Pt with chronic SOB on 3L NC oxygen.  On exam decreased breath sounds bilat.  HR RRR.      Vital Signs:   Date/Time Temp Pulse Resp BP MAP (mmHg) SpO2 Calculated FIO2 (%) - Nasal Cannula Nasal Cannula O2 Flow Rate (L/min) O2 Device Patient Position - Orthostatic VS   05/21/24 08:17:48 98.2 °F (36.8 °C) 67 18 113/78 90 96 % 32 3 L/min Nasal cannula Sitting   05/20/24 21:50:43 97.8 °F (36.6 °C) 86 -- 143/93 110 90 % -- -- -- --   05/20/24 2147 -- 86 -- 143/93 -- -- -- -- -- --   05/20/24 2100 -- -- -- -- -- 90 % -- -- Nasal cannula --   05/20/24 14:38:59 97.7 °F (36.5 °C) 81 -- 138/79 99 99 % -- -- -- --   05/20/24 14:38:49 97.7 °F (36.5 °C) 77 -- 138/79 99 99 % -- -- -- --   05/20/24 07:12:23 97.8 °F (36.6 °C) 78 20 134/79 97 97 % -- -- -- --   05/20/24 0300 -- -- -- -- -- 99 % 32 3 L/min Nasal cannula --   05/20/24 0050 -- -- -- -- -- 96 % 32 3 L/min Nasal cannula --   05/19/24 22:23:55 -- 77 -- 137/79 98 93 % -- -- -- --   05/19/24 15:02:49 98 °F (36.7 °C) 58 -- 128/66 87 92 % -- -- -- --   05/19/24 12:56:26 -- 63 -- 126/67 87 97 % -- -- -- --   05/19/24 0900 -- -- -- -- -- 97 % 32 3 L/min Nasal cannula --   05/19/24 08:51:32 -- 73 -- 131/70 90 96 % -- -- -- --   05/19/24 0850 -- -- -- 131/70 -- -- -- -- -- --   05/19/24 0736 97.7 °F (36.5 °C) 72 20 138/72 -- 96 % -- -- -- --     Pertinent Labs/Diagnostic Results:     5/21 EDWIN - P        Results from last 7 days   Lab Units 05/21/24  0509 05/20/24 0447 05/19/24 0530 05/18/24 0453 05/17/24 0457 05/16/24 0445   WBC Thousand/uL 10.39* 8.96 8.42 8.62 9.70 24.02*   HEMOGLOBIN g/dL 13.0 13.1 12.3 12.1 12.7 13.3   HEMATOCRIT % 45.1 44.1 43.0 39.5 43.2 42.9   PLATELETS Thousands/uL 238 221 191 164 164 201   TOTAL NEUT ABS Thousands/µL 6.56 5.59  --   --   --  21.68*         Results from last 7 days   Lab Units 05/21/24 0509 05/20/24 0447 05/19/24 0530 05/18/24 0453 05/17/24 0457   SODIUM mmol/L 138 138 137 135 136   POTASSIUM mmol/L 5.0 4.4 4.0 3.7 4.0   CHLORIDE mmol/L 100 100 98 98 101   CO2 mmol/L 31 32 32 31 29   ANION GAP mmol/L 7 6 7 6 6   BUN mg/dL 18 18 14 11 10   CREATININE mg/dL 0.82 0.79 0.67 0.68 0.86   EGFR ml/min/1.73sq m 77 81 95 94 73   CALCIUM mg/dL 9.5 9.2 8.7 8.2* 8.5   MAGNESIUM mg/dL  --   --   --   --  1.9     Results from last 7 days   Lab Units 05/16/24 0445   AST U/L 30   ALT U/L 27   ALK PHOS U/L 161*   TOTAL PROTEIN g/dL 7.3   ALBUMIN g/dL 3.6   TOTAL BILIRUBIN mg/dL 0.55         Results from last 7 days   Lab Units 05/21/24  0509 05/20/24 0447 05/19/24 0530 05/18/24 0453 05/17/24 0457 05/16/24 0445   GLUCOSE RANDOM mg/dL 107 109 108 110 133 148*     Results from last 7 days   Lab Units 05/16/24 0533   D-DIMER QUANTITATIVE ug/ml FEU 3.66*     Results from last 7 days   Lab Units 05/16/24 0533   PROTIME seconds 14.1   INR  1.03   PTT seconds 27         Results from last 7 days   Lab Units 05/17/24 0457 05/16/24  0445   PROCALCITONIN ng/ml 1.09* 0.36*     Results from last 7 days   Lab Units 05/16/24  0719 05/16/24  0445   LACTIC ACID mmol/L 1.8 2.3*     Results from last 7 days   Lab Units 05/18/24  0504 05/18/24  0452 05/16/24  1142 05/16/24  0445   BLOOD CULTURE  No Growth at 72 hrs. No Growth at 72 hrs.  --  Staphylococcus aureus*  Staphylococcus aureus*   GRAM STAIN RESULT   --   --  1+ Polys  No bacteria seen Gram positive cocci in clusters*  Gram  positive cocci in clusters*   BODY FLUID CULTURE, STERILE   --   --  2+ Growth of Staphylococcus aureus*  --              Results from last 7 days   Lab Units 05/18/24  0453   VANCOMYCIN RM ug/mL <3.0*       Medications:   Scheduled Medications:  amLODIPine, 5 mg, Oral, Daily  aspirin, 325 mg, Oral, Daily  atorvastatin, 40 mg, Oral, HS  cefazolin, 2,000 mg, Intravenous, Q8H  enoxaparin, 40 mg, Subcutaneous, Q12H  lisinopril, 10 mg, Oral, Daily  metoprolol tartrate, 25 mg, Oral, Q12H ANGELES  potassium chloride, 40 mEq, Oral, BID  rOPINIRole, 0.5 mg, Oral, TID  senna-docusate sodium, 1 tablet, Oral, BID  torsemide, 20 mg, Oral, Daily      Continuous IV Infusions:     PRN Meds:  acetaminophen, 1,000 mg, Intravenous, Q6H PRN  albuterol, 2.5 mg, Nebulization, Q6H PRN  ondansetron, 8 mg, Oral, Q8H PRN  oxyCODONE, 2.5 mg, Oral, Q6H PRN - x 1 5/20  zolpidem, 5 mg, Oral, Daily PRN    Discharge Plan: home with [facility] services    Network Utilization Review Department  ATTENTION: Please call with any questions or concerns to [phone] and carefully listen to the prompts so that you are directed to the right person. All voicemails are confidential.   For Discharge needs, contact Care Management DC Support Team at [phone] opt[facility]  Send all requests for admission clinical reviews, approved or denied determinations and any other requests to dedicated fax number below belonging to the [facility] where the patient is receiving treatment. List of dedicated fax numbers for the Facilities:  FACILITY NAME UR FAX NUMBER   ADMISSION DENIALS (Administrative/Medical Necessity) [phone]   DISCHARGE SUPPORT TEAM (NETWORK) [phone]   PARENT CHILD HEALTH (Maternity/NICU/Pediatrics) [phone]   [facility] [phone]   [facility] [phone]   [facility] [phone]   [facility] [phone]   [facility]  [phone]   [facility] [phone]   [facility] [phone]   [facility] [phone]   [facility] [phone]   Critical access hospital [phone]   [facility] [phone]   [facility] [phone]

## 2024-05-21 NOTE — ASSESSMENT & PLAN NOTE
Present on admission history of CAD.  Currently asymptomatic.  Cw home dose of aspirin, statin, metoprolol, Norvasc, Demadex.

## 2024-05-21 NOTE — PLAN OF CARE
Problem: INFECTION - ADULT  Goal: Absence or prevention of progression during hospitalization  Description: INTERVENTIONS:  - Assess and monitor for signs and symptoms of infection  - Monitor lab/diagnostic results  - Monitor all insertion sites, i.e. indwelling lines, tubes, and drains  - Monitor endotracheal if appropriate and nasal secretions for changes in amount and color  - [place] appropriate cooling/warming therapies per order  - Administer medications as ordered  - Instruct and encourage patient and family to use good hand hygiene technique  - Identify and instruct in appropriate isolation precautions for identified infection/condition  Outcome: Progressing

## 2024-05-21 NOTE — PROGRESS NOTES
[facility]  Progress Note  Name: Dwaine Gould I  MRN: [id number]  Unit/Bed#: -01 I Date of Admission: 5/16/2024   Date of Service: 5/21/2024 I Hospital Day: 5    Assessment & Plan   * Sepsis (HCC)  Assessment & Plan  **63-year-old female patient with past medical history of adenocarcinoma of colon s/p hemicolectomy, had Port-A-Cath placed on May 3, 2024 with planning to start chemotherapy now hospitalized due to sepsis due to infected port and MSSA bacteremia    Continue IV ancef  S/p port removal with IR 5/16        Staphylococcus aureus bacteremia  Assessment & Plan  2/2 blood cultures(5/16) + for MSSA 2/2 to infected port  2/2 blood cultures(5/18) neg so far  Catheter tip c/s + MSSA  IV ancef  ID following  TTE EF 60%, no valvular vegetation noted , EDWIN recommended (likely 5/22)  PICC line ordered    Port-A-Cath in place  Assessment & Plan  H/O carcinoma of the colon post right hemicolectomy.  Patient had Port-A-Cath placed on 05/03/2024 with planning to start on chemotherapy.  S/p port removal 5/16    Stage 3a chronic kidney disease (HCC)  Assessment & Plan  Lab Results   Component Value Date    EGFR 77 05/21/2024    EGFR 81 05/20/2024    EGFR 95 05/19/2024    CREATININE 0.82 05/21/2024    CREATININE 0.79 05/20/2024    CREATININE 0.67 05/19/2024     Present on admission history of CKD stage III.  Currently creatinine stable at baseline.    Essential hypertension  Assessment & Plan  Blood pressure currently stable.  cw lisinopril, Norvasc, Lopressor, Demadex.    COPD (chronic obstructive pulmonary disease) (HCC)  Assessment & Plan  Present on admission with history of COPD.  Chronic home O2 dependent 3L currently stable at baseline.        Chronic heart failure with preserved ejection fraction (HCC)  Assessment & Plan  Wt Readings from Last 3 Encounters:   05/17/24 106 kg (234 lb)   05/15/24 106 kg (234 lb 6.4 oz)   05/08/24 108 kg (237 lb)       Present on admission with history of  chronic heart failure with preserved ejection fraction.  Most recent echo from 2024 noted with EF of 60%.  continue home dose of Demadex.  Low salt fluid restricted diet.             CAD (coronary artery disease)  Assessment & Plan  Present on admission history of CAD.  Currently asymptomatic.  Cw home dose of aspirin, statin, metoprolol, Norvasc, Demadex.               VTE Pharmacologic Prophylaxis:   LOVENOX    Mobility:   Basic Mobility Inpatient Raw Score: 24  JH-HLM Goal: 8: Walk 250 feet or more  JH-HLM Achieved: 8: Walk 250 feet ot more  JH-HLM Goal achieved. Continue to encourage appropriate mobility.    Patient Centered Rounds: I performed bedside rounds with nursing staff today.   Discussions with Specialists or Other Care Team Provider: id, CARDIOLOGY    Education and Discussions with Family / Patient: patient    Total Time Spent on Date of Encounter in care of patient: 35 mins. This time was spent on one or more of the following: performing physical exam; counseling and coordination of care; obtaining or reviewing history; documenting in the medical record; reviewing/ordering tests, medications or procedures; communicating with other healthcare professionals and discussing with patient's family/caregivers.    Current Length of Stay: 5 day(s)  Current Patient Status: Inpatient   Certification Statement: The patient will continue to require additional inpatient hospital stay due to needs EDWIN  Discharge Plan: ?24-48HRS    Code Status: Level 1 - Full Code    Subjective:   NO NEW CONCERNS    Objective:     Vitals:   Temp (24hrs), Av °F (36.7 °C), Min:97.8 °F (36.6 °C), Max:98.2 °F (36.8 °C)    Temp:  [97.8 °F (36.6 °C)-98.2 °F (36.8 °C)] 98.2 °F (36.8 °C)  HR:  [67-86] 67  Resp:  [18] 18  BP: (113-143)/(78-93) 113/78  SpO2:  [90 %-96 %] 96 %  Body mass index is 45.7 kg/m².     Input and Output Summary (last 24 hours):     Intake/Output Summary (Last 24 hours) at 2024 1520  Last data filed at  5/21/2024 0859  Gross per 24 hour   Intake 530 ml   Output --   Net 530 ml       Physical Exam:   Physical Exam  Constitutional:       General: She is not in acute distress.     Appearance: She is obese. She is not toxic-appearing.   HENT:      Mouth/Throat:      Mouth: Mucous membranes are moist.      Pharynx: Oropharynx is clear.   Cardiovascular:      Rate and Rhythm: Normal rate and regular rhythm.      Pulses: Normal pulses.   Pulmonary:      Effort: Pulmonary effort is normal. No respiratory distress.      Breath sounds: Normal breath sounds.   Abdominal:      General: Abdomen is flat. Bowel sounds are normal.      Palpations: Abdomen is soft.   Musculoskeletal:      Left lower leg: No edema.   Neurological:      General: No focal deficit present.      Mental Status: She is alert and oriented to person, place, and time.          Additional Data:     Labs:  Results from last 7 days   Lab Units 05/21/24  0509   WBC Thousand/uL 10.39*   HEMOGLOBIN g/dL 13.0   HEMATOCRIT % 45.1   PLATELETS Thousands/uL 238   SEGS PCT % 62   LYMPHO PCT % 25   MONO PCT % 9   EOS PCT % 2     Results from last 7 days   Lab Units 05/21/24  0509 05/17/24  0457 05/16/24  0445   SODIUM mmol/L 138   < > 134*   POTASSIUM mmol/L 5.0   < > 3.2*   CHLORIDE mmol/L 100   < > 99   CO2 mmol/L 31   < > 24   BUN mg/dL 18   < > 14   CREATININE mg/dL 0.82   < > 0.94   ANION GAP mmol/L 7   < > 11   CALCIUM mg/dL 9.5   < > 8.7   ALBUMIN g/dL  --   --  3.6   TOTAL BILIRUBIN mg/dL  --   --  0.55   ALK PHOS U/L  --   --  161*   ALT U/L  --   --  27   AST U/L  --   --  30   GLUCOSE RANDOM mg/dL 107   < > 148*    < > = values in this interval not displayed.     Results from last 7 days   Lab Units 05/16/24  0533   INR  1.03             Results from last 7 days   Lab Units 05/17/24  0457 05/16/24  0719 05/16/24  0445   LACTIC ACID mmol/L  --  1.8 2.3*   PROCALCITONIN ng/ml 1.09*  --  0.36*       Lines/Drains:  Invasive Devices       Peripheral Intravenous  Line  Duration             Peripheral IV 05/16/24 Right;Ventral (anterior) Hand 5 days                      Recent Cultures (last 7 days):   Results from last 7 days   Lab Units 05/18/24  0504 05/18/24  0452 05/16/24  1142 05/16/24  0445   BLOOD CULTURE  No Growth at 72 hrs. No Growth at 72 hrs.  --  Staphylococcus aureus*  Staphylococcus aureus*   GRAM STAIN RESULT   --   --  1+ Polys  No bacteria seen Gram positive cocci in clusters*  Gram positive cocci in clusters*   BODY FLUID CULTURE, STERILE   --   --  2+ Growth of Staphylococcus aureus*  --        Last 24 Hours Medication List:   Current Facility-Administered Medications   Medication Dose Route Frequency Provider Last Rate    acetaminophen  1,000 mg Intravenous Q6H PRN Priti Kimkevin Reed, DO      albuterol  2.5 mg Nebulization Q6H PRN Michael FOWLER MD      amLODIPine  5 mg Oral Daily Michael FOWLER MD      aspirin  325 mg Oral Daily Michael FOWLER MD      atorvastatin  40 mg Oral HS Michael FOWLER MD      cefazolin  2,000 mg Intravenous Q8H Pa Barnard MD 2,000 mg (05/21/24 0859)    enoxaparin  40 mg Subcutaneous Q12H Michael FOWLER MD      lisinopril  10 mg Oral Daily Michael FOWLER MD      metoprolol tartrate  25 mg Oral Q12H ANGELES Michael FOWLER MD      ondansetron  8 mg Oral Q8H PRN Michael FOWLER MD      oxyCODONE  2.5 mg Oral Q6H PRN Pritialexia Reed, DO      potassium chloride  40 mEq Oral BID Michael FOWLER MD      rOPINIRole  0.5 mg Oral TID Michael FOWLER MD      senna-docusate sodium  1 tablet Oral BID Priti Kimkevin Reed, DO      torsemide  20 mg Oral Daily Michael FOWLER MD      zolpidem  5 mg Oral Daily PRN Michael FOWLER MD          Today, Patient Was Seen By: Kimberly Godoy MD    **Please Note: This note may have been constructed using a voice recognition system.**

## 2024-05-21 NOTE — ASSESSMENT & PLAN NOTE
**63-year-old female patient with past medical history of adenocarcinoma of colon s/p hemicolectomy, had Port-A-Cath placed on May 3, 2024 with planning to start chemotherapy now hospitalized due to sepsis due to infected port and MSSA bacteremia    Continue IV ancef  S/p port removal with IR 5/16

## 2024-05-21 NOTE — PROGRESS NOTES
Progress Note - Infectious Disease   Dwaine Gould 63 y.o. female MRN: [id number]  Unit/Bed#: -01 Encounter: [id number]      Impression/Plan:    MSSA bacteremia  -2/2 blood cultures growing MSSA. Source is infected port as below with resultant soft tissue chest wall infection. Cultures of both port tip and port pocket also growing MSSA. She has no cardiac devices. Consider endocarditis, though TTE (adequate study) without valvular vegetation. Repeat blood cultures 5/18 negative thus far.  -Continue IV Cefazolin 2g every 8 hours  -As blood cultures from 5/18 are negative X 72 hours, OK from ID perspective to place PICC  -As there may be metastatic foci of infection (with chest wall soft tissue infection and inflammation close to manubrium) and patient will also be immunocompromised after starting chemotherapy in near future, would recommend EDWIN to better rule out endocarditis  -If EDWIN is negative for vegetation then anticipate completion of 2 weeks of IV antibiotic from first negative blood cultures and from port removal     Port infection  -Port placed on 5/3 with plans for future chemotherapy for adenocarcinoma of colon.  Patient developed acute drainage from the port, CT scan noting possibly developing abscess/edema noted to port and complicated by MSSA bacteremia. IR removed port 5/16 and placed drain into port pocket. Cultures sent from port pocket (cloudy fluid) and from port tip both growing MSSA matching the blood cultures. As above, TTE negative for vegetation. IR removed port site drain on 5/20.  -Antibiotic plan as above  -Recommend EDWIN as above  -Repeat CBC tomorrow to monitor WBC  -Trend fever curve and hemodynamics     3. Soft tissue hypodensity anterior to manubrium  -Seen on CT scan, read as likely confluent edema measuring 4.1 x 3.1 cm.  I personally discussed the CT scan with radiology.  There was no major radiographic evidence to suggest an abscess and no cortical irregularity of the manubrium  to suggest osteomyelitis.  Follow-up soft tissue ultrasound did not demonstrate any abnormality.  Suspect this was likely edema related to the above port infection.  -Monitor chest wall for development of any swelling, erythema or pain     4. Adenocarcinoma of colon  -Diagnosed in March, status post right hemicolectomy. Eventual plans to start FOLFOX.  -Close Oncology follow up     5. Chronic hypoxic respiraotry failure  -Noted. On home 3L NC.    Plan and recommendations were discussed with primary team.  They agree with plan to continue IV Cefazolin.      Antibiotics:  Cefazolin: 5    24 Hour Events:  Afebrile, WBC 10.3 this a.m.    Subjective:  Patient has no fever, chills, sweats; no nausea, vomiting, diarrhea.  No major pain in her chest wall.  Denies any back pain or joint pain.    Objective:  Vitals:  Temp:  [97.7 °F (36.5 °C)-98.2 °F (36.8 °C)] 98.2 °F (36.8 °C)  HR:  [67-86] 67  Resp:  [18] 18  BP: (113-143)/(78-93) 113/78  SpO2:  [90 %-99 %] 96 %  Temp (24hrs), Av.9 °F (36.6 °C), Min:97.7 °F (36.5 °C), Max:98.2 °F (36.8 °C)  Current: Temperature: 98.2 °F (36.8 °C)    Physical Exam:   General Appearance:  Alert, interactive, nontoxic, no acute distress.   Throat: Oropharynx moist without lesions.    Lungs:   Clear to auscultation bilaterally; no wheezes, rhonchi or rales; respirations unlabored   Heart:  RRR   Abdomen:   Soft, non-tender.     Extremities: Mild bilateral pitting edema of both legs   Skin: No new rashes         Labs:   All pertinent labs and imaging studies were personally reviewed  Results from last 7 days   Lab Units 24  0509 24  04424  0530   WBC Thousand/uL 10.39* 8.96 8.42   HEMOGLOBIN g/dL 13.0 13.1 12.3   PLATELETS Thousands/uL 238 221 191     Results from last 7 days   Lab Units 24  0509 24  0447 24  0530 24  0457 24  0445   SODIUM mmol/L 138 138 137   < > 134*   POTASSIUM mmol/L 5.0 4.4 4.0   < > 3.2*   CHLORIDE mmol/L 100 100 98    < > 99   CO2 mmol/L 31 32 32   < > 24   BUN mg/dL 18 18 14   < > 14   CREATININE mg/dL 0.82 0.79 0.67   < > 0.94   EGFR ml/min/1.73sq m 77 81 95   < > 65   CALCIUM mg/dL 9.5 9.2 8.7   < > 8.7   AST U/L  --   --   --   --  30   ALT U/L  --   --   --   --  27   ALK PHOS U/L  --   --   --   --  161*    < > = values in this interval not displayed.     Results from last 7 days   Lab Units 05/17/24  0457 05/16/24  0445   PROCALCITONIN ng/ml 1.09* 0.36*             Results from last 7 days   Lab Units 05/16/24  0533   D-DIMER QUANTITATIVE ug/ml FEU 3.66*       Micro:  Results from last 7 days   Lab Units 05/18/24  0504 05/18/24  0452 05/16/24  1142 05/16/24  0445   BLOOD CULTURE  No Growth at 48 hrs. No Growth at 48 hrs.  --  Staphylococcus aureus*  Staphylococcus aureus*   GRAM STAIN RESULT   --   --  1+ Polys  No bacteria seen Gram positive cocci in clusters*  Gram positive cocci in clusters*   BODY FLUID CULTURE, STERILE   --   --  2+ Growth of Staphylococcus aureus*  --        Imaging:          Pa Barnard MD  [facility]

## 2024-05-21 NOTE — ASSESSMENT & PLAN NOTE
Present on admission with history of COPD.  Chronic home O2 dependent 3L currently stable at baseline.

## 2024-05-22 ENCOUNTER — APPOINTMENT (OUTPATIENT)
Dept: NON INVASIVE DIAGNOSTICS | Facility: HOSPITAL | Age: 64
End: 2024-05-22
Payer: COMMERCIAL

## 2024-05-22 ENCOUNTER — TELEPHONE (OUTPATIENT)
Age: 64
End: 2024-05-22

## 2024-05-22 ENCOUNTER — ANESTHESIA EVENT (INPATIENT)
Dept: NON INVASIVE DIAGNOSTICS | Facility: HOSPITAL | Age: 64
End: 2024-05-22
Payer: COMMERCIAL

## 2024-05-22 ENCOUNTER — ANESTHESIA (INPATIENT)
Dept: NON INVASIVE DIAGNOSTICS | Facility: HOSPITAL | Age: 64
End: 2024-05-22
Payer: COMMERCIAL

## 2024-05-22 LAB
ANION GAP SERPL CALCULATED.3IONS-SCNC: 5 MMOL/L (ref 4–13)
BASOPHILS # BLD AUTO: 0.08 THOUSANDS/ÂΜL (ref 0–0.1)
BASOPHILS NFR BLD AUTO: 1 % (ref 0–1)
BUN SERPL-MCNC: 18 MG/DL (ref 5–25)
CALCIUM SERPL-MCNC: 9.7 MG/DL (ref 8.4–10.2)
CHLORIDE SERPL-SCNC: 101 MMOL/L (ref 96–108)
CO2 SERPL-SCNC: 31 MMOL/L (ref 21–32)
CREAT SERPL-MCNC: 0.73 MG/DL (ref 0.6–1.3)
EOSINOPHIL # BLD AUTO: 0.21 THOUSAND/ÂΜL (ref 0–0.61)
EOSINOPHIL NFR BLD AUTO: 2 % (ref 0–6)
ERYTHROCYTE [DISTWIDTH] IN BLOOD BY AUTOMATED COUNT: 22 % (ref 11.6–15.1)
GFR SERPL CREATININE-BSD FRML MDRD: 89 ML/MIN/1.73SQ M
GLUCOSE SERPL-MCNC: 108 MG/DL (ref 65–140)
HCT VFR BLD AUTO: 45.9 % (ref 34.8–46.1)
HGB BLD-MCNC: 13.5 G/DL (ref 11.5–15.4)
IMM GRANULOCYTES # BLD AUTO: 0.22 THOUSAND/UL (ref 0–0.2)
IMM GRANULOCYTES NFR BLD AUTO: 2 % (ref 0–2)
LYMPHOCYTES # BLD AUTO: 3.06 THOUSANDS/ÂΜL (ref 0.6–4.47)
LYMPHOCYTES NFR BLD AUTO: 23 % (ref 14–44)
MCH RBC QN AUTO: 24.1 PG (ref 26.8–34.3)
MCHC RBC AUTO-ENTMCNC: 29.4 G/DL (ref 31.4–37.4)
MCV RBC AUTO: 82 FL (ref 82–98)
MONOCYTES # BLD AUTO: 0.89 THOUSAND/ÂΜL (ref 0.17–1.22)
MONOCYTES NFR BLD AUTO: 7 % (ref 4–12)
NEUTROPHILS # BLD AUTO: 8.69 THOUSANDS/ÂΜL (ref 1.85–7.62)
NEUTS SEG NFR BLD AUTO: 65 % (ref 43–75)
NRBC BLD AUTO-RTO: 0 /100 WBCS
PLATELET # BLD AUTO: 288 THOUSANDS/UL (ref 149–390)
PMV BLD AUTO: 9.4 FL (ref 8.9–12.7)
POTASSIUM SERPL-SCNC: 5 MMOL/L (ref 3.5–5.3)
RBC # BLD AUTO: 5.6 MILLION/UL (ref 3.81–5.12)
SL CV LV EF: 65
SODIUM SERPL-SCNC: 137 MMOL/L (ref 135–147)
WBC # BLD AUTO: 13.15 THOUSAND/UL (ref 4.31–10.16)

## 2024-05-22 PROCEDURE — 93312 ECHO TRANSESOPHAGEAL: CPT

## 2024-05-22 PROCEDURE — 36569 INSJ PICC 5 YR+ W/O IMAGING: CPT

## 2024-05-22 PROCEDURE — 02HV33Z INSERTION OF INFUSION DEVICE INTO SUPERIOR VENA CAVA, PERCUTANEOUS APPROACH: ICD-10-PCS | Performed by: FAMILY MEDICINE

## 2024-05-22 PROCEDURE — 99233 SBSQ HOSP IP/OBS HIGH 50: CPT | Performed by: INTERNAL MEDICINE

## 2024-05-22 PROCEDURE — 93312 ECHO TRANSESOPHAGEAL: CPT | Performed by: INTERNAL MEDICINE

## 2024-05-22 PROCEDURE — 99232 SBSQ HOSP IP/OBS MODERATE 35: CPT | Performed by: FAMILY MEDICINE

## 2024-05-22 PROCEDURE — 93325 DOPPLER ECHO COLOR FLOW MAPG: CPT | Performed by: INTERNAL MEDICINE

## 2024-05-22 PROCEDURE — C1751 CATH, INF, PER/CENT/MIDLINE: HCPCS

## 2024-05-22 PROCEDURE — B246ZZ4 ULTRASONOGRAPHY OF RIGHT AND LEFT HEART, TRANSESOPHAGEAL: ICD-10-PCS | Performed by: INTERNAL MEDICINE

## 2024-05-22 PROCEDURE — 93320 DOPPLER ECHO COMPLETE: CPT | Performed by: INTERNAL MEDICINE

## 2024-05-22 PROCEDURE — 85025 COMPLETE CBC W/AUTO DIFF WBC: CPT | Performed by: FAMILY MEDICINE

## 2024-05-22 PROCEDURE — 80048 BASIC METABOLIC PNL TOTAL CA: CPT | Performed by: FAMILY MEDICINE

## 2024-05-22 RX ORDER — LORAZEPAM 2 MG/ML
0.5 INJECTION INTRAMUSCULAR ONCE
Status: COMPLETED | OUTPATIENT
Start: 2024-05-22 | End: 2024-05-22

## 2024-05-22 RX ORDER — SODIUM CHLORIDE, SODIUM LACTATE, POTASSIUM CHLORIDE, CALCIUM CHLORIDE 600; 310; 30; 20 MG/100ML; MG/100ML; MG/100ML; MG/100ML
INJECTION, SOLUTION INTRAVENOUS CONTINUOUS PRN
Status: DISCONTINUED | OUTPATIENT
Start: 2024-05-22 | End: 2024-05-22

## 2024-05-22 RX ORDER — IPRATROPIUM BROMIDE AND ALBUTEROL SULFATE 2.5; .5 MG/3ML; MG/3ML
3 SOLUTION RESPIRATORY (INHALATION) ONCE
Status: COMPLETED | OUTPATIENT
Start: 2024-05-22 | End: 2024-05-22

## 2024-05-22 RX ORDER — LIDOCAINE HYDROCHLORIDE 20 MG/ML
INJECTION, SOLUTION EPIDURAL; INFILTRATION; INTRACAUDAL; PERINEURAL AS NEEDED
Status: DISCONTINUED | OUTPATIENT
Start: 2024-05-22 | End: 2024-05-22

## 2024-05-22 RX ORDER — PROPOFOL 10 MG/ML
INJECTION, EMULSION INTRAVENOUS AS NEEDED
Status: DISCONTINUED | OUTPATIENT
Start: 2024-05-22 | End: 2024-05-22

## 2024-05-22 RX ADMIN — ROPINIROLE 0.5 MG: 0.25 TABLET, FILM COATED ORAL at 22:54

## 2024-05-22 RX ADMIN — PROPOFOL 50 MG: 10 INJECTION, EMULSION INTRAVENOUS at 10:42

## 2024-05-22 RX ADMIN — ATORVASTATIN CALCIUM 40 MG: 40 TABLET, FILM COATED ORAL at 22:54

## 2024-05-22 RX ADMIN — LISINOPRIL 10 MG: 10 TABLET ORAL at 12:53

## 2024-05-22 RX ADMIN — ROPINIROLE 0.5 MG: 0.25 TABLET, FILM COATED ORAL at 17:22

## 2024-05-22 RX ADMIN — AMLODIPINE BESYLATE 5 MG: 5 TABLET ORAL at 12:53

## 2024-05-22 RX ADMIN — POTASSIUM CHLORIDE 40 MEQ: 1500 TABLET, EXTENDED RELEASE ORAL at 17:21

## 2024-05-22 RX ADMIN — ASPIRIN 325 MG ORAL TABLET 325 MG: 325 PILL ORAL at 12:53

## 2024-05-22 RX ADMIN — PROPOFOL 50 MG: 10 INJECTION, EMULSION INTRAVENOUS at 10:48

## 2024-05-22 RX ADMIN — TORSEMIDE 20 MG: 20 TABLET ORAL at 12:53

## 2024-05-22 RX ADMIN — CEFAZOLIN SODIUM 2000 MG: 2 SOLUTION INTRAVENOUS at 00:24

## 2024-05-22 RX ADMIN — METOPROLOL TARTRATE 25 MG: 25 TABLET, FILM COATED ORAL at 22:54

## 2024-05-22 RX ADMIN — SODIUM CHLORIDE, SODIUM LACTATE, POTASSIUM CHLORIDE, AND CALCIUM CHLORIDE: .6; .31; .03; .02 INJECTION, SOLUTION INTRAVENOUS at 10:25

## 2024-05-22 RX ADMIN — LORAZEPAM 0.5 MG: 2 INJECTION INTRAMUSCULAR; INTRAVENOUS at 13:14

## 2024-05-22 RX ADMIN — Medication 2.5 MG: at 22:54

## 2024-05-22 RX ADMIN — PROPOFOL 150 MG: 10 INJECTION, EMULSION INTRAVENOUS at 10:40

## 2024-05-22 RX ADMIN — TOPICAL ANESTHETIC 1 SPRAY: 200 SPRAY DENTAL; PERIODONTAL at 10:28

## 2024-05-22 RX ADMIN — SENNOSIDES AND DOCUSATE SODIUM 1 TABLET: 50; 8.6 TABLET ORAL at 17:21

## 2024-05-22 RX ADMIN — PROPOFOL 50 MG: 10 INJECTION, EMULSION INTRAVENOUS at 10:45

## 2024-05-22 RX ADMIN — LIDOCAINE HYDROCHLORIDE 100 MG: 20 INJECTION, SOLUTION EPIDURAL; INFILTRATION; INTRACAUDAL; PERINEURAL at 10:40

## 2024-05-22 RX ADMIN — IPRATROPIUM BROMIDE AND ALBUTEROL SULFATE 3 ML: 2.5; .5 SOLUTION RESPIRATORY (INHALATION) at 09:43

## 2024-05-22 RX ADMIN — CEFAZOLIN SODIUM 2000 MG: 2 SOLUTION INTRAVENOUS at 17:20

## 2024-05-22 NOTE — TELEPHONE ENCOUNTER
Belia from [facility] called to get verbal order for saline/ Heparin flush before and after pt infusion of Cefazolin IV, they will also do weekly dressing changes.      Call [phone] opt[facility] for pharmacy      1240:Called [facility] spoke with Nelson made  aware that Per Dr. Barnard pt is allowed to receive standard flush orders saline/Heparin.

## 2024-05-22 NOTE — ANESTHESIA POSTPROCEDURE EVALUATION
Post-Op Assessment Note    CV Status:  Stable  Pain Score: 0    Pain management: adequate       Mental Status:  Alert and awake   Hydration Status:  Euvolemic   PONV Controlled:  Controlled   Airway Patency:  Patent and adequate  Two or more mitigation strategies used for obstructive sleep apnea   Post Op Vitals Reviewed: Yes    No anethesia notable event occurred.    Staff: CRNA               BP   121/68   Temp      Pulse 62   Resp   24   SpO2 99

## 2024-05-22 NOTE — ASSESSMENT & PLAN NOTE
2/2 blood cultures(5/16) + for MSSA 2/2 to infected port  2/2 blood cultures(5/18) neg so far  Catheter tip c/s + MSSA  IV ancef  ID following  TTE EF 60%, no valvular vegetation noted , EDWIN done, pending results  PICC line ordered

## 2024-05-22 NOTE — ANESTHESIA PREPROCEDURE EVALUATION
Procedure:  EDWIN    Relevant Problems   CARDIO   (+) CAD (coronary artery disease)   (+) Essential hypertension   (+) Hyperlipidemia      GI/HEPATIC   (+) Adenocarcinoma, colon (HCC)      /RENAL   (+) Stage 3a chronic kidney disease (HCC)      HEMATOLOGY   (+) Anemia   (+) Thrombocytopenia (HCC)      PULMONARY   (+) COPD (chronic obstructive pulmonary disease) (HCC)   (+) Chronic respiratory failure with hypoxia and hypercapnia (HCC)      Behavioral Health   (+) Tobacco abuse      Cardiovascular/Peripheral Vascular   (+) Chronic heart failure with preserved ejection fraction (HCC)      Other   (+) Sepsis ([facility])   (+) Staphylococcus aureus bacteremia   Infected port, r/o endocarditis  Oxygen dependent, on continuous oxygen 3LPM     Physical Exam    Airway    Mallampati score: III  TM Distance: >3 FB  Neck ROM: full     Dental   Comment: Denies loose teeth  Poor dentition, many missing/broken     Cardiovascular  Cardiovascular exam normal    Pulmonary  Pulmonary exam normal     Other Findings  Portions of exam deferred due to low yield and/or unknown COVID statuspost-pubertal.      Anesthesia Plan  ASA Score- 4     Anesthesia Type- IV sedation with anesthesia with ASA Monitors.         Additional Monitors:     Airway Plan:            Plan Factors-    Chart reviewed.   Existing labs reviewed. Patient summary reviewed.    Patient is not a current smoker.              Induction- intravenous.    Postoperative Plan-     Perioperative Resuscitation Plan - Level 1 - Full Code.       Informed Consent- Anesthetic plan and risks discussed with patient.  I personally reviewed this patient with the CRNA. Discussed and agreed on the Anesthesia Plan with the CRNA..

## 2024-05-22 NOTE — DISCHARGE INSTRUCTIONS
Transesophageal Echocardiogram   WHAT YOU NEED TO KNOW:   A transesophageal echocardiogram (EDWIN) is a procedure used to check for problems with your heart. It will also show any problems in the blood vessels near your heart. Sound waves are sent to the heart through a tube inserted into your throat. The sound waves show the structure and function of your heart through pictures on a monitor.  DISCHARGE INSTRUCTIONS:   Call your local emergency number (911 in the [place]) if:   You have any of the following signs of a heart attack:      Squeezing, pressure, or pain in your chest    You may  also have any of the following:     Discomfort or pain in your back, neck, jaw, stomach, or arm    Shortness of breath    Nausea or vomiting    Lightheadedness or a sudden cold sweat      Call your doctor or cardiologist if:   You have a fever or chills.    You taste blood.    You have a severe sore throat or trouble swallowing.    You have questions or concerns about your condition or care.    Do not eat or drink anything until you are able to swallow normally:  Start with soft foods, such as oatmeal, yogurt, or applesauce. When you can eat soft foods easily, you may begin to eat solid foods.  What you can do to keep your heart healthy:   Eat heart-healthy foods.  Eat whole grains, fruits, and vegetables every day. Limit salt and high-fat foods. Ask your healthcare provider for more information on a heart-healthy diet.    Exercise as directed.  Your healthcare provider may suggest an exercise program to help improve your heart health. It is best to start slowly, and do more as you get stronger.  Do not start an exercise program without talking with your healthcare provider.     Maintain a healthy weight.  Keep a healthy weight so your heart does not have to work so hard. Ask what weight is healthy for you. Your healthcare provider can help you create a safe weight-loss plan, if needed.    Manage your medical conditions.  High blood  pressure, high cholesterol, and diabetes can lead to heart problems. Work with your healthcare provider to manage your medical conditions and decrease your risk for heart problems.    Do not smoke.  Nicotine and other chemicals in cigarettes and cigars can cause lung damage. Ask your healthcare provider for information if you currently smoke and need help to quit. E-cigarettes or smokeless tobacco still contain nicotine. Talk to your healthcare provider before you use these products.       Follow up with your doctor or cardiologist as directed:  Write down your questions so you remember to ask them during your visits.  © Copyright [employer] 2023 Information is for End User's use only and may not be sold, redistributed or otherwise used for commercial purposes.  The above information is an  only. It is not intended as medical advice for individual conditions or treatments. Talk to your doctor, nurse or pharmacist before following any medical regimen to see if it is safe and effective for you.

## 2024-05-22 NOTE — PROGRESS NOTES
[facility]  Progress Note  Name: Dwaine Gould I  MRN: [id number]  Unit/Bed#: -01 I Date of Admission: 5/16/2024   Date of Service: 5/22/2024 I Hospital Day: 6    Assessment & Plan   * Sepsis (HCC)  Assessment & Plan  **63-year-old female patient with past medical history of adenocarcinoma of colon s/p hemicolectomy, had Port-A-Cath placed on May 3, 2024 with planning to start chemotherapy now hospitalized due to sepsis due to infected port and MSSA bacteremia    Continue IV ancef  S/p port removal with IR 5/16        Staphylococcus aureus bacteremia  Assessment & Plan  2/2 blood cultures(5/16) + for MSSA 2/2 to infected port  2/2 blood cultures(5/18) neg so far  Catheter tip c/s + MSSA  IV ancef  ID following  TTE EF 60%, no valvular vegetation noted , EDWIN done, pending results  PICC line ordered    Port-A-Cath in place  Assessment & Plan  H/O carcinoma of the colon post right hemicolectomy.  Patient had Port-A-Cath placed on 05/03/2024 with planning to start on chemotherapy.  S/p port removal 5/16    Stage 3a chronic kidney disease (HCC)  Assessment & Plan  Lab Results   Component Value Date    EGFR 89 05/22/2024    EGFR 77 05/21/2024    EGFR 81 05/20/2024    CREATININE 0.73 05/22/2024    CREATININE 0.82 05/21/2024    CREATININE 0.79 05/20/2024     Present on admission history of CKD stage III.  Currently creatinine stable at baseline.    Essential hypertension  Assessment & Plan  Blood pressure currently stable.  cw lisinopril, Norvasc, Lopressor, Demadex.    COPD (chronic obstructive pulmonary disease) (HCC)  Assessment & Plan  Present on admission with history of COPD.  Chronic home O2 dependent 3L currently stable at baseline.        Chronic heart failure with preserved ejection fraction (HCC)  Assessment & Plan  Wt Readings from Last 3 Encounters:   05/22/24 106 kg (234 lb)   05/15/24 106 kg (234 lb 6.4 oz)   05/08/24 108 kg (237 lb)       Present on admission with history of  chronic heart failure with preserved ejection fraction.  Most recent echo from 2024 noted with EF of 60%.  continue home dose of Demadex.  Low salt fluid restricted diet.             CAD (coronary artery disease)  Assessment & Plan  Present on admission history of CAD.  Currently asymptomatic.  Cw home dose of aspirin, statin, metoprolol, Norvasc, Demadex.               VTE Pharmacologic Prophylaxis:   lovenox    Mobility:   Basic Mobility Inpatient Raw Score: 24  JH-HLM Goal: 8: Walk 250 feet or more  JH-HLM Achieved: 1: Laying in bed (pt not in room)  JH-HLM Goal achieved. Continue to encourage appropriate mobility.    Patient Centered Rounds: I performed bedside rounds with nursing staff today.   Discussions with Specialists or Other Care Team Provider: CM    Education and Discussions with Family / Patient: patient    Total Time Spent on Date of Encounter in care of patient: 35 mins. This time was spent on one or more of the following: performing physical exam; counseling and coordination of care; obtaining or reviewing history; documenting in the medical record; reviewing/ordering tests, medications or procedures; communicating with other healthcare professionals and discussing with patient's family/caregivers.    Current Length of Stay: 6 day(s)  Current Patient Status: Inpatient   Certification Statement: The patient will continue to require additional inpatient hospital stay due to needs picc  Discharge Plan: likely tomorrow    Code Status: Level 1 - Full Code    Subjective:   No new concerns  Anxious regarding getting picc    Objective:     Vitals:   Temp (24hrs), Av.4 °F (36.3 °C), Min:97.2 °F (36.2 °C), Max:97.4 °F (36.3 °C)    Temp:  [97.2 °F (36.2 °C)-97.4 °F (36.3 °C)] 97.2 °F (36.2 °C)  HR:  [60-83] 60  Resp:  [17-22] 20  BP: (121-146)/(64-84) 127/65  SpO2:  [94 %-99 %] 96 %  Body mass index is 45.7 kg/m².     Input and Output Summary (last 24 hours):     Intake/Output Summary (Last 24 hours) at  5/22/2024 1305  Last data filed at 5/22/2024 1049  Gross per 24 hour   Intake 300 ml   Output --   Net 300 ml       Physical Exam:   Physical Exam  Constitutional:       General: She is not in acute distress.     Appearance: She is obese. She is not toxic-appearing.   HENT:      Mouth/Throat:      Mouth: Mucous membranes are moist.      Pharynx: Oropharynx is clear.   Cardiovascular:      Rate and Rhythm: Normal rate and regular rhythm.      Pulses: Normal pulses.   Pulmonary:      Effort: Pulmonary effort is normal. No respiratory distress.      Breath sounds: Normal breath sounds.   Musculoskeletal:      Right lower leg: No edema.      Left lower leg: No edema.   Neurological:      General: No focal deficit present.      Mental Status: She is alert and oriented to person, place, and time.          Additional Data:     Labs:  Results from last 7 days   Lab Units 05/22/24  0503   WBC Thousand/uL 13.15*   HEMOGLOBIN g/dL 13.5   HEMATOCRIT % 45.9   PLATELETS Thousands/uL 288   SEGS PCT % 65   LYMPHO PCT % 23   MONO PCT % 7   EOS PCT % 2     Results from last 7 days   Lab Units 05/22/24  0503 05/17/24  0457 05/16/24  0445   SODIUM mmol/L 137   < > 134*   POTASSIUM mmol/L 5.0   < > 3.2*   CHLORIDE mmol/L 101   < > 99   CO2 mmol/L 31   < > 24   BUN mg/dL 18   < > 14   CREATININE mg/dL 0.73   < > 0.94   ANION GAP mmol/L 5   < > 11   CALCIUM mg/dL 9.7   < > 8.7   ALBUMIN g/dL  --   --  3.6   TOTAL BILIRUBIN mg/dL  --   --  0.55   ALK PHOS U/L  --   --  161*   ALT U/L  --   --  27   AST U/L  --   --  30   GLUCOSE RANDOM mg/dL 108   < > 148*    < > = values in this interval not displayed.     Results from last 7 days   Lab Units 05/16/24  0533   INR  1.03             Results from last 7 days   Lab Units 05/17/24  0457 05/16/24  0719 05/16/24  0445   LACTIC ACID mmol/L  --  1.8 2.3*   PROCALCITONIN ng/ml 1.09*  --  0.36*       Lines/Drains:  Invasive Devices       Peripheral Intravenous Line  Duration             Peripheral  IV 05/16/24 Right;Ventral (anterior) Hand 6 days                      Recent Cultures (last 7 days):   Results from last 7 days   Lab Units 05/18/24  0504 05/18/24  0452 05/16/24  1142 05/16/24  0445   BLOOD CULTURE  No Growth After 4 Days. No Growth After 4 Days.  --  Staphylococcus aureus*  Staphylococcus aureus*   GRAM STAIN RESULT   --   --  1+ Polys  No bacteria seen Gram positive cocci in clusters*  Gram positive cocci in clusters*   BODY FLUID CULTURE, STERILE   --   --  2+ Growth of Staphylococcus aureus*  --        Last 24 Hours Medication List:   Current Facility-Administered Medications   Medication Dose Route Frequency Provider Last Rate    acetaminophen  1,000 mg Intravenous Q6H PRN Priti Kimkevin Camiloya, DO      albuterol  2.5 mg Nebulization Q6H PRN Michael FOWLER MD      amLODIPine  5 mg Oral Daily Michael FOWLER MD      aspirin  325 mg Oral Daily Michael FOWLER MD      atorvastatin  40 mg Oral HS Michael FOWLER MD      cefazolin  2,000 mg Intravenous Q8H Pa Barnard MD 2,000 mg (05/22/24 0024)    enoxaparin  40 mg Subcutaneous Q12H Michael FOWLER MD      lisinopril  10 mg Oral Daily Michael FOWLER MD      LORazepam  0.5 mg Intravenous Once Kimberly Godoy MD      metoprolol tartrate  25 mg Oral Q12H [facility] Michael FOWLER MD      ondansetron  8 mg Oral Q8H PRN Michael FOWLER MD      oxyCODONE  2.5 mg Oral Q6H PRN Pritialexia Hathawayal Lesleeya, DO      potassium chloride  40 mEq Oral BID Michael FOWLER MD      rOPINIRole  0.5 mg Oral TID Michael FOWLER MD      senna-docusate sodium  1 tablet Oral BID Priti Kim Lesleeya, DO      torsemide  20 mg Oral Daily Michael FOWLER MD      zolpidem  5 mg Oral Daily PRN Michael FOWLER MD          Today, Patient Was Seen By: Kimberly Godoy MD    **Please Note: This note may have been constructed using a voice recognition system.**

## 2024-05-22 NOTE — QUICK NOTE
Interventional Radiology Quick Note    Right chest wall drain removed 5/20. Arrived at bedside to perform wound chest. DSD and Tegaderm removed. Steri-strips remain in place. No redness or drainage noted. Healing well.. Added one additional steri strip to lateral aspect of incision line.     GUSTAVO Salcido

## 2024-05-22 NOTE — PLAN OF CARE
Problem: INFECTION - ADULT  Goal: Absence or prevention of progression during hospitalization  Description: INTERVENTIONS:  - Assess and monitor for signs and symptoms of infection  - Monitor lab/diagnostic results  - Monitor all insertion sites, i.e. indwelling lines, tubes, and drains  - Monitor endotracheal if appropriate and nasal secretions for changes in amount and color  - [place] appropriate cooling/warming therapies per order  - Administer medications as ordered  - Instruct and encourage patient and family to use good hand hygiene technique  - Identify and instruct in appropriate isolation precautions for identified infection/condition  Outcome: Progressing      Pt called and left a voicemail today @ 1:24p stating that she is still having pain and her knee is swollen and wants to get in sooner than 9/7

## 2024-05-22 NOTE — PROGRESS NOTES
Progress Note - Infectious Disease   Dwaine Gould 63 y.o. female MRN: [id number]  Unit/Bed#: -01 Encounter: [id number]      Impression/Plan:    MSSA bacteremia  -2/2 blood cultures growing MSSA. Source is infected port as below with resultant soft tissue chest wall infection. Cultures of both port tip and port pocket also growing MSSA. She has no cardiac devices or prosthetic hardware/joints. Consider endocarditis, though TTE (adequate study) without valvular vegetation. Repeat blood cultures 5/18 negative. Went for EDWIN 5/22. Of note, WBC is slightly rising. She denies any new back pain, neck pain, chest pain, SOB, abdominal pain, emesis.  -Continue IV Cefazolin 2g every 8 hours  -As blood cultures from 5/18 are negative X 72 hours, OK from ID perspective to place PICC  -Follow up results of EDWIN  -Repeat CBC tomorrow. will need to monitor leukocytosis  -If WBC continues to rise may warrant further evaluation for persistent infectious source  -Plan for at least two weeks of IV antibiotic from negative blood cultures, final duration pending EDWIN result and WBC trend     Port infection  -Port placed on 5/3 with plans for future chemotherapy for adenocarcinoma of colon.  Patient developed acute drainage from the port, CT scan noting possibly developing abscess/edema noted to port and complicated by MSSA bacteremia. IR removed port 5/16 and placed drain into port pocket. Cultures sent from port pocket (cloudy fluid) and from port tip both growing MSSA matching the blood cultures. As above, TTE negative for vegetation. IR removed port site drain on 5/20.  -Antibiotic plan as above  -Follow up EDWIN results  -Repeat CBC tomorrow to monitor WBC  -Trend fever curve and hemodynamics     3. Soft tissue hypodensity anterior to manubrium  -Seen on CT scan, read as likely confluent edema measuring 4.1 x 3.1 cm.  I personally discussed the CT scan with radiology.  There was no major radiographic evidence to suggest an abscess  and no cortical irregularity of the manubrium to suggest osteomyelitis.  Follow-up soft tissue ultrasound did not demonstrate any abnormality.  Suspect this was likely edema related to the above port infection.  -Monitor chest wall for development of any swelling, erythema or pain     4. Adenocarcinoma of colon  -Diagnosed in March, status post right hemicolectomy. Eventual plans to start FOLFOX.  -Close Oncology follow up     5. Chronic hypoxic respiraotry failure  -Noted. On home 3L [place].    Plan and recommendations were discussed with primary team.  They agree with plan to continue IV Cefazolin.      Antibiotics:  Cefazolin: 5    24 Hour Events:  Afebrile, WBC 10.3 this a.m.    Subjective:  Patient has no fever, chills, sweats. Denies new back pain, neck pain, joint pain. No pain of her chest wall or sternum. No cough.     Objective:  Vitals:  Temp:  [97.2 °F (36.2 °C)-97.4 °F (36.3 °C)] 97.2 °F (36.2 °C)  HR:  [60-83] 60  Resp:  [17-22] 20  BP: (121-146)/(64-84) 127/65  SpO2:  [94 %-99 %] 96 %  Temp (24hrs), Av.4 °F (36.3 °C), Min:97.2 °F (36.2 °C), Max:97.4 °F (36.3 °C)  Current: Temperature: (!) 97.2 °F (36.2 °C)    Physical Exam:   General Appearance:  Alert, interactive, nontoxic, no acute distress.   Throat: Oropharynx moist without lesions.    Lungs:   Clear to auscultation bilaterally; no wheezes, rhonchi or rales; respirations unlabored   Heart:  RRR   Abdomen:   Soft, non-tender.     Extremities: Mild bilateral pitting edema of both legs   Skin: No new rashes         Labs:   All pertinent labs and imaging studies were personally reviewed  Results from last 7 days   Lab Units 24  0503 24  0509 24  0447   WBC Thousand/uL 13.15* 10.39* 8.96   HEMOGLOBIN g/dL 13.5 13.0 13.1   PLATELETS Thousands/uL 288 238 221     Results from last 7 days   Lab Units 24  0503 24  0509 24  0447 24  0457 24  0445   SODIUM mmol/L 137 138 138   < > 134*   POTASSIUM mmol/L 5.0  5.0 4.4   < > 3.2*   CHLORIDE mmol/L 101 100 100   < > 99   CO2 mmol/L 31 31 32   < > 24   BUN mg/dL 18 18 18   < > 14   CREATININE mg/dL 0.73 0.82 0.79   < > 0.94   EGFR ml/min/1.73sq m 89 77 81   < > 65   CALCIUM mg/dL 9.7 9.5 9.2   < > 8.7   AST U/L  --   --   --   --  30   ALT U/L  --   --   --   --  27   ALK PHOS U/L  --   --   --   --  161*    < > = values in this interval not displayed.     Results from last 7 days   Lab Units 05/17/24  0457 05/16/24  0445   PROCALCITONIN ng/ml 1.09* 0.36*             Results from last 7 days   Lab Units 05/16/24  0533   D-DIMER QUANTITATIVE ug/ml FEU 3.66*       Micro:  Results from last 7 days   Lab Units 05/18/24  0504 05/18/24  0452 05/16/24  1142 05/16/24  0445   BLOOD CULTURE  No Growth at 72 hrs. No Growth at 72 hrs.  --  Staphylococcus aureus*  Staphylococcus aureus*   GRAM STAIN RESULT   --   --  1+ Polys  No bacteria seen Gram positive cocci in clusters*  Gram positive cocci in clusters*   BODY FLUID CULTURE, STERILE   --   --  2+ Growth of Staphylococcus aureus*  --        Imaging:          Pa Barnard MD  [facility]

## 2024-05-22 NOTE — ASSESSMENT & PLAN NOTE
Lab Results   Component Value Date    EGFR 89 05/22/2024    EGFR 77 05/21/2024    EGFR 81 05/20/2024    CREATININE 0.73 05/22/2024    CREATININE 0.82 05/21/2024    CREATININE 0.79 05/20/2024     Present on admission history of CKD stage III.  Currently creatinine stable at baseline.

## 2024-05-22 NOTE — CASE MANAGEMENT
Case Management Discharge Planning Note    Patient name Dwaine Gould  Location /-01 MRN [id number]  : 1960 Date 2024       Current Admission Date: 2024  Current Admission Diagnosis:Sepsis (HCC)   Patient Active Problem List    Diagnosis Date Noted Date Diagnosed    Staphylococcus aureus bacteremia 2024     Sepsis (HCC) 2024     Port-A-Cath in place 2024     Encounter for counseling for tobacco use disorder 2024     Adenocarcinoma, colon (HCC) 2024     Encounter to establish care 2024     Anemia 2024     Thrombocytopenia (HCC) 2024     Chronic respiratory failure with hypoxia and hypercapnia (HCC) 2024     Stage 3a chronic kidney disease (HCC) 2024     Chronic heart failure with preserved ejection fraction (HCC) 2024     COPD (chronic obstructive pulmonary disease) (HCC) 2024     Leukocytosis 2024     Tobacco abuse 2021     CAD (coronary artery disease) 2021     Essential hypertension 2019     Hyperlipidemia 2019       LOS (days): 6  Geometric Mean LOS (GMLOS) (days):   Days to GMLOS:     OBJECTIVE:  Risk of Unplanned Readmission Score: 14.59         Current admission status: Inpatient   Preferred Pharmacy:   [id number] - [place] - [address]  [address]  [place] [id number]  Phone: [phone] Fax: [phone]    Primary Care Provider: Jordan Ramirez MD    Primary Insurance: [employer]  Secondary Insurance:     DISCHARGE DETAILS:    Discharge planning discussed with:: Patients daughter michael due to pt location showing [facility]  [place] of Choice: Yes  Comments - Freedom of Choice: CM discussed freedom of choice as it pertains to discharge planning. Patients daughter is agreeable to HHC. Patients daughter is understanding that pt needs infusions on discharge and stated she will speak with pt about the importance of HHC for infusions and  blood work.  CM contacted family/caregiver?: Yes  Were Treatment Team discharge recommendations reviewed with patient/caregiver?: Yes  Did patient/caregiver verbalize understanding of patient care needs?: Yes  Were patient/caregiver advised of the risks associated with not following Treatment Team discharge recommendations?: Yes    Contacts  Patient Contacts: Eva Berry (Daughter)  Relationship to Patient:: Family  Contact Method: Phone  Phone Number: [phone]  Reason/Outcome: Continuity of Care, Discharge Planning    Requested Home Health Care         Is the patient interested in HHC at discharge?: Yes  Home Health Discipline requested:: Nursing  Home Health Agency Name:: [facility]  HHA External Referral Reason (only applicable if external HHA name selected): Patient has established relationship with provider  Home Health Follow-Up Provider:: PCP  Home Health Services Needed:: Evaluate Functional Status and Safety, Strengthening/Theraputic Exercises to Improve Function, COPD Management  Oxygen LPM Ordered (if applicable based on home health services needed):: 3 LPM  Homebound Criteria Met:: Requires the Assistance of Another Person for Safe Ambulation or to Leave the Home, Uses an Assist Device (i.e. cane, walker, etc)  Supporting Clincal Findings:: Limited Endurance, Requires Oxygen, Fatigues Easliy in Short Distances    DME Referral Provided  Referral made for DME?: No    Other Referral/Resources/Interventions Provided:  Referral Comments: [facility]  [address] [place] [id number]  Phone: [phone]  Fax: [phone]    Would you like to participate in our [facility] service program?  : No - Declined    Treatment Team Recommendation: Home with Home Health Care  Discharge Destination Plan:: Home with Home Health Care  Transport at Discharge : Family

## 2024-05-22 NOTE — PROCEDURES
Insert Complex Venous Access Line    Date/Time: 5/22/2024 2:55 PM    Performed by: Heaven Steele RN  Authorized by: Kimberly Godoy MD    Patient location:  Bedside  Other Assisting Provider: No    Consent:     Consent obtained:  Written    Consent given by:  Patient  Universal protocol:     Procedure explained and questions answered to patient or proxy's satisfaction: yes      Immediately prior to procedure, a time out was called: yes      Relevant documents present and verified: yes      Required blood products, implants, devices, and special equipment available: yes      Site/side marked: yes      Patient identity confirmed:  Verbally with patient and arm band  Pre-procedure details:     Hand hygiene: Hand hygiene performed prior to insertion      Sterile barrier technique: All elements of maximal sterile technique followed      Skin preparation:  ChloraPrep    Skin preparation agent: Skin preparation agent completely dried prior to procedure    Procedure details:     Complex Venous Access Line Type: PICC      Complex Venous Access Line Indications: long term antibiotics      Catheter tip vessel location: superior vena cava      Orientation:  Right and upper    Location:  Basilic    Procedural supplies:  Single lumen    Catheter size:  4 Fr    Total catheter length (cm):  42    Catheter out on skin (cm):  0    Max flow rate:  5cc/sec    Arm circumference:  36    Patient evaluated for contraindications to access (i.e. fistula, thrombosis, etc): Yes      Approach: percutaneous technique used      Patient position:  Flat    Ultrasound image availability:  Not saved    Sterile ultrasound techniques: Sterile gel and sterile probe covers were used      Number of attempts:  1    Successful placement: yes      Landmarks identified: yes      Vessel of catheter tip end:  [place] 3CG confirmed  Anesthesia (see MAR for exact dosages):     Anesthesia method:  Local infiltration    Local anesthetic:  Lidocaine 1% w/o  epi  Post-procedure details:     Post-procedure:  Securement device placed and dressing applied    Assessment:  Blood return through all ports (3CG)    Post-procedure complications: none      Patient tolerance of procedure:  Tolerated well, no immediate complications    Observer: Yes      Observer name:  Student nurses

## 2024-05-22 NOTE — ASSESSMENT & PLAN NOTE
Wt Readings from Last 3 Encounters:   05/22/24 106 kg (234 lb)   05/15/24 106 kg (234 lb 6.4 oz)   05/08/24 108 kg (237 lb)       Present on admission with history of chronic heart failure with preserved ejection fraction.  Most recent echo from 03/2024 noted with EF of 60%.  continue home dose of Demadex.  Low salt fluid restricted diet.

## 2024-05-23 LAB
ANION GAP SERPL CALCULATED.3IONS-SCNC: 9 MMOL/L (ref 4–13)
BACTERIA BLD CULT: NORMAL
BACTERIA BLD CULT: NORMAL
BASOPHILS # BLD AUTO: 0.06 THOUSANDS/ÂΜL (ref 0–0.1)
BASOPHILS NFR BLD AUTO: 1 % (ref 0–1)
BUN SERPL-MCNC: 26 MG/DL (ref 5–25)
CALCIUM SERPL-MCNC: 9.2 MG/DL (ref 8.4–10.2)
CHLORIDE SERPL-SCNC: 99 MMOL/L (ref 96–108)
CO2 SERPL-SCNC: 29 MMOL/L (ref 21–32)
CREAT SERPL-MCNC: 0.95 MG/DL (ref 0.6–1.3)
EOSINOPHIL # BLD AUTO: 0.19 THOUSAND/ÂΜL (ref 0–0.61)
EOSINOPHIL NFR BLD AUTO: 2 % (ref 0–6)
ERYTHROCYTE [DISTWIDTH] IN BLOOD BY AUTOMATED COUNT: 21.6 % (ref 11.6–15.1)
GFR SERPL CREATININE-BSD FRML MDRD: 64 ML/MIN/1.73SQ M
GLUCOSE SERPL-MCNC: 109 MG/DL (ref 65–140)
HCT VFR BLD AUTO: 41.3 % (ref 34.8–46.1)
HGB BLD-MCNC: 12.3 G/DL (ref 11.5–15.4)
IMM GRANULOCYTES # BLD AUTO: 0.26 THOUSAND/UL (ref 0–0.2)
IMM GRANULOCYTES NFR BLD AUTO: 2 % (ref 0–2)
LYMPHOCYTES # BLD AUTO: 2.4 THOUSANDS/ÂΜL (ref 0.6–4.47)
LYMPHOCYTES NFR BLD AUTO: 21 % (ref 14–44)
MCH RBC QN AUTO: 24.1 PG (ref 26.8–34.3)
MCHC RBC AUTO-ENTMCNC: 29.8 G/DL (ref 31.4–37.4)
MCV RBC AUTO: 81 FL (ref 82–98)
MONOCYTES # BLD AUTO: 0.78 THOUSAND/ÂΜL (ref 0.17–1.22)
MONOCYTES NFR BLD AUTO: 7 % (ref 4–12)
NEUTROPHILS # BLD AUTO: 7.76 THOUSANDS/ÂΜL (ref 1.85–7.62)
NEUTS SEG NFR BLD AUTO: 67 % (ref 43–75)
NRBC BLD AUTO-RTO: 0 /100 WBCS
PLATELET # BLD AUTO: 289 THOUSANDS/UL (ref 149–390)
PMV BLD AUTO: 8.9 FL (ref 8.9–12.7)
POTASSIUM SERPL-SCNC: 4.3 MMOL/L (ref 3.5–5.3)
RBC # BLD AUTO: 5.11 MILLION/UL (ref 3.81–5.12)
SODIUM SERPL-SCNC: 137 MMOL/L (ref 135–147)
WBC # BLD AUTO: 11.45 THOUSAND/UL (ref 4.31–10.16)

## 2024-05-23 PROCEDURE — 99232 SBSQ HOSP IP/OBS MODERATE 35: CPT | Performed by: FAMILY MEDICINE

## 2024-05-23 PROCEDURE — 85025 COMPLETE CBC W/AUTO DIFF WBC: CPT | Performed by: FAMILY MEDICINE

## 2024-05-23 PROCEDURE — 94760 N-INVAS EAR/PLS OXIMETRY 1: CPT

## 2024-05-23 PROCEDURE — 99233 SBSQ HOSP IP/OBS HIGH 50: CPT | Performed by: INTERNAL MEDICINE

## 2024-05-23 PROCEDURE — 80048 BASIC METABOLIC PNL TOTAL CA: CPT | Performed by: FAMILY MEDICINE

## 2024-05-23 RX ORDER — LORAZEPAM 2 MG/ML
0.5 INJECTION INTRAMUSCULAR ONCE
Status: DISCONTINUED | OUTPATIENT
Start: 2024-05-23 | End: 2024-05-24 | Stop reason: HOSPADM

## 2024-05-23 RX ORDER — CEFAZOLIN SODIUM 2 G/50ML
2000 SOLUTION INTRAVENOUS EVERY 8 HOURS
Start: 2024-05-23 | End: 2024-06-01

## 2024-05-23 RX ORDER — NICOTINE 21 MG/24HR
14 PATCH, TRANSDERMAL 24 HOURS TRANSDERMAL DAILY
Status: DISCONTINUED | OUTPATIENT
Start: 2024-05-23 | End: 2024-05-24 | Stop reason: HOSPADM

## 2024-05-23 RX ADMIN — ROPINIROLE 0.5 MG: 0.25 TABLET, FILM COATED ORAL at 17:36

## 2024-05-23 RX ADMIN — NICOTINE 14 MG: 14 PATCH, EXTENDED RELEASE TRANSDERMAL at 15:18

## 2024-05-23 RX ADMIN — POTASSIUM CHLORIDE 40 MEQ: 1500 TABLET, EXTENDED RELEASE ORAL at 10:14

## 2024-05-23 RX ADMIN — POTASSIUM CHLORIDE 40 MEQ: 1500 TABLET, EXTENDED RELEASE ORAL at 17:36

## 2024-05-23 RX ADMIN — METOPROLOL TARTRATE 25 MG: 25 TABLET, FILM COATED ORAL at 21:14

## 2024-05-23 RX ADMIN — ATORVASTATIN CALCIUM 40 MG: 40 TABLET, FILM COATED ORAL at 21:14

## 2024-05-23 RX ADMIN — SENNOSIDES AND DOCUSATE SODIUM 1 TABLET: 50; 8.6 TABLET ORAL at 10:14

## 2024-05-23 RX ADMIN — Medication 2.5 MG: at 21:19

## 2024-05-23 RX ADMIN — ASPIRIN 325 MG ORAL TABLET 325 MG: 325 PILL ORAL at 10:14

## 2024-05-23 RX ADMIN — ROPINIROLE 0.5 MG: 0.25 TABLET, FILM COATED ORAL at 10:14

## 2024-05-23 RX ADMIN — CEFAZOLIN SODIUM 2000 MG: 2 SOLUTION INTRAVENOUS at 00:41

## 2024-05-23 RX ADMIN — CEFAZOLIN SODIUM 2000 MG: 2 SOLUTION INTRAVENOUS at 17:36

## 2024-05-23 RX ADMIN — ROPINIROLE 0.5 MG: 0.25 TABLET, FILM COATED ORAL at 21:14

## 2024-05-23 RX ADMIN — CEFAZOLIN SODIUM 2000 MG: 2 SOLUTION INTRAVENOUS at 10:18

## 2024-05-23 NOTE — RESPIRATORY THERAPY NOTE
RT Protocol Note  Dwaine Gould 63 y.o. female MRN: [id number]  Unit/Bed#: -01 Encounter: [id number]    Assessment    Principal Problem:    Sepsis (HCC)  Active Problems:    CAD (coronary artery disease)    Chronic heart failure with preserved ejection fraction (HCC)    COPD (chronic obstructive pulmonary disease) (HCC)    Essential hypertension    Stage 3a chronic kidney disease (HCC)    Port-A-Cath in place    Staphylococcus aureus bacteremia    Subjective Data: drowsy    Objective    Physical Exam:   Assessment Type: Assess only  General Appearance: Drowsy  Respiratory Pattern: Shallow  Chest Assessment: Chest expansion symmetrical  Bilateral Breath Sounds: Clear, Diminished  Cough: None  O2 Device: nc    Vitals:  Blood pressure 125/78, pulse 60, temperature (!) 97.4 °F (36.3 °C), resp. rate 16, height 5' (1.524 m), weight 106 kg (234 lb), SpO2 95%.    O2 Device: nc     Plan    Respiratory Plan: Discontinue Protocol        Resp Comments: will dc protocol, pt has not req PRN txs in 48 hrs

## 2024-05-23 NOTE — RESPIRATORY THERAPY NOTE
RT Protocol Note  Dwaine Gould 63 y.o. female MRN: [id number]  Unit/Bed#: -01 Encounter: [id number]    Assessment    Principal Problem:    Sepsis (HCC)  Active Problems:    CAD (coronary artery disease)    Chronic heart failure with preserved ejection fraction (HCC)    COPD (chronic obstructive pulmonary disease) (HCC)    Essential hypertension    Stage 3a chronic kidney disease (HCC)    Port-A-Cath in place    Staphylococcus aureus bacteremia      Home Pulmonary Medications:  nebulziers  Home Devices/Therapy: Home O2    Past Medical History:   Diagnosis Date    Acute metabolic encephalopathy     Asthma     CHF (congestive heart failure) (HCC)     Chronic kidney disease     COPD (chronic obstructive pulmonary disease) (HCC)     Hyperlipidemia     Hypertension     Liver disease     Myocardial infarction (HCC)     Seizures (HCC)     Sleep apnea     Transaminitis      Social History     Socioeconomic History    Marital status: /Civil Union     Spouse name: None    Number of children: None    Years of education: None    Highest education level: None   Occupational History    None   Tobacco Use    Smoking status: Former     Current packs/day: 0.00     Average packs/day: 2.0 packs/day for 50.5 years (100.9 ttl pk-yrs)     Types: Cigarettes     Start date: 10/1973     Quit date: 3/18/2024     Years since quittin.1     Passive exposure: Current    Smokeless tobacco: Never   Vaping Use    Vaping status: Never Used   Substance and Sexual Activity    Alcohol use: Yes     Comment: socially    Drug use: Never    Sexual activity: Not Currently     Partners: Male   Other Topics Concern    None   Social History Narrative    None     Social Determinants of Health     Financial Resource Strain: Not on file   Food Insecurity: No Food Insecurity (2024)    Hunger Vital Sign     Worried About Running Out of Food in the Last Year: Never true     Ran Out of Food in the Last Year: Never true   Transportation Needs: No  Transportation Needs (5/17/2024)    PRAPARE - Transportation     Lack of Transportation (Medical): No     Lack of Transportation (Non-Medical): No   Physical Activity: Not on file   Stress: Not on file   Social Connections: Not on file   Intimate Partner Violence: Not on file   Housing Stability: Low Risk  (5/17/2024)    Housing Stability Vital Sign     Unable to Pay for Housing in the Last Year: No     Number of Times Moved in the Last Year: 1     Homeless in the Last Year: No       Subjective    Subjective Data: drowsy    Objective    Physical Exam:   Assessment Type: Assess only  General Appearance: Drowsy, Eyes open/responds to stimulus  Respiratory Pattern: Shallow, Spontaneous  Chest Assessment: Chest expansion symmetrical  Bilateral Breath Sounds: Clear, Diminished  Cough: None  O2 Device: nasal cannula    Vitals:  Blood pressure 125/78, pulse 61, temperature (!) 97.4 °F (36.3 °C), resp. rate 16, height 5' (1.524 m), weight 106 kg (234 lb), SpO2 93%.          Imaging and other studies: I have personally reviewed pertinent reports.      O2 Device: nasal cannula     Plan    Respiratory Plan: Home Bronchodilator Patient pathway        Resp Comments: respiratory protocol completed patient being admitted for sepsis at [facility] site. with significant pulmonary PMh for COPD patient uses nebulziers at home as needed and will be continued here PRN

## 2024-05-23 NOTE — PROGRESS NOTES
Progress Note - Infectious Disease   Dwaine Gould 63 y.o. female MRN: [id number]  Unit/Bed#: -01 Encounter: [id number]      Impression/Plan:    MSSA bacteremia  -2/2 blood cultures growing MSSA. Source is infected port as below with resultant soft tissue chest wall infection. Cultures of both port tip and port pocket also growing MSSA. She has no cardiac devices or prosthetic hardware/joints. Consider endocarditis, though TTE (adequate study) without valvular vegetation. Repeat blood cultures 5/18 negative. EDWIN 5/22 negative for vegetation. Of note, WBC did have slight rise on 5/21-5/22 but now improved. She denies any new back pain, neck pain, chest pain, SOB, abdominal pain, emesis.  -Continue IV Cefazolin 2g every 8 hours  -As there was clear source of infection (port) which was removed, blood cultures cleared rapidly, both TTE and EDWIN negative for endocarditis, CT A/P without additional areas of infection and no other symptoms to suggest areas of seeding will plan for 2 weeks of IV antibiotic from first negative blood cultures, through 6/01/2024  -PICC can be removed on 6/02/2024 per ID perspective, however Oncology may want to keep for chemotherapy so will need to confirm with her provider  -Plan for ID follow up in about 1 week to ensure no new symptoms     Port infection  -Port placed on 5/3 with plans for future chemotherapy for adenocarcinoma of colon.  Patient developed acute drainage from the port, CT scan noting possibly developing abscess/edema noted to port and complicated by MSSA bacteremia. IR removed port 5/16 and placed drain into port pocket. Cultures sent from port pocket (cloudy fluid) and from port tip both growing MSSA matching the blood cultures. As above, TTE and EDWIN negative for vegetation. IR removed port site drain on 5/20. Port site appearing well now.  -Antibiotic plan as above  -Trend fever curve and hemodynamics     3. Soft tissue hypodensity anterior to manubrium  -Seen on CT  scan, read as likely confluent edema measuring 4.1 x 3.1 cm.  I personally discussed the CT scan with radiology.  There was no major radiographic evidence to suggest an abscess and no cortical irregularity of the manubrium to suggest osteomyelitis.  Follow-up soft tissue ultrasound did not demonstrate any abnormality.  Suspect this was likely edema related to the above port infection.  -Monitor chest wall for development of any swelling, erythema or pain     4. Adenocarcinoma of colon  -Diagnosed in March, status post right hemicolectomy. Eventual plans to start FOLFOX.  -Close Oncology follow up     5. Chronic hypoxic respiraotry failure  -Noted. On home 3L NC.    Plan and recommendations were discussed with primary team.  They agree with plan to continue IV Cefazolin.      Antibiotics:  Cefazolin: 6    24 Hour Events:  Afebrile, WBC down trended this AM. EDWIN negative for vegetation.    Subjective:  Patient has no fever, chills, sweats. Denies any new back pain or joint pains.    Objective:  Vitals:  Temp:  [97.2 °F (36.2 °C)-97.5 °F (36.4 °C)] 97.4 °F (36.3 °C)  HR:  [60-85] 60  Resp:  [15-22] 16  BP: (121-133)/(64-84) 125/78  SpO2:  [92 %-99 %] 95 %  Temp (24hrs), Av.4 °F (36.3 °C), Min:97.2 °F (36.2 °C), Max:97.5 °F (36.4 °C)  Current: Temperature: (!) 97.4 °F (36.3 °C)    Physical Exam:   General Appearance:  Alert, interactive, nontoxic, no acute distress.   Throat: Oropharynx moist without lesions.    Lungs:   Clear to auscultation bilaterally; no wheezes, rhonchi or rales; respirations unlabored   Heart:  RRR   Abdomen:   Soft, non-tender.     Extremities: Mild bilateral pitting edema of both legs   Skin: No new rashes         Labs:   All pertinent labs and imaging studies were personally reviewed  Results from last 7 days   Lab Units 24  0601 24  0503 24  0509   WBC Thousand/uL 11.45* 13.15* 10.39*   HEMOGLOBIN g/dL 12.3 13.5 13.0   PLATELETS Thousands/uL 289 288 238     Results from  last 7 days   Lab Units 05/23/24  0601 05/22/24  0503 05/21/24  0509   SODIUM mmol/L 137 137 138   POTASSIUM mmol/L 4.3 5.0 5.0   CHLORIDE mmol/L 99 101 100   CO2 mmol/L 29 31 31   BUN mg/dL 26* 18 18   CREATININE mg/dL 0.95 0.73 0.82   EGFR ml/min/1.73sq m 64 89 77   CALCIUM mg/dL 9.2 9.7 9.5     Results from last 7 days   Lab Units 05/17/24  0457   PROCALCITONIN ng/ml 1.09*                     Micro:  Results from last 7 days   Lab Units 05/18/24  0504 05/18/24  0452 05/16/24  1142   BLOOD CULTURE  No Growth After 4 Days. No Growth After 4 Days.  --    GRAM STAIN RESULT   --   --  1+ Polys  No bacteria seen   BODY FLUID CULTURE, STERILE   --   --  2+ Growth of Staphylococcus aureus*       Imaging:          Pa Barnard MD  [facility]

## 2024-05-23 NOTE — PROGRESS NOTES
[facility]  Progress Note  Name: Dwaine Gould I  MRN: [id number]  Unit/Bed#: -01 I Date of Admission: 5/16/2024   Date of Service: 5/23/2024 I Hospital Day: 7    Assessment & Plan   * Sepsis (HCC)  Assessment & Plan  **63-year-old female patient with past medical history of adenocarcinoma of colon s/p hemicolectomy, had Port-A-Cath placed on May 3, 2024 with planning to start chemotherapy now hospitalized due to sepsis due to infected port and MSSA bacteremia    Continue IV ancef  S/p port removal with IR 5/16        Staphylococcus aureus bacteremia  Assessment & Plan  2/2 blood cultures(5/16) + for MSSA 2/2 to infected port  2/2 blood cultures(5/18) neg so far  Catheter tip c/s + MSSA  IV ancef -> plan total 2 weeks iv ancef  until 6/1/24, op fu with ID in 2 weeks  ID following  TTE EF 60%, no valvular vegetation noted , EDWIN neg for vegetations  PICC lineplaced on 5/22    Port-A-Cath in place  Assessment & Plan  H/O carcinoma of the colon post right hemicolectomy.  Patient had Port-A-Cath placed on 05/03/2024 with planning to start on chemotherapy.  S/p port removal 5/16    Stage 3a chronic kidney disease (HCC)  Assessment & Plan  Lab Results   Component Value Date    EGFR 64 05/23/2024    EGFR 89 05/22/2024    EGFR 77 05/21/2024    CREATININE 0.95 05/23/2024    CREATININE 0.73 05/22/2024    CREATININE 0.82 05/21/2024     Present on admission history of CKD stage III.  Currently creatinine stable at baseline.    Essential hypertension  Assessment & Plan  Blood pressure currently stable.  cw lisinopril, Norvasc, Lopressor, Demadex.    COPD (chronic obstructive pulmonary disease) (HCC)  Assessment & Plan  Present on admission with history of COPD.  Chronic home O2 dependent 3L currently stable at baseline.        Chronic heart failure with preserved ejection fraction (HCC)  Assessment & Plan  Wt Readings from Last 3 Encounters:   05/22/24 106 kg (234 lb)   05/15/24 106 kg (234 lb 6.4 oz)    24 108 kg (237 lb)       Present on admission with history of chronic heart failure with preserved ejection fraction.  Most recent echo from 2024 noted with EF of 60%.  continue home dose of Demadex.  Low salt fluid restricted diet.             CAD (coronary artery disease)  Assessment & Plan  Present on admission history of CAD.  Currently asymptomatic.  Cw home dose of aspirin, statin, metoprolol, Norvasc, Demadex.               VTE Pharmacologic Prophylaxis:   lovenox    Mobility:   Basic Mobility Inpatient Raw Score: 24  JH-HLM Goal: 8: Walk 250 feet or more  JH-HLM Achieved: 8: Walk 250 feet ot more  JH-HLM Goal achieved. Continue to encourage appropriate mobility.    Patient Centered Rounds: I performed bedside rounds with nursing staff today.   Discussions with Specialists or Other Care Team Provider: STEPHANIE PANTOJA    Education and Discussions with Family / Patient: PATIENT    Total Time Spent on Date of Encounter in care of patient: 35 mins. This time was spent on one or more of the following: performing physical exam; counseling and coordination of care; obtaining or reviewing history; documenting in the medical record; reviewing/ordering tests, medications or procedures; communicating with other healthcare professionals and discussing with patient's family/caregivers.    Current Length of Stay: 7 day(s)  Current Patient Status: Inpatient   Certification Statement: The patient will continue to require additional inpatient hospital stay due to await home supplies, antibiotics  Discharge Plan:likely tomorrow    Code Status: Level 1 - Full Code    Subjective:   No new concerns    Objective:     Vitals:   Temp (24hrs), Av.4 °F (36.3 °C), Min:97.4 °F (36.3 °C), Max:97.5 °F (36.4 °C)    Temp:  [97.4 °F (36.3 °C)-97.5 °F (36.4 °C)] 97.4 °F (36.3 °C)  HR:  [60-85] 68  Resp:  [15-18] 16  BP: (109-125)/(61-78) 109/61  SpO2:  [92 %-97 %] 97 %  Body mass index is 45.7 kg/m².     Input and Output Summary (last  24 hours):     Intake/Output Summary (Last 24 hours) at 5/23/2024 1204  Last data filed at 5/23/2024 0537  Gross per 24 hour   Intake 340 ml   Output --   Net 340 ml       Physical Exam:   Physical Exam  Constitutional:       General: She is not in acute distress.     Appearance: She is obese. She is not toxic-appearing.   HENT:      Mouth/Throat:      Mouth: Mucous membranes are moist.      Pharynx: Oropharynx is clear.   Cardiovascular:      Rate and Rhythm: Normal rate and regular rhythm.      Pulses: Normal pulses.   Pulmonary:      Effort: Pulmonary effort is normal. No respiratory distress.      Breath sounds: Normal breath sounds.   Abdominal:      General: Abdomen is flat. Bowel sounds are normal.      Palpations: Abdomen is soft.   Musculoskeletal:      Right lower leg: No edema.      Left lower leg: No edema.   Neurological:      General: No focal deficit present.      Mental Status: She is alert and oriented to person, place, and time.          Additional Data:     Labs:  Results from last 7 days   Lab Units 05/23/24  0601   WBC Thousand/uL 11.45*   HEMOGLOBIN g/dL 12.3   HEMATOCRIT % 41.3   PLATELETS Thousands/uL 289   SEGS PCT % 67   LYMPHO PCT % 21   MONO PCT % 7   EOS PCT % 2     Results from last 7 days   Lab Units 05/23/24  0601   SODIUM mmol/L 137   POTASSIUM mmol/L 4.3   CHLORIDE mmol/L 99   CO2 mmol/L 29   BUN mg/dL 26*   CREATININE mg/dL 0.95   ANION GAP mmol/L 9   CALCIUM mg/dL 9.2   GLUCOSE RANDOM mg/dL 109                 Results from last 7 days   Lab Units 05/17/24  0457   PROCALCITONIN ng/ml 1.09*       Lines/Drains:  Invasive Devices       Peripherally Inserted Central Catheter Line  Duration             PICC Line 05/22/24 Right Basilic <1 day                             Recent Cultures (last 7 days):   Results from last 7 days   Lab Units 05/18/24  0504 05/18/24  0452   BLOOD CULTURE  No Growth After 4 Days. No Growth After 4 Days.       Last 24 Hours Medication List:   Current  Facility-Administered Medications   Medication Dose Route Frequency Provider Last Rate    acetaminophen  1,000 mg Intravenous Q6H PRN Pritialexia Reed,       albuterol  2.5 mg Nebulization Q6H PRN Michael FOWLER MD      amLODIPine  5 mg Oral Daily Michael FOWLER MD      aspirin  325 mg Oral Daily Michael FOWLER MD      atorvastatin  40 mg Oral HS Michael FOWLER MD      cefazolin  2,000 mg Intravenous Q8H Pa Barnard MD 2,000 mg (05/23/24 1029)    enoxaparin  40 mg Subcutaneous Q12H Michael FOWLER MD      lisinopril  10 mg Oral Daily Michael FOWLER MD      metoprolol tartrate  25 mg Oral Q12H ANGELES Michael FOWLER MD      ondansetron  8 mg Oral Q8H PRN Michael FOWLER MD      oxyCODONE  2.5 mg Oral Q6H PRN Priti Reed,       potassium chloride  40 mEq Oral BID Michael FOWLER MD      rOPINIRole  0.5 mg Oral TID Michael FOWLER MD      senna-docusate sodium  1 tablet Oral BID Pritialexia Reed, DO      torsemide  20 mg Oral Daily Michael FOWLER MD      zolpidem  5 mg Oral Daily PRN Michael FOWLER MD          Today, Patient Was Seen By: Kimberly Godoy MD    **Please Note: This note may have been constructed using a voice recognition system.**

## 2024-05-23 NOTE — PLAN OF CARE
Problem: PAIN - ADULT  Goal: Verbalizes/displays adequate comfort level or baseline comfort level  Description: Interventions:  - Encourage patient to monitor pain and request assistance  - Assess pain using appropriate pain scale  - Administer analgesics based on type and severity of pain and evaluate response  - Implement non-pharmacological measures as appropriate and evaluate response  - Consider cultural and social influences on pain and pain management  - Notify physician/advanced practitioner if interventions unsuccessful or patient reports new pain  Outcome: Progressing     Problem: SAFETY ADULT  Goal: Maintain or return to baseline ADL function  Description: INTERVENTIONS:  -  Assess patient's ability to carry out ADLs; assess patient's baseline for ADL function and identify physical deficits which impact ability to perform ADLs (bathing, care of mouth/teeth, toileting, grooming, dressing, etc.)  - Assess/evaluate cause of self-care deficits   - Assess range of motion  - Assess patient's mobility; develop plan if impaired  - Assess patient's need for assistive devices and provide as appropriate  - Encourage maximum independence but intervene and supervise when necessary  - Involve family in performance of ADLs  - Assess for home care needs following discharge   - Consider OT consult to assist with ADL evaluation and planning for discharge  - Provide patient education as appropriate  Outcome: Progressing     Problem: DISCHARGE PLANNING  Goal: Discharge to home or other facility with appropriate resources  Description: INTERVENTIONS:  - Identify barriers to discharge w/patient and caregiver  - Arrange for needed discharge resources and transportation as appropriate  - Identify discharge learning needs (meds, wound care, etc.)  - Arrange for interpretive services to assist at discharge as needed  - Refer to Case Management Department for coordinating discharge planning if the patient needs post-hospital  services based on physician/advanced practitioner order or complex needs related to functional status, cognitive ability, or social support system  Outcome: Progressing     Problem: Knowledge Deficit  Goal: Patient/family/caregiver demonstrates understanding of disease process, treatment plan, medications, and discharge instructions  Description: Complete learning assessment and assess knowledge base.  Interventions:  - Provide teaching at level of understanding  - Provide teaching via preferred learning methods  Outcome: Progressing     Problem: INFECTION - ADULT  Goal: Absence or prevention of progression during hospitalization  Description: INTERVENTIONS:  - Assess and monitor for signs and symptoms of infection  - Monitor lab/diagnostic results  - Monitor all insertion sites, i.e. indwelling lines, tubes, and drains  - Monitor endotracheal if appropriate and nasal secretions for changes in amount and color  - [place] appropriate cooling/warming therapies per order  - Administer medications as ordered  - Instruct and encourage patient and family to use good hand hygiene technique  - Identify and instruct in appropriate isolation precautions for identified infection/condition  Outcome: Progressing

## 2024-05-23 NOTE — ASSESSMENT & PLAN NOTE
2/2 blood cultures(5/16) + for MSSA 2/2 to infected port  2/2 blood cultures(5/18) neg so far  Catheter tip c/s + MSSA  IV ancef -> plan total 2 weeks iv ancef  until 6/1/24, op fu with ID in 2 weeks  ID following  TTE EF 60%, no valvular vegetation noted , EDWIN neg for vegetations  PICC lineplaced on 5/22

## 2024-05-23 NOTE — CASE MANAGEMENT
Case Management Discharge Planning Note    Patient name Dwaine Gould  Location /-01 MRN [id number]  : 1960 Date 2024       Current Admission Date: 2024  Current Admission Diagnosis:Sepsis (HCC)   Patient Active Problem List    Diagnosis Date Noted Date Diagnosed    Staphylococcus aureus bacteremia 2024     Sepsis (HCC) 2024     Port-A-Cath in place 2024     Encounter for counseling for tobacco use disorder 2024     Adenocarcinoma, colon (HCC) 2024     Encounter to establish care 2024     Anemia 2024     Thrombocytopenia (HCC) 2024     Chronic respiratory failure with hypoxia and hypercapnia ([facility]) 2024     Stage 3a chronic kidney disease ([facility]) 2024     Chronic heart failure with preserved ejection fraction (HCC) 2024     COPD (chronic obstructive pulmonary disease) ([facility]) 2024     Leukocytosis 2024     Tobacco abuse 2021     CAD (coronary artery disease) 2021     Essential hypertension 2019     Hyperlipidemia 2019       LOS (days): 7  Geometric Mean LOS (GMLOS) (days): 5.1  Days to GMLOS:-2.3     OBJECTIVE:  Risk of Unplanned Readmission Score: 17.28         Current admission status: Inpatient   Preferred Pharmacy:   [facility]/pharmacy #[id number] - [place] - [address]  [place] [address]  Phone: [phone] Fax: [phone]    Primary Care Provider: Jordan Ramirez MD    Primary Insurance: [employer]  Secondary Insurance:     DISCHARGE DETAILS:    [facility] confirmed they can start tomorrow at 16:45.

## 2024-05-23 NOTE — PROGRESS NOTES
Patient:  AYANNA SHERIFF    MRN:  [id number]    Aidin Request ID:  [id number]    Level of care reserved:  Infusion    Partner Reserved:  [facility], [place] [address] [phone]    Clinical needs requested:    Geography searched:  [id number]    Start of Service:    Request sent:  3:00pm EDT on 5/20/2024 by Shruthi Roberts    Partner reserved:  11:39am EDT on 5/23/2024 by Shruthi Roberts    Choice list shared:

## 2024-05-23 NOTE — PROGRESS NOTES
Patient:  AYANNA SHERIFF    MRN:  [id number]    Vee Request ID:  [id number]    Level of care reserved:  Home Health Agency    Partner Reserved:  [facility], [place] [address] [phone]    Clinical needs requested:    Geography searched:  [address]    Start of Service:  5/23/2024    Request sent:  2:59pm EDT on 5/20/2024 by Shruthi Roberts    Partner reserved:  2:58pm EDT on 5/22/2024 by Shruthi Roberts    Choice list shared:

## 2024-05-23 NOTE — ASSESSMENT & PLAN NOTE
Lab Results   Component Value Date    EGFR 64 05/23/2024    EGFR 89 05/22/2024    EGFR 77 05/21/2024    CREATININE 0.95 05/23/2024    CREATININE 0.73 05/22/2024    CREATININE 0.82 05/21/2024     Present on admission history of CKD stage III.  Currently creatinine stable at baseline.

## 2024-05-24 VITALS
TEMPERATURE: 97.9 F | WEIGHT: 234 LBS | BODY MASS INDEX: 45.94 KG/M2 | DIASTOLIC BLOOD PRESSURE: 73 MMHG | RESPIRATION RATE: 18 BRPM | OXYGEN SATURATION: 96 % | SYSTOLIC BLOOD PRESSURE: 137 MMHG | HEART RATE: 76 BPM | HEIGHT: 60 IN

## 2024-05-24 PROBLEM — E66.01 MORBID OBESITY (HCC): Status: ACTIVE | Noted: 2024-05-24

## 2024-05-24 PROCEDURE — 99239 HOSP IP/OBS DSCHRG MGMT >30: CPT | Performed by: FAMILY MEDICINE

## 2024-05-24 PROCEDURE — 94664 DEMO&/EVAL PT USE INHALER: CPT

## 2024-05-24 PROCEDURE — 94760 N-INVAS EAR/PLS OXIMETRY 1: CPT

## 2024-05-24 RX ADMIN — ASPIRIN 325 MG ORAL TABLET 325 MG: 325 PILL ORAL at 08:53

## 2024-05-24 RX ADMIN — METOPROLOL TARTRATE 25 MG: 25 TABLET, FILM COATED ORAL at 08:53

## 2024-05-24 RX ADMIN — POTASSIUM CHLORIDE 40 MEQ: 1500 TABLET, EXTENDED RELEASE ORAL at 08:53

## 2024-05-24 RX ADMIN — CEFAZOLIN SODIUM 2000 MG: 2 SOLUTION INTRAVENOUS at 00:41

## 2024-05-24 RX ADMIN — ROPINIROLE 0.5 MG: 0.25 TABLET, FILM COATED ORAL at 08:52

## 2024-05-24 RX ADMIN — TORSEMIDE 20 MG: 20 TABLET ORAL at 08:52

## 2024-05-24 RX ADMIN — LISINOPRIL 10 MG: 10 TABLET ORAL at 08:54

## 2024-05-24 RX ADMIN — NICOTINE 14 MG: 14 PATCH, EXTENDED RELEASE TRANSDERMAL at 09:01

## 2024-05-24 RX ADMIN — CEFAZOLIN SODIUM 2000 MG: 2 SOLUTION INTRAVENOUS at 08:46

## 2024-05-24 RX ADMIN — SENNOSIDES AND DOCUSATE SODIUM 1 TABLET: 50; 8.6 TABLET ORAL at 08:53

## 2024-05-24 RX ADMIN — AMLODIPINE BESYLATE 5 MG: 5 TABLET ORAL at 08:53

## 2024-05-24 NOTE — CASE MANAGEMENT
Case Management Discharge Planning Note    Patient name Dwaine Gould  Location /-01 MRN [id number]  : 1960 Date 2024       Current Admission Date: 2024  Current Admission Diagnosis:Sepsis (HCC)   Patient Active Problem List    Diagnosis Date Noted Date Diagnosed    Staphylococcus aureus bacteremia 2024     Sepsis (HCC) 2024     Port-A-Cath in place 2024     Encounter for counseling for tobacco use disorder 2024     Adenocarcinoma, colon (HCC) 2024     Encounter to establish care 2024     Anemia 2024     Thrombocytopenia (HCC) 2024     Chronic respiratory failure with hypoxia and hypercapnia (HCC) 2024     Stage 3a chronic kidney disease (HCC) 2024     Chronic heart failure with preserved ejection fraction (HCC) 2024     COPD (chronic obstructive pulmonary disease) ([facility]) 2024     Leukocytosis 2024     Tobacco abuse 2021     CAD (coronary artery disease) 2021     Essential hypertension 2019     Hyperlipidemia 2019       LOS (days): 8  Geometric Mean LOS (GMLOS) (days): 5.1  Days to GMLOS:-3     OBJECTIVE:  Risk of Unplanned Readmission Score: 17.44         Current admission status: Inpatient   Preferred Pharmacy:   [facility]/pharmacy #[id number] - [place] - [address]  [place] [id number]  Phone: [phone] Fax: [phone]    Primary Care Provider: Jordan Ramirez MD    Primary Insurance: [employer]  Secondary Insurance:     DISCHARGE DETAILS:     Requested Home Health Care         Is the patient interested in HHC at discharge?: Yes  Home Health Discipline requested:: Nursing, Occupational Therapy, Physical Therapy  Home Health Agency Name:: [facility]  HHA External Referral Reason (only applicable if external HHA name selected): Patient has established relationship with provider  Home Health Follow-Up Provider:: PCP  Home Health  Services Needed:: Administration of IV, IM or SC Medications, Evaluate Functional Status and Safety, Oxygen Via Nasal Cannula, Strengthening/Theraputic Exercises to Improve Function, COPD Management  Oxygen LPM Ordered (if applicable based on home health services needed):: 3 LPM  Homebound Criteria Met:: Requires the Assistance of Another Person for Safe Ambulation or to Leave the Home, Uses an Assist Device (i.e. cane, walker, etc)         Other Referral/Resources/Interventions Provided:  Referral Comments: [facility], [facility] has been reserved  Programs:: COPD    Would you like to participate in our [facility] service program?  : No - Declined    Treatment Team Recommendation: Home with Home Health Care  Discharge Destination Plan:: Home with Home Health Care  Transport at Discharge : Family         Additional Comments: Traditonal to be at [facility] home at 16:45pm

## 2024-05-24 NOTE — ASSESSMENT & PLAN NOTE
Morbid obesity POA due to calorie intake/expenditure mismatch a/e/b body mass index [BMI] 45.70 treated with registered dietitian evaluation and dietary recommendations to optimize protein/calorie intake.

## 2024-05-24 NOTE — DISCHARGE SUMMARY
[facility]  Discharge- Dwaine Gould 1960, 63 y.o. female MRN: [id number]  Unit/Bed#: -01 Encounter: [id number]  Primary Care Provider: Jordan Ramirez MD   Date and time admitted to hospital: 5/16/2024  4:20 AM    * Sepsis (HCC)  Assessment & Plan  **63-year-old female patient with past medical history of adenocarcinoma of colon s/p hemicolectomy, had Port-A-Cath placed on May 3, 2024 with planning to start chemotherapy now hospitalized due to sepsis due to infected port and MSSA bacteremia    Continue IV ancef  S/p port removal with IR 5/16        Staphylococcus aureus bacteremia  Assessment & Plan  2/2 blood cultures(5/16) + for MSSA 2/2 to infected port  2/2 blood cultures(5/18) neg so far  Catheter tip c/s + MSSA  IV ancef -> plan total 2 weeks iv ancef  until 6/1/24, op fu with ID in 2 weeks  ID following  TTE EF 60%, no valvular vegetation noted , EDWIN neg for vegetations  PICC lineplaced on 5/22    Port-A-Cath in place  Assessment & Plan  H/O carcinoma of the colon post right hemicolectomy.  Patient had Port-A-Cath placed on 05/03/2024 with planning to start on chemotherapy.  S/p port removal 5/16    Stage 3a chronic kidney disease (HCC)  Assessment & Plan  Lab Results   Component Value Date    EGFR 64 05/23/2024    EGFR 89 05/22/2024    EGFR 77 05/21/2024    CREATININE 0.95 05/23/2024    CREATININE 0.73 05/22/2024    CREATININE 0.82 05/21/2024     Present on admission history of CKD stage III.  Currently creatinine stable at baseline.    Essential hypertension  Assessment & Plan  Blood pressure currently stable.  cw lisinopril, Norvasc, Lopressor, Demadex.    COPD (chronic obstructive pulmonary disease) ([facility])  Assessment & Plan  Present on admission with history of COPD.  Chronic home O2 dependent 3L currently stable at baseline.        Chronic heart failure with preserved ejection fraction (HCC)  Assessment & Plan  Wt Readings from Last 3 Encounters:   05/22/24 106 kg (234  lb)   05/15/24 106 kg (234 lb 6.4 oz)   05/08/24 108 kg (237 lb)       Present on admission with history of chronic heart failure with preserved ejection fraction.  Most recent echo from 03/2024 noted with EF of 60%.  continue home dose of Demadex.  Low salt fluid restricted diet.             CAD (coronary artery disease)  Assessment & Plan  Present on admission history of CAD.  Currently asymptomatic.  Cw home dose of aspirin, statin, metoprolol, Norvasc, Demadex.      Morbid obesity (HCC)  Assessment & Plan  Morbid obesity POA due to calorie intake/expenditure mismatch a/e/b body mass index [BMI] 45.70 treated with registered dietitian evaluation and dietary recommendations to optimize protein/calorie intake.       Discharging Physician / Practitioner: Kimberly Godoy MD  PCP: Jordan Ramirez MD  Admission Date:   Admission Orders (From admission, onward)       Ordered        05/16/24 0743  INPATIENT ADMISSION  Once                          Discharge Date: 05/24/24    Disposition:      Home with home health    Reason for Admission: sepsis, bacteremia    Discharge Diagnoses:     Please see assessment and plan section above for further details regarding discharge diagnoses.     Medical Problems       Resolved Problems  Date Reviewed: 5/19/2024   None         Consultations During Hospital Stay:  ID  Cardiology    Medication Adjustments and Discharge Medications:  Summary of Medication Adjustments made as a result of this hospitalization: see med rec  Medication Dosing Tapers - Please refer to Discharge Medication List for details on any medication dosing tapers (if applicable to patient).  Medications being temporarily held (include recommended restart time): see med rec  Discharge Medication List: See after visit summary for reconciled discharge medications.     Wound Care Recommendations:  When applicable, please see wound care section of After Visit Summary.    Diet Recommendations at Discharge:  Diet -        Diet  Orders   (From admission, onward)                 Start     Ordered    05/22/24 1233  Diet Cardiovascular; Cardiac  Diet effective now        References:    Adult Nutrition Support Algorithm    RD Therapeutic Diet Order Protocol   Question Answer Comment   Diet Type Cardiovascular    Cardiac Cardiac    RD to adjust diet per protocol? Yes        05/22/24 1232                      Hospital Course:     Dwaine Gould is a 63 y.o. female patient who originally presented to the hospital on 5/16/2024 with history of CKD, COPD on chronic O2, CAD, CHF, history of carcinoma: S/p right-sided hemicolectomy, had recent port placement by IR on 5/3/2024 presenting with fever/purulent drainage from placed port.    Noted to be in sepsis secondary to Staphylococcus aureus bacteremia.  MSSA positive in 2 out of 2 blood cultures from 5/16/2024.  Negative and blood cultures on 5/18/2024.  Source thought to be port infection, port was removed by IR on 5/16/2024.  Transthoracic echo/EDWIN negative for vegetations.  Discussed with ID, PICC placed on 5/22/2024.  Started on IV Ancef, completed 2 weeks of IV Ancef as of 6/1/2024 and outpatient follow-up with ID in 2 weeks.    Above plan of care discussed in detail with patient.  She verbalizes understanding and is in agreement.  DC home with home health.    Condition at Discharge: fair     Discharge Day Visit / Exam:     Vitals: Blood Pressure: 137/73 (05/24/24 0849)  Pulse: 76 (05/24/24 0849)  Temperature: 97.9 °F (36.6 °C) (05/24/24 0749)  Temp Source: Oral (05/23/24 2114)  Respirations: 18 (05/24/24 0749)  Height: 5' (152.4 cm) (05/22/24 1030)  Weight - Scale: 106 kg (234 lb) (05/22/24 1030)  SpO2: 96 % (05/24/24 0849)  Exam:   Physical Exam  Constitutional:       General: She is not in acute distress.     Appearance: She is obese. She is not toxic-appearing.   HENT:      Mouth/Throat:      Mouth: Mucous membranes are moist.      Pharynx: Oropharynx is clear.   Cardiovascular:      Rate and  Rhythm: Normal rate and regular rhythm.      Pulses: Normal pulses.   Pulmonary:      Effort: Pulmonary effort is normal. No respiratory distress.      Breath sounds: Normal breath sounds.   Musculoskeletal:      Right lower leg: No edema.      Left lower leg: No edema.   Neurological:      General: No focal deficit present.      Mental Status: She is alert and oriented to person, place, and time.           Goals of Care Discussions:  Code Status at Discharge: Level 1 - Full Code      Discharge instructions/Information to patient and family:   See after visit summary section titled Discharge Instructions for information provided to patient and family.       Discharge Statement:  I spent 35 minutes discharging the patient. This time was spent on the day of discharge. I had direct contact with the patient on the day of discharge. Greater than 50% of the total time was spent examining patient, answering all patient questions, arranging and discussing plan of care with patient as well as directly providing post-discharge instructions.  Additional time then spent on discharge activities.    ** Please Note: This note has been constructed using a voice recognition system **

## 2024-05-28 ENCOUNTER — TELEPHONE (OUTPATIENT)
Dept: INFECTIOUS DISEASES | Facility: CLINIC | Age: 64
End: 2024-05-28

## 2024-05-28 DIAGNOSIS — R78.81 MSSA BACTEREMIA: Primary | ICD-10-CM

## 2024-05-28 DIAGNOSIS — B95.61 MSSA BACTEREMIA: Primary | ICD-10-CM

## 2024-05-28 DIAGNOSIS — T80.212A PORT OR RESERVOIR INFECTION: ICD-10-CM

## 2024-05-28 NOTE — PROGRESS NOTES
OPAT NOTE    Supervising/Discharge physician: Evon     Diagnosis: MSSA Bacteremia / port infection     Drug: Cefazolin      Dose/Route/Frequency: [id number]     Labs/Frequency: no labs    End Date: 6/1    Infusion/[facility]NA contact: [facility]     Next appointment: 5/29        MA assigned:  robi

## 2024-05-28 NOTE — UTILIZATION REVIEW
Continued Stay Review    Date: 5/23                          Current Patient Class: IP   Current Level of Care:     HPI:63 y.o. female initially admitted on  5/16    Assessment/Plan:     5/23 IM Note  Pt is awaiting home supplies and abx . Plan for DC tomorrow . PICC line placed 5/22. Cont ancef .     5/23 ID Note   MSSA bacteremia Continue IV Cefazolin 2g every 8 hours. As there was clear source of infection (port) which was removed, blood cultures cleared rapidly, both TTE and EDWIN negative for endocarditis,plan for 2 weeks of IV antibiotic from first negative blood cultures, through 6/01/2024. PICC can be removed on 6/02/2024 per ID perspective, however Oncology may want to keep for chemotherapy    Vital Signs:   05/23/24 21:14:10 97.5 °F (36.4 °C) 79 20 126/72 90 -- -- -- -- Lying   05/23/24 15:06:11 97.7 °F (36.5 °C) 80 -- 122/71 88 97 % -- -- -- --   05/23/24 10:34:04 97.4 °F (36.3 °C) Abnormal  68 -- -- -- 97 % -- -- -- --   05/23/24 1022 -- -- -- -- -- -- 32 3 L/min Nasal cannula --   05/23/24 10:18:11 -- 74 18 109/61 77 92 % -- -- -- --   05/23/24 1018 -- 74 16 109/61 -- -- -- -- -- --   05/23/24 0816 -- 60 16 -- -- 95 % -- -- -- --   05/23/24 0502 -- -- -- -- -- 93 % 32 3 L/min Nasal cannula --   05/23/24 0451 -- 61 16 -- -- 93 % -- -- --        Pertinent Labs/Diagnostic Results:   5/22 EDWIN   There was no echocardiographic evidence of valvular vegetation.    Left Ventricle: Left ventricular cavity size is normal. Wall thickness is normal. The left ventricular ejection fraction is 65%. Systolic function is normal. Wall motion is normal.    Atrial Septum: No patent foramen ovale detected, confirmed at rest using color doppler.    Left Atrial Appendage: There is normal function. There is no thrombus.      Results from last 7 days   Lab Units 05/23/24  0601 05/22/24  0503   WBC Thousand/uL 11.45* 13.15*   HEMOGLOBIN g/dL 12.3 13.5   HEMATOCRIT % 41.3 45.9   PLATELETS Thousands/uL 289 288   TOTAL NEUT ABS  Thousands/µL 7.76* 8.69*         Results from last 7 days   Lab Units 05/23/24  0601 05/22/24  0503   SODIUM mmol/L 137 137   POTASSIUM mmol/L 4.3 5.0   CHLORIDE mmol/L 99 101   CO2 mmol/L 29 31   ANION GAP mmol/L 9 5   BUN mg/dL 26* 18   CREATININE mg/dL 0.95 0.73   EGFR ml/min/1.73sq m 64 89   CALCIUM mg/dL 9.2 9.7     Results from last 7 days   Lab Units 05/23/24  0601 05/22/24  0503   GLUCOSE RANDOM mg/dL 109 108       Medications:   Scheduled Medications:   acetaminophen  1,000 mg Intravenous Q6H PRN      albuterol  2.5 mg Nebulization Q6H PRN      amLODIPine  5 mg Oral Daily      aspirin  325 mg Oral Daily      atorvastatin  40 mg Oral HS      cefazolin  2,000 mg Intravenous Q8H 2,000 mg (05/23/24 1029)    enoxaparin  40 mg Subcutaneous Q12H      lisinopril  10 mg Oral Daily      metoprolol tartrate  25 mg Oral Q12H ANGELES      ondansetron  8 mg Oral Q8H PRN      oxyCODONE  2.5 mg Oral Q6H PRN      potassium chloride  40 mEq Oral BID      rOPINIRole  0.5 mg Oral TID      senna-docusate sodium  1 tablet Oral BID      torsemide  20 mg Oral Daily      zolpidem  5 mg Oral Daily PRN               Discharge Plan: TBD     Network Utilization Review Department  ATTENTION: Please call with any questions or concerns to [phone] and carefully listen to the prompts so that you are directed to the right person. All voicemails are confidential.   For Discharge needs, contact Care Management DC Support Team at [phone] opt. 2  Send all requests for admission clinical reviews, approved or denied determinations and any other requests to dedicated fax number below belonging to the campus where the patient is receiving treatment. List of dedicated fax numbers for the Facilities:  FACILITY NAME UR FAX NUMBER   ADMISSION DENIALS (Administrative/Medical Necessity) [phone]   DISCHARGE SUPPORT TEAM (NETWORK) [phone]   PARENT CHILD HEALTH (Maternity/NICU/Pediatrics) [phone]   [facility]  [phone]   [facility] [phone]   [facility] [phone]   [facility] [phone]   [facility] [phone]   [facility] [phone]   [facility] [phone]   [facility] [phone]   [facility] [phone]   [facility] [phone]   [facility] [phone]   [facility] [phone]

## 2024-05-28 NOTE — TELEPHONE ENCOUNTER
Called and spoke with patient today.   Went over antibiotic plan and follow up appointment. Patient states she reached out to oncology office regarding keeping PICC line for chemotherapy and is waiting for a call back.   Informed patient if there are any further questions or concerns to call the office   Patient verbalizes understanding at this time.

## 2024-05-30 ENCOUNTER — TELEPHONE (OUTPATIENT)
Dept: HEMATOLOGY ONCOLOGY | Facility: CLINIC | Age: 64
End: 2024-05-30

## 2024-05-30 DIAGNOSIS — C18.9 ADENOCARCINOMA, COLON (HCC): Primary | ICD-10-CM

## 2024-05-30 NOTE — TELEPHONE ENCOUNTER
Called and spoke to patient and made her aware that she should still come in for her appointment since lots going on and have discussion with Dr. Black regarding treatment for next week. Pt is agreeable.

## 2024-05-30 NOTE — TELEPHONE ENCOUNTER
Patient Call    Who are you speaking with? Patient    If it is not the patient, are they listed on an active communication consent form? N/A   What is the reason for this call? Patient calling to speak with Dr. Black.  She was unable to have port placed, they placed a picline.  She was in the hospital and is on antibiotics for 9days.  She wants to know if he still wants to see her tomorrow @ 2pm or should she reschedule appt   Does this require a call back? yes   If a call back is required, please list [facility] call back number [phone]- daughters phone   If a call back is required, advise that a message will be forwarded to their care team and someone will return their call as soon as possible.   Did you relay this information to the patient? yes

## 2024-05-31 ENCOUNTER — OFFICE VISIT (OUTPATIENT)
Dept: HEMATOLOGY ONCOLOGY | Facility: CLINIC | Age: 64
End: 2024-05-31
Payer: COMMERCIAL

## 2024-05-31 ENCOUNTER — TELEPHONE (OUTPATIENT)
Dept: HEMATOLOGY ONCOLOGY | Facility: CLINIC | Age: 64
End: 2024-05-31

## 2024-05-31 ENCOUNTER — HOSPITAL ENCOUNTER (OUTPATIENT)
Dept: INFUSION CENTER | Facility: CLINIC | Age: 64
Discharge: HOME | End: 2024-05-31

## 2024-05-31 ENCOUNTER — PATIENT OUTREACH (OUTPATIENT)
Dept: HEMATOLOGY ONCOLOGY | Facility: CLINIC | Age: 64
End: 2024-05-31

## 2024-05-31 VITALS
SYSTOLIC BLOOD PRESSURE: 124 MMHG | TEMPERATURE: 97.6 F | RESPIRATION RATE: 18 BRPM | WEIGHT: 230 LBS | OXYGEN SATURATION: 96 % | BODY MASS INDEX: 45.16 KG/M2 | HEART RATE: 78 BPM | HEIGHT: 60 IN | DIASTOLIC BLOOD PRESSURE: 78 MMHG

## 2024-05-31 DIAGNOSIS — C18.9 ADENOCARCINOMA, COLON (HCC): Primary | ICD-10-CM

## 2024-05-31 DIAGNOSIS — R78.81 STAPHYLOCOCCUS AUREUS BACTEREMIA: ICD-10-CM

## 2024-05-31 DIAGNOSIS — J44.9 CHRONIC OBSTRUCTIVE PULMONARY DISEASE, UNSPECIFIED COPD TYPE (HCC): ICD-10-CM

## 2024-05-31 DIAGNOSIS — E66.01 MORBID OBESITY (HCC): ICD-10-CM

## 2024-05-31 DIAGNOSIS — B95.61 STAPHYLOCOCCUS AUREUS BACTEREMIA: ICD-10-CM

## 2024-05-31 DIAGNOSIS — Z72.0 TOBACCO ABUSE: ICD-10-CM

## 2024-05-31 PROCEDURE — 99214 OFFICE O/P EST MOD 30 MIN: CPT | Performed by: INTERNAL MEDICINE

## 2024-05-31 NOTE — PROGRESS NOTES
Returned pts call, she stated she wanted to know if she should keep her medical oncology appointment scheduled for today. She had already spoke to someone and was instructed to keep the scheduled appointment. She was thankful for the return call, and has my contact information if she needs anything

## 2024-05-31 NOTE — TELEPHONE ENCOUNTER
Pt will be deferred to 6/12. She finishes antibiotics over weekend and provider wants blood cultures to be done on Monday. TY

## 2024-05-31 NOTE — PROGRESS NOTES
Hematology/Oncology Outpatient Follow- up Note  Dwaine Gould 63 y.o. female MRN: @ Encounter: [id number]        Date:  5/31/2024        Assessment / Plan:    1 stage IIIc colon cancer T4 N2b MX with  2 perforation of the primary site  3 Staph aureus septicemia from Port-A-Cath removed completing chemotherapy with antibiotics.  Plan: Patient will begin treatment in the next above months.  She gets her completion of antibiotics in 6/1.  She will get blood cultures CBC CMP and CEA level 6/3 if the wall are stable she will plan to begin treatment 6/11.  She already has signed consents and sides affects her well-known.  Prognosis very guarded.        HPI: 63-year-old female who comes in for treatment.  She has a stage IIIc colon cancer.  She presented with right colon the disease we will with right hemicolectomy have grossly perforated.  She is to be considered for FOLFOX therapy.  Unfortunately she had developed an infection in her Port-A-Cath had a removed and is completing 2 weeks of antibiotic therapy.  She completes her therapy 6/1.  She will go CBC CMP blood cultures x 2 and CEA level on 6/3.  Will and plan if her cultures remain negative to begin her treatment on 6/11.  After treatment we will probably plan to remove her PICC line and look to get an [id number] placed.  She had a anastomosis washout and again is a candidate unfortunately for recurrent disease.  Hopefully she will be able to tolerate treatment well we will plan to begin as stated.      Interval History:    Patient seen with history of advanced colon cancer T4a N2b cMX.  She initially presented with perforation.  She had her a right colon with terminal ileum and appendix right hemicolectomy invasive adenocarcinoma grossly perforated.  Dizzion had a specimen sent.  It showed MMR stable of note she was ER RB positive consistent with H ER 2 positivity.  She had a K-kt mutation as well.  She was to begin treatment when she developed the  onset of Staph aureus infection after placement of [id number]-Uhkh.  She is now completing 2 weeks of IV antibiotics.  She still has a PICC line in place which we will continue until she gets her first treatment.  She had a CAT scan chest abdomen pelvis on 5/16 which showed a 5 mm nodule and stranding in the lower quadrant left side could not rule out surgical tract tumor.  She will be coming for therapy at the beginning of June.        Cancer Staging:  Cancer Staging   Adenocarcinoma, colon (HCC)  Staging form: Colon and Rectum, AJCC 8th Edition  - Pathologic: pT4a, pN2b - Unsigned  Histologic grading system: 4 grade system  Histologic grade (G): G2  Residual tumor (R): R0 - None      Molecular Testing:     Previous Hematologic/ Oncologic History:    Oncology History   Adenocarcinoma, colon (HCC)   4/16/2024 Initial Diagnosis    Adenocarcinoma, colon (HCC)     6/12/2024 -  Chemotherapy    alteplase (CATHFLO), 2 mg, Intracatheter, Every 1 Minute as needed, 1 of 12 cycles  fluorouracil (ADRUCIL), 400 mg/m2 = 805 mg, Intravenous, Once, 1 of 12 cycles  leucovorin calcium IVPB, 400 mg/m2 = 804 mg, Intravenous, Once, 1 of 12 cycles  oxaliplatin (ELOXATIN) chemo infusion, 85 mg/m2 = 170.85 mg, Intravenous, Once, 1 of 12 cycles  fluorouracil (ADRUCIL) ambulatory infusion Soln, 1,200 mg/m2/day = 4,825 mg, Intravenous, Over 46 hours, 1 of 12 cycles         Current Hematologic/ Oncologic Treatment:       Cycle 1         Test Results:    Imaging: EDWIN    Result Date: 5/22/2024  Narrative:   There was no echocardiographic evidence of valvular vegetation.   Left Ventricle: Left ventricular cavity size is normal. Wall thickness is normal. The left ventricular ejection fraction is 65%. Systolic function is normal. Wall motion is normal.   Atrial Septum: No patent foramen ovale detected, confirmed at rest using color doppler.   Left Atrial Appendage: There is normal function. There is no thrombus.     US abdominal wall    Result Date:  5/21/2024  Narrative: SUPERFICIAL SOFT TISSUE ULTRASOUND INDICATION: CT with New focal hypodensity anterior to the  manubrium likely confluent edema measures approximately 4.1 x 1.2 x 3.1 cm, r/o abscess, follow up imaging. COMPARISON: None TECHNIQUE: Real-time ultrasound of the anterior chest wall was performed with a linear transducer with both volumetric sweeps and still imaging techniques. FINDINGS: No abnormality identified throughout the imaged region of concern corresponding to the CT abnormality.     Impression: No abnormality identified throughout the imaged region of concern. Workstation performed: [id number]     VAS upper limb venous duplex scan, unilateral/limited    Result Date: 5/17/2024  Narrative:  [facility] REPORT CLINICAL: Indications: Patient presents with right upper extremity pain, the neck/shoulder area, x few days. S/P recent chest port insertion. Operative History: Cardiac cath Risk Factors The patient has history of HTN, Hyperlipidemia, CKD and previous smoking (quit <1yr ago).   CONCLUSION:   Impression RIGHT UPPER LIMB: No evidence of acute or chronic deep vein thrombosis. No evidence of superficial thrombophlebitis noted. Doppler evaluation shows a normal response to augmentation maneuvers.  LEFT UPPER LIMB LIMITED: Evaluation shows no evidence of thrombus in the internal jugular vein, subclavian vein, and the innominate vein.  Technical findings were given to Dr. Carrillo.  SIGNATURE: Electronically Signed by: SILVERIO LEON MD, RPVI on 2024-05-17 04:28:13 PM    Echo complete w/ contrast if indicated    Result Date: 5/17/2024  Narrative:   Left Ventricle: Left ventricular cavity size is normal. Wall thickness is mildly increased. The left ventricular ejection fraction is 65%. Systolic function is normal. Although no diagnostic regional wall motion abnormality was identified, this possibility cannot be completely excluded on the basis of this study. Diastolic function is normal  for age.   Left Atrium: The atrium is mildly dilated.   Mitral Valve: There is mild regurgitation.     IR port removal    Result Date: 5/16/2024  Narrative: Port removal Clinical History: Patient presenting with evidence of port infection. The port was placed 2 weeks ago. Contrast: None Fluoro time: 7.7 sec Number of Images: 2 Radiation dose: 1.47 mGy Conscious sedation time: 40min Technique: The patient was brought to the interventional radiology suite and identified verbally and by wristband. The patient was placed supine on the table and the right neck and upper chest were prepped and draped in the usual sterile fashion, over the site of the existing port . All elements of maximal sterile barrier technique were followed (cap, mask, sterile gown, sterile gloves, large sterile sheet, hand hygiene, and 2% chlorhexidine for cutaneous antisepsis). A  view was obtained. The  demonstrated the port and catheter in good position.. Lidocaine was administered to the skin over the existing pocket, and the previous surgical incision was reopened. Immediately, cloudy fluid exited the pocket. Approximately 1 mL of this fluid was collected and sent for culture. The existing port was not well healed in. The Vicryl sutures holding the port in place were cut and removed. The port and catheter were sent for culture. Manual compression was applied to the previous puncture site and  tunnel until hemostasis was obtained. The pocket was flushed several times with normal saline. A 6 Swedish pigtail drainage catheter was placed into the pocket. The port pocket was then closed with 4-0 Vicryl suture and Steri-Strips. The drain in the pocket was then flushed several times with normal saline, and the pocket was found to hold approximately 3 mL of fluid. This drain will be left in for several days to allow for saline flushes of the pocket, and any inflammatory fluid to exit.     Impression: Impression: 1. Successful fluoroscopically  guided port removal as described. There was clear evidence of infection with fluid in the pocket which was sent for culture. The port and catheter were also both sent for culture. 2. A drain was left in the pocket which will be flushed 3 times a day. Workstation performed: [id number]     CT chest abdomen pelvis wo contrast    Result Date: 5/16/2024  Narrative: CT CHEST, ABDOMEN AND PELVIS WITHOUT IV CONTRAST INDICATION: port infection. COMPARISON: CT chest 4/30/2024 and CT abdomen and pelvis 3/19/2024 TECHNIQUE: CT examination of the chest, abdomen and pelvis was performed without intravenous contrast. Multiplanar 2D reformatted images were created from the source data. This examination, like all CT scans performed in the [facility], was performed utilizing techniques to minimize radiation dose exposure, including the use of iterative reconstruction and automated exposure control. Radiation dose length product (DLP) for this visit: 1634 mGy-cm Enteric Contrast: Not administered. FINDINGS: CHEST LUNGS: Minimal bibasilar dependent subsegmental atelectasis. Trace lingular scar adjacent to a prominent cardiophrenic angle fat pad. Pulmonary emphysema. 5 mm juxtapleural nodule in the left lower lobe unchanged when measured in the same fashion image 53 series 4. Central airways are clear. PLEURA: Unremarkable. HEART/GREAT VESSELS: Heart is not enlarged.  No pericardial effusion.  Aortic and coronary artery calcification. No thoracic aortic aneurysm. Tip of portacatheter at the superior cavoatrial junction. MEDIASTINUM AND DUANE: Unremarkable. CHEST WALL AND LOWER NECK: New right anterior chest wall stormy catheter. Far superior aspect of the port is partially excluded. Trace subcutaneous fat stranding adjacent to the port without loculated collection. New focal hypodensity anterior to the manubrium likely confluent edema measures approximately 4.1 x 1.2 x 3.1 cm image 19 series 2 and image 129 series 602.  Evaluation slightly limited IV contrast. ABDOMEN LIVER/BILIARY TREE: Hepatic steatosis. No suspicious mass. Normal hepatic contours. No biliary dilation. GALLBLADDER: No calcified gallstones. No pericholecystic inflammatory change. SPLEEN: Unremarkable. PANCREAS: Mild fatty infiltration of the pancreas. ADRENAL GLANDS: Stable minimal nodular thickening of bilateral adrenal glands. KIDNEYS/URETERS: Stable left renal lower pole cortical scar. Otherwise unremarkable. No perinephric collection. STOMACH AND BOWEL: GI evaluation limited without IV and enteric contrast. Right hemicolectomy with unremarkable ileocolic anastomosis. Small to moderate size third duodenal segment diverticulum without diverticulitis. No bowel obstruction. APPENDIX: Prior appendectomy. ABDOMINOPELVIC CAVITY: Prior pneumoperitoneum has resolved. Trace free fluid in the dependent pelvis. Trace mesenteric edema greater on the right side of the abdomen. Interval enlargement of few mesenteric lymph nodes; representative nodes will be measured in short axis on series 2: Image 138, anterior paraduodenal, 1.2 cm previously 3 mm. Image 153, right mid abdominal mesenteric, 5 mm previously 2 mm. 1.2 x 1.2 cm left anterior pelvic soft tissue nodule previously 8 x 6 mm image 200 series 2. VESSELS: Atherosclerotic disease. Stable infrarenal stable infrarenal aortic ectasia with maximum diameter of 2.4 cm. PELVIS REPRODUCTIVE ORGANS: Unremarkable for patient's age. URINARY BLADDER: Unremarkable. ABDOMINAL WALL/INGUINAL REGIONS: Anterior midline and left lower quadrant subcutaneous residual postsurgical changes. No loculated fluid collection. New tubular density arising from the anterior left lower quadrant muscular abdominal wall measures approximately 2.5 x 0.5 x 1 cm image 99 series 602 and image 18 series 2. Mild bilateral flank subcutaneous edema markedly improved. BONES: No acute fracture or osseous destructive lesion identified. Mild degenerative changes  of the spine.     Impression: CT chest: Trace fat stranding adjacent to the right anterior chest wall port without loculated fluid collection. Hypodensity immediately anterior to the manubrium measuring approximately 4.1 x 1.2 x 3.1 cm likely confluent edema. No soft tissue gas. Evaluation slightly limited without IV contrast. This could be followed up with soft tissue ultrasound if clinically warranted. Trace bibasilar dependent subsegmental atelectasis. Otherwise, no evidence of acute intrathoracic process. Stable 5 mm juxtapleural nodule in the left lower lobe when measured in the same fashion. Noncontrast chest CT follow-up in 3 months is advised. Additional chronic findings and negatives as above. CT abdomen and pelvis: Interval right hemicolectomy. Pneumoperitoneum has resolved. No bowel obstruction. Interval enlargement of few small mesenteric lymph nodes and left anterior pelvic nodule. Mild mesenteric edema and trace pelvic free fluid. Cannot exclude early peritoneal carcinomatosis. Consider follow-up with noncontrast CT PET if it would alter patient management. Indeterminate left lower quadrant anterior deep subcutaneous tubular density. Surgical tract tumor seeding or metastatic disease is not excluded. This could be followed up with nonemergent soft tissue ultrasound. Attention on CT PET if obtained is advised. Hepatic steatosis. Additional findings and negatives as above. The study was marked in EPIC for immediate notification. Workstation performed: [id number]     IR port placement    Result Date: 5/3/2024  Narrative: Chest port placement 5/3/2024 8:59 AM History: Colon carcinoma Contrast: None Fluoroscopy time: 17.5 seconds Number of images: 2 Radiation dose: 3.37 mGy Conscious sedation time: 30 minutes Procedure: The patient was identified verbally and by wristband. Timeout was performed.  Informed consent was obtained. All elements of maximal sterile barrier technique were followed (cap, mask,  sterile gown, sterile gloves, large sterile sheet, hand hygiene and 2% chlorhexidine for cutaneous antisepsis). Following obtaining informed consent, the patient was prepped and draped in the usual sterile fashion.  Using ultrasound guidance, access was gained to the patient's right internal jugular vein using a micropuncture system.  The micropuncture wire was removed and a .035 wire was advanced to the level of the inferior vena cava using fluoroscopic guidance. Using 1% lidocaine, the region of the right anterior chest was was anesthetized.  A small incision was made using a 15 blade scalpel.  The port pocket was created using blunt dissection. The catheter tubing was tunneled from the incision to the venotomy.  The micropuncture dilator was exchanged for a peel-away sheath, using fluoroscopic guidance.  The catheter was placed through the peel-away sheath. The catheter was measured and cut to size.  The catheter was attached to the port.  The port was flushed with saline to ensure patency without evidence of leakage.  The port was placed in the port pocket. The port was sutured in the pocket using 3-0 Vicryl. The incisions were approximated with 3-0 Vicryl and tissue adhesive.  The patient tolerated the procedure well without apparent immediate complications.  The patient left the IR department in unchanged condition. Dr. Mcmahon performed and directly supervised the entire procedure. Findings: Using ultrasound guidance, the right internal jugular vein was cannulated using Seldinger technique. The right internal jugular vein was evaluated as a potential access site.  The right internal jugular vein was patent, and free of thrombus. Static images of the vessel was obtained. Visualization of real time needle entry into the vessel was obtained. Fluoroscopic spot image demonstrates a newly placed single lumen chest port via the right internal jugular vein with the most central aspect at the SVC/RA junction.  The  "catheter tubing is smooth in contour.     Impression: Impression: Successful placement of a single lumen chest port via the right internal jugular vein. Workstation performed: [id number]             Labs:   Lab Results   Component Value Date    WBC 11.45 (H) 05/23/2024    HGB 12.3 05/23/2024    HCT 41.3 05/23/2024    MCV 81 (L) 05/23/2024     05/23/2024     Lab Results   Component Value Date    K 4.3 05/23/2024    CL 99 05/23/2024    CO2 29 05/23/2024    BUN 26 (H) 05/23/2024    CREATININE 0.95 05/23/2024    GLUCOSE 152 (H) 03/19/2024    GLUF 111 (H) 07/20/2021    CALCIUM 9.2 05/23/2024    CORRECTEDCA 9.3 03/20/2024    AST 30 05/16/2024    ALT 27 05/16/2024    ALKPHOS 161 (H) 05/16/2024    EGFR 64 05/23/2024         No results found for: \"SPEP\", \"UPEP\"    No results found for: \"PSA\"    No results found for: \"CEA\"    No results found for: \"\"    No results found for: \"AFP\"    No results found for: \"IRON\", \"TIBC\", \"FERRITIN\"    Lab Results   Component Value Date    ISICTLVM76 261 03/20/2024         ROS: Review of Systems   Constitutional: Negative.    HENT: Negative.     Eyes: Negative.    Respiratory:  Positive for cough and shortness of breath.    Cardiovascular: Negative.    Gastrointestinal:  Positive for abdominal distention.   Endocrine: Negative.    Genitourinary: Negative.    Musculoskeletal: Negative.    Skin: Negative.    Allergic/Immunologic: Negative.    Neurological: Negative.    Hematological: Negative.      She is moving her bowels and not having much in the way of abdominal pain    Current Medications: Reviewed  Allergies: Reviewed  PMH/FH/SH:  Reviewed      Physical Exam:    Body surface area is 1.98 meters squared.    Wt Readings from Last 3 Encounters:   05/31/24 104 kg (230 lb)   05/22/24 106 kg (234 lb)   05/15/24 106 kg (234 lb 6.4 oz)        Temp Readings from Last 3 Encounters:   05/31/24 97.6 °F (36.4 °C) (Temporal)   05/24/24 97.9 °F (36.6 °C)   05/15/24 97.6 °F (36.4 °C) " (Temporal)        BP Readings from Last 3 Encounters:   05/31/24 124/78   05/24/24 137/73   05/15/24 138/94         Pulse Readings from Last 3 Encounters:   05/31/24 78   05/24/24 76   05/15/24 102     @LASTSAO2(3)@      Physical Exam  Constitutional:       Appearance: She is obese.   Eyes:      Extraocular Movements: Extraocular movements intact.      Conjunctiva/sclera: Conjunctivae normal.   Cardiovascular:      Rate and Rhythm: Normal rate and regular rhythm.      Heart sounds: Normal heart sounds.   Pulmonary:      Effort: Pulmonary effort is normal.      Breath sounds: Rhonchi present.   Abdominal:      General: Abdomen is flat. Bowel sounds are normal.      Palpations: Abdomen is soft.   Musculoskeletal:         General: Normal range of motion.      Cervical back: Neck supple.   Skin:     General: Skin is warm and dry.   Neurological:      General: No focal deficit present.      Mental Status: She is alert and oriented to person, place, and time. Mental status is at baseline.     She has a midline scar.  It is healed well.  She has wearing oxygen.  Has scattered rhonchi.      Goals and Barriers:  Current Goal: Prolong Survival from Cancer.   Barriers: None.      Patient's Capacity to Self Care:  Patient is able to self care.    Code Status: [unfilled]  Advance Directive and Living Will:      Power of :

## 2024-06-03 ENCOUNTER — HOSPITAL ENCOUNTER (OUTPATIENT)
Dept: INFUSION CENTER | Facility: CLINIC | Age: 64
Discharge: HOME | End: 2024-06-03

## 2024-06-03 ENCOUNTER — PATIENT OUTREACH (OUTPATIENT)
Dept: HEMATOLOGY ONCOLOGY | Facility: CLINIC | Age: 64
End: 2024-06-03

## 2024-06-03 ENCOUNTER — APPOINTMENT (OUTPATIENT)
Dept: LAB | Facility: HOSPITAL | Age: 64
End: 2024-06-03
Payer: COMMERCIAL

## 2024-06-03 DIAGNOSIS — B95.61 STAPHYLOCOCCUS AUREUS BACTEREMIA: ICD-10-CM

## 2024-06-03 DIAGNOSIS — C18.9 ADENOCARCINOMA, COLON (HCC): ICD-10-CM

## 2024-06-03 DIAGNOSIS — R78.81 STAPHYLOCOCCUS AUREUS BACTEREMIA: ICD-10-CM

## 2024-06-03 LAB
ALBUMIN SERPL BCP-MCNC: 3.6 G/DL (ref 3.5–5)
ALP SERPL-CCNC: 169 U/L (ref 34–104)
ALT SERPL W P-5'-P-CCNC: 16 U/L (ref 7–52)
ANION GAP SERPL CALCULATED.3IONS-SCNC: 8 MMOL/L (ref 4–13)
AST SERPL W P-5'-P-CCNC: 23 U/L (ref 13–39)
BASOPHILS # BLD AUTO: 0.07 THOUSANDS/ÂΜL (ref 0–0.1)
BASOPHILS NFR BLD AUTO: 1 % (ref 0–1)
BILIRUB SERPL-MCNC: 0.27 MG/DL (ref 0.2–1)
BUN SERPL-MCNC: 20 MG/DL (ref 5–25)
CALCIUM SERPL-MCNC: 9 MG/DL (ref 8.4–10.2)
CEA SERPL-MCNC: 92.1 NG/ML (ref 0–3)
CHLORIDE SERPL-SCNC: 104 MMOL/L (ref 96–108)
CO2 SERPL-SCNC: 27 MMOL/L (ref 21–32)
CREAT SERPL-MCNC: 1.06 MG/DL (ref 0.6–1.3)
EOSINOPHIL # BLD AUTO: 0.23 THOUSAND/ÂΜL (ref 0–0.61)
EOSINOPHIL NFR BLD AUTO: 2 % (ref 0–6)
ERYTHROCYTE [DISTWIDTH] IN BLOOD BY AUTOMATED COUNT: 21.5 % (ref 11.6–15.1)
GFR SERPL CREATININE-BSD FRML MDRD: 56 ML/MIN/1.73SQ M
GLUCOSE SERPL-MCNC: 126 MG/DL (ref 65–140)
HCT VFR BLD AUTO: 39.5 % (ref 34.8–46.1)
HGB BLD-MCNC: 12.1 G/DL (ref 11.5–15.4)
IMM GRANULOCYTES # BLD AUTO: 0.06 THOUSAND/UL (ref 0–0.2)
IMM GRANULOCYTES NFR BLD AUTO: 1 % (ref 0–2)
LYMPHOCYTES # BLD AUTO: 2.18 THOUSANDS/ÂΜL (ref 0.6–4.47)
LYMPHOCYTES NFR BLD AUTO: 18 % (ref 14–44)
MCH RBC QN AUTO: 25.1 PG (ref 26.8–34.3)
MCHC RBC AUTO-ENTMCNC: 30.6 G/DL (ref 31.4–37.4)
MCV RBC AUTO: 82 FL (ref 82–98)
MONOCYTES # BLD AUTO: 0.66 THOUSAND/ÂΜL (ref 0.17–1.22)
MONOCYTES NFR BLD AUTO: 5 % (ref 4–12)
NEUTROPHILS # BLD AUTO: 9.29 THOUSANDS/ÂΜL (ref 1.85–7.62)
NEUTS SEG NFR BLD AUTO: 73 % (ref 43–75)
NRBC BLD AUTO-RTO: 0 /100 WBCS
PLATELET # BLD AUTO: 314 THOUSANDS/UL (ref 149–390)
PMV BLD AUTO: 8.5 FL (ref 8.9–12.7)
POTASSIUM SERPL-SCNC: 4 MMOL/L (ref 3.5–5.3)
PROT SERPL-MCNC: 7 G/DL (ref 6.4–8.4)
RBC # BLD AUTO: 4.83 MILLION/UL (ref 3.81–5.12)
SODIUM SERPL-SCNC: 139 MMOL/L (ref 135–147)
WBC # BLD AUTO: 12.49 THOUSAND/UL (ref 4.31–10.16)

## 2024-06-03 PROCEDURE — 87040 BLOOD CULTURE FOR BACTERIA: CPT

## 2024-06-03 PROCEDURE — 80053 COMPREHEN METABOLIC PANEL: CPT

## 2024-06-03 PROCEDURE — 36415 COLL VENOUS BLD VENIPUNCTURE: CPT

## 2024-06-03 PROCEDURE — 85025 COMPLETE CBC W/AUTO DIFF WBC: CPT

## 2024-06-03 PROCEDURE — 82378 CARCINOEMBRYONIC ANTIGEN: CPT

## 2024-06-03 NOTE — PROGRESS NOTES
Called pt to check in prior to starting treatment, no answer, left a detailed message with my contact information

## 2024-06-04 ENCOUNTER — PATIENT OUTREACH (OUTPATIENT)
Dept: CASE MANAGEMENT | Facility: HOSPITAL | Age: 64
End: 2024-06-04

## 2024-06-04 ENCOUNTER — TELEPHONE (OUTPATIENT)
Dept: HEMATOLOGY ONCOLOGY | Facility: CLINIC | Age: 64
End: 2024-06-04

## 2024-06-04 NOTE — TELEPHONE ENCOUNTER
Attempted to call patient and left detailed message making her aware  is ok with only have 1 set of blood cultures done and no additional labs are needed, She was made aware in message that I reached out to Dr. Barnard Infectious Disease and made him aware of labs.  Hope line number left if she had any additional questions or concerns.

## 2024-06-04 NOTE — PROGRESS NOTES
Biopsychosocial and Barriers Assessment    Cancer Diagnosis: colon, stage III  Home/Cell Phone: [phone]  Emergency Contact:  John, daughter Eva, sister Di  Marital Status:  x40 years  Interpretation concerns, speaks another language, preferred language: speaks English  Cultural concerns: none noted  Ability to read or write: independent    Caregiver/Support: , 4 adult children  Children: 4 adult children, 3 live locally one daughter in [place] with the   Child/Elder care: NA    Housing: lives local to [facility]  Home Setup: 2 story home, pt lives on 1st floor  Lives With:   Daily Living Activities: needs assist at times  Durable Medical Equipment: home and portable O2, hospital bed, RW, commode, wheelchair   Ambulation: uses RW, wheelchair for distances     Preferred Pharmacy: [facility]  High co-pays with insurance: no concerns, NEGRITA [place] co-pays with medication coverage: no concerns, NEGRITA PERSON  No medication coverage: NA    Primary Care Provider: Dr. Ramirez  Hx of Home Health Care: yes, Traditional HHC   Hx of Short term rehab: no  Mental Health Hx: none  Substance Abuse Hx: none  Employment: unable to work, see note  [place] Status/Location: not a   Ability to pay bills: concerns, see note  POA/LW/AD:   Transportation Plan/Concerns: pt does not drive d/t h/o seizures,  will provide transportation      What do you know about your Cancer Diagnosis    What has your doctor told you about your cancer diagnosis:    What has your doctor told you about your cancer treatment:    What specific concerns do you have about your diagnosis and treatment:    Have you been made aware of any hair loss associated with treatment:    Additional Comments:      MSW s/w pt by phone this morning to introduce myself as a resource to her and assess for any needs prior to starting tx next week.  Pt says that she's waiting to see if she can start next week, as she's just  "finished antibiotics.  She is hopeful and anxious to get started and tells me that she just wants to get going.     Pt lives locally with her  and has 4 adult children who are all  and have moved out.  She has one daughter in the  in [place], otherwise the rest of them are local.  She notes that they are a strong support system to her and help in various ways, around the house, emotional support, or financially.  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  She notes many chronic health issues in the last 14 years, she is O2 dependent as well.  She says that she has applied for SSD multiple times and has always been denied.  After being dx with cancer she applied again and was told she does not qualify as she has not worked for 5 of the last 10 years.  She argues that she was applying for disability all of that time because she was not able to work.  She also has a h/o seizures that has left her unable to drive.  I suggested that she consult with a SSD  but she tells me that she already has done this after being denied so many times.  She does not qualify for SSI because her  works and \"makes too much money\" for her to qualify, unfortunately her  only makes $19k a year.  She will be eligible for her SS penitentiary income in the fall and realizes that she may have few other options but to wait for that.    We spoke about the infusion center, what to bring with her, what to expect for her first day, what we have available to her.  She wanted to be sure that we have an O2 concentrator, I assured her that we do I and will make the infusion staff aware that she needs this.  Her  will bring her but likely will not stay for her treatment as he has to go to work.  She tells me that she is ready and hoping to get started next week.  She asked about side " effects and what happens to manage them, we reviewed that she would r/o to the office to let them know and we will help her through it the best we can.  She was very appreciative of the call and the time spent talking and otherwise denies any needs or concerns.  She is agreeable to reaching out as needed moving forward.  I will remain available to her as needed in the future.

## 2024-06-04 NOTE — TELEPHONE ENCOUNTER
Called and spoke with Giveline RN who is changing the dressing for the patient and was there to draw labs.  Unfortunately by policy blood cultures should not be done through picc unless a last resort. Pt was sent to hospital for labs. They were able to do 1 set of peripheral blood cultures along with cbc and cmp. Labs done will be forwarded to Dr. Barnard.  Per RN the around picc is slightly pink but it was this color on insertion. No pain or drainage is noted to area.  Patient finished her antibiotics on 6/2 and depending on results ID may order additional labs.

## 2024-06-04 NOTE — TELEPHONE ENCOUNTER
Patient Call    Who are you speaking with? Giveline - [facility] Nurse    If it is not the patient, are they listed on an active communication consent form? N/A   What is the reason for this call? Patient went for labs yesterday, states no facility could draw culture for picc line.    [facility] would like call back ASAP to discuss what to do next   Does this require a call back? Yes   If a call back is required, please list best call back number [phone]   If a call back is required, advise that a message will be forwarded to their care team and someone will return their call as soon as possible.   Did you relay this information to the patient? Yes

## 2024-06-05 DIAGNOSIS — C18.9 ADENOCARCINOMA, COLON (HCC): Primary | ICD-10-CM

## 2024-06-05 RX ORDER — FLUOROURACIL 50 MG/ML
400 INJECTION, SOLUTION INTRAVENOUS ONCE
OUTPATIENT
Start: 2024-06-12

## 2024-06-05 RX ORDER — SODIUM CHLORIDE 9 MG/ML
20 INJECTION, SOLUTION INTRAVENOUS ONCE AS NEEDED
OUTPATIENT
Start: 2024-06-12

## 2024-06-05 RX ORDER — DEXTROSE MONOHYDRATE 50 MG/ML
20 INJECTION, SOLUTION INTRAVENOUS ONCE
OUTPATIENT
Start: 2024-06-12

## 2024-06-06 ENCOUNTER — TELEPHONE (OUTPATIENT)
Age: 64
End: 2024-06-06

## 2024-06-06 ENCOUNTER — TELEPHONE (OUTPATIENT)
Dept: HEMATOLOGY ONCOLOGY | Facility: CLINIC | Age: 64
End: 2024-06-06

## 2024-06-06 NOTE — TELEPHONE ENCOUNTER
Called [facility] and spoke with Smitha.   Informed Smitha of both Dr. Barnard's and Dr. Black's attached notes regarding PICC being used for chemo. Informed Smitha patients oncology office can be contacted regarding PICC being removed.   Smitha verbalizes understanding at this time.

## 2024-06-06 NOTE — TELEPHONE ENCOUNTER
Cruzito with [facility] calling to confirm end date of abx and whether we received labs from oncology. Labs are viewable in pt's chart but were not required by ID. Donovann made aware of this as well as abx end date of 6/1. Pt did take last dose on 6/1. Cruzito is wondering when to pull the PICC line. States oncology was going to use it for 1 administration of chemo but they need confirmation from ID to resume treatment. Will route to office/provider for coordination with oncology.

## 2024-06-06 NOTE — TELEPHONE ENCOUNTER
Called patient with a new appt scheduled for 6/14 @12;40 per Dr Black . Left message with all info and with hope line tel number

## 2024-06-07 DIAGNOSIS — N18.31 STAGE 3A CHRONIC KIDNEY DISEASE (HCC): Primary | ICD-10-CM

## 2024-06-07 DIAGNOSIS — C18.9 ADENOCARCINOMA, COLON (HCC): ICD-10-CM

## 2024-06-07 NOTE — PROGRESS NOTES
wants port placement with IR to be placed. Patient will get her chemotherapy ordered with elastomeric ball connected to the PICC line she has in place.  Pt had port place and got infected. She has been on antibiotics and culture from 6/3/24 preliminary negative. Patient finished antibiotics on 6/2/24.  Once port is in place the picc line will be removed. Family is aware of plan.Chemotherapy is to start next week on 6/12/24. In request to IR chemotherapy dates were provided and asked if could be done on her off week.

## 2024-06-08 LAB — BACTERIA BLD CULT: NORMAL

## 2024-06-10 ENCOUNTER — HOSPITAL ENCOUNTER (OUTPATIENT)
Dept: INFUSION CENTER | Facility: CLINIC | Age: 64
Discharge: HOME | End: 2024-06-10
Payer: COMMERCIAL

## 2024-06-10 DIAGNOSIS — Z95.828 PORT-A-CATH IN PLACE: Primary | ICD-10-CM

## 2024-06-10 DIAGNOSIS — C18.9 ADENOCARCINOMA, COLON (HCC): ICD-10-CM

## 2024-06-10 LAB
ALBUMIN SERPL BCP-MCNC: 3.7 G/DL (ref 3.5–5)
ALP SERPL-CCNC: 168 U/L (ref 34–104)
ALT SERPL W P-5'-P-CCNC: 16 U/L (ref 7–52)
ANION GAP SERPL CALCULATED.3IONS-SCNC: 7 MMOL/L (ref 4–13)
AST SERPL W P-5'-P-CCNC: 18 U/L (ref 13–39)
BASOPHILS # BLD AUTO: 0.04 THOUSANDS/ÂΜL (ref 0–0.1)
BASOPHILS NFR BLD AUTO: 0 % (ref 0–1)
BILIRUB SERPL-MCNC: 0.32 MG/DL (ref 0.2–1)
BUN SERPL-MCNC: 18 MG/DL (ref 5–25)
CALCIUM SERPL-MCNC: 8.9 MG/DL (ref 8.4–10.2)
CHLORIDE SERPL-SCNC: 102 MMOL/L (ref 96–108)
CO2 SERPL-SCNC: 30 MMOL/L (ref 21–32)
CREAT SERPL-MCNC: 0.9 MG/DL (ref 0.6–1.3)
EOSINOPHIL # BLD AUTO: 0.29 THOUSAND/ÂΜL (ref 0–0.61)
EOSINOPHIL NFR BLD AUTO: 2 % (ref 0–6)
ERYTHROCYTE [DISTWIDTH] IN BLOOD BY AUTOMATED COUNT: 21.7 % (ref 11.6–15.1)
GFR SERPL CREATININE-BSD FRML MDRD: 69 ML/MIN/1.73SQ M
GLUCOSE SERPL-MCNC: 97 MG/DL (ref 65–140)
HCT VFR BLD AUTO: 39.1 % (ref 34.8–46.1)
HGB BLD-MCNC: 11.9 G/DL (ref 11.5–15.4)
IMM GRANULOCYTES # BLD AUTO: 0.09 THOUSAND/UL (ref 0–0.2)
IMM GRANULOCYTES NFR BLD AUTO: 1 % (ref 0–2)
LYMPHOCYTES # BLD AUTO: 2.33 THOUSANDS/ÂΜL (ref 0.6–4.47)
LYMPHOCYTES NFR BLD AUTO: 18 % (ref 14–44)
MCH RBC QN AUTO: 25.5 PG (ref 26.8–34.3)
MCHC RBC AUTO-ENTMCNC: 30.4 G/DL (ref 31.4–37.4)
MCV RBC AUTO: 84 FL (ref 82–98)
MONOCYTES # BLD AUTO: 0.96 THOUSAND/ÂΜL (ref 0.17–1.22)
MONOCYTES NFR BLD AUTO: 8 % (ref 4–12)
NEUTROPHILS # BLD AUTO: 9.03 THOUSANDS/ÂΜL (ref 1.85–7.62)
NEUTS SEG NFR BLD AUTO: 71 % (ref 43–75)
NRBC BLD AUTO-RTO: 0 /100 WBCS
PLATELET # BLD AUTO: 264 THOUSANDS/UL (ref 149–390)
PMV BLD AUTO: 8.7 FL (ref 8.9–12.7)
POTASSIUM SERPL-SCNC: 3.9 MMOL/L (ref 3.5–5.3)
PROT SERPL-MCNC: 7.1 G/DL (ref 6.4–8.4)
RBC # BLD AUTO: 4.66 MILLION/UL (ref 3.81–5.12)
SODIUM SERPL-SCNC: 139 MMOL/L (ref 135–147)
WBC # BLD AUTO: 12.74 THOUSAND/UL (ref 4.31–10.16)

## 2024-06-10 PROCEDURE — 85025 COMPLETE CBC W/AUTO DIFF WBC: CPT | Performed by: INTERNAL MEDICINE

## 2024-06-10 PROCEDURE — 80053 COMPREHEN METABOLIC PANEL: CPT | Performed by: INTERNAL MEDICINE

## 2024-06-10 NOTE — PROGRESS NOTES
Pt into clinic for PICC line flush and lab draw via PICC. Pt offers no complaints. Tolerated procedure. Needle-free valve changed and new passive disinfecting cap placed. Pt aware of next appointment on 6/12/24 at 10:30am. AVS declined.

## 2024-06-12 ENCOUNTER — TELEPHONE (OUTPATIENT)
Dept: HEMATOLOGY ONCOLOGY | Facility: CLINIC | Age: 64
End: 2024-06-12

## 2024-06-12 ENCOUNTER — HOSPITAL ENCOUNTER (OUTPATIENT)
Dept: INFUSION CENTER | Facility: CLINIC | Age: 64
Discharge: HOME | End: 2024-06-12
Payer: COMMERCIAL

## 2024-06-12 VITALS
TEMPERATURE: 96.7 F | WEIGHT: 238.6 LBS | DIASTOLIC BLOOD PRESSURE: 87 MMHG | HEIGHT: 60 IN | SYSTOLIC BLOOD PRESSURE: 152 MMHG | HEART RATE: 76 BPM | RESPIRATION RATE: 18 BRPM | BODY MASS INDEX: 46.84 KG/M2

## 2024-06-12 DIAGNOSIS — C18.9 ADENOCARCINOMA, COLON (HCC): Primary | ICD-10-CM

## 2024-06-12 PROCEDURE — 96415 CHEMO IV INFUSION ADDL HR: CPT

## 2024-06-12 PROCEDURE — 96413 CHEMO IV INFUSION 1 HR: CPT

## 2024-06-12 PROCEDURE — 96367 TX/PROPH/DG ADDL SEQ IV INF: CPT

## 2024-06-12 PROCEDURE — G0498 CHEMO EXTEND IV INFUS W/PUMP: HCPCS

## 2024-06-12 PROCEDURE — 96411 CHEMO IV PUSH ADDL DRUG: CPT

## 2024-06-12 PROCEDURE — 96368 THER/DIAG CONCURRENT INF: CPT

## 2024-06-12 RX ORDER — FLUOROURACIL 50 MG/ML
400 INJECTION, SOLUTION INTRAVENOUS ONCE
Status: COMPLETED | OUTPATIENT
Start: 2024-06-12 | End: 2024-06-12

## 2024-06-12 RX ORDER — DEXTROSE MONOHYDRATE 50 MG/ML
20 INJECTION, SOLUTION INTRAVENOUS ONCE
Status: COMPLETED | OUTPATIENT
Start: 2024-06-12 | End: 2024-06-12

## 2024-06-12 RX ORDER — SODIUM CHLORIDE 9 MG/ML
20 INJECTION, SOLUTION INTRAVENOUS ONCE AS NEEDED
Status: DISCONTINUED | OUTPATIENT
Start: 2024-06-12 | End: 2024-06-15 | Stop reason: HOSPADM

## 2024-06-12 RX ADMIN — DEXAMETHASONE SODIUM PHOSPHATE: 10 INJECTION, SOLUTION INTRAMUSCULAR; INTRAVENOUS at 11:17

## 2024-06-12 RX ADMIN — SODIUM CHLORIDE 20 ML/HR: 0.9 INJECTION, SOLUTION INTRAVENOUS at 11:17

## 2024-06-12 RX ADMIN — LEUCOVORIN CALCIUM 792 MG: 350 INJECTION, POWDER, LYOPHILIZED, FOR SOLUTION INTRAMUSCULAR; INTRAVENOUS at 12:02

## 2024-06-12 RX ADMIN — OXALIPLATIN 168.3 MG: 5 INJECTION, SOLUTION INTRAVENOUS at 12:02

## 2024-06-12 RX ADMIN — FLUOROURACIL 790 MG: 50 INJECTION, SOLUTION INTRAVENOUS at 15:18

## 2024-06-12 RX ADMIN — DEXTROSE 20 ML/HR: 5 SOLUTION INTRAVENOUS at 11:47

## 2024-06-12 NOTE — TELEPHONE ENCOUNTER
Patient Call    Who are you speaking with? Patient    If it is not the patient, are they listed on an active communication consent form? N/A   What is the reason for this call? Pt is calling in regards to her current PICC line she has in place.  Pt states the home care nurse has an order to remove her PICC line on 6/18/24. Pt recently had an infection in her port, and it was removed. Pt states she had her first round of chemo through her PICC line. The home care nurse is asking if there is another date scheduled for placement for her port? She would like to know that before she removes the PICC line. Please call pt back regarding this.    Does this require a call back? Yes   If a call back is required, please list best call back number    If a call back is required, advise that a message will be forwarded to their care team and someone will return their call as soon as possible.   Did you relay this information to the patient? Yes

## 2024-06-12 NOTE — PROGRESS NOTES
Patient arrives to infusion center for FOLFOX Chemotherapy. Patient offers no complaints today. PICC offers blood return, dressing clean and intact. Patient began to have sudden, sharp, non radiating, chest pain for approximately 5 minutes. Office notified. Order to restart Oxaliplatin + Leucovorin over 2 hours (pt received 44 minutes of infusion) PICC flushed, clamped, passive disinfectant cap applied. AVS offered.     Next appointment: 6/14/24 @ [id number]

## 2024-06-13 ENCOUNTER — TELEPHONE (OUTPATIENT)
Dept: HEMATOLOGY ONCOLOGY | Facility: CLINIC | Age: 64
End: 2024-06-13

## 2024-06-13 RX ORDER — SODIUM CHLORIDE 9 MG/ML
20 INJECTION, SOLUTION INTRAVENOUS ONCE AS NEEDED
OUTPATIENT
Start: 2024-06-26

## 2024-06-13 RX ORDER — FLUOROURACIL 50 MG/ML
400 INJECTION, SOLUTION INTRAVENOUS ONCE
OUTPATIENT
Start: 2024-06-26

## 2024-06-13 RX ORDER — DEXTROSE MONOHYDRATE 50 MG/ML
20 INJECTION, SOLUTION INTRAVENOUS ONCE
OUTPATIENT
Start: 2024-06-26

## 2024-06-13 NOTE — TELEPHONE ENCOUNTER
Called patient in regards od IR port placement appt . Left message with all info and with hope line tel number . Also asked patient to give me or Abida Marcus a call back to confirmed that she understands appt info

## 2024-06-13 NOTE — TELEPHONE ENCOUNTER
Attempted to call patient and left a vm, also left her a my chart message. Wanted to relay message that we were able to get her on schedule for port and clarifying some things with IR. Gave her my number so she could call back and we could discuss further.

## 2024-06-14 ENCOUNTER — OFFICE VISIT (OUTPATIENT)
Dept: HEMATOLOGY ONCOLOGY | Facility: CLINIC | Age: 64
End: 2024-06-14
Payer: COMMERCIAL

## 2024-06-14 ENCOUNTER — HOSPITAL ENCOUNTER (OUTPATIENT)
Dept: INFUSION CENTER | Facility: CLINIC | Age: 64
Discharge: HOME | End: 2024-06-14

## 2024-06-14 VITALS
RESPIRATION RATE: 18 BRPM | DIASTOLIC BLOOD PRESSURE: 90 MMHG | SYSTOLIC BLOOD PRESSURE: 142 MMHG | WEIGHT: 238 LBS | HEART RATE: 78 BPM | BODY MASS INDEX: 46.72 KG/M2 | OXYGEN SATURATION: 95 % | HEIGHT: 60 IN | TEMPERATURE: 97.8 F

## 2024-06-14 DIAGNOSIS — C18.9 ADENOCARCINOMA, COLON (HCC): Primary | ICD-10-CM

## 2024-06-14 DIAGNOSIS — C18.9 ADENOCARCINOMA, COLON (HCC): ICD-10-CM

## 2024-06-14 PROCEDURE — 99214 OFFICE O/P EST MOD 30 MIN: CPT | Performed by: INTERNAL MEDICINE

## 2024-06-14 NOTE — PROGRESS NOTES
CHAYA quigley'd, see Arianna Zafar's note. PICC line flushed, clamped, green cap applied.     Next appointment: 6/24/24 @ 1300

## 2024-06-14 NOTE — H&P (VIEW-ONLY)
Hematology/Oncology Outpatient Follow- up Note  Dwaine Gould 63 y.o. female MRN: @ Encounter: [id number]        Date:  6/14/2024        Assessment / Plan:    1 stage IIIc colon cancer T4 N2b MX with  2 perforation of the primary site  3 Staph aureus septicemia from Port-A-Cath which has been removed.  4 oxygen dependent significant COPD  She completed antibiotics and will have her PICC line removed and Rmjzlv-j-Eafi replaced  Plan she can begin her treatment received her first dose.  She will return here in 4 weeks.  Return in 2 weeks to get treatment.  Port-A-Cath is placed on 6/24.  Prognosis long-term is guarded.        HPI: 63-year-old female here for follow-up with stage III T4b N2b MX.  She had a perforation at the primary site.  Scans suggested that she may have small pockets of metastatic disease although it is not fairly clear.  Her CEA level was noted to be 92 however.  I explained to her that I cannot rule out that she has metastatic disease at this point.  Our best solution is to continue and go ahead with her treatments.  Hopefully this will control her situation.  Otherwise no other major suggestions or problems.  She has tolerated her medications fairly well to date.  Apparently there was a problem with the pump so she did not receive the full dose today.  She will return in 2 weeks hopefully will get the full dose at that time.  She also will get a Wqfcau-q-Ucli placed to have her PICC line removed.    Interval History: Note from 5/31/2024:  Assessment / Plan:    1 stage IIIc colon cancer T4 N2b MX with  2 perforation of the primary site  3 Staph aureus septicemia from Port-A-Cath removed completing chemotherapy with antibiotics.  Plan: Patient will begin treatment in the next above months.  She gets her completion of antibiotics in 6/1.  She will get blood cultures CBC CMP and CEA level 6/3 if the wall are stable she will plan to begin treatment 6/11.  She already has signed consents and sides  affects her well-known.  Prognosis very guarded.  HPI: 63-year-old female who comes in for treatment.  She has a stage IIIc colon cancer.  She presented with right colon the disease we will with right hemicolectomy have grossly perforated.  She is to be considered for FOLFOX therapy.  Unfortunately she had developed an infection in her Port-A-Cath had a removed and is completing 2 weeks of antibiotic therapy.  She completes her therapy 6/1.  She will go CBC CMP blood cultures x 2 and CEA level on 6/3.  Will and plan if her cultures remain negative to begin her treatment on 6/11.  After treatment we will probably plan to remove her PICC line and look to get an Zndhpe-x-Lysg placed.  She had a anastomosis washout and again is a candidate unfortunately for recurrent disease.  Hopefully she will be able to tolerate treatment well we will plan to begin as stated.  interval History:    Patient seen with history of advanced colon cancer T4a N2b cMX.  She initially presented with perforation.  She had her a right colon with terminal ileum and appendix right hemicolectomy invasive adenocarcinoma grossly perforated.  ZapMe had a specimen sent.  It showed MMR stable of note she was ER RB positive consistent with H ER 2 positivity.  She had a K-kt mutation as well.  She was to begin treatment when she developed the onset of Staph aureus infection after placement of Gszgbk-v-Vhrw.  She is now completing 2 weeks of IV antibiotics.  She still has a PICC line in place which we will continue until she gets her first treatment.  She had a CAT scan chest abdomen pelvis on 5/16 which showed a 5 mm nodule and stranding in the lower quadrant left side could not rule out surgical tract tumor.  She will be coming for therapy at the beginning of June.      Cancer Staging:  Cancer Staging   Adenocarcinoma, colon (HCC)  Staging form: Colon and Rectum, AJCC 8th Edition  - Pathologic: pT4a, pN2b - Unsigned  Histologic grading  system: 4 grade system  Histologic grade (G): G2  Residual tumor (R): R0 - None      Molecular Testing:     Previous Hematologic/ Oncologic History:    Oncology History   Adenocarcinoma, colon (HCC)   4/16/2024 Initial Diagnosis    Adenocarcinoma, colon (HCC)     6/12/2024 -  Chemotherapy    alteplase (CATHFLO), 2 mg, Intracatheter, Every 1 Minute as needed, 1 of 12 cycles  fluorouracil (ADRUCIL), 400 mg/m2 = 805 mg, Intravenous, Once, 1 of 12 cycles  Administration: 790 mg (6/12/2024)  leucovorin calcium IVPB, 400 mg/m2 = 804 mg, Intravenous, Once, 1 of 12 cycles  Administration: 792 mg (6/12/2024)  oxaliplatin (ELOXATIN) chemo infusion, 85 mg/m2 = 170.85 mg, Intravenous, Once, 1 of 12 cycles  Administration: 168.3 mg (6/12/2024)  fluorouracil (ADRUCIL) ambulatory infusion Soln, 1,200 mg/m2/day = 4,825 mg, Intravenous, Over 46 hours, 1 of 12 cycles         Current Hematologic/ Oncologic Treatment:       Cycle 1         Test Results:    Imaging: EDWIN    Result Date: 5/22/2024  Narrative:   There was no echocardiographic evidence of valvular vegetation.   Left Ventricle: Left ventricular cavity size is normal. Wall thickness is normal. The left ventricular ejection fraction is 65%. Systolic function is normal. Wall motion is normal.   Atrial Septum: No patent foramen ovale detected, confirmed at rest using color doppler.   Left Atrial Appendage: There is normal function. There is no thrombus.     US abdominal wall    Result Date: 5/21/2024  Narrative: SUPERFICIAL SOFT TISSUE ULTRASOUND INDICATION: CT with New focal hypodensity anterior to the  manubrium likely confluent edema measures approximately 4.1 x 1.2 x 3.1 cm, r/o abscess, follow up imaging. COMPARISON: None TECHNIQUE: Real-time ultrasound of the anterior chest wall was performed with a linear transducer with both volumetric sweeps and still imaging techniques. FINDINGS: No abnormality identified throughout the imaged region of concern corresponding to the CT  abnormality.     Impression: No abnormality identified throughout the imaged region of concern. Workstation performed: [id number]     VAS upper limb venous duplex scan, unilateral/limited    Result Date: 5/17/2024  Narrative:  [facility] REPORT CLINICAL: Indications: Patient presents with right upper extremity pain, the neck/shoulder area, x few days. S/P recent chest port insertion. Operative History: Cardiac cath Risk Factors The patient has history of HTN, Hyperlipidemia, CKD and previous smoking (quit <1yr ago).   CONCLUSION:   Impression RIGHT UPPER LIMB: No evidence of acute or chronic deep vein thrombosis. No evidence of superficial thrombophlebitis noted. Doppler evaluation shows a normal response to augmentation maneuvers.  LEFT UPPER LIMB LIMITED: Evaluation shows no evidence of thrombus in the internal jugular vein, subclavian vein, and the innominate vein.  Technical findings were given to Dr. Carrillo.  SIGNATURE: Electronically Signed by: SILVERIO LEON MD, RPVI on 2024-05-17 04:28:13 PM    Echo complete w/ contrast if indicated    Result Date: 5/17/2024  Narrative:   Left Ventricle: Left ventricular cavity size is normal. Wall thickness is mildly increased. The left ventricular ejection fraction is 65%. Systolic function is normal. Although no diagnostic regional wall motion abnormality was identified, this possibility cannot be completely excluded on the basis of this study. Diastolic function is normal for age.   Left Atrium: The atrium is mildly dilated.   Mitral Valve: There is mild regurgitation.     IR port removal    Result Date: 5/16/2024  Narrative: Port removal Clinical History: Patient presenting with evidence of port infection. The port was placed 2 weeks ago. Contrast: None Fluoro time: 7.7 sec Number of Images: 2 Radiation dose: 1.47 mGy Conscious sedation time: 40min Technique: The patient was brought to the interventional radiology suite and identified verbally and by  wristband. The patient was placed supine on the table and the right neck and upper chest were prepped and draped in the usual sterile fashion, over the site of the existing port . All elements of maximal sterile barrier technique were followed (cap, mask, sterile gown, sterile gloves, large sterile sheet, hand hygiene, and 2% chlorhexidine for cutaneous antisepsis). A  view was obtained. The  demonstrated the port and catheter in good position.. Lidocaine was administered to the skin over the existing pocket, and the previous surgical incision was reopened. Immediately, cloudy fluid exited the pocket. Approximately 1 mL of this fluid was collected and sent for culture. The existing port was not well healed in. The Vicryl sutures holding the port in place were cut and removed. The port and catheter were sent for culture. Manual compression was applied to the previous puncture site and  tunnel until hemostasis was obtained. The pocket was flushed several times with normal saline. A 6 Syriac pigtail drainage catheter was placed into the pocket. The port pocket was then closed with 4-0 Vicryl suture and Steri-Strips. The drain in the pocket was then flushed several times with normal saline, and the pocket was found to hold approximately 3 mL of fluid. This drain will be left in for several days to allow for saline flushes of the pocket, and any inflammatory fluid to exit.     Impression: Impression: 1. Successful fluoroscopically guided port removal as described. There was clear evidence of infection with fluid in the pocket which was sent for culture. The port and catheter were also both sent for culture. 2. A drain was left in the pocket which will be flushed 3 times a day. Workstation performed: [id number]     CT chest abdomen pelvis wo contrast    Result Date: 5/16/2024  Narrative: CT CHEST, ABDOMEN AND PELVIS WITHOUT IV CONTRAST INDICATION: port infection. COMPARISON: CT chest 4/30/2024 and CT abdomen  and pelvis 3/19/2024 TECHNIQUE: CT examination of the chest, abdomen and pelvis was performed without intravenous contrast. Multiplanar 2D reformatted images were created from the source data. This examination, like all CT scans performed in the [facility], was performed utilizing techniques to minimize radiation dose exposure, including the use of iterative reconstruction and automated exposure control. Radiation dose length product (DLP) for this visit: 1634 mGy-cm Enteric Contrast: Not administered. FINDINGS: CHEST LUNGS: Minimal bibasilar dependent subsegmental atelectasis. Trace lingular scar adjacent to a prominent cardiophrenic angle fat pad. Pulmonary emphysema. 5 mm juxtapleural nodule in the left lower lobe unchanged when measured in the same fashion image 53 series 4. Central airways are clear. PLEURA: Unremarkable. HEART/GREAT VESSELS: Heart is not enlarged.  No pericardial effusion.  Aortic and coronary artery calcification. No thoracic aortic aneurysm. Tip of portacatheter at the superior cavoatrial junction. MEDIASTINUM AND DUANE: Unremarkable. CHEST WALL AND LOWER NECK: New right anterior chest wall stormy catheter. Far superior aspect of the port is partially excluded. Trace subcutaneous fat stranding adjacent to the port without loculated collection. New focal hypodensity anterior to the manubrium likely confluent edema measures approximately 4.1 x 1.2 x 3.1 cm image 19 series 2 and image 129 series 602. Evaluation slightly limited IV contrast. ABDOMEN LIVER/BILIARY TREE: Hepatic steatosis. No suspicious mass. Normal hepatic contours. No biliary dilation. GALLBLADDER: No calcified gallstones. No pericholecystic inflammatory change. SPLEEN: Unremarkable. PANCREAS: Mild fatty infiltration of the pancreas. ADRENAL GLANDS: Stable minimal nodular thickening of bilateral adrenal glands. KIDNEYS/URETERS: Stable left renal lower pole cortical scar. Otherwise unremarkable. No perinephric  collection. STOMACH AND BOWEL: GI evaluation limited without IV and enteric contrast. Right hemicolectomy with unremarkable ileocolic anastomosis. Small to moderate size third duodenal segment diverticulum without diverticulitis. No bowel obstruction. APPENDIX: Prior appendectomy. ABDOMINOPELVIC CAVITY: Prior pneumoperitoneum has resolved. Trace free fluid in the dependent pelvis. Trace mesenteric edema greater on the right side of the abdomen. Interval enlargement of few mesenteric lymph nodes; representative nodes will be measured in short axis on series 2: Image 138, anterior paraduodenal, 1.2 cm previously 3 mm. Image 153, right mid abdominal mesenteric, 5 mm previously 2 mm. 1.2 x 1.2 cm left anterior pelvic soft tissue nodule previously 8 x 6 mm image 200 series 2. VESSELS: Atherosclerotic disease. Stable infrarenal stable infrarenal aortic ectasia with maximum diameter of 2.4 cm. PELVIS REPRODUCTIVE ORGANS: Unremarkable for patient's age. URINARY BLADDER: Unremarkable. ABDOMINAL WALL/INGUINAL REGIONS: Anterior midline and left lower quadrant subcutaneous residual postsurgical changes. No loculated fluid collection. New tubular density arising from the anterior left lower quadrant muscular abdominal wall measures approximately 2.5 x 0.5 x 1 cm image 99 series 602 and image 18 series 2. Mild bilateral flank subcutaneous edema markedly improved. BONES: No acute fracture or osseous destructive lesion identified. Mild degenerative changes of the spine.     Impression: CT chest: Trace fat stranding adjacent to the right anterior chest wall port without loculated fluid collection. Hypodensity immediately anterior to the manubrium measuring approximately 4.1 x 1.2 x 3.1 cm likely confluent edema. No soft tissue gas. Evaluation slightly limited without IV contrast. This could be followed up with soft tissue ultrasound if clinically warranted. Trace bibasilar dependent subsegmental atelectasis. Otherwise, no evidence  "of acute intrathoracic process. Stable 5 mm juxtapleural nodule in the left lower lobe when measured in the same fashion. Noncontrast chest CT follow-up in 3 months is advised. Additional chronic findings and negatives as above. CT abdomen and pelvis: Interval right hemicolectomy. Pneumoperitoneum has resolved. No bowel obstruction. Interval enlargement of few small mesenteric lymph nodes and left anterior pelvic nodule. Mild mesenteric edema and trace pelvic free fluid. Cannot exclude early peritoneal carcinomatosis. Consider follow-up with noncontrast CT PET if it would alter patient management. Indeterminate left lower quadrant anterior deep subcutaneous tubular density. Surgical tract tumor seeding or metastatic disease is not excluded. This could be followed up with nonemergent soft tissue ultrasound. Attention on CT PET if obtained is advised. Hepatic steatosis. Additional findings and negatives as above. The study was marked in EPIC for immediate notification. Workstation performed: [id number]             Labs:   Lab Results   Component Value Date    WBC 12.74 (H) 06/10/2024    HGB 11.9 06/10/2024    HCT 39.1 06/10/2024    MCV 84 06/10/2024     06/10/2024     Lab Results   Component Value Date    K 3.9 06/10/2024     06/10/2024    CO2 30 06/10/2024    BUN 18 06/10/2024    CREATININE 0.90 06/10/2024    GLUCOSE 152 (H) 03/19/2024    GLUF 111 (H) 07/20/2021    CALCIUM 8.9 06/10/2024    CORRECTEDCA 9.3 03/20/2024    AST 18 06/10/2024    ALT 16 06/10/2024    ALKPHOS 168 (H) 06/10/2024    EGFR 69 06/10/2024         No results found for: \"SPEP\", \"UPEP\"    No results found for: \"PSA\"    Lab Results   Component Value Date    CEA 92.1 (H) 06/03/2024       No results found for: \"\"    No results found for: \"AFP\"    No results found for: \"IRON\", \"TIBC\", \"FERRITIN\"    Lab Results   Component Value Date    [id number] 261 03/20/2024         ROS: Review of Systems   Constitutional: Negative.    HENT: " Negative.     Eyes: Negative.    Respiratory:  Positive for cough and shortness of breath.    Cardiovascular: Negative.    Gastrointestinal: Negative.    Endocrine: Negative.    Genitourinary: Negative.    Musculoskeletal: Negative.    Allergic/Immunologic: Negative.    Neurological: Negative.    Hematological: Negative.          Current Medications: Reviewed  Allergies: Reviewed  PMH/FH/SH:  Reviewed      Physical Exam:    Body surface area is 2.01 meters squared.    Wt Readings from Last 3 Encounters:   06/14/24 108 kg (238 lb)   06/12/24 108 kg (238 lb 9.6 oz)   05/31/24 104 kg (230 lb)        Temp Readings from Last 3 Encounters:   06/14/24 97.8 °F (36.6 °C) (Temporal)   06/12/24 (!) 96.7 °F (35.9 °C) (Temporal)   05/31/24 97.6 °F (36.4 °C) (Temporal)        BP Readings from Last 3 Encounters:   06/14/24 142/90   06/12/24 152/87   05/31/24 124/78         Pulse Readings from Last 3 Encounters:   06/14/24 78   06/12/24 76   05/31/24 78     @LASTSAO2(3)@      Physical Exam  Constitutional:       Appearance: Normal appearance. She is normal weight.   HENT:      Head: Normocephalic and atraumatic.   Eyes:      Extraocular Movements: Extraocular movements intact.      Conjunctiva/sclera: Conjunctivae normal.      Pupils: Pupils are equal, round, and reactive to light.   Cardiovascular:      Rate and Rhythm: Normal rate and regular rhythm.      Heart sounds: Normal heart sounds.   Pulmonary:      Effort: Pulmonary effort is normal.      Breath sounds: Normal breath sounds.   Abdominal:      General: Abdomen is flat. Bowel sounds are normal.      Palpations: Abdomen is soft.   Musculoskeletal:         General: Normal range of motion.      Cervical back: Normal range of motion and neck supple.   Skin:     General: Skin is warm and dry.   Neurological:      General: No focal deficit present.      Mental Status: She is alert and oriented to person, place, and time. Mental status is at baseline.     She is wearing oxygen.   Her breathing is reasonably comfortable at rest.      Goals and Barriers:  Current Goal: Prolong Survival from Cancer.   Barriers: None.      Patient's Capacity to Self Care:  Patient is able to self care.    Code Status: [unfilled]  Advance Directive and Living Will:      Power of :

## 2024-06-14 NOTE — PROGRESS NOTES
Hematology/Oncology Outpatient Follow- up Note  Dwaine Gould 63 y.o. female MRN: @ Encounter: [id number]        Date:  6/14/2024        Assessment / Plan:    1 stage IIIc colon cancer T4 N2b MX with  2 perforation of the primary site  3 Staph aureus septicemia from Port-A-Cath which has been removed.  4 oxygen dependent significant COPD  She completed antibiotics and will have her PICC line removed and Ljsqdg-t-Wdpl replaced  Plan she can begin her treatment received her first dose.  She will return here in 4 weeks.  Return in 2 weeks to get treatment.  Port-A-Cath is placed on 6/24.  Prognosis long-term is guarded.        HPI: 63-year-old female here for follow-up with stage III T4b N2b MX.  She had a perforation at the primary site.  Scans suggested that she may have small pockets of metastatic disease although it is not fairly clear.  Her CEA level was noted to be 92 however.  I explained to her that I cannot rule out that she has metastatic disease at this point.  Our best solution is to continue and go ahead with her treatments.  Hopefully this will control her situation.  Otherwise no other major suggestions or problems.  She has tolerated her medications fairly well to date.  Apparently there was a problem with the pump so she did not receive the full dose today.  She will return in 2 weeks hopefully will get the full dose at that time.  She also will get a Cqmpxo-m-Ourz placed to have her PICC line removed.    Interval History: Note from 5/31/2024:  Assessment / Plan:    1 stage IIIc colon cancer T4 N2b MX with  2 perforation of the primary site  3 Staph aureus septicemia from Port-A-Cath removed completing chemotherapy with antibiotics.  Plan: Patient will begin treatment in the next above months.  She gets her completion of antibiotics in 6/1.  She will get blood cultures CBC CMP and CEA level 6/3 if the wall are stable she will plan to begin treatment 6/11.  She already has signed consents and sides  affects her well-known.  Prognosis very guarded.  HPI: 63-year-old female who comes in for treatment.  She has a stage IIIc colon cancer.  She presented with right colon the disease we will with right hemicolectomy have grossly perforated.  She is to be considered for FOLFOX therapy.  Unfortunately she had developed an infection in her Port-A-Cath had a removed and is completing 2 weeks of antibiotic therapy.  She completes her therapy 6/1.  She will go CBC CMP blood cultures x 2 and CEA level on 6/3.  Will and plan if her cultures remain negative to begin her treatment on 6/11.  After treatment we will probably plan to remove her PICC line and look to get an Pwzvmy-v-Waup placed.  She had a anastomosis washout and again is a candidate unfortunately for recurrent disease.  Hopefully she will be able to tolerate treatment well we will plan to begin as stated.  interval History:    Patient seen with history of advanced colon cancer T4a N2b cMX.  She initially presented with perforation.  She had her a right colon with terminal ileum and appendix right hemicolectomy invasive adenocarcinoma grossly perforated.  [employer] had a specimen sent.  It showed MMR stable of note she was ER RB positive consistent with H ER 2 positivity.  She had a K-kt mutation as well.  She was to begin treatment when she developed the onset of Staph aureus infection after placement of Segohv-a-Pxpz.  She is now completing 2 weeks of IV antibiotics.  She still has a PICC line in place which we will continue until she gets her first treatment.  She had a CAT scan chest abdomen pelvis on 5/16 which showed a 5 mm nodule and stranding in the lower quadrant left side could not rule out surgical tract tumor.  She will be coming for therapy at the beginning of June.      Cancer Staging:  Cancer Staging   Adenocarcinoma, colon (HCC)  Staging form: Colon and Rectum, AJCC 8th Edition  - Pathologic: pT4a, pN2b - Unsigned  Histologic grading  system: 4 grade system  Histologic grade (G): G2  Residual tumor (R): R0 - None      Molecular Testing:     Previous Hematologic/ Oncologic History:    Oncology History   Adenocarcinoma, colon (HCC)   4/16/2024 Initial Diagnosis    Adenocarcinoma, colon (HCC)     6/12/2024 -  Chemotherapy    alteplase (CATHFLO), 2 mg, Intracatheter, Every 1 Minute as needed, 1 of 12 cycles  fluorouracil (ADRUCIL), 400 mg/m2 = 805 mg, Intravenous, Once, 1 of 12 cycles  Administration: 790 mg (6/12/2024)  leucovorin calcium IVPB, 400 mg/m2 = 804 mg, Intravenous, Once, 1 of 12 cycles  Administration: 792 mg (6/12/2024)  oxaliplatin (ELOXATIN) chemo infusion, 85 mg/m2 = 170.85 mg, Intravenous, Once, 1 of 12 cycles  Administration: 168.3 mg (6/12/2024)  fluorouracil (ADRUCIL) ambulatory infusion Soln, 1,200 mg/m2/day = 4,825 mg, Intravenous, Over 46 hours, 1 of 12 cycles         Current Hematologic/ Oncologic Treatment:       Cycle 1         Test Results:    Imaging: EDWIN    Result Date: 5/22/2024  Narrative:   There was no echocardiographic evidence of valvular vegetation.   Left Ventricle: Left ventricular cavity size is normal. Wall thickness is normal. The left ventricular ejection fraction is 65%. Systolic function is normal. Wall motion is normal.   Atrial Septum: No patent foramen ovale detected, confirmed at rest using color doppler.   Left Atrial Appendage: There is normal function. There is no thrombus.     US abdominal wall    Result Date: 5/21/2024  Narrative: SUPERFICIAL SOFT TISSUE ULTRASOUND INDICATION: CT with New focal hypodensity anterior to the  manubrium likely confluent edema measures approximately 4.1 x 1.2 x 3.1 cm, r/o abscess, follow up imaging. COMPARISON: None TECHNIQUE: Real-time ultrasound of the anterior chest wall was performed with a linear transducer with both volumetric sweeps and still imaging techniques. FINDINGS: No abnormality identified throughout the imaged region of concern corresponding to the CT  abnormality.     Impression: No abnormality identified throughout the imaged region of concern. Workstation performed: [id number]     VAS upper limb venous duplex scan, unilateral/limited    Result Date: 5/17/2024  Narrative:  [facility] REPORT CLINICAL: Indications: Patient presents with right upper extremity pain, the neck/shoulder area, x few days. S/P recent chest port insertion. Operative History: Cardiac cath Risk Factors The patient has history of HTN, Hyperlipidemia, CKD and previous smoking (quit <1yr ago).   CONCLUSION:   Impression RIGHT UPPER LIMB: No evidence of acute or chronic deep vein thrombosis. No evidence of superficial thrombophlebitis noted. Doppler evaluation shows a normal response to augmentation maneuvers.  LEFT UPPER LIMB LIMITED: Evaluation shows no evidence of thrombus in the internal jugular vein, subclavian vein, and the innominate vein.  Technical findings were given to Dr. Carrillo.  SIGNATURE: Electronically Signed by: SILVERIO LEON MD, RPVI on 2024-05-17 04:28:13 PM    Echo complete w/ contrast if indicated    Result Date: 5/17/2024  Narrative:   Left Ventricle: Left ventricular cavity size is normal. Wall thickness is mildly increased. The left ventricular ejection fraction is 65%. Systolic function is normal. Although no diagnostic regional wall motion abnormality was identified, this possibility cannot be completely excluded on the basis of this study. Diastolic function is normal for age.   Left Atrium: The atrium is mildly dilated.   Mitral Valve: There is mild regurgitation.     IR port removal    Result Date: 5/16/2024  Narrative: Port removal Clinical History: Patient presenting with evidence of port infection. The port was placed 2 weeks ago. Contrast: None Fluoro time: 7.7 sec Number of Images: 2 Radiation dose: 1.47 mGy Conscious sedation time: 40min Technique: The patient was brought to the interventional radiology suite and identified verbally and by  wristband. The patient was placed supine on the table and the right neck and upper chest were prepped and draped in the usual sterile fashion, over the site of the existing port . All elements of maximal sterile barrier technique were followed (cap, mask, sterile gown, sterile gloves, large sterile sheet, hand hygiene, and 2% chlorhexidine for cutaneous antisepsis). A  view was obtained. The  demonstrated the port and catheter in good position.. Lidocaine was administered to the skin over the existing pocket, and the previous surgical incision was reopened. Immediately, cloudy fluid exited the pocket. Approximately 1 mL of this fluid was collected and sent for culture. The existing port was not well healed in. The Vicryl sutures holding the port in place were cut and removed. The port and catheter were sent for culture. Manual compression was applied to the previous puncture site and  tunnel until hemostasis was obtained. The pocket was flushed several times with normal saline. A 6 Slovenian pigtail drainage catheter was placed into the pocket. The port pocket was then closed with 4-0 Vicryl suture and Steri-Strips. The drain in the pocket was then flushed several times with normal saline, and the pocket was found to hold approximately 3 mL of fluid. This drain will be left in for several days to allow for saline flushes of the pocket, and any inflammatory fluid to exit.     Impression: Impression: 1. Successful fluoroscopically guided port removal as described. There was clear evidence of infection with fluid in the pocket which was sent for culture. The port and catheter were also both sent for culture. 2. A drain was left in the pocket which will be flushed 3 times a day. Workstation performed: [id number]     CT chest abdomen pelvis wo contrast    Result Date: 5/16/2024  Narrative: CT CHEST, ABDOMEN AND PELVIS WITHOUT IV CONTRAST INDICATION: port infection. COMPARISON: CT chest 4/30/2024 and CT abdomen  and pelvis 3/19/2024 TECHNIQUE: CT examination of the chest, abdomen and pelvis was performed without intravenous contrast. Multiplanar 2D reformatted images were created from the source data. This examination, like all CT scans performed in the [facility], was performed utilizing techniques to minimize radiation dose exposure, including the use of iterative reconstruction and automated exposure control. Radiation dose length product (DLP) for this visit: 1634 mGy-cm Enteric Contrast: Not administered. FINDINGS: CHEST LUNGS: Minimal bibasilar dependent subsegmental atelectasis. Trace lingular scar adjacent to a prominent cardiophrenic angle fat pad. Pulmonary emphysema. 5 mm juxtapleural nodule in the left lower lobe unchanged when measured in the same fashion image 53 series 4. Central airways are clear. PLEURA: Unremarkable. HEART/GREAT VESSELS: Heart is not enlarged.  No pericardial effusion.  Aortic and coronary artery calcification. No thoracic aortic aneurysm. Tip of portacatheter at the superior cavoatrial junction. MEDIASTINUM AND DUANE: Unremarkable. CHEST WALL AND LOWER NECK: New right anterior chest wall stormy catheter. Far superior aspect of the port is partially excluded. Trace subcutaneous fat stranding adjacent to the port without loculated collection. New focal hypodensity anterior to the manubrium likely confluent edema measures approximately 4.1 x 1.2 x 3.1 cm image 19 series 2 and image 129 series 602. Evaluation slightly limited IV contrast. ABDOMEN LIVER/BILIARY TREE: Hepatic steatosis. No suspicious mass. Normal hepatic contours. No biliary dilation. GALLBLADDER: No calcified gallstones. No pericholecystic inflammatory change. SPLEEN: Unremarkable. PANCREAS: Mild fatty infiltration of the pancreas. ADRENAL GLANDS: Stable minimal nodular thickening of bilateral adrenal glands. KIDNEYS/URETERS: Stable left renal lower pole cortical scar. Otherwise unremarkable. No perinephric  collection. STOMACH AND BOWEL: GI evaluation limited without IV and enteric contrast. Right hemicolectomy with unremarkable ileocolic anastomosis. Small to moderate size third duodenal segment diverticulum without diverticulitis. No bowel obstruction. APPENDIX: Prior appendectomy. ABDOMINOPELVIC CAVITY: Prior pneumoperitoneum has resolved. Trace free fluid in the dependent pelvis. Trace mesenteric edema greater on the right side of the abdomen. Interval enlargement of few mesenteric lymph nodes; representative nodes will be measured in short axis on series 2: Image 138, anterior paraduodenal, 1.2 cm previously 3 mm. Image 153, right mid abdominal mesenteric, 5 mm previously 2 mm. 1.2 x 1.2 cm left anterior pelvic soft tissue nodule previously 8 x 6 mm image 200 series 2. VESSELS: Atherosclerotic disease. Stable infrarenal stable infrarenal aortic ectasia with maximum diameter of 2.4 cm. PELVIS REPRODUCTIVE ORGANS: Unremarkable for patient's age. URINARY BLADDER: Unremarkable. ABDOMINAL WALL/INGUINAL REGIONS: Anterior midline and left lower quadrant subcutaneous residual postsurgical changes. No loculated fluid collection. New tubular density arising from the anterior left lower quadrant muscular abdominal wall measures approximately 2.5 x 0.5 x 1 cm image 99 series 602 and image 18 series 2. Mild bilateral flank subcutaneous edema markedly improved. BONES: No acute fracture or osseous destructive lesion identified. Mild degenerative changes of the spine.     Impression: CT chest: Trace fat stranding adjacent to the right anterior chest wall port without loculated fluid collection. Hypodensity immediately anterior to the manubrium measuring approximately 4.1 x 1.2 x 3.1 cm likely confluent edema. No soft tissue gas. Evaluation slightly limited without IV contrast. This could be followed up with soft tissue ultrasound if clinically warranted. Trace bibasilar dependent subsegmental atelectasis. Otherwise, no evidence  "of acute intrathoracic process. Stable 5 mm juxtapleural nodule in the left lower lobe when measured in the same fashion. Noncontrast chest CT follow-up in 3 months is advised. Additional chronic findings and negatives as above. CT abdomen and pelvis: Interval right hemicolectomy. Pneumoperitoneum has resolved. No bowel obstruction. Interval enlargement of few small mesenteric lymph nodes and left anterior pelvic nodule. Mild mesenteric edema and trace pelvic free fluid. Cannot exclude early peritoneal carcinomatosis. Consider follow-up with noncontrast CT PET if it would alter patient management. Indeterminate left lower quadrant anterior deep subcutaneous tubular density. Surgical tract tumor seeding or metastatic disease is not excluded. This could be followed up with nonemergent soft tissue ultrasound. Attention on CT PET if obtained is advised. Hepatic steatosis. Additional findings and negatives as above. The study was marked in EPIC for immediate notification. Workstation performed: [id number]             Labs:   Lab Results   Component Value Date    WBC 12.74 (H) 06/10/2024    HGB 11.9 06/10/2024    HCT 39.1 06/10/2024    MCV 84 06/10/2024     06/10/2024     Lab Results   Component Value Date    K 3.9 06/10/2024     06/10/2024    CO2 30 06/10/2024    BUN 18 06/10/2024    CREATININE 0.90 06/10/2024    GLUCOSE 152 (H) 03/19/2024    GLUF 111 (H) 07/20/2021    CALCIUM 8.9 06/10/2024    CORRECTEDCA 9.3 03/20/2024    AST 18 06/10/2024    ALT 16 06/10/2024    ALKPHOS 168 (H) 06/10/2024    EGFR 69 06/10/2024         No results found for: \"SPEP\", \"UPEP\"    No results found for: \"PSA\"    Lab Results   Component Value Date    CEA 92.1 (H) 06/03/2024       No results found for: \"\"    No results found for: \"AFP\"    No results found for: \"IRON\", \"TIBC\", \"FERRITIN\"    Lab Results   Component Value Date    OEZYZHIG05 261 03/20/2024         ROS: Review of Systems   Constitutional: Negative.    HENT: " Negative.     Eyes: Negative.    Respiratory:  Positive for cough and shortness of breath.    Cardiovascular: Negative.    Gastrointestinal: Negative.    Endocrine: Negative.    Genitourinary: Negative.    Musculoskeletal: Negative.    Allergic/Immunologic: Negative.    Neurological: Negative.    Hematological: Negative.          Current Medications: Reviewed  Allergies: Reviewed  PMH/FH/SH:  Reviewed      Physical Exam:    Body surface area is 2.01 meters squared.    Wt Readings from Last 3 Encounters:   06/14/24 108 kg (238 lb)   06/12/24 108 kg (238 lb 9.6 oz)   05/31/24 104 kg (230 lb)        Temp Readings from Last 3 Encounters:   06/14/24 97.8 °F (36.6 °C) (Temporal)   06/12/24 (!) 96.7 °F (35.9 °C) (Temporal)   05/31/24 97.6 °F (36.4 °C) (Temporal)        BP Readings from Last 3 Encounters:   06/14/24 142/90   06/12/24 152/87   05/31/24 124/78         Pulse Readings from Last 3 Encounters:   06/14/24 78   06/12/24 76   05/31/24 78     @LASTSAO2(3)@      Physical Exam  Constitutional:       Appearance: Normal appearance. She is normal weight.   HENT:      Head: Normocephalic and atraumatic.   Eyes:      Extraocular Movements: Extraocular movements intact.      Conjunctiva/sclera: Conjunctivae normal.      Pupils: Pupils are equal, round, and reactive to light.   Cardiovascular:      Rate and Rhythm: Normal rate and regular rhythm.      Heart sounds: Normal heart sounds.   Pulmonary:      Effort: Pulmonary effort is normal.      Breath sounds: Normal breath sounds.   Abdominal:      General: Abdomen is flat. Bowel sounds are normal.      Palpations: Abdomen is soft.   Musculoskeletal:         General: Normal range of motion.      Cervical back: Normal range of motion and neck supple.   Skin:     General: Skin is warm and dry.   Neurological:      General: No focal deficit present.      Mental Status: She is alert and oriented to person, place, and time. Mental status is at baseline.     She is wearing oxygen.   Her breathing is reasonably comfortable at rest.      Goals and Barriers:  Current Goal: Prolong Survival from Cancer.   Barriers: None.      Patient's Capacity to Self Care:  Patient is able to self care.    Code Status: [unfilled]  Advance Directive and Living Will:      Power of :

## 2024-06-14 NOTE — PROGRESS NOTES
Patient came for disconnect.  Noticed that chemo ball was fairly full with slight wrinkles in it. Disconnected ball for a brief moment, checked PICC line with flush and got great blood return.  Re-hooked patient back up to ball, she will come back at 5:30 for disconnect as she doesn't want to go to [facility] tomorrow.  Marisol BEAULIEU RN made aware of situation and will put a note in the plan.

## 2024-06-15 PROBLEM — A41.9 SEPSIS (HCC): Status: RESOLVED | Noted: 2024-05-16 | Resolved: 2024-06-15

## 2024-06-18 DIAGNOSIS — C18.9 ADENOCARCINOMA, COLON (HCC): Primary | ICD-10-CM

## 2024-06-19 ENCOUNTER — OFFICE VISIT (OUTPATIENT)
Dept: CARDIOLOGY CLINIC | Facility: CLINIC | Age: 64
End: 2024-06-19
Payer: COMMERCIAL

## 2024-06-19 VITALS
HEART RATE: 81 BPM | OXYGEN SATURATION: 99 % | SYSTOLIC BLOOD PRESSURE: 130 MMHG | DIASTOLIC BLOOD PRESSURE: 86 MMHG | RESPIRATION RATE: 16 BRPM | WEIGHT: 232 LBS | BODY MASS INDEX: 45.55 KG/M2 | HEIGHT: 60 IN

## 2024-06-19 DIAGNOSIS — I10 ESSENTIAL HYPERTENSION: ICD-10-CM

## 2024-06-19 DIAGNOSIS — J44.9 CHRONIC OBSTRUCTIVE PULMONARY DISEASE, UNSPECIFIED COPD TYPE (HCC): ICD-10-CM

## 2024-06-19 DIAGNOSIS — C18.9 ADENOCARCINOMA, COLON (HCC): ICD-10-CM

## 2024-06-19 DIAGNOSIS — J96.11 CHRONIC RESPIRATORY FAILURE WITH HYPOXIA AND HYPERCAPNIA (HCC): ICD-10-CM

## 2024-06-19 DIAGNOSIS — J96.12 CHRONIC RESPIRATORY FAILURE WITH HYPOXIA AND HYPERCAPNIA (HCC): ICD-10-CM

## 2024-06-19 DIAGNOSIS — I25.10 CORONARY ARTERY DISEASE INVOLVING NATIVE HEART WITHOUT ANGINA PECTORIS, UNSPECIFIED VESSEL OR LESION TYPE: Primary | ICD-10-CM

## 2024-06-19 DIAGNOSIS — I50.32 CHRONIC HEART FAILURE WITH PRESERVED EJECTION FRACTION (HCC): ICD-10-CM

## 2024-06-19 DIAGNOSIS — E78.2 MIXED HYPERLIPIDEMIA: ICD-10-CM

## 2024-06-19 DIAGNOSIS — N18.31 STAGE 3A CHRONIC KIDNEY DISEASE (HCC): ICD-10-CM

## 2024-06-19 PROCEDURE — 99214 OFFICE O/P EST MOD 30 MIN: CPT | Performed by: NURSE PRACTITIONER

## 2024-06-19 RX ORDER — CEFAZOLIN SODIUM 2 G/50ML
2000 SOLUTION INTRAVENOUS ONCE
Status: CANCELLED | OUTPATIENT
Start: 2024-06-24

## 2024-06-19 RX ORDER — SODIUM CHLORIDE 9 MG/ML
30 INJECTION, SOLUTION INTRAVENOUS CONTINUOUS
Status: CANCELLED | OUTPATIENT
Start: 2024-06-19

## 2024-06-19 NOTE — PROGRESS NOTES
Cardiology Office Note    Dwaine Gould 63 y.o. female MRN: [id number]    06/19/24          Assessment/Plan:    1.  Chest and throat discomfort  - 3 episodes total, one during chemotherapy- pain relieved by drinking fluids/swallowing  - Has declined stress testing at this time  - Continue aspirin, atorvastatin, metoprolol tartrate  - Advised to report to the emergency room if symptoms occur again    2.  Nonobstructive CAD (ostial PDA lesion not amenable to PCI post closed)  - See plan as above    3.  HFpEF  - Patient currently appears euvolemic  - Continue lisinopril, metoprolol titrate torsemide  - Continue 2000 mg sodium restricted diet Daily weights    4.  COPD with chronic respiratory failure    5.  Colon cancer  - Patient currently receiving chemotherapy    Follow up:  months or sooner as needed    1. Coronary artery disease involving native heart without angina pectoris, unspecified vessel or lesion type        2. Chronic heart failure with preserved ejection fraction (HCC)        3. Chronic respiratory failure with hypoxia and hypercapnia (HCC)        4. Chronic obstructive pulmonary disease, unspecified COPD type (HCC)        5. Essential hypertension        6. Stage 3a chronic kidney disease (HCC)        7. Adenocarcinoma, colon (HCC)        8. Mixed hyperlipidemia            HPI: Dwaine Gould is a 63 y.o. year old female with a past medical history of nonobstructive CAD (cardiac catheterization in 2014 revealed ostial PDA lesion not amenable to PCI), chronic HFpEF, COPD with chronic hypoxic respiratory failure, colon cancer with recent starting of chemo who presents today for a hospital follow-up.  She was admitted to [facility] in mid May 2024 for Staph aureus bacteremia due to Port-A-Cath infection with removal.  During her hospital admission she had a TTE performed which revealed normal systolic function with an EF of 65% with no regional wall motion abnormalities, although could not be  completely excluded, normal diastolic function and mild MR. She also had a EDWIN performed which revealed no valvular vegetation, no patent PFO, and a normal functioning left atrial appendage with no thrombus.    Today she reports that he she has recently started chemotherapy for colon cancer.  She reports that during her initial transfusion she developed chest pain and throat pain described as tightness and she did notify the infusion center who slowed down her drip which improved her symptoms and then eventually resolved on its own.  She states that she did get the sensation in her throat again which improved with drinking fluids.  Given her symptoms she was offered stress testing for further evaluation and she declined at this time.  She states that she would like to go through with her chemo infusions and see how she feels and pursue stress testing at a later time.  She is aware of the risks of postponing stress testing.  She denies dyspnea on exertion or rest, lower extremity edema, lightheadedness, dizziness, syncopal episodes,or palpitations and was instructed to call  the office or seek medical attention if any such symptoms develop.    She was instructed to follow a 2000 mg sodium restricted diet and to weigh themselves daily, and report a weight gain of 3 pounds in 1 day or 5 pounds in 1 week.     All medications reviewed and patient is tolerating medications without side effects.     Family History: Mother and 2 brothers with heart issues      Patient Active Problem List   Diagnosis    Tobacco abuse    CAD (coronary artery disease)    Chronic heart failure with preserved ejection fraction (HCC)    COPD (chronic obstructive pulmonary disease) (HCC)    Leukocytosis    Essential hypertension    Hyperlipidemia    Chronic respiratory failure with hypoxia and hypercapnia (HCC)    Stage 3a chronic kidney disease (HCC)    Anemia    Thrombocytopenia (HCC)    Adenocarcinoma, colon (HCC)    Encounter to establish care     Encounter for counseling for tobacco use disorder    Port-A-Cath in place    Staphylococcus aureus bacteremia    Morbid obesity (HCC)       Allergies   Allergen Reactions    Codeine Anaphylaxis    Wound Dressing Adhesive Blisters     ALL WOUND DRESSINGS     Contrast [Iodinated Contrast Media] GI Intolerance    Prednisone Irritability     Increase in anger/abusive behaviors    Ibuprofen Rash         Current Outpatient Medications:     albuterol (2.5 mg/3 mL) 0.083 % nebulizer solution, Take 3 mL (2.5 mg total) by nebulization every 6 (six) hours as needed for wheezing or shortness of breath, Disp: 360 mL, Rfl: 2    amLODIPine (NORVASC) 5 mg tablet, Take 1 tablet (5 mg total) by mouth daily, Disp: 90 tablet, Rfl: 3    aspirin 325 mg tablet, Take 1 tablet by mouth daily, Disp: , Rfl:     atorvastatin (Lipitor) 40 mg tablet, Take 1 tablet (40 mg total) by mouth daily at bedtime, Disp: 90 tablet, Rfl: 3    [START ON 6/26/2024] fluorouracil 4,750 mg in CADD/Elastomeric Infusion Device, Infuse 4,750 mg (1,200 mg/m2/day x 1.98 m2) into a catheter in a vein via infusion device over 46 hours for 2 days  Infusion planned for June 26, 2024., Disp: 1 each, Rfl: 0    lisinopril (ZESTRIL) 10 mg tablet, Take 1 tablet (10 mg total) by mouth daily, Disp: 30 tablet, Rfl: 2    metoprolol tartrate (LOPRESSOR) 25 mg tablet, Take 1 tablet (25 mg total) by mouth every 12 (twelve) hours, Disp: 60 tablet, Rfl: 3    ondansetron (ZOFRAN-ODT) 8 mg disintegrating tablet, Take 1 tablet (8 mg total) by mouth every 8 (eight) hours as needed for vomiting or nausea, Disp: 20 tablet, Rfl: 1    rOPINIRole (REQUIP) 0.5 mg tablet, Take 1 tablet (0.5 mg total) by mouth 3 (three) times a day, Disp: 90 tablet, Rfl: 0    torsemide (DEMADEX) 20 mg tablet, Take 1 tablet (20 mg total) by mouth daily, Disp: 90 tablet, Rfl: 3    zolpidem (AMBIEN) 5 mg tablet, Take 5 mg by mouth daily as needed, Disp: , Rfl:     Past Medical History:   Diagnosis Date    Acute  metabolic encephalopathy     Asthma     CHF (congestive heart failure) (HCC)     Chronic kidney disease     COPD (chronic obstructive pulmonary disease) (HCC)     Hyperlipidemia     Hypertension     Liver disease     Myocardial infarction (HCC)     Seizures (HCC)     Sleep apnea     Transaminitis        No family history on file.    Past Surgical History:   Procedure Laterality Date    APPENDECTOMY      CARDIAC CATHETERIZATION      GANGLION CYST EXCISION      IR PORT PLACEMENT  5/3/2024    IR PORT REMOVAL  2024    LAPAROTOMY N/A 2024    Procedure: LAPAROTOMY EXPLORATORY, washout, complex closure, LISSY drain;  Surgeon: Francine Reid MD;  Location: [facility];  Service: General    AZ LAPS ABD PRTM&OMENTUM DX W/WO SPEC BR/WA SPX N/A 2024    Procedure: DIAGNOSTIC LAPAROSCOPY converted to Exploratory Laparotomy, Right Hemicolectomy, Abdominal wash-out, Abthera Placement;  Surgeon: Roger Joel DO;  Location: [facility];  Service: General    TYMPANOSTOMY TUBE PLACEMENT         Social History     Socioeconomic History    Marital status: /Civil Union     Spouse name: Not on file    Number of children: Not on file    Years of education: Not on file    Highest education level: Not on file   Occupational History    Not on file   Tobacco Use    Smoking status: Former     Current packs/day: 0.00     Average packs/day: 2.0 packs/day for 50.5 years (100.9 ttl pk-yrs)     Types: Cigarettes     Start date: 10/1973     Quit date: 3/18/2024     Years since quittin.2     Passive exposure: Current    Smokeless tobacco: Never   Vaping Use    Vaping status: Never Used   Substance and Sexual Activity    Alcohol use: Yes     Comment: socially    Drug use: Never    Sexual activity: Not Currently     Partners: Male   Other Topics Concern    Not on file   Social History Narrative    Not on file     Social Determinants of Health     Financial Resource Strain: Not on file   Food Insecurity: No Food  Insecurity (5/17/2024)    Hunger Vital Sign     Worried About Running Out of Food in the Last Year: Never true     Ran Out of Food in the Last Year: Never true   Transportation Needs: No Transportation Needs (5/17/2024)    PRAPARE - Transportation     Lack of Transportation (Medical): No     Lack of Transportation (Non-Medical): No   Physical Activity: Not on file   Stress: Not on file   Social Connections: Unknown (6/18/2024)    Received from [employer]    Social [employer]     How often do you feel lonely or isolated from those around you? (Adult - for ages 18 years and over): Not on file   Intimate Partner Violence: Not on file   Housing Stability: Low Risk  (5/17/2024)    Housing Stability Vital Sign     Unable to Pay for Housing in the Last Year: No     Number of Times Moved in the Last Year: 1     Homeless in the Last Year: No       Review of symptoms:   Constitution:  Negative  HEENT:  Negative  Cardiovascular:  Negative  Respiratory:  Negative  Skin:  Negative  Gastrointestinal:  Negative  Genitourinary:  Negative  Musculoskeletal:  Negative  Neurological:  Negative  Endocrine:  Negative  Psychological:  Negative    Vitals: /86 (BP Location: Left arm, Patient Position: Sitting, Cuff Size: Standard)   Pulse 81   Resp 16   Ht 5' (1.524 m)   Wt 105 kg (232 lb)   SpO2 99%   BMI 45.31 kg/m²         Physical Exam:     GEN: Alert and oriented x 3, in no acute distress.  Well appearing and well nourished.   HEENT: Sclera anicteric, conjunctivae pink, mucous membranes moist.   NECK: Supple, no carotid bruits, no significant JVD. Trachea midline.  HEART: Regular rhythm, normal S1 and S2, no murmurs, clicks, gallops or rubs.   LUNGS: Clear to auscultation bilaterally; no wheezes, rales, or rhonchi. No increased work of breathing or signs of respiratory distress.   ABDOMEN: Soft, nontender, nondistended, normoactive bowel sounds.   EXTREMITIES: Skin warm and well perfused, no clubbing, cyanosis, or  edema.  NEURO: No focal findings. Normal speech. Mood and affect normal.   SKIN: Normal without suspicious lesions on exposed skin.

## 2024-06-24 ENCOUNTER — HOSPITAL ENCOUNTER (OUTPATIENT)
Dept: INFUSION CENTER | Facility: CLINIC | Age: 64
Discharge: HOME | End: 2024-06-24

## 2024-06-24 ENCOUNTER — HOSPITAL ENCOUNTER (OUTPATIENT)
Dept: NON INVASIVE DIAGNOSTICS | Facility: HOSPITAL | Age: 64
Discharge: HOME | End: 2024-06-24
Attending: INTERNAL MEDICINE
Payer: COMMERCIAL

## 2024-06-24 VITALS
RESPIRATION RATE: 21 BRPM | OXYGEN SATURATION: 98 % | BODY MASS INDEX: 45.53 KG/M2 | TEMPERATURE: 97.7 F | SYSTOLIC BLOOD PRESSURE: 122 MMHG | HEART RATE: 63 BPM | WEIGHT: 231.92 LBS | HEIGHT: 60 IN | DIASTOLIC BLOOD PRESSURE: 70 MMHG

## 2024-06-24 DIAGNOSIS — N18.31 STAGE 3A CHRONIC KIDNEY DISEASE (CKD) (HCC): ICD-10-CM

## 2024-06-24 DIAGNOSIS — C18.9 ADENOCARCINOMA, COLON (HCC): ICD-10-CM

## 2024-06-24 LAB
ALBUMIN SERPL BCG-MCNC: 3.6 G/DL (ref 3.5–5)
ALP SERPL-CCNC: 140 U/L (ref 34–104)
ALT SERPL W P-5'-P-CCNC: 18 U/L (ref 7–52)
ANION GAP SERPL CALCULATED.3IONS-SCNC: 6 MMOL/L (ref 4–13)
AST SERPL W P-5'-P-CCNC: 15 U/L (ref 13–39)
BASOPHILS # BLD AUTO: 0.04 THOUSANDS/ÂΜL (ref 0–0.1)
BASOPHILS NFR BLD AUTO: 0 % (ref 0–1)
BILIRUB SERPL-MCNC: 0.33 MG/DL (ref 0.2–1)
BUN SERPL-MCNC: 16 MG/DL (ref 5–25)
CALCIUM SERPL-MCNC: 8.7 MG/DL (ref 8.4–10.2)
CHLORIDE SERPL-SCNC: 107 MMOL/L (ref 96–108)
CO2 SERPL-SCNC: 28 MMOL/L (ref 21–32)
CREAT SERPL-MCNC: 1 MG/DL (ref 0.6–1.3)
EOSINOPHIL # BLD AUTO: 0.13 THOUSAND/ÂΜL (ref 0–0.61)
EOSINOPHIL NFR BLD AUTO: 1 % (ref 0–6)
ERYTHROCYTE [DISTWIDTH] IN BLOOD BY AUTOMATED COUNT: 21.5 % (ref 11.6–15.1)
GFR SERPL CREATININE-BSD FRML MDRD: 60 ML/MIN/1.73SQ M
GLUCOSE P FAST SERPL-MCNC: 117 MG/DL (ref 65–99)
GLUCOSE SERPL-MCNC: 117 MG/DL (ref 65–140)
HCT VFR BLD AUTO: 37.1 % (ref 34.8–46.1)
HGB BLD-MCNC: 11.7 G/DL (ref 11.5–15.4)
IMM GRANULOCYTES # BLD AUTO: 0.06 THOUSAND/UL (ref 0–0.2)
IMM GRANULOCYTES NFR BLD AUTO: 1 % (ref 0–2)
LYMPHOCYTES # BLD AUTO: 2.52 THOUSANDS/ÂΜL (ref 0.6–4.47)
LYMPHOCYTES NFR BLD AUTO: 20 % (ref 14–44)
MCH RBC QN AUTO: 26.2 PG (ref 26.8–34.3)
MCHC RBC AUTO-ENTMCNC: 31.5 G/DL (ref 31.4–37.4)
MCV RBC AUTO: 83 FL (ref 82–98)
MONOCYTES # BLD AUTO: 0.93 THOUSAND/ÂΜL (ref 0.17–1.22)
MONOCYTES NFR BLD AUTO: 7 % (ref 4–12)
NEUTROPHILS # BLD AUTO: 8.92 THOUSANDS/ÂΜL (ref 1.85–7.62)
NEUTS SEG NFR BLD AUTO: 71 % (ref 43–75)
NRBC BLD AUTO-RTO: 0 /100 WBCS
PLATELET # BLD AUTO: 213 THOUSANDS/UL (ref 149–390)
PMV BLD AUTO: 8.2 FL (ref 8.9–12.7)
POTASSIUM SERPL-SCNC: 3.6 MMOL/L (ref 3.5–5.3)
PROT SERPL-MCNC: 6.6 G/DL (ref 6.4–8.4)
RBC # BLD AUTO: 4.46 MILLION/UL (ref 3.81–5.12)
SODIUM SERPL-SCNC: 141 MMOL/L (ref 135–147)
WBC # BLD AUTO: 12.6 THOUSAND/UL (ref 4.31–10.16)

## 2024-06-24 PROCEDURE — 76937 US GUIDE VASCULAR ACCESS: CPT | Performed by: RADIOLOGY

## 2024-06-24 PROCEDURE — 77001 FLUOROGUIDE FOR VEIN DEVICE: CPT | Performed by: RADIOLOGY

## 2024-06-24 PROCEDURE — 80053 COMPREHEN METABOLIC PANEL: CPT | Performed by: INTERNAL MEDICINE

## 2024-06-24 PROCEDURE — C1788 PORT, INDWELLING, IMP: HCPCS

## 2024-06-24 PROCEDURE — 99153 MOD SED SAME PHYS/QHP EA: CPT

## 2024-06-24 PROCEDURE — 77001 FLUOROGUIDE FOR VEIN DEVICE: CPT

## 2024-06-24 PROCEDURE — 99152 MOD SED SAME PHYS/QHP 5/>YRS: CPT

## 2024-06-24 PROCEDURE — C1894 INTRO/SHEATH, NON-LASER: HCPCS

## 2024-06-24 PROCEDURE — 85025 COMPLETE CBC W/AUTO DIFF WBC: CPT | Performed by: INTERNAL MEDICINE

## 2024-06-24 PROCEDURE — 36561 INSERT TUNNELED CV CATH: CPT

## 2024-06-24 PROCEDURE — 36561 INSERT TUNNELED CV CATH: CPT | Performed by: RADIOLOGY

## 2024-06-24 PROCEDURE — 76937 US GUIDE VASCULAR ACCESS: CPT

## 2024-06-24 RX ORDER — MIDAZOLAM HYDROCHLORIDE 2 MG/2ML
INJECTION, SOLUTION INTRAMUSCULAR; INTRAVENOUS AS NEEDED
Status: COMPLETED | OUTPATIENT
Start: 2024-06-24 | End: 2024-06-24

## 2024-06-24 RX ORDER — CEFAZOLIN SODIUM 2 G/50ML
2000 SOLUTION INTRAVENOUS ONCE
Status: COMPLETED | OUTPATIENT
Start: 2024-06-24 | End: 2024-06-24

## 2024-06-24 RX ORDER — FENTANYL CITRATE 50 UG/ML
INJECTION, SOLUTION INTRAMUSCULAR; INTRAVENOUS AS NEEDED
Status: COMPLETED | OUTPATIENT
Start: 2024-06-24 | End: 2024-06-24

## 2024-06-24 RX ORDER — SODIUM CHLORIDE 9 MG/ML
30 INJECTION, SOLUTION INTRAVENOUS CONTINUOUS
Status: DISCONTINUED | OUTPATIENT
Start: 2024-06-24 | End: 2024-06-25 | Stop reason: HOSPADM

## 2024-06-24 RX ADMIN — MIDAZOLAM HYDROCHLORIDE 0.5 MG: 1 INJECTION, SOLUTION INTRAMUSCULAR; INTRAVENOUS at 10:47

## 2024-06-24 RX ADMIN — SODIUM CHLORIDE 30 ML/HR: 0.9 INJECTION, SOLUTION INTRAVENOUS at 08:29

## 2024-06-24 RX ADMIN — CEFAZOLIN SODIUM 2000 MG: 2 SOLUTION INTRAVENOUS at 09:30

## 2024-06-24 RX ADMIN — Medication 3 ML: at 10:44

## 2024-06-24 RX ADMIN — Medication 10 ML: at 10:50

## 2024-06-24 RX ADMIN — FENTANYL CITRATE 50 MCG: 50 INJECTION, SOLUTION INTRAMUSCULAR; INTRAVENOUS at 10:47

## 2024-06-24 RX ADMIN — MIDAZOLAM HYDROCHLORIDE 1.5 MG: 1 INJECTION, SOLUTION INTRAMUSCULAR; INTRAVENOUS at 10:36

## 2024-06-24 RX ADMIN — FENTANYL CITRATE 50 MCG: 50 INJECTION, SOLUTION INTRAMUSCULAR; INTRAVENOUS at 10:36

## 2024-06-24 NOTE — INTERVAL H&P NOTE
The history and physical were reviewed, along with progress notes, and the patient was examined. There are no changes since it was written.    /79   Pulse 72   Temp 97.9 °F (36.6 °C) (Temporal)   Resp 20   Ht 5' (1.524 m)   Wt 105 kg (231 lb 14.8 oz)   SpO2 97%   BMI 45.29 kg/m²        Oliva Woodruff MD/June 24, 2024/10:11 AM

## 2024-06-24 NOTE — DISCHARGE INSTRUCTIONS
Implanted Venous Access Port     WHAT YOU NEED TO KNOW:   An implanted venous access port is a device used to give treatments and take blood. It may also be called a central venous access device (CVAD). The port is a small container that is placed under your skin, usually in your upper chest. The port is attached to a catheter that enters a large vein.   DISCHARGE INSTRUCTIONS:   Resume your normal diet. Small sips of flat soda will help with mild nausea.  Prevent an infection:   Wash your hands often.  Use soap and water. Clean your hands before and after you care for your port. Remind everyone who cares for your port to wash their hands.   Check your skin for infection every day.  Look for redness, swelling, or fluid oozing from the port site.  Care for your port:   1. You may shower beginning 48 hours after procedure.     2.  Leave glue in place.    3. It is normal for some bruising to occur.    4. Use Tylenol for pain.    5. Limit use of arm on the side that your port was placed. Lift nothing heavier than 5 pounds for 1 week, and then gradually increase activity as tolerated.    6. DO NOT apply ointment, lotion or cream to port site until incision is healed. Allow glue to fall off. DO NOT attempt to peel glue from skin even it it begins to flake.     7. After the port incision is healed you may swim, bathe.  Notify the Interventional Radiologist if you have any of the followin. Fever above 101 F    2. Increased redness or swelling after 1st day.     3. Increased pain after 1st day.    4. Any sign of infection (drainage from port site, skin separation, hot to touch).    5. Persistent nausea or vomiting.    Contact Interventional Radiology at [phone] ([place] PATIENTS: Contact Interventional Radiology at [phone]) ([facility]ATH[facility] PATIENTS: Contact Interventional Radiology at [phone]).

## 2024-06-24 NOTE — BRIEF OP NOTE (RAD/CATH)
INTERVENTIONAL RADIOLOGY PROCEDURE NOTE    Date: 6/24/2024    Procedure:   Procedure Summary       Date: 06/24/24 [facility]    Anesthesia Start:  Anesthesia Stop:     Procedure: IR PORT PLACEMENT Diagnosis:       Stage 3a chronic kidney disease (CKD) (HCC)      Adenocarcinoma, colon (HCC)      (port placement for chemo)    Scheduled Providers:  Responsible Provider:     Anesthesia Type: Not recorded ASA Status: Not recorded            Preoperative diagnosis:   1. Stage 3a chronic kidney disease (CKD) (HCC)    2. Adenocarcinoma, colon (HCC)         Postoperative diagnosis: Same.    Surgeon: Oliva Woodruff MD     Assistant: None. No qualified resident was available.    Blood loss: Minimal    Specimens: None    Findings: Successful image guided placement of right chest port.  Tip of catheter in RA, okay to use.    Complications: None immediate.    Anesthesia: conscious sedation

## 2024-06-26 ENCOUNTER — HOSPITAL ENCOUNTER (OUTPATIENT)
Dept: INFUSION CENTER | Facility: CLINIC | Age: 64
Discharge: HOME | End: 2024-06-26
Payer: COMMERCIAL

## 2024-06-26 VITALS — WEIGHT: 235.45 LBS | HEIGHT: 60 IN | BODY MASS INDEX: 46.23 KG/M2

## 2024-06-26 DIAGNOSIS — C18.9 ADENOCARCINOMA, COLON (HCC): Primary | ICD-10-CM

## 2024-06-26 RX ORDER — DEXTROSE MONOHYDRATE 50 MG/ML
20 INJECTION, SOLUTION INTRAVENOUS ONCE
Status: COMPLETED | OUTPATIENT
Start: 2024-06-26 | End: 2024-06-26

## 2024-06-26 RX ORDER — SODIUM CHLORIDE 9 MG/ML
20 INJECTION, SOLUTION INTRAVENOUS ONCE AS NEEDED
Status: DISCONTINUED | OUTPATIENT
Start: 2024-06-26 | End: 2024-06-29 | Stop reason: HOSPADM

## 2024-06-26 RX ORDER — FLUOROURACIL 50 MG/ML
400 INJECTION, SOLUTION INTRAVENOUS ONCE
Status: COMPLETED | OUTPATIENT
Start: 2024-06-26 | End: 2024-06-26

## 2024-06-26 RX ADMIN — DEXTROSE 20 ML/HR: 5 SOLUTION INTRAVENOUS at 13:16

## 2024-06-26 RX ADMIN — DEXAMETHASONE SODIUM PHOSPHATE: 10 INJECTION, SOLUTION INTRAMUSCULAR; INTRAVENOUS at 13:34

## 2024-06-26 RX ADMIN — OXALIPLATIN 168.3 MG: 5 INJECTION, SOLUTION INTRAVENOUS at 14:27

## 2024-06-26 RX ADMIN — LEUCOVORIN CALCIUM 792 MG: 350 INJECTION, POWDER, LYOPHILIZED, FOR SOLUTION INTRAMUSCULAR; INTRAVENOUS at 14:27

## 2024-06-26 RX ADMIN — FLUOROURACIL 790 MG: 50 INJECTION, SOLUTION INTRAVENOUS at 16:40

## 2024-06-26 NOTE — PROGRESS NOTES
Pt presents for FOLFOX offering no current complaints. Pt reports neuropathy in hands x2 days after last treatment, diarrhea resolved with Imodium, nausea and fatigue after cycle 1 of treatment. Pt tolerating treatment well. Report given to Selena Camejo RN.

## 2024-06-28 ENCOUNTER — HOSPITAL ENCOUNTER (OUTPATIENT)
Dept: INFUSION CENTER | Facility: CLINIC | Age: 64
Discharge: HOME | End: 2024-06-28

## 2024-06-28 DIAGNOSIS — C18.9 ADENOCARCINOMA, COLON (HCC): Primary | ICD-10-CM

## 2024-06-28 NOTE — PROGRESS NOTES
Pt presents today for a port flush post pump D/C, offers no complaints, port flushed w/ good blood return, pt tolerated well, Aware of there next appt on  7/8 at 13:30, D/C home, pt declined AVS

## 2024-07-03 DIAGNOSIS — C18.9 ADENOCARCINOMA, COLON (HCC): Primary | ICD-10-CM

## 2024-07-03 RX ORDER — DEXTROSE MONOHYDRATE 50 MG/ML
20 INJECTION, SOLUTION INTRAVENOUS ONCE
OUTPATIENT
Start: 2024-07-10

## 2024-07-03 RX ORDER — FLUOROURACIL 50 MG/ML
400 INJECTION, SOLUTION INTRAVENOUS ONCE
OUTPATIENT
Start: 2024-07-10

## 2024-07-03 RX ORDER — SODIUM CHLORIDE 9 MG/ML
20 INJECTION, SOLUTION INTRAVENOUS ONCE AS NEEDED
OUTPATIENT
Start: 2024-07-10

## 2024-07-08 ENCOUNTER — TELEPHONE (OUTPATIENT)
Dept: HEMATOLOGY ONCOLOGY | Facility: CLINIC | Age: 64
End: 2024-07-08

## 2024-07-08 ENCOUNTER — HOSPITAL ENCOUNTER (OUTPATIENT)
Dept: INFUSION CENTER | Facility: CLINIC | Age: 64
Discharge: HOME | End: 2024-07-08
Payer: COMMERCIAL

## 2024-07-08 DIAGNOSIS — Z95.828 PORT-A-CATH IN PLACE: Primary | ICD-10-CM

## 2024-07-08 DIAGNOSIS — C18.9 ADENOCARCINOMA, COLON (HCC): ICD-10-CM

## 2024-07-08 LAB
ALBUMIN SERPL BCG-MCNC: 3.9 G/DL (ref 3.5–5)
ALP SERPL-CCNC: 184 U/L (ref 34–104)
ALT SERPL W P-5'-P-CCNC: 33 U/L (ref 7–52)
ANION GAP SERPL CALCULATED.3IONS-SCNC: 9 MMOL/L (ref 4–13)
AST SERPL W P-5'-P-CCNC: 18 U/L (ref 13–39)
BASOPHILS # BLD AUTO: 0.02 THOUSANDS/ÂΜL (ref 0–0.1)
BASOPHILS NFR BLD AUTO: 0 % (ref 0–1)
BILIRUB SERPL-MCNC: 0.38 MG/DL (ref 0.2–1)
BUN SERPL-MCNC: 19 MG/DL (ref 5–25)
CALCIUM SERPL-MCNC: 9.2 MG/DL (ref 8.4–10.2)
CHLORIDE SERPL-SCNC: 106 MMOL/L (ref 96–108)
CO2 SERPL-SCNC: 26 MMOL/L (ref 21–32)
CREAT SERPL-MCNC: 0.87 MG/DL (ref 0.6–1.3)
EOSINOPHIL # BLD AUTO: 0.13 THOUSAND/ÂΜL (ref 0–0.61)
EOSINOPHIL NFR BLD AUTO: 1 % (ref 0–6)
ERYTHROCYTE [DISTWIDTH] IN BLOOD BY AUTOMATED COUNT: 21.1 % (ref 11.6–15.1)
GFR SERPL CREATININE-BSD FRML MDRD: 72 ML/MIN/1.73SQ M
GLUCOSE SERPL-MCNC: 120 MG/DL (ref 65–140)
HCT VFR BLD AUTO: 41.3 % (ref 34.8–46.1)
HGB BLD-MCNC: 12.6 G/DL (ref 11.5–15.4)
IMM GRANULOCYTES # BLD AUTO: 0.04 THOUSAND/UL (ref 0–0.2)
IMM GRANULOCYTES NFR BLD AUTO: 0 % (ref 0–2)
LYMPHOCYTES # BLD AUTO: 2.12 THOUSANDS/ÂΜL (ref 0.6–4.47)
LYMPHOCYTES NFR BLD AUTO: 23 % (ref 14–44)
MCH RBC QN AUTO: 25.7 PG (ref 26.8–34.3)
MCHC RBC AUTO-ENTMCNC: 30.5 G/DL (ref 31.4–37.4)
MCV RBC AUTO: 84 FL (ref 82–98)
MONOCYTES # BLD AUTO: 0.6 THOUSAND/ÂΜL (ref 0.17–1.22)
MONOCYTES NFR BLD AUTO: 7 % (ref 4–12)
NEUTROPHILS # BLD AUTO: 6.38 THOUSANDS/ÂΜL (ref 1.85–7.62)
NEUTS SEG NFR BLD AUTO: 69 % (ref 43–75)
NRBC BLD AUTO-RTO: 0 /100 WBCS
PLATELET # BLD AUTO: 204 THOUSANDS/UL (ref 149–390)
PMV BLD AUTO: 8.6 FL (ref 8.9–12.7)
POTASSIUM SERPL-SCNC: 3.6 MMOL/L (ref 3.5–5.3)
PROT SERPL-MCNC: 7 G/DL (ref 6.4–8.4)
RBC # BLD AUTO: 4.9 MILLION/UL (ref 3.81–5.12)
SODIUM SERPL-SCNC: 141 MMOL/L (ref 135–147)
WBC # BLD AUTO: 9.29 THOUSAND/UL (ref 4.31–10.16)

## 2024-07-08 PROCEDURE — 85025 COMPLETE CBC W/AUTO DIFF WBC: CPT | Performed by: INTERNAL MEDICINE

## 2024-07-08 PROCEDURE — 80053 COMPREHEN METABOLIC PANEL: CPT | Performed by: INTERNAL MEDICINE

## 2024-07-08 NOTE — TELEPHONE ENCOUNTER
Received call from patient who states her phone is charging so was unable to hear my call. She states that her port was used the next day after chemo and has been ok. She states her  noted last night something didn't look right so she said she used rubbing alcohol and put a sterile gauze over the area. She states there is no pain per say but achiness as if there is a bruise there. She denies fevers and she has been checking her temp twice a day since her infection.   She was recommended to cleanse area and apply triple antibiotic cream to area and leave open to air. Made her aware that leaving gauze can cause some moisutre to accumulate to better to keep area dry.  She verbalizes understanding. I asked if whoever is with her at home can take a look at area and if tomorrow it looks more pink or red she is to notify us immediately. She was educated to continue taking her temperature and notify us if altered.   Will discuss with Rosy NIÑO if we should bring patient in to look at site tomorrow.

## 2024-07-08 NOTE — PROGRESS NOTES
Pt presents for lab specimen collection. Port a cath red,  tender, yellow discharge, and black at the port incision site. Photo uploaded into chart, and sent to Abida Velazquez RN, per Abida Velazquez RN reaching out to ID. Labs drawn peripherally, Declined AVS, next appointment on 7/10/24 at 12:30pm.

## 2024-07-09 ENCOUNTER — HOSPITAL ENCOUNTER (OUTPATIENT)
Dept: NON INVASIVE DIAGNOSTICS | Facility: HOSPITAL | Age: 64
Discharge: HOME | End: 2024-07-09

## 2024-07-09 DIAGNOSIS — Z95.828 PORT-A-CATH IN PLACE: Primary | ICD-10-CM

## 2024-07-09 DIAGNOSIS — C18.9 ADENOCARCINOMA, COLON (HCC): ICD-10-CM

## 2024-07-09 DIAGNOSIS — Z95.828 PORT-A-CATH IN PLACE: ICD-10-CM

## 2024-07-09 NOTE — QUICK NOTE
Patient was seen today regarding her right IJ site and port incision site.  Patient was concerned about possible infection. Patient reports no pain, drainage, fever or chills.  Patient reports that she did have a small yellowish stringy piece on her port site incision yesterday.  Patient reports that she believes that it may have just been some residual glue and it is no longer there.  Right IJ is well approximated and dry. Some redness is noted but appears to be some slight irritation from possibly her oxygen tubing rubbing. Patient instructed to try wearing different clothing to try to avoid the rubbing.  Port incision site with dry scab. No drainage and appears to still be healing.  Patient instructed on s/s of infection and to return immediately if she has any issues or concerns for infection.

## 2024-07-10 ENCOUNTER — TELEPHONE (OUTPATIENT)
Dept: INFUSION CENTER | Facility: CLINIC | Age: 64
End: 2024-07-10

## 2024-07-10 ENCOUNTER — HOSPITAL ENCOUNTER (OUTPATIENT)
Dept: INFUSION CENTER | Facility: CLINIC | Age: 64
Discharge: HOME | End: 2024-07-10

## 2024-07-10 DIAGNOSIS — C18.9 ADENOCARCINOMA, COLON (HCC): ICD-10-CM

## 2024-07-10 DIAGNOSIS — L53.9 REDNESS OF SKIN: Primary | ICD-10-CM

## 2024-07-10 DIAGNOSIS — C18.9 ADENOCARCINOMA, COLON (HCC): Primary | ICD-10-CM

## 2024-07-10 RX ORDER — CEPHALEXIN 500 MG/1
500 CAPSULE ORAL EVERY 6 HOURS SCHEDULED
Qty: 20 CAPSULE | Refills: 0 | Status: SHIPPED | OUTPATIENT
Start: 2024-07-10 | End: 2024-07-15

## 2024-07-10 NOTE — PROGRESS NOTES
Patient arrives to infusion center for FOLFOX chemotherapy. Patient's port assessed, offers redness, tenderness, with yellow area noted at second incision area surrounded by redness. Black/dark red crusted horizontal line across port insertion site. Redness surrounds this area. (See media for clinical image)     Port assessed by Mara Noriega, this RN, and office RN Abida Velazquez. Port assessed by Rosy Solis PA-C. Per policy and unit manager, unable to use patient's port today. Patient's port previously assessed by IR on 7/9/24, pt reports she was advised by IR to put triple antibiotic ointment on the port and to keep it open to air.     Pt to be deferred 1 week and placed on oral antibiotics.     Next appointment: 7/16/24 @ 8086

## 2024-07-10 NOTE — TELEPHONE ENCOUNTER
Attempted to contact patient via phone. Mobile number does not ring then states the mailbox hasn't been set up yet. Second number states it is unavailable. Will attempt to contact patient again.

## 2024-07-10 NOTE — PROGRESS NOTES
Patient presents to infusion and port still slightly reddened. She was seen in IR yesterday and no recommendations were offered. Patient denies any drainage today, and states some discomfort when the port is touched.  Pt was brought to office so Amalia PAC can look at the area and she is ok to proceed with treatment.  Infusion area recommends a delay since she was seen for port access two days ago and it it seem more red today.  Patient was informed we would delay a week and we would call with appts.

## 2024-07-10 NOTE — PROGRESS NOTES
"Patient seen in the office today for skin/port check at the rest of the infusion center. She has been checking her temperature and has not had any fevers. Patient states the area has \"looked the same\" sine the port was placed without worsening redness or pain. She was also seen by IR NP yesterday for similar complaint. IR documentation and recent hospital records were reviewed. On physical exam there is mild erythema around R IJ site with mild tenderness. The wound is not open and no drainage is present. Granulation tissue present. Port insertion site has a dry scab without any drainage. This is also mildly tender.     There is no overt cellulitis on exam today. I discussed with the patient and nursing staff we will provide a short course of Kephlex given her recent history of MSSA bacteremia/port infection and proceed with chemotherapy. The charge nurse in the infusion room was notified by our oncology nurse however she stated accessing/using the port was against the policy and she refused to set up her 5FU chemo pump for this reason.   "

## 2024-07-16 ENCOUNTER — HOSPITAL ENCOUNTER (OUTPATIENT)
Dept: INFUSION CENTER | Facility: CLINIC | Age: 64
Discharge: HOME | End: 2024-07-16
Payer: COMMERCIAL

## 2024-07-16 ENCOUNTER — TELEPHONE (OUTPATIENT)
Age: 64
End: 2024-07-16

## 2024-07-16 VITALS
WEIGHT: 235.6 LBS | BODY MASS INDEX: 46.26 KG/M2 | HEIGHT: 60 IN | TEMPERATURE: 97.8 F | SYSTOLIC BLOOD PRESSURE: 154 MMHG | RESPIRATION RATE: 17 BRPM | HEART RATE: 74 BPM | DIASTOLIC BLOOD PRESSURE: 82 MMHG

## 2024-07-16 DIAGNOSIS — C18.9 ADENOCARCINOMA, COLON (HCC): Primary | ICD-10-CM

## 2024-07-16 PROCEDURE — 96411 CHEMO IV PUSH ADDL DRUG: CPT

## 2024-07-16 PROCEDURE — 96368 THER/DIAG CONCURRENT INF: CPT

## 2024-07-16 PROCEDURE — 96413 CHEMO IV INFUSION 1 HR: CPT

## 2024-07-16 PROCEDURE — G0498 CHEMO EXTEND IV INFUS W/PUMP: HCPCS

## 2024-07-16 PROCEDURE — 96415 CHEMO IV INFUSION ADDL HR: CPT

## 2024-07-16 PROCEDURE — 96367 TX/PROPH/DG ADDL SEQ IV INF: CPT

## 2024-07-16 RX ORDER — FLUOROURACIL 50 MG/ML
400 INJECTION, SOLUTION INTRAVENOUS ONCE
Status: COMPLETED | OUTPATIENT
Start: 2024-07-16 | End: 2024-07-16

## 2024-07-16 RX ORDER — DEXTROSE MONOHYDRATE 50 MG/ML
20 INJECTION, SOLUTION INTRAVENOUS ONCE
Status: COMPLETED | OUTPATIENT
Start: 2024-07-16 | End: 2024-07-16

## 2024-07-16 RX ORDER — SODIUM CHLORIDE 9 MG/ML
20 INJECTION, SOLUTION INTRAVENOUS ONCE AS NEEDED
Status: DISCONTINUED | OUTPATIENT
Start: 2024-07-16 | End: 2024-07-19 | Stop reason: HOSPADM

## 2024-07-16 RX ADMIN — FLUOROURACIL 800 MG: 50 INJECTION, SOLUTION INTRAVENOUS at 14:56

## 2024-07-16 RX ADMIN — DEXTROSE 20 ML/HR: 5 SOLUTION INTRAVENOUS at 11:51

## 2024-07-16 RX ADMIN — DEXAMETHASONE SODIUM PHOSPHATE: 10 INJECTION, SOLUTION INTRAMUSCULAR; INTRAVENOUS at 11:51

## 2024-07-16 RX ADMIN — OXALIPLATIN 170 MG: 5 INJECTION, SOLUTION INTRAVENOUS at 12:37

## 2024-07-16 RX ADMIN — LEUCOVORIN CALCIUM 800 MG: 350 INJECTION, POWDER, LYOPHILIZED, FOR SOLUTION INTRAMUSCULAR; INTRAVENOUS at 12:37

## 2024-07-16 NOTE — TELEPHONE ENCOUNTER
Call from Dwaine; she stated that she forgot to get her lab work done prior to her scheduled chemo treatment today at 1030. She did have labs done on 7/8/24 and was wondering if those would be OK to use, if not she would have to cancel her appointment today.    Called the infusion center in [place] and spoke with one of the nurses there. She is fine for her treatment today, but they will double check with the physician and call her back directly if she should not come in.    Called Dwaine back and gave her that message. She will head over for her treatment. She had no other questions at this time.

## 2024-07-16 NOTE — PROGRESS NOTES
Patient presents for FOLFOX offering no complaints, patients port assessed, jugular incision less tender and less red than last week, port incision still scabbed. OK to use per infusion mangement and office RN. Patient tolerated treatment without incident. 5FU isometric pump connected with clamps open. Pt aware she should return for disconnect 7/18/24 at 1300. AVS declined, appt calendar for July and August printed.  Next appointment reviewed.

## 2024-07-17 ENCOUNTER — TELEPHONE (OUTPATIENT)
Dept: HEMATOLOGY ONCOLOGY | Facility: CLINIC | Age: 64
End: 2024-07-17

## 2024-07-18 ENCOUNTER — HOSPITAL ENCOUNTER (OUTPATIENT)
Dept: INFUSION CENTER | Facility: CLINIC | Age: 64
Discharge: HOME | End: 2024-07-18

## 2024-07-18 DIAGNOSIS — C18.9 ADENOCARCINOMA, COLON (HCC): ICD-10-CM

## 2024-07-18 NOTE — PROGRESS NOTES
Pt here for elastometric pump disconnect, offering no complaints. Pump has been running for over 46 hours and appears empty and deflated. Tolerated pump disconnect without complications. PAC flushed and de-accessed. AVS given. Next appt 7/29 1230pm. Walked out in stable condition.

## 2024-07-23 RX ORDER — SODIUM CHLORIDE 9 MG/ML
20 INJECTION, SOLUTION INTRAVENOUS ONCE AS NEEDED
OUTPATIENT
Start: 2024-08-05

## 2024-07-23 RX ORDER — DEXTROSE MONOHYDRATE 50 MG/ML
20 INJECTION, SOLUTION INTRAVENOUS ONCE
OUTPATIENT
Start: 2024-08-05

## 2024-07-23 RX ORDER — FLUOROURACIL 50 MG/ML
400 INJECTION, SOLUTION INTRAVENOUS ONCE
OUTPATIENT
Start: 2024-08-05

## 2024-07-24 DIAGNOSIS — C18.9 ADENOCARCINOMA, COLON (HCC): Primary | ICD-10-CM

## 2024-07-29 ENCOUNTER — PREP FOR PROCEDURE (OUTPATIENT)
Dept: INTERVENTIONAL RADIOLOGY/VASCULAR | Facility: CLINIC | Age: 64
End: 2024-07-29

## 2024-07-29 ENCOUNTER — HOSPITAL ENCOUNTER (OUTPATIENT)
Dept: INFUSION CENTER | Facility: CLINIC | Age: 64
Discharge: HOME | End: 2024-07-29
Payer: COMMERCIAL

## 2024-07-29 ENCOUNTER — TELEPHONE (OUTPATIENT)
Dept: INTERVENTIONAL RADIOLOGY/VASCULAR | Facility: CLINIC | Age: 64
End: 2024-07-29

## 2024-07-29 ENCOUNTER — OFFICE VISIT (OUTPATIENT)
Dept: HEMATOLOGY ONCOLOGY | Facility: CLINIC | Age: 64
End: 2024-07-29
Payer: COMMERCIAL

## 2024-07-29 VITALS
WEIGHT: 233 LBS | HEART RATE: 63 BPM | BODY MASS INDEX: 45.75 KG/M2 | SYSTOLIC BLOOD PRESSURE: 120 MMHG | RESPIRATION RATE: 18 BRPM | TEMPERATURE: 96.9 F | OXYGEN SATURATION: 100 % | DIASTOLIC BLOOD PRESSURE: 70 MMHG | HEIGHT: 60 IN

## 2024-07-29 DIAGNOSIS — D69.6 THROMBOCYTOPENIA (HCC): ICD-10-CM

## 2024-07-29 DIAGNOSIS — C18.9 ADENOCARCINOMA, COLON (HCC): ICD-10-CM

## 2024-07-29 DIAGNOSIS — Z95.828 PORT-A-CATH IN PLACE: Primary | ICD-10-CM

## 2024-07-29 DIAGNOSIS — C18.9 ADENOCARCINOMA, COLON (HCC): Primary | ICD-10-CM

## 2024-07-29 DIAGNOSIS — J44.9 CHRONIC OBSTRUCTIVE PULMONARY DISEASE, UNSPECIFIED COPD TYPE (HCC): ICD-10-CM

## 2024-07-29 DIAGNOSIS — C18.9 MALIGNANT NEOPLASM OF COLON, UNSPECIFIED PART OF COLON (HCC): Primary | ICD-10-CM

## 2024-07-29 LAB
ALBUMIN SERPL BCG-MCNC: 3.8 G/DL (ref 3.5–5)
ALP SERPL-CCNC: 167 U/L (ref 34–104)
ALT SERPL W P-5'-P-CCNC: 23 U/L (ref 7–52)
ANION GAP SERPL CALCULATED.3IONS-SCNC: 10 MMOL/L (ref 4–13)
AST SERPL W P-5'-P-CCNC: 17 U/L (ref 13–39)
BASOPHILS # BLD AUTO: 0.03 THOUSANDS/ÂΜL (ref 0–0.1)
BASOPHILS NFR BLD AUTO: 1 % (ref 0–1)
BILIRUB SERPL-MCNC: 0.27 MG/DL (ref 0.2–1)
BUN SERPL-MCNC: 16 MG/DL (ref 5–25)
CALCIUM SERPL-MCNC: 9 MG/DL (ref 8.4–10.2)
CHLORIDE SERPL-SCNC: 107 MMOL/L (ref 96–108)
CO2 SERPL-SCNC: 23 MMOL/L (ref 21–32)
CREAT SERPL-MCNC: 1.01 MG/DL (ref 0.6–1.3)
EOSINOPHIL # BLD AUTO: 0.1 THOUSAND/ÂΜL (ref 0–0.61)
EOSINOPHIL NFR BLD AUTO: 2 % (ref 0–6)
ERYTHROCYTE [DISTWIDTH] IN BLOOD BY AUTOMATED COUNT: 21.2 % (ref 11.6–15.1)
GFR SERPL CREATININE-BSD FRML MDRD: 60 ML/MIN/1.73SQ M
GLUCOSE SERPL-MCNC: 106 MG/DL (ref 65–140)
HCT VFR BLD AUTO: 40.6 % (ref 34.8–46.1)
HGB BLD-MCNC: 12.2 G/DL (ref 11.5–15.4)
IMM GRANULOCYTES # BLD AUTO: 0.01 THOUSAND/UL (ref 0–0.2)
IMM GRANULOCYTES NFR BLD AUTO: 0 % (ref 0–2)
LYMPHOCYTES # BLD AUTO: 1.76 THOUSANDS/ÂΜL (ref 0.6–4.47)
LYMPHOCYTES NFR BLD AUTO: 31 % (ref 14–44)
MCH RBC QN AUTO: 26.4 PG (ref 26.8–34.3)
MCHC RBC AUTO-ENTMCNC: 30 G/DL (ref 31.4–37.4)
MCV RBC AUTO: 88 FL (ref 82–98)
MONOCYTES # BLD AUTO: 0.43 THOUSAND/ÂΜL (ref 0.17–1.22)
MONOCYTES NFR BLD AUTO: 8 % (ref 4–12)
NEUTROPHILS # BLD AUTO: 3.33 THOUSANDS/ÂΜL (ref 1.85–7.62)
NEUTS SEG NFR BLD AUTO: 58 % (ref 43–75)
NRBC BLD AUTO-RTO: 0 /100 WBCS
PLATELET # BLD AUTO: 209 THOUSANDS/UL (ref 149–390)
PMV BLD AUTO: 9.1 FL (ref 8.9–12.7)
POTASSIUM SERPL-SCNC: 4.4 MMOL/L (ref 3.5–5.3)
PROT SERPL-MCNC: 6.8 G/DL (ref 6.4–8.4)
RBC # BLD AUTO: 4.62 MILLION/UL (ref 3.81–5.12)
SODIUM SERPL-SCNC: 140 MMOL/L (ref 135–147)
WBC # BLD AUTO: 5.66 THOUSAND/UL (ref 4.31–10.16)

## 2024-07-29 PROCEDURE — 80053 COMPREHEN METABOLIC PANEL: CPT | Performed by: INTERNAL MEDICINE

## 2024-07-29 PROCEDURE — 85025 COMPLETE CBC W/AUTO DIFF WBC: CPT | Performed by: INTERNAL MEDICINE

## 2024-07-29 PROCEDURE — 99214 OFFICE O/P EST MOD 30 MIN: CPT | Performed by: INTERNAL MEDICINE

## 2024-07-29 NOTE — TELEPHONE ENCOUNTER
Patient sent over from infusion for concerns with her port site.  Patient seen previously on 7/9 for site check. Port was still in healing stages with scab on incision.  Today, IJ access site is well approximated and healing well.  Port pocket incision site access by myself and Dr. Hawkins.  Port pocket incision site is still healing. Scab noted to mid incision. No s/s infection, no dehiscence noted.  Area cleansed with chloraprep. Patient instructed that she could lightly cover the area with a band-aid to keep from rubbing on clothing or keep open to air. Patient advised to call IR immediately if she has any s/s infection: fever, chills, redness to the area, drainage or if she has any questions regarding her port site.

## 2024-07-29 NOTE — PROGRESS NOTES
Patient to clinic for port flush and lab draw. Tearful on arrival with c/o transportation issues and financial difficulties at home. Email sent to  regarding [facility] and possible services. Post not accessed due to small scab on incision site and patient request to draw labs PIV to allow to heal. Media taken of incision site. Labs drawn from right hand. Tolerated well. Aware of next appointment on 7/31/24 at 12:30 pm.

## 2024-07-29 NOTE — PROGRESS NOTES
Hematology/Oncology Outpatient Follow- up Note  Dwaine Gould 63 y.o. female MRN: @ Encounter: [id number]        Date:  7/29/2024        Assessment / Plan:    1 stage IIIc colon cancer T4 N2b MX  2 she had perforation of the primary site  3 resolved Staph aureus septicemia  4 significant oxygen dependent COPD  Plan: Patient will continue her chemotherapy this week.  Will see her in 6 to 8 weeks.  She will get a CEA level after 4 weeks.  Long-term prognosis very guarded.  Her port was checked by infusion the interventional radiologist who felt it was stable      HPI: 63-year-old female here for follow-up.  She has stage III T4b N2b MX colon cancer however she was noted to have a CEA level of 92 postop.  We will follow that up.  I did discuss previously with them that my concern is that she may have metastatic disease.  She has completed 4 cycles of treatment.  After 6 cycles we will follow-up post CEA level and then consider scans.  She has had a port replaced after having Staph aureus from the port.  After removal she has been afebrile.  The port is showing a complete closure of her incision line.  Otherwise she looks and is doing fairly well.  She had her port checked today by interventional radiology who feels that is well-healed and that they can go ahead and use it.  He has not had any fevers or chills.  She has intermittent soft and watery stools.  There is no blood in them.  Minimal cramps but no major abdominal pain.    Interval History: Note from 6/14/2024:  Assessment / Plan:    1 stage IIIc colon cancer T4 N2b MX with  2 perforation of the primary site  3 Staph aureus septicemia from Port-A-Cath which has been removed.  4 oxygen dependent significant COPD  She completed antibiotics and will have her PICC line removed and Wjpain-d-Xnkb replaced  Plan she can begin her treatment received her first dose.  She will return here in 4 weeks.  Return in 2 weeks to get treatment.  Port-A-Cath is placed on 6/24.   Prognosis long-term is guarded.  HPI: 63-year-old female here for follow-up with stage III T4b N2b MX.  She had a perforation at the primary site.  Scans suggested that she may have small pockets of metastatic disease although it is not fairly clear.  Her CEA level was noted to be 92 however.  I explained to her that I cannot rule out that she has metastatic disease at this point.  Our best solution is to continue and go ahead with her treatments.  Hopefully this will control her situation.  Otherwise no other major suggestions or problems.  She has tolerated her medications fairly well to date.  Apparently there was a problem with the pump so she did not receive the full dose today.  She will return in 2 weeks hopefully will get the full dose at that time.  She also will get a Ymelwb-j-Nmio placed to have her PICC line removed.  Interval History: Note from 5/31/2024:  Assessment / Plan:    1 stage IIIc colon cancer T4 N2b MX with  2 perforation of the primary site  3 Staph aureus septicemia from Port-A-Cath removed completing chemotherapy with antibiotics.  Plan: Patient will begin treatment in the next above months.  She gets her completion of antibiotics in 6/1.  She will get blood cultures CBC CMP and CEA level 6/3 if the wall are stable she will plan to begin treatment 6/11.  She already has signed consents and sides affects her well-known.  Prognosis very guarded.  HPI: 63-year-old female who comes in for treatment.  She has a stage IIIc colon cancer.  She presented with right colon the disease we will with right hemicolectomy have grossly perforated.  She is to be considered for FOLFOX therapy.  Unfortunately she had developed an infection in her Port-A-Cath had a removed and is completing 2 weeks of antibiotic therapy.  She completes her therapy 6/1.  She will go CBC CMP blood cultures x 2 and CEA level on 6/3.  Will and plan if her cultures remain negative to begin her treatment on 6/11.  After treatment we  will probably plan to remove her PICC line and look to get an Zqxpag-g-Nutc placed.  She had a anastomosis washout and again is a candidate unfortunately for recurrent disease.  Hopefully she will be able to tolerate treatment well we will plan to begin as stated.  interval History:    Patient seen with history of advanced colon cancer T4a N2b cMX.  She initially presented with perforation.  She had her a right colon with terminal ileum and appendix right hemicolectomy invasive adenocarcinoma grossly perforated.  [employer] had a specimen sent.  It showed MMR stable of note she was ER RB positive consistent with H ER 2 positivity.  She had a K-kt mutation as well.  She was to begin treatment when she developed the onset of Staph aureus infection after placement of Qvqrkr-r-Bgwv.  She is now completing 2 weeks of IV antibiotics.  She still has a PICC line in place which we will continue until she gets her first treatment.  She had a CAT scan chest abdomen pelvis on 5/16 which showed a 5 mm nodule and stranding in the lower quadrant left side could not rule out surgical tract tumor.  She will be coming for therapy at the beginning of June.      Cancer Staging:  Cancer Staging   Adenocarcinoma, colon (HCC)  Staging form: Colon and Rectum, AJCC 8th Edition  - Pathologic: pT4a, pN2b - Unsigned  Histologic grading system: 4 grade system  Histologic grade (G): G2  Residual tumor (R): R0 - None      Molecular Testing:     Previous Hematologic/ Oncologic History:    Oncology History   Adenocarcinoma, colon (HCC)   4/16/2024 Initial Diagnosis    Adenocarcinoma, colon (HCC)     6/12/2024 -  Chemotherapy    alteplase (CATHFLO), 2 mg, Intracatheter, Every 1 Minute as needed, 3 of 12 cycles  fluorouracil (ADRUCIL), 400 mg/m2 = 805 mg, Intravenous, Once, 3 of 12 cycles  Administration: 790 mg (6/12/2024), 790 mg (6/26/2024), 800 mg (7/16/2024)  leucovorin calcium IVPB, 400 mg/m2 = 804 mg, Intravenous, Once, 3 of 12  "cycles  Administration: 792 mg (6/12/2024), 792 mg (6/26/2024), 800 mg (7/16/2024)  oxaliplatin (ELOXATIN) chemo infusion, 85 mg/m2 = 170.85 mg, Intravenous, Once, 3 of 12 cycles  Administration: 168.3 mg (6/12/2024), 168.3 mg (6/26/2024), 170 mg (7/16/2024)  fluorouracil (ADRUCIL) ambulatory infusion Soln, 1,200 mg/m2/day = 4,825 mg, Intravenous, Over 46 hours, 3 of 12 cycles         Current Hematologic/ Oncologic Treatment:       Cycle 1         Test Results:    Imaging: No results found.          Labs:   Lab Results   Component Value Date    WBC 9.29 07/08/2024    HGB 12.6 07/08/2024    HCT 41.3 07/08/2024    MCV 84 07/08/2024     07/08/2024     Lab Results   Component Value Date    K 3.6 07/08/2024     07/08/2024    CO2 26 07/08/2024    BUN 19 07/08/2024    CREATININE 0.87 07/08/2024    GLUCOSE 152 (H) 03/19/2024    GLUF 117 (H) 06/24/2024    CALCIUM 9.2 07/08/2024    CORRECTEDCA 9.3 03/20/2024    AST 18 07/08/2024    ALT 33 07/08/2024    ALKPHOS 184 (H) 07/08/2024    EGFR 72 07/08/2024         No results found for: \"SPEP\", \"UPEP\"    No results found for: \"PSA\"    Lab Results   Component Value Date    CEA 92.1 (H) 06/03/2024       No results found for: \"\"    No results found for: \"AFP\"    No results found for: \"IRON\", \"TIBC\", \"FERRITIN\"    Lab Results   Component Value Date    WLAGBQKQ99 261 03/20/2024         ROS: Review of Systems   Constitutional: Negative.    HENT: Negative.     Eyes: Negative.    Respiratory: Negative.     Cardiovascular: Negative.    Gastrointestinal: Negative.    Endocrine: Negative.    Genitourinary: Negative.    Musculoskeletal: Negative.    Skin: Negative.    Allergic/Immunologic: Negative.    Neurological: Negative.          Current Medications: Reviewed  Allergies: Reviewed  PMH/FH/SH:  Reviewed      Physical Exam:    Body surface area is 1.99 meters squared.    Wt Readings from Last 3 Encounters:   07/29/24 106 kg (233 lb)   07/16/24 107 kg (235 lb 9.6 oz) "   06/26/24 107 kg (235 lb 7.2 oz)        Temp Readings from Last 3 Encounters:   07/29/24 (!) 96.9 °F (36.1 °C) (Temporal)   07/16/24 97.8 °F (36.6 °C)   06/24/24 97.7 °F (36.5 °C) (Temporal)        BP Readings from Last 3 Encounters:   07/29/24 120/70   07/16/24 154/82   06/24/24 122/70         Pulse Readings from Last 3 Encounters:   07/29/24 63   07/16/24 74   06/24/24 63     @LASTSAO2(3)@      Physical Exam  Constitutional:       Appearance: Normal appearance. She is obese. She is ill-appearing (Chronically ill in appearance wearing O2 not in acute).   HENT:      Head: Normocephalic and atraumatic.   Eyes:      Extraocular Movements: Extraocular movements intact.      Conjunctiva/sclera: Conjunctivae normal.      Pupils: Pupils are equal, round, and reactive to light.   Cardiovascular:      Rate and Rhythm: Normal rate and regular rhythm.      Heart sounds: Normal heart sounds.   Pulmonary:      Effort: Pulmonary effort is normal.      Breath sounds: Normal breath sounds.   Abdominal:      General: Abdomen is flat. Bowel sounds are normal.      Palpations: Abdomen is soft.   Musculoskeletal:         General: Normal range of motion.      Cervical back: Normal range of motion and neck supple.   Skin:     General: Skin is warm and dry.   Neurological:      General: No focal deficit present.      Mental Status: She is alert and oriented to person, place, and time. Mental status is at baseline.     No cervical axillary inguinal adenopathy.  The port has slight opening of the wound she was checked by interventional radiology feel it is still healing well.  She will go ahead and use the port this coming week.      Goals and Barriers:  Current Goal: Prolong Survival from Cancer.   Barriers: None.      Patient's Capacity to Self Care:  Patient is able to self care.    Code Status: [unfilled]  Advance Directive and Living Will:      Power of :

## 2024-07-30 ENCOUNTER — PATIENT OUTREACH (OUTPATIENT)
Dept: CASE MANAGEMENT | Facility: HOSPITAL | Age: 64
End: 2024-07-30

## 2024-07-30 DIAGNOSIS — Z78.9 NEED FOR FOLLOW-UP BY SOCIAL WORKER: Primary | ICD-10-CM

## 2024-07-30 NOTE — PROGRESS NOTES
MSW was notified by infusion RN that pt is having financial issues and car problems, may need [facility] transport or other resources for the foreseeable future.  Call out to her today to check in and offer support, no answer, LM for her with my direct number for a return call at her convenience.

## 2024-07-31 ENCOUNTER — TELEPHONE (OUTPATIENT)
Dept: HEMATOLOGY ONCOLOGY | Facility: CLINIC | Age: 64
End: 2024-07-31

## 2024-07-31 ENCOUNTER — TELEPHONE (OUTPATIENT)
Dept: INFUSION CENTER | Facility: CLINIC | Age: 64
End: 2024-07-31

## 2024-07-31 ENCOUNTER — HOSPITAL ENCOUNTER (OUTPATIENT)
Dept: INFUSION CENTER | Facility: CLINIC | Age: 64
Discharge: HOME | End: 2024-07-31

## 2024-07-31 VITALS
TEMPERATURE: 98 F | BODY MASS INDEX: 45.74 KG/M2 | RESPIRATION RATE: 18 BRPM | OXYGEN SATURATION: 96 % | SYSTOLIC BLOOD PRESSURE: 146 MMHG | DIASTOLIC BLOOD PRESSURE: 76 MMHG | HEART RATE: 64 BPM | WEIGHT: 234.2 LBS

## 2024-07-31 DIAGNOSIS — C18.9 ADENOCARCINOMA, COLON (HCC): ICD-10-CM

## 2024-07-31 DIAGNOSIS — C18.9 ADENOCARCINOMA, COLON (HCC): Primary | ICD-10-CM

## 2024-07-31 NOTE — PROGRESS NOTES
Pt into clinic for chemotherapy. Upon arrival to clinic, port was assessed and the port was found to have drainage and an open area. Per Dr Black, patient to be deferred treatment one week.   Per infusion manager, appointment available Monday 8/5/24 at 1:30pm. Patient aware of date of treatment. AVS declined.

## 2024-07-31 NOTE — TELEPHONE ENCOUNTER
Left message to inform patient that her appointment for Monday 8/5/24 will be at 8:30am, not 1:30pm as previously discussed with patient.

## 2024-07-31 NOTE — TELEPHONE ENCOUNTER
"Medication time out/change to orders    Date patient scheduled: 7/31/24    Original medication ordered: folfox    New Medication ordered: n/a    Infusin RN notified Rafaela SANCHEZ    Patient present to Infusion area for treatment. Port area where she removed \"stitch\" slightly open and has some drainage noted. Pt was seen in IR and they had stated it was a scab however pt went home and pulled it out. Treatment to be deferred one week since drainage noted.  "

## 2024-08-01 DIAGNOSIS — C18.9 ADENOCARCINOMA, COLON (HCC): Primary | ICD-10-CM

## 2024-08-02 ENCOUNTER — TELEPHONE (OUTPATIENT)
Age: 64
End: 2024-08-02

## 2024-08-02 DIAGNOSIS — C18.9 ADENOCARCINOMA, COLON (HCC): Primary | ICD-10-CM

## 2024-08-02 DIAGNOSIS — Z95.828 PORT-A-CATH IN PLACE: Primary | ICD-10-CM

## 2024-08-02 DIAGNOSIS — Z95.828 PORT-A-CATH IN PLACE: ICD-10-CM

## 2024-08-02 NOTE — TELEPHONE ENCOUNTER
Patient reports she was seen for scab on port area but she has since had that area open to size of pencil eraser. Reports tenderness, denies fever. Patient was on schedule today for IR Port check but states she wasn't aware of that. Warm transferred to IR to discuss.

## 2024-08-05 ENCOUNTER — TELEPHONE (OUTPATIENT)
Dept: HEMATOLOGY ONCOLOGY | Facility: CLINIC | Age: 64
End: 2024-08-05

## 2024-08-05 NOTE — TELEPHONE ENCOUNTER
"Patient was sent message through my chart earlier today to see if she was having any fevers over the weekend and any other symptoms of pain or discomfort to port site. Called patient and inquired how she felt and she states she is ok with no fevers. Denies pain to area, when asked how the site looks she states it look open, asked about drainage and she is unable to answer. \"She thinks maybe just a little\". She was asking if she should come in for her treatment this week but could not come in today for her labs because of transportation issues.   There is an appt for This Wed in infusion however only a 30min appt and not her chemo treatment. She originally thought that because her labs were ok we didn't need to repeat. She thought she was going to get her treatment. I made her aware I would discuss with PAC and make her aware of the plan.  "

## 2024-08-05 NOTE — TELEPHONE ENCOUNTER
Existing pt MO     She was delayed last week because of her port again however the appt I see for Wed is only for 30min. I saw she was supposed to come in today and she didn't come because of transportation.   I informed her to keep the appt for Wed and we will draw labs. As for her chemo not going to change anything for now until we see if port is ok. Told her when she is there wed to ask for Mara who has been involved with this patient in past. TY

## 2024-08-06 ENCOUNTER — PATIENT OUTREACH (OUTPATIENT)
Dept: HEMATOLOGY ONCOLOGY | Facility: CLINIC | Age: 64
End: 2024-08-06

## 2024-08-06 NOTE — PROGRESS NOTES
Called pt to discuss transportation, and set up if needed, no answer, left a detailed message with my contact information     Pt returned my call, she stated her  got the car fixed and she does not need transportation assistance. She was confused about all her upcoming appointments.  I confirmed all scheduled appointments with her.  I confirmed my contact information with her, so she can reach out in the future for appointment clarification.  She was thankful for the assistance

## 2024-08-07 ENCOUNTER — HOSPITAL ENCOUNTER (OUTPATIENT)
Dept: INFUSION CENTER | Facility: CLINIC | Age: 64
Discharge: HOME | End: 2024-08-07

## 2024-08-07 ENCOUNTER — HOSPITAL ENCOUNTER (OUTPATIENT)
Dept: NON INVASIVE DIAGNOSTICS | Facility: HOSPITAL | Age: 64
Discharge: HOME | End: 2024-08-07

## 2024-08-07 DIAGNOSIS — Z95.828 PORT-A-CATH IN PLACE: ICD-10-CM

## 2024-08-07 NOTE — QUICK NOTE
Intervention al Radiology Quick Note    63-year-old female status post placement of right chest port 6/24/2024 for initiation of chemotherapy for adenocarcinoma of colon.  Patient presents to interventional radiology suite today for site check.    Patient reports removing a suture from the incision line approximately 1 week ago with questionable drainage for that day.  She currently denies any pain, increased redness, swelling, drainage from incision line.  She reports cleansing with soap and water and placing triple antibiotic along the incision line.    On evaluation this afternoon, incision line well-approximated with no redness or drainage noted.  Soft, nontender, no erythema noted.  Patient with a small scabbed area medially.  She denies fever and chills.    At this time, recommend continued observation and cleansing with soap and water and use of triple antibiotic ointment with follow-up with medical oncology as scheduled.  No need for antibiotic therapy at this time.    Patient advised to call interventional radiology for any concerning questions or problems moving forward otherwise she will proceed with medical oncology for initiation of chemotherapy via right chest port.          GUSTAVO Salcido

## 2024-08-14 ENCOUNTER — PATIENT OUTREACH (OUTPATIENT)
Dept: HEMATOLOGY ONCOLOGY | Facility: CLINIC | Age: 64
End: 2024-08-14

## 2024-08-14 RX ORDER — FLUOROURACIL 50 MG/ML
400 INJECTION, SOLUTION INTRAVENOUS ONCE
Status: CANCELLED | OUTPATIENT
Start: 2024-08-19

## 2024-08-14 NOTE — PROGRESS NOTES
Called pt to check in, and confirm all upcoming appointments, no answer, left a detailed message with my contact information

## 2024-08-15 ENCOUNTER — OFFICE VISIT (OUTPATIENT)
Dept: WOUND CARE | Facility: CLINIC | Age: 64
End: 2024-08-15
Payer: COMMERCIAL

## 2024-08-15 VITALS
TEMPERATURE: 97.1 F | SYSTOLIC BLOOD PRESSURE: 172 MMHG | DIASTOLIC BLOOD PRESSURE: 86 MMHG | HEART RATE: 67 BPM | RESPIRATION RATE: 22 BRPM

## 2024-08-15 DIAGNOSIS — Z95.828 PORT-A-CATH IN PLACE: ICD-10-CM

## 2024-08-15 DIAGNOSIS — C18.9 ADENOCARCINOMA, COLON (HCC): ICD-10-CM

## 2024-08-15 DIAGNOSIS — T81.89XA NON-HEALING SURGICAL WOUND, INITIAL ENCOUNTER: Primary | ICD-10-CM

## 2024-08-15 DIAGNOSIS — C18.9 ADENOCARCINOMA, COLON (HCC): Primary | ICD-10-CM

## 2024-08-15 PROCEDURE — 99203 OFFICE O/P NEW LOW 30 MIN: CPT | Performed by: ORTHOPAEDIC SURGERY

## 2024-08-15 PROCEDURE — 99212 OFFICE O/P EST SF 10 MIN: CPT | Performed by: ORTHOPAEDIC SURGERY

## 2024-08-15 RX ORDER — FLUOROURACIL 50 MG/ML
400 INJECTION, SOLUTION INTRAVENOUS ONCE
Status: CANCELLED | OUTPATIENT
Start: 2024-08-20

## 2024-08-15 RX ORDER — LIDOCAINE 40 MG/G
CREAM TOPICAL ONCE
Status: COMPLETED | OUTPATIENT
Start: 2024-08-15 | End: 2024-08-15

## 2024-08-15 RX ADMIN — LIDOCAINE: 40 CREAM TOPICAL at 09:06

## 2024-08-15 NOTE — PROGRESS NOTES
Wound Procedure Treatment Incision Right;Upper Chest    Performed by: Elana Allen LPN  Authorized by: Abida Nicholson PA-C    Associated wounds:   Wound 06/24/24 Incision Chest Right;Upper  Applied primary dressing:  Silicone bordered foam

## 2024-08-15 NOTE — PROGRESS NOTES
Patient ID: Dwaine Gould is a 63 y.o. female Date of Birth 1960       Chief Complaint   Patient presents with    New Patient Visit     RIGHT CHEST WALL WOUND       Allergies:  Codeine, Wound dressing adhesive, Contrast [iodinated contrast media], Prednisone, and Ibuprofen    Diagnosis:      Diagnosis ICD-10-CM Associated Orders   1. Non-healing surgical wound, initial encounter  T81.89XA lidocaine (LMX) 4 % cream     Wound cleansing and dressings     Wound Procedure Treatment Incision Right;Upper Chest      2. Port-A-Cath in place  Z95.828 Ambulatory Referral to Wound Care     Wound cleansing and dressings     Wound Procedure Treatment Incision Right;Upper Chest      3. Adenocarcinoma, colon (HCC)  C18.9 Ambulatory Referral to Wound Care     Wound cleansing and dressings     Wound Procedure Treatment Incision Right;Upper Chest              Assessment and Plan :  Initial Evaluation of a non-healing surgical wound on right anterior chest s/p Port-A-Cath replacement. Wound is is completely epithelialized.  Keep the area covered and protected for about another week.  Follow up as needed or call with questions or concerns    Subjective:   Patient is a 63 y.o. female with pmhx HLD, HTN, CAD, h/o MI, Colon Cancer, COPD (oxygen dependent on 3LNC), RILEY, CHF, Stage 3 CKD, Tobacco Abuse (1 cigarette every 2 days), Thrombocytopenia, Anemia, Asthma, Seizures who presents for initial eval of a nonhealing surgical wound on her right anterior chest following a Port-A-Cath replacement on 6/24/24.  Since then pt was applying triple antibiotic ointment to wound bed and leaving it open to air. Does not have an odor. No significant drainage. No diabetes.  No ETOH or drug use..  Pt denies any sob, fatigue, N/V, CP, fevers or chills.    Pt is accompanied by her daughter who is actively involved in her care.          The following portions of the patient's history were reviewed and updated as appropriate:   Patient Active Problem List    Diagnosis    Tobacco abuse    CAD (coronary artery disease)    Chronic heart failure with preserved ejection fraction (HCC)    COPD (chronic obstructive pulmonary disease) (HCC)    Leukocytosis    Essential hypertension    Hyperlipidemia    Chronic respiratory failure with hypoxia and hypercapnia (HCC)    Stage 3a chronic kidney disease (HCC)    Anemia    Thrombocytopenia (HCC)    Adenocarcinoma, colon (HCC)    Encounter to establish care    Encounter for counseling for tobacco use disorder    Port-A-Cath in place    Staphylococcus aureus bacteremia    Morbid obesity (HCC)     Past Medical History:   Diagnosis Date    Acute metabolic encephalopathy     Asthma     CHF (congestive heart failure) (HCC)     Chronic kidney disease     COPD (chronic obstructive pulmonary disease) (HCC)     Hyperlipidemia     Hypertension     Liver disease     Myocardial infarction (HCC)     Seizures (HCC)     Sleep apnea     Transaminitis      Past Surgical History:   Procedure Laterality Date    APPENDECTOMY      CARDIAC CATHETERIZATION      GANGLION CYST EXCISION      INCONTINENCE SURGERY      IR PORT PLACEMENT  05/03/2024    IR PORT PLACEMENT  6/24/2024    IR PORT REMOVAL  05/16/2024    LAPAROTOMY N/A 03/21/2024    Procedure: LAPAROTOMY EXPLORATORY, washout, complex closure, LISSY drain;  Surgeon: Francine Reid MD;  Location: [facility];  Service: General    NH LAPS ABD PRTM&OMENTUM DX W/WO SPEC BR/WA SPX N/A 03/19/2024    Procedure: DIAGNOSTIC LAPAROSCOPY converted to Exploratory Laparotomy, Right Hemicolectomy, Abdominal wash-out, Abthera Placement;  Surgeon: Roger Joel DO;  Location: [facility];  Service: General    TYMPANOSTOMY TUBE PLACEMENT       No family history on file.   Social History     Socioeconomic History    Marital status: /Civil Union     Spouse name: None    Number of children: None    Years of education: None    Highest education level: None   Occupational History    None   Tobacco Use     Smoking status: Every Day     Types: Cigarettes     Passive exposure: Current    Smokeless tobacco: Never   Vaping Use    Vaping status: Never Used   Substance and Sexual Activity    Alcohol use: Not Currently     Comment: rare    Drug use: Never    Sexual activity: Not Currently     Partners: Male   Other Topics Concern    None   Social History Narrative    None     Social Determinants of Health     Financial Resource Strain: Not on file   Food Insecurity: No Food Insecurity (5/17/2024)    Hunger Vital Sign     Worried About Running Out of Food in the Last Year: Never true     Ran Out of Food in the Last Year: Never true   Transportation Needs: No Transportation Needs (5/17/2024)    PRAPARE - Transportation     Lack of Transportation (Medical): No     Lack of Transportation (Non-Medical): No   Physical Activity: Not on file   Stress: Not on file   Social Connections: Unknown (6/18/2024)    Received from [facility]     How often do you feel lonely or isolated from those around you? (Adult - for ages 18 years and over): Not on file   Intimate Partner Violence: Not on file   Housing Stability: Low Risk  (5/17/2024)    Housing Stability Vital Sign     Unable to Pay for Housing in the Last Year: No     Number of Times Moved in the Last Year: 1     Homeless in the Last Year: No        Current Outpatient Medications:     albuterol (2.5 mg/3 mL) 0.083 % nebulizer solution, Take 3 mL (2.5 mg total) by nebulization every 6 (six) hours as needed for wheezing or shortness of breath, Disp: 360 mL, Rfl: 2    amLODIPine (NORVASC) 5 mg tablet, Take 1 tablet (5 mg total) by mouth daily, Disp: 90 tablet, Rfl: 3    aspirin 325 mg tablet, Take 1 tablet by mouth daily, Disp: , Rfl:     atorvastatin (Lipitor) 40 mg tablet, Take 1 tablet (40 mg total) by mouth daily at bedtime, Disp: 90 tablet, Rfl: 3    lisinopril (ZESTRIL) 10 mg tablet, Take 1 tablet (10 mg total) by mouth daily, Disp: 30 tablet, Rfl: 2     metoprolol tartrate (LOPRESSOR) 25 mg tablet, Take 1 tablet (25 mg total) by mouth every 12 (twelve) hours, Disp: 60 tablet, Rfl: 3    ondansetron (ZOFRAN-ODT) 8 mg disintegrating tablet, Take 1 tablet (8 mg total) by mouth every 8 (eight) hours as needed for vomiting or nausea, Disp: 20 tablet, Rfl: 1    rOPINIRole (REQUIP) 0.5 mg tablet, Take 1 tablet (0.5 mg total) by mouth 3 (three) times a day, Disp: 90 tablet, Rfl: 0    torsemide (DEMADEX) 20 mg tablet, Take 1 tablet (20 mg total) by mouth daily, Disp: 90 tablet, Rfl: 3    zolpidem (AMBIEN) 5 mg tablet, Take 5 mg by mouth daily as needed, Disp: , Rfl:   No current facility-administered medications for this visit.    Review of Systems   Constitutional:  Negative for appetite change, chills, fatigue, fever and unexpected weight change.   HENT:  Negative for congestion, hearing loss and postnasal drip.    Respiratory:  Negative for cough and shortness of breath.    Cardiovascular:  Negative for leg swelling.   Musculoskeletal:  Positive for gait problem.   Skin:  Positive for wound (R anterior chest). Negative for rash.   Neurological:  Negative for numbness.   Hematological:  Does not bruise/bleed easily.   Psychiatric/Behavioral: Negative.         Objective:  BP (!) 172/86   Pulse 67   Temp (!) 97.1 °F (36.2 °C)   Resp 22   Pain Score: 0-No pain     Physical Exam  Vitals reviewed.   Constitutional:       General: She is not in acute distress.     Appearance: Normal appearance. She is well-developed and normal weight.   HENT:      Head: Normocephalic and atraumatic.   Cardiovascular:      Rate and Rhythm: Normal rate.   Pulmonary:      Effort: Pulmonary effort is normal.   Musculoskeletal:         General: No deformity.      Right lower leg: No edema.      Left lower leg: No edema.   Skin:     General: Skin is warm and dry.      Findings: Wound (right chest) present.             Comments: Dry pink newly epithelialized tissue.   Neurological:      General: No  "focal deficit present.      Mental Status: She is alert and oriented to person, place, and time.      Gait: Gait normal.   Psychiatric:         Mood and Affect: Mood and affect normal.         Behavior: Behavior normal. Behavior is cooperative.                    Wound 06/24/24 Incision Chest Right;Upper (Active)   Wound Description Epithelialization;Pink;Eschar 08/15/24 0901   Paula-wound Assessment Intact;Scar Tissue 08/15/24 0901   Wound Length (cm) 0.1 cm 08/15/24 0901   Wound Width (cm) 0.1 cm 08/15/24 0901   Wound Depth (cm) 0.1 cm 08/15/24 0901   Wound Surface Area (cm^2) 0.01 cm^2 08/15/24 0901   Wound Volume (cm^3) 0.001 cm^3 08/15/24 0901   Calculated Wound Volume (cm^3) 0 cm^3 08/15/24 0901   Drainage Amount LORENZO 08/15/24 0901   Drainage Description LORENZO 08/15/24 0901   Non-staged Wound Description Full thickness 08/15/24 0901   Dressing Status Other (Comment) 08/15/24 0901            Wound Instructions:  Orders Placed This Encounter   Procedures    Wound cleansing and dressings     Apply border foam dressing   Change dressing every 3 days     Standing Status:   Future     Standing Expiration Date:   8/22/2024    Wound Procedure Treatment Incision Right;Upper Chest     This order was created via procedure documentation       Total time spent today:  30 minutes.  This includes reviewing the patient's chart, pertinent physician records Dr. Woodruff, Interventional Radiology, 6/24/24, Dr. Black, Medical oncology, 7/29/24 6/14/24, 5/31/24 and Dr. Godoy, Hospitalist, 5/16/24.      Abida Nicholson PA-C, [facility]    Portions of the record may have been created with voice recognition software. Occasional wrong word or \"sound alike\" substitutions may have occurred due to the inherent limitations of voice recognition software. Read the chart carefully and recognize, using context, where substitutions have occurred.      "

## 2024-08-15 NOTE — PROGRESS NOTES
The 5FU on this plan had been released previous week. She was rescheduled because of issues with port and today new order was placed for 5fu 4775mg over 46hrs and sent to [facility].

## 2024-08-15 NOTE — PATIENT INSTRUCTIONS
Orders Placed This Encounter   Procedures    Wound cleansing and dressings     Apply border foam dressing   Change dressing every 3 days     Standing Status:   Future     Standing Expiration Date:   8/22/2024

## 2024-08-19 ENCOUNTER — HOSPITAL ENCOUNTER (OUTPATIENT)
Dept: INFUSION CENTER | Facility: CLINIC | Age: 64
Discharge: HOME | End: 2024-08-19
Payer: COMMERCIAL

## 2024-08-19 ENCOUNTER — PATIENT OUTREACH (OUTPATIENT)
Dept: HEMATOLOGY ONCOLOGY | Facility: CLINIC | Age: 64
End: 2024-08-19

## 2024-08-19 DIAGNOSIS — C18.9 ADENOCARCINOMA, COLON (HCC): ICD-10-CM

## 2024-08-19 DIAGNOSIS — Z95.828 PORT-A-CATH IN PLACE: Primary | ICD-10-CM

## 2024-08-19 LAB
ALBUMIN SERPL BCG-MCNC: 3.7 G/DL (ref 3.5–5)
ALP SERPL-CCNC: 181 U/L (ref 34–104)
ALT SERPL W P-5'-P-CCNC: 21 U/L (ref 7–52)
ANION GAP SERPL CALCULATED.3IONS-SCNC: 7 MMOL/L (ref 4–13)
AST SERPL W P-5'-P-CCNC: 15 U/L (ref 13–39)
BASOPHILS # BLD AUTO: 0.05 THOUSANDS/ÂΜL (ref 0–0.1)
BASOPHILS NFR BLD AUTO: 0 % (ref 0–1)
BILIRUB SERPL-MCNC: 0.38 MG/DL (ref 0.2–1)
BUN SERPL-MCNC: 15 MG/DL (ref 5–25)
CALCIUM SERPL-MCNC: 9.3 MG/DL (ref 8.4–10.2)
CHLORIDE SERPL-SCNC: 103 MMOL/L (ref 96–108)
CO2 SERPL-SCNC: 30 MMOL/L (ref 21–32)
CREAT SERPL-MCNC: 0.8 MG/DL (ref 0.6–1.3)
EOSINOPHIL # BLD AUTO: 0.12 THOUSAND/ÂΜL (ref 0–0.61)
EOSINOPHIL NFR BLD AUTO: 1 % (ref 0–6)
ERYTHROCYTE [DISTWIDTH] IN BLOOD BY AUTOMATED COUNT: 22.2 % (ref 11.6–15.1)
GFR SERPL CREATININE-BSD FRML MDRD: 79 ML/MIN/1.73SQ M
GLUCOSE SERPL-MCNC: 108 MG/DL (ref 65–140)
HCT VFR BLD AUTO: 41 % (ref 34.8–46.1)
HGB BLD-MCNC: 12.3 G/DL (ref 11.5–15.4)
IMM GRANULOCYTES # BLD AUTO: 0.13 THOUSAND/UL (ref 0–0.2)
IMM GRANULOCYTES NFR BLD AUTO: 1 % (ref 0–2)
LYMPHOCYTES # BLD AUTO: 1.94 THOUSANDS/ÂΜL (ref 0.6–4.47)
LYMPHOCYTES NFR BLD AUTO: 14 % (ref 14–44)
MCH RBC QN AUTO: 27.3 PG (ref 26.8–34.3)
MCHC RBC AUTO-ENTMCNC: 30 G/DL (ref 31.4–37.4)
MCV RBC AUTO: 91 FL (ref 82–98)
MONOCYTES # BLD AUTO: 0.9 THOUSAND/ÂΜL (ref 0.17–1.22)
MONOCYTES NFR BLD AUTO: 6 % (ref 4–12)
NEUTROPHILS # BLD AUTO: 11.08 THOUSANDS/ÂΜL (ref 1.85–7.62)
NEUTS SEG NFR BLD AUTO: 78 % (ref 43–75)
NRBC BLD AUTO-RTO: 0 /100 WBCS
PLATELET # BLD AUTO: 311 THOUSANDS/UL (ref 149–390)
PMV BLD AUTO: 9.1 FL (ref 8.9–12.7)
POTASSIUM SERPL-SCNC: 3.8 MMOL/L (ref 3.5–5.3)
PROT SERPL-MCNC: 7 G/DL (ref 6.4–8.4)
RBC # BLD AUTO: 4.5 MILLION/UL (ref 3.81–5.12)
SODIUM SERPL-SCNC: 140 MMOL/L (ref 135–147)
WBC # BLD AUTO: 14.22 THOUSAND/UL (ref 4.31–10.16)

## 2024-08-19 PROCEDURE — 80053 COMPREHEN METABOLIC PANEL: CPT | Performed by: INTERNAL MEDICINE

## 2024-08-19 PROCEDURE — 85025 COMPLETE CBC W/AUTO DIFF WBC: CPT | Performed by: INTERNAL MEDICINE

## 2024-08-19 NOTE — PROGRESS NOTES
Called pt to check in and confirm all upcoming appointments.  Pt was at the infusion center having her labs done when I called.  I reminded her to ask for a print out of all her upcoming appointments. She was thankful for the call, and has my contact information if she needs any further assistance

## 2024-08-19 NOTE — PROGRESS NOTES
Pt into clinic for port flush and lab draw via port. Pt offers complaints of fatigue and skin blisters. Notified Dr Wayne Staples's RN regarding skin blisters and sent picture. Per Dr Black, skin blisters not likely from chemo since pt has not had chemo in a few weeks.     Tolerated procedure. Port de-accessed.     Pt aware of next appointment on 8/20/24 at 8am. AVS declined.

## 2024-08-20 ENCOUNTER — HOSPITAL ENCOUNTER (OUTPATIENT)
Dept: INFUSION CENTER | Facility: CLINIC | Age: 64
Discharge: HOME | End: 2024-08-20
Payer: COMMERCIAL

## 2024-08-20 VITALS
WEIGHT: 237 LBS | SYSTOLIC BLOOD PRESSURE: 178 MMHG | HEIGHT: 60 IN | HEART RATE: 73 BPM | DIASTOLIC BLOOD PRESSURE: 82 MMHG | RESPIRATION RATE: 20 BRPM | BODY MASS INDEX: 46.53 KG/M2 | TEMPERATURE: 98.7 F

## 2024-08-20 DIAGNOSIS — C18.9 ADENOCARCINOMA, COLON (HCC): Primary | ICD-10-CM

## 2024-08-20 PROCEDURE — 96411 CHEMO IV PUSH ADDL DRUG: CPT

## 2024-08-20 PROCEDURE — 96413 CHEMO IV INFUSION 1 HR: CPT

## 2024-08-20 PROCEDURE — G0498 CHEMO EXTEND IV INFUS W/PUMP: HCPCS

## 2024-08-20 PROCEDURE — 96367 TX/PROPH/DG ADDL SEQ IV INF: CPT

## 2024-08-20 PROCEDURE — 96415 CHEMO IV INFUSION ADDL HR: CPT

## 2024-08-20 PROCEDURE — 96368 THER/DIAG CONCURRENT INF: CPT

## 2024-08-20 RX ORDER — SODIUM CHLORIDE 9 MG/ML
20 INJECTION, SOLUTION INTRAVENOUS ONCE AS NEEDED
Status: DISCONTINUED | OUTPATIENT
Start: 2024-08-20 | End: 2024-08-23 | Stop reason: HOSPADM

## 2024-08-20 RX ORDER — FLUOROURACIL 50 MG/ML
400 INJECTION, SOLUTION INTRAVENOUS ONCE
Status: COMPLETED | OUTPATIENT
Start: 2024-08-20 | End: 2024-08-20

## 2024-08-20 RX ORDER — DEXTROSE MONOHYDRATE 50 MG/ML
20 INJECTION, SOLUTION INTRAVENOUS ONCE
Status: COMPLETED | OUTPATIENT
Start: 2024-08-20 | End: 2024-08-20

## 2024-08-20 RX ADMIN — DEXAMETHASONE SODIUM PHOSPHATE: 10 INJECTION, SOLUTION INTRAMUSCULAR; INTRAVENOUS at 08:42

## 2024-08-20 RX ADMIN — LEUCOVORIN CALCIUM 796 MG: 350 INJECTION, POWDER, LYOPHILIZED, FOR SUSPENSION INTRAMUSCULAR; INTRAVENOUS at 09:11

## 2024-08-20 RX ADMIN — OXALIPLATIN 169.15 MG: 5 INJECTION, SOLUTION INTRAVENOUS at 09:11

## 2024-08-20 RX ADMIN — DEXTROSE 20 ML/HR: 5 SOLUTION INTRAVENOUS at 09:11

## 2024-08-20 RX ADMIN — FLUOROURACIL 795 MG: 50 INJECTION, SOLUTION INTRAVENOUS at 11:12

## 2024-08-20 NOTE — PROGRESS NOTES
Patient arrives to infusion center for FOLFOX Chemotherapy. Confirmed with Abida RN that patient is to continue to receive FOLFOX chemotherapy. Patient offers no complaints today. Port accessed, patient tolerated entire infusion without complication. CADD pump connected to patient with clamps open. AVS offered.     Next appointment: 8/22/24 @ 04

## 2024-08-22 ENCOUNTER — HOSPITAL ENCOUNTER (OUTPATIENT)
Dept: INFUSION CENTER | Facility: CLINIC | Age: 64
Discharge: HOME | End: 2024-08-22

## 2024-08-22 DIAGNOSIS — C18.9 ADENOCARCINOMA, COLON (HCC): Primary | ICD-10-CM

## 2024-08-22 NOTE — PROGRESS NOTES
Patient presents for CADD pump disconnection. Offers no complaints at this time. Pump deflated upon assessment. Port flushed and de-accessed with positive blood return. AVS declined, aware of next appointment on 8/30/24 at 8:30 am.

## 2024-08-23 DIAGNOSIS — I10 PRIMARY HYPERTENSION: ICD-10-CM

## 2024-08-23 RX ORDER — LISINOPRIL 10 MG/1
10 TABLET ORAL DAILY
Qty: 30 TABLET | Refills: 5 | Status: SHIPPED | OUTPATIENT
Start: 2024-08-23

## 2024-08-27 RX ORDER — FLUOROURACIL 50 MG/ML
400 INJECTION, SOLUTION INTRAVENOUS ONCE
Status: CANCELLED | OUTPATIENT
Start: 2024-09-04

## 2024-08-27 RX ORDER — DEXTROSE MONOHYDRATE 50 MG/ML
20 INJECTION, SOLUTION INTRAVENOUS ONCE
OUTPATIENT
Start: 2024-09-04

## 2024-08-27 RX ORDER — SODIUM CHLORIDE 9 MG/ML
20 INJECTION, SOLUTION INTRAVENOUS ONCE AS NEEDED
OUTPATIENT
Start: 2024-09-04

## 2024-08-28 DIAGNOSIS — C18.9 ADENOCARCINOMA, COLON (HCC): Primary | ICD-10-CM

## 2024-08-28 RX ORDER — FLUOROURACIL 50 MG/ML
400 INJECTION, SOLUTION INTRAVENOUS ONCE
OUTPATIENT
Start: 2024-09-04

## 2024-08-30 ENCOUNTER — PATIENT OUTREACH (OUTPATIENT)
Dept: CASE MANAGEMENT | Facility: HOSPITAL | Age: 64
End: 2024-08-30

## 2024-08-30 ENCOUNTER — HOSPITAL ENCOUNTER (OUTPATIENT)
Dept: INFUSION CENTER | Facility: CLINIC | Age: 64
End: 2024-08-30
Payer: COMMERCIAL

## 2024-08-30 DIAGNOSIS — C18.9 ADENOCARCINOMA, COLON (HCC): ICD-10-CM

## 2024-08-30 DIAGNOSIS — Z95.828 PORT-A-CATH IN PLACE: Primary | ICD-10-CM

## 2024-08-30 DIAGNOSIS — J44.9 CHRONIC OBSTRUCTIVE PULMONARY DISEASE, UNSPECIFIED COPD TYPE (HCC): ICD-10-CM

## 2024-08-30 DIAGNOSIS — D69.6 THROMBOCYTOPENIA (HCC): ICD-10-CM

## 2024-08-30 LAB
ALBUMIN SERPL BCG-MCNC: 3.7 G/DL (ref 3.5–5)
ALP SERPL-CCNC: 143 U/L (ref 34–104)
ALT SERPL W P-5'-P-CCNC: 22 U/L (ref 7–52)
ANION GAP SERPL CALCULATED.3IONS-SCNC: 9 MMOL/L (ref 4–13)
AST SERPL W P-5'-P-CCNC: 19 U/L (ref 13–39)
BASOPHILS # BLD AUTO: 0.02 THOUSANDS/ÂΜL (ref 0–0.1)
BASOPHILS NFR BLD AUTO: 0 % (ref 0–1)
BILIRUB SERPL-MCNC: 0.38 MG/DL (ref 0.2–1)
BUN SERPL-MCNC: 12 MG/DL (ref 5–25)
CALCIUM SERPL-MCNC: 8.9 MG/DL (ref 8.4–10.2)
CEA SERPL-MCNC: 28.7 NG/ML (ref 0–3)
CHLORIDE SERPL-SCNC: 108 MMOL/L (ref 96–108)
CO2 SERPL-SCNC: 25 MMOL/L (ref 21–32)
CREAT SERPL-MCNC: 0.84 MG/DL (ref 0.6–1.3)
EOSINOPHIL # BLD AUTO: 0.18 THOUSAND/ÂΜL (ref 0–0.61)
EOSINOPHIL NFR BLD AUTO: 2 % (ref 0–6)
ERYTHROCYTE [DISTWIDTH] IN BLOOD BY AUTOMATED COUNT: 21.4 % (ref 11.6–15.1)
GFR SERPL CREATININE-BSD FRML MDRD: 75 ML/MIN/1.73SQ M
GLUCOSE SERPL-MCNC: 113 MG/DL (ref 65–140)
HCT VFR BLD AUTO: 38.3 % (ref 34.8–46.1)
HGB BLD-MCNC: 11.8 G/DL (ref 11.5–15.4)
IMM GRANULOCYTES # BLD AUTO: 0.02 THOUSAND/UL (ref 0–0.2)
IMM GRANULOCYTES NFR BLD AUTO: 0 % (ref 0–2)
LYMPHOCYTES # BLD AUTO: 1.65 THOUSANDS/ÂΜL (ref 0.6–4.47)
LYMPHOCYTES NFR BLD AUTO: 18 % (ref 14–44)
MCH RBC QN AUTO: 28.5 PG (ref 26.8–34.3)
MCHC RBC AUTO-ENTMCNC: 30.8 G/DL (ref 31.4–37.4)
MCV RBC AUTO: 93 FL (ref 82–98)
MONOCYTES # BLD AUTO: 0.71 THOUSAND/ÂΜL (ref 0.17–1.22)
MONOCYTES NFR BLD AUTO: 8 % (ref 4–12)
NEUTROPHILS # BLD AUTO: 6.8 THOUSANDS/ÂΜL (ref 1.85–7.62)
NEUTS SEG NFR BLD AUTO: 72 % (ref 43–75)
NRBC BLD AUTO-RTO: 0 /100 WBCS
PLATELET # BLD AUTO: 220 THOUSANDS/UL (ref 149–390)
PMV BLD AUTO: 8.6 FL (ref 8.9–12.7)
POTASSIUM SERPL-SCNC: 3.7 MMOL/L (ref 3.5–5.3)
PROT SERPL-MCNC: 6.7 G/DL (ref 6.4–8.4)
RBC # BLD AUTO: 4.14 MILLION/UL (ref 3.81–5.12)
SODIUM SERPL-SCNC: 142 MMOL/L (ref 135–147)
WBC # BLD AUTO: 9.38 THOUSAND/UL (ref 4.31–10.16)

## 2024-08-30 PROCEDURE — 85025 COMPLETE CBC W/AUTO DIFF WBC: CPT | Performed by: INTERNAL MEDICINE

## 2024-08-30 PROCEDURE — 82378 CARCINOEMBRYONIC ANTIGEN: CPT

## 2024-08-30 PROCEDURE — 80053 COMPREHEN METABOLIC PANEL: CPT | Performed by: INTERNAL MEDICINE

## 2024-08-30 NOTE — PROGRESS NOTES
Pt here for labs, offering no complaints.  Right  port accessed with positive blood return noted, labs drawn, port flushed and de-accessed without incident.  Patient states that she took out the stitch that was there and it has been healing ever since.  Port looks clean and intact.  AVS declined.  Aware of appt 9/4 @ 2pm.  Walked out in stable condition.

## 2024-08-30 NOTE — PROGRESS NOTES
Closing OncSW episode at this time, pt noted to PN earlier this month that the car has been fixed and she was no longer in need of services.  I had LM for her earlier but this was never returned.  I will remain available to her as needed moving forward and she can be referred again if needed.

## 2024-09-04 ENCOUNTER — TELEPHONE (OUTPATIENT)
Dept: HEMATOLOGY ONCOLOGY | Facility: CLINIC | Age: 64
End: 2024-09-04

## 2024-09-04 ENCOUNTER — PATIENT OUTREACH (OUTPATIENT)
Dept: HEMATOLOGY ONCOLOGY | Facility: CLINIC | Age: 64
End: 2024-09-04

## 2024-09-04 ENCOUNTER — HOSPITAL ENCOUNTER (OUTPATIENT)
Dept: INFUSION CENTER | Facility: CLINIC | Age: 64
Discharge: HOME | End: 2024-09-04

## 2024-09-04 DIAGNOSIS — C18.9 ADENOCARCINOMA, COLON (HCC): Primary | ICD-10-CM

## 2024-09-04 NOTE — PROGRESS NOTES
(Medication time out/change to orders)    Date patient scheduled: today    Original medication ordered: folfox    New Medication ordered: n/a    Office RN notified patient. Yes, called and left vm

## 2024-09-04 NOTE — TELEPHONE ENCOUNTER
Attempted to call patient and left detailed voicemail regarding her next appt. Made her aware in message that there is no room in infusion room for next week and she is scheduled for following week. Date and  times for her lab and chemo appt are left on machine.  Hope line number was left to return our call.

## 2024-09-04 NOTE — PROGRESS NOTES
Patient's daughter came in today stating her mom had an appt at 2pm and asked daughter to please cancel and reschedule because she has been vomiting and not feeling well. Communication sent to infusion dept and daughter was made aware we cannot guarantee she will be treated next week however they would try.

## 2024-09-04 NOTE — PROGRESS NOTES
Called pt to check in, and confirm upcoming appointments, no answer,left a detailed message with my contact information for her to call me back

## 2024-09-05 ENCOUNTER — PATIENT OUTREACH (OUTPATIENT)
Dept: HEMATOLOGY ONCOLOGY | Facility: CLINIC | Age: 64
End: 2024-09-05

## 2024-09-05 NOTE — PROGRESS NOTES
Pt called to confirm she did get the message, and she is aware of the upcoming scheduled medical oncology appointment, and also the new treatment date.  She also stated she is no longer having nausea symptoms, and is feeling better,

## 2024-09-11 DIAGNOSIS — C18.9 ADENOCARCINOMA, COLON (HCC): Primary | ICD-10-CM

## 2024-09-11 RX ORDER — DEXTROSE MONOHYDRATE 50 MG/ML
20 INJECTION, SOLUTION INTRAVENOUS ONCE
OUTPATIENT
Start: 2024-10-02

## 2024-09-11 RX ORDER — FLUOROURACIL 50 MG/ML
400 INJECTION, SOLUTION INTRAVENOUS ONCE
Status: CANCELLED | OUTPATIENT
Start: 2024-09-18

## 2024-09-11 RX ORDER — FLUOROURACIL 50 MG/ML
400 INJECTION, SOLUTION INTRAVENOUS ONCE
OUTPATIENT
Start: 2024-10-02

## 2024-09-11 RX ORDER — SODIUM CHLORIDE 9 MG/ML
20 INJECTION, SOLUTION INTRAVENOUS ONCE AS NEEDED
OUTPATIENT
Start: 2024-10-02

## 2024-09-13 ENCOUNTER — OFFICE VISIT (OUTPATIENT)
Dept: HEMATOLOGY ONCOLOGY | Facility: CLINIC | Age: 64
End: 2024-09-13
Payer: COMMERCIAL

## 2024-09-13 VITALS
BODY MASS INDEX: 45.75 KG/M2 | SYSTOLIC BLOOD PRESSURE: 170 MMHG | HEART RATE: 63 BPM | WEIGHT: 233 LBS | HEIGHT: 60 IN | DIASTOLIC BLOOD PRESSURE: 80 MMHG | TEMPERATURE: 97.9 F | RESPIRATION RATE: 20 BRPM | OXYGEN SATURATION: 93 %

## 2024-09-13 DIAGNOSIS — C18.9 ADENOCARCINOMA, COLON (HCC): Primary | ICD-10-CM

## 2024-09-13 DIAGNOSIS — J96.12 CHRONIC RESPIRATORY FAILURE WITH HYPOXIA AND HYPERCAPNIA (HCC): ICD-10-CM

## 2024-09-13 DIAGNOSIS — J44.9 CHRONIC OBSTRUCTIVE PULMONARY DISEASE, UNSPECIFIED COPD TYPE (HCC): ICD-10-CM

## 2024-09-13 DIAGNOSIS — N18.31 STAGE 3A CHRONIC KIDNEY DISEASE (HCC): ICD-10-CM

## 2024-09-13 DIAGNOSIS — R19.5 ABNORMAL FINDINGS IN STOOL: ICD-10-CM

## 2024-09-13 DIAGNOSIS — J96.11 CHRONIC RESPIRATORY FAILURE WITH HYPOXIA AND HYPERCAPNIA (HCC): ICD-10-CM

## 2024-09-13 PROCEDURE — 99214 OFFICE O/P EST MOD 30 MIN: CPT | Performed by: INTERNAL MEDICINE

## 2024-09-13 NOTE — PROGRESS NOTES
Hematology/Oncology Outpatient Follow- up Note  Dwaine Gould 63 y.o. female MRN: @ Encounter: [id number]        Date:  9/13/2024        Assessment / Plan:    1 stage IIIc colon cancer T4 N2b MX  2 she had perforation at her primary site  3 resolved Staph aureus septicemia  4 significant oxygen dependent COPD  5 to begin cycle 5 of treatment.  Plan: Patient will undergo 2 additional cycles of chemo.  This will then be followed by scan chest abdomen pelvis and further evaluation and follow-up.  We will see her in approximately 5 weeks after her scans.        HPI: 63-year-old female here for follow-up with stage IIIc colon cancer.  They have been perforated.  Unfortunately she was noted to have a CEA level going up to 92.  He is now 30.  She has received 4 cycles of FOLFOX today.  No nausea or vomiting no abdominal pain chest pain or back pain.  At this point she seems to be doing fairly well bowels are working.  No fever chills or sweats.  No shortness of breath.  She noted something in her stools she is not sure if it was a parasite.  We will get his stools for O&P to check.  No other major problem or complaint at this point.  We will plan to give her 2 more cycles of chemotherapy making 6 total.  She will then have a scan of her chest abdomen pelvis if they still look stable we will continue on with the same treatments.    Interval History: Note from 7/29/2024:  Assessment / Plan:    1 stage IIIc colon cancer T4 N2b MX  2 she had perforation of the primary site  3 resolved Staph aureus septicemia  4 significant oxygen dependent COPD  Plan: Patient will continue her chemotherapy this week.  Will see her in 6 to 8 weeks.  She will get a CEA level after 4 weeks.  Long-term prognosis very guarded.  Her port was checked by infusion the interventional radiologist who felt it was stable  HPI: 63-year-old female here for follow-up.  She has stage III T4b N2b MX colon cancer however she was noted to have a CEA level of 92  postop.  We will follow that up.  I did discuss previously with them that my concern is that she may have metastatic disease.  She has completed 4 cycles of treatment.  After 6 cycles we will follow-up post CEA level and then consider scans.  She has had a port replaced after having Staph aureus from the port.  After removal she has been afebrile.  The port is showing a complete closure of her incision line.  Otherwise she looks and is doing fairly well.  She had her port checked today by interventional radiology who feels that is well-healed and that they can go ahead and use it.  He has not had any fevers or chills.  She has intermittent soft and watery stools.  There is no blood in them.  Minimal cramps but no major abdominal pain.  Interval History: Note from 6/14/2024:  Assessment / Plan:    1 stage IIIc colon cancer T4 N2b MX with  2 perforation of the primary site  3 Staph aureus septicemia from Port-A-Cath which has been removed.  4 oxygen dependent significant COPD  She completed antibiotics and will have her PICC line removed and Iekqha-w-Tyrm replaced  Plan she can begin her treatment received her first dose.  She will return here in 4 weeks.  Return in 2 weeks to get treatment.  Port-A-Cath is placed on 6/24.  Prognosis long-term is guarded.  HPI: 63-year-old female here for follow-up with stage III T4b N2b MX.  She had a perforation at the primary site.  Scans suggested that she may have small pockets of metastatic disease although it is not fairly clear.  Her CEA level was noted to be 92 however.  I explained to her that I cannot rule out that she has metastatic disease at this point.  Our best solution is to continue and go ahead with her treatments.  Hopefully this will control her situation.  Otherwise no other major suggestions or problems.  She has tolerated her medications fairly well to date.  Apparently there was a problem with the pump so she did not receive the full dose today.  She will  return in 2 weeks hopefully will get the full dose at that time.  She also will get a Rgasjo-a-Ivai placed to have her PICC line removed.  Interval History: Note from 5/31/2024:  Assessment / Plan:    1 stage IIIc colon cancer T4 N2b MX with  2 perforation of the primary site  3 Staph aureus septicemia from Port-A-Cath removed completing chemotherapy with antibiotics.  Plan: Patient will begin treatment in the next above months.  She gets her completion of antibiotics in 6/1.  She will get blood cultures CBC CMP and CEA level 6/3 if the wall are stable she will plan to begin treatment 6/11.  She already has signed consents and sides affects her well-known.  Prognosis very guarded.  HPI: 63-year-old female who comes in for treatment.  She has a stage IIIc colon cancer.  She presented with right colon the disease we will with right hemicolectomy have grossly perforated.  She is to be considered for FOLFOX therapy.  Unfortunately she had developed an infection in her Port-A-Cath had a removed and is completing 2 weeks of antibiotic therapy.  She completes her therapy 6/1.  She will go CBC CMP blood cultures x 2 and CEA level on 6/3.  Will and plan if her cultures remain negative to begin her treatment on 6/11.  After treatment we will probably plan to remove her PICC line and look to get an Luzplo-y-Amil placed.  She had a anastomosis washout and again is a candidate unfortunately for recurrent disease.  Hopefully she will be able to tolerate treatment well we will plan to begin as stated.  interval History:    Patient seen with history of advanced colon cancer T4a N2b cMX.  She initially presented with perforation.  She had her a right colon with terminal ileum and appendix right hemicolectomy invasive adenocarcinoma grossly perforated.  [facility] had a specimen sent.  It showed MMR stable of note she was ER RB positive consistent with H ER 2 positivity.  She had a K-kt mutation as well.  She was to begin  treatment when she developed the onset of Staph aureus infection after placement of Ffzvne-d-Catx.  She is now completing 2 weeks of IV antibiotics.  She still has a PICC line in place which we will continue until she gets her first treatment.  She had a CAT scan chest abdomen pelvis on 5/16 which showed a 5 mm nodule and stranding in the lower quadrant left side could not rule out surgical tract tumor.  She will be coming for therapy at the beginning of June.      Cancer Staging:  Cancer Staging   Adenocarcinoma, colon (HCC)  Staging form: Colon and Rectum, AJCC 8th Edition  - Pathologic: pT4a, pN2b - Unsigned  Histologic grading system: 4 grade system  Histologic grade (G): G2  Residual tumor (R): R0 - None      Molecular Testing:     Previous Hematologic/ Oncologic History:    Oncology History   Adenocarcinoma, colon (HCC)   4/16/2024 Initial Diagnosis    Adenocarcinoma, colon (HCC)     6/12/2024 -  Chemotherapy    alteplase (CATHFLO), 2 mg, Intracatheter, Every 1 Minute as needed, 5 of 12 cycles  fluorouracil (ADRUCIL), 400 mg/m2 = 805 mg, Intravenous, Once, 5 of 12 cycles  Administration: 790 mg (6/12/2024), 790 mg (6/26/2024), 800 mg (7/16/2024), 795 mg (8/20/2024)  leucovorin calcium IVPB, 400 mg/m2 = 804 mg, Intravenous, Once, 5 of 12 cycles  Administration: 792 mg (6/12/2024), 792 mg (6/26/2024), 800 mg (7/16/2024), 796 mg (8/20/2024)  oxaliplatin (ELOXATIN) chemo infusion, 85 mg/m2 = 170.85 mg, Intravenous, Once, 5 of 12 cycles  Administration: 168.3 mg (6/12/2024), 168.3 mg (6/26/2024), 170 mg (7/16/2024), 169.15 mg (8/20/2024)  fluorouracil (ADRUCIL) ambulatory infusion Soln, 1,200 mg/m2/day = 4,825 mg, Intravenous, Over 46 hours, 5 of 12 cycles         Current Hematologic/ Oncologic Treatment:       Cycle 1         Test Results:    Imaging: No results found.          Labs:   Lab Results   Component Value Date    WBC 9.38 08/30/2024    HGB 11.8 08/30/2024    HCT 38.3 08/30/2024    MCV 93 08/30/2024     " 08/30/2024     Lab Results   Component Value Date    K 3.7 08/30/2024     08/30/2024    CO2 25 08/30/2024    BUN 12 08/30/2024    CREATININE 0.84 08/30/2024    GLUCOSE 152 (H) 03/19/2024    GLUF 117 (H) 06/24/2024    CALCIUM 8.9 08/30/2024    CORRECTEDCA 9.3 03/20/2024    AST 19 08/30/2024    ALT 22 08/30/2024    ALKPHOS 143 (H) 08/30/2024    EGFR 75 08/30/2024         No results found for: \"SPEP\", \"UPEP\"    No results found for: \"PSA\"    Lab Results   Component Value Date    CEA 28.7 (H) 08/30/2024       No results found for: \"\"    No results found for: \"AFP\"    No results found for: \"IRON\", \"TIBC\", \"FERRITIN\"    Lab Results   Component Value Date    QRBBPYYY18 261 03/20/2024         ROS: Review of Systems   Constitutional: Negative.    HENT: Negative.     Eyes: Negative.    Respiratory:  Positive for cough and shortness of breath.    Cardiovascular: Negative.    Gastrointestinal: Negative.    Endocrine: Negative.    Genitourinary: Negative.    Musculoskeletal: Negative.    Skin: Negative.    Allergic/Immunologic: Negative.    Neurological: Negative.    Hematological: Negative.          Current Medications: Reviewed  Allergies: Reviewed  PMH/FH/SH:  Reviewed      Physical Exam:    Body surface area is 1.99 meters squared.    Wt Readings from Last 3 Encounters:   09/13/24 106 kg (233 lb)   08/20/24 108 kg (237 lb)   07/31/24 106 kg (234 lb 3.2 oz)        Temp Readings from Last 3 Encounters:   09/13/24 97.9 °F (36.6 °C) (Temporal)   08/20/24 98.7 °F (37.1 °C) (Temporal)   08/15/24 (!) 97.1 °F (36.2 °C)        BP Readings from Last 3 Encounters:   09/13/24 170/80   08/20/24 (!) 178/82   08/15/24 (!) 172/86         Pulse Readings from Last 3 Encounters:   09/13/24 63   08/20/24 73   08/15/24 67     @LASTSAO2(3)@      Physical Exam  Constitutional:       Appearance: Normal appearance. She is normal weight.   HENT:      Head: Normocephalic and atraumatic.   Eyes:      Extraocular Movements: " Extraocular movements intact.      Conjunctiva/sclera: Conjunctivae normal.      Pupils: Pupils are equal, round, and reactive to light.   Cardiovascular:      Rate and Rhythm: Normal rate and regular rhythm.      Heart sounds: Normal heart sounds.   Pulmonary:      Effort: Pulmonary effort is normal.      Breath sounds: Normal breath sounds.   Abdominal:      General: Abdomen is flat. Bowel sounds are normal.      Palpations: Abdomen is soft.   Musculoskeletal:         General: Normal range of motion.      Cervical back: Normal range of motion and neck supple.   Skin:     General: Skin is warm and dry.   Neurological:      General: No focal deficit present.      Mental Status: She is alert and oriented to person, place, and time. Mental status is at baseline.     No major abdominal tenderness on exam      Goals and Barriers:  Current Goal: Prolong Survival from Cancer.   Barriers: None.      Patient's Capacity to Self Care:  Patient is able to self care.    Code Status: [unfilled]  Advance Directive and Living Will:      Power of :

## 2024-09-17 ENCOUNTER — HOSPITAL ENCOUNTER (OUTPATIENT)
Dept: INFUSION CENTER | Facility: CLINIC | Age: 64
Discharge: HOME | End: 2024-09-17
Payer: COMMERCIAL

## 2024-09-17 DIAGNOSIS — C18.9 ADENOCARCINOMA, COLON (HCC): ICD-10-CM

## 2024-09-17 DIAGNOSIS — Z95.828 PORT-A-CATH IN PLACE: Primary | ICD-10-CM

## 2024-09-17 LAB
ALBUMIN SERPL BCG-MCNC: 3.5 G/DL (ref 3.5–5)
ALP SERPL-CCNC: 175 U/L (ref 34–104)
ALT SERPL W P-5'-P-CCNC: 21 U/L (ref 7–52)
ANION GAP SERPL CALCULATED.3IONS-SCNC: 7 MMOL/L (ref 4–13)
AST SERPL W P-5'-P-CCNC: 17 U/L (ref 13–39)
BASOPHILS # BLD AUTO: 0.05 THOUSANDS/ΜL (ref 0–0.1)
BASOPHILS NFR BLD AUTO: 0 % (ref 0–1)
BILIRUB SERPL-MCNC: 0.28 MG/DL (ref 0.2–1)
BUN SERPL-MCNC: 10 MG/DL (ref 5–25)
CALCIUM SERPL-MCNC: 8.2 MG/DL (ref 8.4–10.2)
CHLORIDE SERPL-SCNC: 102 MMOL/L (ref 96–108)
CO2 SERPL-SCNC: 30 MMOL/L (ref 21–32)
CREAT SERPL-MCNC: 0.68 MG/DL (ref 0.6–1.3)
EOSINOPHIL # BLD AUTO: 0.14 THOUSAND/ΜL (ref 0–0.61)
EOSINOPHIL NFR BLD AUTO: 1 % (ref 0–6)
ERYTHROCYTE [DISTWIDTH] IN BLOOD BY AUTOMATED COUNT: 18.7 % (ref 11.6–15.1)
GFR SERPL CREATININE-BSD FRML MDRD: 94 ML/MIN/1.73SQ M
GLUCOSE SERPL-MCNC: 159 MG/DL (ref 65–140)
HCT VFR BLD AUTO: 41.3 % (ref 34.8–46.1)
HGB BLD-MCNC: 12.3 G/DL (ref 11.5–15.4)
IMM GRANULOCYTES # BLD AUTO: 0.1 THOUSAND/UL (ref 0–0.2)
IMM GRANULOCYTES NFR BLD AUTO: 1 % (ref 0–2)
LYMPHOCYTES # BLD AUTO: 2.05 THOUSANDS/ΜL (ref 0.6–4.47)
LYMPHOCYTES NFR BLD AUTO: 15 % (ref 14–44)
MCH RBC QN AUTO: 27.8 PG (ref 26.8–34.3)
MCHC RBC AUTO-ENTMCNC: 29.8 G/DL (ref 31.4–37.4)
MCV RBC AUTO: 93 FL (ref 82–98)
MONOCYTES # BLD AUTO: 0.77 THOUSAND/ΜL (ref 0.17–1.22)
MONOCYTES NFR BLD AUTO: 6 % (ref 4–12)
NEUTROPHILS # BLD AUTO: 10.45 THOUSANDS/ΜL (ref 1.85–7.62)
NEUTS SEG NFR BLD AUTO: 77 % (ref 43–75)
NRBC BLD AUTO-RTO: 0 /100 WBCS
PLATELET # BLD AUTO: 229 THOUSANDS/UL (ref 149–390)
PMV BLD AUTO: 9.4 FL (ref 8.9–12.7)
POTASSIUM SERPL-SCNC: 3.6 MMOL/L (ref 3.5–5.3)
PROT SERPL-MCNC: 6.5 G/DL (ref 6.4–8.4)
RBC # BLD AUTO: 4.42 MILLION/UL (ref 3.81–5.12)
SODIUM SERPL-SCNC: 139 MMOL/L (ref 135–147)
WBC # BLD AUTO: 13.56 THOUSAND/UL (ref 4.31–10.16)

## 2024-09-17 PROCEDURE — 80053 COMPREHEN METABOLIC PANEL: CPT | Performed by: INTERNAL MEDICINE

## 2024-09-17 PROCEDURE — 85025 COMPLETE CBC W/AUTO DIFF WBC: CPT | Performed by: INTERNAL MEDICINE

## 2024-09-18 ENCOUNTER — HOSPITAL ENCOUNTER (OUTPATIENT)
Dept: INFUSION CENTER | Facility: CLINIC | Age: 64
Discharge: HOME | End: 2024-09-18

## 2024-09-18 ENCOUNTER — TELEPHONE (OUTPATIENT)
Dept: HEMATOLOGY ONCOLOGY | Facility: CLINIC | Age: 64
End: 2024-09-18

## 2024-09-18 ENCOUNTER — TELEPHONE (OUTPATIENT)
Dept: NEUROLOGY | Facility: CLINIC | Age: 64
End: 2024-09-18

## 2024-09-18 VITALS
TEMPERATURE: 97.8 F | DIASTOLIC BLOOD PRESSURE: 98 MMHG | HEART RATE: 69 BPM | RESPIRATION RATE: 19 BRPM | SYSTOLIC BLOOD PRESSURE: 198 MMHG

## 2024-09-18 DIAGNOSIS — Z71.6 ENCOUNTER FOR COUNSELING FOR TOBACCO USE DISORDER: Primary | ICD-10-CM

## 2024-09-18 DIAGNOSIS — C18.9 ADENOCARCINOMA, COLON (HCC): ICD-10-CM

## 2024-09-18 DIAGNOSIS — C18.9 ADENOCARCINOMA, COLON (HCC): Primary | ICD-10-CM

## 2024-09-18 RX ORDER — LEVOFLOXACIN 500 MG/1
500 TABLET, FILM COATED ORAL EVERY 24 HOURS
Qty: 10 TABLET | Refills: 0 | Status: SHIPPED | OUTPATIENT
Start: 2024-09-18 | End: 2024-09-28

## 2024-09-18 NOTE — TELEPHONE ENCOUNTER
(Medication time out/change to orders)    Date patient scheduled: today    Original medication ordered: folofx    New Medication ordered: n/a- pt shows up to infusion with high bp, cough and pink tinged sputum that started today. Pt will be deferred 1 week    Office RN notified patient? NO- pt already in infusion and Rita with patient and made her aware.

## 2024-09-18 NOTE — PROGRESS NOTES
Pt presents for FOLFOX infusion offering complaints of painful productive cough with blood tinged sputum, rib pain, congestion, and head ache. Pt stated she has had bronchitis in the past and feels like that is what's going on. Pt blood pressure elevated 198/98 after taking blood pressure medication this morning. Reached out to Abida Velazquez RN, performed med time out with Abida Velazquez RN, Per Dr. Black defer pt 1 week, chest x-ray and sputum culture ordered. Dr. Black also ordered antibiotics to pt pharmacy pt made aware. Pt sent to hospital for tests. Pt educated on going to the ER if she feels she needs to be evaluated pt understood, and agreed. Due to lack of availability in schedule for next week pt will not get treatment for 2 weeks, offered pt to look into other infusion center availability pt was not interested and preferred to wait 2 weeks to come to [place] infusion. AVS declined, walked out in stable condition. Next appointment on 10/2/24 at 12:30pm.

## 2024-09-19 ENCOUNTER — TELEPHONE (OUTPATIENT)
Dept: HEMATOLOGY ONCOLOGY | Facility: CLINIC | Age: 64
End: 2024-09-19

## 2024-09-19 NOTE — TELEPHONE ENCOUNTER
Patient was called and family informs me she is sleeping and wasn't able to speak to her. I made them aware that we had ordered chest xray and sputum cultures done yesterday because of her complaints and her treatment was delayed and she never went to get these done. The person did not know this and I asked that she please call the hopeline to also clarify what she needs regarding CT scan. A message was relayed she needed meds because she gets sick. They state they will relay the message.

## 2024-09-20 ENCOUNTER — TELEPHONE (OUTPATIENT)
Dept: NEUROLOGY | Facility: CLINIC | Age: 64
End: 2024-09-20

## 2024-09-20 NOTE — TELEPHONE ENCOUNTER
Tried calling pt to remind him of his appt 9/23 @ 1:00 but received a message that number was not receving call at this time

## 2024-09-23 DIAGNOSIS — C18.9 ADENOCARCINOMA, COLON (HCC): Primary | ICD-10-CM

## 2024-09-24 ENCOUNTER — HOSPITAL ENCOUNTER (OUTPATIENT)
Dept: RADIOLOGY | Facility: HOSPITAL | Age: 64
Discharge: HOME | End: 2024-09-24
Payer: COMMERCIAL

## 2024-09-24 ENCOUNTER — APPOINTMENT (OUTPATIENT)
Dept: LAB | Facility: HOSPITAL | Age: 64
End: 2024-09-24
Payer: COMMERCIAL

## 2024-09-24 DIAGNOSIS — E78.2 MIXED HYPERLIPIDEMIA: ICD-10-CM

## 2024-09-24 DIAGNOSIS — J96.12 CHRONIC RESPIRATORY FAILURE WITH HYPOXIA AND HYPERCAPNIA (HCC): ICD-10-CM

## 2024-09-24 DIAGNOSIS — R19.5 ABNORMAL FINDINGS IN STOOL: ICD-10-CM

## 2024-09-24 DIAGNOSIS — C18.9 ADENOCARCINOMA, COLON (HCC): ICD-10-CM

## 2024-09-24 DIAGNOSIS — I50.32 CHRONIC HEART FAILURE WITH PRESERVED EJECTION FRACTION (HFPEF) (HCC): ICD-10-CM

## 2024-09-24 DIAGNOSIS — N18.31 STAGE 3A CHRONIC KIDNEY DISEASE (HCC): ICD-10-CM

## 2024-09-24 DIAGNOSIS — D50.9 MICROCYTIC ANEMIA: ICD-10-CM

## 2024-09-24 DIAGNOSIS — J44.9 CHRONIC OBSTRUCTIVE PULMONARY DISEASE, UNSPECIFIED COPD TYPE (HCC): ICD-10-CM

## 2024-09-24 DIAGNOSIS — J96.11 CHRONIC RESPIRATORY FAILURE WITH HYPOXIA AND HYPERCAPNIA (HCC): ICD-10-CM

## 2024-09-24 PROCEDURE — 71046 X-RAY EXAM CHEST 2 VIEWS: CPT

## 2024-09-25 ENCOUNTER — APPOINTMENT (OUTPATIENT)
Dept: LAB | Facility: HOSPITAL | Age: 64
End: 2024-09-25
Payer: COMMERCIAL

## 2024-09-25 PROCEDURE — 87205 SMEAR GRAM STAIN: CPT

## 2024-09-25 PROCEDURE — 87209 SMEAR COMPLEX STAIN: CPT

## 2024-09-25 PROCEDURE — 87070 CULTURE OTHR SPECIMN AEROBIC: CPT

## 2024-09-25 PROCEDURE — 87177 OVA AND PARASITES SMEARS: CPT

## 2024-09-27 LAB
BACTERIA SPT RESP CULT: ABNORMAL
GRAM STN SPEC: ABNORMAL

## 2024-09-30 ENCOUNTER — TELEPHONE (OUTPATIENT)
Dept: CARDIOLOGY CLINIC | Facility: CLINIC | Age: 64
End: 2024-09-30

## 2024-09-30 NOTE — TELEPHONE ENCOUNTER
Appointment Request From: Dwaine Gould     With Provider: Lance Altamirano PA-C [[facility]]     Preferred Date Range: Any date 9/28/2024 or later     Preferred Times: Any     Reason: To address the following health maintenance concerns.  Annual Physical  Breast Cancer Screening: Mammogram     Comments:  Mammogram over due

## 2024-10-01 ENCOUNTER — TELEPHONE (OUTPATIENT)
Dept: HEMATOLOGY ONCOLOGY | Facility: CLINIC | Age: 64
End: 2024-10-01

## 2024-10-01 ENCOUNTER — HOSPITAL ENCOUNTER (OUTPATIENT)
Dept: INFUSION CENTER | Facility: CLINIC | Age: 64
Discharge: HOME | End: 2024-10-01
Payer: COMMERCIAL

## 2024-10-01 DIAGNOSIS — Z95.828 PORT-A-CATH IN PLACE: Primary | ICD-10-CM

## 2024-10-01 DIAGNOSIS — C18.9 ADENOCARCINOMA, COLON (HCC): ICD-10-CM

## 2024-10-01 LAB
ALBUMIN SERPL BCG-MCNC: 3.7 G/DL (ref 3.5–5)
ALP SERPL-CCNC: 156 U/L (ref 34–104)
ALT SERPL W P-5'-P-CCNC: 27 U/L (ref 7–52)
ANION GAP SERPL CALCULATED.3IONS-SCNC: 6 MMOL/L (ref 4–13)
AST SERPL W P-5'-P-CCNC: 22 U/L (ref 13–39)
BASOPHILS # BLD AUTO: 0.06 THOUSANDS/ÂΜL (ref 0–0.1)
BASOPHILS NFR BLD AUTO: 0 % (ref 0–1)
BILIRUB SERPL-MCNC: 0.28 MG/DL (ref 0.2–1)
BUN SERPL-MCNC: 21 MG/DL (ref 5–25)
CALCIUM SERPL-MCNC: 8.5 MG/DL (ref 8.4–10.2)
CHLORIDE SERPL-SCNC: 106 MMOL/L (ref 96–108)
CO2 SERPL-SCNC: 30 MMOL/L (ref 21–32)
CREAT SERPL-MCNC: 0.89 MG/DL (ref 0.6–1.3)
EOSINOPHIL # BLD AUTO: 0.33 THOUSAND/ÂΜL (ref 0–0.61)
EOSINOPHIL NFR BLD AUTO: 2 % (ref 0–6)
ERYTHROCYTE [DISTWIDTH] IN BLOOD BY AUTOMATED COUNT: 18.9 % (ref 11.6–15.1)
GFR SERPL CREATININE-BSD FRML MDRD: 70 ML/MIN/1.73SQ M
GLUCOSE SERPL-MCNC: 97 MG/DL (ref 65–140)
HCT VFR BLD AUTO: 41.8 % (ref 34.8–46.1)
HGB BLD-MCNC: 12 G/DL (ref 11.5–15.4)
IMM GRANULOCYTES # BLD AUTO: 0.09 THOUSAND/UL (ref 0–0.2)
IMM GRANULOCYTES NFR BLD AUTO: 1 % (ref 0–2)
LYMPHOCYTES # BLD AUTO: 1.68 THOUSANDS/ÂΜL (ref 0.6–4.47)
LYMPHOCYTES NFR BLD AUTO: 12 % (ref 14–44)
MCH RBC QN AUTO: 27.8 PG (ref 26.8–34.3)
MCHC RBC AUTO-ENTMCNC: 28.7 G/DL (ref 31.4–37.4)
MCV RBC AUTO: 97 FL (ref 82–98)
MONOCYTES # BLD AUTO: 1.07 THOUSAND/ÂΜL (ref 0.17–1.22)
MONOCYTES NFR BLD AUTO: 8 % (ref 4–12)
NEUTROPHILS # BLD AUTO: 10.93 THOUSANDS/ÂΜL (ref 1.85–7.62)
NEUTS SEG NFR BLD AUTO: 77 % (ref 43–75)
NRBC BLD AUTO-RTO: 0 /100 WBCS
PLATELET # BLD AUTO: 249 THOUSANDS/UL (ref 149–390)
PMV BLD AUTO: 8.8 FL (ref 8.9–12.7)
POTASSIUM SERPL-SCNC: 4.3 MMOL/L (ref 3.5–5.3)
PROT SERPL-MCNC: 6.8 G/DL (ref 6.4–8.4)
RBC # BLD AUTO: 4.31 MILLION/UL (ref 3.81–5.12)
SODIUM SERPL-SCNC: 142 MMOL/L (ref 135–147)
WBC # BLD AUTO: 14.16 THOUSAND/UL (ref 4.31–10.16)

## 2024-10-01 PROCEDURE — 80053 COMPREHEN METABOLIC PANEL: CPT | Performed by: INTERNAL MEDICINE

## 2024-10-01 PROCEDURE — 85025 COMPLETE CBC W/AUTO DIFF WBC: CPT | Performed by: INTERNAL MEDICINE

## 2024-10-01 NOTE — PROGRESS NOTES
Pt here for labs, offering no complaints.  Right port accessed with positive blood return noted, labs drawn, port flushed and de-accessed without incident.  AVS declined.  Aware of appt 10/2 @ 1230 pm. Walked out in stable condition.

## 2024-10-02 ENCOUNTER — HOSPITAL ENCOUNTER (OUTPATIENT)
Dept: INFUSION CENTER | Facility: CLINIC | Age: 64
Discharge: HOME | End: 2024-10-02
Payer: COMMERCIAL

## 2024-10-02 VITALS
HEIGHT: 60 IN | TEMPERATURE: 96.7 F | DIASTOLIC BLOOD PRESSURE: 72 MMHG | RESPIRATION RATE: 20 BRPM | BODY MASS INDEX: 47.74 KG/M2 | WEIGHT: 243.2 LBS | HEART RATE: 70 BPM | SYSTOLIC BLOOD PRESSURE: 154 MMHG

## 2024-10-02 DIAGNOSIS — C18.9 ADENOCARCINOMA, COLON (HCC): Primary | ICD-10-CM

## 2024-10-02 PROCEDURE — 96415 CHEMO IV INFUSION ADDL HR: CPT

## 2024-10-02 PROCEDURE — 96411 CHEMO IV PUSH ADDL DRUG: CPT

## 2024-10-02 PROCEDURE — 96367 TX/PROPH/DG ADDL SEQ IV INF: CPT

## 2024-10-02 PROCEDURE — 96368 THER/DIAG CONCURRENT INF: CPT

## 2024-10-02 PROCEDURE — 96413 CHEMO IV INFUSION 1 HR: CPT

## 2024-10-02 PROCEDURE — G0498 CHEMO EXTEND IV INFUS W/PUMP: HCPCS

## 2024-10-02 RX ORDER — DEXTROSE MONOHYDRATE 50 MG/ML
20 INJECTION, SOLUTION INTRAVENOUS ONCE
Status: COMPLETED | OUTPATIENT
Start: 2024-10-02 | End: 2024-10-02

## 2024-10-02 RX ORDER — SODIUM CHLORIDE 9 MG/ML
20 INJECTION, SOLUTION INTRAVENOUS ONCE AS NEEDED
Status: DISCONTINUED | OUTPATIENT
Start: 2024-10-02 | End: 2024-10-05 | Stop reason: HOSPADM

## 2024-10-02 RX ORDER — FLUOROURACIL 50 MG/ML
400 INJECTION, SOLUTION INTRAVENOUS ONCE
Status: COMPLETED | OUTPATIENT
Start: 2024-10-02 | End: 2024-10-02

## 2024-10-02 RX ADMIN — SODIUM CHLORIDE 20 ML/HR: 0.9 INJECTION, SOLUTION INTRAVENOUS at 13:26

## 2024-10-02 RX ADMIN — DEXTROSE 20 ML/HR: 5 SOLUTION INTRAVENOUS at 14:01

## 2024-10-02 RX ADMIN — DEXAMETHASONE SODIUM PHOSPHATE: 10 INJECTION, SOLUTION INTRAMUSCULAR; INTRAVENOUS at 13:26

## 2024-10-02 RX ADMIN — LEUCOVORIN CALCIUM 804 MG: 350 INJECTION, POWDER, LYOPHILIZED, FOR SUSPENSION INTRAMUSCULAR; INTRAVENOUS at 14:09

## 2024-10-02 RX ADMIN — FLUOROURACIL 805 MG: 50 INJECTION, SOLUTION INTRAVENOUS at 16:34

## 2024-10-02 RX ADMIN — OXALIPLATIN 170.85 MG: 5 INJECTION, SOLUTION INTRAVENOUS at 14:09

## 2024-10-02 NOTE — PROGRESS NOTES
Pt here for chemotherapy, offering no complaints.  Right port accessed with positive blood return noted throughout treatment.  With about 45 minutes left of her oxali/leuco, patient got up to use restroom and somehow became disconnected from her chemo.  There was a few drops of chemo that went on floor.  Patient was fine and went to restroom to get cleaned up as she had a urine accident in the interim of this going on. Tolerated remainder of infusion without incident.  Port flushed and chemo ball attached, double checked with Veronica ALMAGUER RN.  AVS declined. Aware of disconnect 10/4 @ 3pm.  Walked out in stable condition

## 2024-10-04 ENCOUNTER — APPOINTMENT (EMERGENCY)
Dept: RADIOLOGY | Facility: HOSPITAL | Age: 64
DRG: 279 | End: 2024-10-04
Payer: COMMERCIAL

## 2024-10-04 ENCOUNTER — HOSPITAL ENCOUNTER (OUTPATIENT)
Dept: INFUSION CENTER | Facility: CLINIC | Age: 64
Discharge: HOME | End: 2024-10-04

## 2024-10-04 ENCOUNTER — HOSPITAL ENCOUNTER (EMERGENCY)
Facility: HOSPITAL | Age: 64
Discharge: HOME | DRG: 279 | End: 2024-10-04
Attending: EMERGENCY MEDICINE
Payer: COMMERCIAL

## 2024-10-04 VITALS
HEART RATE: 77 BPM | RESPIRATION RATE: 19 BRPM | TEMPERATURE: 98 F | OXYGEN SATURATION: 96 % | DIASTOLIC BLOOD PRESSURE: 67 MMHG | SYSTOLIC BLOOD PRESSURE: 149 MMHG

## 2024-10-04 DIAGNOSIS — Z45.2 ENCOUNTER FOR CENTRAL LINE CARE: Primary | ICD-10-CM

## 2024-10-04 LAB
ALBUMIN SERPL BCG-MCNC: 3.8 G/DL (ref 3.5–5)
ALP SERPL-CCNC: 164 U/L (ref 34–104)
ALT SERPL W P-5'-P-CCNC: 54 U/L (ref 7–52)
ANION GAP SERPL CALCULATED.3IONS-SCNC: 4 MMOL/L (ref 4–13)
AST SERPL W P-5'-P-CCNC: 32 U/L (ref 13–39)
BASOPHILS # BLD AUTO: 0.03 THOUSANDS/ΜL (ref 0–0.1)
BASOPHILS NFR BLD AUTO: 0 % (ref 0–1)
BILIRUB SERPL-MCNC: 0.32 MG/DL (ref 0.2–1)
BUN SERPL-MCNC: 28 MG/DL (ref 5–25)
CALCIUM SERPL-MCNC: 8.3 MG/DL (ref 8.4–10.2)
CHLORIDE SERPL-SCNC: 102 MMOL/L (ref 96–108)
CO2 SERPL-SCNC: 32 MMOL/L (ref 21–32)
CREAT SERPL-MCNC: 1.01 MG/DL (ref 0.6–1.3)
EOSINOPHIL # BLD AUTO: 0.06 THOUSAND/ΜL (ref 0–0.61)
EOSINOPHIL NFR BLD AUTO: 1 % (ref 0–6)
ERYTHROCYTE [DISTWIDTH] IN BLOOD BY AUTOMATED COUNT: 18.6 % (ref 11.6–15.1)
GFR SERPL CREATININE-BSD FRML MDRD: 60 ML/MIN/1.73SQ M
GLUCOSE SERPL-MCNC: 119 MG/DL (ref 65–140)
HCT VFR BLD AUTO: 40.6 % (ref 34.8–46.1)
HGB BLD-MCNC: 11.5 G/DL (ref 11.5–15.4)
IMM GRANULOCYTES # BLD AUTO: 0.06 THOUSAND/UL (ref 0–0.2)
IMM GRANULOCYTES NFR BLD AUTO: 1 % (ref 0–2)
LACTATE SERPL-SCNC: 0.7 MMOL/L (ref 0.5–2)
LYMPHOCYTES # BLD AUTO: 1.19 THOUSANDS/ΜL (ref 0.6–4.47)
LYMPHOCYTES NFR BLD AUTO: 11 % (ref 14–44)
MAGNESIUM SERPL-MCNC: 2.3 MG/DL (ref 1.9–2.7)
MCH RBC QN AUTO: 28 PG (ref 26.8–34.3)
MCHC RBC AUTO-ENTMCNC: 28.3 G/DL (ref 31.4–37.4)
MCV RBC AUTO: 99 FL (ref 82–98)
MONOCYTES # BLD AUTO: 0.61 THOUSAND/ΜL (ref 0.17–1.22)
MONOCYTES NFR BLD AUTO: 5 % (ref 4–12)
NEUTROPHILS # BLD AUTO: 9.42 THOUSANDS/ΜL (ref 1.85–7.62)
NEUTS SEG NFR BLD AUTO: 82 % (ref 43–75)
NRBC BLD AUTO-RTO: 0 /100 WBCS
PLATELET # BLD AUTO: 252 THOUSANDS/UL (ref 149–390)
PMV BLD AUTO: 8.9 FL (ref 8.9–12.7)
POTASSIUM SERPL-SCNC: 4.4 MMOL/L (ref 3.5–5.3)
PROCALCITONIN SERPL-MCNC: 0.09 NG/ML
PROT SERPL-MCNC: 6.8 G/DL (ref 6.4–8.4)
RBC # BLD AUTO: 4.1 MILLION/UL (ref 3.81–5.12)
SODIUM SERPL-SCNC: 138 MMOL/L (ref 135–147)
WBC # BLD AUTO: 11.37 THOUSAND/UL (ref 4.31–10.16)

## 2024-10-04 PROCEDURE — 84145 PROCALCITONIN (PCT): CPT | Performed by: EMERGENCY MEDICINE

## 2024-10-04 PROCEDURE — 83605 ASSAY OF LACTIC ACID: CPT | Performed by: EMERGENCY MEDICINE

## 2024-10-04 PROCEDURE — 99284 EMERGENCY DEPT VISIT MOD MDM: CPT

## 2024-10-04 PROCEDURE — 83735 ASSAY OF MAGNESIUM: CPT | Performed by: EMERGENCY MEDICINE

## 2024-10-04 PROCEDURE — 99284 EMERGENCY DEPT VISIT MOD MDM: CPT | Performed by: EMERGENCY MEDICINE

## 2024-10-04 PROCEDURE — 80053 COMPREHEN METABOLIC PANEL: CPT | Performed by: EMERGENCY MEDICINE

## 2024-10-04 PROCEDURE — 85025 COMPLETE CBC W/AUTO DIFF WBC: CPT | Performed by: EMERGENCY MEDICINE

## 2024-10-04 PROCEDURE — 71045 X-RAY EXAM CHEST 1 VIEW: CPT

## 2024-10-04 PROCEDURE — 36415 COLL VENOUS BLD VENIPUNCTURE: CPT | Performed by: EMERGENCY MEDICINE

## 2024-10-04 NOTE — ED PROVIDER NOTES
"Final diagnoses:   Encounter for central line care     ED Disposition       ED Disposition   Discharge    Condition   Stable    Date/Time   Fri Oct 4, 2024 12:45 PM    Comment   Dwaine Sotelocarlos discharge to home/self care.                   Assessment & Plan       Medical Decision Making  Patient is a 63 yoF who is Receiving chemotherapy for colon adenocarcinoma.  Right chest port was placed by interventional radiology and has been functioning well.  Infusion started yesterday and was scheduled to run until 3 PM today.  Pump is now empty prematurely.  Unsure if infused too rapidly or leaked.  She is concerned that her port \"feels different.\"  Port superuser RN to evaluate function, and chest x-ray will be obtained to evaluate line placement.    Course of cancer treatment (colon stage IIIc) has been complicated by bowel ration in her primary site, Staph aureus septicemia, and increasing CEA level.  Anshul for 6 cycles of chemotherapy in total with follow-up scans.  Most recent oncology note was September 13.    Amount and/or Complexity of Data Reviewed  External Data Reviewed: notes.     Details: See narrative  Labs: ordered. Decision-making details documented in ED Course.  Radiology: ordered.        ED Course as of 10/04/24 1519   Fri Oct 04, 2024   1203 Procalcitonin   1203 Comprehensive metabolic panel(!)   1203 CBC and differential(!)   1203 Lactic acid, plasma (w/reflex if result > 2.0)   1203 Magnesium   1244 Seen and evaluated by infusion nurse. Port and infusion appropriately de-accessed.    1245 Discussed reassuring labs with patient and spouse. Comfortable with discharge at this time.        Medications - No data to display    ED Risk Strat Scores                                               History of Present Illness       Chief Complaint   Patient presents with    Medication Problem     Pt reports chemo infusion that was initiated at infusion center at approx 1230 yest afternoon. Was scheduled to infuse until " 1500 today but when she woke up this am CAD pump was empty. Unsure if medication all infused or leaked out.  reports hallucinations that happened overnight.        Past Medical History:   Diagnosis Date    Acute metabolic encephalopathy     Asthma     CHF (congestive heart failure) (HCC)     Chronic kidney disease     COPD (chronic obstructive pulmonary disease) (HCC)     Hyperlipidemia     Hypertension     Liver disease     Myocardial infarction (HCC)     Seizures (HCC)     Sleep apnea     Transaminitis       Past Surgical History:   Procedure Laterality Date    APPENDECTOMY      CARDIAC CATHETERIZATION      GANGLION CYST EXCISION      INCONTINENCE SURGERY      IR PORT PLACEMENT  05/03/2024    IR PORT PLACEMENT  6/24/2024    IR PORT REMOVAL  05/16/2024    LAPAROTOMY N/A 03/21/2024    Procedure: LAPAROTOMY EXPLORATORY, washout, complex closure, LISSY drain;  Surgeon: Francine Reid MD;  Location: [facility];  Service: General    NH LAPS ABD PRTM&OMENTUM DX W/WO SPEC BR/WA SPX N/A 03/19/2024    Procedure: DIAGNOSTIC LAPAROSCOPY converted to Exploratory Laparotomy, Right Hemicolectomy, Abdominal wash-out, Abthera Placement;  Surgeon: Roger Joel DO;  Location: [facility];  Service: General    TYMPANOSTOMY TUBE PLACEMENT        History reviewed. No pertinent family history.   Social History     Tobacco Use    Smoking status: Every Day     Types: Cigarettes     Passive exposure: Current    Smokeless tobacco: Never   Vaping Use    Vaping status: Never Used   Substance Use Topics    Alcohol use: Not Currently     Comment: rare    Drug use: Never      E-Cigarette/Vaping    E-Cigarette Use Never User       E-Cigarette/Vaping Substances    Nicotine No     THC No     CBD No     Flavoring No     Other No     Unknown No       I have reviewed and agree with the history as documented.     Patient is a 63-year-old female who presents due to concern for possible port malfunction          Review of Systems    Neurological:  Positive for weakness.           Objective       ED Triage Vitals [10/04/24 0944]   Temperature Pulse Blood Pressure Respirations SpO2 Patient Position - Orthostatic VS   98 °F (36.7 °C) 89 149/67 19 93 % Sitting      Temp Source Heart Rate Source BP Location FiO2 (%) Pain Score    Oral Monitor Left arm -- No Pain      Vitals      Date and Time Temp Pulse SpO2 Resp BP Pain Score FACES Pain Rating User   10/04/24 1245 -- 77 96 % -- -- -- -- FS   10/04/24 1231 -- -- 91 % -- -- -- -- FS   10/04/24 1230 -- 86 87 % -- -- -- -- TOD   10/04/24 1130 -- 76 92 % -- -- -- -- TOD   10/04/24 0944 98 °F (36.7 °C) 89 93 % 19 149/67 No Pain -- LF            Physical Exam  Vitals and nursing note reviewed.   Constitutional:       General: She is not in acute distress.     Appearance: She is obese. She is ill-appearing.   HENT:      Head: Normocephalic and atraumatic.      Right Ear: External ear normal.      Left Ear: External ear normal.      Nose: Nose normal.   Cardiovascular:      Rate and Rhythm: Normal rate and regular rhythm.   Pulmonary:      Effort: Pulmonary effort is normal.      Breath sounds: Normal breath sounds.   Chest:      Comments: R chest port in place. No surrounding fluctuance, redness, or tenderness  Abdominal:      General: There is no distension.      Palpations: Abdomen is soft.      Tenderness: There is no abdominal tenderness.   Musculoskeletal:      Right lower leg: No edema.      Left lower leg: No edema.   Skin:     General: Skin is warm and dry.   Neurological:      General: No focal deficit present.      Mental Status: She is alert and oriented to person, place, and time. Mental status is at baseline.   Psychiatric:         Behavior: Behavior normal.         Results Reviewed       Procedure Component Value Units Date/Time    Procalcitonin [[id number]]  (Normal) Collected: 10/04/24 1040    Lab Status: Final result Specimen: Blood from Arm, Right Updated: 10/04/24 1127     Procalcitonin  0.09 ng/ml     Lactic acid, plasma (w/reflex if result > 2.0) [[id number]]  (Normal) Collected: 10/04/24 1040    Lab Status: Final result Specimen: Blood from Arm, Right Updated: 10/04/24 1116     LACTIC ACID 0.7 mmol/L     Narrative:      Result may be elevated if tourniquet was used during collection.    Comprehensive metabolic panel [[id number]]  (Abnormal) Collected: 10/04/24 1040    Lab Status: Final result Specimen: Blood from Arm, Right Updated: 10/04/24 1115     Sodium 138 mmol/L      Potassium 4.4 mmol/L      Chloride 102 mmol/L      CO2 32 mmol/L      ANION GAP 4 mmol/L      BUN 28 mg/dL      Creatinine 1.01 mg/dL      Glucose 119 mg/dL      Calcium 8.3 mg/dL      AST 32 U/L      ALT 54 U/L      Alkaline Phosphatase 164 U/L      Total Protein 6.8 g/dL      Albumin 3.8 g/dL      Total Bilirubin 0.32 mg/dL      eGFR 60 ml/min/1.73sq m     Narrative:      [employer] guidelines for Chronic Kidney Disease (CKD):     Stage 1 with normal or high GFR (GFR > 90 mL/min/1.73 square meters)    Stage 2 Mild CKD (GFR = 60-89 mL/min/1.73 square meters)    Stage 3A Moderate CKD (GFR = 45-59 mL/min/1.73 square meters)    Stage 3B Moderate CKD (GFR = 30-44 mL/min/1.73 square meters)    Stage 4 Severe CKD (GFR = 15-29 mL/min/1.73 square meters)    Stage 5 End Stage CKD (GFR <15 mL/min/1.73 square meters)  Note: GFR calculation is accurate only with a steady state creatinine    Magnesium [[id number]]  (Normal) Collected: 10/04/24 1040    Lab Status: Final result Specimen: Blood from Arm, Right Updated: 10/04/24 1115     Magnesium 2.3 mg/dL     CBC and differential [[id number]]  (Abnormal) Collected: 10/04/24 1040    Lab Status: Final result Specimen: Blood from Arm, Right Updated: 10/04/24 1102     WBC 11.37 Thousand/uL      RBC 4.10 Million/uL      Hemoglobin 11.5 g/dL      Hematocrit 40.6 %      MCV 99 fL      MCH 28.0 pg      MCHC 28.3 g/dL      RDW 18.6 %      MPV 8.9 fL      Platelets 252 Thousands/uL       nRBC 0 /100 WBCs      Segmented % 82 %      Immature Grans % 1 %      Lymphocytes % 11 %      Monocytes % 5 %      Eosinophils Relative 1 %      Basophils Relative 0 %      Absolute Neutrophils 9.42 Thousands/µL      Absolute Immature Grans 0.06 Thousand/uL      Absolute Lymphocytes 1.19 Thousands/µL      Absolute Monocytes 0.61 Thousand/µL      Eosinophils Absolute 0.06 Thousand/µL      Basophils Absolute 0.03 Thousands/µL     UA (URINE) with reflex to Scope [[id number]]     Lab Status: No result Specimen: Urine             XR chest 1 view portable   Final Interpretation by Gabe Eller MD (10/04 1056)      Findings suggestive of mild central pulmonary vascular congestion. Correlate with clinical findings.      Otherwise, no radiographic evidence of acute intrathoracic process.      Workstation performed: [id number]             Procedures    ED Medication and Procedure Management   Prior to Admission Medications   Prescriptions Last Dose Informant Patient Reported? Taking?   albuterol (2.5 mg/3 mL) 0.083 % nebulizer solution  Self No No   Sig: Take 3 mL (2.5 mg total) by nebulization every 6 (six) hours as needed for wheezing or shortness of breath   amLODIPine (NORVASC) 5 mg tablet  Self No No   Sig: Take 1 tablet (5 mg total) by mouth daily   aspirin 325 mg tablet  Self Yes No   Sig: Take 1 tablet by mouth daily   atorvastatin (Lipitor) 40 mg tablet  Self No No   Sig: Take 1 tablet (40 mg total) by mouth daily at bedtime   fluorouracil 4,825 mg in CADD/Elastomeric Infusion Device   No No   Sig: Infuse 4,825 mg (1,200 mg/m2/day x 2.01 m2) into a catheter in a vein via infusion device over 46 hours for 2 days  Infusion planned for October 2, 2024.   lisinopril (ZESTRIL) 10 mg tablet  Self No No   Sig: TAKE 1 TABLET BY MOUTH EVERY DAY   metoprolol tartrate (LOPRESSOR) 25 mg tablet  Self No No   Sig: Take 1 tablet (25 mg total) by mouth every 12 (twelve) hours   ondansetron (ZOFRAN-ODT) 8 mg  disintegrating tablet  Self No No   Sig: Take 1 tablet (8 mg total) by mouth every 8 (eight) hours as needed for vomiting or nausea   rOPINIRole (REQUIP) 0.5 mg tablet  Self No No   Sig: Take 1 tablet (0.5 mg total) by mouth 3 (three) times a day   torsemide (DEMADEX) 20 mg tablet  Self No No   Sig: Take 1 tablet (20 mg total) by mouth daily   zolpidem (AMBIEN) 5 mg tablet  Self Yes No   Sig: Take 5 mg by mouth daily as needed   Patient not taking: Reported on 9/13/2024      Facility-Administered Medications: None     Discharge Medication List as of 10/4/2024 12:47 PM        CONTINUE these medications which have NOT CHANGED    Details   albuterol (2.5 mg/3 mL) 0.083 % nebulizer solution Take 3 mL (2.5 mg total) by nebulization every 6 (six) hours as needed for wheezing or shortness of breath, Starting Tue 5/14/2024, Normal      amLODIPine (NORVASC) 5 mg tablet Take 1 tablet (5 mg total) by mouth daily, Starting Mon 4/22/2024, Normal      aspirin 325 mg tablet Take 1 tablet by mouth daily, Historical Med      atorvastatin (Lipitor) 40 mg tablet Take 1 tablet (40 mg total) by mouth daily at bedtime, Starting Mon 4/22/2024, Normal      fluorouracil 4,825 mg in CADD/Elastomeric Infusion Device Infuse 4,825 mg (1,200 mg/m2/day x 2.01 m2) into a catheter in a vein via infusion device over 46 hours for 2 days  Infusion planned for October 2, 2024., Starting Wed 10/2/2024, Until Fri 10/4/2024, Normal      lisinopril (ZESTRIL) 10 mg tablet TAKE 1 TABLET BY MOUTH EVERY DAY, Starting Fri 8/23/2024, Normal      metoprolol tartrate (LOPRESSOR) 25 mg tablet Take 1 tablet (25 mg total) by mouth every 12 (twelve) hours, Starting Mon 4/22/2024, Normal      ondansetron (ZOFRAN-ODT) 8 mg disintegrating tablet Take 1 tablet (8 mg total) by mouth every 8 (eight) hours as needed for vomiting or nausea, Starting Wed 4/24/2024, Normal      rOPINIRole (REQUIP) 0.5 mg tablet Take 1 tablet (0.5 mg total) by mouth 3 (three) times a day,  Starting Tue 3/19/2024, Normal      torsemide (DEMADEX) 20 mg tablet Take 1 tablet (20 mg total) by mouth daily, Starting Mon 4/22/2024, Normal      zolpidem (AMBIEN) 5 mg tablet Take 5 mg by mouth daily as needed, Starting Wed 4/10/2024, Until Mon 10/7/2024 at 2359, Historical Med           No discharge procedures on file.  ED SEPSIS DOCUMENTATION   Time reflects when diagnosis was documented in both MDM as applicable and the Disposition within this note       Time User Action Codes Description Comment    10/4/2024 12:46 PM Padma Preston Add [Z45.2] Encounter for central line care                  Padma Preston MD  10/04/24 1056

## 2024-10-07 ENCOUNTER — APPOINTMENT (EMERGENCY)
Dept: RADIOLOGY | Facility: HOSPITAL | Age: 64
DRG: 279 | End: 2024-10-07
Payer: COMMERCIAL

## 2024-10-07 ENCOUNTER — HOSPITAL ENCOUNTER (INPATIENT)
Facility: HOSPITAL | Age: 64
LOS: 9 days | Discharge: HOME | DRG: 279 | End: 2024-10-16
Attending: EMERGENCY MEDICINE | Admitting: INTERNAL MEDICINE
Payer: COMMERCIAL

## 2024-10-07 ENCOUNTER — APPOINTMENT (EMERGENCY)
Dept: CT IMAGING | Facility: HOSPITAL | Age: 64
DRG: 279 | End: 2024-10-07
Payer: COMMERCIAL

## 2024-10-07 DIAGNOSIS — G93.40 ACUTE ENCEPHALOPATHY: Primary | ICD-10-CM

## 2024-10-07 DIAGNOSIS — C79.9 METASTATIC DISEASE (HCC): ICD-10-CM

## 2024-10-07 DIAGNOSIS — K72.00 SHOCK LIVER: ICD-10-CM

## 2024-10-07 DIAGNOSIS — N17.9 AKI (ACUTE KIDNEY INJURY) (HCC): ICD-10-CM

## 2024-10-07 DIAGNOSIS — K72.00 ACUTE LIVER FAILURE: ICD-10-CM

## 2024-10-07 LAB
2HR DELTA HS TROPONIN: 34 NG/L
ALBUMIN SERPL BCG-MCNC: 3.2 G/DL (ref 3.5–5)
ALBUMIN SERPL BCG-MCNC: 3.9 G/DL (ref 3.5–5)
ALP SERPL-CCNC: 213 U/L (ref 34–104)
ALP SERPL-CCNC: 255 U/L (ref 34–104)
ALT SERPL W P-5'-P-CCNC: 4413 U/L (ref 7–52)
ALT SERPL W P-5'-P-CCNC: >5000 U/L (ref 7–52)
AMMONIA PLAS-SCNC: 152 UMOL/L (ref 18–72)
AMORPH URATE CRY URNS QL MICRO: ABNORMAL
ANION GAP SERPL CALCULATED.3IONS-SCNC: 11 MMOL/L (ref 4–13)
ANION GAP SERPL CALCULATED.3IONS-SCNC: 9 MMOL/L (ref 4–13)
APAP SERPL-MCNC: <2 UG/ML (ref 10–20)
APTT PPP: 24 SECONDS (ref 23–34)
AST SERPL W P-5'-P-CCNC: 4922 U/L (ref 13–39)
AST SERPL W P-5'-P-CCNC: 6555 U/L (ref 13–39)
BACTERIA UR QL AUTO: ABNORMAL /HPF
BASOPHILS # BLD MANUAL: 0 THOUSAND/UL (ref 0–0.1)
BASOPHILS NFR MAR MANUAL: 0 % (ref 0–1)
BILIRUB SERPL-MCNC: 2.52 MG/DL (ref 0.2–1)
BILIRUB SERPL-MCNC: 2.74 MG/DL (ref 0.2–1)
BILIRUB UR QL STRIP: NEGATIVE
BUN SERPL-MCNC: 75 MG/DL (ref 5–25)
BUN SERPL-MCNC: 76 MG/DL (ref 5–25)
CALCIUM ALBUM COR SERPL-MCNC: 7.9 MG/DL (ref 8.3–10.1)
CALCIUM SERPL-MCNC: 7.3 MG/DL (ref 8.4–10.2)
CALCIUM SERPL-MCNC: 7.7 MG/DL (ref 8.4–10.2)
CARDIAC TROPONIN I PNL SERPL HS: 192 NG/L
CARDIAC TROPONIN I PNL SERPL HS: 226 NG/L
CHLORIDE SERPL-SCNC: 102 MMOL/L (ref 96–108)
CHLORIDE SERPL-SCNC: 99 MMOL/L (ref 96–108)
CLARITY UR: ABNORMAL
CO2 SERPL-SCNC: 24 MMOL/L (ref 21–32)
CO2 SERPL-SCNC: 26 MMOL/L (ref 21–32)
COLOR UR: ABNORMAL
CREAT SERPL-MCNC: 2.98 MG/DL (ref 0.6–1.3)
CREAT SERPL-MCNC: 3.01 MG/DL (ref 0.6–1.3)
EOSINOPHIL # BLD MANUAL: 0 THOUSAND/UL (ref 0–0.4)
EOSINOPHIL NFR BLD MANUAL: 0 % (ref 0–6)
ERYTHROCYTE [DISTWIDTH] IN BLOOD BY AUTOMATED COUNT: 19.3 % (ref 11.6–15.1)
ETHANOL SERPL-MCNC: <10 MG/DL
FLUAV RNA RESP QL NAA+PROBE: NEGATIVE
FLUBV RNA RESP QL NAA+PROBE: NEGATIVE
GFR SERPL CREATININE-BSD FRML MDRD: 16 ML/MIN/1.73SQ M
GFR SERPL CREATININE-BSD FRML MDRD: 16 ML/MIN/1.73SQ M
GLUCOSE SERPL-MCNC: 113 MG/DL (ref 65–140)
GLUCOSE SERPL-MCNC: 113 MG/DL (ref 65–140)
GLUCOSE SERPL-MCNC: 118 MG/DL (ref 65–140)
GLUCOSE UR STRIP-MCNC: ABNORMAL MG/DL
HCT VFR BLD AUTO: 40.1 % (ref 34.8–46.1)
HGB BLD-MCNC: 11.9 G/DL (ref 11.5–15.4)
HGB UR QL STRIP.AUTO: ABNORMAL
HYALINE CASTS #/AREA URNS LPF: ABNORMAL /LPF
INR PPP: 1.87 (ref 0.85–1.19)
KETONES UR STRIP-MCNC: ABNORMAL MG/DL
LACTATE SERPL-SCNC: 2.1 MMOL/L (ref 0.5–2)
LACTATE SERPL-SCNC: 2.8 MMOL/L (ref 0.5–2)
LEUKOCYTE ESTERASE UR QL STRIP: NEGATIVE
LYMPHOCYTES # BLD AUTO: 0.84 THOUSAND/UL (ref 0.6–4.47)
LYMPHOCYTES # BLD AUTO: 6 % (ref 14–44)
MCH RBC QN AUTO: 28.2 PG (ref 26.8–34.3)
MCHC RBC AUTO-ENTMCNC: 29.7 G/DL (ref 31.4–37.4)
MCV RBC AUTO: 95 FL (ref 82–98)
MONOCYTES # BLD AUTO: 0.14 THOUSAND/UL (ref 0–1.22)
MONOCYTES NFR BLD: 1 % (ref 4–12)
NEUTROPHILS # BLD MANUAL: 12.97 THOUSAND/UL (ref 1.85–7.62)
NEUTS BAND NFR BLD MANUAL: 9 % (ref 0–8)
NEUTS SEG NFR BLD AUTO: 84 % (ref 43–75)
NITRITE UR QL STRIP: NEGATIVE
NON-SQ EPI CELLS URNS QL MICRO: ABNORMAL /HPF
PH UR STRIP.AUTO: 5 [PH]
PLATELET # BLD AUTO: 125 THOUSANDS/UL (ref 149–390)
PLATELET BLD QL SMEAR: ABNORMAL
PMV BLD AUTO: 10.7 FL (ref 8.9–12.7)
POTASSIUM SERPL-SCNC: 4.9 MMOL/L (ref 3.5–5.3)
POTASSIUM SERPL-SCNC: 5.8 MMOL/L (ref 3.5–5.3)
PROCALCITONIN SERPL-MCNC: 1.67 NG/ML
PROT SERPL-MCNC: 5.8 G/DL (ref 6.4–8.4)
PROT SERPL-MCNC: 7.1 G/DL (ref 6.4–8.4)
PROT UR STRIP-MCNC: ABNORMAL MG/DL
PROTHROMBIN TIME: 22.2 SECONDS (ref 12.3–15)
RBC # BLD AUTO: 4.22 MILLION/UL (ref 3.81–5.12)
RBC #/AREA URNS AUTO: ABNORMAL /HPF
RBC MORPH BLD: NORMAL
RSV RNA RESP QL NAA+PROBE: NEGATIVE
SALICYLATES SERPL-MCNC: <5 MG/DL (ref 3–20)
SARS-COV-2 RNA RESP QL NAA+PROBE: NEGATIVE
SODIUM SERPL-SCNC: 134 MMOL/L (ref 135–147)
SODIUM SERPL-SCNC: 137 MMOL/L (ref 135–147)
SP GR UR STRIP.AUTO: 1.02 (ref 1–1.03)
UROBILINOGEN UR STRIP-ACNC: 2 MG/DL
WBC # BLD AUTO: 13.95 THOUSAND/UL (ref 4.31–10.16)
WBC #/AREA URNS AUTO: ABNORMAL /HPF

## 2024-10-07 PROCEDURE — 81001 URINALYSIS AUTO W/SCOPE: CPT

## 2024-10-07 PROCEDURE — 85610 PROTHROMBIN TIME: CPT

## 2024-10-07 PROCEDURE — 82948 REAGENT STRIP/BLOOD GLUCOSE: CPT

## 2024-10-07 PROCEDURE — 71045 X-RAY EXAM CHEST 1 VIEW: CPT

## 2024-10-07 PROCEDURE — 85730 THROMBOPLASTIN TIME PARTIAL: CPT

## 2024-10-07 PROCEDURE — 83605 ASSAY OF LACTIC ACID: CPT

## 2024-10-07 PROCEDURE — 80053 COMPREHEN METABOLIC PANEL: CPT

## 2024-10-07 PROCEDURE — 74176 CT ABD & PELVIS W/O CONTRAST: CPT

## 2024-10-07 PROCEDURE — 70450 CT HEAD/BRAIN W/O DYE: CPT

## 2024-10-07 PROCEDURE — 96361 HYDRATE IV INFUSION ADD-ON: CPT

## 2024-10-07 PROCEDURE — 93005 ELECTROCARDIOGRAM TRACING: CPT

## 2024-10-07 PROCEDURE — 99285 EMERGENCY DEPT VISIT HI MDM: CPT

## 2024-10-07 PROCEDURE — 80074 ACUTE HEPATITIS PANEL: CPT

## 2024-10-07 PROCEDURE — 85027 COMPLETE CBC AUTOMATED: CPT

## 2024-10-07 PROCEDURE — 87040 BLOOD CULTURE FOR BACTERIA: CPT

## 2024-10-07 PROCEDURE — 80143 DRUG ASSAY ACETAMINOPHEN: CPT

## 2024-10-07 PROCEDURE — 80179 DRUG ASSAY SALICYLATE: CPT

## 2024-10-07 PROCEDURE — 36415 COLL VENOUS BLD VENIPUNCTURE: CPT

## 2024-10-07 PROCEDURE — 82140 ASSAY OF AMMONIA: CPT

## 2024-10-07 PROCEDURE — 82077 ASSAY SPEC XCP UR&BREATH IA: CPT

## 2024-10-07 PROCEDURE — 85007 BL SMEAR W/DIFF WBC COUNT: CPT

## 2024-10-07 PROCEDURE — 84484 ASSAY OF TROPONIN QUANT: CPT

## 2024-10-07 PROCEDURE — 86038 ANTINUCLEAR ANTIBODIES: CPT

## 2024-10-07 PROCEDURE — 0241U HB NFCT DS VIR RESP RNA 4 TRGT: CPT

## 2024-10-07 PROCEDURE — 71250 CT THORAX DX C-: CPT

## 2024-10-07 PROCEDURE — 96365 THER/PROPH/DIAG IV INF INIT: CPT

## 2024-10-07 PROCEDURE — 84145 PROCALCITONIN (PCT): CPT

## 2024-10-07 RX ADMIN — ACETYLCYSTEINE 15000 MG: 200 INJECTION, SOLUTION INTRAVENOUS at 22:35

## 2024-10-07 RX ADMIN — SODIUM CHLORIDE 1000 ML: 0.9 INJECTION, SOLUTION INTRAVENOUS at 21:59

## 2024-10-07 RX ADMIN — PIPERACILLIN AND TAZOBACTAM 4.5 G: 36; 4.5 INJECTION, POWDER, FOR SOLUTION INTRAVENOUS at 22:40

## 2024-10-08 ENCOUNTER — APPOINTMENT (INPATIENT)
Dept: ULTRASOUND IMAGING | Facility: HOSPITAL | Age: 64
DRG: 279 | End: 2024-10-08
Payer: COMMERCIAL

## 2024-10-08 ENCOUNTER — TELEPHONE (OUTPATIENT)
Age: 64
End: 2024-10-08

## 2024-10-08 PROBLEM — K72.00 SHOCK LIVER: Status: ACTIVE | Noted: 2024-10-08

## 2024-10-08 LAB
4HR DELTA HS TROPONIN: 31 NG/L
ALBUMIN SERPL BCG-MCNC: 3 G/DL (ref 3.5–5)
ALP SERPL-CCNC: 206 U/L (ref 34–104)
ALT SERPL W P-5'-P-CCNC: 3640 U/L (ref 7–52)
ANA SER QL IA: NEGATIVE
ANION GAP SERPL CALCULATED.3IONS-SCNC: 12 MMOL/L (ref 4–13)
AST SERPL W P-5'-P-CCNC: 2956 U/L (ref 13–39)
ATRIAL RATE: 95 BPM
ATRIAL RATE: 95 BPM
BILIRUB SERPL-MCNC: 2.35 MG/DL (ref 0.2–1)
BUN SERPL-MCNC: 82 MG/DL (ref 5–25)
CALCIUM ALBUM COR SERPL-MCNC: 7.8 MG/DL (ref 8.3–10.1)
CALCIUM SERPL-MCNC: 7 MG/DL (ref 8.4–10.2)
CARDIAC TROPONIN I PNL SERPL HS: 223 NG/L
CHLORIDE SERPL-SCNC: 105 MMOL/L (ref 96–108)
CO2 SERPL-SCNC: 23 MMOL/L (ref 21–32)
CREAT SERPL-MCNC: 3.09 MG/DL (ref 0.6–1.3)
ERYTHROCYTE [DISTWIDTH] IN BLOOD BY AUTOMATED COUNT: 19 % (ref 11.6–15.1)
GFR SERPL CREATININE-BSD FRML MDRD: 15 ML/MIN/1.73SQ M
GLUCOSE SERPL-MCNC: 119 MG/DL (ref 65–140)
HAV IGM SER QL: NORMAL
HBV CORE IGM SER QL: NORMAL
HBV SURFACE AG SER QL: NORMAL
HCT VFR BLD AUTO: 37.5 % (ref 34.8–46.1)
HCV AB SER QL: NORMAL
HGB BLD-MCNC: 11.2 G/DL (ref 11.5–15.4)
INR PPP: 1.88 (ref 0.85–1.19)
MCH RBC QN AUTO: 28.1 PG (ref 26.8–34.3)
MCHC RBC AUTO-ENTMCNC: 29.9 G/DL (ref 31.4–37.4)
MCV RBC AUTO: 94 FL (ref 82–98)
NRBC BLD AUTO-RTO: 1 /100 WBCS
P AXIS: 46 DEGREES
P AXIS: 49 DEGREES
PLATELET # BLD AUTO: 102 THOUSANDS/UL (ref 149–390)
PMV BLD AUTO: 10.5 FL (ref 8.9–12.7)
POTASSIUM SERPL-SCNC: 4.4 MMOL/L (ref 3.5–5.3)
PR INTERVAL: 126 MS
PR INTERVAL: 126 MS
PROT SERPL-MCNC: 5.4 G/DL (ref 6.4–8.4)
PROTHROMBIN TIME: 22.3 SECONDS (ref 12.3–15)
QRS AXIS: 55 DEGREES
QRS AXIS: 60 DEGREES
QRSD INTERVAL: 80 MS
QRSD INTERVAL: 94 MS
QT INTERVAL: 306 MS
QT INTERVAL: 336 MS
QTC INTERVAL: 384 MS
QTC INTERVAL: 422 MS
RBC # BLD AUTO: 3.99 MILLION/UL (ref 3.81–5.12)
SODIUM SERPL-SCNC: 140 MMOL/L (ref 135–147)
T WAVE AXIS: 108 DEGREES
T WAVE AXIS: 99 DEGREES
VENTRICULAR RATE: 95 BPM
VENTRICULAR RATE: 95 BPM
WBC # BLD AUTO: 10.31 THOUSAND/UL (ref 4.31–10.16)

## 2024-10-08 PROCEDURE — 99223 1ST HOSP IP/OBS HIGH 75: CPT | Performed by: INTERNAL MEDICINE

## 2024-10-08 PROCEDURE — 80053 COMPREHEN METABOLIC PANEL: CPT | Performed by: INTERNAL MEDICINE

## 2024-10-08 PROCEDURE — 85027 COMPLETE CBC AUTOMATED: CPT | Performed by: INTERNAL MEDICINE

## 2024-10-08 PROCEDURE — 99255 IP/OBS CONSLTJ NEW/EST HI 80: CPT | Performed by: INTERNAL MEDICINE

## 2024-10-08 PROCEDURE — 99222 1ST HOSP IP/OBS MODERATE 55: CPT | Performed by: INTERNAL MEDICINE

## 2024-10-08 PROCEDURE — 94664 DEMO&/EVAL PT USE INHALER: CPT

## 2024-10-08 PROCEDURE — 93010 ELECTROCARDIOGRAM REPORT: CPT | Performed by: INTERNAL MEDICINE

## 2024-10-08 PROCEDURE — 85610 PROTHROMBIN TIME: CPT

## 2024-10-08 PROCEDURE — 76705 ECHO EXAM OF ABDOMEN: CPT

## 2024-10-08 PROCEDURE — 94760 N-INVAS EAR/PLS OXIMETRY 1: CPT

## 2024-10-08 PROCEDURE — 99233 SBSQ HOSP IP/OBS HIGH 50: CPT | Performed by: INTERNAL MEDICINE

## 2024-10-08 PROCEDURE — 84484 ASSAY OF TROPONIN QUANT: CPT

## 2024-10-08 PROCEDURE — 86015 ACTIN ANTIBODY EACH: CPT

## 2024-10-08 RX ORDER — METOPROLOL TARTRATE 25 MG/1
25 TABLET, FILM COATED ORAL EVERY 12 HOURS SCHEDULED
Status: DISCONTINUED | OUTPATIENT
Start: 2024-10-08 | End: 2024-10-10

## 2024-10-08 RX ORDER — LACTULOSE 10 G/15ML
20 SOLUTION ORAL 3 TIMES DAILY
Status: DISCONTINUED | OUTPATIENT
Start: 2024-10-08 | End: 2024-10-09

## 2024-10-08 RX ORDER — ASPIRIN 325 MG
325 TABLET ORAL DAILY
Status: DISCONTINUED | OUTPATIENT
Start: 2024-10-08 | End: 2024-10-16 | Stop reason: HOSPADM

## 2024-10-08 RX ORDER — ATORVASTATIN CALCIUM 40 MG/1
40 TABLET, FILM COATED ORAL
Status: DISCONTINUED | OUTPATIENT
Start: 2024-10-08 | End: 2024-10-08

## 2024-10-08 RX ORDER — SODIUM CHLORIDE, SODIUM GLUCONATE, SODIUM ACETATE, POTASSIUM CHLORIDE, MAGNESIUM CHLORIDE, SODIUM PHOSPHATE, DIBASIC, AND POTASSIUM PHOSPHATE .53; .5; .37; .037; .03; .012; .00082 G/100ML; G/100ML; G/100ML; G/100ML; G/100ML; G/100ML; G/100ML
50 INJECTION, SOLUTION INTRAVENOUS CONTINUOUS
Status: DISCONTINUED | OUTPATIENT
Start: 2024-10-08 | End: 2024-10-10

## 2024-10-08 RX ORDER — LACTULOSE 10 G/15ML
20 SOLUTION ORAL 3 TIMES DAILY
Status: DISCONTINUED | OUTPATIENT
Start: 2024-10-08 | End: 2024-10-08

## 2024-10-08 RX ORDER — AMLODIPINE BESYLATE 5 MG/1
5 TABLET ORAL DAILY
Status: DISCONTINUED | OUTPATIENT
Start: 2024-10-08 | End: 2024-10-09

## 2024-10-08 RX ADMIN — PIPERACILLIN AND TAZOBACTAM 4.5 G: 36; 4.5 INJECTION, POWDER, FOR SOLUTION INTRAVENOUS at 10:31

## 2024-10-08 RX ADMIN — LACTULOSE 20 G: 20 SOLUTION ORAL at 17:03

## 2024-10-08 RX ADMIN — LACTULOSE 20 G: 20 SOLUTION ORAL at 09:33

## 2024-10-08 RX ADMIN — METOPROLOL TARTRATE 25 MG: 25 TABLET, FILM COATED ORAL at 22:01

## 2024-10-08 RX ADMIN — SODIUM CHLORIDE, SODIUM GLUCONATE, SODIUM ACETATE, POTASSIUM CHLORIDE, MAGNESIUM CHLORIDE, SODIUM PHOSPHATE, DIBASIC, AND POTASSIUM PHOSPHATE 50 ML/HR: .53; .5; .37; .037; .03; .012; .00082 INJECTION, SOLUTION INTRAVENOUS at 14:15

## 2024-10-08 RX ADMIN — PIPERACILLIN AND TAZOBACTAM 4.5 G: 36; 4.5 INJECTION, POWDER, FOR SOLUTION INTRAVENOUS at 18:07

## 2024-10-08 RX ADMIN — AMLODIPINE BESYLATE 5 MG: 5 TABLET ORAL at 09:33

## 2024-10-08 RX ADMIN — PIPERACILLIN AND TAZOBACTAM 4.5 G: 36; 4.5 INJECTION, POWDER, FOR SOLUTION INTRAVENOUS at 06:05

## 2024-10-08 RX ADMIN — LACTULOSE 20 G: 20 SOLUTION ORAL at 03:55

## 2024-10-08 RX ADMIN — METOPROLOL TARTRATE 25 MG: 25 TABLET, FILM COATED ORAL at 09:32

## 2024-10-08 RX ADMIN — ASPIRIN 325 MG ORAL TABLET 325 MG: 325 PILL ORAL at 09:32

## 2024-10-08 RX ADMIN — LACTULOSE 20 G: 20 SOLUTION ORAL at 22:01

## 2024-10-08 NOTE — ED PROVIDER NOTES
Final diagnoses:   Acute encephalopathy   LETICIA (acute kidney injury) (HCC)   Acute liver failure   Shock liver   Metastatic disease (HCC)     ED Disposition       ED Disposition   Admit    Condition   Stable    Date/Time   Mon Oct 7, 2024 11:20 PM    Comment   Case was discussed with Dr. Wei and the patient's admission status was agreed to be Admission Status: inpatient status to the service of Dr. Wei .               Assessment & Plan       Medical Decision Making  On arrival, vital signs stable. Patient AO x 2, confused to time. CT scan consistent with aggressive metastatic disease and underlying liver lesions cannot be excluded. Leukocytosis, 13.95. Elevated Pro-Ravi, 1.67. 0-hour troponin 192. Lactic acid 2.8. Significantly acutely elevated liver enzymes and new LETICIA. Started fluids, NAC and Zosyn in the ER. Discussed with GI and awaiting more lab work. Per GI, believe this is shock liver. Patient hemodynamically stable in the ER and discussed with ICU. Patient okay for the floor. D/w internal medicine for admission.    Amount and/or Complexity of Data Reviewed  Labs: ordered. Decision-making details documented in ED Course.  Radiology: ordered.    Risk  Prescription drug management.  Decision regarding hospitalization.        ED Course as of 10/08/24 0456   Mon Oct 07, 2024   2151 WBC(!): 13.95   2151 Procalcitonin(!): 1.67   2151 hs TnI 0hr(!): 192   2151 LACTIC ACID(!): 2.8   2310 Ammonia(!): 152   2314 Delta 2hr hsTnI(!): 34       Medications   lactulose retention enema 200 g (has no administration in time range)   sodium chloride 0.9 % bolus 1,000 mL (0 mL Intravenous Stopped 10/7/24 2350)   piperacillin-tazobactam (ZOSYN) IVPB 4.5 g (0 g Intravenous Stopped 10/7/24 2311)   acetylcysteine (ACETADOTE) 15,000 mg in dextrose 5 % 200 mL IVPB (0 mg Intravenous Stopped 10/7/24 2350)       ED Risk Strat Scores   HEART Risk Score      Flowsheet Row Most Recent Value   Heart Score Risk Calculator    History 0  Filed at: 10/08/2024 0429   ECG 0 Filed at: 10/08/2024 0429   Age 1 Filed at: 10/08/2024 0429   Risk Factors 2 Filed at: 10/08/2024 0429   Troponin 2 Filed at: 10/08/2024 0429   HEART Score 5 Filed at: 10/08/2024 0429                               SBIRT 20yo+      Flowsheet Row Most Recent Value   Initial Alcohol Screen: US AUDIT-C     1. How often do you have a drink containing alcohol? 0 Filed at: 10/07/2024 2022   2. How many drinks containing alcohol do you have on a typical day you are drinking?  0 Filed at: 10/07/2024 2022   Audit-C Score 0 Filed at: 10/07/2024 2022   NADEEN: How many times in the past year have you...    Used an illegal drug or used a prescription medication for non-medical reasons? Never Filed at: 10/07/2024 2022                            History of Present Illness       Chief Complaint   Patient presents with    Altered Mental Status     Pt arrives via ems from home. Per family pt increasingly confused x 3 days. Pt unable to care for self x3 days. Pt hypoxic upon EMS arrival.        Past Medical History:   Diagnosis Date    Acute metabolic encephalopathy     Asthma     CHF (congestive heart failure) (HCC)     Chronic kidney disease     COPD (chronic obstructive pulmonary disease) (HCC)     Hyperlipidemia     Hypertension     Liver disease     Myocardial infarction (HCC)     Seizures (HCC)     Sleep apnea     Transaminitis       Past Surgical History:   Procedure Laterality Date    APPENDECTOMY      CARDIAC CATHETERIZATION      GANGLION CYST EXCISION      INCONTINENCE SURGERY      IR PORT PLACEMENT  05/03/2024    IR PORT PLACEMENT  6/24/2024    IR PORT REMOVAL  05/16/2024    LAPAROTOMY N/A 03/21/2024    Procedure: LAPAROTOMY EXPLORATORY, washout, complex closure, LISSY drain;  Surgeon: Francine Reid MD;  Location: [facility];  Service: General    SC LAPS ABD PRTM&OMENTUM DX W/WO SPEC BR/WA SPX N/A 03/19/2024    Procedure: DIAGNOSTIC LAPAROSCOPY converted to Exploratory Laparotomy, Right  "Hemicolectomy, Abdominal wash-out, Abthera Placement;  Surgeon: Roger Joel DO;  Location: [facility];  Service: General    TYMPANOSTOMY TUBE PLACEMENT        History reviewed. No pertinent family history.   Social History     Tobacco Use    Smoking status: Every Day     Types: Cigarettes     Passive exposure: Current    Smokeless tobacco: Never   Vaping Use    Vaping status: Never Used   Substance Use Topics    Alcohol use: Not Currently     Comment: rare    Drug use: Never      E-Cigarette/Vaping    E-Cigarette Use Never User       E-Cigarette/Vaping Substances    Nicotine No     THC No     CBD No     Flavoring No     Other No     Unknown No       I have reviewed and agree with the history as documented.     Patient is a 63-year-old female with a past medical history of COPD, CHF, stage III colon cancer, who presents to the emergency room via EMS for AMS. Last chemotherapy infusion 10/2. Patient is also oxygen dependent due to COPD. Presents via EMS for AMS. Family are poor historians. Reports AMS for the past x3 days. Per family she has been, \" hallucinating\". On EMS arrival, patient was hypoxic to low 80s.      Altered Mental Status  Presenting symptoms: confusion    Associated symptoms: no abdominal pain, no fever, no nausea, no palpitations, no rash, no seizures and no vomiting        Review of Systems   Constitutional:  Negative for chills and fever.   HENT:  Negative for ear pain, sore throat, trouble swallowing and voice change.    Eyes:  Negative for pain and visual disturbance.   Respiratory:  Negative for cough and shortness of breath.    Cardiovascular:  Negative for chest pain and palpitations.   Gastrointestinal:  Negative for abdominal pain, nausea and vomiting.   Genitourinary:  Negative for dysuria, flank pain and hematuria.   Musculoskeletal:  Negative for arthralgias and back pain.   Skin:  Negative for color change and rash.   Neurological:  Negative for seizures and syncope. "   Psychiatric/Behavioral:  Positive for confusion.    All other systems reviewed and are negative.          Objective       ED Triage Vitals   Temperature Pulse Blood Pressure Respirations SpO2 Patient Position - Orthostatic VS   10/07/24 2124 10/07/24 2020 10/07/24 2020 10/07/24 2020 10/07/24 2020 10/07/24 2020   97.5 °F (36.4 °C) 100 143/77 21 92 % Lying      Temp Source Heart Rate Source BP Location FiO2 (%) Pain Score    10/07/24 2124 10/07/24 2020 10/07/24 2020 -- 10/08/24 0147    Oral Monitor Right arm  No Pain      Vitals      Date and Time Temp Pulse SpO2 Resp BP Pain Score FACES Pain Rating User   10/08/24 0300 -- -- -- -- -- No Pain -- MSD   10/08/24 0157 97.2 °F (36.2 °C) 92 86 % 16 145/93 -- -- DII   10/08/24 0147 -- -- -- -- -- No Pain -- OB   10/08/24 0100 -- 92 98 % 15 114/58 -- -- ARNAUD   10/08/24 0000 -- 95 96 % 18 140/67 -- -- ARNAUD   10/07/24 2300 -- 94 92 % 20 139/67 -- -- ARNAUD   10/07/24 2230 -- 92 91 % 20 134/78 -- -- CZ   10/07/24 2124 97.5 °F (36.4 °C) -- -- -- -- -- -- CZ   10/07/24 2020 -- 100 92 % 21 143/77 -- -- ARNAUD            Physical Exam  Vitals and nursing note reviewed.   Constitutional:       General: She is not in acute distress.     Appearance: She is obese.   HENT:      Head: Normocephalic and atraumatic.   Eyes:      Conjunctiva/sclera: Conjunctivae normal.   Cardiovascular:      Rate and Rhythm: Normal rate and regular rhythm.      Heart sounds: No murmur heard.  Pulmonary:      Effort: Pulmonary effort is normal. No respiratory distress.      Breath sounds: Normal breath sounds.   Abdominal:      Palpations: Abdomen is soft.      Tenderness: There is no abdominal tenderness.   Musculoskeletal:         General: No swelling.      Cervical back: Neck supple.   Skin:     General: Skin is warm and dry.      Capillary Refill: Capillary refill takes less than 2 seconds.   Neurological:      Mental Status: She is alert. She is disoriented and confused.      GCS: GCS eye subscore is 4. GCS  verbal subscore is 5. GCS motor subscore is 6.      Comments: AO x2, patient not oriented to time.   Difficulty assessing patient as she does not follow commands or answer questions appropriately.    Psychiatric:         Mood and Affect: Mood normal.         Results Reviewed       Procedure Component Value Units Date/Time    HS Troponin I 2hr [[id number]]  (Abnormal) Collected: 10/07/24 2244    Lab Status: Final result Specimen: Blood from Hand, Left Updated: 10/07/24 2312     hs TnI 2hr 226 ng/L      Delta 2hr hsTnI 34 ng/L     Urine Microscopic [[id number]]  (Abnormal) Collected: 10/07/24 2232    Lab Status: Final result Specimen: Urine, Clean Catch Updated: 10/07/24 2254     RBC, UA None Seen /hpf      WBC, UA 1-2 /hpf      Epithelial Cells Occasional /hpf      Bacteria, UA Occasional /hpf      Hyaline Casts, UA 5-10 /lpf      Amorphous Crystals, UA Innumerable    Lactic acid 2 Hours [[id number]]  (Abnormal) Collected: 10/07/24 2219    Lab Status: Final result Specimen: Blood from Arm, Left Updated: 10/07/24 2253     LACTIC ACID 2.1 mmol/L     Narrative:      Result may be elevated if tourniquet was used during collection.    Ammonia [[id number]]  (Abnormal) Collected: 10/07/24 2219    Lab Status: Final result Specimen: Blood from Arm, Left Updated: 10/07/24 2253     Ammonia 152 umol/L     HS Troponin I 4hr [[id number]]     Lab Status: No result Specimen: Blood     UA w Reflex to Microscopic w Reflex to Culture [[id number]]  (Abnormal) Collected: 10/07/24 2232    Lab Status: Final result Specimen: Urine, Clean Catch Updated: 10/07/24 2244     Color, UA Dark Yellow     Clarity, UA Turbid     Specific Gravity, UA 1.018     pH, UA 5.0     Leukocytes, UA Negative     Nitrite, UA Negative     Protein, UA 70 (1+) mg/dl      Glucose, UA Trace mg/dl      Ketones, UA Trace mg/dl      Urobilinogen, UA 2.0 mg/dl      Bilirubin, UA Negative     Occult Blood, UA Small    Salicylate level [[id number]]  (Normal) Collected: 10/07/24  2153    Lab Status: Final result Specimen: Blood from Arm, Right Updated: 10/07/24 2240     Salicylate Lvl <5 mg/dL     Acetaminophen level-If concentration is detectable, please discuss with medical  on call. [[id number]]  (Abnormal) Collected: 10/07/24 2153    Lab Status: Final result Specimen: Blood from Arm, Right Updated: 10/07/24 2240     Acetaminophen Level <2 ug/mL     Comprehensive metabolic panel [[id number]]  (Abnormal) Collected: 10/07/24 2157    Lab Status: Final result Specimen: Blood from Arm, Right Updated: 10/07/24 2239     Sodium 137 mmol/L      Potassium 4.9 mmol/L      Chloride 102 mmol/L      CO2 26 mmol/L      ANION GAP 9 mmol/L      BUN 76 mg/dL      Creatinine 2.98 mg/dL      Glucose 118 mg/dL      Calcium 7.3 mg/dL      Corrected Calcium 7.9 mg/dL      AST 4,922 U/L      ALT 4,413 U/L      Alkaline Phosphatase 213 U/L      Total Protein 5.8 g/dL      Albumin 3.2 g/dL      Total Bilirubin 2.52 mg/dL      eGFR 16 ml/min/1.73sq m     Narrative:      National Kid[employer] Disease Foundation guidelines for Chronic Kidney Disease (CKD):     Stage 1 with normal or high GFR (GFR > 90 mL/min/1.73 square meters)    Stage 2 Mild CKD (GFR = 60-89 mL/min/1.73 square meters)    Stage 3A Moderate CKD (GFR = 45-59 mL/min/1.73 square meters)    Stage 3B Moderate CKD (GFR = 30-44 mL/min/1.73 square meters)    Stage 4 Severe CKD (GFR = 15-29 mL/min/1.73 square meters)    Stage 5 End Stage CKD (GFR <15 mL/min/1.73 square meters)  Note: GFR calculation is accurate only with a steady state creatinine    Hepatitis panel, acute [[id number]] Collected: 10/07/24 2219    Lab Status: In process Specimen: Blood from Arm, Left Updated: 10/07/24 2223    LEVAR Screen w/ Reflex to Titer/Pattern [[id number]] Collected: 10/07/24 2219    Lab Status: In process Specimen: Blood from Arm, Left Updated: 10/07/24 2223    Blood culture #2 [[id number]] Collected: 10/07/24 2220    Lab Status: In process Specimen: Blood from Arm, Right  Updated: 10/07/24 2223    Ethanol [[id number]]  (Normal) Collected: 10/07/24 2153    Lab Status: Final result Specimen: Blood from Arm, Right Updated: 10/07/24 2221     Ethanol Lvl <10 mg/dL     RBC Morphology Reflex Test [[id number]] Collected: 10/07/24 2052    Lab Status: Final result Specimen: Blood from Arm, Left Updated: 10/07/24 2201    Anti-smooth muscle antibody, IgG [[id number]]     Lab Status: No result Specimen: Blood     CBC and differential [[id number]]  (Abnormal) Collected: 10/07/24 2052    Lab Status: Final result Specimen: Blood from Arm, Left Updated: 10/07/24 2150     WBC 13.95 Thousand/uL      RBC 4.22 Million/uL      Hemoglobin 11.9 g/dL      Hematocrit 40.1 %      MCV 95 fL      MCH 28.2 pg      MCHC 29.7 g/dL      RDW 19.3 %      MPV 10.7 fL      Platelets 125 Thousands/uL     Manual Differential(PHLEBS Do Not Order) [[id number]]  (Abnormal) Collected: 10/07/24 2052    Lab Status: Final result Specimen: Blood from Arm, Left Updated: 10/07/24 2150     Segmented % 84 %      Bands % 9 %      Lymphocytes % 6 %      Monocytes % 1 %      Eosinophils % 0 %      Basophils % 0 %      Absolute Neutrophils 12.97 Thousand/uL      Absolute Lymphocytes 0.84 Thousand/uL      Absolute Monocytes 0.14 Thousand/uL      Absolute Eosinophils 0.00 Thousand/uL      Absolute Basophils 0.00 Thousand/uL      Total Counted --     RBC Morphology Normal     Platelet Estimate Borderline    FLU/RSV/COVID - if FLU/RSV clinically relevant [[id number]]  (Normal) Collected: 10/07/24 2052    Lab Status: Final result Specimen: Nares from Nose Updated: 10/07/24 2148     SARS-CoV-2 Negative     INFLUENZA A PCR Negative     INFLUENZA B PCR Negative     RSV PCR Negative    Narrative:      This test has been performed using the CoV-2/Flu/RSV plus assay on the [employer] GeneXpert platform. This test has been validated by the  and verified by the performing laboratory.     This test is designed to amplify and detect the following:  nucleocapsid (N), envelope (E), and RNA-dependent RNA polymerase (RdRP) genes of the SARS-CoV-2 genome; matrix (M), basic polymerase (PB2), and acidic protein (PA) segments of the influenza A genome; matrix (M) and non-structural protein (NS) segments of the influenza B genome, and the nucleocapsid genes of RSV A and RSV B.     Positive results are indicative of the presence of Flu A, Flu B, RSV, and/or SARS-CoV-2 RNA. Positive results for SARS-CoV-2 or suspected novel influenza should be reported to state, local, or federal health departments according to local reporting requirements.      All results should be assessed in conjunction with clinical presentation and other laboratory markers for clinical management.     FOR PEDIATRIC PATIENTS - copy/paste COVID Guidelines URL to browser: [email or web address]       Comprehensive metabolic panel [[id number]]  (Abnormal) Collected: 10/07/24 2052    Lab Status: Final result Specimen: Blood from Arm, Left Updated: 10/07/24 2128     Sodium 134 mmol/L      Potassium 5.8 mmol/L      Chloride 99 mmol/L      CO2 24 mmol/L      ANION GAP 11 mmol/L      BUN 75 mg/dL      Creatinine 3.01 mg/dL      Glucose 113 mg/dL      Calcium 7.7 mg/dL      AST 6,555 U/L      ALT >5,000 U/L      Alkaline Phosphatase 255 U/L      Total Protein 7.1 g/dL      Albumin 3.9 g/dL      Total Bilirubin 2.74 mg/dL      eGFR 16 ml/min/1.73sq m     Narrative:      National Kidney Disease Foundation guidelines for Chronic Kidney Disease (CKD):     Stage 1 with normal or high GFR (GFR > 90 mL/min/1.73 square meters)    Stage 2 Mild CKD (GFR = 60-89 mL/min/1.73 square meters)    Stage 3A Moderate CKD (GFR = 45-59 mL/min/1.73 square meters)    Stage 3B Moderate CKD (GFR = 30-44 mL/min/1.73 square meters)    Stage 4 Severe CKD (GFR = 15-29 mL/min/1.73 square meters)    Stage 5 End Stage CKD (GFR <15 mL/min/1.73 square meters)  Note: GFR calculation is  accurate only with a steady state creatinine    Procalcitonin [[id number]]  (Abnormal) Collected: 10/07/24 2052    Lab Status: Final result Specimen: Blood from Arm, Left Updated: 10/07/24 2125     Procalcitonin 1.67 ng/ml     HS Troponin 0hr (reflex protocol) [[id number]]  (Abnormal) Collected: 10/07/24 2052    Lab Status: Final result Specimen: Blood from Arm, Left Updated: 10/07/24 2123     hs TnI 0hr 192 ng/L     Lactic acid [[id number]]  (Abnormal) Collected: 10/07/24 2052    Lab Status: Final result Specimen: Blood from Arm, Left Updated: 10/07/24 2123     LACTIC ACID 2.8 mmol/L     Narrative:      Result may be elevated if tourniquet was used during collection.    Protime-INR [[id number]]  (Abnormal) Collected: 10/07/24 2052    Lab Status: Final result Specimen: Blood from Arm, Left Updated: 10/07/24 2117     Protime 22.2 seconds      INR 1.87    Narrative:      INR Therapeutic Range    Indication                                             INR Range      Atrial Fibrillation                                               2.0-3.0  Hypercoagulable State                                    2.0.2.3  Left Ventricular Asist Device                            2.0-3.0  Mechanical Heart Valve                                  -    Aortic(with afib, MI, embolism, HF, LA enlargement,    and/or coagulopathy)                                     2.0-3.0 (2.5-3.5)     Mitral                                                             2.5-3.5  Prosthetic/Bioprosthetic Heart Valve               2.0-3.0  Venous thromboembolism (VTE: VT, PE        2.0-3.0    APTT [[id number]]  (Normal) Collected: 10/07/24 2052    Lab Status: Final result Specimen: Blood from Arm, Left Updated: 10/07/24 2117     PTT 24 seconds     Blood culture #1 [[id number]] Collected: 10/07/24 2052    Lab Status: In process Specimen: Blood from Hand, Left Updated: 10/07/24 2056    Fingerstick Glucose (POCT) [[id number]]  (Normal) Collected: 10/07/24 2054    Lab Status:  Final result Specimen: Blood Updated: 10/07/24 2055     POC Glucose 113 mg/dl             CT head without contrast   Final Interpretation by Elver Wei MD (10/07 2130)      No acute intracranial abnormality.  Stable chronic microangiopathic changes within the brain.                  Workstation performed: [id number]         CT chest abdomen pelvis wo contrast   Final Interpretation by Ric Womack MD (10/07 2143)      1.  No evidence of acute traumatic injury. No identifiable acute abnormality to account for the patient's clinical presentation.   2.  Multiple new and/or enlarging pulmonary nodules, consistent with aggressive metastatic disease.   3.  Diffusely heterogeneous appearance of the liver. While this may at least partially represent inhomogeneous hepatic steatosis, underlying lesions cannot be excluded without IV contrast.   4.  Mild diffuse anasarca.      The study was marked in EPIC for significant notification.         Workstation performed: [id number]         XR chest portable    (Results Pending)       ECG 12 Lead Documentation Only    Date/Time: 10/8/2024 8:24 PM    Performed by: Maru Rodríguez PA-C  Authorized by: Maru Rodríguez PA-C    Indications / Diagnosis:  Cardiac work-up  ECG reviewed by me, the ED Provider: yes    Patient location:  ED  Interpretation:     Interpretation: normal    Rate:     ECG rate:  95    ECG rate assessment: normal    Rhythm:     Rhythm: sinus rhythm    ST segments:     ST segments:  Normal  T waves:     T waves: normal        ED Medication and Procedure Management   Prior to Admission Medications   Prescriptions Last Dose Informant Patient Reported? Taking?   albuterol (2.5 mg/3 mL) 0.083 % nebulizer solution  Self No No   Sig: Take 3 mL (2.5 mg total) by nebulization every 6 (six) hours as needed for wheezing or shortness of breath   amLODIPine (NORVASC) 5 mg tablet  Self No No   Sig: Take 1 tablet (5 mg total) by mouth daily   aspirin 325 mg tablet  Self  Yes No   Sig: Take 1 tablet by mouth daily   atorvastatin (Lipitor) 40 mg tablet  Self No No   Sig: Take 1 tablet (40 mg total) by mouth daily at bedtime   lisinopril (ZESTRIL) 10 mg tablet  Self No No   Sig: TAKE 1 TABLET BY MOUTH EVERY DAY   metoprolol tartrate (LOPRESSOR) 25 mg tablet  Self No No   Sig: Take 1 tablet (25 mg total) by mouth every 12 (twelve) hours   ondansetron (ZOFRAN-ODT) 8 mg disintegrating tablet  Self No No   Sig: Take 1 tablet (8 mg total) by mouth every 8 (eight) hours as needed for vomiting or nausea   rOPINIRole (REQUIP) 0.5 mg tablet  Self No No   Sig: Take 1 tablet (0.5 mg total) by mouth 3 (three) times a day   torsemide (DEMADEX) 20 mg tablet  Self No No   Sig: Take 1 tablet (20 mg total) by mouth daily   zolpidem (AMBIEN) 5 mg tablet  Self Yes No   Sig: Take 5 mg by mouth daily as needed   Patient not taking: Reported on 9/13/2024      Facility-Administered Medications: None     Current Discharge Medication List        CONTINUE these medications which have NOT CHANGED    Details   albuterol (2.5 mg/3 mL) 0.083 % nebulizer solution Take 3 mL (2.5 mg total) by nebulization every 6 (six) hours as needed for wheezing or shortness of breath  Qty: 360 mL, Refills: 2    Associated Diagnoses: Chronic obstructive pulmonary disease, unspecified COPD type (HCC)      amLODIPine (NORVASC) 5 mg tablet Take 1 tablet (5 mg total) by mouth daily  Qty: 90 tablet, Refills: 3    Associated Diagnoses: Essential hypertension      aspirin 325 mg tablet Take 1 tablet by mouth daily      atorvastatin (Lipitor) 40 mg tablet Take 1 tablet (40 mg total) by mouth daily at bedtime  Qty: 90 tablet, Refills: 3    Associated Diagnoses: Mixed hyperlipidemia      lisinopril (ZESTRIL) 10 mg tablet TAKE 1 TABLET BY MOUTH EVERY DAY  Qty: 30 tablet, Refills: 5    Associated Diagnoses: Primary hypertension      metoprolol tartrate (LOPRESSOR) 25 mg tablet Take 1 tablet (25 mg total) by mouth every 12 (twelve) hours  Qty:  60 tablet, Refills: 3    Associated Diagnoses: Essential hypertension      ondansetron (ZOFRAN-ODT) 8 mg disintegrating tablet Take 1 tablet (8 mg total) by mouth every 8 (eight) hours as needed for vomiting or nausea  Qty: 20 tablet, Refills: 1    Associated Diagnoses: Chemotherapy induced nausea and vomiting      rOPINIRole (REQUIP) 0.5 mg tablet Take 1 tablet (0.5 mg total) by mouth 3 (three) times a day  Qty: 90 tablet, Refills: 0    Associated Diagnoses: RLS (restless legs syndrome)      torsemide (DEMADEX) 20 mg tablet Take 1 tablet (20 mg total) by mouth daily  Qty: 90 tablet, Refills: 3    Associated Diagnoses: Chronic heart failure with preserved ejection fraction (HFpEF) (HCC)      zolpidem (AMBIEN) 5 mg tablet Take 5 mg by mouth daily as needed           No discharge procedures on file.  ED SEPSIS DOCUMENTATION   Time reflects when diagnosis was documented in both MDM as applicable and the Disposition within this note       Time User Action Codes Description Comment    10/7/2024 11:20 PM Maru Rodríguez [G93.40] Acute encephalopathy     10/7/2024 11:20 PM Maru Rodríguez [N17.9] LETICIA (acute kidney injury) (HCC)     10/7/2024 11:20 PM Maru Rodríguez [K72.00] Acute liver failure     10/7/2024 11:20 PM Maru Rodríguez [K72.00] Shock liver     10/7/2024 11:20 PM Maru Rodríguez [C79.9] Metastatic disease (HCC)                  Maru Rodríguez PA-C  10/08/24 0456

## 2024-10-08 NOTE — ASSESSMENT & PLAN NOTE
-GI consulted on patient for transaminitis/shock liver    -CT does suggest increased echogenicity of the liver as well as multiple new enlarging pulmonary nodules consistent with aggressive metastatic disease.    -Patient does have a history of CHF.  I do think patient's elevated liver enzymes are likely multifactorial.    -Will add liver labs. Hepatitis panel pending. Patient denies ETOH. RUQ US pending.     -LFT's [id number]; although trending down.     -Avoid hepatotoxins.     -NAC initiated in ED.    -Continue to trend LFT's/PT/INR/platelet.  MELD 3.0: 29 at 10/8/2024  9:48 AM  MELD-Na: 28 at 10/8/2024  9:48 AM  Calculated from:  Serum Creatinine: 3.09 mg/dL (Using max of 3 mg/dL) at 10/8/2024  6:17 AM  Serum Sodium: 140 mmol/L (Using max of 137 mmol/L) at 10/8/2024  6:17 AM  Total Bilirubin: 2.35 mg/dL at 10/8/2024  6:17 AM  Serum Albumin: 3.0 g/dL at 10/8/2024  6:17 AM  INR(ratio): 1.88 at 10/8/2024  9:48 AM  Age at listing (hypothetical): 63 years  Sex: Female at 10/8/2024  9:48 AM    -CV maximization. Avoid hypotension.     Continue Lactulose at this time.     Continue Abx.at this time.

## 2024-10-08 NOTE — ASSESSMENT & PLAN NOTE
Continue amlodipine 5mg daily, lisinopril 10mg daily, and metoprolol 25mg BID.  Monitor vital signs.

## 2024-10-08 NOTE — TELEPHONE ENCOUNTER
Call received from patients daughter. Patient is currently admitted due to poor liver function. Stated that patient had scans done showing aggressive metastasis of her cancer. Stated she needs to know if her moms cancer is terminal and what this means for her with how she is progressing, due to daughter living in [place]. Daughter was tearful on the phone saying she needs answers and is requesting a call from Dr Black about her moms plan moving forward with what her scans are showing.     Also mentioned they will be canceling patients CT scan scheduled for this Friday due to patient being admitted, and already having the same scan done inpatient without contrast.

## 2024-10-08 NOTE — PROGRESS NOTES
Progress Note - Hospitalist   Name: Dwaine Gould 63 y.o. female I MRN: [id number]  Unit/Bed#: -01 I Date of Admission: 10/7/2024   Date of Service: 10/8/2024 I Hospital Day: 1    Assessment & Plan  Shock liver  Markedly elevated LFTs and elevated ammonia level.  CT noted. Follow up with liver US.  Patient given NAC in ED. Started on lactulose.  Trend LFTs and ammonia levels.  Daily CBC checks  GI consulted and recs appreciated.  PT/INR elevated today. Possible coagulopathy secondary to shock liver. Continue to monitor.  WBC count at 10.3 today, down from 13.95 on admission. Continue with empiric course of IV zosyn. Reassuring that white count is downtrending, patient afebrile, not hypotensive, and pulse ox stable.  Monitor for hypoglycemia secondary to decreased liver function.  LETICIA (acute kidney injury) (HCC)  Likely in the setting of shock liver. Urine studies reviewed.  Cr and BUN elevated. Monitor and continue IV fluids.  Monitor electrolytes and trend renal function.  Essential hypertension  Continue amlodipine 5mg daily, lisinopril 10mg daily, and metoprolol 25mg BID.  Monitor vital signs.  Hyperlipidemia  Hold atorvastatin given ongoing shock liver and risk of hepatotoxicity    VTE Pharmacologic Prophylaxis:   Moderate Risk (Score 3-4) - Pharmacological DVT Prophylaxis Contraindicated. Sequential Compression Devices Ordered.    Mobility:   Basic Mobility Inpatient Raw Score: 17  JH-HLM Goal: 5: Stand one or more mins  JH-HLM Achieved: 6: Walk 10 steps or more  JH-HLM Goal achieved. Continue to encourage appropriate mobility.    Patient Centered Rounds: I performed bedside rounds with nursing staff today.  Discussions with Specialists or Other Care Team Provider:  IP CONSULT TO GASTROENTEROLOGY      Education and Discussions with Family / Patient: Student did not perform.    Current Length of Stay: 1 day(s)  Current Patient Status: Inpatient   Certification Statement: The patient will continue to require  additional inpatient hospital stay due to plan as noted above  Discharge Plan: Anticipate discharge in >72 hrs to home.    Code Status: Prior    Subjective:   Patient is alert and oriented this morning. She reports some headaches, but denies dizziness, confusion, or nausea/vomiting. She denies alcohol use or taking tylenol prior to presentation. She cannot recall the events leading up to her altered mental status. Patient denies chest pain, palpitations, SOB, abdominal pain.    Objective:     Vitals:   Temp (24hrs), Av °F (36.1 °C), Min:96.4 °F (35.8 °C), Max:97.5 °F (36.4 °C)    Temp:  [96.4 °F (35.8 °C)-97.5 °F (36.4 °C)] 96.4 °F (35.8 °C)  HR:  [] 92  Resp:  [15-21] 18  BP: (114-145)/(58-93) 120/71  SpO2:  [86 %-98 %] 93 %  Body mass index is 52.27 kg/m².     Input and Output Summary (last 24 hours):     Intake/Output Summary (Last 24 hours) at 10/8/2024 1249  Last data filed at 10/8/2024 0605  Gross per 24 hour   Intake 1405 ml   Output 400 ml   Net 1005 ml       Physical Exam:   Physical Exam  Constitutional:       Appearance: Normal appearance.   Cardiovascular:      Rate and Rhythm: Normal rate and regular rhythm.   Pulmonary:      Effort: Pulmonary effort is normal.      Breath sounds: Normal breath sounds.   Abdominal:      General: Abdomen is flat.      Palpations: Abdomen is soft.   Musculoskeletal:      Right lower leg: Edema present.      Left lower leg: Edema present.   Neurological:      Mental Status: She is alert.         Additional Data:     Labs:  Results from last 7 days   Lab Units 10/08/24  0617 10/07/24  2052 10/04/24  1040   WBC Thousand/uL 10.31* 13.95* 11.37*   HEMOGLOBIN g/dL 11.2* 11.9 11.5   HEMATOCRIT % 37.5 40.1 40.6   PLATELETS Thousands/uL 102* 125* 252   BANDS PCT %  --  9*  --    SEGS PCT %  --   --  82*   LYMPHO PCT %  --  6* 11*   MONO PCT %  --  1* 5   EOS PCT %  --  0 1     Results from last 7 days   Lab Units 10/08/24  0617   SODIUM mmol/L 140   POTASSIUM mmol/L 4.4    CHLORIDE mmol/L 105   CO2 mmol/L 23   BUN mg/dL 82*   CREATININE mg/dL 3.09*   ANION GAP mmol/L 12   CALCIUM mg/dL 7.0*   ALBUMIN g/dL 3.0*   TOTAL BILIRUBIN mg/dL 2.35*   ALK PHOS U/L 206*   ALT U/L 3,640*   AST U/L 2,956*   GLUCOSE RANDOM mg/dL 119     Results from last 7 days   Lab Units 10/08/24  0948   INR  1.88*     Results from last 7 days   Lab Units 10/07/24  2054   POC GLUCOSE mg/dl 113         Results from last 7 days   Lab Units 10/07/24  2219 10/07/24  2052 10/04/24  1040   LACTIC ACID mmol/L 2.1* 2.8* 0.7   PROCALCITONIN ng/ml  --  1.67* 0.09       Lines/Drains:  Invasive Devices       Central Venous Catheter Line  Duration             Port A Cath 06/24/24 Right Internal jugular 106 days              Peripheral Intravenous Line  Duration             Peripheral IV 10/07/24 Dorsal (posterior);Left Hand <1 day    Peripheral IV 10/07/24 Dorsal (posterior);Right Hand <1 day    Peripheral IV 10/07/24 Right Antecubital <1 day              Line  Duration             Pump Device Other medication pump (Comment) Right Chest 5 days                    Central Line:  Goal for removal: Will discontinue when hemodynamically stable.             Imaging: Reviewed radiology reports from this admission including:  XR chest portable    Result Date: 10/8/2024  Impression: Findings suggestive of mild central pulmonary vascular congestion. Workstation performed: [id number]     CT chest abdomen pelvis wo contrast    Result Date: 10/7/2024  Impression: 1.  No evidence of acute traumatic injury. No identifiable acute abnormality to account for the patient's clinical presentation. 2.  Multiple new and/or enlarging pulmonary nodules, consistent with aggressive metastatic disease. 3.  Diffusely heterogeneous appearance of the liver. While this may at least partially represent inhomogeneous hepatic steatosis, underlying lesions cannot be excluded without IV contrast. 4.  Mild diffuse anasarca. The study was marked in EPIC for  significant notification. Workstation performed: [id number]     CT head without contrast    Result Date: 10/7/2024  Impression: No acute intracranial abnormality.  Stable chronic microangiopathic changes within the brain. Workstation performed: [id number]       XR chest portable    Result Date: 10/8/2024  Impression Findings suggestive of mild central pulmonary vascular congestion. Workstation performed: [id number]        Results for orders placed during the hospital encounter of 05/16/24    Echo complete w/ contrast if indicated    Interpretation Summary    Left Ventricle: Left ventricular cavity size is normal. Wall thickness is mildly increased. The left ventricular ejection fraction is 65%. Systolic function is normal. Although no diagnostic regional wall motion abnormality was identified, this possibility cannot be completely excluded on the basis of this study. Diastolic function is normal for age.    Left Atrium: The atrium is mildly dilated.    Mitral Valve: There is mild regurgitation.      Recent Cultures (last 7 days):         Last 24 Hours Medication List:   Current Facility-Administered Medications   Medication Dose Route Frequency Provider Last Rate    amLODIPine  5 mg Oral Daily Theodore Wei MD      aspirin  325 mg Oral Daily Theodore Wei MD      lactulose  20 g Oral TID Theodore Wei MD      lactulose  200 g Rectal Once Maru Rodríguez PA-C      metoprolol tartrate  25 mg Oral Q12H [facility] Theodore Wei MD      piperacillin-tazobactam  4.5 g Intravenous Q8H Theodore Wei MD 4.5 g (10/08/24 1031)        Today, Patient Was Seen By: Gui Obrien and the attending physician, Leo Tompkins,*        **Please Note: This note may have been constructed using a voice recognition system.**

## 2024-10-08 NOTE — UTILIZATION REVIEW
Initial Clinical Review    Admission: Date/Time/Statement:   Admission Orders (From admission, onward)       Ordered        10/07/24 2321  INPATIENT ADMISSION  Once                          Orders Placed This Encounter   Procedures    INPATIENT ADMISSION     Standing Status:   Standing     Number of Occurrences:   1     Order Specific Question:   Level of Care     Answer:   Med Surg [16]     Order Specific Question:   Estimated length of stay     Answer:   More than 2 Midnights     Order Specific Question:   Certification     Answer:   I certify that inpatient services are medically necessary for this patient for a duration of greater than two midnights. See H&P and MD Progress Notes for additional information about the patient's course of treatment.     ED Arrival Information       Expected   -    Arrival   10/7/2024 20:14    Acuity   Emergent              Means of arrival   Ambulance    Escorted by   [facility]   Hospitalist    Admission type   Emergency              Arrival complaint   -             Chief Complaint   Patient presents with    Altered Mental Status     Pt arrives via ems from home. Per family pt increasingly confused x 3 days. Pt unable to care for self x3 days. Pt hypoxic upon EMS arrival.        Initial Presentation: 63 y.o. female who presented by EMS to [facility] ED. Admitted as Inpatient for evaluation and treatment of AMS, LETICIA, liver shock. PMHx: asthma, CHF, CKD, COPD (on chronic O2), HLD, HTN, liver disease, MI, seizures, sleep apnea. Presented w/ AMS, per family has been altered for past 3 days and also has been hallucinating. On EMS arrival, pt hypoxic to low 80s. On exam, confused, oriented to self and place only; pt does not follow commands or answer questions appropriately. Labs Lactic 2.8, Ammonia 152, Crt 2.98, AST 6,555, ALT >5,000, Tbili 2.74, K 5.8, Trop elevated. Imaging multiple new/enlarging pulmonary nodules, diffusely heterogeneous appearance of  liver, diffuse anasarca. CTH unremarkable. Plan: IVF, NAC, IV Zosyn, Trend labs, replete electrolytes as needed; check echo s/t elevated trop. GI consulted.    Anticipated Length of Stay/Certification Statement: Patient will be admitted on an inpatient basis with an anticipated length of stay of greater than 2 midnights secondary to shock liver and LETICIA.     Date: 10/08/24   Day 2: Exam, pt disoriented and confused. Crt 3.09. Plan: check liver US, start lactulose, continue IV Zosyn, trend LFTs and ammonia, IVF, Trend labs & renal function, replete electrolytes as needed. Continue PTA meds. Supportive care.     GI: Pt w/ transaminitis/shock liver. Check liver labs, hepatitis panel. RUQ US. Trend LFTs q6h. MELD 3.0 - 29. Continue lactulose, IV ABX, avoid hypotension.    ED Treatment-Medication Administration from 10/07/2024 2014 to 10/08/2024 0146         Date/Time Order Dose Route Action     10/07/2024 2159 sodium chloride 0.9 % bolus 1,000 mL 1,000 mL Intravenous New Bag     10/07/2024 2240 piperacillin-tazobactam (ZOSYN) IVPB 4.5 g 4.5 g Intravenous New Bag     10/07/2024 2235 acetylcysteine (ACETADOTE) 15,000 mg in dextrose 5 % 200 mL IVPB 15,000 mg Intravenous New Bag            Scheduled Medications:  amLODIPine, 5 mg, Oral, Daily  aspirin, 325 mg, Oral, Daily  lactulose, 20 g, Oral, TID  metoprolol tartrate, 25 mg, Oral, Q12H ANGELES  piperacillin-tazobactam, 4.5 g, Intravenous, Q8H    Continuous IV Infusions: none    PRN Meds: none      ED Triage Vitals   Temperature Pulse Respirations Blood Pressure SpO2 Pain Score   10/07/24 2124 10/07/24 2020 10/07/24 2020 10/07/24 2020 10/07/24 2020 10/08/24 0147   97.5 °F (36.4 °C) 100 21 143/77 92 % No Pain     Weight (last 2 days)       Date/Time Weight    10/08/24 0600 121 (267.64)    10/08/24 01:57:02 121 (267.64)    10/07/24 2152 115 (254.41)            Vital Signs (last 3 days)       Date/Time Temp Pulse Resp BP MAP (mmHg) SpO2 Calculated FIO2 (%) - Nasal Cannula Nasal  Cannula O2 Flow Rate (L/min) O2 Device Maura Coma Scale Score Pain    10/08/24 0730 -- -- -- -- -- 93 % 36 4 L/min Nasal cannula 13 No Pain    10/08/24 07:04:11 96.4 °F (35.8 °C) 92 18 120/71 87 93 % -- -- -- -- --    10/08/24 0500 -- -- -- -- -- -- 44 6 L/min Nasal cannula -- --    10/08/24 0420 -- -- -- -- -- -- 44 6 L/min Nasal cannula -- --    10/08/24 0300 -- -- -- -- -- -- -- -- -- 13 No Pain    10/08/24 01:57:02 97.2 °F (36.2 °C) 92 16 145/93 110 86 % -- -- Nasal cannula -- --    10/08/24 0147 -- -- -- -- -- -- -- -- -- -- No Pain    10/08/24 0100 -- 92 15 114/58 76 98 % -- -- None (Room air) -- --    10/08/24 0000 -- 95 18 140/67 96 96 % -- -- None (Room air) -- --    10/07/24 2300 -- 94 20 139/67 94 92 % -- -- None (Room air) -- --    10/07/24 2256 -- -- -- -- -- -- -- -- -- 15 --    10/07/24 2230 -- 92 20 134/78 98 91 % 36 4 L/min Nasal cannula -- --    10/07/24 2124 97.5 °F (36.4 °C) -- -- -- -- -- -- -- -- -- --    10/07/24 2100 -- -- -- -- -- -- -- -- Nasal cannula -- --    10/07/24 2020 -- 100 21 143/77 -- 92 % 44 6 L/min Nasal cannula -- --              Pertinent Labs/Diagnostic Test Results:   Radiology:  XR chest portable   Final Interpretation by Gabe Eller MD (10/08 0615)      Findings suggestive of mild central pulmonary vascular congestion.            Workstation performed: [id number]         CT head without contrast   Final Interpretation by Elver Wei MD (10/07 2130)      No acute intracranial abnormality.  Stable chronic microangiopathic changes within the brain.                  Workstation performed: [id number]         CT chest abdomen pelvis wo contrast   Final Interpretation by Ric Womack MD (10/07 2143)      1.  No evidence of acute traumatic injury. No identifiable acute abnormality to account for the patient's clinical presentation.   2.  Multiple new and/or enlarging pulmonary nodules, consistent with aggressive metastatic disease.   3.  Diffusely  heterogeneous appearance of the liver. While this may at least partially represent inhomogeneous hepatic steatosis, underlying lesions cannot be excluded without IV contrast.   4.  Mild diffuse anasarca.      The study was marked in EPIC for significant notification.         Workstation performed: [id number]         US abdomen complete    (Results Pending)     Cardiology:  ECG 12 lead   Final Result by Minoo Tejeda MD (10/08 0835)   Normal sinus rhythm   Septal infarct (cited on or before 07-OCT-2024)   Abnormal ECG   Confirmed by Minoo Tejeda ([id number]) on 10/8/2024 8:35:36 AM      ECG 12 lead   Final Result by Minoo Tejeda MD (10/08 0835)   Normal sinus rhythm   Septal infarct , age undetermined   Abnormal ECG   Confirmed by Minoo Tejeda ([id number]) on 10/8/2024 8:35:53 AM           Results from last 7 days   Lab Units 10/07/24  2052   SARS-COV-2  Negative     Results from last 7 days   Lab Units 10/08/24  0617 10/07/24  2052 10/04/24  1040 10/01/24  1316   WBC Thousand/uL 10.31* 13.95* 11.37* 14.16*   HEMOGLOBIN g/dL 11.2* 11.9 11.5 12.0   HEMATOCRIT % 37.5 40.1 40.6 41.8   PLATELETS Thousands/uL 102* 125* 252 249   TOTAL NEUT ABS Thousands/µL  --   --  9.42* 10.93*   BANDS PCT %  --  9*  --   --          Results from last 7 days   Lab Units 10/08/24  0617 10/07/24  2157 10/07/24  2052 10/04/24  1040 10/01/24  1316   SODIUM mmol/L 140 137 134* 138 142   POTASSIUM mmol/L 4.4 4.9 5.8* 4.4 4.3   CHLORIDE mmol/L 105 102 99 102 106   CO2 mmol/L 23 26 24 32 30   ANION GAP mmol/L 12 9 11 4 6   BUN mg/dL 82* 76* 75* 28* 21   CREATININE mg/dL 3.09* 2.98* 3.01* 1.01 0.89   EGFR ml/min/1.73sq m 15 16 16 60 70   CALCIUM mg/dL 7.0* 7.3* 7.7* 8.3* 8.5   MAGNESIUM mg/dL  --   --   --  2.3  --      Results from last 7 days   Lab Units 10/08/24  0617 10/07/24  2219 10/07/24  2157 10/07/24  2052 10/04/24  1040 10/01/24  1316   AST U/L 2,956*  --  4,922* 6,555* 32 22   ALT U/L 3,640*  --  4,413* >5,000* 54* 27   ALK PHOS U/L  206*  --  213* 255* 164* 156*   TOTAL PROTEIN g/dL 5.4*  --  5.8* 7.1 6.8 6.8   ALBUMIN g/dL 3.0*  --  3.2* 3.9 3.8 3.7   TOTAL BILIRUBIN mg/dL 2.35*  --  2.52* 2.74* 0.32 0.28   AMMONIA umol/L  --  152*  --   --   --   --      Results from last 7 days   Lab Units 10/07/24  2054   POC GLUCOSE mg/dl 113     Results from last 7 days   Lab Units 10/08/24  0617 10/07/24  2157 10/07/24  2052 10/04/24  1040 10/01/24  1316   GLUCOSE RANDOM mg/dL 119 118 113 119 97      Results from last 7 days   Lab Units 10/08/24  0617 10/07/24  2244 10/07/24  2052   HS TNI 0HR ng/L  --   --  192*   HS TNI 2HR ng/L  --  226*  --    HSTNI D2 ng/L  --  34*  --    HS TNI 4HR ng/L 223*  --   --    HSTNI D4 ng/L 31*  --   --          Results from last 7 days   Lab Units 10/07/24  2052   PROTIME seconds 22.2*   INR  1.87*   PTT seconds 24         Results from last 7 days   Lab Units 10/07/24  2052 10/04/24  1040   PROCALCITONIN ng/ml 1.67* 0.09     Results from last 7 days   Lab Units 10/07/24  2219 10/07/24  2052 10/04/24  1040   LACTIC ACID mmol/L 2.1* 2.8* 0.7      Results from last 7 days   Lab Units 10/07/24  2232   CLARITY UA  Turbid   COLOR UA  Dark Yellow   SPEC GRAV UA  1.018   PH UA  5.0   GLUCOSE UA mg/dl Trace*   KETONES UA mg/dl Trace*   BLOOD UA  Small*   PROTEIN UA mg/dl 70 (1+)*   NITRITE UA  Negative   BILIRUBIN UA  Negative   UROBILINOGEN UA (BE) mg/dl 2.0*   LEUKOCYTES UA  Negative   WBC UA /hpf 1-2   RBC UA /hpf None Seen   BACTERIA UA /hpf Occasional   EPITHELIAL CELLS WET PREP /hpf Occasional     Results from last 7 days   Lab Units 10/07/24  2052   INFLUENZA A PCR  Negative   INFLUENZA B PCR  Negative   RSV PCR  Negative      Results from last 7 days   Lab Units 10/07/24  2153   ETHANOL LVL mg/dL <10   ACETAMINOPHEN LVL ug/mL <2*   SALICYLATE LVL mg/dL <5             Past Medical History:   Diagnosis Date    Acute metabolic encephalopathy     Asthma     CHF (congestive heart failure) (HCC)     Chronic kidney disease      COPD (chronic obstructive pulmonary disease) (HCC)     Hyperlipidemia     Hypertension     Liver disease     Myocardial infarction (HCC)     Seizures (HCC)     Sleep apnea     Transaminitis      Present on Admission:   LETICIA (acute kidney injury) (HCC)   Essential hypertension   Hyperlipidemia      Admitting Diagnosis: Acute liver failure [K72.00]  Shock liver [K72.00]  Metastatic disease (HCC) [C79.9]  LETICIA (acute kidney injury) (HCC) [N17.9]  Acute encephalopathy [G93.40]  AMS (altered mental status) [R41.82]  Age/Sex: 63 y.o. female    Network Utilization Review Department  ATTENTION: Please call with any questions or concerns to [phone] and carefully listen to the prompts so that you are directed to the right person. All voicemails are confidential.   For Discharge needs, contact Care Management DC Support Team at [phone] opt. [phone]  Send all requests for admission clinical reviews, approved or denied determinations and any other requests to dedicated fax number below belonging to the campus where the patient is receiving treatment. List of dedicated fax numbers for the Facilities:  FACILITY NAME UR FAX NUMBER   ADMISSION DENIALS (Administrative/Medical Necessity) [phone]   DISCHARGE SUPPORT TEAM (NETWORK) [phone]   PARENT CHILD HEALTH (Maternity/NICU/Pediatrics) [phone]   [facility] [phone]   [facility] [phone]   [facility] [phone]   [facility] [phone]   [facility] [phone]   [facility] [phone]   [facility] [phone]   [facility] [phone]   [facility] [phone]   [facility] [phone]   [facility] [phone]   [facility]  [facility] [phone]

## 2024-10-08 NOTE — CONSULTS
Oncology Consult Note  Dwaine Gould 63 y.o. female MRN: [id number]  Unit/Bed#: -01 Encounter: [id number]      Presenting Complaint: Patient with known history of stage III versus stage IV colorectal cancer with CEA elevation and probable peritoneal metastasis.    History of Presenting Illness: Dwaine Gould is seen for initial consultation 10/7/2024 at the referral of hospitalist service.  63-year-old female known to have advanced colorectal cancer.  She is status post perforation.  She is been on FOLFOX therapy.  She had a delay of about 3 weeks receiving her treatment on day 43 on 10/2.  She received oxaliplatin/5-FU/leucovorin/5-FU.  The dosage that received were no different and previously and her liver chemistries prior to receiving this were normal and previous liver chemistries post treatments have been normal.  She comes in now with renal insufficiency marked elevation liver chemistries.  Etiology is not totally clear.  Being evaluated further.  Again she has significant transaminitis.  She is slightly lethargic.  However she recognized me when I went to see her today.  She does not recall what happened other than she was found to be sleepy.  Her drugs that she received a 5-FU leucovorin and oxaliplatin.      Review of Systems - As stated in the HPI otherwise the fourteen point review of systems was negative.    Past Medical History:   Diagnosis Date    Acute metabolic encephalopathy     Asthma     CHF (congestive heart failure) (HCC)     Chronic kidney disease     COPD (chronic obstructive pulmonary disease) (HCC)     Hyperlipidemia     Hypertension     Liver disease     Myocardial infarction (HCC)     Seizures (HCC)     Sleep apnea     Transaminitis        Social History     Socioeconomic History    Marital status: /Civil Union     Spouse name: None    Number of children: None    Years of education: None    Highest education level: None   Occupational History    None   Tobacco Use    Smoking  status: Every Day     Types: Cigarettes     Passive exposure: Current    Smokeless tobacco: Never   Vaping Use    Vaping status: Never Used   Substance and Sexual Activity    Alcohol use: Not Currently     Comment: rare    Drug use: Never    Sexual activity: Not Currently     Partners: Male   Other Topics Concern    None   Social History Narrative    None     Social Determinants of Health     Financial Resource Strain: Not on file   Food Insecurity: No Food Insecurity (10/8/2024)    Hunger Vital Sign     Worried About Running Out of Food in the Last Year: Never true     Ran Out of Food in the Last Year: Never true   Transportation Needs: No Transportation Needs (10/8/2024)    PRAPARE - Transportation     Lack of Transportation (Medical): No     Lack of Transportation (Non-Medical): No   Physical Activity: Not on file   Stress: Not on file   Social Connections: Unknown (6/18/2024)    Received from [facility]     How often do you feel lonely or isolated from those around you? (Adult - for ages 18 years and over): Not on file   Intimate Partner Violence: Not on file   Housing Stability: Low Risk  (10/8/2024)    Housing Stability Vital Sign     Unable to Pay for Housing in the Last Year: No     Number of Times Moved in the Last Year: 0     Homeless in the Last Year: No       History reviewed. No pertinent family history.    Allergies   Allergen Reactions    Codeine Anaphylaxis    Wound Dressing Adhesive Blisters     ALL WOUND DRESSINGS     Contrast [Iodinated Contrast Media] GI Intolerance    Prednisone Irritability     Increase in anger/abusive behaviors    Ibuprofen Rash         Current Facility-Administered Medications:     albumin human (FLEXBUMIN) 25 % injection 50 g, 50 g, Intravenous, Q6H, Jwalaevan Bañeulos MD    aspirin tablet 325 mg, 325 mg, Oral, Daily, Theodore Wei MD, 325 mg at 10/10/24 0816    calcium gluconate 2 g in sodium chloride 0.9% 100 mL (premix), 2 g, Intravenous, Once,  Brittanie Soni MD, Last Rate: 100 mL/hr at 10/10/24 1000, 2 g at 10/10/24 1000    dicyclomine (BENTYL) capsule 10 mg, 10 mg, Oral, TID AC, Aviva Grigsby PA-C, 10 mg at 10/10/24 0643    HYDROmorphone (DILAUDID) injection 0.5 mg, 0.5 mg, Intravenous, Q6H PRN, Leo Tompkins MD, 0.5 mg at 10/09/24 1221    lactulose (CHRONULAC) oral solution 20 g, 20 g, Oral, Daily, Aviva Grigsby PA-C, 20 g at 10/10/24 0816    lactulose retention enema 200 g, 200 g, Rectal, Once, Maru Rodríguez PA-C    lidocaine (LIDODERM) 5 % patch 1 patch, 1 patch, Topical, Daily, Leo Tompkins MD, 1 patch at 10/10/24 0816    [COMPLETED] piperacillin-tazobactam (ZOSYN) 4.5 g in sodium chloride 0.9 % 100 mL IV LOADING DOSE, 4.5 g, Intravenous, Once, Stopped at 10/08/24 1704 **FOLLOWED BY** piperacillin-tazobactam (ZOSYN) 4.5 g in sodium chloride 0.9 % 100 mL IVPB (EXTENDED INFUSION), 4.5 g, Intravenous, Q8H, Theodore Wei MD, Last Rate: 25 mL/hr at 10/10/24 0139, 4.5 g at 10/10/24 0139      /61   Pulse 61   Temp 97.6 °F (36.4 °C)   Resp 17   Ht 5' (1.524 m)   Wt 112 kg (247 lb 12.8 oz)   SpO2 97%   BMI 48.39 kg/m²       General Appearance:  Highly lethargic but easily arousable and I was able to speak with her and she recognized me.  She is able to converse.       Eyes:    PERRL   Ears:    Normal external ear canals, both ears   Nose:   Nares normal, septum midline   Throat:   Mucosa moist. Pharynx without injection.    Neck:   Supple       Lungs:     Clear to auscultation bilaterally   Chest Wall:    No tenderness or deformity    Heart:    Regular rate and rhythm       Abdomen:     Soft, non-tender, bowel sounds +, no organomegaly   Obese soft bowel sounds are active.  No major tenderness.        Extremities:   Extremities no cyanosis or edema       Skin:   no rash or icterus.    Lymph nodes:   Cervical, supraclavicular, and axillary nodes normal   Neurologic:   CNII-XII intact, normal strength,  sensation and reflexes     Throughout  No major asterixis.             Recent Results (from the past 48 hour(s))   CBC and differential    Collection Time: 10/09/24  4:50 AM   Result Value Ref Range    WBC 10.14 4.31 - 10.16 Thousand/uL    RBC 3.72 (L) 3.81 - 5.12 Million/uL    Hemoglobin 10.4 (L) 11.5 - 15.4 g/dL    Hematocrit 35.1 34.8 - 46.1 %    MCV 94 82 - 98 fL    MCH 28.0 26.8 - 34.3 pg    MCHC 29.6 (L) 31.4 - 37.4 g/dL    RDW 19.0 (H) 11.6 - 15.1 %    MPV 10.6 8.9 - 12.7 fL    Platelets 87 (L) 149 - 390 Thousands/uL   Comprehensive metabolic panel    Collection Time: 10/09/24  4:50 AM   Result Value Ref Range    Sodium 140 135 - 147 mmol/L    Potassium 3.9 3.5 - 5.3 mmol/L    Chloride 107 96 - 108 mmol/L    CO2 24 21 - 32 mmol/L    ANION GAP 9 4 - 13 mmol/L    BUN 80 (H) 5 - 25 mg/dL    Creatinine 2.86 (H) 0.60 - 1.30 mg/dL    Glucose 124 65 - 140 mg/dL    Calcium 7.0 (L) 8.4 - 10.2 mg/dL    Corrected Calcium 8.0 (L) 8.3 - 10.1 mg/dL     (H) 13 - 39 U/L    ALT 2,267 (H) 7 - 52 U/L    Alkaline Phosphatase 211 (H) 34 - 104 U/L    Total Protein 5.1 (L) 6.4 - 8.4 g/dL    Albumin 2.8 (L) 3.5 - 5.0 g/dL    Total Bilirubin 1.33 (H) 0.20 - 1.00 mg/dL    eGFR 17 ml/min/1.73sq m   Protime-INR    Collection Time: 10/09/24  4:50 AM   Result Value Ref Range    Protime 20.1 (H) 12.3 - 15.0 seconds    INR 1.63 (H) 0.85 - 1.19   Kelvin-Barr Virus (EBV), Quantitative PCR, [facility]    Collection Time: 10/09/24  4:50 AM    Specimen: Arm, Left; Blood   Result Value Ref Range    EBV TARGET Not Detected Not Detected   Manual Differential(PHLEBS Do Not Order)    Collection Time: 10/09/24  4:50 AM   Result Value Ref Range    Segmented % 84 (H) 43 - 75 %    Bands % 7 0 - 8 %    Lymphocytes % 7 (L) 14 - 44 %    Monocytes % 2 (L) 4 - 12 %    Eosinophils % 0 0 - 6 %    Basophils % 0 0 - 1 %    Absolute Neutrophils 9.23 (H) 1.85 - 7.62 Thousand/uL    Absolute Lymphocytes 0.71 0.60 - 4.47 Thousand/uL    Absolute Monocytes 0.20 0.00  - 1.22 Thousand/uL    Absolute Eosinophils 0.00 0.00 - 0.40 Thousand/uL    Absolute Basophils 0.00 0.00 - 0.10 Thousand/uL    Total Counted      RBC Morphology Present     Platelet Estimate Decreased (A) Adequate    Anisocytosis Present    Echo complete w/ contrast if indicated    Collection Time: 10/09/24 12:03 PM   Result Value Ref Range    LA/Ao Ratio 2D 1.07     Triscuspid Valve Regurgitation Peak Gradient 40.0 mmHg    LA Volume Index (BP) 21.6 mL/m2    MV Peak A Ezekiel 0.84 m/s    MV stenosis pressure 1/2 time 88 ms    MV Peak E Ezekiel 94 cm/s    E wave deceleration time 299 ms    E/A ratio 1.12     MV valve area p 1/2 method 2.50     TR Peak Ezekiel 2.8 m/s    RVID d 3.4 cm    A4C EF 56 %    Tricuspid valve peak regurgitation velocity 2.80 m/s    Left ventricular stroke volume (2D) 65.00 mL    IVSd 1.20 cm    Tricuspid annular plane systolic excursion 2.60 cm    Ao root 2.90 cm    LVPWd 1.20 cm    LA size 3.1 cm    Asc Ao 2.9 cm    LA volume (BP) 44 mL    FS 38 28 - 44    LVIDS 2.80 cm    IVS 1.2 cm    LVIDd 4.50 cm    LEFT VENTRICLE SYSTOLIC VOLUME (MOD BIPLANE) 2D 29 mL    LV DIASTOLIC VOLUME (MOD BIPLANE) 2D 94 mL    Left Atrium Area-systolic Four Chamber 16.9 cm2    Left Atrium Area-systolic Apical Two Chamber 17.6 cm2    MV E' Tissue Velocity Septal 8 cm/s    LVSV, 2D 65 mL    BSA 2.03 m2    LV EF 65    Comprehensive metabolic panel    Collection Time: 10/10/24  5:12 AM   Result Value Ref Range    Sodium 137 135 - 147 mmol/L    Potassium 4.0 3.5 - 5.3 mmol/L    Chloride 106 96 - 108 mmol/L    CO2 21 21 - 32 mmol/L    ANION GAP 10 4 - 13 mmol/L    BUN 75 (H) 5 - 25 mg/dL    Creatinine 2.60 (H) 0.60 - 1.30 mg/dL    Glucose 106 65 - 140 mg/dL    Calcium 7.2 (L) 8.4 - 10.2 mg/dL    Corrected Calcium 8.0 (L) 8.3 - 10.1 mg/dL     (H) 13 - 39 U/L    ALT 1,783 (H) 7 - 52 U/L    Alkaline Phosphatase 203 (H) 34 - 104 U/L    Total Protein 5.5 (L) 6.4 - 8.4 g/dL    Albumin 3.0 (L) 3.5 - 5.0 g/dL    Total Bilirubin 1.48  (H) 0.20 - 1.00 mg/dL    eGFR 19 ml/min/1.73sq m   CBC and differential    Collection Time: 10/10/24  6:54 AM   Result Value Ref Range    WBC 10.80 (H) 4.31 - 10.16 Thousand/uL    RBC 3.88 3.81 - 5.12 Million/uL    Hemoglobin 11.0 (L) 11.5 - 15.4 g/dL    Hematocrit 37.3 34.8 - 46.1 %    MCV 96 82 - 98 fL    MCH 28.4 26.8 - 34.3 pg    MCHC 29.5 (L) 31.4 - 37.4 g/dL    RDW 19.4 (H) 11.6 - 15.1 %    MPV 10.2 8.9 - 12.7 fL    Platelets 70 (L) 149 - 390 Thousands/uL    nRBC 0 /100 WBCs    Segmented % 83 (H) 43 - 75 %    Immature Grans % 1 0 - 2 %    Lymphocytes % 8 (L) 14 - 44 %    Monocytes % 5 4 - 12 %    Eosinophils Relative 3 0 - 6 %    Basophils Relative 0 0 - 1 %    Absolute Neutrophils 9.05 (H) 1.85 - 7.62 Thousands/µL    Absolute Immature Grans 0.07 0.00 - 0.20 Thousand/uL    Absolute Lymphocytes 0.88 0.60 - 4.47 Thousands/µL    Absolute Monocytes 0.50 0.17 - 1.22 Thousand/µL    Eosinophils Absolute 0.28 0.00 - 0.61 Thousand/µL    Basophils Absolute 0.02 0.00 - 0.10 Thousands/µL   Protime-INR    Collection Time: 10/10/24  6:54 AM   Result Value Ref Range    Protime 17.2 (H) 12.3 - 15.0 seconds    INR 1.33 (H) 0.85 - 1.19         US right upper quadrant    Result Date: 10/8/2024  Narrative: RIGHT UPPER QUADRANT ULTRASOUND INDICATION: Elevated LFTs. Abnormal CT appearance of the liver. COMPARISON: CT CAP 10/7/2024 TECHNIQUE: Real-time ultrasound of the right upper quadrant was performed with a curvilinear transducer with both volumetric sweeps and still imaging techniques. FINDINGS: PANCREAS: The barely visible pancreas is grossly unremarkable but suboptimally evaluated. AORTA AND IVC: Visualized portions are normal for patient age. LIVER: Size: Mildly enlarged. The liver measures 18.3 cm in the midclavicular line. Contour: Surface contour is smooth. Parenchyma: The parenchyma is diffusely echogenic and heterogeneous. However, the deeper portions of the liver are obscured due to sound wave attenuation. There is an  indeterminant hypoechoic lesion within the left lobe, measuring 1.6 x 2.2 x 1.6 cm. Limited imaging of the main portal vein shows it to be patent and hepatopetal. BILIARY: No gallbladder findings. No intrahepatic biliary dilatation. CBD measures 3.0 mm. No choledocholithiasis. KIDNEY: Right kidney measures 11.9 x 4.7 x 6.1 cm. Volume 178.4 mL Kidney within normal limits. ASCITES: None.     Impression: 1.  Diffusely echogenic and heterogeneous appearance of the liver. There is moderately heterogeneous and increased echogenicity throughout the visible hepatic parenchyma, with posterior attenuation of the sound waves. The liver is mildly enlarged. There is a possible hypoechoic lesion within the left hepatic lobe, which cannot be definitively characterized on this exam. Follow-up evaluation by gadolinium-enhanced MRI of the abdomen is recommended. The study was marked in EPIC for significant notification. Workstation performed: [id number]     XR chest portable    Result Date: 10/8/2024  Narrative: XR CHEST PORTABLE INDICATION: cardiac work-up. COMPARISON: 1 view chest 10/4/2024 FINDINGS: Right chest wall stormy catheter in place with distal tip in the upper right atrium. Minimal right basilar subsegmental atelectasis. No pneumothorax or pleural effusion. Cardiac silhouette is enlarged.  Aortic calcification is present. Prominent central pulmonary vasculature. Bones are unremarkable for age. Normal upper abdomen.     Impression: Findings suggestive of mild central pulmonary vascular congestion. Workstation performed: [id number]     CT chest abdomen pelvis wo contrast    Result Date: 10/7/2024  Narrative: CT CHEST, ABDOMEN AND PELVIS WITHOUT IV CONTRAST INDICATION: ams, hx cancer, questionable unwitnessed fall. COMPARISON: CT CAP 5/16/2024 TECHNIQUE: CT examination of the chest, abdomen and pelvis was performed without intravenous contrast. Multiplanar 2D reformatted images were created from the source data. This  examination, like all CT scans performed in the [facility], was performed utilizing techniques to minimize radiation dose exposure, including the use of iterative reconstruction and automated exposure control. Radiation dose length product (DLP) for this visit: 1682 mGy-cm Enteric Contrast: Not administered. FINDINGS: Image quality is moderately degraded, due to patient motion artifact. This can decrease the sensitivity for detecting and characterizing subtle abnormalities. Evaluation of the parenchymal organs, mucosa and urothelium is also significantly limited without intravenous contrast. CHEST LUNGS: Moderate diffuse manifestations of centrilobular emphysema. There is moderate right greater than left basilar atelectasis and/or scarring, increased since the prior study. There are multiple pulmonary nodules, which will be described on series 3: Image 57, left upper lobe, juxtapleural, 4/5 mm; new Image 94, right upper lobe, 6 mm; new Image 109, juxtapleural left lower lobe, 10 x 15 mm; previously 5-6 mm. PLEURA: Unremarkable. HEART/GREAT VESSELS: The heart is not grossly enlarged, with prominent multi-vessel coronary artery calcifications. The pericardium is unremarkable. No thoracic aortic aneurysm. MEDIASTINUM AND DUANE: Unremarkable. CHEST WALL AND LOWER NECK: Unremarkable. ABDOMEN LIVER/BILIARY TREE: Moderate diffuse heterogeneity throughout the hepatic parenchyma. Subtle underlying lesions cannot be excluded. In particular, there are regions of relative increased density within the right lobe (series 2, image 94) GALLBLADDER: No calcified gallstones. No pericholecystic inflammatory change. SPLEEN: Unremarkable. PANCREAS: Unremarkable. ADRENAL GLANDS: Mild nonspecific density nodularity of both adrenal glands. KIDNEYS/URETERS: Unremarkable. No hydronephrosis. STOMACH AND BOWEL: The stomach and small bowel loops are unremarkable. Status post right hemicolectomy and ileocolonic reanastomosis.  APPENDIX: No findings to suggest appendicitis. ABDOMINOPELVIC CAVITY: No ascites. No pneumoperitoneum. . There is an enlarged paraduodenal lymph node (2, 136) measuring 1.4 x 2.2 cm. This previously measured 1.1 x 1.6 cm. VESSELS: Unremarkable for patient's age. PELVIS REPRODUCTIVE ORGANS: Unremarkable for patient's age. URINARY BLADDER: Unremarkable. ABDOMINAL WALL/INGUINAL REGIONS: Nonspecific diffuse subcutaneous edema throughout the abdomen and pelvis. BONES: No acute fracture or suspicious osseous lesion.     Impression: 1.  No evidence of acute traumatic injury. No identifiable acute abnormality to account for the patient's clinical presentation. 2.  Multiple new and/or enlarging pulmonary nodules, consistent with aggressive metastatic disease. 3.  Diffusely heterogeneous appearance of the liver. While this may at least partially represent inhomogeneous hepatic steatosis, underlying lesions cannot be excluded without IV contrast. 4.  Mild diffuse anasarca. The study was marked in EPIC for significant notification. Workstation performed: [id number]     CT head without contrast    Result Date: 10/7/2024  Narrative: CT BRAIN - WITHOUT CONTRAST INDICATION:   ams. COMPARISON: 3/14/2024. TECHNIQUE:  CT examination of the brain was performed.  Multiplanar 2D reformatted images were created from the source data. Radiation dose length product (DLP) for this visit:  916 mGy-cm .  This examination, like all CT scans performed [facility], was performed utilizing techniques to minimize radiation dose exposure, including the use of iterative reconstruction and automated exposure control. IMAGE QUALITY:  Diagnostic. FINDINGS: PARENCHYMA: Decreased attenuation is noted in periventricular and subcortical white matter demonstrating an appearance that is statistically most likely to represent mild microangiopathic change; this appearance is similar when compared to most recent prior examination. No CT  signs of acute infarction.  No intracranial mass, mass effect or midline shift.  No acute parenchymal hemorrhage. VENTRICLES AND EXTRA-AXIAL SPACES:  Normal for the patient's age. VISUALIZED ORBITS: Normal visualized orbits. PARANASAL SINUSES: Tiny retention cyst right maxillary sinus otherwise well-aerated. CALVARIUM AND EXTRACRANIAL SOFT TISSUES: No acute calvarial abnormality. Minimal opacification of dependent left mastoid air cells likely reflect trace effusion.     Impression: No acute intracranial abnormality.  Stable chronic microangiopathic changes within the brain. Workstation performed: [id number]     XR chest 1 view portable    Result Date: 10/4/2024  Narrative: XR CHEST PORTABLE INDICATION: confusion, weak, immunocompromise. COMPARISON: Two-view chest 9/20/2024 FINDINGS: Right chest wall portacatheter with distal tip at the right atrium. Minimal right basilar subsegmental atelectasis. No pneumothorax or pleural effusion. Prominent central pulmonary vasculature. Cardiac silhouette is enlarged.  Aortic calcification is present. Bones are unremarkable for age. Normal upper abdomen.     Impression: Findings suggestive of mild central pulmonary vascular congestion. Correlate with clinical findings. Otherwise, no radiographic evidence of acute intrathoracic process. Workstation performed: [id number]     XR chest pa and lateral    Result Date: 9/24/2024  Narrative: CHEST INDICATION:   Malignant neoplasm of colon, unspecified. COMPARISON:  None. EXAM PERFORMED/VIEWS:  XR CHEST PA AND LATERAL FINDINGS: Cardiomediastinal silhouette appears significantly enlarged. Portacatheter in place in the right atrial appendage. The lungs are clear.  No pneumothorax or pleural effusion. Osseous structures appear within normal limits for patient age.     Impression: No acute cardiopulmonary disease. Clearing of previous pulmonary edema from previous intubated study of 3/22/2024 Electronically signed: 09/24/2024 01:38 PM Dewayne  MD Sampson    ECOG PS: 2-3      Assessment and plan:  1 stage III versus stage IV colorectal cancer with probable peritoneal metastasis  2 marked elevation in transaminases question of etiology  3 status post fifth cycle of chemotherapy at previous chemotherapies without major changes in liver function  4 renal insufficiency CKD 4  Plan: At this point conservative measures watching her liver functions closely would be what we would recommend in which is what is being done.  It is noted that her transaminases are starting to come down we will see how she progresses.  I would make sure she is watching both her both her CBC watching platelets and white count as well as her renal function along with liver functions.

## 2024-10-08 NOTE — MALNUTRITION/BMI
This medical record reflects one or more clinical indicators suggestive of morbid obesity.      BMI Findings:  Adult BMI Classifications: Morbid Obesity 50-59.9        Body mass index is 52.27 kg/m².     See Nutrition note dated 10/8/24 for additional details.  Completed nutrition assessment is viewable in the nutrition documentation.

## 2024-10-08 NOTE — ASSESSMENT & PLAN NOTE
Continue amlodipine 5mg daily, lisinopril 10mg daily, and Lopresor 25mg BID.  Monitor vital signs.

## 2024-10-08 NOTE — ASSESSMENT & PLAN NOTE
Markedly elevated LFTs and elevated ammonia level.  CT noted. Follow up with liver US.  Patient given NAC in ED. Started on lactulose.  Trend LFTs and ammonia levels.  Daily CBC checks  GI consulted and recs appreciated.  PT/INR elevated today. Possible coagulopathy secondary to shock liver. Continue to monitor.  WBC count at 10.3 today, down from 13.95 on admission. Continue with empiric course of IV zosyn. Reassuring that white count is downtrending, patient afebrile, not hypotensive, and pulse ox stable.  Monitor for hypoglycemia secondary to decreased liver function.

## 2024-10-08 NOTE — NURSING NOTE
Received patient from ED lethargic, confused, vs, complete head to toe assessment done, unable to complete admission because of patient mental status. Dr Theodore Wei was notified.

## 2024-10-08 NOTE — CASE MANAGEMENT
Case Management Assessment & Discharge Planning Note    Patient name Dwaine Gould  Location /-01 MRN [id number]  : 1960 Date 10/8/2024       Current Admission Date: 10/7/2024  Current Admission Diagnosis:Shock liver   Patient Active Problem List    Diagnosis Date Noted Date Diagnosed    Shock liver 10/08/2024     Abnormal findings in stool 2024     Morbid obesity (HCC) 2024     Staphylococcus aureus bacteremia 2024     Port-A-Cath in place 2024     Encounter for counseling for tobacco use disorder 2024     Adenocarcinoma, colon (HCC) 2024     Encounter to establish care 2024     Anemia 2024     Thrombocytopenia (HCC) 2024     Chronic respiratory failure with hypoxia and hypercapnia (HCC) 2024     Stage 3a chronic kidney disease (HCC) 2024     LETICIA (acute kidney injury) (HCC) 2024     Chronic heart failure with preserved ejection fraction (HCC) 2024     COPD (chronic obstructive pulmonary disease) (HCC) 2024     Transaminitis 2024     Leukocytosis 2024     Tobacco abuse 2021     CAD (coronary artery disease) 2021     Essential hypertension 2019     Hyperlipidemia 2019       LOS (days): 1  Geometric Mean LOS (GMLOS) (days):   Days to GMLOS:     OBJECTIVE:    Risk of Unplanned Readmission Score: 30.48         Current admission status: Inpatient       Preferred Pharmacy:   [facility]/pharmacy #[id number] - [place] - [address]  [place] [address]  Phone: [phone] Fax: [phone]    Primary Care Provider: Jordan Ramirez MD    Primary Insurance: [employer]  Secondary Insurance:     ASSESSMENT:  Active Health Care Proxies       MARLI GOULD [facility] Representative - Spouse   Primary Phone: [phone] (Mobile)                 Advance Directives  Does patient have a Health Care POA?: No  Was patient offered paperwork?: Yes (Declined)  Does patient  currently have a Health Care decision maker?: Yes, please see Health Care Proxy section  Does patient have Advance Directives?: No  Was patient offered paperwork?: Yes (Declined)  Primary Contact: Joel         Readmission Root Cause  30 Day Readmission: No    Patient Information  Admitted from:: Home  Mental Status: Alert  During Assessment patient was accompanied by: Not accompanied during assessment  Assessment information provided by:: Patient  Primary Caregiver: Self  Support Systems: Spouse/significant other  County of Residence: [place]  What city do you live in?: [place]  Home entry access options. Select all that apply.: Stairs  Number of steps to enter home.: 3  Type of Current Residence: 2 story home  Upon entering residence, is there a bedroom on the main floor (no further steps)?: No  A bedroom is located on the following floor levels of residence (select all that apply):: 2nd Floor  Upon entering residence, is there a bathroom on the main floor (no further steps)?: No  Indicate which floors of current residence have a bathroom (select all the apply):: 2nd Floor (Patient sleeps in a hospital bed on the 1st fl.)  Number of steps to 2nd floor from main floor: One Flight  Living Arrangements: Lives w/ Spouse/significant other  Is patient a ?: No    Activities of Daily Living Prior to Admission  Functional Status: Independent  Completes ADLs independently?: Yes  Ambulates independently?: Yes  Does patient use assisted devices?: Yes  Assisted Devices (DME) used: Bedside Commode, Walker, Wheelchair, Portable Oxygen concentrator, Straight Cane  DME Company Name (respiratory supplies): [employer]  O2 Rate(s): 4L  Does patient currently own DME?: Yes  What DME does the patient currently own?: Bedside Commode, Portable Oxygen concentrator, Wheelchair, Straight Cane, Walker  Does patient have a history of Outpatient Therapy (PT/OT)?: No  Does the patient have a history of Short-Term Rehab?: No  Does  patient have a history of HHC?: Yes (Traditional)  Does patient currently have HHC?: No         Patient Information Continued  Income Source: Unemployed  Does patient have prescription coverage?: Yes  Does patient receive dialysis treatments?: No  Does patient have a history of substance abuse?: Yes (Methamphetamines, Pain meds)  History of Withdrawal Symptoms: Denies past symptoms  Is patient currently in treatment for substance abuse?: N/A - sober  Does patient have a history of Mental Health Diagnosis?: No         Means of Transportation  Means of Transport to [facility]:: Family transport      Social Determinants of Health (SDOH)      Flowsheet Row Most Recent Value   Housing Stability    In the last 12 months, was there a time when you were not able to pay the mortgage or rent on time? N   In the past 12 months, how many times have you moved where you were living? 0   At any time in the past 12 months, were you homeless or living in a shelter (including now)? N   Transportation Needs    In the past 12 months, has lack of transportation kept you from medical appointments or from getting medications? no   In the past 12 months, has lack of transportation kept you from meetings, work, or from getting things needed for daily living? No   Food Insecurity    Within the past 12 months, you worried that your food would run out before you got the money to buy more. Never true   Within the past 12 months, the food you bought just didn't last and you didn't have money to get more. Never true   Utilities    In the past 12 months has the electric, gas, oil, or water company threatened to shut off services in your home? No            DISCHARGE DETAILS:    Discharge planning discussed with:: Patient at bedside.  Freedom of Choice: Yes  Comments - Freedom of Choice: CM discussed discharge planning and freedom of choice if services are recommended by [facility]. CM made blanket SNF referral in [facility].  CM contacted family/caregiver?: No-  see comments (Patient declined.)  Were Treatment Team discharge recommendations reviewed with patient/caregiver?: Yes  Did patient/caregiver verbalize understanding of patient care needs?: Yes  Were patient/caregiver advised of the risks associated with not following Treatment Team discharge recommendations?: Yes              DME Referral Provided  Referral made for DME?: No    Other Referral/Resources/Interventions Provided:  Interventions: None Indicated  Referral Comments: CM to discuss SNF and HHC options due to AMPAC scores.    Would you like to participate in our [facility] service program?  : No - Declined    Treatment Team Recommendation: Home  Discharge Destination Plan:: Home  Transport at Discharge : Family

## 2024-10-08 NOTE — H&P
H&P - Hospitalist   Name: Dwaine Gould 63 y.o. female I MRN: [id number]  Unit/Bed#: -01 I Date of Admission: 10/7/2024   Date of Service: 10/8/2024 I Hospital Day: 1     Assessment & Plan  Shock liver  Markedly elevated LFTs and elevated ammonia level.  CT noted. F/u liver US.  Given NAC in ED.  Start on lactulose and continue with empiric IV zosyn.  Trend LFTs and ammonia levels.  GI consulted and recs appreciated.  LETICIA (acute kidney injury) (HCC)  In the setting of shock liver.  Urine studies noted.  Trial of IVF.  Trend renal functions.  Essential hypertension  Continue amlodipine 5mg daily, lisinopril 10mg daily, and Lopresor 25mg BID.  Monitor vital signs.  Hyperlipidemia  Statin      VTE Pharmacologic Prophylaxis:   Moderate Risk (Score 3-4) - Pharmacological DVT Prophylaxis Contraindicated. Sequential Compression Devices Ordered.  Code Status: Prior    Anticipated Length of Stay: Patient will be admitted on an inpatient basis with an anticipated length of stay of greater than 2 midnights secondary to shock liver and LETICIA.    History of Present Illness   Chief Complaint: altered mental status    Patient is a 64yo female with PMH of HTN, HLD, CAD, HFpEF, COPD on home O2, and colon cancer on chemotherapy, who presented to the ED with altered mental status. Patient currently remains confused and is unable to provide reliable history. In ED patient had CT brain, which did not reveal any acute pathology. Labs revealed markedly elevated LFTs, elevated ammonia level, and elevated creatinine. CT a/p demonstrated a diffusely heterogenous appearance of the liver. ED physician spoke with GI, who believed presentation is consistent with shock liver. ICU was contacted, who deemed patient was stable for medical floors. She was started on NAC, IV abx, and IVF. She is being admitted for further management.    Review of Systems   Unable to perform ROS: Mental status change       Historical Information   Past Medical  History:   Diagnosis Date    Acute metabolic encephalopathy     Asthma     CHF (congestive heart failure) (HCC)     Chronic kidney disease     COPD (chronic obstructive pulmonary disease) (HCC)     Hyperlipidemia     Hypertension     Liver disease     Myocardial infarction (HCC)     Seizures (HCC)     Sleep apnea     Transaminitis      Past Surgical History:   Procedure Laterality Date    APPENDECTOMY      CARDIAC CATHETERIZATION      GANGLION CYST EXCISION      INCONTINENCE SURGERY      IR PORT PLACEMENT  05/03/2024    IR PORT PLACEMENT  6/24/2024    IR PORT REMOVAL  05/16/2024    LAPAROTOMY N/A 03/21/2024    Procedure: LAPAROTOMY EXPLORATORY, washout, complex closure, LISSY drain;  Surgeon: Francine Reid MD;  Location: [facility];  Service: General    MA LAPS ABD PRTM&OMENTUM DX W/WO SPEC BR/WA SPX N/A 03/19/2024    Procedure: DIAGNOSTIC LAPAROSCOPY converted to Exploratory Laparotomy, Right Hemicolectomy, Abdominal wash-out, Abthera Placement;  Surgeon: Roger Joel DO;  Location: [facility];  Service: General    TYMPANOSTOMY TUBE PLACEMENT       Social History     Tobacco Use    Smoking status: Every Day     Types: Cigarettes     Passive exposure: Current    Smokeless tobacco: Never   Vaping Use    Vaping status: Never Used   Substance and Sexual Activity    Alcohol use: Not Currently     Comment: rare    Drug use: Never    Sexual activity: Not Currently     Partners: Male     E-Cigarette/Vaping    E-Cigarette Use Never User      E-Cigarette/Vaping Substances    Nicotine No     THC No     CBD No     Flavoring No     Other No     Unknown No        Social History:  Marital Status: /Civil Union     Objective :  Temp:  [97.2 °F (36.2 °C)-97.5 °F (36.4 °C)] 97.2 °F (36.2 °C)  HR:  [] 92  BP: (114-145)/(58-93) 145/93  Resp:  [15-21] 16  SpO2:  [86 %-98 %] 86 %  O2 Device: Nasal cannula  Nasal Cannula O2 Flow Rate (L/min):  [4 L/min-6 L/min] 6 L/min    Physical Exam  Constitutional:        Appearance: Normal appearance.   HENT:      Head: Normocephalic and atraumatic.      Nose: Nose normal.      Mouth/Throat:      Pharynx: Oropharynx is clear.   Eyes:      Extraocular Movements: Extraocular movements intact.      Pupils: Pupils are equal, round, and reactive to light.   Cardiovascular:      Rate and Rhythm: Normal rate and regular rhythm.      Pulses: Normal pulses.      Heart sounds: Normal heart sounds.   Pulmonary:      Effort: Pulmonary effort is normal.      Breath sounds: Normal breath sounds.   Abdominal:      General: Abdomen is flat. Bowel sounds are normal.      Palpations: Abdomen is soft.      Tenderness: There is no abdominal tenderness.   Musculoskeletal:         General: No deformity.   Neurological:      General: No focal deficit present.      Mental Status: She is alert. She is disoriented.   Psychiatric:      Comments: Confused but calm         Lines/Drains:      Central Line:  Goal for removal: Will discontinue when hemodynamically stable.             Lab Results: I have reviewed the following results:  Results from last 7 days   Lab Units 10/07/24  2052 10/04/24  1040   WBC Thousand/uL 13.95* 11.37*   HEMOGLOBIN g/dL 11.9 11.5   HEMATOCRIT % 40.1 40.6   PLATELETS Thousands/uL 125* 252   BANDS PCT % 9*  --    SEGS PCT %  --  82*   LYMPHO PCT % 6* 11*   MONO PCT % 1* 5   EOS PCT % 0 1     Results from last 7 days   Lab Units 10/07/24  2157   SODIUM mmol/L 137   POTASSIUM mmol/L 4.9   CHLORIDE mmol/L 102   CO2 mmol/L 26   BUN mg/dL 76*   CREATININE mg/dL 2.98*   ANION GAP mmol/L 9   CALCIUM mg/dL 7.3*   ALBUMIN g/dL 3.2*   TOTAL BILIRUBIN mg/dL 2.52*   ALK PHOS U/L 213*   ALT U/L 4,413*   AST U/L 4,922*   GLUCOSE RANDOM mg/dL 118     Results from last 7 days   Lab Units 10/07/24  2052   INR  1.87*     Results from last 7 days   Lab Units 10/07/24  2054   POC GLUCOSE mg/dl 113     Lab Results   Component Value Date    HGBA1C 6.7 (H) 03/20/2024     Results from last 7 days   Lab Units  10/07/24  2219 10/07/24  2052 10/04/24  1040   LACTIC ACID mmol/L 2.1* 2.8* 0.7   PROCALCITONIN ng/ml  --  1.67* 0.09

## 2024-10-08 NOTE — CONSULTS
Consultation - Gastroenterology   Name: Dwaine Gould 63 y.o. female I MRN: [id number]  Unit/Bed#: -01 I Date of Admission: 10/7/2024   Date of Service: 10/8/2024 I Hospital Day: 1   Inpatient consult to gastroenterology  Consult performed by: Eva Moody PA-C  Consult ordered by: Theodore Wei MD        Physician Requesting Evaluation: Leo Tompkins,*   Reason for Evaluation / Principal Problem: Transaminitis    Assessment & Plan  Shock liver    Transaminitis  -GI consulted on patient for transaminitis/shock liver    -CT does suggest increased echogenicity of the liver as well as multiple new enlarging pulmonary nodules consistent with aggressive metastatic disease.    -Patient does have a history of CHF.  I do think patient's elevated liver enzymes are likely multifactorial.    -Will add liver labs. Hepatitis panel pending. Patient denies ETOH. RUQ US pending.     -LFT's q0apurp; although trending down.     -Avoid hepatotoxins.     -NAC initiated in ED.    -Continue to trend LFT's/PT/INR/platelet.  MELD 3.0: 29 at 10/8/2024  9:48 AM  MELD-Na: 28 at 10/8/2024  9:48 AM  Calculated from:  Serum Creatinine: 3.09 mg/dL (Using max of 3 mg/dL) at 10/8/2024  6:17 AM  Serum Sodium: 140 mmol/L (Using max of 137 mmol/L) at 10/8/2024  6:17 AM  Total Bilirubin: 2.35 mg/dL at 10/8/2024  6:17 AM  Serum Albumin: 3.0 g/dL at 10/8/2024  6:17 AM  INR(ratio): 1.88 at 10/8/2024  9:48 AM  Age at listing (hypothetical): 63 years  Sex: Female at 10/8/2024  9:48 AM    -CV maximization. Avoid hypotension.     Continue Lactulose at this time.     Continue Abx.at this time.         History of Present Illness   HPI:  Dwaine Gould is a 63 y.o. female who presents with a past medical history significant for hypertension, hyperlipidemia, CHF, COPD, history of colon cancer on chemotherapy.    GI consulted on patient today for transaminitis.  Patient apparently was admitted to [facility] secondary to altered mental  status.  Upon interview with patient today she is very drowsy and seems slightly confused.  In the emergency department patient was found to have significantly elevated LFTs and ammonia level.  CTAP did show evidence of a heterogeneous appearance of the liver.  Patient was started on NAC protocol.    Per chart patient denies history of alcohol abuse or recreational drug abuse.  Acute hepatitis panel is pending.    Patient denies any abdominal pain, nausea, vomiting.  Patient denies any hematemesis, rectal bleeding, melena.    Review of Systems  I have reviewed the patient's PMH, PSH, Social History, Family History, Meds, and Allergies    Objective :  Temp:  [96.4 °F (35.8 °C)-97.5 °F (36.4 °C)] 96.4 °F (35.8 °C)  HR:  [] 92  BP: (114-145)/(58-93) 120/71  Resp:  [15-21] 18  SpO2:  [86 %-98 %] 93 %  O2 Device: Nasal cannula  Nasal Cannula O2 Flow Rate (L/min):  [4 L/min-6 L/min] 4 L/min    Physical Exam  Constitutional:       Appearance: She is obese. She is ill-appearing.   Abdominal:      General: There is distension.      Palpations: Abdomen is soft.           Lab Results: I have reviewed the following results:CBC/BMP:   .     10/07/24  2052 10/07/24  2157 10/08/24  0617   WBC 13.95*  --  10.31*   HGB 11.9  --  11.2*   HCT 40.1  --  37.5   *  --  102*   BANDSPCT 9*  --   --    SODIUM 134*   < > 140   K 5.8*   < > 4.4   CL 99   < > 105   CO2 24   < > 23   BUN 75*   < > 82*   CREATININE 3.01*   < > 3.09*   GLUC 113   < > 119    < > = values in this interval not displayed.    , LFTs:   .     10/08/24  0617   AST 2,956*   ALT 3,640*   ALB 3.0*   TBILI 2.35*   ALKPHOS 206*    , PTT/INR:  .     10/07/24  2052 10/08/24  0948   PTT 24  --    INR 1.87* 1.88*        Imaging Results Review: No pertinent imaging studies reviewed.  Other Study Results Review: No additional pertinent studies reviewed.

## 2024-10-08 NOTE — RESPIRATORY THERAPY NOTE
RT Protocol Note  Dwaine Gould 63 y.o. female MRN: [id number]  Unit/Bed#: -01 Encounter: [id number]    Assessment    Active Problems:  There are no active Hospital Problems.      Home Pulmonary Medications:  none       Past Medical History:   Diagnosis Date    Acute metabolic encephalopathy     Asthma     CHF (congestive heart failure) (HCC)     Chronic kidney disease     COPD (chronic obstructive pulmonary disease) (HCC)     Hyperlipidemia     Hypertension     Liver disease     Myocardial infarction (HCC)     Seizures (HCC)     Sleep apnea     Transaminitis      Social History     Socioeconomic History    Marital status: /Civil Union     Spouse name: None    Number of children: None    Years of education: None    Highest education level: None   Occupational History    None   Tobacco Use    Smoking status: Every Day     Types: Cigarettes     Passive exposure: Current    Smokeless tobacco: Never   Vaping Use    Vaping status: Never Used   Substance and Sexual Activity    Alcohol use: Not Currently     Comment: rare    Drug use: Never    Sexual activity: Not Currently     Partners: Male   Other Topics Concern    None   Social History Narrative    None     Social Determinants of Health     Financial Resource Strain: Not on file   Food Insecurity: No Food Insecurity (5/17/2024)    Hunger Vital Sign     Worried About Running Out of Food in the Last Year: Never true     Ran Out of Food in the Last Year: Never true   Transportation Needs: No Transportation Needs (5/17/2024)    PRAPARE - Transportation     Lack of Transportation (Medical): No     Lack of Transportation (Non-Medical): No   Physical Activity: Not on file   Stress: Not on file   Social Connections: Unknown (6/18/2024)    Received from [employer]    Social Connections     How often do you feel lonely or isolated from those around you? (Adult - for ages 18 years and over): Not on file   Intimate Partner Violence: Not on file   Housing Stability: Low  Risk  (5/17/2024)    Housing Stability Vital Sign     Unable to Pay for Housing in the Last Year: No     Number of Times Moved in the Last Year: 1     Homeless in the Last Year: No       Subjective         Objective    Physical Exam:   Assessment Type: Assess only  General Appearance: Drowsy  Respiratory Pattern: Dyspnea at rest  Chest Assessment: Chest expansion symmetrical  Bilateral Breath Sounds: Diminished    Vitals:  Blood pressure 145/93, pulse 92, temperature (!) 97.2 °F (36.2 °C), temperature source Axillary, resp. rate 16, weight 121 kg (267 lb 10.2 oz), SpO2 (!) 86%.          Imaging and other studies: Results Review Statement: No pertinent imaging studies reviewed.          Plan    Respiratory Plan: Discontinue Protocol        Resp Comments: patient desaturating during sleep initially had a sleep study done but isabel went for the titration study. Pt denies pulmonary disease or the luis of any pulmonary medications at home.Pt admits to wearing 3LNC at home.

## 2024-10-08 NOTE — ASSESSMENT & PLAN NOTE
Likely in the setting of shock liver. Urine studies reviewed.  Cr and BUN elevated. Monitor and continue IV fluids.  Monitor electrolytes and trend renal function.

## 2024-10-08 NOTE — UTILIZATION REVIEW
NOTIFICATION OF INPATIENT ADMISSION   AUTHORIZATION REQUEST   SERVICING FACILITY:   [place] [address]  Tax ID: [id number]  NPI: [id number] ATTENDING PROVIDER:  Attending Name and NPI#: Leo Bazzi Md [[id number]]  Address: [address] [place] [address]  Phone: [phone]     ADMISSION INFORMATION:  Place of Service: Inpatient [facility]  Place of Service Code: 21  Inpatient Admission Date/Time: 10/7/24 11:21 PM  Discharge Date/Time: No discharge date for patient encounter.  Admitting Diagnosis Code/Description:  Acute liver failure [K72.00]  Shock liver [K72.00]  Metastatic disease (HCC) [C79.9]  LETICIA (acute kidney injury) (HCC) [N17.9]  Acute encephalopathy [G93.40]  AMS (altered mental status) [R41.82]     UTILIZATION REVIEW CONTACT:  Mishel Warren, Utilization   Network Utilization Review Department  Phone: [phone]  Fax [phone]  Email: [email or web address]  Contact for approvals/pending authorizations, clinical reviews, and discharge.     PHYSICIAN ADVISORY SERVICES:  Medical Necessity Denial & [facility]ua[facility] Review  Phone: [phone]  Fax: [phone]  Email: [email or web address]     DISCHARGE SUPPORT TEAM:  For Patients Discharge Needs & Updates  Phone: [phone] opt. 2 Fax: [phone]  Email: [email or web address]

## 2024-10-08 NOTE — ASSESSMENT & PLAN NOTE
Markedly elevated LFTs and elevated ammonia level.  CT noted. F/u liver US.  Given NAC in ED.  Start on lactulose and continue with empiric IV zosyn.  Trend LFTs and ammonia levels.  GI consulted and recs appreciated.

## 2024-10-08 NOTE — PLAN OF CARE
Problem: PAIN - ADULT  Goal: Verbalizes/displays adequate comfort level or baseline comfort level  Description: Interventions:  - Encourage patient to monitor pain and request assistance  - Assess pain using appropriate pain scale  - Administer analgesics based on type and severity of pain and evaluate response  - Implement non-pharmacological measures as appropriate and evaluate response  - Consider cultural and social influences on pain and pain management  - Notify physician/advanced practitioner if interventions unsuccessful or patient reports new pain  Outcome: Progressing     Problem: INFECTION - ADULT  Goal: Absence or prevention of progression during hospitalization  Description: INTERVENTIONS:  - Assess and monitor for signs and symptoms of infection  - Monitor lab/diagnostic results  - Monitor all insertion sites, i.e. indwelling lines, tubes, and drains  - Monitor endotracheal if appropriate and nasal secretions for changes in amount and color  - [place] appropriate cooling/warming therapies per order  - Administer medications as ordered  - Instruct and encourage patient and family to use good hand hygiene technique  - Identify and instruct in appropriate isolation precautions for identified infection/condition  Outcome: Progressing  Goal: Absence of fever/infection during neutropenic period  Description: INTERVENTIONS:  - Monitor WBC    Outcome: Progressing     Problem: SAFETY ADULT  Goal: Patient will remain free of falls  Description: INTERVENTIONS:  - Educate patient/family on patient safety including physical limitations  - Instruct patient to call for assistance with activity   - Consult OT/PT to assist with strengthening/mobility   - Keep Call bell within reach  - Keep bed low and locked with side rails adjusted as appropriate  - Keep care items and personal belongings within reach  - Initiate and maintain comfort rounds  - Make Fall Risk Sign visible to staff  - Offer Toileting every 2 Hours,  in advance of need  - Initiate/Maintain bed alarm  - Obtain necessary fall risk management equipment: walker  - Apply yellow socks and bracelet for high fall risk patients  - Consider moving patient to room near nurses station  Outcome: Progressing  Goal: Maintain or return to baseline ADL function  Description: INTERVENTIONS:  -  Assess patient's ability to carry out ADLs; assess patient's baseline for ADL function and identify physical deficits which impact ability to perform ADLs (bathing, care of mouth/teeth, toileting, grooming, dressing, etc.)  - Assess/evaluate cause of self-care deficits   - Assess range of motion  - Assess patient's mobility; develop plan if impaired  - Assess patient's need for assistive devices and provide as appropriate  - Encourage maximum independence but intervene and supervise when necessary  - Involve family in performance of ADLs  - Assess for home care needs following discharge   - Consider OT consult to assist with ADL evaluation and planning for discharge  - Provide patient education as appropriate  Outcome: Progressing  Goal: Maintains/Returns to pre admission functional level  Description: INTERVENTIONS:  - Perform AM-PAC 6 Click Basic Mobility/ Daily Activity assessment daily.  - Set and communicate daily mobility goal to care team and patient/family/caregiver.   - Collaborate with rehabilitation services on mobility goals if consulted  - Perform Range of Motion 2 times a day.  - Reposition patient every 2 hours.  - Dangle patient 2 times a day  - Stand patient 2 times a day  - Ambulate patient 2 times a day  - Out of bed to chair 2 times a day   - Out of bed for meals 3 times a day  - Out of bed for toileting  - Record patient progress and toleration of activity level   Outcome: Progressing

## 2024-10-09 ENCOUNTER — APPOINTMENT (INPATIENT)
Dept: NON INVASIVE DIAGNOSTICS | Facility: HOSPITAL | Age: 64
DRG: 279 | End: 2024-10-09
Payer: COMMERCIAL

## 2024-10-09 ENCOUNTER — TELEPHONE (OUTPATIENT)
Dept: HEMATOLOGY ONCOLOGY | Facility: CLINIC | Age: 64
End: 2024-10-09

## 2024-10-09 ENCOUNTER — DOCUMENTATION (OUTPATIENT)
Dept: INFUSION CENTER | Facility: CLINIC | Age: 64
End: 2024-10-09

## 2024-10-09 PROBLEM — K76.82 HEPATIC ENCEPHALOPATHY (HCC): Status: ACTIVE | Noted: 2024-03-14

## 2024-10-09 LAB
ACTIN IGG SERPL-ACNC: 5 UNITS (ref 0–19)
ALBUMIN SERPL BCG-MCNC: 2.8 G/DL (ref 3.5–5)
ALP SERPL-CCNC: 211 U/L (ref 34–104)
ALT SERPL W P-5'-P-CCNC: 2267 U/L (ref 7–52)
ANION GAP SERPL CALCULATED.3IONS-SCNC: 9 MMOL/L (ref 4–13)
ANISOCYTOSIS BLD QL SMEAR: PRESENT
AORTIC ROOT: 2.9 CM
APICAL FOUR CHAMBER EJECTION FRACTION: 56 %
ASCENDING AORTA: 2.9 CM
AST SERPL W P-5'-P-CCNC: 889 U/L (ref 13–39)
BASOPHILS # BLD MANUAL: 0 THOUSAND/UL (ref 0–0.1)
BASOPHILS NFR MAR MANUAL: 0 % (ref 0–1)
BILIRUB SERPL-MCNC: 1.33 MG/DL (ref 0.2–1)
BSA FOR ECHO PROCEDURE: 2.03 M2
BUN SERPL-MCNC: 80 MG/DL (ref 5–25)
CALCIUM ALBUM COR SERPL-MCNC: 8 MG/DL (ref 8.3–10.1)
CALCIUM SERPL-MCNC: 7 MG/DL (ref 8.4–10.2)
CHLORIDE SERPL-SCNC: 107 MMOL/L (ref 96–108)
CO2 SERPL-SCNC: 24 MMOL/L (ref 21–32)
CREAT SERPL-MCNC: 2.86 MG/DL (ref 0.6–1.3)
E WAVE DECELERATION TIME: 299 MS
E/A RATIO: 1.12
EBV DNA # SPEC NAA+PROBE: NOT DETECTED {COPIES}/ML
EOSINOPHIL # BLD MANUAL: 0 THOUSAND/UL (ref 0–0.4)
EOSINOPHIL NFR BLD MANUAL: 0 % (ref 0–6)
ERYTHROCYTE [DISTWIDTH] IN BLOOD BY AUTOMATED COUNT: 19 % (ref 11.6–15.1)
FRACTIONAL SHORTENING: 38 (ref 28–44)
GFR SERPL CREATININE-BSD FRML MDRD: 17 ML/MIN/1.73SQ M
GLUCOSE SERPL-MCNC: 124 MG/DL (ref 65–140)
HCT VFR BLD AUTO: 35.1 % (ref 34.8–46.1)
HGB BLD-MCNC: 10.4 G/DL (ref 11.5–15.4)
INR PPP: 1.63 (ref 0.85–1.19)
INTERVENTRICULAR SEPTUM IN DIASTOLE (PARASTERNAL SHORT AXIS VIEW): 1.2 CM
INTERVENTRICULAR SEPTUM: 1.2 CM (ref 0.6–1.1)
LA/AORTA RATIO 2D: 1.07
LAAS-AP2: 17.6 CM2
LAAS-AP4: 16.9 CM2
LEFT ATRIUM SIZE: 3.1 CM
LEFT ATRIUM VOLUME (MOD BIPLANE): 44 ML
LEFT ATRIUM VOLUME INDEX (MOD BIPLANE): 21.6 ML/M2
LEFT INTERNAL DIMENSION IN SYSTOLE: 2.8 CM (ref 2.1–4)
LEFT VENTRICULAR INTERNAL DIMENSION IN DIASTOLE: 4.5 CM (ref 3.5–6)
LEFT VENTRICULAR POSTERIOR WALL IN END DIASTOLE: 1.2 CM
LEFT VENTRICULAR STROKE VOLUME: 65 ML
LVSV (TEICH): 65 ML
LYMPHOCYTES # BLD AUTO: 0.71 THOUSAND/UL (ref 0.6–4.47)
LYMPHOCYTES # BLD AUTO: 7 % (ref 14–44)
MCH RBC QN AUTO: 28 PG (ref 26.8–34.3)
MCHC RBC AUTO-ENTMCNC: 29.6 G/DL (ref 31.4–37.4)
MCV RBC AUTO: 94 FL (ref 82–98)
MONOCYTES # BLD AUTO: 0.2 THOUSAND/UL (ref 0–1.22)
MONOCYTES NFR BLD: 2 % (ref 4–12)
MV E'TISSUE VEL-SEP: 8 CM/S
MV PEAK A VEL: 0.84 M/S
MV PEAK E VEL: 94 CM/S
MV STENOSIS PRESSURE HALF TIME: 88 MS
MV VALVE AREA P 1/2 METHOD: 2.5
NEUTROPHILS # BLD MANUAL: 9.23 THOUSAND/UL (ref 1.85–7.62)
NEUTS BAND NFR BLD MANUAL: 7 % (ref 0–8)
NEUTS SEG NFR BLD AUTO: 84 % (ref 43–75)
PLATELET # BLD AUTO: 87 THOUSANDS/UL (ref 149–390)
PLATELET BLD QL SMEAR: ABNORMAL
PMV BLD AUTO: 10.6 FL (ref 8.9–12.7)
POTASSIUM SERPL-SCNC: 3.9 MMOL/L (ref 3.5–5.3)
PROT SERPL-MCNC: 5.1 G/DL (ref 6.4–8.4)
PROTHROMBIN TIME: 20.1 SECONDS (ref 12.3–15)
RBC # BLD AUTO: 3.72 MILLION/UL (ref 3.81–5.12)
RBC MORPH BLD: PRESENT
RIGHT VENTRICLE ID DIMENSION: 3.4 CM
SL CV LV EF: 65
SL CV PED ECHO LEFT VENTRICLE DIASTOLIC VOLUME (MOD BIPLANE) 2D: 94 ML
SL CV PED ECHO LEFT VENTRICLE SYSTOLIC VOLUME (MOD BIPLANE) 2D: 29 ML
SODIUM SERPL-SCNC: 140 MMOL/L (ref 135–147)
TR MAX PG: 40 MMHG
TR PEAK VELOCITY: 2.8 M/S
TRICUSPID ANNULAR PLANE SYSTOLIC EXCURSION: 2.6 CM
TRICUSPID VALVE PEAK REGURGITATION VELOCITY: 2.8 M/S
WBC # BLD AUTO: 10.14 THOUSAND/UL (ref 4.31–10.16)

## 2024-10-09 PROCEDURE — 85027 COMPLETE CBC AUTOMATED: CPT

## 2024-10-09 PROCEDURE — 85610 PROTHROMBIN TIME: CPT

## 2024-10-09 PROCEDURE — 87799 DETECT AGENT NOS DNA QUANT: CPT | Performed by: INTERNAL MEDICINE

## 2024-10-09 PROCEDURE — 93306 TTE W/DOPPLER COMPLETE: CPT

## 2024-10-09 PROCEDURE — 99233 SBSQ HOSP IP/OBS HIGH 50: CPT | Performed by: INTERNAL MEDICINE

## 2024-10-09 PROCEDURE — 93306 TTE W/DOPPLER COMPLETE: CPT | Performed by: INTERNAL MEDICINE

## 2024-10-09 PROCEDURE — 99232 SBSQ HOSP IP/OBS MODERATE 35: CPT | Performed by: INTERNAL MEDICINE

## 2024-10-09 PROCEDURE — 80053 COMPREHEN METABOLIC PANEL: CPT

## 2024-10-09 PROCEDURE — 85007 BL SMEAR W/DIFF WBC COUNT: CPT

## 2024-10-09 RX ORDER — LACTULOSE 10 G/15ML
20 SOLUTION ORAL DAILY
Status: DISCONTINUED | OUTPATIENT
Start: 2024-10-10 | End: 2024-10-16 | Stop reason: HOSPADM

## 2024-10-09 RX ORDER — LIDOCAINE 50 MG/G
1 PATCH TOPICAL DAILY
Status: DISCONTINUED | OUTPATIENT
Start: 2024-10-09 | End: 2024-10-16 | Stop reason: HOSPADM

## 2024-10-09 RX ORDER — HYDROMORPHONE HCL/PF 1 MG/ML
0.5 SYRINGE (ML) INJECTION EVERY 6 HOURS PRN
Status: DISCONTINUED | OUTPATIENT
Start: 2024-10-09 | End: 2024-10-16

## 2024-10-09 RX ORDER — DICYCLOMINE HYDROCHLORIDE 10 MG/1
10 CAPSULE ORAL
Status: DISCONTINUED | OUTPATIENT
Start: 2024-10-09 | End: 2024-10-16 | Stop reason: HOSPADM

## 2024-10-09 RX ADMIN — DICYCLOMINE HYDROCHLORIDE 10 MG: 10 CAPSULE ORAL at 13:07

## 2024-10-09 RX ADMIN — LACTULOSE 20 G: 20 SOLUTION ORAL at 09:48

## 2024-10-09 RX ADMIN — HYDROMORPHONE HYDROCHLORIDE 0.5 MG: 1 INJECTION, SOLUTION INTRAMUSCULAR; INTRAVENOUS; SUBCUTANEOUS at 12:21

## 2024-10-09 RX ADMIN — PERFLUTREN 2 ML/MIN: 6.52 INJECTION, SUSPENSION INTRAVENOUS at 12:03

## 2024-10-09 RX ADMIN — ASPIRIN 325 MG ORAL TABLET 325 MG: 325 PILL ORAL at 09:47

## 2024-10-09 RX ADMIN — DICYCLOMINE HYDROCHLORIDE 10 MG: 10 CAPSULE ORAL at 17:50

## 2024-10-09 RX ADMIN — PIPERACILLIN AND TAZOBACTAM 4.5 G: 36; 4.5 INJECTION, POWDER, FOR SOLUTION INTRAVENOUS at 04:48

## 2024-10-09 RX ADMIN — LIDOCAINE 1 PATCH: 700 PATCH TOPICAL at 11:06

## 2024-10-09 RX ADMIN — PIPERACILLIN AND TAZOBACTAM 4.5 G: 36; 4.5 INJECTION, POWDER, FOR SOLUTION INTRAVENOUS at 17:50

## 2024-10-09 RX ADMIN — PIPERACILLIN AND TAZOBACTAM 4.5 G: 36; 4.5 INJECTION, POWDER, FOR SOLUTION INTRAVENOUS at 11:06

## 2024-10-09 RX ADMIN — METOPROLOL TARTRATE 25 MG: 25 TABLET, FILM COATED ORAL at 20:14

## 2024-10-09 RX ADMIN — METOPROLOL TARTRATE 25 MG: 25 TABLET, FILM COATED ORAL at 09:47

## 2024-10-09 RX ADMIN — AMLODIPINE BESYLATE 5 MG: 5 TABLET ORAL at 09:48

## 2024-10-09 NOTE — PROGRESS NOTES
For encounter on 10/4/24     Patient to ED for hallucinations and AMS. CADD pump to be dc'd early due to ED visit and admission that followed. CADD pump appears to be delfated with inner chamber still full. CADD pump dc'd by this RN on 10/4/24 at aprox. 1200pm  Port flushed and clamped for ED use.

## 2024-10-09 NOTE — ASSESSMENT & PLAN NOTE
Patient continues to have elevated LFTs and PT/INR, but they are downtrending    Liver US (10/8) - echogenic and heterogeneous liver with mild enlargement. Hypoechoic lesion within left hepatic lobe also noted. Refer to full report.  Radiology recommends gadolinium-enhanced MRI abd as f/u.  LEVAR negative, acute hepatitis panel negative, anti-smooth muscle antibody pending  Cardiac echo EF 65%, grade 1 diastolic dysfunction, no significant valvular disease  Plan   Appreciate GI recommendations  Hold lactulose for now in the setting of worsening diarrhea.  Continue IV fluids, consider discontinuing tomorrow to avoid volume overload.   Continue with course of IV zosyn (day 3)

## 2024-10-09 NOTE — PLAN OF CARE
Problem: INFECTION - ADULT  Goal: Absence or prevention of progression during hospitalization  Description: INTERVENTIONS:  - Assess and monitor for signs and symptoms of infection  - Monitor lab/diagnostic results  - Monitor all insertion sites, i.e. indwelling lines, tubes, and drains  - Monitor endotracheal if appropriate and nasal secretions for changes in amount and color  - [place] appropriate cooling/warming therapies per order  - Administer medications as ordered  - Instruct and encourage patient and family to use good hand hygiene technique  - Identify and instruct in appropriate isolation precautions for identified infection/condition  10/9/2024 1410 by Boby Mercado RN  Outcome: Progressing  10/9/2024 1410 by Boby Mercado RN  Outcome: Progressing     Problem: INFECTION - ADULT  Goal: Absence of fever/infection during neutropenic period  Description: INTERVENTIONS:  - Monitor WBC    10/9/2024 1410 by Boby Mercado RN  Outcome: Progressing  10/9/2024 1410 by Boby Mercado RN  Outcome: Progressing     Problem: RESPIRATORY - ADULT  Goal: Achieves optimal ventilation and oxygenation  Description: INTERVENTIONS:  - Assess for changes in respiratory status  - Assess for changes in mentation and behavior  - Position to facilitate oxygenation and minimize respiratory effort  - Oxygen administered by appropriate delivery if ordered  - Initiate smoking cessation education as indicated  - Encourage broncho-pulmonary hygiene including cough, deep breathe, Incentive Spirometry  - Assess the need for suctioning and aspirate as needed  - Assess and instruct to report SOB or any respiratory difficulty  - Respiratory Therapy support as indicated  Outcome: Progressing     Problem: GASTROINTESTINAL - ADULT  Goal: Minimal or absence of nausea and/or vomiting  Description: INTERVENTIONS:  - Administer IV fluids if ordered to ensure adequate hydration  - Maintain NPO status until nausea and vomiting are resolved  -  Nasogastric tube if ordered  - Administer ordered antiemetic medications as needed  - Provide nonpharmacologic comfort measures as appropriate  - Advance diet as tolerated, if ordered  - Consider nutrition services referral to assist patient with adequate nutrition and appropriate food choices  Outcome: Progressing     Problem: GENITOURINARY - ADULT  Goal: Maintains or returns to baseline urinary function  Description: INTERVENTIONS:  - Assess urinary function  - Encourage oral fluids to ensure adequate hydration if ordered  - Administer IV fluids as ordered to ensure adequate hydration  - Administer ordered medications as needed  - Offer frequent toileting  - Follow urinary retention protocol if ordered  Outcome: Progressing

## 2024-10-09 NOTE — PROGRESS NOTES
Progress Note - Hospitalist   Name: Dwaine Gould 63 y.o. female I MRN: [id number]  Unit/Bed#: -01 I Date of Admission: 10/7/2024   Date of Service: 10/9/2024 I Hospital Day: 2    Assessment & Plan  Shock liver  Patient continues to have elevated LFTs and PT/INR, but they are downtrending    Liver US (10/8) - echogenic and heterogeneous liver with mild enlargement. Hypoechoic lesion within left hepatic lobe also noted. Refer to full report.  Radiology recommends gadolinium-enhanced MRI abd as f/u.  LEVAR negative, acute hepatitis panel negative, anti-smooth muscle antibody pending  Cardiac echo EF 65%, grade 1 diastolic dysfunction, no significant valvular disease  Plan   Appreciate GI recommendations  Hold lactulose for now in the setting of worsening diarrhea.  Continue IV fluids, consider discontinuing tomorrow to avoid volume overload.   Continue with course of IV zosyn (day 3)      LETICIA (acute kidney injury) (HCC)  Likely in the setting of shock liver. Urine studies reviewed.  Cr and BUN elevated but slowly downtrending. Monitor and continue IV fluids.  Consider nephrology consult if labs do not improve by tomorrow possible hepatorenal syndrome  Monitor electrolytes and trend renal function.  Essential hypertension  Continue amlodipine 5mg daily, lisinopril 10mg daily, and metoprolol 25mg BID.  Monitor BP.  Hyperlipidemia  Hold atorvastatin given ongoing shock liver and risk of hepatotoxicity  Hepatic encephalopathy (HCC)      VTE Pharmacologic Prophylaxis:   Moderate Risk (Score 3-4) - Pharmacological DVT Prophylaxis Contraindicated. Sequential Compression Devices Ordered.    Mobility:   Basic Mobility Inpatient Raw Score: 17  JH-HLM Goal: 5: Stand one or more mins  JH-HLM Achieved: 6: Walk 10 steps or more  JH-HLM Goal achieved. Continue to encourage appropriate mobility.    Patient Centered Rounds: I performed bedside rounds with nursing staff today.  Discussions with Specialists or Other Care Team  Provider:  IP CONSULT TO GASTROENTEROLOGY  IP CONSULT TO HEMATOLOGY      Education and Discussions with Family / Patient: Patient's daughter was present in the room.    Current Length of Stay: 2 day(s)  Current Patient Status: Inpatient   Certification Statement: The patient will continue to require additional inpatient hospital stay due to plan as noted above  Discharge Plan: Anticipate discharge in 48-72 hrs to home.    Code Status: Level 3 - DNAR and DNI    Subjective:   Patient is alert, oriented, and feeling well today. She reports some weakness, but denies headaches, dizziness, confusion overnight. Patient had 2 loose bowel movements this morning with occasional nausea. She also reports some shortness of breath but states this is her baseline. Denies chest pain, palpitations.    Objective:     Vitals:   Temp (24hrs), Av.4 °F (36.3 °C), Min:97.2 °F (36.2 °C), Max:97.6 °F (36.4 °C)    Temp:  [97.2 °F (36.2 °C)-97.6 °F (36.4 °C)] 97.6 °F (36.4 °C)  HR:  [63-69] 63  Resp:  [18] 18  BP: (115-120)/(55-62) 120/62  SpO2:  [95 %] 95 %  Body mass index is 47.85 kg/m².     Input and Output Summary (last 24 hours):     Intake/Output Summary (Last 24 hours) at 10/9/2024 1449  Last data filed at 10/9/2024 0501  Gross per 24 hour   Intake 600 ml   Output --   Net 600 ml       Physical Exam:   Physical Exam  Constitutional:       Comments: Patient is tired-appearing.   HENT:      Head: Normocephalic and atraumatic.   Cardiovascular:      Rate and Rhythm: Normal rate and regular rhythm.      Heart sounds: Normal heart sounds.   Pulmonary:      Effort: Pulmonary effort is normal.      Breath sounds: Normal breath sounds.   Abdominal:      General: Abdomen is flat.      Palpations: Abdomen is soft.   Musculoskeletal:      Right lower leg: Edema present.      Left lower leg: Edema present.   Skin:     General: Skin is warm and dry.   Neurological:      Mental Status: She is alert.         Additional Data:     Labs:  Results  from last 7 days   Lab Units 10/09/24  0450 10/07/24  2052 10/04/24  1040   WBC Thousand/uL 10.14   < > 11.37*   HEMOGLOBIN g/dL 10.4*   < > 11.5   HEMATOCRIT % 35.1   < > 40.6   PLATELETS Thousands/uL 87*   < > 252   BANDS PCT % 7   < >  --    SEGS PCT %  --   --  82*   LYMPHO PCT % 7*   < > 11*   MONO PCT % 2*   < > 5   EOS PCT % 0   < > 1    < > = values in this interval not displayed.     Results from last 7 days   Lab Units 10/09/24  0450   SODIUM mmol/L 140   POTASSIUM mmol/L 3.9   CHLORIDE mmol/L 107   CO2 mmol/L 24   BUN mg/dL 80*   CREATININE mg/dL 2.86*   ANION GAP mmol/L 9   CALCIUM mg/dL 7.0*   ALBUMIN g/dL 2.8*   TOTAL BILIRUBIN mg/dL 1.33*   ALK PHOS U/L 211*   ALT U/L 2,267*   AST U/L 889*   GLUCOSE RANDOM mg/dL 124     Results from last 7 days   Lab Units 10/09/24  0450   INR  1.63*     Results from last 7 days   Lab Units 10/07/24  2054   POC GLUCOSE mg/dl 113         Results from last 7 days   Lab Units 10/07/24  2219 10/07/24  2052 10/04/24  1040   LACTIC ACID mmol/L 2.1* 2.8* 0.7   PROCALCITONIN ng/ml  --  1.67* 0.09       Lines/Drains:  Invasive Devices       Central Venous Catheter Line  Duration             Port A Cath 06/24/24 Right Internal jugular 107 days              Peripheral Intravenous Line  Duration             Peripheral IV 10/07/24 Dorsal (posterior);Right Hand 1 day    Peripheral IV 10/07/24 Right Antecubital 1 day    Peripheral IV 10/09/24 Left;Ventral (anterior) Forearm <1 day                    Central Line:  Goal for removal: Will discontinue when hemodynamically stable.             Imaging: Reviewed radiology reports from this admission including:    US right upper quadrant    Result Date: 10/8/2024  Impression: 1.  Diffusely echogenic and heterogeneous appearance of the liver. There is moderately heterogeneous and increased echogenicity throughout the visible hepatic parenchyma, with posterior attenuation of the sound waves. The liver is mildly enlarged. There is a possible  hypoechoic lesion within the left hepatic lobe, which cannot be definitively characterized on this exam. Follow-up evaluation by gadolinium-enhanced MRI of the abdomen is recommended. The study was marked in EPIC for significant notification. Workstation performed: [id number]     XR chest portable    Result Date: 10/8/2024  Impression: Findings suggestive of mild central pulmonary vascular congestion. Workstation performed: [id number]     CT chest abdomen pelvis wo contrast    Result Date: 10/7/2024  Impression: 1.  No evidence of acute traumatic injury. No identifiable acute abnormality to account for the patient's clinical presentation. 2.  Multiple new and/or enlarging pulmonary nodules, consistent with aggressive metastatic disease. 3.  Diffusely heterogeneous appearance of the liver. While this may at least partially represent inhomogeneous hepatic steatosis, underlying lesions cannot be excluded without IV contrast. 4.  Mild diffuse anasarca. The study was marked in EPIC for significant notification. Workstation performed: [id number]     CT head without contrast    Result Date: 10/7/2024  Impression: No acute intracranial abnormality.  Stable chronic microangiopathic changes within the brain. Workstation performed: [id number]       XR chest portable    Result Date: 10/8/2024  Impression Findings suggestive of mild central pulmonary vascular congestion. Workstation performed: [id number]        Results for orders placed during the hospital encounter of 05/16/24    Echo complete w/ contrast if indicated    Interpretation Summary    Left Ventricle: Left ventricular cavity size is normal. Wall thickness is mildly increased. The left ventricular ejection fraction is 65%. Systolic function is normal. Although no diagnostic regional wall motion abnormality was identified, this possibility cannot be completely excluded on the basis of this study. Diastolic function is normal for age.    Left Atrium: The atrium is mildly  dilated.    Mitral Valve: There is mild regurgitation.      Recent Cultures (last 7 days):         Last 24 Hours Medication List:   Current Facility-Administered Medications   Medication Dose Route Frequency Provider Last Rate    aspirin  325 mg Oral Daily Theodore Wei MD      dicyclomine  10 mg Oral TID AC Aviva Grigsby PA-C      HYDROmorphone  0.5 mg Intravenous Q6H PRN Leo Tompkins MD      [START ON 10/10/2024] lactulose  20 g Oral Daily Aviva Grigsby PA-C      lactulose  200 g Rectal Once Maru Rodríguez PA-C      lidocaine  1 patch Topical Daily Leo Tompkins MD      metoprolol tartrate  25 mg Oral Q12H ANGELES Theodore Wei MD      multi-electrolyte  50 mL/hr Intravenous Continuous Leo Tompkins MD 50 mL/hr (10/08/24 1415)    piperacillin-tazobactam  4.5 g Intravenous Q8H Theodore Wei MD 4.5 g (10/09/24 1106)        Today, Patient Was Seen By: Brittanie Soni MD, MS3 and the attending physician, Leo Tompkins,*        **Please Note: This note may have been constructed using a voice recognition system.**

## 2024-10-09 NOTE — ASSESSMENT & PLAN NOTE
Likely in the setting of shock liver. Urine studies reviewed.  Cr and BUN elevated but slowly downtrending. Monitor and continue IV fluids.  Consider nephrology consult if labs do not improve by tomorrow possible hepatorenal syndrome  Monitor electrolytes and trend renal function.

## 2024-10-09 NOTE — PROGRESS NOTES
Progress Note - Dwaine Gould 63 y.o. female MRN: [id number]    Unit/Bed#: -01 Encounter: [id number]    Assessment and Plan:    Transaminitis  - Presented on 10/7 with altered mental status, significantly elevated LFTs, and LETICIA  - Likely 2/2 shock liver, suspect she was having hypotensive episodes at home prior to admission  - LFTs rapidly improving which is consistent with shock liver; INR stable and has improved  - LEVAR neg, acute hep panel neg; anti-smooth muscle ab pending  - NAC completed empirically on admission; tylenol level undetectable on admission  - Continue to trend LFTs and INR  - Echo pending to re-evaluate heart function given history of CHF, this could exacerbate her elevated LFTs but would not be the sole cause of it given the level of elevation      Lower Abdominal Pain  Acute Diarrhea  - Having lower abdominal pain with diarrhea over the past several days which is consistent with her prior chemotherapy reaction  - Will back off on lactulose to daily dosing, continue to monitor mental status  - Add bentyl 10 mg TID AC        Subjective:Dwaine is having a lot of lower abdominal cramping and diarrhea. This is typical after her chemotherapy sessions but was also started on lactulose on admission.     Objective:     Vitals: Blood pressure 120/62, pulse 63, temperature 97.6 °F (36.4 °C), resp. rate 18, height 5' (1.524 m), weight 111 kg (245 lb), SpO2 95%.,Body mass index is 47.85 kg/m².      Intake/Output Summary (Last 24 hours) at 10/9/2024 1356  Last data filed at 10/9/2024 0501  Gross per 24 hour   Intake 600 ml   Output --   Net 600 ml       Physical Exam:     General Appearance: Alert, oriented x 3, appears uncomfortable from her abdomen  Lungs: Clear to auscultation bilaterally, no rales or rhonchi, no labored breathing/accessory muscle use  Heart: Regular rate and rhythm, S1, S2 normal, no murmur, click, rub or gallop  Abdomen: Non-distended, soft, BS active, (+) mild TTP in bilateral lower  quadrants  Extremities: No cyanosis, edema    Invasive Devices       Central Venous Catheter Line  Duration             Port A Cath 06/24/24 Right Internal jugular 107 days              Peripheral Intravenous Line  Duration             Peripheral IV 10/07/24 Dorsal (posterior);Right Hand 1 day    Peripheral IV 10/07/24 Right Antecubital 1 day                    Lab Results:  Results from last 7 days   Lab Units 10/09/24  0450 10/07/24  2052 10/04/24  1040   WBC Thousand/uL 10.14   < > 11.37*   HEMOGLOBIN g/dL 10.4*   < > 11.5   HEMATOCRIT % 35.1   < > 40.6   PLATELETS Thousands/uL 87*   < > 252   SEGS PCT %  --   --  82*   LYMPHO PCT % 7*   < > 11*   MONO PCT % 2*   < > 5   EOS PCT % 0   < > 1    < > = values in this interval not displayed.     Results from last 7 days   Lab Units 10/09/24  0450   POTASSIUM mmol/L 3.9   CHLORIDE mmol/L 107   CO2 mmol/L 24   BUN mg/dL 80*   CREATININE mg/dL 2.86*   CALCIUM mg/dL 7.0*   ALK PHOS U/L 211*   ALT U/L 2,267*   AST U/L 889*     Results from last 7 days   Lab Units 10/09/24  0450   INR  1.63*           Imaging Studies: I have personally reviewed pertinent imaging studies.    CT chest abdomen pelvis wo contrast    Addendum Date: 10/9/2024    ADDENDUM: The report should also state that there is increased nodularity within the anterior pelvic fat/omentum, best seen on series 2, image 187. This is new since the prior abdominal CT and consistent with disease progression/carcinomatosis.    Result Date: 10/9/2024  Impression: 1.  No evidence of acute traumatic injury. No identifiable acute abnormality to account for the patient's clinical presentation. 2.  Multiple new and/or enlarging pulmonary nodules, consistent with aggressive metastatic disease. 3.  Diffusely heterogeneous appearance of the liver. While this may at least partially represent inhomogeneous hepatic steatosis, underlying lesions cannot be excluded without IV contrast. 4.  Mild diffuse anasarca. The study was  marked in EPIC for significant notification. Workstation performed: [id number]     US right upper quadrant    Result Date: 10/8/2024  Impression: 1.  Diffusely echogenic and heterogeneous appearance of the liver. There is moderately heterogeneous and increased echogenicity throughout the visible hepatic parenchyma, with posterior attenuation of the sound waves. The liver is mildly enlarged. There is a possible hypoechoic lesion within the left hepatic lobe, which cannot be definitively characterized on this exam. Follow-up evaluation by gadolinium-enhanced MRI of the abdomen is recommended. The study was marked in EPIC for significant notification. Workstation performed: [id number]     XR chest portable    Result Date: 10/8/2024  Impression: Findings suggestive of mild central pulmonary vascular congestion. Workstation performed: [id number]     CT head without contrast    Result Date: 10/7/2024  Impression: No acute intracranial abnormality.  Stable chronic microangiopathic changes within the brain. Workstation performed: [id number]

## 2024-10-09 NOTE — PLAN OF CARE
Problem: PAIN - ADULT  Goal: Verbalizes/displays adequate comfort level or baseline comfort level  Description: Interventions:  - Encourage patient to monitor pain and request assistance  - Assess pain using appropriate pain scale  - Administer analgesics based on type and severity of pain and evaluate response  - Implement non-pharmacological measures as appropriate and evaluate response  - Consider cultural and social influences on pain and pain management  - Notify physician/advanced practitioner if interventions unsuccessful or patient reports new pain  Outcome: Progressing     Problem: INFECTION - ADULT  Goal: Absence or prevention of progression during hospitalization  Description: INTERVENTIONS:  - Assess and monitor for signs and symptoms of infection  - Monitor lab/diagnostic results  - Monitor all insertion sites, i.e. indwelling lines, tubes, and drains  - Monitor endotracheal if appropriate and nasal secretions for changes in amount and color  - [place] appropriate cooling/warming therapies per order  - Administer medications as ordered  - Instruct and encourage patient and family to use good hand hygiene technique  - Identify and instruct in appropriate isolation precautions for identified infection/condition  Outcome: Progressing  Goal: Absence of fever/infection during neutropenic period  Description: INTERVENTIONS:  - Monitor WBC    Outcome: Progressing     Problem: SAFETY ADULT  Goal: Patient will remain free of falls  Description: INTERVENTIONS:  - Educate patient/family on patient safety including physical limitations  - Instruct patient to call for assistance with activity   - Consult OT/PT to assist with strengthening/mobility   - Keep Call bell within reach  - Keep bed low and locked with side rails adjusted as appropriate  - Keep care items and personal belongings within reach  - Initiate and maintain comfort rounds  - Make Fall Risk Sign visible to staff  - Offer Toileting every 2 Hours,  in advance of need  - Initiate/Maintain bed alarm  - Obtain necessary fall risk management equipment: bed alarm  - Apply yellow socks and bracelet for high fall risk patients  - Consider moving patient to room near nurses station  Outcome: Progressing  Goal: Maintain or return to baseline ADL function  Description: INTERVENTIONS:  -  Assess patient's ability to carry out ADLs; assess patient's baseline for ADL function and identify physical deficits which impact ability to perform ADLs (bathing, care of mouth/teeth, toileting, grooming, dressing, etc.)  - Assess/evaluate cause of self-care deficits   - Assess range of motion  - Assess patient's mobility; develop plan if impaired  - Assess patient's need for assistive devices and provide as appropriate  - Encourage maximum independence but intervene and supervise when necessary  - Involve family in performance of ADLs  - Assess for home care needs following discharge   - Consider OT consult to assist with ADL evaluation and planning for discharge  - Provide patient education as appropriate  Outcome: Progressing  Goal: Maintains/Returns to pre admission functional level  Description: INTERVENTIONS:  - Perform AM-PAC 6 Click Basic Mobility/ Daily Activity assessment daily.  - Set and communicate daily mobility goal to care team and patient/family/caregiver.   - Collaborate with rehabilitation services on mobility goals if consulted  - Perform Range of Motion 3 times a day.  - Reposition patient every 3 hours.  - Dangle patient 3 times a day  - Stand patient 3 times a day  - Ambulate patient 3 times a day  - Out of bed to chair 3 times a day   - Out of bed for meals 3 times a day  - Out of bed for toileting  - Record patient progress and toleration of activity level   Outcome: Progressing

## 2024-10-09 NOTE — QUICK NOTE
10/9/2028:  Please see consultation note of 10/8.  I also spoke with daughter.  She is concerned and I will go over her concerns regarding lesion in the lung.  I spoke with the hospitalist and they will again discuss a CEA level to her regimen.

## 2024-10-09 NOTE — ADDENDUM NOTE
Encounter addended by: Khalida Delgado RN on: 10/9/2024 10:12 AM   Actions taken: Clinical Note Signed

## 2024-10-09 NOTE — TELEPHONE ENCOUNTER
10/9/2024 time 5:30 PM called and left message with daughter.  I reviewed her x-rays.  Will speak with the daughter tomorrow.  They mother who is the patient is doing better.  The daughter is Eva Black

## 2024-10-10 ENCOUNTER — TELEPHONE (OUTPATIENT)
Dept: HEMATOLOGY ONCOLOGY | Facility: CLINIC | Age: 64
End: 2024-10-10

## 2024-10-10 LAB
ALBUMIN SERPL BCG-MCNC: 3 G/DL (ref 3.5–5)
ALP SERPL-CCNC: 203 U/L (ref 34–104)
ALT SERPL W P-5'-P-CCNC: 1783 U/L (ref 7–52)
ANION GAP SERPL CALCULATED.3IONS-SCNC: 10 MMOL/L (ref 4–13)
AST SERPL W P-5'-P-CCNC: 426 U/L (ref 13–39)
BASOPHILS # BLD AUTO: 0.02 THOUSANDS/ΜL (ref 0–0.1)
BASOPHILS NFR BLD AUTO: 0 % (ref 0–1)
BILIRUB SERPL-MCNC: 1.48 MG/DL (ref 0.2–1)
BUN SERPL-MCNC: 75 MG/DL (ref 5–25)
CALCIUM ALBUM COR SERPL-MCNC: 8 MG/DL (ref 8.3–10.1)
CALCIUM SERPL-MCNC: 7.2 MG/DL (ref 8.4–10.2)
CHLORIDE SERPL-SCNC: 106 MMOL/L (ref 96–108)
CO2 SERPL-SCNC: 21 MMOL/L (ref 21–32)
CREAT SERPL-MCNC: 2.6 MG/DL (ref 0.6–1.3)
EOSINOPHIL # BLD AUTO: 0.28 THOUSAND/ΜL (ref 0–0.61)
EOSINOPHIL NFR BLD AUTO: 3 % (ref 0–6)
ERYTHROCYTE [DISTWIDTH] IN BLOOD BY AUTOMATED COUNT: 19.4 % (ref 11.6–15.1)
GFR SERPL CREATININE-BSD FRML MDRD: 19 ML/MIN/1.73SQ M
GLUCOSE SERPL-MCNC: 106 MG/DL (ref 65–140)
HCT VFR BLD AUTO: 37.3 % (ref 34.8–46.1)
HGB BLD-MCNC: 11 G/DL (ref 11.5–15.4)
IMM GRANULOCYTES # BLD AUTO: 0.07 THOUSAND/UL (ref 0–0.2)
IMM GRANULOCYTES NFR BLD AUTO: 1 % (ref 0–2)
INR PPP: 1.33 (ref 0.85–1.19)
LYMPHOCYTES # BLD AUTO: 0.88 THOUSANDS/ΜL (ref 0.6–4.47)
LYMPHOCYTES NFR BLD AUTO: 8 % (ref 14–44)
MCH RBC QN AUTO: 28.4 PG (ref 26.8–34.3)
MCHC RBC AUTO-ENTMCNC: 29.5 G/DL (ref 31.4–37.4)
MCV RBC AUTO: 96 FL (ref 82–98)
MONOCYTES # BLD AUTO: 0.5 THOUSAND/ΜL (ref 0.17–1.22)
MONOCYTES NFR BLD AUTO: 5 % (ref 4–12)
NEUTROPHILS # BLD AUTO: 9.05 THOUSANDS/ΜL (ref 1.85–7.62)
NEUTS SEG NFR BLD AUTO: 83 % (ref 43–75)
NRBC BLD AUTO-RTO: 0 /100 WBCS
PLATELET # BLD AUTO: 70 THOUSANDS/UL (ref 149–390)
PMV BLD AUTO: 10.2 FL (ref 8.9–12.7)
POTASSIUM SERPL-SCNC: 4 MMOL/L (ref 3.5–5.3)
PROT SERPL-MCNC: 5.5 G/DL (ref 6.4–8.4)
PROTHROMBIN TIME: 17.2 SECONDS (ref 12.3–15)
RBC # BLD AUTO: 3.88 MILLION/UL (ref 3.81–5.12)
SODIUM SERPL-SCNC: 137 MMOL/L (ref 135–147)
WBC # BLD AUTO: 10.8 THOUSAND/UL (ref 4.31–10.16)

## 2024-10-10 PROCEDURE — 99233 SBSQ HOSP IP/OBS HIGH 50: CPT | Performed by: INTERNAL MEDICINE

## 2024-10-10 PROCEDURE — 99232 SBSQ HOSP IP/OBS MODERATE 35: CPT | Performed by: INTERNAL MEDICINE

## 2024-10-10 PROCEDURE — 80053 COMPREHEN METABOLIC PANEL: CPT

## 2024-10-10 PROCEDURE — 99254 IP/OBS CNSLTJ NEW/EST MOD 60: CPT | Performed by: INTERNAL MEDICINE

## 2024-10-10 PROCEDURE — 85610 PROTHROMBIN TIME: CPT

## 2024-10-10 PROCEDURE — 85025 COMPLETE CBC W/AUTO DIFF WBC: CPT

## 2024-10-10 RX ORDER — ALBUMIN (HUMAN) 12.5 G/50ML
50 SOLUTION INTRAVENOUS EVERY 6 HOURS
Status: COMPLETED | OUTPATIENT
Start: 2024-10-10 | End: 2024-10-11

## 2024-10-10 RX ORDER — CALCIUM GLUCONATE 20 MG/ML
2 INJECTION, SOLUTION INTRAVENOUS ONCE
Status: COMPLETED | OUTPATIENT
Start: 2024-10-10 | End: 2024-10-10

## 2024-10-10 RX ADMIN — CALCIUM GLUCONATE 2 G: 20 INJECTION, SOLUTION INTRAVENOUS at 10:00

## 2024-10-10 RX ADMIN — ALBUMIN (HUMAN) 50 G: 0.25 INJECTION, SOLUTION INTRAVENOUS at 10:13

## 2024-10-10 RX ADMIN — METOPROLOL TARTRATE 25 MG: 25 TABLET, FILM COATED ORAL at 08:16

## 2024-10-10 RX ADMIN — PIPERACILLIN AND TAZOBACTAM 4.5 G: 36; 4.5 INJECTION, POWDER, FOR SOLUTION INTRAVENOUS at 10:13

## 2024-10-10 RX ADMIN — DICYCLOMINE HYDROCHLORIDE 10 MG: 10 CAPSULE ORAL at 10:49

## 2024-10-10 RX ADMIN — DICYCLOMINE HYDROCHLORIDE 10 MG: 10 CAPSULE ORAL at 15:56

## 2024-10-10 RX ADMIN — PIPERACILLIN AND TAZOBACTAM 4.5 G: 36; 4.5 INJECTION, POWDER, FOR SOLUTION INTRAVENOUS at 01:39

## 2024-10-10 RX ADMIN — LIDOCAINE 1 PATCH: 700 PATCH TOPICAL at 08:16

## 2024-10-10 RX ADMIN — ALBUMIN (HUMAN) 50 G: 0.25 INJECTION, SOLUTION INTRAVENOUS at 15:56

## 2024-10-10 RX ADMIN — ALBUMIN (HUMAN) 50 G: 0.25 INJECTION, SOLUTION INTRAVENOUS at 21:00

## 2024-10-10 RX ADMIN — LACTULOSE 20 G: 20 SOLUTION ORAL at 08:16

## 2024-10-10 RX ADMIN — ASPIRIN 325 MG ORAL TABLET 325 MG: 325 PILL ORAL at 08:16

## 2024-10-10 RX ADMIN — SODIUM CHLORIDE, SODIUM GLUCONATE, SODIUM ACETATE, POTASSIUM CHLORIDE, MAGNESIUM CHLORIDE, SODIUM PHOSPHATE, DIBASIC, AND POTASSIUM PHOSPHATE 50 ML/HR: .53; .5; .37; .037; .03; .012; .00082 INJECTION, SOLUTION INTRAVENOUS at 00:25

## 2024-10-10 RX ADMIN — DICYCLOMINE HYDROCHLORIDE 10 MG: 10 CAPSULE ORAL at 06:43

## 2024-10-10 NOTE — PLAN OF CARE
Problem: PAIN - ADULT  Goal: Verbalizes/displays adequate comfort level or baseline comfort level  Description: Interventions:  - Encourage patient to monitor pain and request assistance  - Assess pain using appropriate pain scale  - Administer analgesics based on type and severity of pain and evaluate response  - Implement non-pharmacological measures as appropriate and evaluate response  - Consider cultural and social influences on pain and pain management  - Notify physician/advanced practitioner if interventions unsuccessful or patient reports new pain  Outcome: Progressing     Problem: INFECTION - ADULT  Goal: Absence or prevention of progression during hospitalization  Description: INTERVENTIONS:  - Assess and monitor for signs and symptoms of infection  - Monitor lab/diagnostic results  - Monitor all insertion sites, i.e. indwelling lines, tubes, and drains  - Monitor endotracheal if appropriate and nasal secretions for changes in amount and color  - [place] appropriate cooling/warming therapies per order  - Administer medications as ordered  - Instruct and encourage patient and family to use good hand hygiene technique  - Identify and instruct in appropriate isolation precautions for identified infection/condition  Outcome: Progressing  Goal: Absence of fever/infection during neutropenic period  Description: INTERVENTIONS:  - Monitor WBC    Outcome: Progressing     Problem: SAFETY ADULT  Goal: Patient will remain free of falls  Description: INTERVENTIONS:  - Educate patient/family on patient safety including physical limitations  - Instruct patient to call for assistance with activity   - Consult OT/PT to assist with strengthening/mobility   - Keep Call bell within reach  - Keep bed low and locked with side rails adjusted as appropriate  - Keep care items and personal belongings within reach  - Initiate and maintain comfort rounds  - Make Fall Risk Sign visible to staff  - Offer Toileting every 2 Hours,  in advance of need  - Initiate/Maintain bed alarm  - Obtain necessary fall risk management equipment: yellow socks  - Apply yellow socks and bracelet for high fall risk patients  - Consider moving patient to room near nurses station  Outcome: Progressing  Goal: Maintain or return to baseline ADL function  Description: INTERVENTIONS:  -  Assess patient's ability to carry out ADLs; assess patient's baseline for ADL function and identify physical deficits which impact ability to perform ADLs (bathing, care of mouth/teeth, toileting, grooming, dressing, etc.)  - Assess/evaluate cause of self-care deficits   - Assess range of motion  - Assess patient's mobility; develop plan if impaired  - Assess patient's need for assistive devices and provide as appropriate  - Encourage maximum independence but intervene and supervise when necessary  - Involve family in performance of ADLs  - Assess for home care needs following discharge   - Consider OT consult to assist with ADL evaluation and planning for discharge  - Provide patient education as appropriate  Outcome: Progressing  Goal: Maintains/Returns to pre admission functional level  Description: INTERVENTIONS:  - Perform AM-PAC 6 Click Basic Mobility/ Daily Activity assessment daily.  - Set and communicate daily mobility goal to care team and patient/family/caregiver.   - Collaborate with rehabilitation services on mobility goals if consulted  - Perform Range of Motion 2 times a day.  - Reposition patient every 2 hours.  - Dangle patient 2 times a day  - Stand patient 2 times a day  - Ambulate patient 2 times a day  - Out of bed to chair 2 times a day   - Out of bed for meals 2 times a day  - Out of bed for toileting  - Record patient progress and toleration of activity level   Outcome: Progressing     Problem: Prexisting or High Potential for Compromised Skin Integrity  Goal: Skin integrity is maintained or improved  Description: INTERVENTIONS:  - Identify patients at risk  for skin breakdown  - Assess and monitor skin integrity  - Assess and monitor nutrition and hydration status  - Monitor labs   - Assess for incontinence   - Turn and reposition patient  - Assist with mobility/ambulation  - Relieve pressure over bony prominences  - Avoid friction and shearing  - Provide appropriate hygiene as needed including keeping skin clean and dry  - Evaluate need for skin moisturizer/barrier cream  - Collaborate with interdisciplinary team   - Patient/family teaching  - Consider wound care consult   Outcome: Progressing     Problem: RESPIRATORY - ADULT  Goal: Achieves optimal ventilation and oxygenation  Description: INTERVENTIONS:  - Assess for changes in respiratory status  - Assess for changes in mentation and behavior  - Position to facilitate oxygenation and minimize respiratory effort  - Oxygen administered by appropriate delivery if ordered  - Initiate smoking cessation education as indicated  - Encourage broncho-pulmonary hygiene including cough, deep breathe, Incentive Spirometry  - Assess the need for suctioning and aspirate as needed  - Assess and instruct to report SOB or any respiratory difficulty  - Respiratory Therapy support as indicated  Outcome: Progressing     Problem: GASTROINTESTINAL - ADULT  Goal: Minimal or absence of nausea and/or vomiting  Description: INTERVENTIONS:  - Administer IV fluids if ordered to ensure adequate hydration  - Maintain NPO status until nausea and vomiting are resolved  - Nasogastric tube if ordered  - Administer ordered antiemetic medications as needed  - Provide nonpharmacologic comfort measures as appropriate  - Advance diet as tolerated, if ordered  - Consider nutrition services referral to assist patient with adequate nutrition and appropriate food choices  Outcome: Progressing  Goal: Maintains or returns to baseline bowel function  Description: INTERVENTIONS:  - Assess bowel function  - Encourage oral fluids to ensure adequate hydration  -  Administer IV fluids if ordered to ensure adequate hydration  - Administer ordered medications as needed  - Encourage mobilization and activity  - Consider nutritional services referral to assist patient with adequate nutrition and appropriate food choices  Outcome: Progressing  Goal: Maintains adequate nutritional intake  Description: INTERVENTIONS:  - Monitor percentage of each meal consumed  - Identify factors contributing to decreased intake, treat as appropriate  - Assist with meals as needed  - Monitor I&O, weight, and lab values if indicated  - Obtain nutrition services referral as needed  Outcome: Progressing  Goal: Establish and maintain optimal ostomy function  Description: INTERVENTIONS:  - Assess bowel function  - Encourage oral fluids to ensure adequate hydration  - Administer IV fluids if ordered to ensure adequate hydration   - Administer ordered medications as needed  - Encourage mobilization and activity  - Nutrition services referral to assist patient with appropriate food choices  - Assess stoma site  - Consider wound care consult   Outcome: Progressing  Goal: Oral mucous membranes remain intact  Description: INTERVENTIONS  - Assess oral mucosa and hygiene practices  - Implement preventative oral hygiene regimen  - Implement oral medicated treatments as ordered  - Initiate Nutrition services referral as needed  Outcome: Progressing     Problem: GENITOURINARY - ADULT  Goal: Maintains or returns to baseline urinary function  Description: INTERVENTIONS:  - Assess urinary function  - Encourage oral fluids to ensure adequate hydration if ordered  - Administer IV fluids as ordered to ensure adequate hydration  - Administer ordered medications as needed  - Offer frequent toileting  - Follow urinary retention protocol if ordered  Outcome: Progressing  Goal: Absence of urinary retention  Description: INTERVENTIONS:  - Assess patient’s ability to void and empty bladder  - Monitor I/O  - Bladder scan as  needed  - Discuss with physician/AP medications to alleviate retention as needed  - Discuss catheterization for long term situations as appropriate  Outcome: Progressing  Goal: Urinary catheter remains patent  Description: INTERVENTIONS:  - Assess patency of urinary catheter  - If patient has a chronic wilson, consider changing catheter if non-functioning  - Follow guidelines for intermittent irrigation of non-functioning urinary catheter  Outcome: Progressing

## 2024-10-10 NOTE — CASE MANAGEMENT
Case Management Progress Note    Patient name Dwaine Gould  Location /-01 MRN [id number]  : 1960 Date 10/10/2024       LOS (days): 3  Geometric Mean LOS (GMLOS) (days): 3.2  Days to GMLOS:0.7        OBJECTIVE:        Current admission status: Inpatient  Preferred Pharmacy:   [facility]/pharm[facility] #[id number] - [place] - [address]  [place] [address]  Phone: [phone] Fax: [phone]    Primary Care Provider: Jordan Ramirez MD    Primary Insurance: [employer]  Secondary Insurance:     PROGRESS NOTE:  As per SLIM rounds, pt is anticipated for dc within 48-72hrs+. Pt has an AMPAC of 17. [facility] referrals in place for STR and HHC. CM continue to follow and assist with pt dc plans.

## 2024-10-10 NOTE — ASSESSMENT & PLAN NOTE
Likely in the setting of shock liver. Urine studies reviewed.  Cr and BUN continue to be elevated. Monitor and continue IV fluids.  Possible hepatorenal syndrome- Nephrology consulted, IV albumin 25% 50 g q6h x4 doses added to regimen.  Nephrology planning for urine lites checks and serial bladder scan  Monitor electrolytes and trend renal function.

## 2024-10-10 NOTE — PROGRESS NOTES
Progress Note - Dwaine Gould 63 y.o. female MRN: [id number]    Unit/Bed#: -01 Encounter: [id number]    Assessment and Plan:    Transaminitis  Encephalopathy  - Presented on 10/7 with altered mental status, significantly elevated LFTs, and LETICIA  - Likely 2/2 shock liver, suspect she was having hypotensive episodes at home prior to admission   - LFTs rapidly improving which is consistent with shock liver; INR stable and continues to improve indicating intact liver function  - LEVAR neg, acute hep panel neg; anti-smooth muscle ab negative  - NAC completed empirically on admission; tylenol level undetectable on admission  - US on admission with heterogeneous liver and questionable lesion, will need contrasted imaging as follow up to evaluate for metastatic disease once her renal function improves  - Continue to trend LFTs and INR until normalized  - Can discontinue lactulose in the next 48 hours as LFTs continue to improve       Lower Abdominal Pain  Acute Diarrhea  - Having lower abdominal pain with diarrhea over the past several days which is consistent with her prior chemotherapy reaction  - Much improved today with reducing lactulose dose and addition of dicyclomine      The patient will be seen by Dr. Ibarra. GI will sign off.       Subjective: She is feeling much better today, less diarrhea and less lower abdominal cramping. Oriented and not confused.     Objective:     Vitals: Blood pressure 111/61, pulse 61, temperature 97.6 °F (36.4 °C), resp. rate 17, height 5' (1.524 m), weight 112 kg (247 lb 12.8 oz), SpO2 97%.,Body mass index is 48.39 kg/m².      Intake/Output Summary (Last 24 hours) at 10/10/2024 1356  Last data filed at 10/9/2024 1748  Gross per 24 hour   Intake 240 ml   Output --   Net 240 ml       Physical Exam:     General Appearance: Alert, appears stated age and cooperative  Lungs: Clear to auscultation bilaterally, no rales or rhonchi, no labored breathing/accessory muscle use  Heart: Regular rate  and rhythm, S1, S2 normal, no murmur, click, rub or gallop  Abdomen: Soft, non-tender, non-distended; bowel sounds normal; no masses or no organomegaly  Extremities: No cyanosis, edema    Invasive Devices       Central Venous Catheter Line  Duration             Port A Cath 06/24/24 Right Internal jugular 108 days              Peripheral Intravenous Line  Duration             Peripheral IV 10/07/24 Dorsal (posterior);Right Hand 2 days    Peripheral IV 10/07/24 Right Antecubital 2 days    Peripheral IV 10/09/24 Left;Ventral (anterior) Forearm 1 day                    Lab Results:  Results from last 7 days   Lab Units 10/10/24  0654   WBC Thousand/uL 10.80*   HEMOGLOBIN g/dL 11.0*   HEMATOCRIT % 37.3   PLATELETS Thousands/uL 70*   SEGS PCT % 83*   LYMPHO PCT % 8*   MONO PCT % 5   EOS PCT % 3     Results from last 7 days   Lab Units 10/10/24  0512   POTASSIUM mmol/L 4.0   CHLORIDE mmol/L 106   CO2 mmol/L 21   BUN mg/dL 75*   CREATININE mg/dL 2.60*   CALCIUM mg/dL 7.2*   ALK PHOS U/L 203*   ALT U/L 1,783*   AST U/L 426*     Results from last 7 days   Lab Units 10/10/24  0654   INR  1.33*           Imaging Studies: I have personally reviewed pertinent imaging studies.    CT chest abdomen pelvis wo contrast    Addendum Date: 10/9/2024    ADDENDUM: The report should also state that there is increased nodularity within the anterior pelvic fat/omentum, best seen on series 2, image 187. This is new since the prior abdominal CT and consistent with disease progression/carcinomatosis.    Result Date: 10/9/2024  Impression: 1.  No evidence of acute traumatic injury. No identifiable acute abnormality to account for the patient's clinical presentation. 2.  Multiple new and/or enlarging pulmonary nodules, consistent with aggressive metastatic disease. 3.  Diffusely heterogeneous appearance of the liver. While this may at least partially represent inhomogeneous hepatic steatosis, underlying lesions cannot be excluded without IV  contrast. 4.  Mild diffuse anasarca. The study was marked in EPIC for significant notification. Workstation performed: [id number]     US right upper quadrant    Result Date: 10/8/2024  Impression: 1.  Diffusely echogenic and heterogeneous appearance of the liver. There is moderately heterogeneous and increased echogenicity throughout the visible hepatic parenchyma, with posterior attenuation of the sound waves. The liver is mildly enlarged. There is a possible hypoechoic lesion within the left hepatic lobe, which cannot be definitively characterized on this exam. Follow-up evaluation by gadolinium-enhanced MRI of the abdomen is recommended. The study was marked in EPIC for significant notification. Workstation performed: [id number]     XR chest portable    Result Date: 10/8/2024  Impression: Findings suggestive of mild central pulmonary vascular congestion. Workstation performed: [id number]     CT head without contrast    Result Date: 10/7/2024  Impression: No acute intracranial abnormality.  Stable chronic microangiopathic changes within the brain. Workstation performed: [id number]

## 2024-10-10 NOTE — PROGRESS NOTES
Progress Note - Hospitalist   Name: Dwaine Gould 63 y.o. female I MRN: [id number]  Unit/Bed#: -01 I Date of Admission: 10/7/2024   Date of Service: 10/10/2024 I Hospital Day: 3    Assessment & Plan  Shock liver  Patient continues to have elevated LFTs and PT/INR, but they are downtrending. , ALT 1783, alk phosph 203, PT 17.2, INR 1.33 today.  Liver US (10/8) - echogenic and heterogeneous liver with mild enlargement. Hypoechoic lesion within left hepatic lobe also noted. Refer to full report.  Radiology recommends gadolinium-enhanced MRI abd as f/u.  LEVAR negative, acute hepatitis panel negative, anti-smooth muscle antibody pending  Cardiac echo EF 65%, grade 1 diastolic dysfunction, no significant valvular disease    Plan   Appreciate GI recommendations  Lactulose decreased to daily dosing for now in the setting of worsening diarrhea.  Continue with albumin to help intravascular volume   Discontinue IV zosyn given stable white count and absence of URI symptoms.  LETICIA (acute kidney injury) (HCC)  Likely in the setting of shock liver. Urine studies reviewed.  Cr and BUN continue to be elevated. Monitor and continue IV fluids.  Possible hepatorenal syndrome- Nephrology consulted, IV albumin 25% 50 g q6h x4 doses added to regimen.  Nephrology planning for urine lites checks and serial bladder scan  Monitor electrolytes and trend renal function.  Essential hypertension  Continue amlodipine 5mg daily, lisinopril 10mg daily. Nephrology advised discontinuing oral metoprolol.  Monitor BP.  Hyperlipidemia  Hold atorvastatin given ongoing shock liver and risk of hepatotoxicity  Hepatic encephalopathy (HCC)  Secondary to shock liver  Improving      VTE Pharmacologic Prophylaxis:   Moderate Risk (Score 3-4) - Pharmacological DVT Prophylaxis Contraindicated. Sequential Compression Devices Ordered.    Mobility:   Basic Mobility Inpatient Raw Score: 17  JH-HLM Goal: 5: Stand one or more mins  JH-HLM Achieved: 6: Walk 10  steps or more  -HLM Goal achieved. Continue to encourage appropriate mobility.    Patient Centered Rounds: I performed bedside rounds with nursing staff today.  Discussions with Specialists or Other Care Team Provider:  IP CONSULT TO GASTROENTEROLOGY  IP CONSULT TO HEMATOLOGY  IP CONSULT TO NEPHROLOGY      Education and Discussions with Family / Patient: Called and updated patient's daughter, Eva.    Current Length of Stay: 3 day(s)  Current Patient Status: Inpatient   Certification Statement: The patient will continue to require additional inpatient hospital stay due to plan as noted above  Discharge Plan: Anticipate discharge in 48-72 hrs to home.    Code Status: Level 3 - DNAR and DNI    Subjective:   Patient continues to feel weak, but reports improvement in diarrhea from yesterday. Now experiencing occasional loose stools without blood. Denies headaches, dizziness, nausea/vomiting. Reports no urinary symptoms. Denies chest pain, shortness of breath, palpitations, cough, fever, chills.     Objective:     Vitals:   No data recorded.    HR:  [45-63] 61  Resp:  [16-18] 17  BP: ()/(54-70) 111/61  SpO2:  [91 %-99 %] 97 %  Body mass index is 48.39 kg/m².     Input and Output Summary (last 24 hours):     Intake/Output Summary (Last 24 hours) at 10/10/2024 0922  Last data filed at 10/9/2024 1748  Gross per 24 hour   Intake 240 ml   Output --   Net 240 ml       Physical Exam:   Physical Exam  Constitutional:       Appearance: She is ill-appearing.   HENT:      Head: Normocephalic and atraumatic.   Cardiovascular:      Rate and Rhythm: Normal rate and regular rhythm.   Pulmonary:      Effort: Pulmonary effort is normal.      Breath sounds: Normal breath sounds.   Abdominal:      General: Abdomen is flat.      Palpations: Abdomen is soft.   Musculoskeletal:      Right lower leg: Edema present.   Skin:     General: Skin is warm and dry.   Neurological:      Mental Status: She is alert.         Additional Data:      Labs:  Results from last 7 days   Lab Units 10/10/24  0654 10/09/24  0450   WBC Thousand/uL 10.80* 10.14   HEMOGLOBIN g/dL 11.0* 10.4*   HEMATOCRIT % 37.3 35.1   PLATELETS Thousands/uL 70* 87*   BANDS PCT %  --  7   SEGS PCT % 83*  --    LYMPHO PCT % 8* 7*   MONO PCT % 5 2*   EOS PCT % 3 0     Results from last 7 days   Lab Units 10/10/24  0512   SODIUM mmol/L 137   POTASSIUM mmol/L 4.0   CHLORIDE mmol/L 106   CO2 mmol/L 21   BUN mg/dL 75*   CREATININE mg/dL 2.60*   ANION GAP mmol/L 10   CALCIUM mg/dL 7.2*   ALBUMIN g/dL 3.0*   TOTAL BILIRUBIN mg/dL 1.48*   ALK PHOS U/L 203*   ALT U/L 1,783*   AST U/L 426*   GLUCOSE RANDOM mg/dL 106     Results from last 7 days   Lab Units 10/10/24  0654   INR  1.33*     Results from last 7 days   Lab Units 10/07/24  2054   POC GLUCOSE mg/dl 113         Results from last 7 days   Lab Units 10/07/24  2219 10/07/24  2052 10/04/24  1040   LACTIC ACID mmol/L 2.1* 2.8* 0.7   PROCALCITONIN ng/ml  --  1.67* 0.09       Lines/Drains:  Invasive Devices       Central Venous Catheter Line  Duration             Port A Cath 06/24/24 Right Internal jugular 107 days              Peripheral Intravenous Line  Duration             Peripheral IV 10/07/24 Dorsal (posterior);Right Hand 2 days    Peripheral IV 10/07/24 Right Antecubital 2 days    Peripheral IV 10/09/24 Left;Ventral (anterior) Forearm 1 day                    Central Line:  Goal for removal: Will discontinue when hemodynamically stable.             Imaging: Reviewed radiology reports from this admission including:    CT chest abdomen pelvis wo contrast    Addendum Date: 10/9/2024    ADDENDUM: The report should also state that there is increased nodularity within the anterior pelvic fat/omentum, best seen on series 2, image 187. This is new since the prior abdominal CT and consistent with disease progression/carcinomatosis.    Result Date: 10/9/2024  Impression: 1.  No evidence of acute traumatic injury. No identifiable acute  abnormality to account for the patient's clinical presentation. 2.  Multiple new and/or enlarging pulmonary nodules, consistent with aggressive metastatic disease. 3.  Diffusely heterogeneous appearance of the liver. While this may at least partially represent inhomogeneous hepatic steatosis, underlying lesions cannot be excluded without IV contrast. 4.  Mild diffuse anasarca. The study was marked in EPIC for significant notification. Workstation performed: [id number]     US right upper quadrant    Result Date: 10/8/2024  Impression: 1.  Diffusely echogenic and heterogeneous appearance of the liver. There is moderately heterogeneous and increased echogenicity throughout the visible hepatic parenchyma, with posterior attenuation of the sound waves. The liver is mildly enlarged. There is a possible hypoechoic lesion within the left hepatic lobe, which cannot be definitively characterized on this exam. Follow-up evaluation by gadolinium-enhanced MRI of the abdomen is recommended. The study was marked in EPIC for significant notification. Workstation performed: [id number]     XR chest portable    Result Date: 10/8/2024  Impression: Findings suggestive of mild central pulmonary vascular congestion. Workstation performed: [id number]     CT head without contrast    Result Date: 10/7/2024  Impression: No acute intracranial abnormality.  Stable chronic microangiopathic changes within the brain. Workstation performed: [id number]       No Chest XR results available for this patient.     Results for orders placed during the hospital encounter of 10/07/24    Echo complete w/ contrast if indicated    Interpretation Summary    Left Ventricle: Left ventricular cavity size is normal. Wall thickness is mildly increased. The left ventricular ejection fraction is 65%. Systolic function is normal. Although no diagnostic regional wall motion abnormality was identified, this possibility cannot be completely excluded on the basis of this  study. Diastolic function is mildly abnormal, consistent with grade I (abnormal) relaxation. This is a technically difficult study with poor acoustic windows.    Mitral Valve: There is mild regurgitation.    Tricuspid Valve: There is mild regurgitation.      Recent Cultures (last 7 days):   Results from last 7 days   Lab Units 10/07/24  2220 10/07/24  2052   BLOOD CULTURE  No Growth at 24 hrs. No Growth at 24 hrs.       Last 24 Hours Medication List:   Current Facility-Administered Medications   Medication Dose Route Frequency Provider Last Rate    aspirin  325 mg Oral Daily Theodore Wei MD      calcium gluconate  2 g Intravenous Once Brittanie Soni MD      dicyclomine  10 mg Oral TID AC Aviva Grigsby PA-C      HYDROmorphone  0.5 mg Intravenous Q6H PRN Leo Tompkins MD      lactulose  20 g Oral Daily Aviva Grigsby PA-C      lactulose  200 g Rectal Once Maru Rodríguez PA-C      lidocaine  1 patch Topical Daily Leo Tompkins MD      metoprolol tartrate  25 mg Oral Q12H ANGELES Theoodre Wei MD      multi-electrolyte  50 mL/hr Intravenous Continuous Leo Tompkins MD 50 mL/hr (10/10/24 0025)    piperacillin-tazobactam  4.5 g Intravenous Q8H Theodore Wei MD 4.5 g (10/10/24 0139)        Today, Patient Was Seen By: Gui Obrien, MS3 and the attending physician, Leo Tompkins,*        **Please Note: This note may have been constructed using a voice recognition system.**

## 2024-10-10 NOTE — ASSESSMENT & PLAN NOTE
Continue amlodipine 5mg daily, lisinopril 10mg daily. Nephrology advised discontinuing oral metoprolol.  Monitor BP.

## 2024-10-10 NOTE — ASSESSMENT & PLAN NOTE
Present on admission, concerning for possible underlying intravascular volume depletion.    Patient has underlying advanced colorectal cancer [stage III versus stage IV colorectal cancer with CEA elevation and probable peritoneal metastasis] and currently being followed by oncology as outpatient and receiving oxaliplatin/5-FU/leucovorin.    Upon review of old medical record from [facility] chart, previously known baseline creatinine was 0.7-1.0.  Admission creatinine was 3.01 and current creatinine is 2.6.  Urine analysis showed hyaline cast suggestive of intravascular volume depletion.  Patient has low serum albumin and in current clinical setting, we will plan to start patient on IV albumin 25% 50 g every 6 hours x 4 doses to boost intravascular volume status.  Patient's blood pressure is on the softer side and will plan to discontinue oral metoprolol today.  Plan to check urine lites and serial bladder scan for further evaluation and recheck renal function with a.m. lab.    CT scan of abdomen pelvis without contrast done on 10/7/2024 showed no hydronephrosis.

## 2024-10-10 NOTE — ASSESSMENT & PLAN NOTE
Patient continues to have elevated LFTs and PT/INR, but they are downtrending. , ALT 1783, alk phosph 203, PT 17.2, INR 1.33 today.  Liver US (10/8) - echogenic and heterogeneous liver with mild enlargement. Hypoechoic lesion within left hepatic lobe also noted. Refer to full report.  Radiology recommends gadolinium-enhanced MRI abd as f/u.  LEVAR negative, acute hepatitis panel negative, anti-smooth muscle antibody pending  Cardiac echo EF 65%, grade 1 diastolic dysfunction, no significant valvular disease    Plan   Appreciate GI recommendations  Lactulose decreased to daily dosing for now in the setting of worsening diarrhea.  Continue with albumin to help intravascular volume   Discontinue IV zosyn given stable white count and absence of URI symptoms.

## 2024-10-10 NOTE — CONSULTS
NEPHROLOGY CONSULTATION NOTE    Patient: Dwaine Gould               Sex: female          DOA: 10/7/2024  8:14 PM   YOB: 1960         Age: 63 y.o.         LOS:  LOS: 3 days   Encounter Date: 10/10/2024    REFERRING PHYSICIAN: Leo Tompkins MD      REASON FOR THE REFERRAL / CONSULTATION: Further management of LETICIA    DATE OF CONSULTATION / SERVICE: 10/10/2024    ADMISSION DIAGNOSIS: Shock liver     Chief Complaint   Patient presents with    Altered Mental Status     Pt arrives via ems from home. Per family pt increasingly confused x 3 days. Pt unable to care for self x3 days. Pt hypoxic upon EMS arrival.         ASSESSMENT / PLAN     Assessment & Plan  LETICIA (acute kidney injury) (HCC)    Present on admission, concerning for possible underlying intravascular volume depletion.    Patient has underlying advanced colorectal cancer [stage III versus stage IV colorectal cancer with CEA elevation and probable peritoneal metastasis] and currently being followed by oncology as outpatient and receiving oxaliplatin/5-FU/leucovorin.    Upon review of old medical record from [facility] chart, previously known baseline creatinine was 0.7-1.0.  Admission creatinine was 3.01 and current creatinine is 2.6.  Urine analysis showed hyaline cast suggestive of intravascular volume depletion.  Patient has low serum albumin and in current clinical setting, we will plan to start patient on IV albumin 25% 50 g every 6 hours x 4 doses to boost intravascular volume status.  Patient's blood pressure is on the softer side and will plan to discontinue oral metoprolol today.  Plan to check urine lites and serial bladder scan for further evaluation and recheck renal function with a.m. lab.    CT scan of abdomen pelvis without contrast done on 10/7/2024 showed no hydronephrosis.      Overall above mentioned plan and recommendations were also d/w current [facility] physician and they agreed with my plan of IV albumin as mentioned  earlier    Edi Bañuelos MD  Nephrology  10/10/2024    HPI     This is a 63-year-old female with past medical history significant for colorectal cancer, admitted for altered mental status.  On admission patient was found to have elevated creatinine and nephrology were consulted for further management of LETICIA.    Upon review of old medical record from [facility] chart, previously known baseline creatinine was thought to be around 0.7-1.0.  Admission creatinine was 3.01.  Patient has underlying stage III versus stage IV colorectal cancer with possible peritoneal metastasis and currently been followed by oncologist as outpatient and receiving chemotherapy-5-FU along with oxaliplatin.    Patient also have underlying hypertension and was also receiving amlodipine along with metoprolol and lisinopril as outpatient.     Currently patient denies nausea, vomiting, headache, dizziness, abdominal pain, constipation or rash.    PAST MEDICAL HISTORY     Past Medical History:   Diagnosis Date    Acute metabolic encephalopathy     Asthma     CHF (congestive heart failure) (HCC)     Chronic kidney disease     COPD (chronic obstructive pulmonary disease) (HCC)     Hyperlipidemia     Hypertension     Liver disease     Myocardial infarction (HCC)     Seizures (HCC)     Sleep apnea     Transaminitis        PAST SURGICAL HISTORY     Past Surgical History:   Procedure Laterality Date    APPENDECTOMY      CARDIAC CATHETERIZATION      GANGLION CYST EXCISION      INCONTINENCE SURGERY      IR PORT PLACEMENT  05/03/2024    IR PORT PLACEMENT  6/24/2024    IR PORT REMOVAL  05/16/2024    LAPAROTOMY N/A 03/21/2024    Procedure: LAPAROTOMY EXPLORATORY, washout, complex closure, LISSY drain;  Surgeon: Francine Reid MD;  Location: [facility];  Service: General    MS LAPS ABD PRTM&OMENTUM DX W/WO SPEC BR/WA SPX N/A 03/19/2024    Procedure: DIAGNOSTIC LAPAROSCOPY converted to Exploratory Laparotomy, Right Hemicolectomy, Abdominal wash-out, Abthera  Placement;  Surgeon: Roger Joel DO;  Location: [facility];  Service: General    TYMPANOSTOMY TUBE PLACEMENT         ALLERGIES     Allergies   Allergen Reactions    Codeine Anaphylaxis    Wound Dressing Adhesive Blisters     ALL WOUND DRESSINGS     Contrast [Iodinated Contrast Media] GI Intolerance    Prednisone Irritability     Increase in anger/abusive behaviors    Ibuprofen Rash       SOCIAL HISTORY     Social History     Substance and Sexual Activity   Alcohol Use Not Currently    Comment: rare     Social History     Substance and Sexual Activity   Drug Use Never     Social History     Tobacco Use   Smoking Status Every Day    Types: Cigarettes    Passive exposure: Current   Smokeless Tobacco Never       FAMILY HISTORY     History reviewed. No pertinent family history.    CURRENT MEDICATIONS       Current Facility-Administered Medications:     albumin human (FLEXBUMIN) 25 % injection 50 g, 50 g, Intravenous, Q6H, Edi Bañuelos MD, 50 g at 10/10/24 1013    aspirin tablet 325 mg, 325 mg, Oral, Daily, Theodore Wei MD, 325 mg at 10/10/24 0816    dicyclomine (BENTYL) capsule 10 mg, 10 mg, Oral, TID AC, Aviva Grigsby PA-C, 10 mg at 10/10/24 1049    HYDROmorphone (DILAUDID) injection 0.5 mg, 0.5 mg, Intravenous, Q6H PRN, Leo Tompkins MD, 0.5 mg at 10/09/24 1221    lactulose (CHRONULAC) oral solution 20 g, 20 g, Oral, Daily, Aviva Grigsby PA-C, 20 g at 10/10/24 0816    lactulose retention enema 200 g, 200 g, Rectal, Once, Maru Rodríguez PA-C    lidocaine (LIDODERM) 5 % patch 1 patch, 1 patch, Topical, Daily, Leo Tompkins MD, 1 patch at 10/10/24 0816    REVIEW OF SYSTEMS     Complete 10 points of review of systems were obtained and discussed in length with patient today.  Complete 10 points of review of systems were negative/unremarkable except mentioned in the HPI section.      OBJECTIVE     Current Weight: Weight - Scale: 112 kg (247 lb 12.8 oz)  /61   Pulse 61   Temp  97.6 °F (36.4 °C)   Resp 17   Ht 5' (1.524 m)   Wt 112 kg (247 lb 12.8 oz)   SpO2 97%   BMI 48.39 kg/m²   Vitals:    10/10/24 0648   BP: 111/61   Pulse: 61   Resp: 17   Temp:    SpO2: 97%     Body mass index is 48.39 kg/m².    Intake/Output Summary (Last 24 hours) at 10/10/2024 1205  Last data filed at 10/9/2024 1748  Gross per 24 hour   Intake 240 ml   Output --   Net 240 ml       PHYSICAL EXAMINATION     Physical Exam  Constitutional:       General: She is not in acute distress.     Appearance: She is obese.   HENT:      Right Ear: External ear normal.   Eyes:      Conjunctiva/sclera:      Right eye: No hemorrhage.  Neck:      Thyroid: No thyromegaly.   Pulmonary:      Effort: No accessory muscle usage or respiratory distress.   Abdominal:      General: There is no distension.   Musculoskeletal:         General: Swelling present.   Skin:     Coloration: Skin is not jaundiced.   Psychiatric:         Behavior: Behavior is not combative.           LAB RESULTS        Results from last 7 days   Lab Units 10/10/24  0654 10/10/24  0512 10/09/24  0450 10/08/24  0617 10/07/24  2157 10/07/24  2052 10/04/24  1040   WBC Thousand/uL 10.80*  --  10.14 10.31*  --  13.95* 11.37*   HEMOGLOBIN g/dL 11.0*  --  10.4* 11.2*  --  11.9 11.5   HEMATOCRIT % 37.3  --  35.1 37.5  --  40.1 40.6   PLATELETS Thousands/uL 70*  --  87* 102*  --  125* 252   SODIUM mmol/L  --  137 140 140 137 134* 138   POTASSIUM mmol/L  --  4.0 3.9 4.4 4.9 5.8* 4.4   CHLORIDE mmol/L  --  106 107 105 102 99 102   CO2 mmol/L  --  21 24 23 26 24 32   BUN mg/dL  --  75* 80* 82* 76* 75* 28*   CREATININE mg/dL  --  2.60* 2.86* 3.09* 2.98* 3.01* 1.01   EGFR ml/min/1.73sq m  --  19 17 15 16 16 60   CALCIUM mg/dL  --  7.2* 7.0* 7.0* 7.3* 7.7* 8.3*   MAGNESIUM mg/dL  --   --   --   --   --   --  2.3       I have personally reviewed the old medical records and patient's previously known baseline creatinine level is ~0.7-1.0    RADIOLOGY RESULTS       US right upper  quadrant   Final Result by Ric Womack MD (10/08 1611)      1.  Diffusely echogenic and heterogeneous appearance of the liver. There is moderately heterogeneous and increased echogenicity throughout the visible hepatic parenchyma, with posterior attenuation of the sound waves. The liver is mildly enlarged. There    is a possible hypoechoic lesion within the left hepatic lobe, which cannot be definitively characterized on this exam. Follow-up evaluation by gadolinium-enhanced MRI of the abdomen is recommended. The study was marked in EPIC for significant    notification.      Workstation performed: [id number]         XR chest portable   Final Result by Gabe Eller MD (10/08 0615)      Findings suggestive of mild central pulmonary vascular congestion.            Workstation performed: [id number]         CT head without contrast   Final Result by Elver Wei MD (10/07 2130)      No acute intracranial abnormality.  Stable chronic microangiopathic changes within the brain.                  Workstation performed: [id number]         CT chest abdomen pelvis wo contrast   Final Result by Ric Womack MD (10/09 1304)   Addendum (preliminary) 1 of 1 by Ric Womack MD (10/09 1304)   ADDENDUM:      The report should also state that there is increased nodularity within the    anterior pelvic fat/omentum, best seen on series 2, image 187. This is new    since the prior abdominal CT and consistent with disease    progression/carcinomatosis.      Final      1.  No evidence of acute traumatic injury. No identifiable acute abnormality to account for the patient's clinical presentation.   2.  Multiple new and/or enlarging pulmonary nodules, consistent with aggressive metastatic disease.   3.  Diffusely heterogeneous appearance of the liver. While this may at least partially represent inhomogeneous hepatic steatosis, underlying lesions cannot be excluded without IV contrast.   4.  Mild  "diffuse anasarca.      The study was marked in EPIC for significant notification.         Workstation performed: [id number]             Portions of the record may have been created with voice recognition software. Occasional wrong word or \"sound a like\" substitutions may have occurred due to the inherent limitations of voice recognition software. Read the chart carefully and recognize, using context, where substitutions have occurred.    "

## 2024-10-11 LAB
ALBUMIN SERPL BCG-MCNC: 4.7 G/DL (ref 3.5–5)
ALP SERPL-CCNC: 145 U/L (ref 34–104)
ALT SERPL W P-5'-P-CCNC: 976 U/L (ref 7–52)
ANION GAP SERPL CALCULATED.3IONS-SCNC: 13 MMOL/L (ref 4–13)
AST SERPL W P-5'-P-CCNC: 198 U/L (ref 13–39)
BASOPHILS # BLD AUTO: 0.02 THOUSANDS/ΜL (ref 0–0.1)
BASOPHILS NFR BLD AUTO: 0 % (ref 0–1)
BILIRUB DIRECT SERPL-MCNC: 1.19 MG/DL (ref 0–0.2)
BILIRUB SERPL-MCNC: 1.96 MG/DL (ref 0.2–1)
BUN SERPL-MCNC: 74 MG/DL (ref 5–25)
CALCIUM SERPL-MCNC: 8.4 MG/DL (ref 8.4–10.2)
CHLORIDE SERPL-SCNC: 102 MMOL/L (ref 96–108)
CO2 SERPL-SCNC: 24 MMOL/L (ref 21–32)
CREAT SERPL-MCNC: 2.83 MG/DL (ref 0.6–1.3)
CREAT UR-MCNC: 157.7 MG/DL
EOSINOPHIL # BLD AUTO: 0.16 THOUSAND/ΜL (ref 0–0.61)
EOSINOPHIL NFR BLD AUTO: 2 % (ref 0–6)
ERYTHROCYTE [DISTWIDTH] IN BLOOD BY AUTOMATED COUNT: 19.3 % (ref 11.6–15.1)
GFR SERPL CREATININE-BSD FRML MDRD: 17 ML/MIN/1.73SQ M
GLUCOSE SERPL-MCNC: 126 MG/DL (ref 65–140)
HCT VFR BLD AUTO: 31.8 % (ref 34.8–46.1)
HGB BLD-MCNC: 9.2 G/DL (ref 11.5–15.4)
IMM GRANULOCYTES # BLD AUTO: 0.03 THOUSAND/UL (ref 0–0.2)
IMM GRANULOCYTES NFR BLD AUTO: 0 % (ref 0–2)
LYMPHOCYTES # BLD AUTO: 0.75 THOUSANDS/ΜL (ref 0.6–4.47)
LYMPHOCYTES NFR BLD AUTO: 9 % (ref 14–44)
MCH RBC QN AUTO: 27.9 PG (ref 26.8–34.3)
MCHC RBC AUTO-ENTMCNC: 28.9 G/DL (ref 31.4–37.4)
MCV RBC AUTO: 96 FL (ref 82–98)
MICROALBUMIN UR-MCNC: 352.5 MG/L
MICROALBUMIN/CREAT 24H UR: 224 MG/G CREATININE (ref 0–30)
MONOCYTES # BLD AUTO: 0.63 THOUSAND/ΜL (ref 0.17–1.22)
MONOCYTES NFR BLD AUTO: 8 % (ref 4–12)
NEUTROPHILS # BLD AUTO: 6.36 THOUSANDS/ΜL (ref 1.85–7.62)
NEUTS SEG NFR BLD AUTO: 81 % (ref 43–75)
NRBC BLD AUTO-RTO: 1 /100 WBCS
PLATELET # BLD AUTO: 43 THOUSANDS/UL (ref 149–390)
PMV BLD AUTO: 10.7 FL (ref 8.9–12.7)
POTASSIUM SERPL-SCNC: 3.9 MMOL/L (ref 3.5–5.3)
PROT SERPL-MCNC: 6.6 G/DL (ref 6.4–8.4)
RBC # BLD AUTO: 3.3 MILLION/UL (ref 3.81–5.12)
SODIUM 24H UR-SCNC: 10 MOL/L
SODIUM SERPL-SCNC: 139 MMOL/L (ref 135–147)
UUN 24H UR-MCNC: 609 MG/DL
WBC # BLD AUTO: 7.95 THOUSAND/UL (ref 4.31–10.16)

## 2024-10-11 PROCEDURE — 99232 SBSQ HOSP IP/OBS MODERATE 35: CPT | Performed by: INTERNAL MEDICINE

## 2024-10-11 PROCEDURE — 80076 HEPATIC FUNCTION PANEL: CPT

## 2024-10-11 PROCEDURE — 80048 BASIC METABOLIC PNL TOTAL CA: CPT | Performed by: INTERNAL MEDICINE

## 2024-10-11 PROCEDURE — 99233 SBSQ HOSP IP/OBS HIGH 50: CPT | Performed by: INTERNAL MEDICINE

## 2024-10-11 PROCEDURE — 85025 COMPLETE CBC W/AUTO DIFF WBC: CPT

## 2024-10-11 PROCEDURE — 84540 ASSAY OF URINE/UREA-N: CPT | Performed by: INTERNAL MEDICINE

## 2024-10-11 PROCEDURE — 82043 UR ALBUMIN QUANTITATIVE: CPT | Performed by: INTERNAL MEDICINE

## 2024-10-11 PROCEDURE — 82570 ASSAY OF URINE CREATININE: CPT | Performed by: INTERNAL MEDICINE

## 2024-10-11 PROCEDURE — 84300 ASSAY OF URINE SODIUM: CPT | Performed by: INTERNAL MEDICINE

## 2024-10-11 RX ADMIN — ASPIRIN 325 MG ORAL TABLET 325 MG: 325 PILL ORAL at 09:23

## 2024-10-11 RX ADMIN — ALBUMIN (HUMAN) 50 G: 0.25 INJECTION, SOLUTION INTRAVENOUS at 03:10

## 2024-10-11 RX ADMIN — LACTULOSE 20 G: 20 SOLUTION ORAL at 09:23

## 2024-10-11 RX ADMIN — DICYCLOMINE HYDROCHLORIDE 10 MG: 10 CAPSULE ORAL at 06:38

## 2024-10-11 RX ADMIN — DICYCLOMINE HYDROCHLORIDE 10 MG: 10 CAPSULE ORAL at 16:52

## 2024-10-11 RX ADMIN — DICYCLOMINE HYDROCHLORIDE 10 MG: 10 CAPSULE ORAL at 12:08

## 2024-10-11 NOTE — PROGRESS NOTES
Progress Note - Hospitalist   Name: Dwaine Gould 63 y.o. female I MRN: [id number]  Unit/Bed#: [facility] I Date of Admission: 10/7/2024   Date of Service: 10/11/2024 I Hospital Day: 4    Assessment & Plan  Shock liver  Patient with elevated LFTs, but downtrending. , , alk phosph 145 today.  Liver US (10/8) - echogenic and heterogeneous liver with mild enlargement. Hypoechoic lesion within left hepatic lobe also noted. Refer to full report.  Radiology recommends gadolinium-enhanced MRI abd as f/u.  LEVAR negative, acute hepatitis panel negative, anti-smooth muscle antibody pending  Cardiac echo EF 65%, grade 1 diastolic dysfunction, no significant valvular disease    Plan   Appreciate GI recommendations  Lactulose decreased to daily dosing for now in the setting of worsening diarrhea. Consider discontinuing given pt's improved encephalopathy.  Continue with albumin to help intravascular volume   Off abx  LETICIA (acute kidney injury) (HCC)  Likely in the setting of shock liver. Urine studies reviewed.  Cr and BUN continue to be elevated. Monitor and continue IV fluids.  Possible hepatorenal syndrome- Nephrology consulted, IV albumin 25% 50 g q6h x4 doses added.  Nephrology planning for urine lites checks and serial bladder scan  Urine urea nitrogen, urine sodium, and alb/Cr ratio pending  Monitor electrolytes and trend renal function.  Essential hypertension  Continue amlodipine 5mg daily, lisinopril 10mg daily. Nephrology advised discontinuing oral metoprolol.  Monitor BP.  Hyperlipidemia  Hold atorvastatin given ongoing shock liver and risk of hepatotoxicity  Hepatic encephalopathy (HCC)  Secondary to shock liver  Improving      VTE Pharmacologic Prophylaxis:   Moderate Risk (Score 3-4) - Pharmacological DVT Prophylaxis Contraindicated. Sequential Compression Devices Ordered.    Mobility:   Basic Mobility Inpatient Raw Score: 22  JH-HLM Goal: 7: Walk 25 feet or more  JH-HLM Achieved: 7: Walk 25 feet or  more  -HLM Goal achieved. Continue to encourage appropriate mobility.    Patient Centered Rounds: I performed bedside rounds with nursing staff today.  Discussions with Specialists or Other Care Team Provider:  IP CONSULT TO GASTROENTEROLOGY  IP CONSULT TO HEMATOLOGY  IP CONSULT TO NEPHROLOGY      Education and Discussions with Family / Patient: Patient's daughter called and updated.    Current Length of Stay: 4 day(s)  Current Patient Status: Inpatient   Certification Statement: The patient will continue to require additional inpatient hospital stay due to plan as noted above  Discharge Plan: Anticipate discharge in >72 hrs to home.    Code Status: Level 3 - DNAR and DNI    Subjective:   Patient more confused today. Vitals stable overnight with no acute events. Reports her abdominal pain and diarrhea have improved. She has been eating and ambulating more. Patient states she has pain and burning with urination, but no change in frequency. Denies nausea/vomiting, chest pain, palpitations, shortness of breath.    Objective:     Vitals:   Temp (24hrs), Av.5 °F (36.4 °C), Min:97.2 °F (36.2 °C), Max:98 °F (36.7 °C)    Temp:  [97.2 °F (36.2 °C)-98 °F (36.7 °C)] 98 °F (36.7 °C)  HR:  [47-63] 63  Resp:  [16] 16  BP: ()/(53-79) 119/79  SpO2:  [86 %-97 %] 97 %  Body mass index is 47.75 kg/m².     Input and Output Summary (last 24 hours):     Intake/Output Summary (Last 24 hours) at 10/11/2024 0918  Last data filed at 10/10/2024 1900  Gross per 24 hour   Intake 200 ml   Output --   Net 200 ml       Physical Exam:   Physical Exam  Constitutional:       Comments: Patient is ill-appearing. Confused.   HENT:      Head: Normocephalic and atraumatic.   Cardiovascular:      Rate and Rhythm: Normal rate and regular rhythm.      Comments: Normal S1 and S2.  Pulmonary:      Effort: Pulmonary effort is normal.      Breath sounds: Normal breath sounds.   Abdominal:      General: Abdomen is flat.      Palpations: Abdomen is  soft.   Musculoskeletal:      Right lower leg: Edema present.      Left lower leg: Edema present.         Additional Data:     Labs:  Results from last 7 days   Lab Units 10/11/24  0423 10/10/24  0654 10/09/24  0450   WBC Thousand/uL 7.95   < > 10.14   HEMOGLOBIN g/dL 9.2*   < > 10.4*   HEMATOCRIT % 31.8*   < > 35.1   PLATELETS Thousands/uL 43*   < > 87*   BANDS PCT %  --   --  7   SEGS PCT % 81*   < >  --    LYMPHO PCT % 9*   < > 7*   MONO PCT % 8   < > 2*   EOS PCT % 2   < > 0    < > = values in this interval not displayed.     Results from last 7 days   Lab Units 10/11/24  0423   SODIUM mmol/L 139   POTASSIUM mmol/L 3.9   CHLORIDE mmol/L 102   CO2 mmol/L 24   BUN mg/dL 74*   CREATININE mg/dL 2.83*   ANION GAP mmol/L 13   CALCIUM mg/dL 8.4   ALBUMIN g/dL 4.7   TOTAL BILIRUBIN mg/dL 1.96*   ALK PHOS U/L 145*   ALT U/L 976*   AST U/L 198*   GLUCOSE RANDOM mg/dL 126     Results from last 7 days   Lab Units 10/10/24  0654   INR  1.33*     Results from last 7 days   Lab Units 10/07/24  2054   POC GLUCOSE mg/dl 113         Results from last 7 days   Lab Units 10/07/24  2219 10/07/24  2052 10/04/24  1040   LACTIC ACID mmol/L 2.1* 2.8* 0.7   PROCALCITONIN ng/ml  --  1.67* 0.09       Lines/Drains:  Invasive Devices       Central Venous Catheter Line  Duration             Port A Cath 06/24/24 Right Internal jugular 108 days              Peripheral Intravenous Line  Duration             Peripheral IV 10/07/24 Dorsal (posterior);Right Hand 3 days    Peripheral IV 10/09/24 Left;Ventral (anterior) Forearm 2 days                    Central Line:  Goal for removal: Will discontinue when hemodynamically stable.             Imaging: Reviewed radiology reports from this admission including:    CT chest abdomen pelvis wo contrast    Addendum Date: 10/9/2024    ADDENDUM: The report should also state that there is increased nodularity within the anterior pelvic fat/omentum, best seen on series 2, image 187. This is new since the prior  abdominal CT and consistent with disease progression/carcinomatosis.    Result Date: 10/9/2024  Impression: 1.  No evidence of acute traumatic injury. No identifiable acute abnormality to account for the patient's clinical presentation. 2.  Multiple new and/or enlarging pulmonary nodules, consistent with aggressive metastatic disease. 3.  Diffusely heterogeneous appearance of the liver. While this may at least partially represent inhomogeneous hepatic steatosis, underlying lesions cannot be excluded without IV contrast. 4.  Mild diffuse anasarca. The study was marked in EPIC for significant notification. Workstation performed: [id number]     US right upper quadrant    Result Date: 10/8/2024  Impression: 1.  Diffusely echogenic and heterogeneous appearance of the liver. There is moderately heterogeneous and increased echogenicity throughout the visible hepatic parenchyma, with posterior attenuation of the sound waves. The liver is mildly enlarged. There is a possible hypoechoic lesion within the left hepatic lobe, which cannot be definitively characterized on this exam. Follow-up evaluation by gadolinium-enhanced MRI of the abdomen is recommended. The study was marked in EPIC for significant notification. Workstation performed: [id number]     XR chest portable    Result Date: 10/8/2024  Impression: Findings suggestive of mild central pulmonary vascular congestion. Workstation performed: [id number]     CT head without contrast    Result Date: 10/7/2024  Impression: No acute intracranial abnormality.  Stable chronic microangiopathic changes within the brain. Workstation performed: [id number]       No Chest XR results available for this patient.     Results for orders placed during the hospital encounter of 10/07/24    Echo complete w/ contrast if indicated    Interpretation Summary    Left Ventricle: Left ventricular cavity size is normal. Wall thickness is mildly increased. The left ventricular ejection fraction is 65%.  Systolic function is normal. Although no diagnostic regional wall motion abnormality was identified, this possibility cannot be completely excluded on the basis of this study. Diastolic function is mildly abnormal, consistent with grade I (abnormal) relaxation. This is a technically difficult study with poor acoustic windows.    Mitral Valve: There is mild regurgitation.    Tricuspid Valve: There is mild regurgitation.      Recent Cultures (last 7 days):   Results from last 7 days   Lab Units 10/07/24  2220 10/07/24  2052   BLOOD CULTURE  No Growth at 48 hrs. No Growth at 48 hrs.       Last 24 Hours Medication List:   Current Facility-Administered Medications   Medication Dose Route Frequency Provider Last Rate    aspirin  325 mg Oral Daily Theodore Wei MD      dicyclomine  10 mg Oral TID AC Aviva Grigsby PA-C      HYDROmorphone  0.5 mg Intravenous Q6H PRN Leo Tompkins MD      lactulose  20 g Oral Daily Aviva Grigsby PA-C      lactulose  200 g Rectal Once Maru Rodríguez PA-C      lidocaine  1 patch Topical Daily Leo Tompkins MD          Today, Patient Was Seen By: Gui Obrien, MS3 and the attending physician, Leo Tompkins,*      **Please Note: This note may have been constructed using a voice recognition system.**

## 2024-10-11 NOTE — PROGRESS NOTES
NEPHROLOGY PROGRESS NOTE    Patient: Dwaine Gould               Sex: female          DOA: 10/7/2024  8:14 PM   YOB: 1960        Age:  63 y.o.        LOS:  LOS: 4 days   10/11/2024    REASON FOR THE CONSULTATION: Acute kidney injury    ASSESSMENT/PLAN     Assessment & Plan  LETICIA (acute kidney injury) (HCC)  Present on admission, concerning for possible underlying intravascular volume depletion.  After review of the medical record, appears baseline creatinine levels are fluctuant around 1 0.7-1.0 and admitted with creatinine at 3.01.    Patient has underlying advanced colorectal cancer [stage III versus stage IV colorectal cancer with CEA elevation and probable peritoneal metastasis] and currently being followed by oncology as outpatient and receiving oxaliplatin/5-FU/leucovorin.    Prior workup noted urinalysis with hyaline casts suspicious for intravascular volume depletion.  When low serum albumin levels she was provided with 25% IV albumin 50 g for total of 4 doses to boost intravascular volume status.  Patient with mild dependent lower extremity edema but denies shortness of breath on examination today.    At and levels have been fluctuating from 2.6-2.8 for the past 72 hours, suspect a component of underlying ATN.  Will continue to monitor patient today without additional administration of IV fluid.  Oral intake encouraged.    Essential hypertension  Not currently on antihypertensive medications.  Current blood pressure stable at 112/73.      SUBJECTIVE     Patient was seen and examined at the bedside today.  She reports that she feels overall well and denies nausea, vomiting, shortness of breath.  Endorses chronic lower extremity edema.    Reviewed past 24 hour events.    CURRENT MEDICATIONS       Current Facility-Administered Medications:     aspirin tablet 325 mg, 325 mg, Oral, Daily, Theodore Wei MD, 325 mg at 10/11/24 0923    dicyclomine (BENTYL) capsule 10 mg, 10 mg, Oral, TID Aviva FORTE  DOMINICK Grigsby, 10 mg at 10/11/24 0638    HYDROmorphone (DILAUDID) injection 0.5 mg, 0.5 mg, Intravenous, Q6H PRN, Leo Tompkins MD, 0.5 mg at 10/09/24 1221    lactulose (CHRONULAC) oral solution 20 g, 20 g, Oral, Daily, Aviva BENNETT DOMINICK Grigsby, 20 g at 10/11/24 0923    lactulose retention enema 200 g, 200 g, Rectal, Once, Maru Rodríguez PA-C    lidocaine (LIDODERM) 5 % patch 1 patch, 1 patch, Topical, Daily, Leo Tompkins MD, 1 patch at 10/10/24 0816    REVIEW OF SYSTEMS     Review of Systems   Constitutional:  Positive for activity change and fatigue. Negative for chills and fever.   HENT:  Negative for trouble swallowing.    Respiratory:  Negative for shortness of breath.    Cardiovascular:  Positive for leg swelling.   Gastrointestinal:  Negative for nausea and vomiting.   Genitourinary:  Negative for difficulty urinating, dysuria, frequency and hematuria.   Musculoskeletal:  Negative for back pain.   Skin:  Negative for pallor.   Neurological:  Positive for weakness. Negative for dizziness, syncope and light-headedness.   Psychiatric/Behavioral:  Negative for sleep disturbance. The patient is not nervous/anxious.        OBJECTIVE     Current Weight: Weight - Scale: 111 kg (244 lb 7.8 oz)  Vitals:    10/11/24 1116   BP: 112/73   Pulse: 66   Resp:    Temp: 97.7 °F (36.5 °C)   SpO2: 94%     Body mass index is 47.75 kg/m².    Intake/Output Summary (Last 24 hours) at 10/11/2024 1156  Last data filed at 10/10/2024 1900  Gross per 24 hour   Intake 200 ml   Output --   Net 200 ml       PHYSICAL EXAMINATION     Physical Exam  Vitals reviewed.   Constitutional:       General: She is not in acute distress.  HENT:      Head: Normocephalic.      Mouth/Throat:      Lips: Pink.      Mouth: Mucous membranes are moist.   Eyes:      General: Lids are normal. No scleral icterus.  Cardiovascular:      Rate and Rhythm: Normal rate and regular rhythm.      Heart sounds: S1 normal and S2 normal. No murmur  heard.  Pulmonary:      Effort: Pulmonary effort is normal. No accessory muscle usage or respiratory distress.      Breath sounds: Normal breath sounds.   Abdominal:      General: There is no distension.      Tenderness: There is no abdominal tenderness.   Musculoskeletal:      Cervical back: Normal range of motion and neck supple. No tenderness.      Right lower leg: Edema present.      Left lower leg: Edema present.   Skin:     General: Skin is warm.      Coloration: Skin is not cyanotic or jaundiced.   Neurological:      General: No focal deficit present.      Mental Status: She is alert and oriented to person, place, and time.   Psychiatric:         Attention and Perception: Attention normal.         Speech: Speech normal.         Behavior: Behavior is cooperative.           LAB RESULTS     Results from last 7 days   Lab Units 10/11/24  0423 10/10/24  0654 10/10/24  0512 10/09/24  0450 10/08/24  0617 10/07/24  2157 10/07/24  2052   WBC Thousand/uL 7.95 10.80*  --  10.14 10.31*  --  13.95*   HEMOGLOBIN g/dL 9.2* 11.0*  --  10.4* 11.2*  --  11.9   HEMATOCRIT % 31.8* 37.3  --  35.1 37.5  --  40.1   PLATELETS Thousands/uL 43* 70*  --  87* 102*  --  125*   SODIUM mmol/L 139  --  137 140 140 137 134*   POTASSIUM mmol/L 3.9  --  4.0 3.9 4.4 4.9 5.8*   CHLORIDE mmol/L 102  --  106 107 105 102 99   CO2 mmol/L 24  --  21 24 23 26 24   BUN mg/dL 74*  --  75* 80* 82* 76* 75*   CREATININE mg/dL 2.83*  --  2.60* 2.86* 3.09* 2.98* 3.01*   EGFR ml/min/1.73sq m 17  --  19 17 15 16 16   CALCIUM mg/dL 8.4  --  7.2* 7.0* 7.0* 7.3* 7.7*       RADIOLOGY RESULTS      Results for orders placed during the hospital encounter of 10/07/24    XR chest portable    Narrative  XR CHEST PORTABLE    INDICATION: cardiac work-up.    COMPARISON: 1 view chest 10/4/2024    FINDINGS: Right chest wall stormy catheter in place with distal tip in the upper right atrium.    Minimal right basilar subsegmental atelectasis. No pneumothorax or pleural  "effusion.    Cardiac silhouette is enlarged.  Aortic calcification is present. Prominent central pulmonary vasculature.    Bones are unremarkable for age.    Normal upper abdomen.    Impression  Findings suggestive of mild central pulmonary vascular congestion.        Workstation performed: [id number]    Results for orders placed during the hospital encounter of 09/24/24    XR chest pa and lateral    Narrative  CHEST    INDICATION:   Malignant neoplasm of colon, unspecified.    COMPARISON:  None.    EXAM PERFORMED/VIEWS:  XR CHEST PA AND LATERAL    FINDINGS:    Cardiomediastinal silhouette appears significantly enlarged. Portacatheter in place in the right atrial appendage.    The lungs are clear.  No pneumothorax or pleural effusion.    Osseous structures appear within normal limits for patient age.    Impression  No acute cardiopulmonary disease.  Clearing of previous pulmonary edema from previous intubated study of 3/22/2024          Electronically signed: 09/24/2024 01:38 PM MD Krystina Villalpando CRNP  Nephrology  10/11/2024      Portions of the record may have been created with voice recognition software. Occasional wrong word or \"sound a like\" substitutions may have occurred due to the inherent limitations of voice recognition software. Read the chart carefully and recognize, using context, where substitutions have occurred.   "

## 2024-10-11 NOTE — ASSESSMENT & PLAN NOTE
Likely in the setting of shock liver. Urine studies reviewed.  Cr and BUN continue to be elevated. Monitor and continue IV fluids.  Possible hepatorenal syndrome- Nephrology consulted, IV albumin 25% 50 g q6h x4 doses added.  Nephrology planning for urine lites checks and serial bladder scan  Urine urea nitrogen, urine sodium, and alb/Cr ratio pending  Monitor electrolytes and trend renal function.

## 2024-10-11 NOTE — PLAN OF CARE
Problem: PAIN - ADULT  Goal: Verbalizes/displays adequate comfort level or baseline comfort level  Description: Interventions:  - Encourage patient to monitor pain and request assistance  - Assess pain using appropriate pain scale  - Administer analgesics based on type and severity of pain and evaluate response  - Implement non-pharmacological measures as appropriate and evaluate response  - Consider cultural and social influences on pain and pain management  - Notify physician/advanced practitioner if interventions unsuccessful or patient reports new pain  Outcome: Progressing     Problem: RESPIRATORY - ADULT  Goal: Achieves optimal ventilation and oxygenation  Description: INTERVENTIONS:  - Assess for changes in respiratory status  - Assess for changes in mentation and behavior  - Position to facilitate oxygenation and minimize respiratory effort  - Oxygen administered by appropriate delivery if ordered  - Initiate smoking cessation education as indicated  - Encourage broncho-pulmonary hygiene including cough, deep breathe, Incentive Spirometry  - Assess the need for suctioning and aspirate as needed  - Assess and instruct to report SOB or any respiratory difficulty  - Respiratory Therapy support as indicated  Outcome: Progressing     Problem: GASTROINTESTINAL - ADULT  Goal: Maintains adequate nutritional intake  Description: INTERVENTIONS:  - Monitor percentage of each meal consumed  - Identify factors contributing to decreased intake, treat as appropriate  - Assist with meals as needed  - Monitor I&O, weight, and lab values if indicated  - Obtain nutrition services referral as needed  Outcome: Progressing     Problem: GENITOURINARY - ADULT  Goal: Maintains or returns to baseline urinary function  Description: INTERVENTIONS:  - Assess urinary function  - Encourage oral fluids to ensure adequate hydration if ordered  - Administer IV fluids as ordered to ensure adequate hydration  - Administer ordered medications  as needed  - Offer frequent toileting  - Follow urinary retention protocol if ordered  Outcome: Progressing     Problem: GENITOURINARY - ADULT  Goal: Absence of urinary retention  Description: INTERVENTIONS:  - Assess patient’s ability to void and empty bladder  - Monitor I/O  - Bladder scan as needed  - Discuss with physician/AP medications to alleviate retention as needed  - Discuss catheterization for long term situations as appropriate  Outcome: Progressing

## 2024-10-11 NOTE — ASSESSMENT & PLAN NOTE
Present on admission, concerning for possible underlying intravascular volume depletion.  After review of the medical record, appears baseline creatinine levels are fluctuant around 1 0.7-1.0 and admitted with creatinine at 3.01.    Patient has underlying advanced colorectal cancer [stage III versus stage IV colorectal cancer with CEA elevation and probable peritoneal metastasis] and currently being followed by oncology as outpatient and receiving oxaliplatin/5-FU/leucovorin.    Prior workup noted urinalysis with hyaline casts suspicious for intravascular volume depletion.  When low serum albumin levels she was provided with 25% IV albumin 50 g for total of 4 doses to boost intravascular volume status.  Patient with mild dependent lower extremity edema but denies shortness of breath on examination today.    At and levels have been fluctuating from 2.6-2.8 for the past 72 hours, suspect a component of underlying ATN.  Will continue to monitor patient today without additional administration of IV fluid.  Oral intake encouraged.

## 2024-10-11 NOTE — PLAN OF CARE
Problem: PAIN - ADULT  Goal: Verbalizes/displays adequate comfort level or baseline comfort level  Description: Interventions:  - Encourage patient to monitor pain and request assistance  - Assess pain using appropriate pain scale  - Administer analgesics based on type and severity of pain and evaluate response  - Implement non-pharmacological measures as appropriate and evaluate response  - Consider cultural and social influences on pain and pain management  - Notify physician/advanced practitioner if interventions unsuccessful or patient reports new pain  Outcome: Progressing     Problem: INFECTION - ADULT  Goal: Absence or prevention of progression during hospitalization  Description: INTERVENTIONS:  - Assess and monitor for signs and symptoms of infection  - Monitor lab/diagnostic results  - Monitor all insertion sites, i.e. indwelling lines, tubes, and drains  - Monitor endotracheal if appropriate and nasal secretions for changes in amount and color  - [place] appropriate cooling/warming therapies per order  - Administer medications as ordered  - Instruct and encourage patient and family to use good hand hygiene technique  - Identify and instruct in appropriate isolation precautions for identified infection/condition  Outcome: Progressing  Goal: Absence of fever/infection during neutropenic period  Description: INTERVENTIONS:  - Monitor WBC    Outcome: Progressing     Problem: SAFETY ADULT  Goal: Patient will remain free of falls  Description: INTERVENTIONS:  - Educate patient/family on patient safety including physical limitations  - Instruct patient to call for assistance with activity   - Consult OT/PT to assist with strengthening/mobility   - Keep Call bell within reach  - Keep bed low and locked with side rails adjusted as appropriate  - Keep care items and personal belongings within reach  - Initiate and maintain comfort rounds  - Make Fall Risk Sign visible to staff  - Offer Toileting every  Hours,  in advance of need  - Initiate/Maintain alarm  - Obtain necessary fall risk management equipment:   - Apply yellow socks and bracelet for high fall risk patients  - Consider moving patient to room near nurses station  Outcome: Progressing  Goal: Maintain or return to baseline ADL function  Description: INTERVENTIONS:  -  Assess patient's ability to carry out ADLs; assess patient's baseline for ADL function and identify physical deficits which impact ability to perform ADLs (bathing, care of mouth/teeth, toileting, grooming, dressing, etc.)  - Assess/evaluate cause of self-care deficits   - Assess range of motion  - Assess patient's mobility; develop plan if impaired  - Assess patient's need for assistive devices and provide as appropriate  - Encourage maximum independence but intervene and supervise when necessary  - Involve family in performance of ADLs  - Assess for home care needs following discharge   - Consider OT consult to assist with ADL evaluation and planning for discharge  - Provide patient education as appropriate  Outcome: Progressing  Goal: Maintains/Returns to pre admission functional level  Description: INTERVENTIONS:  - Perform AM-PAC 6 Click Basic Mobility/ Daily Activity assessment daily.  - Set and communicate daily mobility goal to care team and patient/family/caregiver.   - Collaborate with rehabilitation services on mobility goals if consulted  - Perform Range of Motion  times a day.  - Reposition patient every  hours.  - Dangle patient  times a day  - Stand patient  times a day  - Ambulate patient  times a day  - Out of bed to chair  times a day   - Out of bed for meals  times a day  - Out of bed for toileting  - Record patient progress and toleration of activity level   Outcome: Progressing     Problem: Prexisting or High Potential for Compromised Skin Integrity  Goal: Skin integrity is maintained or improved  Description: INTERVENTIONS:  - Identify patients at risk for skin breakdown  -  Assess and monitor skin integrity  - Assess and monitor nutrition and hydration status  - Monitor labs   - Assess for incontinence   - Turn and reposition patient  - Assist with mobility/ambulation  - Relieve pressure over bony prominences  - Avoid friction and shearing  - Provide appropriate hygiene as needed including keeping skin clean and dry  - Evaluate need for skin moisturizer/barrier cream  - Collaborate with interdisciplinary team   - Patient/family teaching  - Consider wound care consult   Outcome: Progressing     Problem: RESPIRATORY - ADULT  Goal: Achieves optimal ventilation and oxygenation  Description: INTERVENTIONS:  - Assess for changes in respiratory status  - Assess for changes in mentation and behavior  - Position to facilitate oxygenation and minimize respiratory effort  - Oxygen administered by appropriate delivery if ordered  - Initiate smoking cessation education as indicated  - Encourage broncho-pulmonary hygiene including cough, deep breathe, Incentive Spirometry  - Assess the need for suctioning and aspirate as needed  - Assess and instruct to report SOB or any respiratory difficulty  - Respiratory Therapy support as indicated  Outcome: Progressing     Problem: GASTROINTESTINAL - ADULT  Goal: Minimal or absence of nausea and/or vomiting  Description: INTERVENTIONS:  - Administer IV fluids if ordered to ensure adequate hydration  - Maintain NPO status until nausea and vomiting are resolved  - Nasogastric tube if ordered  - Administer ordered antiemetic medications as needed  - Provide nonpharmacologic comfort measures as appropriate  - Advance diet as tolerated, if ordered  - Consider nutrition services referral to assist patient with adequate nutrition and appropriate food choices  Outcome: Progressing  Goal: Maintains or returns to baseline bowel function  Description: INTERVENTIONS:  - Assess bowel function  - Encourage oral fluids to ensure adequate hydration  - Administer IV fluids if  ordered to ensure adequate hydration  - Administer ordered medications as needed  - Encourage mobilization and activity  - Consider nutritional services referral to assist patient with adequate nutrition and appropriate food choices  Outcome: Progressing  Goal: Maintains adequate nutritional intake  Description: INTERVENTIONS:  - Monitor percentage of each meal consumed  - Identify factors contributing to decreased intake, treat as appropriate  - Assist with meals as needed  - Monitor I&O, weight, and lab values if indicated  - Obtain nutrition services referral as needed  Outcome: Progressing  Goal: Establish and maintain optimal ostomy function  Description: INTERVENTIONS:  - Assess bowel function  - Encourage oral fluids to ensure adequate hydration  - Administer IV fluids if ordered to ensure adequate hydration   - Administer ordered medications as needed  - Encourage mobilization and activity  - Nutrition services referral to assist patient with appropriate food choices  - Assess stoma site  - Consider wound care consult   Outcome: Progressing  Goal: Oral mucous membranes remain intact  Description: INTERVENTIONS  - Assess oral mucosa and hygiene practices  - Implement preventative oral hygiene regimen  - Implement oral medicated treatments as ordered  - Initiate Nutrition services referral as needed  Outcome: Progressing     Problem: GENITOURINARY - ADULT  Goal: Maintains or returns to baseline urinary function  Description: INTERVENTIONS:  - Assess urinary function  - Encourage oral fluids to ensure adequate hydration if ordered  - Administer IV fluids as ordered to ensure adequate hydration  - Administer ordered medications as needed  - Offer frequent toileting  - Follow urinary retention protocol if ordered  Outcome: Progressing  Goal: Absence of urinary retention  Description: INTERVENTIONS:  - Assess patient’s ability to void and empty bladder  - Monitor I/O  - Bladder scan as needed  - Discuss with  physician/AP medications to alleviate retention as needed  - Discuss catheterization for long term situations as appropriate  Outcome: Progressing  Goal: Urinary catheter remains patent  Description: INTERVENTIONS:  - Assess patency of urinary catheter  - If patient has a chronic wilson, consider changing catheter if non-functioning  - Follow guidelines for intermittent irrigation of non-functioning urinary catheter  Outcome: Progressing

## 2024-10-11 NOTE — QUICK NOTE
10/11/2024:    Patient is having slight improvement in continuing liver functions.  Her kidney function remains around 2.8.  Being watched closely.  CEA level is pending.  Her lungs suggest unfortunately new metastatic disease although we will watch that closely.  I did speak with her daughter Eva in [place].  I told her we are watching the kidney and liver function.  As an outpatient if she continues to improve those we will rethink her treatment.  Dr. Black

## 2024-10-11 NOTE — ASSESSMENT & PLAN NOTE
Patient with elevated LFTs, but downtrending. , , alk phosph 145 today.  Liver US (10/8) - echogenic and heterogeneous liver with mild enlargement. Hypoechoic lesion within left hepatic lobe also noted. Refer to full report.  Radiology recommends gadolinium-enhanced MRI abd as f/u.  LEVAR negative, acute hepatitis panel negative, anti-smooth muscle antibody pending  Cardiac echo EF 65%, grade 1 diastolic dysfunction, no significant valvular disease    Plan   Appreciate GI recommendations  Lactulose decreased to daily dosing for now in the setting of worsening diarrhea. Consider discontinuing given pt's improved encephalopathy.  Continue with albumin to help intravascular volume   Off abx

## 2024-10-11 NOTE — CASE MANAGEMENT
Case Management Progress Note    Patient name Dwaine Gould  Location /-01 MRN [id number]  : 1960 Date 10/11/2024       LOS (days): 4  Geometric Mean LOS (GMLOS) (days): 3.2  Days to GMLOS:-0.3        OBJECTIVE:        Current admission status: Inpatient  Preferred Pharmacy:   [facility]/pharmacy #[id number] - [place] - [address]  [place] [id number]  Phone: [phone] Fax: [phone]    Primary Care Provider: Jordan Ramirez MD    Primary Insurance: [employer]  Secondary Insurance:     PROGRESS NOTE:    Per rounding with [facility], patient's liver function is improving, but her kidney function has not. Nephrology continues to follow. Patient is anticipated to be discharged in 48-72 hrs. SNF and VNA referrals previously sent in the event that either are recommended. CM department will continue to follow patient through discharge.

## 2024-10-12 LAB
ALBUMIN SERPL BCG-MCNC: 4.6 G/DL (ref 3.5–5)
ALP SERPL-CCNC: 152 U/L (ref 34–104)
ALT SERPL W P-5'-P-CCNC: 757 U/L (ref 7–52)
ANION GAP SERPL CALCULATED.3IONS-SCNC: 13 MMOL/L (ref 4–13)
AST SERPL W P-5'-P-CCNC: 102 U/L (ref 13–39)
BASOPHILS # BLD AUTO: 0.01 THOUSANDS/ΜL (ref 0–0.1)
BASOPHILS NFR BLD AUTO: 0 % (ref 0–1)
BILIRUB DIRECT SERPL-MCNC: 0.88 MG/DL (ref 0–0.2)
BILIRUB SERPL-MCNC: 1.68 MG/DL (ref 0.2–1)
BUN SERPL-MCNC: 77 MG/DL (ref 5–25)
CALCIUM SERPL-MCNC: 8.8 MG/DL (ref 8.4–10.2)
CHLORIDE SERPL-SCNC: 101 MMOL/L (ref 96–108)
CO2 SERPL-SCNC: 24 MMOL/L (ref 21–32)
CREAT SERPL-MCNC: 3.09 MG/DL (ref 0.6–1.3)
EOSINOPHIL # BLD AUTO: 0.13 THOUSAND/ΜL (ref 0–0.61)
EOSINOPHIL NFR BLD AUTO: 2 % (ref 0–6)
ERYTHROCYTE [DISTWIDTH] IN BLOOD BY AUTOMATED COUNT: 19.9 % (ref 11.6–15.1)
GFR SERPL CREATININE-BSD FRML MDRD: 15 ML/MIN/1.73SQ M
GLUCOSE SERPL-MCNC: 154 MG/DL (ref 65–140)
HCT VFR BLD AUTO: 35.8 % (ref 34.8–46.1)
HGB BLD-MCNC: 10.5 G/DL (ref 11.5–15.4)
IMM GRANULOCYTES # BLD AUTO: 0.06 THOUSAND/UL (ref 0–0.2)
IMM GRANULOCYTES NFR BLD AUTO: 1 % (ref 0–2)
LYMPHOCYTES # BLD AUTO: 0.69 THOUSANDS/ΜL (ref 0.6–4.47)
LYMPHOCYTES NFR BLD AUTO: 9 % (ref 14–44)
MCH RBC QN AUTO: 28.2 PG (ref 26.8–34.3)
MCHC RBC AUTO-ENTMCNC: 29.3 G/DL (ref 31.4–37.4)
MCV RBC AUTO: 96 FL (ref 82–98)
MONOCYTES # BLD AUTO: 0.53 THOUSAND/ΜL (ref 0.17–1.22)
MONOCYTES NFR BLD AUTO: 7 % (ref 4–12)
NEUTROPHILS # BLD AUTO: 6.61 THOUSANDS/ΜL (ref 1.85–7.62)
NEUTS SEG NFR BLD AUTO: 81 % (ref 43–75)
NRBC BLD AUTO-RTO: 1 /100 WBCS
PLATELET # BLD AUTO: 50 THOUSANDS/UL (ref 149–390)
PMV BLD AUTO: 11.4 FL (ref 8.9–12.7)
POTASSIUM SERPL-SCNC: 3.6 MMOL/L (ref 3.5–5.3)
PROT SERPL-MCNC: 7 G/DL (ref 6.4–8.4)
RBC # BLD AUTO: 3.72 MILLION/UL (ref 3.81–5.12)
SODIUM SERPL-SCNC: 138 MMOL/L (ref 135–147)
WBC # BLD AUTO: 8.03 THOUSAND/UL (ref 4.31–10.16)

## 2024-10-12 PROCEDURE — 99233 SBSQ HOSP IP/OBS HIGH 50: CPT | Performed by: INTERNAL MEDICINE

## 2024-10-12 PROCEDURE — 80076 HEPATIC FUNCTION PANEL: CPT

## 2024-10-12 PROCEDURE — 99233 SBSQ HOSP IP/OBS HIGH 50: CPT

## 2024-10-12 PROCEDURE — 85025 COMPLETE CBC W/AUTO DIFF WBC: CPT

## 2024-10-12 PROCEDURE — 80048 BASIC METABOLIC PNL TOTAL CA: CPT

## 2024-10-12 RX ORDER — FUROSEMIDE 10 MG/ML
80 INJECTION INTRAMUSCULAR; INTRAVENOUS
Status: DISCONTINUED | OUTPATIENT
Start: 2024-10-12 | End: 2024-10-14

## 2024-10-12 RX ORDER — FUROSEMIDE 10 MG/ML
80 INJECTION INTRAMUSCULAR; INTRAVENOUS
Status: DISCONTINUED | OUTPATIENT
Start: 2024-10-12 | End: 2024-10-12

## 2024-10-12 RX ADMIN — DICYCLOMINE HYDROCHLORIDE 10 MG: 10 CAPSULE ORAL at 09:26

## 2024-10-12 RX ADMIN — DICYCLOMINE HYDROCHLORIDE 10 MG: 10 CAPSULE ORAL at 15:16

## 2024-10-12 RX ADMIN — ASPIRIN 325 MG ORAL TABLET 325 MG: 325 PILL ORAL at 09:26

## 2024-10-12 RX ADMIN — FUROSEMIDE 80 MG: 10 INJECTION, SOLUTION INTRAMUSCULAR; INTRAVENOUS at 11:22

## 2024-10-12 RX ADMIN — LIDOCAINE 1 PATCH: 700 PATCH TOPICAL at 09:26

## 2024-10-12 RX ADMIN — DICYCLOMINE HYDROCHLORIDE 10 MG: 10 CAPSULE ORAL at 11:22

## 2024-10-12 RX ADMIN — FUROSEMIDE 80 MG: 10 INJECTION, SOLUTION INTRAMUSCULAR; INTRAVENOUS at 15:16

## 2024-10-12 RX ADMIN — HYDROMORPHONE HYDROCHLORIDE 0.5 MG: 1 INJECTION, SOLUTION INTRAMUSCULAR; INTRAVENOUS; SUBCUTANEOUS at 11:28

## 2024-10-12 RX ADMIN — LACTULOSE 20 G: 20 SOLUTION ORAL at 09:26

## 2024-10-12 NOTE — ASSESSMENT & PLAN NOTE
Present on admission. After review of the medical record, appears baseline creatinine levels are fluctuant around 0.7-1.0 and admitted with creatinine at 3.01.    Patient has underlying advanced colorectal cancer [stage III versus stage IV colorectal cancer with CEA elevation and probable peritoneal metastasis] and currently being followed by oncology as outpatient and receiving oxaliplatin/5-FU/leucovorin.    Prior workup noted urinalysis with hyaline casts suspicious for intravascular volume depletion.  She was provided with IV albumin for a total of 4 doses to boost intravascular volume status with no significant improvement in creatinine levels.  Creatinine levels have been fluctuating from 2.6-2.8 and concern for development of ATN.  Over the past 24 hours, patient has development of worsening lower extremity edema extending up through the hips and reports mild shortness of breath today.    Creatinine level has worsened to 3.0 on labs today, patient oligo -anuric with only 100 mL of urine output documented over the past 24 hours with serial bladder scan minimum.  Repeat urine sodium low at 10, concern for development of volume overload.    Will initiate diuretic therapy with furosemide 80 mg IV twice daily for volume management.  Will place indwelling De La Cruz catheter for strict I's/O monitoring in the setting of worsening LETICIA.  Will closely monitor daily BMP.

## 2024-10-12 NOTE — PROGRESS NOTES
NEPHROLOGY PROGRESS NOTE    Patient: Dwaine Gould               Sex: female          DOA: 10/7/2024  8:14 PM   YOB: 1960        Age:  63 y.o.        LOS:  LOS: 5 days   10/12/2024    REASON FOR THE CONSULTATION: Further management of acute kidney injury    ASSESSMENT/PLAN     Assessment & Plan  LETICIA (acute kidney injury) (HCC)  Present on admission. After review of the medical record, appears baseline creatinine levels are fluctuant around 0.7-1.0 and admitted with creatinine at 3.01.    Patient has underlying advanced colorectal cancer [stage III versus stage IV colorectal cancer with CEA elevation and probable peritoneal metastasis] and currently being followed by oncology as outpatient and receiving oxaliplatin/5-FU/leucovorin.    Prior workup noted urinalysis with hyaline casts suspicious for intravascular volume depletion.  She was provided with IV albumin for a total of 4 doses to boost intravascular volume status with no significant improvement in creatinine levels.  Creatinine levels have been fluctuating from 2.6-2.8 and concern for development of ATN.  Over the past 24 hours, patient has development of worsening lower extremity edema extending up through the hips and reports mild shortness of breath today.    Creatinine level has worsened to 3.0 on labs today, patient oligo -anuric with only 100 mL of urine output documented over the past 24 hours with serial bladder scan minimum.  Repeat urine sodium low at 10, concern for development of volume overload.    Will initiate diuretic therapy with furosemide 80 mg IV twice daily for volume management.  Will place indwelling De La Cruz catheter for strict I's/O monitoring in the setting of worsening LETICIA.  Will closely monitor daily BMP.    Essential hypertension  Antihypertensive medications on hold, blood pressure reading within acceptable range.    Above plan was discussed with the [facility] provider, in agreement for placement of De La Cruz catheter and  initiation of IV diuretics for volume management.    SUBJECTIVE     Patient was seen and examined at the bedside today.  She utilizes nasal cannula oxygen chronically but does report mild shortness of breath today and worsening lower extremity edema.  Urine output minimal.    Reviewed past 24 hour events.    CURRENT MEDICATIONS       Current Facility-Administered Medications:     aspirin tablet 325 mg, 325 mg, Oral, Daily, Theodore Wei MD, 325 mg at 10/12/24 0926    dicyclomine (BENTYL) capsule 10 mg, 10 mg, Oral, TID AC, Aviva Grigsby PA-C, 10 mg at 10/12/24 0926    furosemide (LASIX) injection 80 mg, 80 mg, Intravenous, BID (diuretic), GUSTAVO Smith    HYDROmorphone (DILAUDID) injection 0.5 mg, 0.5 mg, Intravenous, Q6H PRN, Leo Tompkins MD, 0.5 mg at 10/09/24 1221    lactulose (CHRONULAC) oral solution 20 g, 20 g, Oral, Daily, Aviva Grigsby PA-C, 20 g at 10/12/24 0926    lidocaine (LIDODERM) 5 % patch 1 patch, 1 patch, Topical, Daily, Leo Tompkins MD, 1 patch at 10/12/24 0926    REVIEW OF SYSTEMS     Review of Systems   Constitutional:  Positive for activity change and fatigue. Negative for chills and fever.   HENT:  Negative for trouble swallowing.    Respiratory:  Positive for shortness of breath.    Cardiovascular:  Positive for leg swelling. Negative for chest pain.   Gastrointestinal:  Negative for nausea and vomiting.   Genitourinary:  Positive for decreased urine volume. Negative for difficulty urinating, dysuria, frequency and hematuria.   Musculoskeletal:  Negative for back pain.   Skin:  Negative for pallor.   Neurological:  Positive for weakness. Negative for dizziness, syncope and light-headedness.   Psychiatric/Behavioral:  Negative for sleep disturbance. The patient is not nervous/anxious.        OBJECTIVE     Current Weight: Weight - Scale: 116 kg (255 lb 15.3 oz)  Vitals:    10/12/24 0718   BP: 118/65   Pulse: 70   Resp: 16   Temp:    SpO2: (!) 86%      Body mass index is 49.99 kg/m².    Intake/Output Summary (Last 24 hours) at 10/12/2024 1105  Last data filed at 10/12/2024 0855  Gross per 24 hour   Intake 360 ml   Output 100 ml   Net 260 ml       PHYSICAL EXAMINATION     Physical Exam  Vitals reviewed.   Constitutional:       General: She is not in acute distress.  HENT:      Head: Normocephalic.      Mouth/Throat:      Lips: Pink.      Mouth: Mucous membranes are moist.   Eyes:      General: Lids are normal. No scleral icterus.  Cardiovascular:      Rate and Rhythm: Normal rate and regular rhythm.      Heart sounds: S1 normal and S2 normal. No murmur heard.  Pulmonary:      Effort: Pulmonary effort is normal. No accessory muscle usage or respiratory distress.      Comments: On chronic nasal cannula  Abdominal:      General: There is no distension.      Tenderness: There is no abdominal tenderness.   Musculoskeletal:      Cervical back: Normal range of motion and neck supple. No tenderness.   Skin:     General: Skin is warm.      Coloration: Skin is not cyanotic or jaundiced.   Neurological:      General: No focal deficit present.      Mental Status: She is alert and oriented to person, place, and time.   Psychiatric:         Attention and Perception: Attention normal.         Speech: Speech normal.         Behavior: Behavior is cooperative.           LAB RESULTS     Results from last 7 days   Lab Units 10/12/24  0846 10/11/24  0423 10/10/24  0654 10/10/24  0512 10/09/24  0450 10/08/24  0617 10/07/24  2157 10/07/24  2052   WBC Thousand/uL 8.03 7.95 10.80*  --  10.14 10.31*  --  13.95*   HEMOGLOBIN g/dL 10.5* 9.2* 11.0*  --  10.4* 11.2*  --  11.9   HEMATOCRIT % 35.8 31.8* 37.3  --  35.1 37.5  --  40.1   PLATELETS Thousands/uL 50* 43* 70*  --  87* 102*  --  125*   SODIUM mmol/L 138 139  --  137 140 140 137 134*   POTASSIUM mmol/L 3.6 3.9  --  4.0 3.9 4.4 4.9 5.8*   CHLORIDE mmol/L 101 102  --  106 107 105 102 99   CO2 mmol/L 24 24  --  21 24 23 26 24   BUN mg/dL  "77* 74*  --  75* 80* 82* 76* 75*   CREATININE mg/dL 3.09* 2.83*  --  2.60* 2.86* 3.09* 2.98* 3.01*   EGFR ml/min/1.73sq m 15 17  --  19 17 15 16 16   CALCIUM mg/dL 8.8 8.4  --  7.2* 7.0* 7.0* 7.3* 7.7*       RADIOLOGY RESULTS      Results for orders placed during the hospital encounter of 10/07/24    XR chest portable    Narrative  XR CHEST PORTABLE    INDICATION: cardiac work-up.    COMPARISON: 1 view chest 10/4/2024    FINDINGS: Right chest wall stormy catheter in place with distal tip in the upper right atrium.    Minimal right basilar subsegmental atelectasis. No pneumothorax or pleural effusion.    Cardiac silhouette is enlarged.  Aortic calcification is present. Prominent central pulmonary vasculature.    Bones are unremarkable for age.    Normal upper abdomen.    Impression  Findings suggestive of mild central pulmonary vascular congestion.        Workstation performed: [id number]    Results for orders placed during the hospital encounter of 09/24/24    XR chest pa and lateral    Narrative  CHEST    INDICATION:   Malignant neoplasm of colon, unspecified.    COMPARISON:  None.    EXAM PERFORMED/VIEWS:  XR CHEST PA AND LATERAL    FINDINGS:    Cardiomediastinal silhouette appears significantly enlarged. Portacatheter in place in the right atrial appendage.    The lungs are clear.  No pneumothorax or pleural effusion.    Osseous structures appear within normal limits for patient age.    Impression  No acute cardiopulmonary disease.  Clearing of previous pulmonary edema from previous intubated study of 3/22/2024          Electronically signed: 09/24/2024 01:38 PM MD Krystina Villalpando CRNP  Nephrology  10/12/2024      Portions of the record may have been created with voice recognition software. Occasional wrong word or \"sound a like\" substitutions may have occurred due to the inherent limitations of voice recognition software. Read the chart carefully and recognize, using context, where " substitutions have occurred.

## 2024-10-12 NOTE — PLAN OF CARE
Problem: PAIN - ADULT  Goal: Verbalizes/displays adequate comfort level or baseline comfort level  Description: Interventions:  - Encourage patient to monitor pain and request assistance  - Assess pain using appropriate pain scale  - Administer analgesics based on type and severity of pain and evaluate response  - Implement non-pharmacological measures as appropriate and evaluate response  - Consider cultural and social influences on pain and pain management  - Notify physician/advanced practitioner if interventions unsuccessful or patient reports new pain  Outcome: Progressing     Problem: Prexisting or High Potential for Compromised Skin Integrity  Goal: Skin integrity is maintained or improved  Description: INTERVENTIONS:  - Identify patients at risk for skin breakdown  - Assess and monitor skin integrity  - Assess and monitor nutrition and hydration status  - Monitor labs   - Assess for incontinence   - Turn and reposition patient  - Assist with mobility/ambulation  - Relieve pressure over bony prominences  - Avoid friction and shearing  - Provide appropriate hygiene as needed including keeping skin clean and dry  - Evaluate need for skin moisturizer/barrier cream  - Collaborate with interdisciplinary team   - Patient/family teaching  - Consider wound care consult   Outcome: Progressing     Problem: RESPIRATORY - ADULT  Goal: Achieves optimal ventilation and oxygenation  Description: INTERVENTIONS:  - Assess for changes in respiratory status  - Assess for changes in mentation and behavior  - Position to facilitate oxygenation and minimize respiratory effort  - Oxygen administered by appropriate delivery if ordered  - Initiate smoking cessation education as indicated  - Encourage broncho-pulmonary hygiene including cough, deep breathe, Incentive Spirometry  - Assess the need for suctioning and aspirate as needed  - Assess and instruct to report SOB or any respiratory difficulty  - Respiratory Therapy support as  indicated  Outcome: Progressing     Problem: GASTROINTESTINAL - ADULT  Goal: Maintains adequate nutritional intake  Description: INTERVENTIONS:  - Monitor percentage of each meal consumed  - Identify factors contributing to decreased intake, treat as appropriate  - Assist with meals as needed  - Monitor I&O, weight, and lab values if indicated  - Obtain nutrition services referral as needed  Outcome: Progressing     Problem: GENITOURINARY - ADULT  Goal: Maintains or returns to baseline urinary function  Description: INTERVENTIONS:  - Assess urinary function  - Encourage oral fluids to ensure adequate hydration if ordered  - Administer IV fluids as ordered to ensure adequate hydration  - Administer ordered medications as needed  - Offer frequent toileting  - Follow urinary retention protocol if ordered  Outcome: Progressing

## 2024-10-12 NOTE — ASSESSMENT & PLAN NOTE
Secondary to shock liver  Improved, back to baseline.  Off lactulose  Monitor closely, if patient develops worsening confusion can consider rechecking ammonia and reinstituting lactulose therapy if needed

## 2024-10-12 NOTE — ASSESSMENT & PLAN NOTE
Patient presented with markedly elevated liver enzymes  Suspected due to transient hypotension prior to admission    Liver enzymes trending down significantly.  No further encephalopathy noted.  Continue to trend CMP

## 2024-10-12 NOTE — PLAN OF CARE
Problem: PAIN - ADULT  Goal: Verbalizes/displays adequate comfort level or baseline comfort level  Description: Interventions:  - Encourage patient to monitor pain and request assistance  - Assess pain using appropriate pain scale  - Administer analgesics based on type and severity of pain and evaluate response  - Implement non-pharmacological measures as appropriate and evaluate response  - Consider cultural and social influences on pain and pain management  - Notify physician/advanced practitioner if interventions unsuccessful or patient reports new pain  Outcome: Progressing     Problem: INFECTION - ADULT  Goal: Absence or prevention of progression during hospitalization  Description: INTERVENTIONS:  - Assess and monitor for signs and symptoms of infection  - Monitor lab/diagnostic results  - Monitor all insertion sites, i.e. indwelling lines, tubes, and drains  - Monitor endotracheal if appropriate and nasal secretions for changes in amount and color  - Leonard appropriate cooling/warming therapies per order  - Administer medications as ordered  - Instruct and encourage patient and family to use good hand hygiene technique  - Identify and instruct in appropriate isolation precautions for identified infection/condition  Outcome: Progressing  Goal: Absence of fever/infection during neutropenic period  Description: INTERVENTIONS:  - Monitor WBC    Outcome: Progressing     Problem: SAFETY ADULT  Goal: Patient will remain free of falls  Description: INTERVENTIONS:  - Educate patient/family on patient safety including physical limitations  - Instruct patient to call for assistance with activity   - Consult OT/PT to assist with strengthening/mobility   - Keep Call bell within reach  - Keep bed low and locked with side rails adjusted as appropriate  - Keep care items and personal belongings within reach  - Initiate and maintain comfort rounds  - Make Fall Risk Sign visible to staff  - Offer Toileting every 2 Hours,  in advance of need  - Initiate/Maintain bed alarm  - Obtain necessary fall risk management equipment: chair alarm  - Apply yellow socks and bracelet for high fall risk patients  - Consider moving patient to room near nurses station  Outcome: Progressing  Goal: Maintain or return to baseline ADL function  Description: INTERVENTIONS:  -  Assess patient's ability to carry out ADLs; assess patient's baseline for ADL function and identify physical deficits which impact ability to perform ADLs (bathing, care of mouth/teeth, toileting, grooming, dressing, etc.)  - Assess/evaluate cause of self-care deficits   - Assess range of motion  - Assess patient's mobility; develop plan if impaired  - Assess patient's need for assistive devices and provide as appropriate  - Encourage maximum independence but intervene and supervise when necessary  - Involve family in performance of ADLs  - Assess for home care needs following discharge   - Consider OT consult to assist with ADL evaluation and planning for discharge  - Provide patient education as appropriate  Outcome: Progressing  Goal: Maintains/Returns to pre admission functional level  Description: INTERVENTIONS:  - Perform AM-PAC 6 Click Basic Mobility/ Daily Activity assessment daily.  - Set and communicate daily mobility goal to care team and patient/family/caregiver.   - Collaborate with rehabilitation services on mobility goals if consulted  - Perform Range of Motion 3 times a day.  - Reposition patient every 3 hours.  - Dangle patient 3 times a day  - Stand patient 3 times a day  - Ambulate patient 3 times a day  - Out of bed to chair 3 times a day   - Out of bed for meals 3 times a day  - Out of bed for toileting  - Record patient progress and toleration of activity level   Outcome: Progressing     Problem: Prexisting or High Potential for Compromised Skin Integrity  Goal: Skin integrity is maintained or improved  Description: INTERVENTIONS:  - Identify patients at risk for  skin breakdown  - Assess and monitor skin integrity  - Assess and monitor nutrition and hydration status  - Monitor labs   - Assess for incontinence   - Turn and reposition patient  - Assist with mobility/ambulation  - Relieve pressure over bony prominences  - Avoid friction and shearing  - Provide appropriate hygiene as needed including keeping skin clean and dry  - Evaluate need for skin moisturizer/barrier cream  - Collaborate with interdisciplinary team   - Patient/family teaching  - Consider wound care consult   Outcome: Progressing     Problem: RESPIRATORY - ADULT  Goal: Achieves optimal ventilation and oxygenation  Description: INTERVENTIONS:  - Assess for changes in respiratory status  - Assess for changes in mentation and behavior  - Position to facilitate oxygenation and minimize respiratory effort  - Oxygen administered by appropriate delivery if ordered  - Initiate smoking cessation education as indicated  - Encourage broncho-pulmonary hygiene including cough, deep breathe, Incentive Spirometry  - Assess the need for suctioning and aspirate as needed  - Assess and instruct to report SOB or any respiratory difficulty  - Respiratory Therapy support as indicated  Outcome: Progressing     Problem: GASTROINTESTINAL - ADULT  Goal: Minimal or absence of nausea and/or vomiting  Description: INTERVENTIONS:  - Administer IV fluids if ordered to ensure adequate hydration  - Maintain NPO status until nausea and vomiting are resolved  - Nasogastric tube if ordered  - Administer ordered antiemetic medications as needed  - Provide nonpharmacologic comfort measures as appropriate  - Advance diet as tolerated, if ordered  - Consider nutrition services referral to assist patient with adequate nutrition and appropriate food choices  Outcome: Progressing  Goal: Maintains or returns to baseline bowel function  Description: INTERVENTIONS:  - Assess bowel function  - Encourage oral fluids to ensure adequate hydration  -  Administer IV fluids if ordered to ensure adequate hydration  - Administer ordered medications as needed  - Encourage mobilization and activity  - Consider nutritional services referral to assist patient with adequate nutrition and appropriate food choices  Outcome: Progressing  Goal: Maintains adequate nutritional intake  Description: INTERVENTIONS:  - Monitor percentage of each meal consumed  - Identify factors contributing to decreased intake, treat as appropriate  - Assist with meals as needed  - Monitor I&O, weight, and lab values if indicated  - Obtain nutrition services referral as needed  Outcome: Progressing  Goal: Establish and maintain optimal ostomy function  Description: INTERVENTIONS:  - Assess bowel function  - Encourage oral fluids to ensure adequate hydration  - Administer IV fluids if ordered to ensure adequate hydration   - Administer ordered medications as needed  - Encourage mobilization and activity  - Nutrition services referral to assist patient with appropriate food choices  - Assess stoma site  - Consider wound care consult   Outcome: Progressing  Goal: Oral mucous membranes remain intact  Description: INTERVENTIONS  - Assess oral mucosa and hygiene practices  - Implement preventative oral hygiene regimen  - Implement oral medicated treatments as ordered  - Initiate Nutrition services referral as needed  Outcome: Progressing     Problem: GENITOURINARY - ADULT  Goal: Maintains or returns to baseline urinary function  Description: INTERVENTIONS:  - Assess urinary function  - Encourage oral fluids to ensure adequate hydration if ordered  - Administer IV fluids as ordered to ensure adequate hydration  - Administer ordered medications as needed  - Offer frequent toileting  - Follow urinary retention protocol if ordered  Outcome: Progressing  Goal: Absence of urinary retention  Description: INTERVENTIONS:  - Assess patient’s ability to void and empty bladder  - Monitor I/O  - Bladder scan as  needed  - Discuss with physician/AP medications to alleviate retention as needed  - Discuss catheterization for long term situations as appropriate  Outcome: Progressing  Goal: Urinary catheter remains patent  Description: INTERVENTIONS:  - Assess patency of urinary catheter  - If patient has a chronic wilson, consider changing catheter if non-functioning  - Follow guidelines for intermittent irrigation of non-functioning urinary catheter  Outcome: Progressing

## 2024-10-12 NOTE — PROGRESS NOTES
Progress Note - Hospitalist   Name: Dwaine Gould 63 y.o. female I MRN: [id number]  Unit/Bed#: -01 I Date of Admission: 10/7/2024   Date of Service: 10/12/2024 I Hospital Day: 5    Assessment & Plan  LETICIA (acute kidney injury) (HCC)  Patient presented with elevated creatinine up to 3 compared to a previously normal baseline.  This probably started as a prerenal LETICIA and developed into ATN.  Patient remains mostly oligoanuric at this time.  Creatinine not improving.  She appears to be slightly overloaded.  Initially did not respond to fluids.  Nephrology following    Plan to insert De La Cruz catheter  Will attempt diuretic trial with IV Lasix  Monitor intake and output closely  Monitor volume status  If no improvement may have to consider renal replacement therapy.  Avoid nephrotoxins  Shock liver  Patient presented with markedly elevated liver enzymes  Suspected due to transient hypotension prior to admission    Liver enzymes trending down significantly.  No further encephalopathy noted.  Continue to trend CMP    Essential hypertension  Monitor BP.  Avoid hypotension especially in light of acute kidney injury  Hyperlipidemia  Currently hold atorvastatin given ongoing shock liver and risk of hepatotoxicity  Hepatic encephalopathy (HCC)  Secondary to shock liver  Improved, back to baseline.  Off lactulose  Monitor closely, if patient develops worsening confusion can consider rechecking ammonia and reinstituting lactulose therapy if needed    VTE Pharmacologic Prophylaxis:    Hold due to thrombocytopenia    Mobility:   Basic Mobility Inpatient Raw Score: 20  JH-HLM Goal: 6: Walk 10 steps or more  JH-HLM Achieved: 7: Walk 25 feet or more  JH-HLM Goal achieved. Continue to encourage appropriate mobility.    Patient Centered Rounds: I performed bedside rounds with nursing staff today.   Discussions with Specialists or Other Care Team Provider: Discussed with care management team    Education and Discussions with Family /  Patient:  I did call the first daughter listed as a contact for an update.     Current Length of Stay: 5 day(s)  Current Patient Status: Inpatient   Certification Statement: The patient will continue to require additional inpatient hospital stay due to monitoring of kidney function, IV therapy  Discharge Plan: Anticipate discharge in >72 hrs to rehab facility.    Code Status: Level 3 - DNAR and DNI    Subjective     Patient valuated this morning.  Does mention shortness of breath, feels a little swollen.  Not peeing much.  Extreme fatigue.  Mental status okay however    Objective :  Temp:  [97.7 °F (36.5 °C)-98 °F (36.7 °C)] 98 °F (36.7 °C)  HR:  [61-70] 70  BP: (105-118)/(62-68) 118/65  Resp:  [16-18] 16  SpO2:  [86 %-97 %] 86 %  O2 Device: Nasal cannula  Nasal Cannula O2 Flow Rate (L/min):  [3 L/min] 3 L/min    Body mass index is 49.99 kg/m².     Input and Output Summary (last 24 hours):     Intake/Output Summary (Last 24 hours) at 10/12/2024 1318  Last data filed at 10/12/2024 0855  Gross per 24 hour   Intake 360 ml   Output 100 ml   Net 260 ml       Physical Exam  Vitals and nursing note reviewed.   Constitutional:       Appearance: Normal appearance. She is normal weight.   HENT:      Head: Normocephalic and atraumatic.      Right Ear: External ear normal.      Left Ear: External ear normal.      Nose: Nose normal. No congestion.      Mouth/Throat:      Mouth: Mucous membranes are moist.      Pharynx: Oropharynx is clear. No oropharyngeal exudate or posterior oropharyngeal erythema.   Eyes:      General: No scleral icterus.        Right eye: No discharge.         Left eye: No discharge.      Extraocular Movements: Extraocular movements intact.      Conjunctiva/sclera: Conjunctivae normal.      Pupils: Pupils are equal, round, and reactive to light.   Cardiovascular:      Rate and Rhythm: Normal rate and regular rhythm.      Pulses: Normal pulses.      Heart sounds: Normal heart sounds. No murmur heard.     No  friction rub. No gallop.   Pulmonary:      Effort: Pulmonary effort is normal. No respiratory distress.      Breath sounds: Normal breath sounds. No stridor. No wheezing, rhonchi or rales.   Chest:      Chest wall: No tenderness.   Abdominal:      General: Abdomen is flat. Bowel sounds are normal. There is no distension.      Palpations: Abdomen is soft. There is no mass.      Tenderness: There is no abdominal tenderness. There is no guarding or rebound.   Musculoskeletal:         General: No swelling, tenderness, deformity or signs of injury. Normal range of motion.      Cervical back: Normal range of motion and neck supple. No rigidity. No muscular tenderness.      Right lower leg: Edema present.      Left lower leg: Edema present.      Comments: Edema 2/4+ bilateral lower extremities   Skin:     General: Skin is warm and dry.      Capillary Refill: Capillary refill takes less than 2 seconds.      Coloration: Skin is not jaundiced or pale.      Findings: No bruising, erythema, lesion or rash.   Neurological:      General: No focal deficit present.      Mental Status: She is alert and oriented to person, place, and time. Mental status is at baseline.      Cranial Nerves: No cranial nerve deficit.      Sensory: No sensory deficit.      Motor: No weakness.      Coordination: Coordination normal.   Psychiatric:         Mood and Affect: Mood normal.         Behavior: Behavior normal.         Thought Content: Thought content normal.         Judgment: Judgment normal.           Lines/Drains:  Lines/Drains/Airways       Active Status       Name Placement date Placement time Site Days    Port A Cath 06/24/24 Right Internal jugular 06/24/24  1050  Internal jugular  110    Urethral Catheter Latex 16 Fr. 10/12/24  1137  Latex  less than 1                  Urinary Catheter:  Goal for removal: Remove after 48 hrs of I/O monitoring                      Lab Results: I have reviewed the following results:   Results from last 7  days   Lab Units 10/12/24  0846 10/10/24  0654 10/09/24  0450   WBC Thousand/uL 8.03   < > 10.14   HEMOGLOBIN g/dL 10.5*   < > 10.4*   HEMATOCRIT % 35.8   < > 35.1   PLATELETS Thousands/uL 50*   < > 87*   BANDS PCT %  --   --  7   SEGS PCT % 81*   < >  --    LYMPHO PCT % 9*   < > 7*   MONO PCT % 7   < > 2*   EOS PCT % 2   < > 0    < > = values in this interval not displayed.     Results from last 7 days   Lab Units 10/12/24  0846   SODIUM mmol/L 138   POTASSIUM mmol/L 3.6   CHLORIDE mmol/L 101   CO2 mmol/L 24   BUN mg/dL 77*   CREATININE mg/dL 3.09*   ANION GAP mmol/L 13   CALCIUM mg/dL 8.8   ALBUMIN g/dL 4.6   TOTAL BILIRUBIN mg/dL 1.68*   ALK PHOS U/L 152*   ALT U/L 757*   AST U/L 102*   GLUCOSE RANDOM mg/dL 154*     Results from last 7 days   Lab Units 10/10/24  0654   INR  1.33*     Results from last 7 days   Lab Units 10/07/24  2054   POC GLUCOSE mg/dl 113         Results from last 7 days   Lab Units 10/07/24  2219 10/07/24  2052   LACTIC ACID mmol/L 2.1* 2.8*   PROCALCITONIN ng/ml  --  1.67*       Recent Cultures (last 7 days):   Results from last 7 days   Lab Units 10/07/24  2220 10/07/24  2052   BLOOD CULTURE  No Growth at 72 hrs. No Growth at 72 hrs.       Imaging Results Review: No pertinent imaging studies reviewed.  Other Study Results Review: No additional pertinent studies reviewed.    Last 24 Hours Medication List:     Current Facility-Administered Medications:     aspirin tablet 325 mg, Daily    dicyclomine (BENTYL) capsule 10 mg, TID AC    furosemide (LASIX) injection 80 mg, BID (diuretic)    HYDROmorphone (DILAUDID) injection 0.5 mg, Q6H PRN    lactulose (CHRONULAC) oral solution 20 g, Daily    lidocaine (LIDODERM) 5 % patch 1 patch, Daily    Administrative Statements   Today, Patient Was Seen By: Leo Bazzi MD      **Please Note: This note may have been constructed using a voice recognition system.**

## 2024-10-12 NOTE — ASSESSMENT & PLAN NOTE
Patient presented with elevated creatinine up to 3 compared to a previously normal baseline.  This probably started as a prerenal LETICIA and developed into ATN.  Patient remains mostly oligoanuric at this time.  Creatinine not improving.  She appears to be slightly overloaded.  Initially did not respond to fluids.  Nephrology following    Plan to insert De La Cruz catheter  Will attempt diuretic trial with IV Lasix  Monitor intake and output closely  Monitor volume status  If no improvement may have to consider renal replacement therapy.  Avoid nephrotoxins

## 2024-10-13 LAB
ALBUMIN SERPL BCG-MCNC: 4.4 G/DL (ref 3.5–5)
ALP SERPL-CCNC: 146 U/L (ref 34–104)
ALT SERPL W P-5'-P-CCNC: 595 U/L (ref 7–52)
AMMONIA PLAS-SCNC: 51 UMOL/L (ref 18–72)
ANION GAP SERPL CALCULATED.3IONS-SCNC: 9 MMOL/L (ref 4–13)
AST SERPL W P-5'-P-CCNC: 56 U/L (ref 13–39)
BASOPHILS # BLD AUTO: 0.02 THOUSANDS/ΜL (ref 0–0.1)
BASOPHILS NFR BLD AUTO: 0 % (ref 0–1)
BILIRUB DIRECT SERPL-MCNC: 0.71 MG/DL (ref 0–0.2)
BILIRUB SERPL-MCNC: 1.5 MG/DL (ref 0.2–1)
BUN SERPL-MCNC: 72 MG/DL (ref 5–25)
CALCIUM SERPL-MCNC: 8.6 MG/DL (ref 8.4–10.2)
CHLORIDE SERPL-SCNC: 103 MMOL/L (ref 96–108)
CO2 SERPL-SCNC: 29 MMOL/L (ref 21–32)
CREAT SERPL-MCNC: 2.68 MG/DL (ref 0.6–1.3)
EOSINOPHIL # BLD AUTO: 0.15 THOUSAND/ΜL (ref 0–0.61)
EOSINOPHIL NFR BLD AUTO: 3 % (ref 0–6)
ERYTHROCYTE [DISTWIDTH] IN BLOOD BY AUTOMATED COUNT: 20 % (ref 11.6–15.1)
GFR SERPL CREATININE-BSD FRML MDRD: 18 ML/MIN/1.73SQ M
GLUCOSE SERPL-MCNC: 114 MG/DL (ref 65–140)
HCT VFR BLD AUTO: 35.8 % (ref 34.8–46.1)
HGB BLD-MCNC: 10.3 G/DL (ref 11.5–15.4)
IMM GRANULOCYTES # BLD AUTO: 0.04 THOUSAND/UL (ref 0–0.2)
IMM GRANULOCYTES NFR BLD AUTO: 1 % (ref 0–2)
LYMPHOCYTES # BLD AUTO: 0.65 THOUSANDS/ΜL (ref 0.6–4.47)
LYMPHOCYTES NFR BLD AUTO: 11 % (ref 14–44)
MCH RBC QN AUTO: 27.9 PG (ref 26.8–34.3)
MCHC RBC AUTO-ENTMCNC: 28.8 G/DL (ref 31.4–37.4)
MCV RBC AUTO: 97 FL (ref 82–98)
MONOCYTES # BLD AUTO: 0.56 THOUSAND/ΜL (ref 0.17–1.22)
MONOCYTES NFR BLD AUTO: 10 % (ref 4–12)
NEUTROPHILS # BLD AUTO: 4.37 THOUSANDS/ΜL (ref 1.85–7.62)
NEUTS SEG NFR BLD AUTO: 75 % (ref 43–75)
NRBC BLD AUTO-RTO: 1 /100 WBCS
PLATELET # BLD AUTO: 55 THOUSANDS/UL (ref 149–390)
PMV BLD AUTO: 11.1 FL (ref 8.9–12.7)
POTASSIUM SERPL-SCNC: 3.8 MMOL/L (ref 3.5–5.3)
PROT SERPL-MCNC: 6.7 G/DL (ref 6.4–8.4)
RBC # BLD AUTO: 3.69 MILLION/UL (ref 3.81–5.12)
SODIUM SERPL-SCNC: 141 MMOL/L (ref 135–147)
TSH SERPL DL<=0.05 MIU/L-ACNC: 2.75 UIU/ML (ref 0.45–4.5)
VIT B12 SERPL-MCNC: 1194 PG/ML (ref 180–914)
WBC # BLD AUTO: 5.79 THOUSAND/UL (ref 4.31–10.16)

## 2024-10-13 PROCEDURE — 99232 SBSQ HOSP IP/OBS MODERATE 35: CPT

## 2024-10-13 PROCEDURE — 80048 BASIC METABOLIC PNL TOTAL CA: CPT

## 2024-10-13 PROCEDURE — 85025 COMPLETE CBC W/AUTO DIFF WBC: CPT

## 2024-10-13 PROCEDURE — 99233 SBSQ HOSP IP/OBS HIGH 50: CPT | Performed by: INTERNAL MEDICINE

## 2024-10-13 PROCEDURE — 82140 ASSAY OF AMMONIA: CPT | Performed by: INTERNAL MEDICINE

## 2024-10-13 PROCEDURE — 84443 ASSAY THYROID STIM HORMONE: CPT | Performed by: INTERNAL MEDICINE

## 2024-10-13 PROCEDURE — 82607 VITAMIN B-12: CPT | Performed by: INTERNAL MEDICINE

## 2024-10-13 PROCEDURE — 80076 HEPATIC FUNCTION PANEL: CPT

## 2024-10-13 RX ADMIN — DICYCLOMINE HYDROCHLORIDE 10 MG: 10 CAPSULE ORAL at 09:13

## 2024-10-13 RX ADMIN — DICYCLOMINE HYDROCHLORIDE 10 MG: 10 CAPSULE ORAL at 15:22

## 2024-10-13 RX ADMIN — RIFAXIMIN 550 MG: 550 TABLET ORAL at 13:59

## 2024-10-13 RX ADMIN — FUROSEMIDE 80 MG: 10 INJECTION, SOLUTION INTRAMUSCULAR; INTRAVENOUS at 09:13

## 2024-10-13 RX ADMIN — HYDROMORPHONE HYDROCHLORIDE 0.5 MG: 1 INJECTION, SOLUTION INTRAMUSCULAR; INTRAVENOUS; SUBCUTANEOUS at 13:59

## 2024-10-13 RX ADMIN — RIFAXIMIN 550 MG: 550 TABLET ORAL at 22:12

## 2024-10-13 RX ADMIN — FUROSEMIDE 80 MG: 10 INJECTION, SOLUTION INTRAMUSCULAR; INTRAVENOUS at 15:22

## 2024-10-13 RX ADMIN — ASPIRIN 325 MG ORAL TABLET 325 MG: 325 PILL ORAL at 09:13

## 2024-10-13 RX ADMIN — LIDOCAINE 1 PATCH: 700 PATCH TOPICAL at 09:13

## 2024-10-13 RX ADMIN — DICYCLOMINE HYDROCHLORIDE 10 MG: 10 CAPSULE ORAL at 11:01

## 2024-10-13 RX ADMIN — LACTULOSE 20 G: 20 SOLUTION ORAL at 09:13

## 2024-10-13 NOTE — ASSESSMENT & PLAN NOTE
Patient presented with markedly elevated liver enzymes  Suspected due to transient hypotension prior to admission    Liver enzymes trending down significantly.  No further encephalopathy noted.  Continue to trend CMP periodically, avoid hepatotoxins

## 2024-10-13 NOTE — PLAN OF CARE
Problem: PAIN - ADULT  Goal: Verbalizes/displays adequate comfort level or baseline comfort level  Description: Interventions:  - Encourage patient to monitor pain and request assistance  - Assess pain using appropriate pain scale  - Administer analgesics based on type and severity of pain and evaluate response  - Implement non-pharmacological measures as appropriate and evaluate response  - Consider cultural and social influences on pain and pain management  - Notify physician/advanced practitioner if interventions unsuccessful or patient reports new pain  Outcome: Progressing     Problem: INFECTION - ADULT  Goal: Absence or prevention of progression during hospitalization  Description: INTERVENTIONS:  - Assess and monitor for signs and symptoms of infection  - Monitor lab/diagnostic results  - Monitor all insertion sites, i.e. indwelling lines, tubes, and drains  - Monitor endotracheal if appropriate and nasal secretions for changes in amount and color  - [place] appropriate cooling/warming therapies per order  - Administer medications as ordered  - Instruct and encourage patient and family to use good hand hygiene technique  - Identify and instruct in appropriate isolation precautions for identified infection/condition  Outcome: Progressing  Goal: Absence of fever/infection during neutropenic period  Description: INTERVENTIONS:  - Monitor WBC    Outcome: Progressing     Problem: SAFETY ADULT  Goal: Patient will remain free of falls  Description: INTERVENTIONS:  - Educate patient/family on patient safety including physical limitations  - Instruct patient to call for assistance with activity   - Consult OT/PT to assist with strengthening/mobility   - Keep Call bell within reach  - Keep bed low and locked with side rails adjusted as appropriate  - Keep care items and personal belongings within reach  - Initiate and maintain comfort rounds  - Make Fall Risk Sign visible to staff  - Offer Toileting every 2 Hours,  in advance of need  - Initiate/Maintain bed alarm  - Obtain necessary fall risk management equipment: chair alarm  - Apply yellow socks and bracelet for high fall risk patients  - Consider moving patient to room near nurses station  Outcome: Progressing  Goal: Maintain or return to baseline ADL function  Description: INTERVENTIONS:  -  Assess patient's ability to carry out ADLs; assess patient's baseline for ADL function and identify physical deficits which impact ability to perform ADLs (bathing, care of mouth/teeth, toileting, grooming, dressing, etc.)  - Assess/evaluate cause of self-care deficits   - Assess range of motion  - Assess patient's mobility; develop plan if impaired  - Assess patient's need for assistive devices and provide as appropriate  - Encourage maximum independence but intervene and supervise when necessary  - Involve family in performance of ADLs  - Assess for home care needs following discharge   - Consider OT consult to assist with ADL evaluation and planning for discharge  - Provide patient education as appropriate  Outcome: Progressing  Goal: Maintains/Returns to pre admission functional level  Description: INTERVENTIONS:  - Perform AM-PAC 6 Click Basic Mobility/ Daily Activity assessment daily.  - Set and communicate daily mobility goal to care team and patient/family/caregiver.   - Collaborate with rehabilitation services on mobility goals if consulted  - Perform Range of Motion 3 times a day.  - Reposition patient every 3 hours.  - Dangle patient 3 times a day  - Stand patient 3 times a day  - Ambulate patient 3 times a day  - Out of bed to chair 3 times a day   - Out of bed for meals 3 times a day  - Out of bed for toileting  - Record patient progress and toleration of activity level   Outcome: Progressing     Problem: Prexisting or High Potential for Compromised Skin Integrity  Goal: Skin integrity is maintained or improved  Description: INTERVENTIONS:  - Identify patients at risk for  skin breakdown  - Assess and monitor skin integrity  - Assess and monitor nutrition and hydration status  - Monitor labs   - Assess for incontinence   - Turn and reposition patient  - Assist with mobility/ambulation  - Relieve pressure over bony prominences  - Avoid friction and shearing  - Provide appropriate hygiene as needed including keeping skin clean and dry  - Evaluate need for skin moisturizer/barrier cream  - Collaborate with interdisciplinary team   - Patient/family teaching  - Consider wound care consult   Outcome: Progressing     Problem: RESPIRATORY - ADULT  Goal: Achieves optimal ventilation and oxygenation  Description: INTERVENTIONS:  - Assess for changes in respiratory status  - Assess for changes in mentation and behavior  - Position to facilitate oxygenation and minimize respiratory effort  - Oxygen administered by appropriate delivery if ordered  - Initiate smoking cessation education as indicated  - Encourage broncho-pulmonary hygiene including cough, deep breathe, Incentive Spirometry  - Assess the need for suctioning and aspirate as needed  - Assess and instruct to report SOB or any respiratory difficulty  - Respiratory Therapy support as indicated  Outcome: Progressing     Problem: GASTROINTESTINAL - ADULT  Goal: Minimal or absence of nausea and/or vomiting  Description: INTERVENTIONS:  - Administer IV fluids if ordered to ensure adequate hydration  - Maintain NPO status until nausea and vomiting are resolved  - Nasogastric tube if ordered  - Administer ordered antiemetic medications as needed  - Provide nonpharmacologic comfort measures as appropriate  - Advance diet as tolerated, if ordered  - Consider nutrition services referral to assist patient with adequate nutrition and appropriate food choices  Outcome: Progressing  Goal: Maintains or returns to baseline bowel function  Description: INTERVENTIONS:  - Assess bowel function  - Encourage oral fluids to ensure adequate hydration  -  Administer IV fluids if ordered to ensure adequate hydration  - Administer ordered medications as needed  - Encourage mobilization and activity  - Consider nutritional services referral to assist patient with adequate nutrition and appropriate food choices  Outcome: Progressing  Goal: Maintains adequate nutritional intake  Description: INTERVENTIONS:  - Monitor percentage of each meal consumed  - Identify factors contributing to decreased intake, treat as appropriate  - Assist with meals as needed  - Monitor I&O, weight, and lab values if indicated  - Obtain nutrition services referral as needed  Outcome: Progressing  Goal: Establish and maintain optimal ostomy function  Description: INTERVENTIONS:  - Assess bowel function  - Encourage oral fluids to ensure adequate hydration  - Administer IV fluids if ordered to ensure adequate hydration   - Administer ordered medications as needed  - Encourage mobilization and activity  - Nutrition services referral to assist patient with appropriate food choices  - Assess stoma site  - Consider wound care consult   Outcome: Progressing  Goal: Oral mucous membranes remain intact  Description: INTERVENTIONS  - Assess oral mucosa and hygiene practices  - Implement preventative oral hygiene regimen  - Implement oral medicated treatments as ordered  - Initiate Nutrition services referral as needed  Outcome: Progressing     Problem: GENITOURINARY - ADULT  Goal: Maintains or returns to baseline urinary function  Description: INTERVENTIONS:  - Assess urinary function  - Encourage oral fluids to ensure adequate hydration if ordered  - Administer IV fluids as ordered to ensure adequate hydration  - Administer ordered medications as needed  - Offer frequent toileting  - Follow urinary retention protocol if ordered  Outcome: Progressing  Goal: Absence of urinary retention  Description: INTERVENTIONS:  - Assess patient’s ability to void and empty bladder  - Monitor I/O  - Bladder scan as  needed  - Discuss with physician/AP medications to alleviate retention as needed  - Discuss catheterization for long term situations as appropriate  Outcome: Progressing  Goal: Urinary catheter remains patent  Description: INTERVENTIONS:  - Assess patency of urinary catheter  - If patient has a chronic wilson, consider changing catheter if non-functioning  - Follow guidelines for intermittent irrigation of non-functioning urinary catheter  Outcome: Progressing

## 2024-10-13 NOTE — PLAN OF CARE
Problem: PAIN - ADULT  Goal: Verbalizes/displays adequate comfort level or baseline comfort level  Description: Interventions:  - Encourage patient to monitor pain and request assistance  - Assess pain using appropriate pain scale  - Administer analgesics based on type and severity of pain and evaluate response  - Implement non-pharmacological measures as appropriate and evaluate response  - Consider cultural and social influences on pain and pain management  - Notify physician/advanced practitioner if interventions unsuccessful or patient reports new pain  Outcome: Progressing     Problem: INFECTION - ADULT  Goal: Absence or prevention of progression during hospitalization  Description: INTERVENTIONS:  - Assess and monitor for signs and symptoms of infection  - Monitor lab/diagnostic results  - Monitor all insertion sites, i.e. indwelling lines, tubes, and drains  - Monitor endotracheal if appropriate and nasal secretions for changes in amount and color  - [place] appropriate cooling/warming therapies per order  - Administer medications as ordered  - Instruct and encourage patient and family to use good hand hygiene technique  - Identify and instruct in appropriate isolation precautions for identified infection/condition  Outcome: Progressing  Goal: Absence of fever/infection during neutropenic period  Description: INTERVENTIONS:  - Monitor WBC    Outcome: Progressing     Problem: SAFETY ADULT  Goal: Patient will remain free of falls  Description: INTERVENTIONS:  - Educate patient/family on patient safety including physical limitations  - Instruct patient to call for assistance with activity   - Consult OT/PT to assist with strengthening/mobility   - Keep Call bell within reach  - Keep bed low and locked with side rails adjusted as appropriate  - Keep care items and personal belongings within reach  - Initiate and maintain comfort rounds  - Make Fall Risk Sign visible to staff  - Offer Toileting every 2 Hours,  in advance of need  - Initiate/Maintain bedalarm  - Obtain necessary fall risk management equipment:   - Apply yellow socks and bracelet for high fall risk patients  - Consider moving patient to room near nurses station  Outcome: Progressing  Goal: Maintain or return to baseline ADL function  Description: INTERVENTIONS:  -  Assess patient's ability to carry out ADLs; assess patient's baseline for ADL function and identify physical deficits which impact ability to perform ADLs (bathing, care of mouth/teeth, toileting, grooming, dressing, etc.)  - Assess/evaluate cause of self-care deficits   - Assess range of motion  - Assess patient's mobility; develop plan if impaired  - Assess patient's need for assistive devices and provide as appropriate  - Encourage maximum independence but intervene and supervise when necessary  - Involve family in performance of ADLs  - Assess for home care needs following discharge   - Consider OT consult to assist with ADL evaluation and planning for discharge  - Provide patient education as appropriate  Outcome: Progressing  Goal: Maintains/Returns to pre admission functional level  Description: INTERVENTIONS:  - Perform AM-PAC 6 Click Basic Mobility/ Daily Activity assessment daily.  - Set and communicate daily mobility goal to care team and patient/family/caregiver.   - Collaborate with rehabilitation services on mobility goals if consulted  - Perform Range of Motion 3 times a day.  - Reposition patient every 2 hours.  - Dangle patient 3 times a day  - Stand patient 3 times a day  - Ambulate patient 3 times a day  - Out of bed to chair 3 times a day   - Out of bed for meals 3 times a day  - Out of bed for toileting  - Record patient progress and toleration of activity level   Outcome: Progressing     Problem: Prexisting or High Potential for Compromised Skin Integrity  Goal: Skin integrity is maintained or improved  Description: INTERVENTIONS:  - Identify patients at risk for skin  breakdown  - Assess and monitor skin integrity  - Assess and monitor nutrition and hydration status  - Monitor labs   - Assess for incontinence   - Turn and reposition patient  - Assist with mobility/ambulation  - Relieve pressure over bony prominences  - Avoid friction and shearing  - Provide appropriate hygiene as needed including keeping skin clean and dry  - Evaluate need for skin moisturizer/barrier cream  - Collaborate with interdisciplinary team   - Patient/family teaching  - Consider wound care consult   Outcome: Progressing     Problem: RESPIRATORY - ADULT  Goal: Achieves optimal ventilation and oxygenation  Description: INTERVENTIONS:  - Assess for changes in respiratory status  - Assess for changes in mentation and behavior  - Position to facilitate oxygenation and minimize respiratory effort  - Oxygen administered by appropriate delivery if ordered  - Initiate smoking cessation education as indicated  - Encourage broncho-pulmonary hygiene including cough, deep breathe, Incentive Spirometry  - Assess the need for suctioning and aspirate as needed  - Assess and instruct to report SOB or any respiratory difficulty  - Respiratory Therapy support as indicated  Outcome: Progressing     Problem: GASTROINTESTINAL - ADULT  Goal: Minimal or absence of nausea and/or vomiting  Description: INTERVENTIONS:  - Administer IV fluids if ordered to ensure adequate hydration  - Maintain NPO status until nausea and vomiting are resolved  - Nasogastric tube if ordered  - Administer ordered antiemetic medications as needed  - Provide nonpharmacologic comfort measures as appropriate  - Advance diet as tolerated, if ordered  - Consider nutrition services referral to assist patient with adequate nutrition and appropriate food choices  Outcome: Progressing  Goal: Maintains or returns to baseline bowel function  Description: INTERVENTIONS:  - Assess bowel function  - Encourage oral fluids to ensure adequate hydration  - Administer  IV fluids if ordered to ensure adequate hydration  - Administer ordered medications as needed  - Encourage mobilization and activity  - Consider nutritional services referral to assist patient with adequate nutrition and appropriate food choices  Outcome: Progressing  Goal: Maintains adequate nutritional intake  Description: INTERVENTIONS:  - Monitor percentage of each meal consumed  - Identify factors contributing to decreased intake, treat as appropriate  - Assist with meals as needed  - Monitor I&O, weight, and lab values if indicated  - Obtain nutrition services referral as needed  Outcome: Progressing  Goal: Establish and maintain optimal ostomy function  Description: INTERVENTIONS:  - Assess bowel function  - Encourage oral fluids to ensure adequate hydration  - Administer IV fluids if ordered to ensure adequate hydration   - Administer ordered medications as needed  - Encourage mobilization and activity  - Nutrition services referral to assist patient with appropriate food choices  - Assess stoma site  - Consider wound care consult   Outcome: Progressing  Goal: Oral mucous membranes remain intact  Description: INTERVENTIONS  - Assess oral mucosa and hygiene practices  - Implement preventative oral hygiene regimen  - Implement oral medicated treatments as ordered  - Initiate Nutrition services referral as needed  Outcome: Progressing     Problem: GENITOURINARY - ADULT  Goal: Maintains or returns to baseline urinary function  Description: INTERVENTIONS:  - Assess urinary function  - Encourage oral fluids to ensure adequate hydration if ordered  - Administer IV fluids as ordered to ensure adequate hydration  - Administer ordered medications as needed  - Offer frequent toileting  - Follow urinary retention protocol if ordered  Outcome: Progressing  Goal: Absence of urinary retention  Description: INTERVENTIONS:  - Assess patient’s ability to void and empty bladder  - Monitor I/O  - Bladder scan as needed  -  Discuss with physician/AP medications to alleviate retention as needed  - Discuss catheterization for long term situations as appropriate  Outcome: Progressing  Goal: Urinary catheter remains patent  Description: INTERVENTIONS:  - Assess patency of urinary catheter  - If patient has a chronic wilson, consider changing catheter if non-functioning  - Follow guidelines for intermittent irrigation of non-functioning urinary catheter  Outcome: Progressing

## 2024-10-13 NOTE — ASSESSMENT & PLAN NOTE
Secondary to shock liver  Improved, back to baseline.  Off lactulose  Monitor closely, if patient develops worsening confusion can consider rechecking ammonia and reinstituting lactulose therapy if needed -repeat ammonia normal  Patient did have some intermittent confusion however, she does not want to have multiple bowel movements so I am putting her on Xifaxan for now.

## 2024-10-13 NOTE — PROGRESS NOTES
Progress Note - Hospitalist   Name: Dwaine Gould 63 y.o. female I MRN: [id number]  Unit/Bed#: -01 I Date of Admission: 10/7/2024   Date of Service: 10/13/2024 I Hospital Day: 6    Assessment & Plan  LETICIA (acute kidney injury) (HCC)  Patient presented with elevated creatinine up to 3 compared to a previously normal baseline.  This probably started as a prerenal LETICIA and developed into ATN.  Patient remains mostly oligoanuric at this time.  Creatinine not improving.  She appears to be slightly overloaded.  Initially did not respond to fluids.  Nephrology following    De La Cruz catheter inserted on 10/12 for accurate intake and output  Continue diuretic trial with IV Lasix, currently appears to be diuresing well  Continue to monitor kidney function  Monitor intake and output closely  Monitor volume status  Avoid nephrotoxins  Shock liver  Patient presented with markedly elevated liver enzymes  Suspected due to transient hypotension prior to admission    Liver enzymes trending down significantly.  No further encephalopathy noted.  Continue to trend CMP periodically, avoid hepatotoxins    Hepatic encephalopathy (HCC)  Secondary to shock liver  Improved, back to baseline.  Off lactulose  Monitor closely, if patient develops worsening confusion can consider rechecking ammonia and reinstituting lactulose therapy if needed -repeat ammonia normal  Patient did have some intermittent confusion however, she does not want to have multiple bowel movements so I am putting her on Xifaxan for now.  Essential hypertension  Monitor BP.  Avoid hypotension especially in light of acute kidney injury  Hyperlipidemia  Currently hold atorvastatin given ongoing shock liver and risk of hepatotoxicity  Transaminitis  Continue to trend CMP    VTE Pharmacologic Prophylaxis: VTE Score: 2 patient is thrombocytopenia, holding DVT prophylaxis    Mobility:   Basic Mobility Inpatient Raw Score: 22  JH-HLM Goal: 7: Walk 25 feet or more  JH-HLM Achieved:  7: Walk 25 feet or more  -HLM Goal achieved. Continue to encourage appropriate mobility.    Patient Centered Rounds: I performed bedside rounds with nursing staff today.   Discussions with Specialists or Other Care Team Provider: Discussed with care management team    Education and Discussions with Family / Patient: Updated  (daughter) via phone.,  Spoke to Eva    Current Length of Stay: 6 day(s)  Current Patient Status: Inpatient   Certification Statement: The patient will continue to require additional inpatient hospital stay due to need for IV therapy  Discharge Plan: Anticipate discharge in 48-72 hrs to either home with services or rehab depending on patient and family decision PT evaluation    Code Status: Level 3 - DNAR and DNI    Subjective     Patient valuated this morning.  Yesterday she was intermittently confused but today she appears to be AOx4.  Denies any chest pain nausea or vomiting.  Diuresing well with current diuretic regimen.    Objective :  Temp:  [97.4 °F (36.3 °C)-97.8 °F (36.6 °C)] 97.8 °F (36.6 °C)  HR:  [61-76] 61  BP: (124-153)/(63-79) 127/63  Resp:  [17-20] 17  SpO2:  [91 %-97 %] 91 %  O2 Device: Nasal cannula  Nasal Cannula O2 Flow Rate (L/min):  [3 L/min] 3 L/min    Body mass index is 49.94 kg/m².     Input and Output Summary (last 24 hours):     Intake/Output Summary (Last 24 hours) at 10/13/2024 1234  Last data filed at 10/13/2024 1100  Gross per 24 hour   Intake 600 ml   Output 4000 ml   Net -3400 ml       Physical Exam  Vitals and nursing note reviewed.   Constitutional:       Appearance: Normal appearance. She is normal weight.      Comments: Female in bed, awake   HENT:      Head: Normocephalic and atraumatic.      Right Ear: External ear normal.      Left Ear: External ear normal.      Nose: Nose normal. No congestion.      Mouth/Throat:      Mouth: Mucous membranes are moist.      Pharynx: Oropharynx is clear. No oropharyngeal exudate or posterior  oropharyngeal erythema.   Eyes:      General: No scleral icterus.        Right eye: No discharge.         Left eye: No discharge.      Extraocular Movements: Extraocular movements intact.      Conjunctiva/sclera: Conjunctivae normal.      Pupils: Pupils are equal, round, and reactive to light.   Cardiovascular:      Rate and Rhythm: Normal rate and regular rhythm.      Pulses: Normal pulses.      Heart sounds: Normal heart sounds. No murmur heard.     No friction rub. No gallop.   Pulmonary:      Effort: Pulmonary effort is normal. No respiratory distress.      Breath sounds: Normal breath sounds. No stridor. No wheezing, rhonchi or rales.   Chest:      Chest wall: No tenderness.   Abdominal:      General: Abdomen is flat. Bowel sounds are normal. There is no distension.      Palpations: Abdomen is soft. There is no mass.      Tenderness: There is no abdominal tenderness. There is no guarding or rebound.   Genitourinary:     Comments: De La Cruz catheter in place  Musculoskeletal:         General: No swelling, tenderness, deformity or signs of injury. Normal range of motion.      Cervical back: Normal range of motion and neck supple. No rigidity. No muscular tenderness.      Right lower leg: Edema present.      Left lower leg: Edema present.      Comments: Edema 2/4+ bilateral lower extremities   Skin:     General: Skin is warm and dry.      Capillary Refill: Capillary refill takes less than 2 seconds.      Coloration: Skin is not jaundiced or pale.      Findings: No bruising, erythema, lesion or rash.   Neurological:      General: No focal deficit present.      Mental Status: She is alert and oriented to person, place, and time. Mental status is at baseline.      Cranial Nerves: No cranial nerve deficit.      Sensory: No sensory deficit.      Motor: No weakness.      Coordination: Coordination normal.   Psychiatric:         Mood and Affect: Mood normal.         Behavior: Behavior normal.         Thought Content: Thought  content normal.         Judgment: Judgment normal.           Lines/Drains:  Lines/Drains/Airways       Active Status       Name Placement date Placement time Site Days    Port A Cath 06/24/24 Right Internal jugular 06/24/24  1050  Internal jugular  111    Urethral Catheter Latex 16 Fr. 10/12/24  1137  Latex  1                  Urinary Catheter:  Goal for removal: Remove after 48 hrs of I/O monitoring                      Lab Results: I have reviewed the following results:   Results from last 7 days   Lab Units 10/13/24  0548 10/10/24  0654 10/09/24  0450   WBC Thousand/uL 5.79   < > 10.14   HEMOGLOBIN g/dL 10.3*   < > 10.4*   HEMATOCRIT % 35.8   < > 35.1   PLATELETS Thousands/uL 55*   < > 87*   BANDS PCT %  --   --  7   SEGS PCT % 75   < >  --    LYMPHO PCT % 11*   < > 7*   MONO PCT % 10   < > 2*   EOS PCT % 3   < > 0    < > = values in this interval not displayed.     Results from last 7 days   Lab Units 10/13/24  0548   SODIUM mmol/L 141   POTASSIUM mmol/L 3.8   CHLORIDE mmol/L 103   CO2 mmol/L 29   BUN mg/dL 72*   CREATININE mg/dL 2.68*   ANION GAP mmol/L 9   CALCIUM mg/dL 8.6   ALBUMIN g/dL 4.4   TOTAL BILIRUBIN mg/dL 1.50*   ALK PHOS U/L 146*   ALT U/L 595*   AST U/L 56*   GLUCOSE RANDOM mg/dL 114     Results from last 7 days   Lab Units 10/10/24  0654   INR  1.33*     Results from last 7 days   Lab Units 10/07/24  2054   POC GLUCOSE mg/dl 113         Results from last 7 days   Lab Units 10/07/24  2219 10/07/24  2052   LACTIC ACID mmol/L 2.1* 2.8*   PROCALCITONIN ng/ml  --  1.67*       Recent Cultures (last 7 days):   Results from last 7 days   Lab Units 10/07/24  2220 10/07/24  2052   BLOOD CULTURE  No Growth at 72 hrs. No Growth at 72 hrs.       Imaging Results Review: No pertinent imaging studies reviewed.  Other Study Results Review: No additional pertinent studies reviewed.    Last 24 Hours Medication List:     Current Facility-Administered Medications:     aspirin tablet 325 mg, Daily    dicyclomine  (BENTYL) capsule 10 mg, TID AC    furosemide (LASIX) injection 80 mg, BID (diuretic)    HYDROmorphone (DILAUDID) injection 0.5 mg, Q6H PRN    lactulose (CHRONULAC) oral solution 20 g, Daily    lidocaine (LIDODERM) 5 % patch 1 patch, Daily    Administrative Statements   Today, Patient Was Seen By: Leo Bazzi MD      **Please Note: This note may have been constructed using a voice recognition system.**

## 2024-10-13 NOTE — ASSESSMENT & PLAN NOTE
Patient presented with elevated creatinine up to 3 compared to a previously normal baseline.  This probably started as a prerenal LETICIA and developed into ATN.  Patient remains mostly oligoanuric at this time.  Creatinine not improving.  She appears to be slightly overloaded.  Initially did not respond to fluids.  Nephrology following    De La Cruz catheter inserted on 10/12 for accurate intake and output  Continue diuretic trial with IV Lasix, currently appears to be diuresing well  Continue to monitor kidney function  Monitor intake and output closely  Monitor volume status  Avoid nephrotoxins

## 2024-10-13 NOTE — PROGRESS NOTES
NEPHROLOGY PROGRESS NOTE    Patient: Dwaine Gould               Sex: female          DOA: 10/7/2024  8:14 PM   YOB: 1960        Age:  63 y.o.        LOS:  LOS: 6 days   10/13/2024    REASON FOR THE CONSULTATION: Further management of acute kidney injury    ASSESSMENT/PLAN     Assessment & Plan  LETICIA (acute kidney injury) (HCC)  Present on admission. After review of the medical record, appears baseline creatinine levels are fluctuant around 0.7-1.0 and admitted with creatinine at 3.01.    Patient has underlying advanced colorectal cancer [stage III versus stage IV colorectal cancer with CEA elevation and probable peritoneal metastasis] and currently being followed by oncology as outpatient and receiving oxaliplatin/5-FU/leucovorin.    Prior workup noted urinalysis with hyaline casts suspicious for intravascular volume depletion.  She was provided with IV albumin for a total of 4 doses to boost intravascular volume status with no significant improvement in creatinine levels.  Creatinine levels have been fluctuating from 2.6-2.8 and concern for development of ATN.  Follow-up urine sodium level was low at 10.    Yesterday, she was noted to have a worsening in renal function to a creatinine of 3.0 with development of oligoanuria and clinically appeared volume overloaded.  De La Cruz catheter was placed and she was initiated on furosemide 80 mg IV twice daily.  She was noted to have 2.4 L of urine output over the past 24 hours and reports significant improvement in breathing and symptomology today.  Creatinine level has improved to 2.68 on labs today.    Will continue IV diuretics and strict I's/O monitoring.  Will repeat BMP in the a.m.    Essential hypertension  Antihypertensive medications currently on hold with blood pressure readings overall acceptable over the past 24 hours.  Will allow for slightly higher blood pressure in the setting of LETICIA as above.      SUBJECTIVE     Patient was seen and evaluated at  the bedside today.  She reports she feels significant improvement when compared to yesterday.  She does use oxygen at baseline but states breathing feels improved.  Still has ongoing lower extremity edema.  Happy that kidney numbers have improved.    Reviewed past 24 hour events.    CURRENT MEDICATIONS       Current Facility-Administered Medications:     aspirin tablet 325 mg, 325 mg, Oral, Daily, Theodore Wei MD, 325 mg at 10/13/24 0913    dicyclomine (BENTYL) capsule 10 mg, 10 mg, Oral, TID AC, Aviva Grigsby PA-C, 10 mg at 10/13/24 1101    furosemide (LASIX) injection 80 mg, 80 mg, Intravenous, BID (diuretic), GUSTAVO Smith, 80 mg at 10/13/24 0913    HYDROmorphone (DILAUDID) injection 0.5 mg, 0.5 mg, Intravenous, Q6H PRN, Leo Tompkins MD, 0.5 mg at 10/12/24 1128    lactulose (CHRONULAC) oral solution 20 g, 20 g, Oral, Daily, Aviva Grigsby PA-C, 20 g at 10/13/24 0913    lidocaine (LIDODERM) 5 % patch 1 patch, 1 patch, Topical, Daily, Leo Tompkins MD, 1 patch at 10/13/24 0913    REVIEW OF SYSTEMS     Review of Systems   Constitutional:  Positive for activity change and fatigue. Negative for chills and fever.   HENT:  Negative for trouble swallowing.    Respiratory:  Negative for cough and shortness of breath.    Cardiovascular:  Positive for leg swelling. Negative for chest pain.   Gastrointestinal:  Negative for nausea and vomiting.   Genitourinary:  Negative for difficulty urinating, dysuria, frequency and hematuria.   Musculoskeletal:  Negative for back pain.   Skin:  Negative for pallor.   Neurological:  Positive for weakness. Negative for dizziness, syncope and light-headedness.   Psychiatric/Behavioral:  Negative for sleep disturbance. The patient is not nervous/anxious.        OBJECTIVE     Current Weight: Weight - Scale: 116 kg (255 lb 11.7 oz)  Vitals:    10/13/24 0734   BP: 127/63   Pulse:    Resp: 17   Temp: 97.8 °F (36.6 °C)   SpO2:      Body mass index is 49.94  kg/m².    Intake/Output Summary (Last 24 hours) at 10/13/2024 1123  Last data filed at 10/13/2024 0600  Gross per 24 hour   Intake 600 ml   Output 2400 ml   Net -1800 ml       PHYSICAL EXAMINATION     Physical Exam  Vitals reviewed.   Constitutional:       General: She is not in acute distress.  HENT:      Head: Normocephalic.      Mouth/Throat:      Lips: Pink.      Mouth: Mucous membranes are moist.   Eyes:      General: Lids are normal. No scleral icterus.  Cardiovascular:      Rate and Rhythm: Normal rate and regular rhythm.      Heart sounds: S1 normal and S2 normal. No murmur heard.  Pulmonary:      Effort: Pulmonary effort is normal. No accessory muscle usage or respiratory distress.      Comments: On nasal cannula  Abdominal:      General: There is no distension.      Tenderness: There is no abdominal tenderness.   Musculoskeletal:      Cervical back: Normal range of motion and neck supple. No tenderness.      Right lower leg: Edema present.      Left lower leg: Edema present.   Skin:     General: Skin is warm.      Coloration: Skin is not cyanotic or jaundiced.   Neurological:      General: No focal deficit present.      Mental Status: She is alert and oriented to person, place, and time.   Psychiatric:         Attention and Perception: Attention normal.         Speech: Speech normal.         Behavior: Behavior is cooperative.           LAB RESULTS     Results from last 7 days   Lab Units 10/13/24  0548 10/12/24  0846 10/11/24  0423 10/10/24  0654 10/10/24  0512 10/09/24  0450 10/08/24  0617 10/07/24  2157 10/07/24  2052   WBC Thousand/uL 5.79 8.03 7.95 10.80*  --  10.14 10.31*  --  13.95*   HEMOGLOBIN g/dL 10.3* 10.5* 9.2* 11.0*  --  10.4* 11.2*  --  11.9   HEMATOCRIT % 35.8 35.8 31.8* 37.3  --  35.1 37.5  --  40.1   PLATELETS Thousands/uL 55* 50* 43* 70*  --  87* 102*  --  125*   SODIUM mmol/L 141 138 139  --  137 140 140 137 134*   POTASSIUM mmol/L 3.8 3.6 3.9  --  4.0 3.9 4.4 4.9 5.8*   CHLORIDE mmol/L  "103 101 102  --  106 107 105 102 99   CO2 mmol/L 29 24 24  --  21 24 23 26 24   BUN mg/dL 72* 77* 74*  --  75* 80* 82* 76* 75*   CREATININE mg/dL 2.68* 3.09* 2.83*  --  2.60* 2.86* 3.09* 2.98* 3.01*   EGFR ml/min/1.73sq m 18 15 17  --  19 17 15 16 16   CALCIUM mg/dL 8.6 8.8 8.4  --  7.2* 7.0* 7.0* 7.3* 7.7*       RADIOLOGY RESULTS      Results for orders placed during the hospital encounter of 10/07/24    XR chest portable    Narrative  XR CHEST PORTABLE    INDICATION: cardiac work-up.    COMPARISON: 1 view chest 10/4/2024    FINDINGS: Right chest wall stormy catheter in place with distal tip in the upper right atrium.    Minimal right basilar subsegmental atelectasis. No pneumothorax or pleural effusion.    Cardiac silhouette is enlarged.  Aortic calcification is present. Prominent central pulmonary vasculature.    Bones are unremarkable for age.    Normal upper abdomen.    Impression  Findings suggestive of mild central pulmonary vascular congestion.        Workstation performed: [id number]    Results for orders placed during the hospital encounter of 09/24/24    XR chest pa and lateral    Narrative  CHEST    INDICATION:   Malignant neoplasm of colon, unspecified.    COMPARISON:  None.    EXAM PERFORMED/VIEWS:  XR CHEST PA AND LATERAL    FINDINGS:    Cardiomediastinal silhouette appears significantly enlarged. Portacatheter in place in the right atrial appendage.    The lungs are clear.  No pneumothorax or pleural effusion.    Osseous structures appear within normal limits for patient age.    Impression  No acute cardiopulmonary disease.  Clearing of previous pulmonary edema from previous intubated study of 3/22/2024          Electronically signed: 09/24/2024 01:38 PM MD Krystina Villalpando CRNP  Nephrology  10/13/2024      Portions of the record may have been created with voice recognition software. Occasional wrong word or \"sound a like\" substitutions may have occurred due to the " inherent limitations of voice recognition software. Read the chart carefully and recognize, using context, where substitutions have occurred.

## 2024-10-13 NOTE — ASSESSMENT & PLAN NOTE
Antihypertensive medications currently on hold with blood pressure readings overall acceptable over the past 24 hours.  Will allow for slightly higher blood pressure in the setting of LETICIA as above.

## 2024-10-13 NOTE — ASSESSMENT & PLAN NOTE
Present on admission. After review of the medical record, appears baseline creatinine levels are fluctuant around 0.7-1.0 and admitted with creatinine at 3.01.    Patient has underlying advanced colorectal cancer [stage III versus stage IV colorectal cancer with CEA elevation and probable peritoneal metastasis] and currently being followed by oncology as outpatient and receiving oxaliplatin/5-FU/leucovorin.    Prior workup noted urinalysis with hyaline casts suspicious for intravascular volume depletion.  She was provided with IV albumin for a total of 4 doses to boost intravascular volume status with no significant improvement in creatinine levels.  Creatinine levels have been fluctuating from 2.6-2.8 and concern for development of ATN.  Follow-up urine sodium level was low at 10.    Yesterday, she was noted to have a worsening in renal function to a creatinine of 3.0 with development of oligoanuria and clinically appeared volume overloaded.  De La Cruz catheter was placed and she was initiated on furosemide 80 mg IV twice daily.  She was noted to have 2.4 L of urine output over the past 24 hours and reports significant improvement in breathing and symptomology today.  Creatinine level has improved to 2.68 on labs today.    Will continue IV diuretics and strict I's/O monitoring.  Will repeat BMP in the a.m.

## 2024-10-14 ENCOUNTER — HOSPITAL ENCOUNTER (OUTPATIENT)
Dept: INFUSION CENTER | Facility: CLINIC | Age: 64
End: 2024-10-14

## 2024-10-14 LAB
ALBUMIN SERPL BCG-MCNC: 4 G/DL (ref 3.5–5)
ALP SERPL-CCNC: 153 U/L (ref 34–104)
ALT SERPL W P-5'-P-CCNC: 416 U/L (ref 7–52)
ANION GAP SERPL CALCULATED.3IONS-SCNC: 13 MMOL/L (ref 4–13)
AST SERPL W P-5'-P-CCNC: 37 U/L (ref 13–39)
BACTERIA BLD CULT: NORMAL
BACTERIA BLD CULT: NORMAL
BASOPHILS # BLD AUTO: 0.02 THOUSANDS/ΜL (ref 0–0.1)
BASOPHILS NFR BLD AUTO: 0 % (ref 0–1)
BILIRUB SERPL-MCNC: 1.82 MG/DL (ref 0.2–1)
BUN SERPL-MCNC: 53 MG/DL (ref 5–25)
CALCIUM SERPL-MCNC: 8.5 MG/DL (ref 8.4–10.2)
CHLORIDE SERPL-SCNC: 102 MMOL/L (ref 96–108)
CO2 SERPL-SCNC: 28 MMOL/L (ref 21–32)
CREAT SERPL-MCNC: 1.87 MG/DL (ref 0.6–1.3)
EOSINOPHIL # BLD AUTO: 0.14 THOUSAND/ΜL (ref 0–0.61)
EOSINOPHIL NFR BLD AUTO: 3 % (ref 0–6)
ERYTHROCYTE [DISTWIDTH] IN BLOOD BY AUTOMATED COUNT: 20.2 % (ref 11.6–15.1)
GFR SERPL CREATININE-BSD FRML MDRD: 28 ML/MIN/1.73SQ M
GLUCOSE SERPL-MCNC: 95 MG/DL (ref 65–140)
HCT VFR BLD AUTO: 36 % (ref 34.8–46.1)
HGB BLD-MCNC: 10.8 G/DL (ref 11.5–15.4)
IMM GRANULOCYTES # BLD AUTO: 0.04 THOUSAND/UL (ref 0–0.2)
IMM GRANULOCYTES NFR BLD AUTO: 1 % (ref 0–2)
LYMPHOCYTES # BLD AUTO: 0.74 THOUSANDS/ΜL (ref 0.6–4.47)
LYMPHOCYTES NFR BLD AUTO: 16 % (ref 14–44)
MCH RBC QN AUTO: 28.1 PG (ref 26.8–34.3)
MCHC RBC AUTO-ENTMCNC: 30 G/DL (ref 31.4–37.4)
MCV RBC AUTO: 94 FL (ref 82–98)
MONOCYTES # BLD AUTO: 0.65 THOUSAND/ΜL (ref 0.17–1.22)
MONOCYTES NFR BLD AUTO: 14 % (ref 4–12)
NEUTROPHILS # BLD AUTO: 3 THOUSANDS/ΜL (ref 1.85–7.62)
NEUTS SEG NFR BLD AUTO: 66 % (ref 43–75)
NRBC BLD AUTO-RTO: 1 /100 WBCS
PLATELET # BLD AUTO: 60 THOUSANDS/UL (ref 149–390)
PMV BLD AUTO: 10.4 FL (ref 8.9–12.7)
POTASSIUM SERPL-SCNC: 3.6 MMOL/L (ref 3.5–5.3)
PROT SERPL-MCNC: 6.2 G/DL (ref 6.4–8.4)
RBC # BLD AUTO: 3.84 MILLION/UL (ref 3.81–5.12)
SODIUM SERPL-SCNC: 143 MMOL/L (ref 135–147)
WBC # BLD AUTO: 4.59 THOUSAND/UL (ref 4.31–10.16)

## 2024-10-14 PROCEDURE — 99233 SBSQ HOSP IP/OBS HIGH 50: CPT | Performed by: INTERNAL MEDICINE

## 2024-10-14 PROCEDURE — 80053 COMPREHEN METABOLIC PANEL: CPT | Performed by: INTERNAL MEDICINE

## 2024-10-14 PROCEDURE — 97167 OT EVAL HIGH COMPLEX 60 MIN: CPT

## 2024-10-14 PROCEDURE — 99232 SBSQ HOSP IP/OBS MODERATE 35: CPT | Performed by: INTERNAL MEDICINE

## 2024-10-14 PROCEDURE — 85025 COMPLETE CBC W/AUTO DIFF WBC: CPT

## 2024-10-14 RX ORDER — FLUTICASONE PROPIONATE 50 MCG
1 SPRAY, SUSPENSION (ML) NASAL 2 TIMES DAILY
Status: DISCONTINUED | OUTPATIENT
Start: 2024-10-14 | End: 2024-10-16 | Stop reason: HOSPADM

## 2024-10-14 RX ORDER — FUROSEMIDE 10 MG/ML
40 INJECTION INTRAMUSCULAR; INTRAVENOUS
Status: DISCONTINUED | OUTPATIENT
Start: 2024-10-14 | End: 2024-10-15

## 2024-10-14 RX ADMIN — LACTULOSE 20 G: 20 SOLUTION ORAL at 08:13

## 2024-10-14 RX ADMIN — DICYCLOMINE HYDROCHLORIDE 10 MG: 10 CAPSULE ORAL at 10:53

## 2024-10-14 RX ADMIN — HYDROMORPHONE HYDROCHLORIDE 0.5 MG: 1 INJECTION, SOLUTION INTRAMUSCULAR; INTRAVENOUS; SUBCUTANEOUS at 11:00

## 2024-10-14 RX ADMIN — DICYCLOMINE HYDROCHLORIDE 10 MG: 10 CAPSULE ORAL at 18:01

## 2024-10-14 RX ADMIN — FUROSEMIDE 80 MG: 10 INJECTION, SOLUTION INTRAMUSCULAR; INTRAVENOUS at 08:13

## 2024-10-14 RX ADMIN — ASPIRIN 325 MG ORAL TABLET 325 MG: 325 PILL ORAL at 08:13

## 2024-10-14 RX ADMIN — FLUTICASONE PROPIONATE 1 SPRAY: 50 SPRAY, METERED NASAL at 10:53

## 2024-10-14 RX ADMIN — FLUTICASONE PROPIONATE 1 SPRAY: 50 SPRAY, METERED NASAL at 18:01

## 2024-10-14 RX ADMIN — RIFAXIMIN 550 MG: 550 TABLET ORAL at 23:47

## 2024-10-14 RX ADMIN — FUROSEMIDE 40 MG: 10 INJECTION, SOLUTION INTRAMUSCULAR; INTRAVENOUS at 18:01

## 2024-10-14 RX ADMIN — RIFAXIMIN 550 MG: 550 TABLET ORAL at 08:13

## 2024-10-14 RX ADMIN — DICYCLOMINE HYDROCHLORIDE 10 MG: 10 CAPSULE ORAL at 06:27

## 2024-10-14 NOTE — PLAN OF CARE
Problem: OCCUPATIONAL THERAPY ADULT  Goal: Performs self-care activities at highest level of function for planned discharge setting.  See evaluation for individualized goals.  Description: Treatment Interventions: ADL retraining, UE strengthening/ROM, Endurance training, Compensatory technique education          See flowsheet documentation for full assessment, interventions and recommendations.   Note: Limitation: Decreased ADL status, Decreased endurance, Decreased self-care trans, Decreased high-level ADLs  Prognosis: Fair  Assessment: Patient is a 63 y.o. female seen for OT evaluation s/p admit to [facility] on 10/7/2024 w/ Shock liver. Commorbidities affecting patient's functional performance at time of assessment include:  has a past medical history of Acute metabolic encephalopathy, Asthma, CHF (congestive heart failure) (HCC), Chronic kidney disease, COPD (chronic obstructive pulmonary disease) (HCC), Hyperlipidemia, Hypertension, Liver disease, Myocardial infarction (HCC), Seizures (HCC), Sleep apnea, and Transaminitis.  Orders placed for OT evaluation and treatment.  Performed at least two patient identifiers during session including name and wristband.  Prior to admission, patient was Independent with ADLs and Meal prep, Housekeeping, and  family assisted with transportation. Personal factors affecting patient at time of initial evaluation include: steps to enter, difficulty performing ADLs, and difficulty performing IADLs. Upon evaluation, patient requires supervision assist for UB ADLs, minimal  assist for LB ADLs, transfers and functional ambulation in room and bathroom with supervision assistance x1 assist, with the use of Rolling Walker.  Patient is oriented x 4. Occupational performance is affected by the following deficits: decreased muscle strength, dynamic sit/ stand balance deficit with poor standing tolerance time for self care and functional mobility, and decreased activity  tolerance.  Patient to benefit from continued Occupational Therapy treatment while in the hospital to address deficits as defined above and maximize level of functional independence with ADLs and functional mobility. Occupational Performance areas to address include: grooming , bathing/ shower, dressing, transfer to all surfaces, functional mobility, and IADLS: Household maintenance. From OT standpoint, recommendation at time of d/c would be   Level III (Minimum Resource Intensity)     Rehab Resource Intensity Level, OT: III (Minimum Resource Intensity)

## 2024-10-14 NOTE — OCCUPATIONAL THERAPY NOTE
Occupational Therapy Evaluation     Patient Name: Dwaine Gould  Today's Date: 10/14/2024  Problem List  Principal Problem:    Shock liver  Active Problems:    Transaminitis    Essential hypertension    Hyperlipidemia    LETICIA (acute kidney injury) (HCC)    Hepatic encephalopathy (HCC)    Adenocarcinoma, colon (HCC)    Past Medical History  Past Medical History:   Diagnosis Date    Acute metabolic encephalopathy     Asthma     CHF (congestive heart failure) (HCC)     Chronic kidney disease     COPD (chronic obstructive pulmonary disease) (HCC)     Hyperlipidemia     Hypertension     Liver disease     Myocardial infarction (HCC)     Seizures (HCC)     Sleep apnea     Transaminitis      Past Surgical History  Past Surgical History:   Procedure Laterality Date    APPENDECTOMY      CARDIAC CATHETERIZATION      GANGLION CYST EXCISION      INCONTINENCE SURGERY      IR PORT PLACEMENT  05/03/2024    IR PORT PLACEMENT  6/24/2024    IR PORT REMOVAL  05/16/2024    LAPAROTOMY N/A 03/21/2024    Procedure: LAPAROTOMY EXPLORATORY, washout, complex closure, LISSY drain;  Surgeon: Francine Reid MD;  Location: [facility];  Service: General    TN LAPS ABD PRTM&OMENTUM DX W/WO SPEC BR/WA SPX N/A 03/19/2024    Procedure: DIAGNOSTIC LAPAROSCOPY converted to Exploratory Laparotomy, Right Hemicolectomy, Abdominal wash-out, Abthera Placement;  Surgeon: Roger Joel DO;  Location: [facility];  Service: General    TYMPANOSTOMY TUBE PLACEMENT               10/14/24 0829   OT Last Visit   OT Visit Date 10/14/24   Note Type   Note type Evaluation   Pain Assessment   Pain Assessment Tool 0-10   Pain Score 6   Pain Location/Orientation Orientation: Lower;Location: Abdomen  (Stomach)   Home Living   Type of Home House  (Planning to travel to [place] to live with daughter. She lives in a 3 level home, can enter without steps, will be staying on first floor.)   Home Layout Two level;Stairs to enter with rails;Able to live on main level with  bedroom/bathroom;1/2 bath on main level  (3 MEGA with rail,bedroom on first floor, was going upstairs to shower. Just prior to admission was sponge bathing.)   Bathroom Shower/Tub Tub/shower unit   Bathroom Toilet Standard   Bathroom Equipment Grab bars in shower;Shower chair;Grab bars around toilet   Bathroom Accessibility Accessible   Home Equipment Walker;Cane;Reacher;Sock aid;Long-handled shoehorn  (Has equipment, does not use)   Prior Function   Level of [place] Independent with ADLs;Independent with functional mobility;Independent with IADLS   Lives With Spouse   Receives Help From Family   IADLs Family/Friend/Other provides transportation;Independent with meal prep;Independent with medication management  (Children provide transportation.)   Falls in the last 6 months 0   Vocational On disability   Lifestyle   Autonomy Pt was independent with ADLs/IADLs prior to session.   Intrinsic Gratification Sleeping, watch tv, read   General   Family/Caregiver Present No   Subjective   Subjective Pt agreeable to evaluation.   ADL   Where Assessed Other (Comment)  (ADL levels based on functional performance during OT evaluation.)   Eating Assistance 7  Independent   Eating Deficit None   Grooming Assistance 5  Supervision/Setup   Grooming Deficit Increased time to complete;Verbal cueing;Supervision/safety   UB Bathing Assistance 5  Supervision/Setup   UB Bathing Deficit Increased time to complete;Verbal cueing;Supervision/safety   LB Bathing Assistance 5  Supervision/Setup   LB Bathing Deficit Increased time to complete;Supervision/safety   UB Dressing Assistance 5  Supervision/Setup   UB Dressing Deficit Increased time to complete;Supervision/safety   LB Dressing Assistance 4  Minimal Assistance   LB Dressing Deficit Increased time to complete;Supervision/safety;Verbal cueing   Toileting Assistance  5  Supervision/Setup   Toileting Deficit Verbal cueing;Supervison/safety;Increased time to complete   Transfers   Sit  to Stand 5  Supervision   Additional items Assist x 1;Bedrails   Stand to Sit 5  Supervision   Additional items Assist x 1;Increased time required;Bedrails   Functional Mobility   Functional Mobility 5  Supervision   Additional Comments Functional mobility with RW, O2 decreased to 86% on 2L NC, recovered in 2 minutes.   Additional items Rolling walker   Balance   Static Sitting Good   Dynamic Sitting Fair +   Static Standing Fair   Dynamic Standing Fair -   Ambulatory Fair -   Activity Tolerance   Activity Tolerance Patient limited by fatigue   Nurse Made Aware LAURA Ruiz   RUE Assessment   RUE Assessment WFL   LUE Assessment   LUE Assessment WFL   Hand Function   Gross Motor Coordination Functional   Fine Motor Coordination Functional   Sensation   Light Touch No apparent deficits   Vision-Basic Assessment   Current Vision No visual deficits   Cognition   Overall Cognitive Status WFL   Arousal/Participation Alert;Cooperative;Responsive   Attention Within functional limits   Orientation Level Oriented X4;Oriented to person;Oriented to place;Oriented to time;Oriented to situation   Memory Within functional limits   Following Commands Follows all commands and directions without difficulty   Assessment   Limitation Decreased ADL status;Decreased endurance;Decreased self-care trans;Decreased high-level ADLs   Prognosis Fair   Assessment Patient is a 63 y.o. female seen for OT evaluation s/p admit to [facility] on 10/7/2024 w/ Shock liver. Commorbidities affecting patient's functional performance at time of assessment include:  has a past medical history of Acute metabolic encephalopathy, Asthma, CHF (congestive heart failure) (HCC), Chronic kidney disease, COPD (chronic obstructive pulmonary disease) (HCC), Hyperlipidemia, Hypertension, Liver disease, Myocardial infarction (HCC), Seizures (HCC), Sleep apnea, and Transaminitis.  Orders placed for OT evaluation and treatment.  Performed at least two patient  identifiers during session including name and wristband.  Prior to admission, patient was Independent with ADLs and Meal prep, Housekeeping, and  family assisted with transportation. Personal factors affecting patient at time of initial evaluation include: steps to enter, difficulty performing ADLs, and difficulty performing IADLs. Upon evaluation, patient requires supervision assist for UB ADLs, minimal  assist for LB ADLs, transfers and functional ambulation in room and bathroom with supervision assistance x1 assist, with the use of Rolling Walker.  Patient is oriented x 4. Occupational performance is affected by the following deficits: decreased muscle strength, dynamic sit/ stand balance deficit with poor standing tolerance time for self care and functional mobility, and decreased activity tolerance.  Patient to benefit from continued Occupational Therapy treatment while in the hospital to address deficits as defined above and maximize level of functional independence with ADLs and functional mobility. Occupational Performance areas to address include: grooming , bathing/ shower, dressing, transfer to all surfaces, functional mobility, and IADLS: Household maintenance. From OT standpoint, recommendation at time of d/c would be   Level III (Minimum Resource Intensity)   Plan   Treatment Interventions ADL retraining;UE strengthening/ROM;Endurance training;Compensatory technique education   Goal Expiration Date 10/28/24   OT Treatment Day 0   OT Frequency 1-2x/wk   Discharge Recommendation   Rehab Resource Intensity Level, OT III (Minimum Resource Intensity)   AM-PAC Daily Activity Inpatient   Lower Body Dressing 3   Bathing 3   Toileting 3   Upper Body Dressing 3   Grooming 3   Eating 4   Daily Activity Raw Score 19   Daily Activity Standardized Score (Calc for Raw Score >=11) 40.22   AM-PAC Applied Cognition Inpatient   Following a Speech/Presentation 4   Understanding Ordinary Conversation 4   Taking Medications  4   Remembering Where Things Are Placed or Put Away 4   Remembering List of 4-5 Errands 3   Taking Care of Complicated Tasks 3   Applied Cognition Raw Score 22   Applied Cognition Standardized Score 47.83   Barthel Index   Feeding 10   Bathing 0   Grooming Score 0   Dressing Score 5   Bladder Score 10   Bowels Score 10   Toilet Use Score 5   Transfers (Bed/Chair) Score 10   Mobility (Level Surface) Score 0   Stairs Score 0   Barthel Index Score 50     Occupational Therapy Goals: In 7- 10 days:  1.  Patient will complete UB dressing tasks while seated with MI.   2. Patient will complete LB ADLs at MI level,  with the use of AE as indicated.  3. Patient will increase OOB/ sitting tolerance to 2-4 hours per day for increased participation in self care and leisure tasks with no s/s of exertion  4. Patient will increase standing tolerance time to  5  minutes with  Unilateral UE support to complete sink level ADLs @ MI level while maintaining O2 saturations.   5.  Patient/ Family  will demonstrate competency with UE Home Exercise Program.

## 2024-10-14 NOTE — PROGRESS NOTES
Progress Note - Hospitalist   Name: Dwaine Gould 63 y.o. female I MRN: [id number]  Unit/Bed#: -01 I Date of Admission: 10/7/2024   Date of Service: 10/14/2024 I Hospital Day: 7    Assessment & Plan  LETICIA (acute kidney injury) (HCC)  Patient presented with elevated creatinine up to 3 compared to a previously normal baseline.  This probably started as a prerenal LETICIA and developed into ATN.  Patient remains mostly oligoanuric at this time.  Creatinine not improving.  She appears to be slightly overloaded.  Initially did not respond to fluids.  Nephrology following    De La Cruz catheter inserted on 10/12 for accurate intake and output  Continue diuretic trial with IV Lasix, currently appears to be diuresing well and creatinine also improving, nephrology following  Continue to monitor kidney function  Monitor intake and output closely  Monitor volume status  Avoid nephrotoxins    Hoping to eventually medically cleared patient once stable from the kidney perspective, off IV diuretics and liver perspective.  The patient is very weak.  Discussed with daughter Eva, she would like to take patient to [place] but I am not completely sure if she should be traveling at this point, potentially may benefit from local rehab vs local [facility] first and once stronger [place].  Shock liver  Patient presented with markedly elevated liver enzymes  Suspected due to transient hypotension prior to admission    Liver enzymes trending down significantly day by day.  No further encephalopathy noted, on lactulose and rifaximin.  Monitor CMP  Avoid hepatotoxins    Hepatic encephalopathy (HCC)  Secondary to shock liver  Still has intermittent confusion over the weekend   So replaced on lactulose and rifaximin although confusion could be multifactorial including delirium  Monitor  Essential hypertension  Monitor BP.  Avoid hypotension especially in light of acute kidney injury  Hyperlipidemia  Currently hold atorvastatin  "given ongoing shock liver and risk of hepatotoxicity  Transaminitis  Continue to trend CMP  Adenocarcinoma, colon (HCC)  The patient has a history of colon cancer previously been on FOLFOX therapy, most recent treatment 10/she was per oncology    Imaging during this hospitalization does show \"  Multiple new and/or enlarging pulmonary nodules, consistent with aggressive metastatic disease.  /  Diffusely heterogeneous appearance of the liver. While this may at least partially represent inhomogeneous hepatic steatosis, underlying lesions cannot be excluded without IV contrast.\"  Patient will need eventual follow-up outpatient with oncology once recovered from kidney and liver injury for further workup    VTE Pharmacologic Prophylaxis: VTE Score: 2   Not prescribed due to some cytopenia    Mobility:   Basic Mobility Inpatient Raw Score: 22  JH-HLM Goal: 7: Walk 25 feet or more  JH-HLM Achieved: 7: Walk 25 feet or more  JH-HLM Goal achieved. Continue to encourage appropriate mobility.    Patient Centered Rounds: I performed bedside rounds with nursing staff today.   Discussions with Specialists or Other Care Team Provider: Discussed with care management team    Education and Discussions with Family / Patient: Updated  (daughter) via phone.    Current Length of Stay: 7 day(s)  Current Patient Status: Inpatient   Certification Statement: The patient will continue to require additional inpatient hospital stay due to    Discharge Plan: Anticipate discharge in 24-48 hrs to home with home services.    Code Status: Level 3 - DNAR and DNI    Subjective     Patient valuated this man.  She is feeling better.  She denies any chest pain nausea or vomiting.  Feels fatigue    Objective :  Temp:  [98.3 °F (36.8 °C)-98.4 °F (36.9 °C)] 98.4 °F (36.9 °C)  HR:  [79-92] 79  BP: (138-167)/(61-78) 167/78  Resp:  [16] 16  SpO2:  [84 %-91 %] 88 %    Body mass index is 49.94 kg/m².     Input and Output Summary (last 24 hours): "     Intake/Output Summary (Last 24 hours) at 10/14/2024 1053  Last data filed at 10/14/2024 0327  Gross per 24 hour   Intake 780 ml   Output 4800 ml   Net -4020 ml       Physical Exam  Vitals and nursing note reviewed.   Constitutional:       Appearance: Normal appearance. She is normal weight. She is ill-appearing.      Comments: Chronically ill appearing but acutely nontoxic appearing female in bed, awake   HENT:      Head: Normocephalic and atraumatic.      Right Ear: External ear normal.      Left Ear: External ear normal.      Nose: Nose normal. No congestion.      Mouth/Throat:      Mouth: Mucous membranes are moist.      Pharynx: Oropharynx is clear. No oropharyngeal exudate or posterior oropharyngeal erythema.   Eyes:      General: No scleral icterus.        Right eye: No discharge.         Left eye: No discharge.      Extraocular Movements: Extraocular movements intact.      Conjunctiva/sclera: Conjunctivae normal.      Pupils: Pupils are equal, round, and reactive to light.   Cardiovascular:      Rate and Rhythm: Normal rate and regular rhythm.      Pulses: Normal pulses.      Heart sounds: Normal heart sounds. No murmur heard.     No friction rub. No gallop.   Pulmonary:      Effort: Pulmonary effort is normal. No respiratory distress.      Breath sounds: Normal breath sounds. No stridor. No wheezing, rhonchi or rales.   Chest:      Chest wall: No tenderness.   Abdominal:      General: Abdomen is flat. Bowel sounds are normal. There is no distension.      Palpations: Abdomen is soft. There is no mass.      Tenderness: There is no abdominal tenderness. There is no guarding or rebound.   Musculoskeletal:         General: No swelling, tenderness, deformity or signs of injury. Normal range of motion.      Cervical back: Normal range of motion and neck supple. No rigidity. No muscular tenderness.   Skin:     General: Skin is warm and dry.      Capillary Refill: Capillary refill takes less than 2 seconds.       Coloration: Skin is not jaundiced or pale.      Findings: No bruising, erythema, lesion or rash.   Neurological:      General: No focal deficit present.      Mental Status: She is alert and oriented to person, place, and time. Mental status is at baseline.      Cranial Nerves: No cranial nerve deficit.      Sensory: No sensory deficit.      Motor: No weakness.      Coordination: Coordination normal.   Psychiatric:         Mood and Affect: Mood normal.         Behavior: Behavior normal.         Thought Content: Thought content normal.         Judgment: Judgment normal.           Lines/Drains:  Lines/Drains/Airways       Active Status       Name Placement date Placement time Site Days    Port A Cath 06/24/24 Right Internal jugular 06/24/24  1050  Internal jugular  112    Urethral Catheter Latex 16 Fr. 10/12/24  1137  Latex  1                  Urinary Catheter:  Goal for removal: Remove after 48 hrs of I/O monitoring                      Lab Results: I have reviewed the following results:   Results from last 7 days   Lab Units 10/14/24  0614 10/10/24  0654 10/09/24  0450   WBC Thousand/uL 4.59   < > 10.14   HEMOGLOBIN g/dL 10.8*   < > 10.4*   HEMATOCRIT % 36.0   < > 35.1   PLATELETS Thousands/uL 60*   < > 87*   BANDS PCT %  --   --  7   SEGS PCT % 66   < >  --    LYMPHO PCT % 16   < > 7*   MONO PCT % 14*   < > 2*   EOS PCT % 3   < > 0    < > = values in this interval not displayed.     Results from last 7 days   Lab Units 10/14/24  0436   SODIUM mmol/L 143   POTASSIUM mmol/L 3.6   CHLORIDE mmol/L 102   CO2 mmol/L 28   BUN mg/dL 53*   CREATININE mg/dL 1.87*   ANION GAP mmol/L 13   CALCIUM mg/dL 8.5   ALBUMIN g/dL 4.0   TOTAL BILIRUBIN mg/dL 1.82*   ALK PHOS U/L 153*   ALT U/L 416*   AST U/L 37   GLUCOSE RANDOM mg/dL 95     Results from last 7 days   Lab Units 10/10/24  0654   INR  1.33*     Results from last 7 days   Lab Units 10/07/24  2054   POC GLUCOSE mg/dl 113         Results from last 7 days   Lab Units  10/07/24  2219 10/07/24  2052   LACTIC ACID mmol/L 2.1* 2.8*   PROCALCITONIN ng/ml  --  1.67*       Recent Cultures (last 7 days):   Results from last 7 days   Lab Units 10/07/24  2220 10/07/24  2052   BLOOD CULTURE  No Growth After 5 Days. No Growth After 5 Days.       Imaging Results Review: No pertinent imaging studies reviewed.  Other Study Results Review: No additional pertinent studies reviewed.    Last 24 Hours Medication List:     Current Facility-Administered Medications:     aspirin tablet 325 mg, Daily    dicyclomine (BENTYL) capsule 10 mg, TID AC    fluticasone (FLONASE) 50 mcg/act nasal spray 1 spray, BID    furosemide (LASIX) injection 40 mg, BID (diuretic)    HYDROmorphone (DILAUDID) injection 0.5 mg, Q6H PRN    lactulose (CHRONULAC) oral solution 20 g, Daily    lidocaine (LIDODERM) 5 % patch 1 patch, Daily    rifaximin (XIFAXAN) tablet 550 mg, Q12H ANGELES    Administrative Statements   Today, Patient Was Seen By: Leo Bazzi MD      **Please Note: This note may have been constructed using a voice recognition system.**

## 2024-10-14 NOTE — ASSESSMENT & PLAN NOTE
"The patient has a history of colon cancer previously been on FOLFOX therapy, most recent treatment 10/she was per oncology    Imaging during this hospitalization does show \"  Multiple new and/or enlarging pulmonary nodules, consistent with aggressive metastatic disease.  /  Diffusely heterogeneous appearance of the liver. While this may at least partially represent inhomogeneous hepatic steatosis, underlying lesions cannot be excluded without IV contrast.\"  Patient will need eventual follow-up outpatient with oncology once recovered from kidney and liver injury for further workup  "

## 2024-10-14 NOTE — ASSESSMENT & PLAN NOTE
Present on admission. After review of the medical record, appears baseline creatinine levels are fluctuant around 0.7-1.0 and admitted with creatinine at 3.01.    Patient has underlying advanced colorectal cancer [stage III versus stage IV colorectal cancer with CEA elevation and probable peritoneal metastasis] and currently being followed by oncology as outpatient and receiving oxaliplatin/5-FU/leucovorin.    Prior workup noted urinalysis with hyaline casts suspicious for intravascular volume depletion.  She was provided with IV albumin for a total of 4 doses to boost intravascular volume status with no significant improvement in creatinine levels.  Creatinine levels have been fluctuating from 2.6-2.8 and concern for development of ATN.  Follow-up urine sodium level was low at 10.    Patient had development of significant volume overload and was initiated on IV furosemide 80 mg twice daily with improvement in urine output and noted to have 5.4 L of urine output over the past 24 hours.  Creatinine level has improved to 1.87 on labs today.    Will reduce diuretic to 40 mg IV twice daily and continue to monitor strict I's/O.  Repeat BMP in the morning.

## 2024-10-14 NOTE — PLAN OF CARE
Problem: PAIN - ADULT  Goal: Verbalizes/displays adequate comfort level or baseline comfort level  Description: Interventions:  - Encourage patient to monitor pain and request assistance  - Assess pain using appropriate pain scale  - Administer analgesics based on type and severity of pain and evaluate response  - Implement non-pharmacological measures as appropriate and evaluate response  - Consider cultural and social influences on pain and pain management  - Notify physician/advanced practitioner if interventions unsuccessful or patient reports new pain  Outcome: Progressing     Problem: INFECTION - ADULT  Goal: Absence or prevention of progression during hospitalization  Description: INTERVENTIONS:  - Assess and monitor for signs and symptoms of infection  - Monitor lab/diagnostic results  - Monitor all insertion sites, i.e. indwelling lines, tubes, and drains  - Monitor endotracheal if appropriate and nasal secretions for changes in amount and color  - [place] appropriate cooling/warming therapies per order  - Administer medications as ordered  - Instruct and encourage patient and family to use good hand hygiene technique  - Identify and instruct in appropriate isolation precautions for identified infection/condition  Outcome: Progressing

## 2024-10-14 NOTE — PLAN OF CARE
Problem: PAIN - ADULT  Goal: Verbalizes/displays adequate comfort level or baseline comfort level  Description: Interventions:  - Encourage patient to monitor pain and request assistance  - Assess pain using appropriate pain scale  - Administer analgesics based on type and severity of pain and evaluate response  - Implement non-pharmacological measures as appropriate and evaluate response  - Consider cultural and social influences on pain and pain management  - Notify physician/advanced practitioner if interventions unsuccessful or patient reports new pain  Outcome: Progressing     Problem: INFECTION - ADULT  Goal: Absence or prevention of progression during hospitalization  Description: INTERVENTIONS:  - Assess and monitor for signs and symptoms of infection  - Monitor lab/diagnostic results  - Monitor all insertion sites, i.e. indwelling lines, tubes, and drains  - Monitor endotracheal if appropriate and nasal secretions for changes in amount and color  - [place] appropriate cooling/warming therapies per order  - Administer medications as ordered  - Instruct and encourage patient and family to use good hand hygiene technique  - Identify and instruct in appropriate isolation precautions for identified infection/condition  Outcome: Progressing  Goal: Absence of fever/infection during neutropenic period  Description: INTERVENTIONS:  - Monitor WBC    Outcome: Progressing     Problem: SAFETY ADULT  Goal: Patient will remain free of falls  Description: INTERVENTIONS:  - Educate patient/family on patient safety including physical limitations  - Instruct patient to call for assistance with activity   - Consult OT/PT to assist with strengthening/mobility   - Keep Call bell within reach  - Keep bed low and locked with side rails adjusted as appropriate  - Keep care items and personal belongings within reach  - Initiate and maintain comfort rounds  - Make Fall Risk Sign visible to staff  - Offer Toileting every 2 Hours,  in advance of need  - Initiate/Maintain bed alarm  - Obtain necessary fall risk management equipment:   - Apply yellow socks and bracelet for high fall risk patients  - Consider moving patient to room near nurses station  Outcome: Progressing  Goal: Maintain or return to baseline ADL function  Description: INTERVENTIONS:  -  Assess patient's ability to carry out ADLs; assess patient's baseline for ADL function and identify physical deficits which impact ability to perform ADLs (bathing, care of mouth/teeth, toileting, grooming, dressing, etc.)  - Assess/evaluate cause of self-care deficits   - Assess range of motion  - Assess patient's mobility; develop plan if impaired  - Assess patient's need for assistive devices and provide as appropriate  - Encourage maximum independence but intervene and supervise when necessary  - Involve family in performance of ADLs  - Assess for home care needs following discharge   - Consider OT consult to assist with ADL evaluation and planning for discharge  - Provide patient education as appropriate  Outcome: Progressing  Goal: Maintains/Returns to pre admission functional level  Description: INTERVENTIONS:  - Perform AM-PAC 6 Click Basic Mobility/ Daily Activity assessment daily.  - Set and communicate daily mobility goal to care team and patient/family/caregiver.   - Collaborate with rehabilitation services on mobility goals if consulted  - Perform Range of Motion 2 times a day.  - Reposition patient every 2 hours.  - Dangle patient 2 times a day  - Stand patient 2 times a day  - Ambulate patient 2 times a day  - Out of bed to chair 2 times a day   - Out of bed for meals 2 times a day  - Out of bed for toileting  - Record patient progress and toleration of activity level   Outcome: Progressing     Problem: Prexisting or High Potential for Compromised Skin Integrity  Goal: Skin integrity is maintained or improved  Description: INTERVENTIONS:  - Identify patients at risk for skin  breakdown  - Assess and monitor skin integrity  - Assess and monitor nutrition and hydration status  - Monitor labs   - Assess for incontinence   - Turn and reposition patient  - Assist with mobility/ambulation  - Relieve pressure over bony prominences  - Avoid friction and shearing  - Provide appropriate hygiene as needed including keeping skin clean and dry  - Evaluate need for skin moisturizer/barrier cream  - Collaborate with interdisciplinary team   - Patient/family teaching  - Consider wound care consult   Outcome: Progressing     Problem: RESPIRATORY - ADULT  Goal: Achieves optimal ventilation and oxygenation  Description: INTERVENTIONS:  - Assess for changes in respiratory status  - Assess for changes in mentation and behavior  - Position to facilitate oxygenation and minimize respiratory effort  - Oxygen administered by appropriate delivery if ordered  - Initiate smoking cessation education as indicated  - Encourage broncho-pulmonary hygiene including cough, deep breathe, Incentive Spirometry  - Assess the need for suctioning and aspirate as needed  - Assess and instruct to report SOB or any respiratory difficulty  - Respiratory Therapy support as indicated  Outcome: Progressing     Problem: GASTROINTESTINAL - ADULT  Goal: Minimal or absence of nausea and/or vomiting  Description: INTERVENTIONS:  - Administer IV fluids if ordered to ensure adequate hydration  - Maintain NPO status until nausea and vomiting are resolved  - Nasogastric tube if ordered  - Administer ordered antiemetic medications as needed  - Provide nonpharmacologic comfort measures as appropriate  - Advance diet as tolerated, if ordered  - Consider nutrition services referral to assist patient with adequate nutrition and appropriate food choices  Outcome: Progressing  Goal: Maintains or returns to baseline bowel function  Description: INTERVENTIONS:  - Assess bowel function  - Encourage oral fluids to ensure adequate hydration  - Administer  IV fluids if ordered to ensure adequate hydration  - Administer ordered medications as needed  - Encourage mobilization and activity  - Consider nutritional services referral to assist patient with adequate nutrition and appropriate food choices  Outcome: Progressing  Goal: Maintains adequate nutritional intake  Description: INTERVENTIONS:  - Monitor percentage of each meal consumed  - Identify factors contributing to decreased intake, treat as appropriate  - Assist with meals as needed  - Monitor I&O, weight, and lab values if indicated  - Obtain nutrition services referral as needed  Outcome: Progressing  Goal: Establish and maintain optimal ostomy function  Description: INTERVENTIONS:  - Assess bowel function  - Encourage oral fluids to ensure adequate hydration  - Administer IV fluids if ordered to ensure adequate hydration   - Administer ordered medications as needed  - Encourage mobilization and activity  - Nutrition services referral to assist patient with appropriate food choices  - Assess stoma site  - Consider wound care consult   Outcome: Progressing  Goal: Oral mucous membranes remain intact  Description: INTERVENTIONS  - Assess oral mucosa and hygiene practices  - Implement preventative oral hygiene regimen  - Implement oral medicated treatments as ordered  - Initiate Nutrition services referral as needed  Outcome: Progressing     Problem: GENITOURINARY - ADULT  Goal: Maintains or returns to baseline urinary function  Description: INTERVENTIONS:  - Assess urinary function  - Encourage oral fluids to ensure adequate hydration if ordered  - Administer IV fluids as ordered to ensure adequate hydration  - Administer ordered medications as needed  - Offer frequent toileting  - Follow urinary retention protocol if ordered  Outcome: Progressing  Goal: Absence of urinary retention  Description: INTERVENTIONS:  - Assess patient’s ability to void and empty bladder  - Monitor I/O  - Bladder scan as needed  -  Discuss with physician/AP medications to alleviate retention as needed  - Discuss catheterization for long term situations as appropriate  Outcome: Progressing  Goal: Urinary catheter remains patent  Description: INTERVENTIONS:  - Assess patency of urinary catheter  - If patient has a chronic wilson, consider changing catheter if non-functioning  - Follow guidelines for intermittent irrigation of non-functioning urinary catheter  Outcome: Progressing

## 2024-10-14 NOTE — QUICK NOTE
10/14/2024:    Creatinine has improved down to 1.87 liver functions are improving.  Hopefully should be able to go home shortly if she continues to improve.  She does have follow-up in oncology.  Dr. Black

## 2024-10-14 NOTE — UTILIZATION REVIEW
Continued Stay Review    Date: 10/14/24                          Current Patient Class: Inpatient    Current Level of Care: med surg  HPI:63 y.o. female initially admitted on 10/7/24     Assessment/Plan:   LETICIA POA. Continue diuresis with IV Lasix. Renal following, monitor labs. Using IV Dilaudid for pain control.   Per renal: Patient had development of significant volume overload and was initiated on IV furosemide 80 mg twice daily with improvement in urine output and noted to have 5.4 L of urine output over the past 24 hours.  Creatinine level has improved to 1.87 on labs today.    Will reduce diuretic to 40 mg IV twice daily and continue to monitor strict I's/O.  Repeat BMP in the morning.    Medications:   Scheduled Medications:  aspirin, 325 mg, Oral, Daily  dicyclomine, 10 mg, Oral, TID AC  fluticasone, 1 spray, Each Nare, BID  furosemide, 40 mg, Intravenous, BID (diuretic)  lactulose, 20 g, Oral, Daily  lidocaine, 1 patch, Topical, Daily  rifaximin, 550 mg, Oral, Q12H ANGELES      PRN Meds:  HYDROmorphone, 0.5 mg, Intravenous, Q6H PRN, 10/14 x1      Discharge Plan: tbd    Vital Signs (last 3 days)       Date/Time Temp Pulse Resp BP MAP (mmHg) SpO2 Calculated FIO2 (%) - Nasal Cannula Nasal Cannula O2 Flow Rate (L/min) O2 Device Patient Position - Orthostatic VS Maura Coma Scale Score Pain    10/14/24 1100 -- -- -- -- -- -- -- -- -- -- -- 7    10/14/24 0829 -- -- -- -- -- -- -- -- -- -- -- 6    10/14/24 0820 -- -- -- -- -- 94 % -- -- -- -- 15 6    10/14/24 06:55:21 98.4 °F (36.9 °C) 79 16 167/78 108 88 % -- -- -- -- -- --    10/13/24 23:45:48 -- 92 -- 151/72 98 91 % -- -- -- -- -- --    10/13/24 2127 -- -- -- -- -- -- -- -- -- -- 13 --    10/13/24 2100 -- -- -- -- -- -- -- -- -- -- -- 3    10/13/24 15:21:06 98.3 °F (36.8 °C) 88 16 138/61 87 84 % -- -- -- -- -- --    10/13/24 1359 -- -- -- -- -- -- -- -- -- -- -- 7    10/13/24 1049 -- -- -- -- -- -- -- -- -- -- 13 No Pain    10/13/24 07:34:25 97.8 °F (36.6 °C) --  17 127/63 84 -- -- -- -- -- -- --    10/13/24 0023 -- -- -- -- -- -- -- -- -- -- 13 --    10/12/24 22:09:46 97.5 °F (36.4 °C) 61 20 124/69 87 91 % 32 3 L/min Nasal cannula -- -- No Pain    10/12/24 20:24:15 97.4 °F (36.3 °C) 66 20 150/65 93 97 % 32 3 L/min Nasal cannula -- -- --    10/12/24 15:12:40 97.7 °F (36.5 °C) 76 20 153/79 104 96 % -- -- -- -- -- --    10/12/24 1321 -- -- -- -- -- -- -- -- -- -- 13 --    10/12/24 1128 -- -- -- -- -- -- -- -- -- -- -- 8    10/12/24 07:18:42 -- 70 16 118/65 83 86 % -- -- -- -- -- --    10/11/24 2349 -- -- -- -- -- -- -- -- -- -- -- No Pain    10/11/24 22:53:31 98 °F (36.7 °C) 61 18 114/62 79 97 % -- -- -- -- -- --    10/11/24 2022 -- -- -- -- -- 95 % 32 3 L/min Nasal cannula -- 13 No Pain    10/11/24 14:50:35 97.7 °F (36.5 °C) -- 16 105/68 80 -- -- -- -- -- -- --    10/11/24 1220 -- -- -- -- -- -- -- -- -- -- 13 No Pain    10/11/24 11:16:18 97.7 °F (36.5 °C) 66 -- 112/73 86 94 % -- -- -- -- -- --    10/11/24 0801 -- -- 16 -- -- -- -- -- -- -- -- --    10/11/24 07:11:59 98 °F (36.7 °C) -- -- 119/79 92 -- -- -- -- Sitting -- --          Weight (last 2 days)       Date/Time Weight    10/14/24 0542 116 (255.73)    10/13/24 0600 116 (255.73)    10/12/24 0539 116 (255.95)            Pertinent Labs/Diagnostic Results:   Radiology:  US right upper quadrant   Final Interpretation by Ric Womack MD (10/08 1611)      1.  Diffusely echogenic and heterogeneous appearance of the liver. There is moderately heterogeneous and increased echogenicity throughout the visible hepatic parenchyma, with posterior attenuation of the sound waves. The liver is mildly enlarged. There    is a possible hypoechoic lesion within the left hepatic lobe, which cannot be definitively characterized on this exam. Follow-up evaluation by gadolinium-enhanced MRI of the abdomen is recommended. The study was marked in EPIC for significant    notification.      Workstation performed: [id number]         XR chest  portable   Final Interpretation by Gabe Eller MD (10/08 0615)      Findings suggestive of mild central pulmonary vascular congestion.            Workstation performed: [id number]         CT head without contrast   Final Interpretation by Elver Wei MD (10/07 2130)      No acute intracranial abnormality.  Stable chronic microangiopathic changes within the brain.                  Workstation performed: [id number]         CT chest abdomen pelvis wo contrast   Final Interpretation by Ric Womack MD (10/09 1304)   Addendum (preliminary) 1 of 1 by Ric Womack MD (10/09 1304)   ADDENDUM:      The report should also state that there is increased nodularity within the    anterior pelvic fat/omentum, best seen on series 2, image 187. This is new    since the prior abdominal CT and consistent with disease    progression/carcinomatosis.      Final      1.  No evidence of acute traumatic injury. No identifiable acute abnormality to account for the patient's clinical presentation.   2.  Multiple new and/or enlarging pulmonary nodules, consistent with aggressive metastatic disease.   3.  Diffusely heterogeneous appearance of the liver. While this may at least partially represent inhomogeneous hepatic steatosis, underlying lesions cannot be excluded without IV contrast.   4.  Mild diffuse anasarca.      The study was marked in EPIC for significant notification.         Workstation performed: [id number]           Cardiology:  Echo complete w/ contrast if indicated   Final Result by Mae Rodriguez MD (10/09 9676)        Left Ventricle: Left ventricular cavity size is normal. Wall thickness    is mildly increased. The left ventricular ejection fraction is 65%.    Systolic function is normal. Although no diagnostic regional wall motion    abnormality was identified, this possibility cannot be completely excluded    on the basis of this study. Diastolic function is mildly abnormal,    consistent  with grade I (abnormal) relaxation. This is a technically    difficult study with poor acoustic windows.     Mitral Valve: There is mild regurgitation.     Tricuspid Valve: There is mild regurgitation.         ECG 12 lead   Final Result by Minoo Tejeda MD (10/08 0835)   Normal sinus rhythm   Septal infarct (cited on or before 07-OCT-2024)   Abnormal ECG   Confirmed by Minoo Tejeda ([id number]) on 10/8/2024 8:35:36 AM      ECG 12 lead   Final Result by Minoo Tejeda MD (10/08 0835)   Normal sinus rhythm   Septal infarct , age undetermined   Abnormal ECG   Confirmed by Minoo Tejeda ([id number]) on 10/8/2024 8:35:53 AM        GI:  No orders to display       Results from last 7 days   Lab Units 10/07/24  2052   SARS-COV-2  Negative     Results from last 7 days   Lab Units 10/14/24  0614 10/13/24  0548 10/12/24  0846 10/11/24  0423 10/10/24  0654 10/09/24  0450 10/09/24  0450 10/08/24  0617 10/07/24  2052   WBC Thousand/uL 4.59 5.79 8.03 7.95 10.80*  --  10.14   < > 13.95*   HEMOGLOBIN g/dL 10.8* 10.3* 10.5* 9.2* 11.0*  --  10.4*   < > 11.9   HEMATOCRIT % 36.0 35.8 35.8 31.8* 37.3  --  35.1   < > 40.1   PLATELETS Thousands/uL 60* 55* 50* 43* 70*  --  87*   < > 125*   TOTAL NEUT ABS Thousands/µL 3.00 4.37 6.61 6.36 9.05*   < >  --   --   --    BANDS PCT %  --   --   --   --   --   --  7  --  9*    < > = values in this interval not displayed.         Results from last 7 days   Lab Units 10/14/24  0436 10/13/24  0548 10/12/24  0846 10/11/24  0423 10/10/24  0512   SODIUM mmol/L 143 141 138 139 137   POTASSIUM mmol/L 3.6 3.8 3.6 3.9 4.0   CHLORIDE mmol/L 102 103 101 102 106   CO2 mmol/L 28 29 24 24 21   ANION GAP mmol/L 13 9 13 13 10   BUN mg/dL 53* 72* 77* 74* 75*   CREATININE mg/dL 1.87* 2.68* 3.09* 2.83* 2.60*   EGFR ml/min/1.73sq m 28 18 15 17 19   CALCIUM mg/dL 8.5 8.6 8.8 8.4 7.2*     Results from last 7 days   Lab Units 10/14/24  0436 10/13/24  0548 10/12/24  0846 10/11/24  0423 10/10/24  0512 10/08/24  0617  "10/07/24  2219   AST U/L 37 56* 102* 198* 426*   < >  --    ALT U/L 416* 595* 757* 976* 1,783*   < >  --    ALK PHOS U/L 153* 146* 152* 145* 203*   < >  --    TOTAL PROTEIN g/dL 6.2* 6.7 7.0 6.6 5.5*   < >  --    ALBUMIN g/dL 4.0 4.4 4.6 4.7 3.0*   < >  --    TOTAL BILIRUBIN mg/dL 1.82* 1.50* 1.68* 1.96* 1.48*   < >  --    BILIRUBIN DIRECT mg/dL  --  0.71* 0.88* 1.19*  --   --   --    AMMONIA umol/L  --  51  --   --   --   --  152*    < > = values in this interval not displayed.     Results from last 7 days   Lab Units 10/07/24  2054   POC GLUCOSE mg/dl 113     Results from last 7 days   Lab Units 10/14/24  0436 10/13/24  0548 10/12/24  0846 10/11/24  0423 10/10/24  0512 10/09/24  0450 10/08/24  0617 10/07/24  2157 10/07/24  2052   GLUCOSE RANDOM mg/dL 95 114 154* 126 106 124 119 118 113             No results found for: \"BETA-HYDROXYBUTYRATE\"                   Results from last 7 days   Lab Units 10/08/24  0617 10/07/24  2244 10/07/24  2052   HS TNI 0HR ng/L  --   --  192*   HS TNI 2HR ng/L  --  226*  --    HSTNI D2 ng/L  --  34*  --    HS TNI 4HR ng/L 223*  --   --    HSTNI D4 ng/L 31*  --   --          Results from last 7 days   Lab Units 10/10/24  0654 10/09/24  0450 10/08/24  0948 10/07/24  2052   PROTIME seconds 17.2* 20.1* 22.3* 22.2*   INR  1.33* 1.63* 1.88* 1.87*   PTT seconds  --   --   --  24     Results from last 7 days   Lab Units 10/13/24  0548   TSH 3RD GENERATON uIU/mL 2.750     Results from last 7 days   Lab Units 10/07/24  2052   PROCALCITONIN ng/ml 1.67*     Results from last 7 days   Lab Units 10/07/24  2219 10/07/24  2052   LACTIC ACID mmol/L 2.1* 2.8*                             Results from last 7 days   Lab Units 10/07/24  2219   HEP B S AG  Non-reactive   HEP C AB  Non-reactive   HEP B C IGM  Non-reactive                     Results from last 7 days   Lab Units 10/11/24  0222 10/07/24  2232   CLARITY UA   --  Turbid   COLOR UA   --  Dark Yellow   SPEC GRAV UA   --  1.018   PH UA   --  5.0 "   GLUCOSE UA mg/dl  --  Trace*   KETONES UA mg/dl  --  Trace*   BLOOD UA   --  Small*   PROTEIN UA mg/dl  --  70 (1+)*   NITRITE UA   --  Negative   BILIRUBIN UA   --  Negative   UROBILINOGEN UA (BE) mg/dl  --  2.0*   LEUKOCYTES UA   --  Negative   WBC UA /hpf  --  1-2   RBC UA /hpf  --  None Seen   BACTERIA UA /hpf  --  Occasional   EPITHELIAL CELLS WET PREP /hpf  --  Occasional   SODIUM UR  10  --    CREATININE UR mg/dL 157.7  --      Results from last 7 days   Lab Units 10/07/24  2052   INFLUENZA A PCR  Negative   INFLUENZA B PCR  Negative   RSV PCR  Negative             Results from last 7 days   Lab Units 10/07/24  2153   ETHANOL LVL mg/dL <10   ACETAMINOPHEN LVL ug/mL <2*   SALICYLATE LVL mg/dL <5                 Results from last 7 days   Lab Units 10/07/24  2220 10/07/24  2052   BLOOD CULTURE  No Growth After 5 Days. No Growth After 5 Days.                   Network Utilization Review Department  ATTENTION: Please call with any questions or concerns to [phone] and carefully listen to the prompts so that you are directed to the right person. All voicemails are confidential.   For Discharge needs, contact Care Management DC Support Team at [phone] opt. 2  Send all requests for admission clinical reviews, approved or denied determinations and any other requests to dedicated fax number below belonging to the campus where the patient is receiving treatment. List of dedicated fax numbers for the Facilities:  FACILITY NAME UR FAX NUMBER   ADMISSION DENIALS (Administrative/Medical Necessity) [phone]   DISCHARGE SUPPORT TEAM (NETWORK) [phone]   PARENT CHILD HEALTH (Maternity/NICU/Pediatrics) [phone]   [facility] [phone]   [facility] [phone]   [facility] [phone]   [facility] [phone]   [facility] [phone]   [facility]  [place] [phone]   [facility] [phone]   [facility] [phone]   [facility] [phone]   [facility] [phone]   [facility] [phone]   [facility] [phone]

## 2024-10-14 NOTE — ASSESSMENT & PLAN NOTE
Patient presented with markedly elevated liver enzymes  Suspected due to transient hypotension prior to admission    Liver enzymes trending down significantly day by day.  No further encephalopathy noted, on lactulose and rifaximin.  Monitor CMP  Avoid hepatotoxins

## 2024-10-14 NOTE — ASSESSMENT & PLAN NOTE
Currently not on antihypertensive therapy, blood pressure readings acceptable over the past 24 hours.

## 2024-10-14 NOTE — ASSESSMENT & PLAN NOTE
Patient presented with elevated creatinine up to 3 compared to a previously normal baseline.  This probably started as a prerenal LETICIA and developed into ATN.  Patient remains mostly oligoanuric at this time.  Creatinine not improving.  She appears to be slightly overloaded.  Initially did not respond to fluids.  Nephrology following    De La Cruz catheter inserted on 10/12 for accurate intake and output  Continue diuretic trial with IV Lasix, currently appears to be diuresing well and creatinine also improving, nephrology following  Continue to monitor kidney function  Monitor intake and output closely  Monitor volume status  Avoid nephrotoxins    Hoping to eventually medically cleared patient once stable from the kidney perspective, off IV diuretics and liver perspective.  The patient is very weak.  Discussed with daughter vEa, she would like to take patient to [place] but I am not completely sure if she should be traveling at this point, potentially may benefit from local rehab vs local C first and [place]

## 2024-10-14 NOTE — CASE MANAGEMENT
Case Management Discharge Planning Note    Patient name Dwaine Gould  Location /-01 MRN [id number]  : 1960 Date 10/14/2024       Current Admission Date: 10/7/2024  Current Admission Diagnosis:Shock liver   Patient Active Problem List    Diagnosis Date Noted Date Diagnosed    Shock liver 10/08/2024     Abnormal findings in stool 2024     Morbid obesity (HCC) 2024     Staphylococcus aureus bacteremia 2024     Port-A-Cath in place 2024     Encounter for counseling for tobacco use disorder 2024     Adenocarcinoma, colon (HCC) 2024     Encounter to establish care 2024     Anemia 2024     Thrombocytopenia (HCC) 2024     Chronic respiratory failure with hypoxia and hypercapnia (HCC) 2024     Stage 3a chronic kidney disease (HCC) 2024     Hepatic encephalopathy (HCC) 2024     LETICIA (acute kidney injury) (HCC) 2024     Chronic heart failure with preserved ejection fraction (HCC) 2024     COPD (chronic obstructive pulmonary disease) (HCC) 2024     Transaminitis 2024     Leukocytosis 2024     Tobacco abuse 2021     CAD (coronary artery disease) 2021     Essential hypertension 2019     Hyperlipidemia 2019       LOS (days): 7  Geometric Mean LOS (GMLOS) (days): 4.9  Days to GMLOS:-1.6     OBJECTIVE:  Risk of Unplanned Readmission Score: 27.73         Current admission status: Inpatient   Preferred Pharmacy:   [facility]/pharmacy #[id number] - [place] - [address] [facility]  [place] [address]  Phone: [phone] Fax: [phone]    Primary Care Provider: Jordan Ramirez MD    Primary Insurance: [employer]  Secondary Insurance:     DISCHARGE DETAILS:  As per SLIM rounds, monitoring liver and kidney fcn, IV Lasix. STR and HHC referrals pending in [place] if recommended by SLIM.

## 2024-10-14 NOTE — ASSESSMENT & PLAN NOTE
Secondary to shock liver  Still has intermittent confusion over the weekend   So replaced on lactulose and rifaximin although confusion could be multifactorial including delirium  Monitor

## 2024-10-14 NOTE — PROGRESS NOTES
NEPHROLOGY PROGRESS NOTE    Patient: Dwaine Gould               Sex: female          DOA: 10/7/2024  8:14 PM   YOB: 1960        Age:  63 y.o.        LOS:  LOS: 7 days   10/14/2024    REASON FOR THE CONSULTATION: Acute kidney injury    ASSESSMENT/PLAN     Assessment & Plan  LETICIA (acute kidney injury) (HCC)  Present on admission. After review of the medical record, appears baseline creatinine levels are fluctuant around 0.7-1.0 and admitted with creatinine at 3.01.    Patient has underlying advanced colorectal cancer [stage III versus stage IV colorectal cancer with CEA elevation and probable peritoneal metastasis] and currently being followed by oncology as outpatient and receiving oxaliplatin/5-FU/leucovorin.    Prior workup noted urinalysis with hyaline casts suspicious for intravascular volume depletion.  She was provided with IV albumin for a total of 4 doses to boost intravascular volume status with no significant improvement in creatinine levels.  Creatinine levels have been fluctuating from 2.6-2.8 and concern for development of ATN.  Follow-up urine sodium level was low at 10.    Patient had development of significant volume overload and was initiated on IV furosemide 80 mg twice daily with improvement in urine output and noted to have 5.4 L of urine output over the past 24 hours.  Creatinine level has improved to 1.87 on labs today.    Will reduce diuretic to 40 mg IV twice daily and continue to monitor strict I's/O.  Repeat BMP in the morning.    Essential hypertension  Currently not on antihypertensive therapy, blood pressure readings acceptable over the past 24 hours.      SUBJECTIVE     Patient was seen and examined at the bedside today.  She reports significant improvement in symptoms and breathing is better.    Reviewed past 24 hour events.    CURRENT MEDICATIONS       Current Facility-Administered Medications:     aspirin tablet 325 mg, 325 mg, Oral, Daily, Theodore Wei MD, 325 mg at  10/14/24 0813    dicyclomine (BENTYL) capsule 10 mg, 10 mg, Oral, TID AC, Aviva Grigsby PA-C, 10 mg at 10/14/24 1053    fluticasone (FLONASE) 50 mcg/act nasal spray 1 spray, 1 spray, Each Nare, BID, Leo Tompkins MD, 1 spray at 10/14/24 1053    furosemide (LASIX) injection 40 mg, 40 mg, Intravenous, BID (diuretic), Francis Vazquez MD    HYDROmorphone (DILAUDID) injection 0.5 mg, 0.5 mg, Intravenous, Q6H PRN, Loe Tompkins MD, 0.5 mg at 10/14/24 1100    lactulose (CHRONULAC) oral solution 20 g, 20 g, Oral, Daily, Aviva Grigsby PA-C, 20 g at 10/14/24 0813    lidocaine (LIDODERM) 5 % patch 1 patch, 1 patch, Topical, Daily, Leo Tompkins MD, 1 patch at 10/13/24 0913    rifaximin (XIFAXAN) tablet 550 mg, 550 mg, Oral, Q12H ANGELES, Leo Tompkins MD, 550 mg at 10/14/24 0813    REVIEW OF SYSTEMS     Review of Systems   Constitutional:  Positive for activity change. Negative for chills, fatigue and fever.   HENT:  Negative for trouble swallowing.    Respiratory:  Negative for shortness of breath.    Cardiovascular:  Positive for leg swelling. Negative for chest pain.   Gastrointestinal:  Negative for nausea and vomiting.   Genitourinary:  Negative for difficulty urinating, dysuria, frequency and hematuria.   Musculoskeletal:  Negative for back pain.   Skin:  Negative for pallor.   Neurological:  Negative for dizziness, syncope, weakness and light-headedness.   Psychiatric/Behavioral:  Negative for sleep disturbance. The patient is not nervous/anxious.        OBJECTIVE     Current Weight: Weight - Scale: 116 kg (255 lb 11.7 oz)  Vitals:    10/14/24 0820   BP:    Pulse:    Resp:    Temp:    SpO2: 94%     Body mass index is 49.94 kg/m².    Intake/Output Summary (Last 24 hours) at 10/14/2024 1123  Last data filed at 10/14/2024 0327  Gross per 24 hour   Intake 540 ml   Output 3850 ml   Net -3310 ml       PHYSICAL EXAMINATION     Physical Exam  Vitals reviewed.   Constitutional:        General: She is not in acute distress.  HENT:      Head: Normocephalic.      Mouth/Throat:      Lips: Pink.      Mouth: Mucous membranes are moist.   Eyes:      General: Lids are normal. No scleral icterus.  Cardiovascular:      Rate and Rhythm: Normal rate and regular rhythm.      Heart sounds: S1 normal and S2 normal. No murmur heard.  Pulmonary:      Effort: Pulmonary effort is normal. No accessory muscle usage or respiratory distress.      Breath sounds: Examination of the right-lower field reveals decreased breath sounds. Examination of the left-lower field reveals decreased breath sounds. Decreased breath sounds present.      Comments: On chronic nasal cannula  Abdominal:      General: There is no distension.      Tenderness: There is no abdominal tenderness.   Musculoskeletal:      Cervical back: Normal range of motion and neck supple. No tenderness.   Skin:     General: Skin is warm.      Coloration: Skin is not cyanotic or jaundiced.   Neurological:      General: No focal deficit present.      Mental Status: She is alert and oriented to person, place, and time.   Psychiatric:         Attention and Perception: Attention normal.         Speech: Speech normal.         Behavior: Behavior is cooperative.           LAB RESULTS     Results from last 7 days   Lab Units 10/14/24  0614 10/14/24  0436 10/13/24  0548 10/12/24  0846 10/11/24  0423 10/10/24  0654 10/10/24  0512 10/09/24  0450 10/08/24  0617   WBC Thousand/uL 4.59  --  5.79 8.03 7.95 10.80*  --  10.14 10.31*   HEMOGLOBIN g/dL 10.8*  --  10.3* 10.5* 9.2* 11.0*  --  10.4* 11.2*   HEMATOCRIT % 36.0  --  35.8 35.8 31.8* 37.3  --  35.1 37.5   PLATELETS Thousands/uL 60*  --  55* 50* 43* 70*  --  87* 102*   SODIUM mmol/L  --  143 141 138 139  --  137 140 140   POTASSIUM mmol/L  --  3.6 3.8 3.6 3.9  --  4.0 3.9 4.4   CHLORIDE mmol/L  --  102 103 101 102  --  106 107 105   CO2 mmol/L  --  28 29 24 24  --  21 24 23   BUN mg/dL  --  53* 72* 77* 74*  --  75* 80* 82*  "  CREATININE mg/dL  --  1.87* 2.68* 3.09* 2.83*  --  2.60* 2.86* 3.09*   EGFR ml/min/1.73sq m  --  28 18 15 17  --  19 17 15   CALCIUM mg/dL  --  8.5 8.6 8.8 8.4  --  7.2* 7.0* 7.0*       RADIOLOGY RESULTS      Results for orders placed during the hospital encounter of 10/07/24    XR chest portable    Narrative  XR CHEST PORTABLE    INDICATION: cardiac work-up.    COMPARISON: 1 view chest 10/4/2024    FINDINGS: Right chest wall stormy catheter in place with distal tip in the upper right atrium.    Minimal right basilar subsegmental atelectasis. No pneumothorax or pleural effusion.    Cardiac silhouette is enlarged.  Aortic calcification is present. Prominent central pulmonary vasculature.    Bones are unremarkable for age.    Normal upper abdomen.    Impression  Findings suggestive of mild central pulmonary vascular congestion.        Workstation performed: [id number]    Results for orders placed during the hospital encounter of 09/24/24    XR chest pa and lateral    Narrative  CHEST    INDICATION:   Malignant neoplasm of colon, unspecified.    COMPARISON:  None.    EXAM PERFORMED/VIEWS:  XR CHEST PA AND LATERAL    FINDINGS:    Cardiomediastinal silhouette appears significantly enlarged. Portacatheter in place in the right atrial appendage.    The lungs are clear.  No pneumothorax or pleural effusion.    Osseous structures appear within normal limits for patient age.    Impression  No acute cardiopulmonary disease.  Clearing of previous pulmonary edema from previous intubated study of 3/22/2024          Electronically signed: 09/24/2024 01:38 PM MD Krystina Villalpando CRNP  Nephrology  10/14/2024      Portions of the record may have been created with voice recognition software. Occasional wrong word or \"sound a like\" substitutions may have occurred due to the inherent limitations of voice recognition software. Read the chart carefully and recognize, using context, where substitutions have " occurred.

## 2024-10-15 LAB
ALBUMIN SERPL BCG-MCNC: 4.2 G/DL (ref 3.5–5)
ALP SERPL-CCNC: 143 U/L (ref 34–104)
ALT SERPL W P-5'-P-CCNC: 334 U/L (ref 7–52)
ANION GAP SERPL CALCULATED.3IONS-SCNC: 11 MMOL/L (ref 4–13)
AST SERPL W P-5'-P-CCNC: 29 U/L (ref 13–39)
BASOPHILS # BLD AUTO: 0.02 THOUSANDS/ΜL (ref 0–0.1)
BASOPHILS NFR BLD AUTO: 0 % (ref 0–1)
BILIRUB SERPL-MCNC: 2.09 MG/DL (ref 0.2–1)
BUN SERPL-MCNC: 37 MG/DL (ref 5–25)
CALCIUM SERPL-MCNC: 8.9 MG/DL (ref 8.4–10.2)
CHLORIDE SERPL-SCNC: 97 MMOL/L (ref 96–108)
CO2 SERPL-SCNC: 38 MMOL/L (ref 21–32)
CREAT SERPL-MCNC: 1.54 MG/DL (ref 0.6–1.3)
EOSINOPHIL # BLD AUTO: 0.16 THOUSAND/ΜL (ref 0–0.61)
EOSINOPHIL NFR BLD AUTO: 3 % (ref 0–6)
ERYTHROCYTE [DISTWIDTH] IN BLOOD BY AUTOMATED COUNT: 19.9 % (ref 11.6–15.1)
GFR SERPL CREATININE-BSD FRML MDRD: 36 ML/MIN/1.73SQ M
GLUCOSE SERPL-MCNC: 98 MG/DL (ref 65–140)
HCT VFR BLD AUTO: 39 % (ref 34.8–46.1)
HGB BLD-MCNC: 11.5 G/DL (ref 11.5–15.4)
IMM GRANULOCYTES # BLD AUTO: 0.03 THOUSAND/UL (ref 0–0.2)
IMM GRANULOCYTES NFR BLD AUTO: 1 % (ref 0–2)
LYMPHOCYTES # BLD AUTO: 1.11 THOUSANDS/ΜL (ref 0.6–4.47)
LYMPHOCYTES NFR BLD AUTO: 21 % (ref 14–44)
MCH RBC QN AUTO: 27.4 PG (ref 26.8–34.3)
MCHC RBC AUTO-ENTMCNC: 29.5 G/DL (ref 31.4–37.4)
MCV RBC AUTO: 93 FL (ref 82–98)
MONOCYTES # BLD AUTO: 0.82 THOUSAND/ΜL (ref 0.17–1.22)
MONOCYTES NFR BLD AUTO: 16 % (ref 4–12)
NEUTROPHILS # BLD AUTO: 3.15 THOUSANDS/ΜL (ref 1.85–7.62)
NEUTS SEG NFR BLD AUTO: 59 % (ref 43–75)
NRBC BLD AUTO-RTO: 1 /100 WBCS
PLATELET # BLD AUTO: 74 THOUSANDS/UL (ref 149–390)
PMV BLD AUTO: 10.3 FL (ref 8.9–12.7)
POTASSIUM SERPL-SCNC: 3 MMOL/L (ref 3.5–5.3)
PROT SERPL-MCNC: 6.6 G/DL (ref 6.4–8.4)
RBC # BLD AUTO: 4.19 MILLION/UL (ref 3.81–5.12)
SODIUM SERPL-SCNC: 146 MMOL/L (ref 135–147)
WBC # BLD AUTO: 5.29 THOUSAND/UL (ref 4.31–10.16)

## 2024-10-15 PROCEDURE — 99232 SBSQ HOSP IP/OBS MODERATE 35: CPT | Performed by: INTERNAL MEDICINE

## 2024-10-15 PROCEDURE — 80053 COMPREHEN METABOLIC PANEL: CPT | Performed by: INTERNAL MEDICINE

## 2024-10-15 PROCEDURE — 85025 COMPLETE CBC W/AUTO DIFF WBC: CPT | Performed by: INTERNAL MEDICINE

## 2024-10-15 RX ADMIN — DICYCLOMINE HYDROCHLORIDE 10 MG: 10 CAPSULE ORAL at 11:20

## 2024-10-15 RX ADMIN — LIDOCAINE 1 PATCH: 700 PATCH TOPICAL at 08:27

## 2024-10-15 RX ADMIN — FLUTICASONE PROPIONATE 1 SPRAY: 50 SPRAY, METERED NASAL at 17:19

## 2024-10-15 RX ADMIN — DICYCLOMINE HYDROCHLORIDE 10 MG: 10 CAPSULE ORAL at 08:27

## 2024-10-15 RX ADMIN — LACTULOSE 20 G: 20 SOLUTION ORAL at 08:27

## 2024-10-15 RX ADMIN — RIFAXIMIN 550 MG: 550 TABLET ORAL at 08:27

## 2024-10-15 RX ADMIN — ASPIRIN 325 MG ORAL TABLET 325 MG: 325 PILL ORAL at 08:27

## 2024-10-15 RX ADMIN — DICYCLOMINE HYDROCHLORIDE 10 MG: 10 CAPSULE ORAL at 17:23

## 2024-10-15 RX ADMIN — FLUTICASONE PROPIONATE 1 SPRAY: 50 SPRAY, METERED NASAL at 08:29

## 2024-10-15 RX ADMIN — HYDROMORPHONE HYDROCHLORIDE 0.5 MG: 1 INJECTION, SOLUTION INTRAMUSCULAR; INTRAVENOUS; SUBCUTANEOUS at 00:27

## 2024-10-15 RX ADMIN — RIFAXIMIN 550 MG: 550 TABLET ORAL at 20:51

## 2024-10-15 NOTE — PLAN OF CARE
Problem: PAIN - ADULT  Goal: Verbalizes/displays adequate comfort level or baseline comfort level  Description: Interventions:  - Encourage patient to monitor pain and request assistance  - Assess pain using appropriate pain scale  - Administer analgesics based on type and severity of pain and evaluate response  - Implement non-pharmacological measures as appropriate and evaluate response  - Consider cultural and social influences on pain and pain management  - Notify physician/advanced practitioner if interventions unsuccessful or patient reports new pain  Outcome: Progressing     Problem: INFECTION - ADULT  Goal: Absence or prevention of progression during hospitalization  Description: INTERVENTIONS:  - Assess and monitor for signs and symptoms of infection  - Monitor lab/diagnostic results  - Monitor all insertion sites, i.e. indwelling lines, tubes, and drains  - Monitor endotracheal if appropriate and nasal secretions for changes in amount and color  - [place] appropriate cooling/warming therapies per order  - Administer medications as ordered  - Instruct and encourage patient and family to use good hand hygiene technique  - Identify and instruct in appropriate isolation precautions for identified infection/condition  Outcome: Progressing  Goal: Absence of fever/infection during neutropenic period  Description: INTERVENTIONS:  - Monitor WBC    Outcome: Progressing     Problem: SAFETY ADULT  Goal: Patient will remain free of falls  Description: INTERVENTIONS:  - Educate patient/family on patient safety including physical limitations  - Instruct patient to call for assistance with activity   - Consult OT/PT to assist with strengthening/mobility   - Keep Call bell within reach  - Keep bed low and locked with side rails adjusted as appropriate  - Keep care items and personal belongings within reach  - Initiate and maintain comfort rounds  - Make Fall Risk Sign visible to staff  - Offer Toileting every 2 Hours,  in advance of need  - Initiate/Maintain bed alarm  - Obtain necessary fall risk management equipment:   - Apply yellow socks and bracelet for high fall risk patients  - Consider moving patient to room near nurses station  Outcome: Progressing  Goal: Maintain or return to baseline ADL function  Description: INTERVENTIONS:  -  Assess patient's ability to carry out ADLs; assess patient's baseline for ADL function and identify physical deficits which impact ability to perform ADLs (bathing, care of mouth/teeth, toileting, grooming, dressing, etc.)  - Assess/evaluate cause of self-care deficits   - Assess range of motion  - Assess patient's mobility; develop plan if impaired  - Assess patient's need for assistive devices and provide as appropriate  - Encourage maximum independence but intervene and supervise when necessary  - Involve family in performance of ADLs  - Assess for home care needs following discharge   - Consider OT consult to assist with ADL evaluation and planning for discharge  - Provide patient education as appropriate  Outcome: Progressing  Goal: Maintains/Returns to pre admission functional level  Description: INTERVENTIONS:  - Perform AM-PAC 6 Click Basic Mobility/ Daily Activity assessment daily.  - Set and communicate daily mobility goal to care team and patient/family/caregiver.   - Collaborate with rehabilitation services on mobility goals if consulted  - Perform Range of Motion 2 times a day.  - Reposition patient every 2 hours.  - Dangle patient 2 times a day  - Stand patient 2 times a day  - Ambulate patient 2 times a day  - Out of bed to chair 2 times a day   - Out of bed for meals 2 times a day  - Out of bed for toileting  - Record patient progress and toleration of activity level   Outcome: Progressing     Problem: Prexisting or High Potential for Compromised Skin Integrity  Goal: Skin integrity is maintained or improved  Description: INTERVENTIONS:  - Identify patients at risk for skin  breakdown  - Assess and monitor skin integrity  - Assess and monitor nutrition and hydration status  - Monitor labs   - Assess for incontinence   - Turn and reposition patient  - Assist with mobility/ambulation  - Relieve pressure over bony prominences  - Avoid friction and shearing  - Provide appropriate hygiene as needed including keeping skin clean and dry  - Evaluate need for skin moisturizer/barrier cream  - Collaborate with interdisciplinary team   - Patient/family teaching  - Consider wound care consult   Outcome: Progressing     Problem: RESPIRATORY - ADULT  Goal: Achieves optimal ventilation and oxygenation  Description: INTERVENTIONS:  - Assess for changes in respiratory status  - Assess for changes in mentation and behavior  - Position to facilitate oxygenation and minimize respiratory effort  - Oxygen administered by appropriate delivery if ordered  - Initiate smoking cessation education as indicated  - Encourage broncho-pulmonary hygiene including cough, deep breathe, Incentive Spirometry  - Assess the need for suctioning and aspirate as needed  - Assess and instruct to report SOB or any respiratory difficulty  - Respiratory Therapy support as indicated  Outcome: Progressing     Problem: GASTROINTESTINAL - ADULT  Goal: Minimal or absence of nausea and/or vomiting  Description: INTERVENTIONS:  - Administer IV fluids if ordered to ensure adequate hydration  - Maintain NPO status until nausea and vomiting are resolved  - Nasogastric tube if ordered  - Administer ordered antiemetic medications as needed  - Provide nonpharmacologic comfort measures as appropriate  - Advance diet as tolerated, if ordered  - Consider nutrition services referral to assist patient with adequate nutrition and appropriate food choices  Outcome: Progressing  Goal: Maintains or returns to baseline bowel function  Description: INTERVENTIONS:  - Assess bowel function  - Encourage oral fluids to ensure adequate hydration  - Administer  IV fluids if ordered to ensure adequate hydration  - Administer ordered medications as needed  - Encourage mobilization and activity  - Consider nutritional services referral to assist patient with adequate nutrition and appropriate food choices  Outcome: Progressing  Goal: Maintains adequate nutritional intake  Description: INTERVENTIONS:  - Monitor percentage of each meal consumed  - Identify factors contributing to decreased intake, treat as appropriate  - Assist with meals as needed  - Monitor I&O, weight, and lab values if indicated  - Obtain nutrition services referral as needed  Outcome: Progressing  Goal: Establish and maintain optimal ostomy function  Description: INTERVENTIONS:  - Assess bowel function  - Encourage oral fluids to ensure adequate hydration  - Administer IV fluids if ordered to ensure adequate hydration   - Administer ordered medications as needed  - Encourage mobilization and activity  - Nutrition services referral to assist patient with appropriate food choices  - Assess stoma site  - Consider wound care consult   Outcome: Progressing  Goal: Oral mucous membranes remain intact  Description: INTERVENTIONS  - Assess oral mucosa and hygiene practices  - Implement preventative oral hygiene regimen  - Implement oral medicated treatments as ordered  - Initiate Nutrition services referral as needed  Outcome: Progressing     Problem: GENITOURINARY - ADULT  Goal: Maintains or returns to baseline urinary function  Description: INTERVENTIONS:  - Assess urinary function  - Encourage oral fluids to ensure adequate hydration if ordered  - Administer IV fluids as ordered to ensure adequate hydration  - Administer ordered medications as needed  - Offer frequent toileting  - Follow urinary retention protocol if ordered  Outcome: Progressing  Goal: Absence of urinary retention  Description: INTERVENTIONS:  - Assess patient’s ability to void and empty bladder  - Monitor I/O  - Bladder scan as needed  -  Discuss with physician/AP medications to alleviate retention as needed  - Discuss catheterization for long term situations as appropriate  Outcome: Progressing  Goal: Urinary catheter remains patent  Description: INTERVENTIONS:  - Assess patency of urinary catheter  - If patient has a chronic wilson, consider changing catheter if non-functioning  - Follow guidelines for intermittent irrigation of non-functioning urinary catheter  Outcome: Progressing

## 2024-10-15 NOTE — ASSESSMENT & PLAN NOTE
Patient presented with markedly elevated liver enzymes  Suspected due to transient hypotension prior to admission  Liver enzymes trending down significantly day by day.  No further encephalopathy noted, on lactulose and rifaximin.  Monitor CMP, Avoid hepatotoxins

## 2024-10-15 NOTE — NURSING NOTE
Maykel, Pca went in for morning labs. He reported to me that pt has removed her wilson with the balloon intact.   Pt noted to have blood in vaginal area. When pt was asked what happened, she stated I'm sick with Cancer and dying, I just want to go home.I'm done with that thing. And you are not putting in another one.  On call slim notified wilson emptied for 350ml   no...

## 2024-10-15 NOTE — ASSESSMENT & PLAN NOTE
Secondary to shock liver  Still has intermittent confusion over the weekend   So replaced on lactulose and rifaximin although confusion could be multifactorial including delirium.

## 2024-10-15 NOTE — PROGRESS NOTES
NEPHROLOGY PROGRESS NOTE    Patient: Dwaine Gould               Sex: female          DOA: 10/7/2024  8:14 PM   YOB: 1960        Age:  63 y.o.        LOS:  LOS: 8 days   10/15/2024    REASON FOR THE CONSULTATION: Acute kidney injury    ASSESSMENT/PLAN     Assessment & Plan  LETICIA (acute kidney injury) (HCC)  Present on admission. After review of the medical record, appears baseline creatinine levels are fluctuant around 0.7-1.0 and admitted with creatinine at 3.01.    Patient has underlying advanced colorectal cancer [stage III versus stage IV colorectal cancer with CEA elevation and probable peritoneal metastasis] and currently being followed by oncology as outpatient and receiving oxaliplatin/5-FU/leucovorin.    Prior workup noted urinalysis with hyaline casts suspicious for intravascular volume depletion.  She was provided with IV albumin for a total of 4 doses to boost intravascular volume status with no significant improvement in creatinine levels.  Creatinine levels have been fluctuating from 2.6-2.8 and concern for development of ATN.  Follow-up urine sodium level was low at 10.    Patient had development of significant volume overload and was initiated on IV furosemide 80 mg twice daily with improvement in urine output.  Current creatinine level has improved to 1.54 and De La Cruz catheter removed.    Given development of mild metabolic alkalosis we will hold on further diuretic administration today.  Will repeat BMP tomorrow morning.  Essential hypertension  Currently not on antihypertensive therapy, blood pressure readings acceptable over the past 24 hours.      SUBJECTIVE     Patient was seen at the bedside today, reports that she is being feeling better but very anxious to be discharged from the hospital.  Happy that her kidney function is improving.    Reviewed past 24 hour events.    CURRENT MEDICATIONS       Current Facility-Administered Medications:     aspirin tablet 325 mg, 325 mg, Oral,  Daily, Theodore Wei MD, 325 mg at 10/15/24 0827    dicyclomine (BENTYL) capsule 10 mg, 10 mg, Oral, TID AC, Aviva Grigsby PA-C, 10 mg at 10/15/24 1120    fluticasone (FLONASE) 50 mcg/act nasal spray 1 spray, 1 spray, Each Nare, BID, Leo Tompkins MD, 1 spray at 10/15/24 0829    HYDROmorphone (DILAUDID) injection 0.5 mg, 0.5 mg, Intravenous, Q6H PRN, Leo Tompkins MD, 0.5 mg at 10/15/24 0027    lactulose (CHRONULAC) oral solution 20 g, 20 g, Oral, Daily, Aviva Grigsby PA-C, 20 g at 10/15/24 0827    lidocaine (LIDODERM) 5 % patch 1 patch, 1 patch, Topical, Daily, Leo Tompkins MD, 1 patch at 10/15/24 0827    rifaximin (XIFAXAN) tablet 550 mg, 550 mg, Oral, Q12H ANGELES, Leo Tompkins MD, 550 mg at 10/15/24 0827    REVIEW OF SYSTEMS     Review of Systems   Constitutional:  Positive for activity change and fatigue. Negative for chills and fever.   HENT:  Negative for trouble swallowing.    Respiratory:  Negative for shortness of breath.    Cardiovascular:  Negative for leg swelling.   Gastrointestinal:  Negative for nausea and vomiting.   Genitourinary:  Negative for difficulty urinating, dysuria, frequency and hematuria.   Musculoskeletal:  Negative for back pain.   Skin:  Negative for pallor.   Neurological:  Positive for weakness. Negative for dizziness, syncope and light-headedness.   Psychiatric/Behavioral:  Negative for sleep disturbance. The patient is not nervous/anxious.        OBJECTIVE     Current Weight: Weight - Scale: 107 kg (236 lb 15.9 oz)  Vitals:    10/15/24 0717   BP: 132/76   Pulse: 95   Resp: 18   Temp: 98.4 °F (36.9 °C)   SpO2: 98%     Body mass index is 46.28 kg/m².    Intake/Output Summary (Last 24 hours) at 10/15/2024 1159  Last data filed at 10/15/2024 0825  Gross per 24 hour   Intake 660 ml   Output 3125 ml   Net -2465 ml       PHYSICAL EXAMINATION     Physical Exam  Vitals reviewed.   Constitutional:       General: She is not in acute  distress.  HENT:      Head: Normocephalic.      Mouth/Throat:      Lips: Pink.      Mouth: Mucous membranes are moist.   Eyes:      General: Lids are normal. No scleral icterus.  Cardiovascular:      Rate and Rhythm: Normal rate and regular rhythm.      Heart sounds: S1 normal and S2 normal. No murmur heard.  Pulmonary:      Effort: Pulmonary effort is normal. No accessory muscle usage or respiratory distress.      Breath sounds: Normal breath sounds.   Abdominal:      General: There is no distension.      Tenderness: There is no abdominal tenderness.   Musculoskeletal:      Cervical back: Normal range of motion and neck supple. No tenderness.      Right lower leg: Edema present.      Left lower leg: Edema present.   Skin:     General: Skin is warm.      Coloration: Skin is not cyanotic or jaundiced.   Neurological:      General: No focal deficit present.      Mental Status: She is alert and oriented to person, place, and time.   Psychiatric:         Attention and Perception: Attention normal.         Speech: Speech normal.         Behavior: Behavior is cooperative.           LAB RESULTS     Results from last 7 days   Lab Units 10/15/24  0444 10/14/24  0614 10/14/24  0436 10/13/24  0548 10/12/24  0846 10/11/24  0423 10/10/24  0654 10/10/24  0512 10/09/24  0450   WBC Thousand/uL 5.29 4.59  --  5.79 8.03 7.95 10.80*  --  10.14   HEMOGLOBIN g/dL 11.5 10.8*  --  10.3* 10.5* 9.2* 11.0*  --  10.4*   HEMATOCRIT % 39.0 36.0  --  35.8 35.8 31.8* 37.3  --  35.1   PLATELETS Thousands/uL 74* 60*  --  55* 50* 43* 70*  --  87*   SODIUM mmol/L 146  --  143 141 138 139  --  137 140   POTASSIUM mmol/L 3.0*  --  3.6 3.8 3.6 3.9  --  4.0 3.9   CHLORIDE mmol/L 97  --  102 103 101 102  --  106 107   CO2 mmol/L 38*  --  28 29 24 24  --  21 24   BUN mg/dL 37*  --  53* 72* 77* 74*  --  75* 80*   CREATININE mg/dL 1.54*  --  1.87* 2.68* 3.09* 2.83*  --  2.60* 2.86*   EGFR ml/min/1.73sq m 36  --  28 18 15 17  --  19 17   CALCIUM mg/dL 8.9  --  " 8.5 8.6 8.8 8.4  --  7.2* 7.0*       RADIOLOGY RESULTS      Results for orders placed during the hospital encounter of 10/07/24    XR chest portable    Narrative  XR CHEST PORTABLE    INDICATION: cardiac work-up.    COMPARISON: 1 view chest 10/4/2024    FINDINGS: Right chest wall stormy catheter in place with distal tip in the upper right atrium.    Minimal right basilar subsegmental atelectasis. No pneumothorax or pleural effusion.    Cardiac silhouette is enlarged.  Aortic calcification is present. Prominent central pulmonary vasculature.    Bones are unremarkable for age.    Normal upper abdomen.    Impression  Findings suggestive of mild central pulmonary vascular congestion.        Workstation performed: [id number]    Results for orders placed during the hospital encounter of 09/24/24    XR chest pa and lateral    Narrative  CHEST    INDICATION:   Malignant neoplasm of colon, unspecified.    COMPARISON:  None.    EXAM PERFORMED/VIEWS:  XR CHEST PA AND LATERAL    FINDINGS:    Cardiomediastinal silhouette appears significantly enlarged. Portacatheter in place in the right atrial appendage.    The lungs are clear.  No pneumothorax or pleural effusion.    Osseous structures appear within normal limits for patient age.    Impression  No acute cardiopulmonary disease.  Clearing of previous pulmonary edema from previous intubated study of 3/22/2024          Electronically signed: 09/24/2024 01:38 PM MD Krystina Villalpando CRNP  Nephrology  10/15/2024      Portions of the record may have been created with voice recognition software. Occasional wrong word or \"sound a like\" substitutions may have occurred due to the inherent limitations of voice recognition software. Read the chart carefully and recognize, using context, where substitutions have occurred.   "

## 2024-10-15 NOTE — ASSESSMENT & PLAN NOTE
Present on admission. After review of the medical record, appears baseline creatinine levels are fluctuant around 0.7-1.0 and admitted with creatinine at 3.01.    Patient has underlying advanced colorectal cancer [stage III versus stage IV colorectal cancer with CEA elevation and probable peritoneal metastasis] and currently being followed by oncology as outpatient and receiving oxaliplatin/5-FU/leucovorin.    Prior workup noted urinalysis with hyaline casts suspicious for intravascular volume depletion.  She was provided with IV albumin for a total of 4 doses to boost intravascular volume status with no significant improvement in creatinine levels.  Creatinine levels have been fluctuating from 2.6-2.8 and concern for development of ATN.  Follow-up urine sodium level was low at 10.    Patient had development of significant volume overload and was initiated on IV furosemide 80 mg twice daily with improvement in urine output.  Current creatinine level has improved to 1.54 and De La Cruz catheter removed.    Given development of mild metabolic alkalosis we will hold on further diuretic administration today.  Will repeat BMP tomorrow morning.

## 2024-10-15 NOTE — ASSESSMENT & PLAN NOTE
"The patient has a history of colon cancer previously been on FOLFOX therapy, most recent treatment 10/she was per oncology.    Imaging during this hospitalization does show \"  Multiple new and/or enlarging pulmonary nodules, consistent with aggressive metastatic disease.  /  Diffusely heterogeneous appearance of the liver. While this may at least partially represent inhomogeneous hepatic steatosis, underlying lesions cannot be excluded without IV contrast.\"  Patient will need eventual follow-up outpatient with oncology once recovered from kidney and liver injury for further workup  "

## 2024-10-15 NOTE — PLAN OF CARE
Problem: PAIN - ADULT  Goal: Verbalizes/displays adequate comfort level or baseline comfort level  Description: Interventions:  - Encourage patient to monitor pain and request assistance  - Assess pain using appropriate pain scale  - Administer analgesics based on type and severity of pain and evaluate response  - Implement non-pharmacological measures as appropriate and evaluate response  - Consider cultural and social influences on pain and pain management  - Notify physician/advanced practitioner if interventions unsuccessful or patient reports new pain  Outcome: Progressing     Problem: INFECTION - ADULT  Goal: Absence or prevention of progression during hospitalization  Description: INTERVENTIONS:  - Assess and monitor for signs and symptoms of infection  - Monitor lab/diagnostic results  - Monitor all insertion sites, i.e. indwelling lines, tubes, and drains  - Monitor endotracheal if appropriate and nasal secretions for changes in amount and color  - [place] appropriate cooling/warming therapies per order  - Administer medications as ordered  - Instruct and encourage patient and family to use good hand hygiene technique  - Identify and instruct in appropriate isolation precautions for identified infection/condition  Outcome: Progressing     Problem: GENITOURINARY - ADULT  Goal: Maintains or returns to baseline urinary function  Description: INTERVENTIONS:  - Assess urinary function  - Encourage oral fluids to ensure adequate hydration if ordered  - Administer IV fluids as ordered to ensure adequate hydration  - Administer ordered medications as needed  - Offer frequent toileting  - Follow urinary retention protocol if ordered  Outcome: Progressing

## 2024-10-15 NOTE — PROGRESS NOTES
Progress Note - Hospitalist   Name: Dwaine Gould 63 y.o. female I MRN: [id number]  Unit/Bed#: -01 I Date of Admission: 10/7/2024   Date of Service: 10/15/2024 I Hospital Day: 8    Assessment & Plan  LETICIA (acute kidney injury) (HCC)  Patient presented with elevated creatinine up to 3 compared to a previously normal baseline.  This probably started as a prerenal LETICIA and developed into ATN.  Patient remains mostly oligoanuric at this time.  Creatinine not improving.  She appears to be slightly overloaded.  Initially did not respond to fluids.  Nephrology following    De La Cruz catheter inserted on 10/12 for accurate intake and output  Continue diuretic trial with IV Lasix, currently appears to be diuresing well and creatinine also improving, nephrology following  Continue to monitor kidney function  Monitor intake and output closely  Monitor volume status  Avoid nephrotoxins    Hoping to eventually medically cleared patient once stable from the kidney perspective, off IV diuretics and liver perspective.  The patient is very weak.  Discussed with daughter Eva, she would like to take patient to [place] but I am not completely sure if she should be traveling at this point, potentially may benefit from local rehab vs local [facility] first and once stronger can consider traveling down south.    On 10/15/2024 patient being monitored and given the metabolic alkalosis and hyponatremia nephrology wants to hold off diuretics  Shock liver  Patient presented with markedly elevated liver enzymes  Suspected due to transient hypotension prior to admission  Liver enzymes trending down significantly day by day.  No further encephalopathy noted, on lactulose and rifaximin.  Monitor CMP, Avoid hepatotoxins    Hepatic encephalopathy (HCC)  Secondary to shock liver  Still has intermittent confusion over the weekend   So replaced on lactulose and rifaximin although confusion could be multifactorial including delirium.  Essential  "hypertension  Monitor BP.  Avoid hypotension especially in light of acute kidney injury.  Hyperlipidemia  Currently hold atorvastatin given ongoing shock liver and risk of hepatotoxicity  Transaminitis  Continue to trend CMP.  Adenocarcinoma, colon (HCC)  The patient has a history of colon cancer previously been on FOLFOX therapy, most recent treatment 10/she was per oncology.    Imaging during this hospitalization does show \"  Multiple new and/or enlarging pulmonary nodules, consistent with aggressive metastatic disease.  /  Diffusely heterogeneous appearance of the liver. While this may at least partially represent inhomogeneous hepatic steatosis, underlying lesions cannot be excluded without IV contrast.\"  Patient will need eventual follow-up outpatient with oncology once recovered from kidney and liver injury for further workup    VTE Pharmacologic Prophylaxis: VTE Score: 2 Low Risk (Score 0-2) - Encourage Ambulation.  Not prescribed due to present of cytopenia    Mobility:   Basic Mobility Inpatient Raw Score: 20  JH-HLM Goal: 6: Walk 10 steps or more  JH-HLM Achieved: 6: Walk 10 steps or more  JH-HLM Goal NOT achieved. Continue with multidisciplinary rounding and encourage appropriate mobility to improve upon JH-HLM goals.    Patient Centered Rounds: I performed bedside rounds with nursing staff today.   Discussions with Specialists or Other Care Team Provider: Nephrology    Education and Discussions with Family / Patient:  Spoke to patient at length.     Current Length of Stay: 8 day(s)  Current Patient Status: Inpatient   Certification Statement: The patient will continue to require additional inpatient hospital stay due to need to optimize diuretic regimen  Discharge Plan: Anticipate discharge in 24-48 hrs to home.    Code Status: Level 3 - DNAR and DNI    Subjective   Patient seen and examined at bedside by me.  No chest pain, palpitations or diaphoresis.  No fever or chills at this point of time.  Patient " does have weakness and the weakness is generalized.  No dysuria or diarrhea    Objective :  Temp:  [98.1 °F (36.7 °C)-98.5 °F (36.9 °C)] 98.1 °F (36.7 °C)  HR:  [] 84  BP: (132-158)/(76-92) 158/80  Resp:  [18-20] 19  SpO2:  [98 %-99 %] 98 %    Body mass index is 46.28 kg/m².     Input and Output Summary (last 24 hours):     Intake/Output Summary (Last 24 hours) at 10/15/2024 1622  Last data filed at 10/15/2024 0825  Gross per 24 hour   Intake 420 ml   Output 1725 ml   Net -1305 ml       Physical Exam    General exam- looks a little weak  HEENT - atraumatic and normocephalic  Neck- supple  Skin - no fresh rash  Extremities no fresh focal deformities  Cardiovascular- S1-S2 heard  Respiratory- bilateral air entry present, no crackles or rhonchi  Skin - no fresh rash  Abdomen - normal bowel sounds present, no rebound tenderness  CNS- No fresh focal deficits  Psych- no acute psychosis      Lines/Drains:  Lines/Drains/Airways       Active Status       Name Placement date Placement time Site Days    Port A Cath 06/24/24 Right Internal jugular 06/24/24  1050  Internal jugular  113                    Central Line:  Goal for removal:  Continue for now               Lab Results: I have reviewed the following results:   Results from last 7 days   Lab Units 10/15/24  0444 10/10/24  0654 10/09/24  0450   WBC Thousand/uL 5.29   < > 10.14   HEMOGLOBIN g/dL 11.5   < > 10.4*   HEMATOCRIT % 39.0   < > 35.1   PLATELETS Thousands/uL 74*   < > 87*   BANDS PCT %  --   --  7   SEGS PCT % 59   < >  --    LYMPHO PCT % 21   < > 7*   MONO PCT % 16*   < > 2*   EOS PCT % 3   < > 0    < > = values in this interval not displayed.     Results from last 7 days   Lab Units 10/15/24  0444   SODIUM mmol/L 146   POTASSIUM mmol/L 3.0*   CHLORIDE mmol/L 97   CO2 mmol/L 38*   BUN mg/dL 37*   CREATININE mg/dL 1.54*   ANION GAP mmol/L 11   CALCIUM mg/dL 8.9   ALBUMIN g/dL 4.2   TOTAL BILIRUBIN mg/dL 2.09*   ALK PHOS U/L 143*   ALT U/L 334*   AST U/L 29    GLUCOSE RANDOM mg/dL 98     Results from last 7 days   Lab Units 10/10/24  0654   INR  1.33*                   Recent Cultures (last 7 days):         Imaging Results Review: No pertinent imaging studies reviewed.  Other Study Results Review: No additional pertinent studies reviewed.    Last 24 Hours Medication List:     Current Facility-Administered Medications:     aspirin tablet 325 mg, Daily    dicyclomine (BENTYL) capsule 10 mg, TID AC    fluticasone (FLONASE) 50 mcg/act nasal spray 1 spray, BID    HYDROmorphone (DILAUDID) injection 0.5 mg, Q6H PRN    lactulose (CHRONULAC) oral solution 20 g, Daily    lidocaine (LIDODERM) 5 % patch 1 patch, Daily    rifaximin (XIFAXAN) tablet 550 mg, Q12H ANGELES    Administrative Statements   Today, Patient Was Seen By: Derick Steen MD  I have spent a total time of 35 minutes in caring for this patient on the day of the visit/encounter including Diagnostic results, Prognosis, Documenting in the medical record, Reviewing / ordering tests, medicine, procedures  , Obtaining or reviewing history  , and Communicating with other healthcare professionals .    **Please Note: This note may have been constructed using a voice recognition system.**

## 2024-10-15 NOTE — ASSESSMENT & PLAN NOTE
Patient presented with elevated creatinine up to 3 compared to a previously normal baseline.  This probably started as a prerenal LETICIA and developed into ATN.  Patient remains mostly oligoanuric at this time.  Creatinine not improving.  She appears to be slightly overloaded.  Initially did not respond to fluids.  Nephrology following    De La Cruz catheter inserted on 10/12 for accurate intake and output  Continue diuretic trial with IV Lasix, currently appears to be diuresing well and creatinine also improving, nephrology following  Continue to monitor kidney function  Monitor intake and output closely  Monitor volume status  Avoid nephrotoxins    Hoping to eventually medically cleared patient once stable from the kidney perspective, off IV diuretics and liver perspective.  The patient is very weak.  Discussed with daughter Eva, she would like to take patient to [place] but I am not completely sure if she should be traveling at this point, potentially may benefit from local rehab vs local [facility] first and once stronger can consider traveling down south.    On 10/15/2024 patient being monitored and given the metabolic alkalosis and hyponatremia nephrology wants to hold off diuretics

## 2024-10-16 ENCOUNTER — TELEPHONE (OUTPATIENT)
Age: 64
End: 2024-10-16

## 2024-10-16 VITALS
HEIGHT: 60 IN | OXYGEN SATURATION: 97 % | HEART RATE: 81 BPM | BODY MASS INDEX: 46.88 KG/M2 | TEMPERATURE: 98.1 F | WEIGHT: 238.76 LBS | SYSTOLIC BLOOD PRESSURE: 150 MMHG | RESPIRATION RATE: 19 BRPM | DIASTOLIC BLOOD PRESSURE: 72 MMHG

## 2024-10-16 DIAGNOSIS — N17.9 ACUTE RENAL FAILURE, UNSPECIFIED ACUTE RENAL FAILURE TYPE (HCC): Primary | ICD-10-CM

## 2024-10-16 LAB
ANION GAP SERPL CALCULATED.3IONS-SCNC: 8 MMOL/L (ref 4–13)
BUN SERPL-MCNC: 23 MG/DL (ref 5–25)
CALCIUM SERPL-MCNC: 8.5 MG/DL (ref 8.4–10.2)
CHLORIDE SERPL-SCNC: 96 MMOL/L (ref 96–108)
CO2 SERPL-SCNC: 39 MMOL/L (ref 21–32)
CREAT SERPL-MCNC: 1.09 MG/DL (ref 0.6–1.3)
ERYTHROCYTE [DISTWIDTH] IN BLOOD BY AUTOMATED COUNT: 19.7 % (ref 11.6–15.1)
GFR SERPL CREATININE-BSD FRML MDRD: 54 ML/MIN/1.73SQ M
GLUCOSE SERPL-MCNC: 112 MG/DL (ref 65–140)
HCT VFR BLD AUTO: 37.4 % (ref 34.8–46.1)
HGB BLD-MCNC: 11 G/DL (ref 11.5–15.4)
MCH RBC QN AUTO: 27.9 PG (ref 26.8–34.3)
MCHC RBC AUTO-ENTMCNC: 29.4 G/DL (ref 31.4–37.4)
MCV RBC AUTO: 95 FL (ref 82–98)
PLATELET # BLD AUTO: 73 THOUSANDS/UL (ref 149–390)
PMV BLD AUTO: 10.6 FL (ref 8.9–12.7)
POTASSIUM SERPL-SCNC: 3 MMOL/L (ref 3.5–5.3)
RBC # BLD AUTO: 3.94 MILLION/UL (ref 3.81–5.12)
SODIUM SERPL-SCNC: 143 MMOL/L (ref 135–147)
WBC # BLD AUTO: 5.58 THOUSAND/UL (ref 4.31–10.16)

## 2024-10-16 PROCEDURE — 99239 HOSP IP/OBS DSCHRG MGMT >30: CPT | Performed by: INTERNAL MEDICINE

## 2024-10-16 PROCEDURE — 80048 BASIC METABOLIC PNL TOTAL CA: CPT

## 2024-10-16 PROCEDURE — 85027 COMPLETE CBC AUTOMATED: CPT | Performed by: INTERNAL MEDICINE

## 2024-10-16 PROCEDURE — 99232 SBSQ HOSP IP/OBS MODERATE 35: CPT | Performed by: INTERNAL MEDICINE

## 2024-10-16 RX ORDER — LIDOCAINE 50 MG/G
1 PATCH TOPICAL DAILY
Qty: 30 PATCH | Refills: 0 | Status: SHIPPED | OUTPATIENT
Start: 2024-10-17

## 2024-10-16 RX ORDER — FUROSEMIDE 20 MG/1
20 TABLET ORAL DAILY
Status: DISCONTINUED | OUTPATIENT
Start: 2024-10-17 | End: 2024-10-16 | Stop reason: HOSPADM

## 2024-10-16 RX ORDER — LACTULOSE 10 G/15ML
20 SOLUTION ORAL DAILY
Qty: 240 ML | Refills: 0 | Status: SHIPPED | OUTPATIENT
Start: 2024-10-17

## 2024-10-16 RX ORDER — FUROSEMIDE 20 MG/1
20 TABLET ORAL DAILY
Qty: 30 TABLET | Refills: 0 | Status: SHIPPED | OUTPATIENT
Start: 2024-10-17

## 2024-10-16 RX ORDER — POTASSIUM CHLORIDE 1500 MG/1
40 TABLET, EXTENDED RELEASE ORAL 2 TIMES DAILY
Status: DISCONTINUED | OUTPATIENT
Start: 2024-10-16 | End: 2024-10-16 | Stop reason: HOSPADM

## 2024-10-16 RX ORDER — POTASSIUM CHLORIDE 1500 MG/1
40 TABLET, EXTENDED RELEASE ORAL ONCE
Status: COMPLETED | OUTPATIENT
Start: 2024-10-16 | End: 2024-10-16

## 2024-10-16 RX ORDER — DICYCLOMINE HYDROCHLORIDE 10 MG/1
10 CAPSULE ORAL
Qty: 30 CAPSULE | Refills: 0 | Status: SHIPPED | OUTPATIENT
Start: 2024-10-16 | End: 2024-10-26

## 2024-10-16 RX ORDER — FLUTICASONE PROPIONATE 50 MCG
1 SPRAY, SUSPENSION (ML) NASAL 2 TIMES DAILY
Qty: 9.9 ML | Refills: 0 | Status: SHIPPED | OUTPATIENT
Start: 2024-10-16 | End: 2024-11-15

## 2024-10-16 RX ORDER — POTASSIUM CHLORIDE 1500 MG/1
40 TABLET, EXTENDED RELEASE ORAL 2 TIMES DAILY
Qty: 14 TABLET | Refills: 0 | Status: SHIPPED | OUTPATIENT
Start: 2024-10-16 | End: 2024-10-23

## 2024-10-16 RX ADMIN — LACTULOSE 20 G: 20 SOLUTION ORAL at 08:54

## 2024-10-16 RX ADMIN — RIFAXIMIN 550 MG: 550 TABLET ORAL at 08:54

## 2024-10-16 RX ADMIN — FLUTICASONE PROPIONATE 1 SPRAY: 50 SPRAY, METERED NASAL at 08:54

## 2024-10-16 RX ADMIN — HYDROMORPHONE HYDROCHLORIDE 0.5 MG: 1 INJECTION, SOLUTION INTRAMUSCULAR; INTRAVENOUS; SUBCUTANEOUS at 04:16

## 2024-10-16 RX ADMIN — LIDOCAINE 1 PATCH: 700 PATCH TOPICAL at 08:54

## 2024-10-16 RX ADMIN — DICYCLOMINE HYDROCHLORIDE 10 MG: 10 CAPSULE ORAL at 07:21

## 2024-10-16 RX ADMIN — POTASSIUM CHLORIDE 40 MEQ: 1500 TABLET, EXTENDED RELEASE ORAL at 11:23

## 2024-10-16 RX ADMIN — DICYCLOMINE HYDROCHLORIDE 10 MG: 10 CAPSULE ORAL at 11:23

## 2024-10-16 RX ADMIN — ASPIRIN 325 MG ORAL TABLET 325 MG: 325 PILL ORAL at 08:54

## 2024-10-16 RX ADMIN — POTASSIUM CHLORIDE 40 MEQ: 1500 TABLET, EXTENDED RELEASE ORAL at 09:00

## 2024-10-16 NOTE — ASSESSMENT & PLAN NOTE
Present on admission. After review of the medical record, appears baseline creatinine levels are fluctuant around 0.7-1.0 and admitted with creatinine at 3.01.    Patient has underlying advanced colorectal cancer [stage III versus stage IV colorectal cancer with CEA elevation and probable peritoneal metastasis] and currently being followed by oncology as outpatient and receiving oxaliplatin/5-FU/leucovorin.    Prior workup noted urinalysis with hyaline casts suspicious for intravascular volume depletion.  She was provided with IV albumin for a total of 4 doses to boost intravascular volume status with no significant improvement in creatinine levels.  Creatinine levels have been fluctuating from 2.6-2.8 and concern for development of ATN.  Follow-up urine sodium level was low at 10.    Patient had development of significant volume overload and was initiated on IV furosemide 80 mg twice daily with improvement in urine output.  De La Cruz catheter was removed and IV diuretics placed on hold due to the development of mild metabolic alkalosis concerning for contraction alkalosis.    Patient with persistent good urine output over the past 24 hours with current creatinine level of 1.0 back within suspected baseline.  Patient refusing ABG for further evaluation of mild metabolic alkalosis.

## 2024-10-16 NOTE — ASSESSMENT & PLAN NOTE
Results from last 7 days   Lab Units 10/16/24  0446 10/15/24  0444 10/14/24  0436 10/13/24  0548 10/12/24  0846 10/11/24  0423 10/10/24  0512   SODIUM mmol/L 143 146 143 141 138 139 137   POTASSIUM mmol/L 3.0* 3.0* 3.6 3.8 3.6 3.9 4.0   CHLORIDE mmol/L 96 97 102 103 101 102 106   CO2 mmol/L 39* 38* 28 29 24 24 21   ANION GAP mmol/L 8 11 13 9 13 13 10   BUN mg/dL 23 37* 53* 72* 77* 74* 75*   CREATININE mg/dL 1.09 1.54* 1.87* 2.68* 3.09* 2.83* 2.60*   CALCIUM mg/dL 8.5 8.9 8.5 8.6 8.8 8.4 7.2*   ALBUMIN g/dL  --  4.2 4.0 4.4 4.6 4.7 3.0*   TOTAL BILIRUBIN mg/dL  --  2.09* 1.82* 1.50* 1.68* 1.96* 1.48*   ALK PHOS U/L  --  143* 153* 146* 152* 145* 203*   ALT U/L  --  334* 416* 595* 757* 976* 1,783*   AST U/L  --  29 37 56* 102* 198* 426*   EGFR ml/min/1.73sq m 54 36 28 18 15 17 19   GLUCOSE RANDOM mg/dL 112 98 95 114 154* 126 106     Patient did have elevated CMP which was resolving, patient was advised to stay in the hospital but wanted to leave and follow-up with her primary care physicians

## 2024-10-16 NOTE — PLAN OF CARE
Problem: PAIN - ADULT  Goal: Verbalizes/displays adequate comfort level or baseline comfort level  Description: Interventions:  - Encourage patient to monitor pain and request assistance  - Assess pain using appropriate pain scale  - Administer analgesics based on type and severity of pain and evaluate response  - Implement non-pharmacological measures as appropriate and evaluate response  - Consider cultural and social influences on pain and pain management  - Notify physician/advanced practitioner if interventions unsuccessful or patient reports new pain  Outcome: Progressing     Problem: INFECTION - ADULT  Goal: Absence or prevention of progression during hospitalization  Description: INTERVENTIONS:  - Assess and monitor for signs and symptoms of infection  - Monitor lab/diagnostic results  - Monitor all insertion sites, i.e. indwelling lines, tubes, and drains  - Monitor endotracheal if appropriate and nasal secretions for changes in amount and color  - [place] appropriate cooling/warming therapies per order  - Administer medications as ordered  - Instruct and encourage patient and family to use good hand hygiene technique  - Identify and instruct in appropriate isolation precautions for identified infection/condition  Outcome: Progressing  Goal: Absence of fever/infection during neutropenic period  Description: INTERVENTIONS:  - Monitor WBC    Outcome: Progressing     Problem: SAFETY ADULT  Goal: Patient will remain free of falls  Description: INTERVENTIONS:  - Educate patient/family on patient safety including physical limitations  - Instruct patient to call for assistance with activity   - Consult OT/PT to assist with strengthening/mobility   - Keep Call bell within reach  - Keep bed low and locked with side rails adjusted as appropriate  - Keep care items and personal belongings within reach  - Initiate and maintain comfort rounds  - Make Fall Risk Sign visible to staff  - Offer Toileting every 2 Hours,  in advance of need  - Initiate/Maintain bed alarm  - Obtain necessary fall risk management equipment: bed alarm non skid footwear   - Apply yellow socks and bracelet for high fall risk patients  - Consider moving patient to room near nurses station  Outcome: Progressing  Goal: Maintain or return to baseline ADL function  Description: INTERVENTIONS:  -  Assess patient's ability to carry out ADLs; assess patient's baseline for ADL function and identify physical deficits which impact ability to perform ADLs (bathing, care of mouth/teeth, toileting, grooming, dressing, etc.)  - Assess/evaluate cause of self-care deficits   - Assess range of motion  - Assess patient's mobility; develop plan if impaired  - Assess patient's need for assistive devices and provide as appropriate  - Encourage maximum independence but intervene and supervise when necessary  - Involve family in performance of ADLs  - Assess for home care needs following discharge   - Consider OT consult to assist with ADL evaluation and planning for discharge  - Provide patient education as appropriate  Outcome: Progressing  Goal: Maintains/Returns to pre admission functional level  Description: INTERVENTIONS:  - Perform AM-PAC 6 Click Basic Mobility/ Daily Activity assessment daily.  - Set and communicate daily mobility goal to care team and patient/family/caregiver.   - Collaborate with rehabilitation services on mobility goals if consulted  - Perform Range of Motion 3 times a day.  - Reposition patient every 3 hours.  - Dangle patient 3 times a day  - Stand patient 3 times a day  - Ambulate patient 3 times a day  - Out of bed to chair 3 times a day   - Out of bed for meals 3 times a day  - Out of bed for toileting  - Record patient progress and toleration of activity level   Outcome: Progressing     Problem: Prexisting or High Potential for Compromised Skin Integrity  Goal: Skin integrity is maintained or improved  Description: INTERVENTIONS:  - Identify  patients at risk for skin breakdown  - Assess and monitor skin integrity  - Assess and monitor nutrition and hydration status  - Monitor labs   - Assess for incontinence   - Turn and reposition patient  - Assist with mobility/ambulation  - Relieve pressure over bony prominences  - Avoid friction and shearing  - Provide appropriate hygiene as needed including keeping skin clean and dry  - Evaluate need for skin moisturizer/barrier cream  - Collaborate with interdisciplinary team   - Patient/family teaching  - Consider wound care consult   Outcome: Progressing     Problem: RESPIRATORY - ADULT  Goal: Achieves optimal ventilation and oxygenation  Description: INTERVENTIONS:  - Assess for changes in respiratory status  - Assess for changes in mentation and behavior  - Position to facilitate oxygenation and minimize respiratory effort  - Oxygen administered by appropriate delivery if ordered  - Initiate smoking cessation education as indicated  - Encourage broncho-pulmonary hygiene including cough, deep breathe, Incentive Spirometry  - Assess the need for suctioning and aspirate as needed  - Assess and instruct to report SOB or any respiratory difficulty  - Respiratory Therapy support as indicated  Outcome: Progressing     Problem: GASTROINTESTINAL - ADULT  Goal: Minimal or absence of nausea and/or vomiting  Description: INTERVENTIONS:  - Administer IV fluids if ordered to ensure adequate hydration  - Maintain NPO status until nausea and vomiting are resolved  - Nasogastric tube if ordered  - Administer ordered antiemetic medications as needed  - Provide nonpharmacologic comfort measures as appropriate  - Advance diet as tolerated, if ordered  - Consider nutrition services referral to assist patient with adequate nutrition and appropriate food choices  Outcome: Progressing  Goal: Maintains or returns to baseline bowel function  Description: INTERVENTIONS:  - Assess bowel function  - Encourage oral fluids to ensure  adequate hydration  - Administer IV fluids if ordered to ensure adequate hydration  - Administer ordered medications as needed  - Encourage mobilization and activity  - Consider nutritional services referral to assist patient with adequate nutrition and appropriate food choices  Outcome: Progressing  Goal: Maintains adequate nutritional intake  Description: INTERVENTIONS:  - Monitor percentage of each meal consumed  - Identify factors contributing to decreased intake, treat as appropriate  - Assist with meals as needed  - Monitor I&O, weight, and lab values if indicated  - Obtain nutrition services referral as needed  Outcome: Progressing  Goal: Establish and maintain optimal ostomy function  Description: INTERVENTIONS:  - Assess bowel function  - Encourage oral fluids to ensure adequate hydration  - Administer IV fluids if ordered to ensure adequate hydration   - Administer ordered medications as needed  - Encourage mobilization and activity  - Nutrition services referral to assist patient with appropriate food choices  - Assess stoma site  - Consider wound care consult   Outcome: Progressing  Goal: Oral mucous membranes remain intact  Description: INTERVENTIONS  - Assess oral mucosa and hygiene practices  - Implement preventative oral hygiene regimen  - Implement oral medicated treatments as ordered  - Initiate Nutrition services referral as needed  Outcome: Progressing     Problem: GENITOURINARY - ADULT  Goal: Maintains or returns to baseline urinary function  Description: INTERVENTIONS:  - Assess urinary function  - Encourage oral fluids to ensure adequate hydration if ordered  - Administer IV fluids as ordered to ensure adequate hydration  - Administer ordered medications as needed  - Offer frequent toileting  - Follow urinary retention protocol if ordered  Outcome: Progressing  Goal: Absence of urinary retention  Description: INTERVENTIONS:  - Assess patient’s ability to void and empty bladder  - Monitor  I/O  - Bladder scan as needed  - Discuss with physician/AP medications to alleviate retention as needed  - Discuss catheterization for long term situations as appropriate  Outcome: Progressing  Goal: Urinary catheter remains patent  Description: INTERVENTIONS:  - Assess patency of urinary catheter  - If patient has a chronic wilson, consider changing catheter if non-functioning  - Follow guidelines for intermittent irrigation of non-functioning urinary catheter  Outcome: Progressing

## 2024-10-16 NOTE — DISCHARGE SUMMARY
Discharge Summary - Hospitalist   Name: Dwaine Gould 63 y.o. female I MRN: [id number]  Unit/Bed#: -01 I Date of Admission: 10/7/2024   Date of Service: 10/16/2024 I Hospital Day: 9     Assessment & Plan  LETICIA (acute kidney injury) (HCC)  Patient presented with elevated creatinine up to 3 compared to a previously normal baseline.  This probably started as a prerenal LETICIA and developed into ATN.  Patient remains mostly oligoanuric at this time.  Creatinine not improving.  She appears to be slightly overloaded.  Initially did not respond to fluids.  Nephrology following    De La Cruz catheter inserted on 10/12 for accurate intake and output  On 10/15/2024 patient being monitored and given the metabolic alkalosis and hyponatremia nephrology wants to hold off diuretics  On 10/16/2024 she was advised ABG to see her alkalosis because, patient refused and wanted to leave.  Spoke with nephrology who was okay with sending her on Lasix 20 mg p.o. daily  Shock liver  Patient presented with markedly elevated liver enzymes  Suspected due to transient hypotension prior to admission  Liver enzymes trending down significantly day by day.  No further encephalopathy noted, on lactulose and rifaximin.  Monitor CMP, Avoid hepatotoxins    Hepatic encephalopathy (HCC)  Secondary to shock liver  Still has intermittent confusion over the weekend   So replaced on lactulose and rifaximin although confusion could be multifactorial including delirium.  Essential hypertension  Avoid hypotension especially in light of acute kidney injury.  Hyperlipidemia  Currently hold atorvastatin given ongoing shock liver and risk of hepatotoxicity.  Transaminitis  Results from last 7 days   Lab Units 10/16/24  0446 10/15/24  0444 10/14/24  0436 10/13/24  0548 10/12/24  0846 10/11/24  0423 10/10/24  0512   SODIUM mmol/L 143 146 143 141 138 139 137   POTASSIUM mmol/L 3.0* 3.0* 3.6 3.8 3.6 3.9 4.0   CHLORIDE mmol/L 96 97 102 103 101 102 106   CO2 mmol/L 39* 38* 28  "29 24 24 21   ANION GAP mmol/L 8 11 13 9 13 13 10   BUN mg/dL 23 37* 53* 72* 77* 74* 75*   CREATININE mg/dL 1.09 1.54* 1.87* 2.68* 3.09* 2.83* 2.60*   CALCIUM mg/dL 8.5 8.9 8.5 8.6 8.8 8.4 7.2*   ALBUMIN g/dL  --  4.2 4.0 4.4 4.6 4.7 3.0*   TOTAL BILIRUBIN mg/dL  --  2.09* 1.82* 1.50* 1.68* 1.96* 1.48*   ALK PHOS U/L  --  143* 153* 146* 152* 145* 203*   ALT U/L  --  334* 416* 595* 757* 976* 1,783*   AST U/L  --  29 37 56* 102* 198* 426*   EGFR ml/min/1.73sq m 54 36 28 18 15 17 19   GLUCOSE RANDOM mg/dL 112 98 95 114 154* 126 106     Patient did have elevated CMP which was resolving, patient was advised to stay in the hospital but wanted to leave and follow-up with her primary care physicians    Adenocarcinoma, colon (HCC)  The patient has a history of colon cancer previously been on FOLFOX therapy, most recent treatment 10/she was per oncology.    Imaging during this hospitalization does show \"  Multiple new and/or enlarging pulmonary nodules, consistent with aggressive metastatic disease.  /  Diffusely heterogeneous appearance of the liver. While this may at least partially represent inhomogeneous hepatic steatosis, underlying lesions cannot be excluded without IV contrast.\"  Patient will need eventual follow-up outpatient with oncology once recovered from kidney and liver injury for further workup     Admitting Provider:  Theodore Wei MD  Discharge Provider:  Derick Steen MD  Admission Date: 10/7/2024       Discharge Date: 10/16/24   LOS: 9  Primary Care Physician at Discharge: Jordan Ramirez [phone]    HOSPITAL COURSE:  Dwaine Gould is a 63 y.o. female who presented with acute kidney injury.  Patient improved after adjustment of her medications.  Patient told to avoid nephrotoxic medications.  Patient was told to stay for any ABG but she flat out refused and said that she wanted to leave.  Spoke to nephrology who said that this need not be and AGAINST MEDICAL ADVICE discharge and patient can be " sent on Lasix 20 mg p.o. daily with follow-up labs.  Spoke to patient and the daughter who verbalized understanding of the plan    Mobility:   Basic Mobility Inpatient Raw Score: 23  JH-HLM Goal: 7: Walk 25 feet or more  JH-HLM Achieved: 6: Walk 10 steps or more  JH-HLM Goal achieved. Continue to encourage appropriate mobility.    REASON FOR ADMISSION/ ADMISSION DIAGNOSES    DISCHARGE DIAGNOSES  LETICIA (acute kidney injury) (HCC)  Assessment & Plan  Patient presented with elevated creatinine up to 3 compared to a previously normal baseline.  This probably started as a prerenal LETICIA and developed into ATN.  Patient remains mostly oligoanuric at this time.  Creatinine not improving.  She appears to be slightly overloaded.  Initially did not respond to fluids.  Nephrology following    De La Cruz catheter inserted on 10/12 for accurate intake and output  On 10/15/2024 patient being monitored and given the metabolic alkalosis and hyponatremia nephrology wants to hold off diuretics  On 10/16/2024 she was advised ABG to see her alkalosis because, patient refused and wanted to leave.  Spoke with nephrology who was okay with sending her on Lasix 20 mg p.o. daily    Hyperlipidemia  Assessment & Plan  Currently hold atorvastatin given ongoing shock liver and risk of hepatotoxicity.    Essential hypertension  Assessment & Plan  Avoid hypotension especially in light of acute kidney injury.    Transaminitis  Assessment & Plan  Results from last 7 days   Lab Units 10/16/24  0446 10/15/24  0444 10/14/24  0436 10/13/24  0548 10/12/24  0846 10/11/24  0423 10/10/24  0512   SODIUM mmol/L 143 146 143 141 138 139 137   POTASSIUM mmol/L 3.0* 3.0* 3.6 3.8 3.6 3.9 4.0   CHLORIDE mmol/L 96 97 102 103 101 102 106   CO2 mmol/L 39* 38* 28 29 24 24 21   ANION GAP mmol/L 8 11 13 9 13 13 10   BUN mg/dL 23 37* 53* 72* 77* 74* 75*   CREATININE mg/dL 1.09 1.54* 1.87* 2.68* 3.09* 2.83* 2.60*   CALCIUM mg/dL 8.5 8.9 8.5 8.6 8.8 8.4 7.2*   ALBUMIN g/dL  --  4.2  4.0 4.4 4.6 4.7 3.0*   TOTAL BILIRUBIN mg/dL  --  2.09* 1.82* 1.50* 1.68* 1.96* 1.48*   ALK PHOS U/L  --  143* 153* 146* 152* 145* 203*   ALT U/L  --  334* 416* 595* 757* 976* 1,783*   AST U/L  --  29 37 56* 102* 198* 426*   EGFR ml/min/1.73sq m 54 36 28 18 15 17 19   GLUCOSE RANDOM mg/dL 112 98 95 114 154* 126 106     Patient did have elevated CMP which was resolving, patient was advised to stay in the hospital but wanted to leave and follow-up with her primary care physicians        CONSULTING PROVIDERS   IP CONSULT TO GASTROENTEROLOGY  IP CONSULT TO HEMATOLOGY  IP CONSULT TO NEPHROLOGY    PROCEDURES PERFORMED  * No surgery found *    RADIOLOGY RESULTS  CT chest abdomen pelvis wo contrast    Addendum Date: 10/9/2024    ADDENDUM: The report should also state that there is increased nodularity within the anterior pelvic fat/omentum, best seen on series 2, image 187. This is new since the prior abdominal CT and consistent with disease progression/carcinomatosis.    Result Date: 10/9/2024  Impression: 1.  No evidence of acute traumatic injury. No identifiable acute abnormality to account for the patient's clinical presentation. 2.  Multiple new and/or enlarging pulmonary nodules, consistent with aggressive metastatic disease. 3.  Diffusely heterogeneous appearance of the liver. While this may at least partially represent inhomogeneous hepatic steatosis, underlying lesions cannot be excluded without IV contrast. 4.  Mild diffuse anasarca. The study was marked in EPIC for significant notification. Workstation performed: [id number]     US right upper quadrant    Result Date: 10/8/2024  Impression: 1.  Diffusely echogenic and heterogeneous appearance of the liver. There is moderately heterogeneous and increased echogenicity throughout the visible hepatic parenchyma, with posterior attenuation of the sound waves. The liver is mildly enlarged. There is a possible hypoechoic lesion within the left hepatic lobe, which cannot  be definitively characterized on this exam. Follow-up evaluation by gadolinium-enhanced MRI of the abdomen is recommended. The study was marked in EPIC for significant notification. Workstation performed: [id number]     XR chest portable    Result Date: 10/8/2024  Impression: Findings suggestive of mild central pulmonary vascular congestion. Workstation performed: [id number]     CT head without contrast    Result Date: 10/7/2024  Impression: No acute intracranial abnormality.  Stable chronic microangiopathic changes within the brain. Workstation performed: [id number]       LABS  Results from last 7 days   Lab Units 10/16/24  0446 10/15/24  0444 10/14/24  0614 10/13/24  0548 10/12/24  0846 10/11/24  0423 10/10/24  0654   WBC Thousand/uL 5.58 5.29 4.59 5.79 8.03 7.95 10.80*   HEMOGLOBIN g/dL 11.0* 11.5 10.8* 10.3* 10.5* 9.2* 11.0*   HEMATOCRIT % 37.4 39.0 36.0 35.8 35.8 31.8* 37.3   MCV fL 95 93 94 97 96 96 96   PLATELETS Thousands/uL 73* 74* 60* 55* 50* 43* 70*   INR   --   --   --   --   --   --  1.33*     Results from last 7 days   Lab Units 10/16/24  0446 10/15/24  0444 10/14/24  0436 10/13/24  0548 10/12/24  0846 10/11/24  0423 10/10/24  0512   SODIUM mmol/L 143 146 143 141 138 139 137   POTASSIUM mmol/L 3.0* 3.0* 3.6 3.8 3.6 3.9 4.0   CHLORIDE mmol/L 96 97 102 103 101 102 106   CO2 mmol/L 39* 38* 28 29 24 24 21   BUN mg/dL 23 37* 53* 72* 77* 74* 75*   CREATININE mg/dL 1.09 1.54* 1.87* 2.68* 3.09* 2.83* 2.60*   CALCIUM mg/dL 8.5 8.9 8.5 8.6 8.8 8.4 7.2*   ALBUMIN g/dL  --  4.2 4.0 4.4 4.6 4.7 3.0*   TOTAL BILIRUBIN mg/dL  --  2.09* 1.82* 1.50* 1.68* 1.96* 1.48*   ALK PHOS U/L  --  143* 153* 146* 152* 145* 203*   ALT U/L  --  334* 416* 595* 757* 976* 1,783*   AST U/L  --  29 37 56* 102* 198* 426*   EGFR ml/min/1.73sq m 54 36 28 18 15 17 19   GLUCOSE RANDOM mg/dL 112 98 95 114 154* 126 106                          Results from last 7 days   Lab Units 10/13/24  0548   TSH 3RD GENERATON uIU/mL 2.750                Cultures:                   PHYSICAL EXAM:  Vitals:   Blood Pressure: 150/72 (10/16/24 1002)  Pulse: 81 (10/16/24 1002)  Temperature: 98.1 °F (36.7 °C) (10/16/24 0703)  Temp Source: Axillary (10/08/24 0157)  Respirations: 19 (10/16/24 1002)  Height: 5' (152.4 cm) (10/09/24 1202)  Weight - Scale: 108 kg (238 lb 12.1 oz) (10/16/24 0558)  SpO2: 97 % (10/16/24 1002)    General appearance: alert, appears stated age, and cooperative  HEENT - atraumatic and normocephalic  Neck- supple  Skin - no fresh rash  Extremities no fresh focal deformities  Cardiovascular- S1-S2 heard  Respiratory- bilateral air entry present, no crackles or rhonchi  Skin - no fresh rash  Abdomen - normal bowel sounds present, no rebound tenderness  CNS- No fresh focal deficits  Psych- no acute psychosis     Planned Re-admission: No  Discharge Disposition: Home with Home Health Care      Test Results Pending at Discharge:   Incidental findings: None    Medications   Summary of Medication Adjustments made as a result of this hospitalization: Lasix 20 mg p.o. daily, stop atorvastatin  Medication Dosing Tapers - Please refer to Discharge Medication List for details on any medication dosing tapers (if applicable to patient).  Discharge Medication List: See after visit summary for reconciled discharge medications.     Diet restrictions: Heart healthy diet   Activity restrictions: No strenuous activity  Discharge Condition: fair    Outpatient Follow-Up and Discharge Instructions  See after visit summary section titled Discharge Instructions for information provided to patient and family.      Code Status: Prior  Discharge Statement   I spent 35 minutes discharging the patient. This time was spent on the day of discharge. Greater than 50% of total time was spent with the patient and / or family counseling and / or coordination of care.    Derick Steen MD  [facility] Internal Medicine    ** Please Note: This note has been constructed using a  voice recognition system. **

## 2024-10-16 NOTE — ASSESSMENT & PLAN NOTE
Patient presented with elevated creatinine up to 3 compared to a previously normal baseline.  This probably started as a prerenal LETICIA and developed into ATN.  Patient remains mostly oligoanuric at this time.  Creatinine not improving.  She appears to be slightly overloaded.  Initially did not respond to fluids.  Nephrology following    De La Cruz catheter inserted on 10/12 for accurate intake and output  On 10/15/2024 patient being monitored and given the metabolic alkalosis and hyponatremia nephrology wants to hold off diuretics  On 10/16/2024 she was advised ABG to see her alkalosis because, patient refused and wanted to leave.  Spoke with nephrology who was okay with sending her on Lasix 20 mg p.o. daily

## 2024-10-16 NOTE — ASSESSMENT & PLAN NOTE
Currently not on antihypertensive therapy, blood pressure readings acceptable over the past 24 hours.  Blood pressure readings overall acceptable.

## 2024-10-16 NOTE — PROGRESS NOTES
NEPHROLOGY PROGRESS NOTE    Patient: Dwaine Gould               Sex: female          DOA: 10/7/2024  8:14 PM   YOB: 1960        Age:  63 y.o.        LOS:  LOS: 9 days   10/16/2024    REASON FOR THE CONSULTATION: Management of acute kidney injury    ASSESSMENT/PLAN     Assessment & Plan  LETICIA (acute kidney injury) (HCC)  Present on admission. After review of the medical record, appears baseline creatinine levels are fluctuant around 0.7-1.0 and admitted with creatinine at 3.01.    Patient has underlying advanced colorectal cancer [stage III versus stage IV colorectal cancer with CEA elevation and probable peritoneal metastasis] and currently being followed by oncology as outpatient and receiving oxaliplatin/5-FU/leucovorin.    Prior workup noted urinalysis with hyaline casts suspicious for intravascular volume depletion.  She was provided with IV albumin for a total of 4 doses to boost intravascular volume status with no significant improvement in creatinine levels.  Creatinine levels have been fluctuating from 2.6-2.8 and concern for development of ATN.  Follow-up urine sodium level was low at 10.    Patient had development of significant volume overload and was initiated on IV furosemide 80 mg twice daily with improvement in urine output.  De La Cruz catheter was removed and IV diuretics placed on hold due to the development of mild metabolic alkalosis concerning for contraction alkalosis.    Patient with persistent good urine output over the past 24 hours with current creatinine level of 1.0 back within suspected baseline.  Patient refusing ABG for further evaluation of mild metabolic alkalosis.  Essential hypertension  Currently not on antihypertensive therapy, blood pressure readings acceptable over the past 24 hours.  Blood pressure readings overall acceptable.      SUBJECTIVE     Patient was seen and examined at the bedside today.  She is adamant for hospital discharge today wants to leave with her  family.  She denies nausea, vomiting, or shortness of breath.     Reviewed past 24 hour events.    CURRENT MEDICATIONS       Current Facility-Administered Medications:     aspirin tablet 325 mg, 325 mg, Oral, Daily, Theodore Wei MD, 325 mg at 10/16/24 0854    dicyclomine (BENTYL) capsule 10 mg, 10 mg, Oral, TID AC, Aviva Grigsby PA-C, 10 mg at 10/16/24 1123    fluticasone (FLONASE) 50 mcg/act nasal spray 1 spray, 1 spray, Each Nare, BID, Leo Tompkins MD, 1 spray at 10/16/24 0854    [START ON 10/17/2024] furosemide (LASIX) tablet 20 mg, 20 mg, Oral, Daily, Derick Steen MD    lactulose (CHRONULAC) oral solution 20 g, 20 g, Oral, Daily, Aviva Grigsby PA-C, 20 g at 10/16/24 0854    lidocaine (LIDODERM) 5 % patch 1 patch, 1 patch, Topical, Daily, Leo Tompkins MD, 1 patch at 10/16/24 0854    potassium chloride (Klor-Con M20) CR tablet 40 mEq, 40 mEq, Oral, BID, Derick Steen MD, 40 mEq at 10/16/24 0900    rifaximin (XIFAXAN) tablet 550 mg, 550 mg, Oral, Q12H ANGELES, Leo Tompkins MD, 550 mg at 10/16/24 0854    REVIEW OF SYSTEMS     Review of Systems   Constitutional:  Positive for activity change and fatigue. Negative for chills and fever.   HENT:  Negative for trouble swallowing.    Respiratory:  Negative for cough and shortness of breath.    Cardiovascular:  Positive for leg swelling. Negative for chest pain.   Gastrointestinal:  Negative for nausea and vomiting.   Genitourinary:  Negative for difficulty urinating, dysuria, frequency and hematuria.   Musculoskeletal:  Negative for back pain.   Skin:  Negative for pallor.   Neurological:  Negative for dizziness, syncope, weakness and light-headedness.   Psychiatric/Behavioral:  Negative for sleep disturbance. The patient is not nervous/anxious.        OBJECTIVE     Current Weight: Weight - Scale: 108 kg (238 lb 12.1 oz)  Vitals:    10/16/24 1002   BP: 150/72   Pulse: 81   Resp: 19   Temp:    SpO2: 97%     Body mass  index is 46.63 kg/m².    Intake/Output Summary (Last 24 hours) at 10/16/2024 1146  Last data filed at 10/16/2024 0703  Gross per 24 hour   Intake 1100 ml   Output --   Net 1100 ml       PHYSICAL EXAMINATION     Physical Exam  Vitals reviewed.   Constitutional:       General: She is not in acute distress.  HENT:      Head: Normocephalic.      Mouth/Throat:      Lips: Pink.      Mouth: Mucous membranes are moist.   Eyes:      General: Lids are normal. No scleral icterus.  Cardiovascular:      Rate and Rhythm: Normal rate and regular rhythm.      Heart sounds: S1 normal and S2 normal. No murmur heard.  Pulmonary:      Effort: Pulmonary effort is normal. No accessory muscle usage or respiratory distress.      Breath sounds: Examination of the right-lower field reveals decreased breath sounds. Examination of the left-lower field reveals decreased breath sounds. Decreased breath sounds present.   Abdominal:      General: There is no distension.      Tenderness: There is no abdominal tenderness.   Musculoskeletal:      Cervical back: Normal range of motion and neck supple. No tenderness.      Right lower leg: Edema present.      Left lower leg: Edema present.   Skin:     General: Skin is warm.      Coloration: Skin is not cyanotic or jaundiced.   Neurological:      General: No focal deficit present.      Mental Status: She is alert and oriented to person, place, and time.   Psychiatric:         Attention and Perception: Attention normal.         Speech: Speech normal.         Behavior: Behavior is cooperative.           LAB RESULTS     Results from last 7 days   Lab Units 10/16/24  0446 10/15/24  0444 10/14/24  0614 10/14/24  0436 10/13/24  0548 10/12/24  0846 10/11/24  0423 10/10/24  0654 10/10/24  0512   WBC Thousand/uL 5.58 5.29 4.59  --  5.79 8.03 7.95 10.80*  --    HEMOGLOBIN g/dL 11.0* 11.5 10.8*  --  10.3* 10.5* 9.2* 11.0*  --    HEMATOCRIT % 37.4 39.0 36.0  --  35.8 35.8 31.8* 37.3  --    PLATELETS Thousands/uL 73*  74* 60*  --  55* 50* 43* 70*  --    SODIUM mmol/L 143 146  --  143 141 138 139  --  137   POTASSIUM mmol/L 3.0* 3.0*  --  3.6 3.8 3.6 3.9  --  4.0   CHLORIDE mmol/L 96 97  --  102 103 101 102  --  106   CO2 mmol/L 39* 38*  --  28 29 24 24  --  21   BUN mg/dL 23 37*  --  53* 72* 77* 74*  --  75*   CREATININE mg/dL 1.09 1.54*  --  1.87* 2.68* 3.09* 2.83*  --  2.60*   EGFR ml/min/1.73sq m 54 36  --  28 18 15 17  --  19   CALCIUM mg/dL 8.5 8.9  --  8.5 8.6 8.8 8.4  --  7.2*       RADIOLOGY RESULTS      Results for orders placed during the hospital encounter of 10/07/24    XR chest portable    Narrative  XR CHEST PORTABLE    INDICATION: cardiac work-up.    COMPARISON: 1 view chest 10/4/2024    FINDINGS: Right chest wall stormy catheter in place with distal tip in the upper right atrium.    Minimal right basilar subsegmental atelectasis. No pneumothorax or pleural effusion.    Cardiac silhouette is enlarged.  Aortic calcification is present. Prominent central pulmonary vasculature.    Bones are unremarkable for age.    Normal upper abdomen.    Impression  Findings suggestive of mild central pulmonary vascular congestion.        Workstation performed: [id number]    Results for orders placed during the hospital encounter of 09/24/24    XR chest pa and lateral    Narrative  CHEST    INDICATION:   Malignant neoplasm of colon, unspecified.    COMPARISON:  None.    EXAM PERFORMED/VIEWS:  XR CHEST PA AND LATERAL    FINDINGS:    Cardiomediastinal silhouette appears significantly enlarged. Portacatheter in place in the right atrial appendage.    The lungs are clear.  No pneumothorax or pleural effusion.    Osseous structures appear within normal limits for patient age.    Impression  No acute cardiopulmonary disease.  Clearing of previous pulmonary edema from previous intubated study of 3/22/2024          Electronically signed: 09/24/2024 01:38 PM MD Krystina Villalpando,  "[facility]  Nephrology  10/16/2024      Portions of the record may have been created with voice recognition software. Occasional wrong word or \"sound a like\" substitutions may have occurred due to the inherent limitations of voice recognition software. Read the chart carefully and recognize, using context, where substitutions have occurred.   "

## 2024-10-17 NOTE — PROGRESS NOTES
10/17/2024:    Plan has been discontinued as she has new lung metastasis.  Also she had liver chemistry elevation significant that have returned towards normal which will play an impact into further treatment in the future.  Spoke with Dr. Zhang regarding this.    Dr. Black

## 2024-10-21 ENCOUNTER — APPOINTMENT (OUTPATIENT)
Dept: LAB | Facility: CLINIC | Age: 64
End: 2024-10-21
Payer: COMMERCIAL

## 2024-10-21 ENCOUNTER — HOSPITAL ENCOUNTER (OUTPATIENT)
Dept: INFUSION CENTER | Facility: CLINIC | Age: 64
End: 2024-10-21

## 2024-10-21 DIAGNOSIS — N17.9 ACUTE RENAL FAILURE, UNSPECIFIED ACUTE RENAL FAILURE TYPE (HCC): ICD-10-CM

## 2024-10-21 DIAGNOSIS — N17.9 AKI (ACUTE KIDNEY INJURY) (HCC): ICD-10-CM

## 2024-10-21 DIAGNOSIS — G93.40 ACUTE ENCEPHALOPATHY: ICD-10-CM

## 2024-10-21 LAB
ANION GAP SERPL CALCULATED.3IONS-SCNC: 9 MMOL/L (ref 4–13)
BUN SERPL-MCNC: 11 MG/DL (ref 5–25)
CALCIUM SERPL-MCNC: 9 MG/DL (ref 8.4–10.2)
CHLORIDE SERPL-SCNC: 100 MMOL/L (ref 96–108)
CHOLEST SERPL-MCNC: 177 MG/DL
CO2 SERPL-SCNC: 36 MMOL/L (ref 21–32)
CREAT SERPL-MCNC: 1.04 MG/DL (ref 0.6–1.3)
FERRITIN SERPL-MCNC: 239 NG/ML (ref 11–307)
GFR SERPL CREATININE-BSD FRML MDRD: 58 ML/MIN/1.73SQ M
GLUCOSE P FAST SERPL-MCNC: 86 MG/DL (ref 65–99)
HDLC SERPL-MCNC: 33 MG/DL
IRON SATN MFR SERPL: 11 % (ref 15–50)
IRON SERPL-MCNC: 31 UG/DL (ref 50–212)
LDLC SERPL CALC-MCNC: 115 MG/DL (ref 0–100)
POTASSIUM SERPL-SCNC: 4.3 MMOL/L (ref 3.5–5.3)
SODIUM SERPL-SCNC: 145 MMOL/L (ref 135–147)
TIBC SERPL-MCNC: 285 UG/DL (ref 250–450)
TRIGL SERPL-MCNC: 143 MG/DL
UIBC SERPL-MCNC: 254 UG/DL (ref 155–355)

## 2024-10-21 PROCEDURE — 83550 IRON BINDING TEST: CPT

## 2024-10-21 PROCEDURE — 80048 BASIC METABOLIC PNL TOTAL CA: CPT

## 2024-10-21 PROCEDURE — 82728 ASSAY OF FERRITIN: CPT

## 2024-10-21 PROCEDURE — 80061 LIPID PANEL: CPT

## 2024-10-21 PROCEDURE — 83540 ASSAY OF IRON: CPT

## 2024-10-23 ENCOUNTER — OFFICE VISIT (OUTPATIENT)
Dept: NEPHROLOGY | Facility: CLINIC | Age: 64
End: 2024-10-23
Payer: COMMERCIAL

## 2024-10-23 VITALS
BODY MASS INDEX: 46.49 KG/M2 | WEIGHT: 236.8 LBS | DIASTOLIC BLOOD PRESSURE: 80 MMHG | SYSTOLIC BLOOD PRESSURE: 140 MMHG | RESPIRATION RATE: 18 BRPM | OXYGEN SATURATION: 90 % | HEIGHT: 60 IN | TEMPERATURE: 98 F | HEART RATE: 103 BPM

## 2024-10-23 DIAGNOSIS — J44.9 CHRONIC OBSTRUCTIVE PULMONARY DISEASE, UNSPECIFIED COPD TYPE (HCC): ICD-10-CM

## 2024-10-23 DIAGNOSIS — N18.31 STAGE 3A CHRONIC KIDNEY DISEASE (HCC): ICD-10-CM

## 2024-10-23 DIAGNOSIS — C18.9 ADENOCARCINOMA, COLON (HCC): ICD-10-CM

## 2024-10-23 DIAGNOSIS — E83.9 CHRONIC KIDNEY DISEASE-MINERAL AND BONE DISORDER: ICD-10-CM

## 2024-10-23 DIAGNOSIS — I10 ESSENTIAL HYPERTENSION: ICD-10-CM

## 2024-10-23 DIAGNOSIS — M89.9 CHRONIC KIDNEY DISEASE-MINERAL AND BONE DISORDER: ICD-10-CM

## 2024-10-23 DIAGNOSIS — N18.9 CHRONIC KIDNEY DISEASE-MINERAL AND BONE DISORDER: ICD-10-CM

## 2024-10-23 DIAGNOSIS — N17.9 AKI (ACUTE KIDNEY INJURY) (HCC): Primary | ICD-10-CM

## 2024-10-23 PROCEDURE — 99214 OFFICE O/P EST MOD 30 MIN: CPT | Performed by: INTERNAL MEDICINE

## 2024-10-23 RX ORDER — MELOXICAM 15 MG/1
15 TABLET ORAL DAILY
COMMUNITY
Start: 2024-10-01

## 2024-10-23 RX ORDER — HYDROXYZINE PAMOATE 25 MG/1
CAPSULE ORAL
COMMUNITY
Start: 2024-10-01

## 2024-10-23 RX ORDER — FLUTICASONE FUROATE, UMECLIDINIUM BROMIDE AND VILANTEROL TRIFENATATE 100; 62.5; 25 UG/1; UG/1; UG/1
1 POWDER RESPIRATORY (INHALATION) DAILY
COMMUNITY
Start: 2024-10-01

## 2024-10-23 RX ORDER — OXYCODONE HYDROCHLORIDE 5 MG/1
TABLET ORAL
COMMUNITY

## 2024-10-23 RX ORDER — LIDOCAINE/PRILOCAINE 2.5 %-2.5%
CREAM (GRAM) TOPICAL
COMMUNITY
Start: 2024-10-01

## 2024-10-23 NOTE — ASSESSMENT & PLAN NOTE
Metastatic in nature and being monitored by oncologist.  Not on chemotherapy as last chemotherapy make her very sick

## 2024-10-23 NOTE — ASSESSMENT & PLAN NOTE
Patient came after hospital follow-up.  Had acute kidney injury in the hospital responded to IV fluid    Kidney function seems to be at baseline now    Patient is quite asymptomatic though gaining weight and has some leg swelling    No shortness of breath.  Will require close monitoring.  Patient still on diuretic which I will continue for now

## 2024-10-23 NOTE — ASSESSMENT & PLAN NOTE
Likely contributing to shortness of breath and volume overload.  Being monitored by pulmonary.  I will continue furosemide at this point         Everything discussed at length with the patient and both of her daughters.  Will continue diuretic for now.  Advised to monitor weight at home if she started gaining weight she will increase the diuretic.  I will see her back in 3 months.  She will get blood work in 1 month in 3 months as part of the close follow-up

## 2024-10-23 NOTE — ASSESSMENT & PLAN NOTE
Lab Results   Component Value Date    EGFR 58 10/21/2024    EGFR 54 10/16/2024    EGFR 36 10/15/2024    CREATININE 1.04 10/21/2024    CREATININE 1.09 10/16/2024    CREATININE 1.54 (H) 10/15/2024     GFR is about 58 making stage IIIa CKD.  Multifactorial.  Will monitor closely as it can fluctuate with multiple medical problems

## 2024-10-23 NOTE — PROGRESS NOTES
Ambulatory Visit  Name: Dwaine Gould      : 1960      MRN: [id number]  Encounter Provider: Francis Vazquez MD  Encounter Date: 10/23/2024   Encounter department: [facility]    Assessment & Plan  LETICIA (acute kidney injury) (HCC)  Patient came after hospital follow-up.  Had acute kidney injury in the hospital responded to IV fluid    Kidney function seems to be at baseline now    Patient is quite asymptomatic though gaining weight and has some leg swelling    No shortness of breath.  Will require close monitoring.  Patient still on diuretic which I will continue for now       Stage 3a chronic kidney disease (HCC)  Lab Results   Component Value Date    EGFR 58 10/21/2024    EGFR 54 10/16/2024    EGFR 36 10/15/2024    CREATININE 1.04 10/21/2024    CREATININE 1.09 10/16/2024    CREATININE 1.54 (H) 10/15/2024     GFR is about 58 making stage IIIa CKD.  Multifactorial.  Will monitor closely as it can fluctuate with multiple medical problems       Chronic kidney disease-mineral and bone disorder  Lab Results   Component Value Date    EGFR 58 10/21/2024    EGFR 54 10/16/2024    EGFR 36 10/15/2024    CREATININE 1.04 10/21/2024    CREATININE 1.09 10/16/2024    CREATININE 1.54 (H) 10/15/2024   PTH and phosphorus along with vitamin D are within acceptable range and will continue to monitor         Adenocarcinoma, colon (HCC)  Metastatic in nature and being monitored by oncologist.  Not on chemotherapy as last chemotherapy make her very sick       Essential hypertension  Very well-controlled       Chronic obstructive pulmonary disease, unspecified COPD type (HCC)  Likely contributing to shortness of breath and volume overload.  Being monitored by pulmonary.  I will continue furosemide at this point         Everything discussed at length with the patient and both of her daughters.  Will continue diuretic for now.  Advised to monitor weight at home if she started gaining weight she will  increase the diuretic.  I will see her back in 3 months.  She will get blood work in 1 month in 3 months as part of the close follow-up          History of Present Illness     Dwaine Gould is a 63 y.o. female who presents for hospital discharge    Patient was in hospital with acute kidney injury.  Patient came with volume overload status requiring diuretic resulting in acute kidney injury    Patient diuretic was stopped and she gradually got better    At present she seems to be stable.  On oxygen which is chronic due to COPD    Patient also has a colon cancer and being monitored by oncologist.  Possible acute kidney injury was related to chemotherapy    She denies any acute complaint    Chronically short of breath    Also the leg swelling      Review of Systems   Constitutional:  Negative for fatigue.   HENT:  Negative for congestion.    Eyes:  Negative for photophobia and pain.   Respiratory:  Negative for chest tightness and shortness of breath.    Cardiovascular:  Positive for leg swelling. Negative for chest pain and palpitations.   Gastrointestinal:  Negative for abdominal distention, abdominal pain and blood in stool.   Endocrine: Negative for polydipsia.   Genitourinary:  Negative for difficulty urinating, dysuria, flank pain, hematuria and urgency.   Musculoskeletal:  Negative for arthralgias and back pain.   Skin:  Negative for rash.   Neurological:  Negative for dizziness, light-headedness and headaches.   Hematological:  Does not bruise/bleed easily.   Psychiatric/Behavioral:  Negative for behavioral problems. The patient is not nervous/anxious.            Objective     Pulse 103   Temp 98 °F (36.7 °C) (Temporal)   Resp 18   Ht 5' (1.524 m)   Wt 107 kg (236 lb 12.8 oz)   SpO2 90%   BMI 46.25 kg/m²     Physical Exam  Constitutional:       General: She is not in acute distress.     Appearance: She is well-developed.   HENT:      Head: Normocephalic.      Mouth/Throat:      Mouth: Mucous membranes are  moist.   Eyes:      General: No scleral icterus.     Conjunctiva/sclera: Conjunctivae normal.   Neck:      Vascular: No JVD.   Cardiovascular:      Rate and Rhythm: Normal rate.      Heart sounds: Normal heart sounds.   Pulmonary:      Effort: Pulmonary effort is normal.      Breath sounds: No wheezing.   Abdominal:      Palpations: Abdomen is soft.      Tenderness: There is no abdominal tenderness.   Musculoskeletal:         General: Normal range of motion.      Cervical back: Neck supple.   Skin:     General: Skin is warm.      Findings: No rash.   Neurological:      Mental Status: She is alert and oriented to person, place, and time.   Psychiatric:         Behavior: Behavior normal.

## 2024-10-24 ENCOUNTER — TELEPHONE (OUTPATIENT)
Dept: CARDIOLOGY CLINIC | Facility: CLINIC | Age: 64
End: 2024-10-24

## 2024-10-24 NOTE — TELEPHONE ENCOUNTER
----- Message from Lance Altamirano PA-C sent at 10/23/2024 12:04 PM EDT -----  Please advise patient her kidney function is improving, and she should continue to follow with nephrology.  Her potassium is within normal range.  Her cholesterol labs show that her cholesterol levels are above target range, however we cannot go up on her cholesterol medications at this time due to recent elevation in liver enzymes.  I recommend that she follows up with her PCP regarding her recent hospitalization/recent elevation in liver enzymes, and we can reassess her cholesterol medications at her next cardiology appointment.  She should continue with dietary modifications at this time.

## 2025-01-01 ENCOUNTER — HOME CARE VISIT (OUTPATIENT)
Dept: HOME HEALTH SERVICES | Facility: HOME HEALTHCARE | Age: 65
End: 2025-01-01
Payer: COMMERCIAL

## 2025-01-01 ENCOUNTER — HOME CARE VISIT (OUTPATIENT)
Dept: HOME HOSPICE | Facility: HOSPICE | Age: 65
End: 2025-01-01
Payer: COMMERCIAL

## 2025-01-01 VITALS — TEMPERATURE: 92.4 F | HEART RATE: 42 BPM | RESPIRATION RATE: 17 BRPM

## 2025-01-01 PROCEDURE — G0299 HHS/HOSPICE OF RN EA 15 MIN: HCPCS

## 2025-01-01 PROCEDURE — G0155 HHCP-SVS OF CSW,EA 15 MIN: HCPCS

## 2025-01-01 PROCEDURE — G0156 HHCP-SVS OF AIDE,EA 15 MIN: HCPCS

## 2025-01-01 RX ADMIN — HALOPERIDOL 1 MG: 1 TABLET ORAL at 12:00

## 2025-01-01 RX ADMIN — LORAZEPAM 0.5 MG: 0.5 TABLET ORAL at 12:00

## 2025-01-01 RX ADMIN — HYDROMORPHONE HYDROCHLORIDE 8 MG: 8 TABLET ORAL at 12:00

## 2025-01-01 RX ADMIN — DEXAMETHASONE 2 MG: 2 TABLET ORAL at 12:00

## 2025-01-22 ENCOUNTER — TELEPHONE (OUTPATIENT)
Dept: NEPHROLOGY | Facility: CLINIC | Age: 65
End: 2025-01-22

## 2025-01-29 ENCOUNTER — TELEPHONE (OUTPATIENT)
Age: 65
End: 2025-01-29

## 2025-01-29 NOTE — TELEPHONE ENCOUNTER
Patient was looking to see if there were any appointments, informed there will be a provider starting at the end of February and may be able to scheduled when able.

## 2025-02-03 ENCOUNTER — TELEPHONE (OUTPATIENT)
Dept: NEPHROLOGY | Facility: CLINIC | Age: 65
End: 2025-02-03

## 2025-02-03 DIAGNOSIS — N18.31 STAGE 3A CHRONIC KIDNEY DISEASE (HCC): Primary | ICD-10-CM

## 2025-02-03 NOTE — TELEPHONE ENCOUNTER
I called and left message on patients answering machine reminding patient to have non fasting lab work done prior to appointment for Wednesday 02/05/2025 with Dr. Vazquez. Left message for patient to contact the office if patient is unable to keep appointment

## 2025-02-11 ENCOUNTER — TELEPHONE (OUTPATIENT)
Dept: NEPHROLOGY | Facility: CLINIC | Age: 65
End: 2025-02-11

## 2025-02-11 NOTE — TELEPHONE ENCOUNTER
I called and left message on patients answering machine reminding patient to have non fasting lab work done prior to appointment for Thursday 02/13/2025 with GUSTAVO Garcia

## 2025-03-04 ENCOUNTER — HOSPITAL ENCOUNTER (EMERGENCY)
Facility: HOSPITAL | Age: 65
Discharge: HOME | End: 2025-03-04
Payer: COMMERCIAL

## 2025-03-04 ENCOUNTER — APPOINTMENT (EMERGENCY)
Dept: CT IMAGING | Facility: HOSPITAL | Age: 65
End: 2025-03-04
Payer: COMMERCIAL

## 2025-03-04 ENCOUNTER — APPOINTMENT (EMERGENCY)
Dept: ULTRASOUND IMAGING | Facility: HOSPITAL | Age: 65
End: 2025-03-04
Payer: COMMERCIAL

## 2025-03-04 VITALS
TEMPERATURE: 98 F | OXYGEN SATURATION: 96 % | HEART RATE: 81 BPM | HEIGHT: 60 IN | WEIGHT: 236 LBS | DIASTOLIC BLOOD PRESSURE: 69 MMHG | SYSTOLIC BLOOD PRESSURE: 152 MMHG | BODY MASS INDEX: 46.33 KG/M2 | RESPIRATION RATE: 20 BRPM

## 2025-03-04 DIAGNOSIS — C79.9 METASTATIC CANCER (HCC): Primary | ICD-10-CM

## 2025-03-04 LAB
ALBUMIN SERPL BCG-MCNC: 3.9 G/DL (ref 3.5–5)
ALP SERPL-CCNC: 220 U/L (ref 34–104)
ALT SERPL W P-5'-P-CCNC: 19 U/L (ref 7–52)
ANION GAP SERPL CALCULATED.3IONS-SCNC: 8 MMOL/L (ref 4–13)
AST SERPL W P-5'-P-CCNC: 16 U/L (ref 13–39)
BACTERIA UR QL AUTO: ABNORMAL /HPF
BASOPHILS # BLD AUTO: 0.08 THOUSANDS/ÂΜL (ref 0–0.1)
BASOPHILS NFR BLD AUTO: 1 % (ref 0–1)
BILIRUB SERPL-MCNC: 0.42 MG/DL (ref 0.2–1)
BILIRUB UR QL STRIP: NEGATIVE
BUN SERPL-MCNC: 21 MG/DL (ref 5–25)
CALCIUM SERPL-MCNC: 8.7 MG/DL (ref 8.4–10.2)
CHLORIDE SERPL-SCNC: 104 MMOL/L (ref 96–108)
CLARITY UR: CLEAR
CO2 SERPL-SCNC: 27 MMOL/L (ref 21–32)
COLOR UR: YELLOW
CREAT SERPL-MCNC: 1.08 MG/DL (ref 0.6–1.3)
EOSINOPHIL # BLD AUTO: 0.74 THOUSAND/ÂΜL (ref 0–0.61)
EOSINOPHIL NFR BLD AUTO: 4 % (ref 0–6)
ERYTHROCYTE [DISTWIDTH] IN BLOOD BY AUTOMATED COUNT: 17.2 % (ref 11.6–15.1)
GFR SERPL CREATININE-BSD FRML MDRD: 55 ML/MIN/1.73SQ M
GLUCOSE SERPL-MCNC: 119 MG/DL (ref 65–140)
GLUCOSE UR STRIP-MCNC: NEGATIVE MG/DL
HCT VFR BLD AUTO: 44.7 % (ref 34.8–46.1)
HGB BLD-MCNC: 13.1 G/DL (ref 11.5–15.4)
HGB UR QL STRIP.AUTO: ABNORMAL
IMM GRANULOCYTES # BLD AUTO: 0.12 THOUSAND/UL (ref 0–0.2)
IMM GRANULOCYTES NFR BLD AUTO: 1 % (ref 0–2)
KETONES UR STRIP-MCNC: NEGATIVE MG/DL
LACTATE SERPL-SCNC: 1.3 MMOL/L (ref 0.5–2)
LEUKOCYTE ESTERASE UR QL STRIP: NEGATIVE
LIPASE SERPL-CCNC: 18 U/L (ref 11–82)
LYMPHOCYTES # BLD AUTO: 2.79 THOUSANDS/ÂΜL (ref 0.6–4.47)
LYMPHOCYTES NFR BLD AUTO: 16 % (ref 14–44)
MCH RBC QN AUTO: 24.6 PG (ref 26.8–34.3)
MCHC RBC AUTO-ENTMCNC: 29.3 G/DL (ref 31.4–37.4)
MCV RBC AUTO: 84 FL (ref 82–98)
MONOCYTES # BLD AUTO: 1.03 THOUSAND/ÂΜL (ref 0.17–1.22)
MONOCYTES NFR BLD AUTO: 6 % (ref 4–12)
MUCOUS THREADS UR QL AUTO: ABNORMAL
NEUTROPHILS # BLD AUTO: 12.47 THOUSANDS/ÂΜL (ref 1.85–7.62)
NEUTS SEG NFR BLD AUTO: 72 % (ref 43–75)
NITRITE UR QL STRIP: NEGATIVE
NON-SQ EPI CELLS URNS QL MICRO: ABNORMAL /HPF
NRBC BLD AUTO-RTO: 0 /100 WBCS
PH UR STRIP.AUTO: 5.5 [PH]
PLATELET # BLD AUTO: 340 THOUSANDS/UL (ref 149–390)
PMV BLD AUTO: 8.5 FL (ref 8.9–12.7)
POTASSIUM SERPL-SCNC: 4 MMOL/L (ref 3.5–5.3)
PROT SERPL-MCNC: 7.3 G/DL (ref 6.4–8.4)
PROT UR STRIP-MCNC: ABNORMAL MG/DL
RBC # BLD AUTO: 5.33 MILLION/UL (ref 3.81–5.12)
RBC #/AREA URNS AUTO: ABNORMAL /HPF
SODIUM SERPL-SCNC: 139 MMOL/L (ref 135–147)
SP GR UR STRIP.AUTO: 1.03 (ref 1–1.03)
UROBILINOGEN UR STRIP-ACNC: <2 MG/DL
WBC # BLD AUTO: 17.23 THOUSAND/UL (ref 4.31–10.16)
WBC #/AREA URNS AUTO: ABNORMAL /HPF

## 2025-03-04 PROCEDURE — 99284 EMERGENCY DEPT VISIT MOD MDM: CPT

## 2025-03-04 PROCEDURE — 76856 US EXAM PELVIC COMPLETE: CPT

## 2025-03-04 PROCEDURE — 36415 COLL VENOUS BLD VENIPUNCTURE: CPT

## 2025-03-04 PROCEDURE — 85025 COMPLETE CBC W/AUTO DIFF WBC: CPT

## 2025-03-04 PROCEDURE — 96375 TX/PRO/DX INJ NEW DRUG ADDON: CPT

## 2025-03-04 PROCEDURE — 81001 URINALYSIS AUTO W/SCOPE: CPT

## 2025-03-04 PROCEDURE — 83605 ASSAY OF LACTIC ACID: CPT

## 2025-03-04 PROCEDURE — 74177 CT ABD & PELVIS W/CONTRAST: CPT

## 2025-03-04 PROCEDURE — 96374 THER/PROPH/DIAG INJ IV PUSH: CPT

## 2025-03-04 PROCEDURE — 76830 TRANSVAGINAL US NON-OB: CPT

## 2025-03-04 PROCEDURE — 83690 ASSAY OF LIPASE: CPT

## 2025-03-04 PROCEDURE — 96361 HYDRATE IV INFUSION ADD-ON: CPT

## 2025-03-04 PROCEDURE — 80053 COMPREHEN METABOLIC PANEL: CPT

## 2025-03-04 PROCEDURE — 99285 EMERGENCY DEPT VISIT HI MDM: CPT

## 2025-03-04 RX ORDER — METHYLPREDNISOLONE SODIUM SUCCINATE 125 MG/2ML
80 INJECTION, POWDER, LYOPHILIZED, FOR SOLUTION INTRAMUSCULAR; INTRAVENOUS ONCE
Status: COMPLETED | OUTPATIENT
Start: 2025-03-04 | End: 2025-03-04

## 2025-03-04 RX ORDER — OXYCODONE HYDROCHLORIDE 5 MG/1
5 TABLET ORAL EVERY 6 HOURS PRN
Qty: 10 TABLET | Refills: 0 | Status: SHIPPED | OUTPATIENT
Start: 2025-03-04 | End: 2025-03-14

## 2025-03-04 RX ORDER — ONDANSETRON 2 MG/ML
4 INJECTION INTRAMUSCULAR; INTRAVENOUS ONCE
Status: COMPLETED | OUTPATIENT
Start: 2025-03-04 | End: 2025-03-04

## 2025-03-04 RX ORDER — OXYCODONE HYDROCHLORIDE 5 MG/1
5 TABLET ORAL EVERY 4 HOURS PRN
Qty: 10 TABLET | Refills: 0 | Status: SHIPPED | OUTPATIENT
Start: 2025-03-04 | End: 2025-03-04

## 2025-03-04 RX ORDER — OXYCODONE HYDROCHLORIDE 5 MG/1
5 TABLET ORAL ONCE
Refills: 0 | Status: COMPLETED | OUTPATIENT
Start: 2025-03-04 | End: 2025-03-04

## 2025-03-04 RX ORDER — FENTANYL CITRATE 50 UG/ML
25 INJECTION, SOLUTION INTRAMUSCULAR; INTRAVENOUS ONCE
Refills: 0 | Status: COMPLETED | OUTPATIENT
Start: 2025-03-04 | End: 2025-03-04

## 2025-03-04 RX ORDER — DIPHENHYDRAMINE HYDROCHLORIDE 50 MG/ML
25 INJECTION INTRAMUSCULAR; INTRAVENOUS ONCE
Status: COMPLETED | OUTPATIENT
Start: 2025-03-04 | End: 2025-03-04

## 2025-03-04 RX ADMIN — ONDANSETRON 4 MG: 2 INJECTION INTRAMUSCULAR; INTRAVENOUS at 19:31

## 2025-03-04 RX ADMIN — METHYLPREDNISOLONE SODIUM SUCCINATE 80 MG: 125 INJECTION, POWDER, FOR SOLUTION INTRAMUSCULAR; INTRAVENOUS at 18:56

## 2025-03-04 RX ADMIN — FENTANYL CITRATE 25 MCG: 0.05 INJECTION, SOLUTION INTRAMUSCULAR; INTRAVENOUS at 20:22

## 2025-03-04 RX ADMIN — SODIUM CHLORIDE 1000 ML: 0.9 INJECTION, SOLUTION INTRAVENOUS at 19:31

## 2025-03-04 RX ADMIN — OXYCODONE HYDROCHLORIDE 5 MG: 5 TABLET ORAL at 23:06

## 2025-03-04 RX ADMIN — DIPHENHYDRAMINE HYDROCHLORIDE 25 MG: 50 INJECTION, SOLUTION INTRAMUSCULAR; INTRAVENOUS at 18:56

## 2025-03-04 RX ADMIN — IOHEXOL 100 ML: 350 INJECTION, SOLUTION INTRAVENOUS at 19:25

## 2025-03-05 NOTE — ED NOTES
Pt reports 9/10 pain in lower abdomen, provider made aware     Krystina Knight, RN  03/04/25 2007

## 2025-03-05 NOTE — DISCHARGE INSTRUCTIONS
Please follow up with your oncologist as soon as possible to discuss CT findings.    Return to the ED with any new/concerning issues.      IMPRESSION:     Endometrium is homogeneous but thickened for postmenopausal woman. No focal abnormality. Consider tissue sampling to exclude coexisting endometrial carcinoma.     Lobulated soft tissue mass posterior to the uterus compatible with tumor. The extent of tumor was better shown on the CT scan from earlier today.    IMPRESSION:     1.  Redemonstrated pulmonary nodules and hepatic masses compatible with metastasis.  2.  Interval increase in peritoneal and omental nodularity compatible with carcinomatosis.  3.  Interval increase in size of nodular tract in the left lower quadrant abdominal wall compatible with metastasis.  4.  Mild right hydronephrosis which may indicate distal involvement of the right ureter.

## 2025-03-05 NOTE — ED PROVIDER NOTES
Time reflects when diagnosis was documented in both MDM as applicable and the Disposition within this note       Time User Action Codes Description Comment    3/4/2025 10:54 PM Roger Mccormack [C79.9] Metastatic cancer (HCC)           ED Disposition       ED Disposition   Discharge    Condition   Stable    Date/Time   Tue Mar 4, 2025 10:54 PM    Comment   Dwaine Gould discharge to home/self care.                   Assessment & Plan       Medical Decision Making  Patient is a 64 year old female with PMHx colon CA, COPD on chronic O2, s/p hemicolectomy and chemotherapy treatments, presenting to the ED for evaluation of increased abdominal pain as well as vaginal spotting and cramping for the last week. Hx and clinical exam documented below.     Ddx: metastatic disease, colitis, kidney stone, abdominal hernia, UTI, or other concerning abdominal pathology.     Labs, US and CT AP ordered. Patient given Benadryl and Solumedrol in preparation for CT with hx of contrast allergy. Pain controlled with Fentanyl, given fluids, and Zofran. Workup significant for leukocytosis, elevated Alk Phos. Otherwise patient afebrile and hemodynamically stable.    CT and US findings reviewed and discussed at length with patient and , concerning for diffuse metastatic disease, including uterine which is likely responsible for vaginal spotting. In context of metastasis, leukocytosis likely due to metastatic process as opposed to acute infection. Patient offered admission for pain control and possible expedited oncology appointment, but patient prefers discharge and already has an appointment scheduled with her oncologist. Patient was provided with Roxicodone at discharge.    A paper prescription for a short course of Roxicodone was provided to the patient. Unable to E-transcribe due to transmission network issues.    I reviewed all testing with the patient:   I gave oral return precautions for what to return for in addition to the  written return precautions.   The patient and  verbalized understanding of the discharge instructions and warnings that would necessitate return to the Emergency Department.  I specifically highlighted areas of special concern regarding the written and verbal discharge instructions and return precautions.    All questions were answered prior to discharge.      Amount and/or Complexity of Data Reviewed  Labs: ordered.  Radiology: ordered.    Risk  Prescription drug management.        ED Course as of 03/05/25 1244   Tue Mar 04, 2025   2208 IMPRESSION:     Endometrium is homogeneous but thickened for postmenopausal woman. No focal abnormality. Consider tissue sampling to exclude coexisting endometrial carcinoma.     Lobulated soft tissue mass posterior to the uterus compatible with tumor. The extent of tumor was better shown on the CT scan from earlier today.         Medications   diphenhydrAMINE (BENADRYL) injection 25 mg (25 mg Intravenous Given 3/4/25 1856)   methylPREDNISolone sodium succinate (Solu-MEDROL) injection 80 mg (80 mg Intravenous Given 3/4/25 1856)   ondansetron (ZOFRAN) injection 4 mg (4 mg Intravenous Given 3/4/25 1931)   sodium chloride 0.9 % bolus 1,000 mL (0 mL Intravenous Stopped 3/4/25 2314)   iohexol (OMNIPAQUE) 350 MG/ML injection (MULTI-DOSE) 100 mL (100 mL Intravenous Given 3/4/25 1925)   fentaNYL injection 25 mcg (25 mcg Intravenous Given 3/4/25 2022)   oxyCODONE (ROXICODONE) IR tablet 5 mg (5 mg Oral Given 3/4/25 2306)       ED Risk Strat Scores                            SBIRT 20yo+      Flowsheet Row Most Recent Value   Initial Alcohol Screen: US AUDIT-C     1. How often do you have a drink containing alcohol? 0 Filed at: 03/04/2025 2105   2. How many drinks containing alcohol do you have on a typical day you are drinking?  0 Filed at: 03/04/2025 2105   3a. Male UNDER 65: How often do you have five or more drinks on one occasion? 0 Filed at: 03/04/2025 2105   3b. FEMALE Any Age, or  MALE 65+: How often do you have 4 or more drinks on one occassion? 0 Filed at: 03/04/2025 2105   Audit-C Score 0 Filed at: 03/04/2025 2105   NADEEN: How many times in the past year have you...    Used an illegal drug or used a prescription medication for non-medical reasons? Never Filed at: 03/04/2025 2105                            History of Present Illness       Chief Complaint   Patient presents with    Abdominal Pain     States vaginal spotting and abd pain x 1 week. Denies nausea  has COPD, wears 3 L o2 baseline. Stage 3 colon ca        Past Medical History:   Diagnosis Date    Acute metabolic encephalopathy     Anemia 3 2024    Asthma     Cancer (HCC) 3\2024    CHF (congestive heart failure) (HCC)     Chronic kidney disease     COPD (chronic obstructive pulmonary disease) (HCC)     Coronary artery disease 11 6 2010    Hyperlipidemia     Hypertension     Liver disease     Myocardial infarction (HCC)     Seizures (HCC)     Sleep apnea     Transaminitis 3/12/2024      Past Surgical History:   Procedure Laterality Date    APPENDECTOMY      CARDIAC CATHETERIZATION      GANGLION CYST EXCISION      INCONTINENCE SURGERY      IR PORT PLACEMENT  05/03/2024    IR PORT PLACEMENT  6/24/2024    IR PORT REMOVAL  05/16/2024    LAPAROTOMY N/A 03/21/2024    Procedure: LAPAROTOMY EXPLORATORY, washout, complex closure, LISSY drain;  Surgeon: Francine Reid MD;  Location: [facility];  Service: General    NC LAPS ABD PRTM&OMENTUM DX W/WO SPEC BR/WA SPX N/A 03/19/2024    Procedure: DIAGNOSTIC LAPAROSCOPY converted to Exploratory Laparotomy, Right Hemicolectomy, Abdominal wash-out, Abthera Placement;  Surgeon: Roger Joel DO;  Location: [facility];  Service: General    TYMPANOSTOMY TUBE PLACEMENT        Family History   Problem Relation Age of Onset    Hypertension Mother     Cancer Father     Cancer Maternal Grandmother     Cancer Maternal Aunt     Cancer Sister     Diabetes Brother     Hypertension Brother     Kidney  disease Maternal Uncle         kidney cancer      Social History     Tobacco Use    Smoking status: Every Day     Types: Cigarettes     Passive exposure: Current    Smokeless tobacco: Never   Vaping Use    Vaping status: Never Used   Substance Use Topics    Alcohol use: Yes     Comment: rare    Drug use: Never      E-Cigarette/Vaping    E-Cigarette Use Never User       E-Cigarette/Vaping Substances    Nicotine No     THC No     CBD No     Flavoring No     Other No     Unknown No       I have reviewed and agree with the history as documented.     HPI    Patient is a 64 year old female with PMHx colon CA s/p partial hemicolectomy in March 2024, presenting to the ED for evaluation of 1 week history of intermittent vaginal spotting and increasing lower abdominal pain over the past few months. Patient states that she feels increased pain in the LLQ and sometimes the pain radiates to the L flank. Over the last several days, she has had a few episodes of lower abdominal cramping and vaginal spotting without any heavy flow or passage of clots. Denies any rectal pain, rectal bleeding, hematuria. Patient states that she was due to have a follow up CT scan several months ago, ordered by her oncologist but patient was hospitalized and never had the chance to complete the scan.      Patient denies any fevers, chills, cough, congestion. Wears 3L O2 chronically at baseline. Denies any chest pain or increased shortness of breath from baseline. Denies urinary complaints.    Review of Systems   Constitutional:  Positive for fatigue.   Respiratory:  Positive for shortness of breath (chronic O2 use).    Gastrointestinal:  Positive for abdominal pain.   Musculoskeletal:  Positive for arthralgias and myalgias.           Objective       ED Triage Vitals   Temperature Pulse Blood Pressure Respirations SpO2 Patient Position - Orthostatic VS   03/04/25 1648 03/04/25 1648 03/04/25 1648 03/04/25 1648 03/04/25 1648 03/04/25 1648   98 °F (36.7  °C) 75 142/71 18 95 % Sitting      Temp Source Heart Rate Source BP Location FiO2 (%) Pain Score    03/04/25 1648 03/04/25 1648 03/04/25 1648 -- 03/04/25 2022    Temporal Monitor Left arm  9      Vitals      Date and Time Temp Pulse SpO2 Resp BP Pain Score FACES Pain Rating User   03/04/25 2306 -- -- -- -- -- 10 - Worst Possible Pain -- VB   03/04/25 2200 -- 81 96 % -- 152/69 -- -- VB   03/04/25 2130 -- 78 94 % -- -- -- -- VB   03/04/25 2022 -- -- -- -- -- 9 --    03/04/25 2000 -- 73 96 % 20 189/101 -- --    03/04/25 1900 -- 63 98 % 20 195/86 -- -- VB   03/04/25 1648 98 °F (36.7 °C) 75 95 % 18 142/71 -- -- RO            Physical Exam  Vitals and nursing note reviewed.   Constitutional:       General: She is not in acute distress.     Appearance: She is well-developed. She is obese. She is not ill-appearing, toxic-appearing or diaphoretic.   HENT:      Head: Normocephalic and atraumatic.      Mouth/Throat:      Mouth: Mucous membranes are moist.      Pharynx: Oropharynx is clear.   Eyes:      Conjunctiva/sclera: Conjunctivae normal.   Cardiovascular:      Rate and Rhythm: Normal rate and regular rhythm.      Heart sounds: Normal heart sounds. No murmur heard.  Pulmonary:      Effort: Pulmonary effort is normal. No respiratory distress.      Breath sounds: Normal breath sounds. No wheezing, rhonchi or rales.   Chest:      Chest wall: No tenderness.   Abdominal:      Palpations: Abdomen is soft.      Tenderness: There is abdominal tenderness in the right lower quadrant, suprapubic area, left upper quadrant and left lower quadrant. There is no guarding or rebound.      Hernia: No hernia is present.   Genitourinary:     Comments: Deferred by patient     Musculoskeletal:         General: No swelling.      Cervical back: Neck supple.   Skin:     General: Skin is warm and dry.      Capillary Refill: Capillary refill takes less than 2 seconds.      Coloration: Skin is not pale.   Neurological:      General: No focal  deficit present.      Mental Status: She is alert and oriented to person, place, and time.   Psychiatric:         Mood and Affect: Mood normal.         Results Reviewed       Procedure Component Value Units Date/Time    Urine Microscopic [[id number]]  (Abnormal) Collected: 03/04/25 1856    Lab Status: Final result Specimen: Urine, Clean Catch Updated: 03/04/25 1918     RBC, UA 1-2 /hpf      WBC, UA 2-4 /hpf      Epithelial Cells Occasional /hpf      Bacteria, UA Occasional /hpf      MUCUS THREADS Occasional    UA w Reflex to Microscopic w Reflex to Culture [[id number]]  (Abnormal) Collected: 03/04/25 1856    Lab Status: Final result Specimen: Urine, Clean Catch Updated: 03/04/25 1917     Color, UA Yellow     Clarity, UA Clear     Specific Gravity, UA 1.027     pH, UA 5.5     Leukocytes, UA Negative     Nitrite, UA Negative     Protein, UA Trace mg/dl      Glucose, UA Negative mg/dl      Ketones, UA Negative mg/dl      Urobilinogen, UA <2.0 mg/dl      Bilirubin, UA Negative     Occult Blood, UA Moderate    Lactic acid, plasma (w/reflex if result > 2.0) [[id number]]  (Normal) Collected: 03/04/25 1846    Lab Status: Final result Specimen: Blood from Arm, Right Updated: 03/04/25 1910     LACTIC ACID 1.3 mmol/L     Narrative:      Result may be elevated if tourniquet was used during collection.    Comprehensive metabolic panel [[id number]]  (Abnormal) Collected: 03/04/25 1652    Lab Status: Final result Specimen: Blood from Arm, Right Updated: 03/04/25 1734     Sodium 139 mmol/L      Potassium 4.0 mmol/L      Chloride 104 mmol/L      CO2 27 mmol/L      ANION GAP 8 mmol/L      BUN 21 mg/dL      Creatinine 1.08 mg/dL      Glucose 119 mg/dL      Calcium 8.7 mg/dL      AST 16 U/L      ALT 19 U/L      Alkaline Phosphatase 220 U/L      Total Protein 7.3 g/dL      Albumin 3.9 g/dL      Total Bilirubin 0.42 mg/dL      eGFR 55 ml/min/1.73sq m     Narrative:      [employer] guidelines for Chronic Kidney Disease  (CKD):     Stage 1 with normal or high GFR (GFR > 90 mL/min/1.73 square meters)    Stage 2 Mild CKD (GFR = 60-89 mL/min/1.73 square meters)    Stage 3A Moderate CKD (GFR = 45-59 mL/min/1.73 square meters)    Stage 3B Moderate CKD (GFR = 30-44 mL/min/1.73 square meters)    Stage 4 Severe CKD (GFR = 15-29 mL/min/1.73 square meters)    Stage 5 End Stage CKD (GFR <15 mL/min/1.73 square meters)  Note: GFR calculation is accurate only with a steady state creatinine    Lipase [[id number]]  (Normal) Collected: 03/04/25 1652    Lab Status: Final result Specimen: Blood from Arm, Right Updated: 03/04/25 1734     Lipase 18 u/L     CBC and differential [[id number]]  (Abnormal) Collected: 03/04/25 1652    Lab Status: Final result Specimen: Blood from Arm, Right Updated: 03/04/25 1715     WBC 17.23 Thousand/uL      RBC 5.33 Million/uL      Hemoglobin 13.1 g/dL      Hematocrit 44.7 %      MCV 84 fL      MCH 24.6 pg      MCHC 29.3 g/dL      RDW 17.2 %      MPV 8.5 fL      Platelets 340 Thousands/uL      nRBC 0 /100 WBCs      Segmented % 72 %      Immature Grans % 1 %      Lymphocytes % 16 %      Monocytes % 6 %      Eosinophils Relative 4 %      Basophils Relative 1 %      Absolute Neutrophils 12.47 Thousands/µL      Absolute Immature Grans 0.12 Thousand/uL      Absolute Lymphocytes 2.79 Thousands/µL      Absolute Monocytes 1.03 Thousand/µL      Eosinophils Absolute 0.74 Thousand/µL      Basophils Absolute 0.08 Thousands/µL             US pelvis complete w transvaginal   Final Interpretation by Eva Dangelo MD (03/04 2206)      Endometrium is homogeneous but thickened for postmenopausal woman. No focal abnormality. Consider tissue sampling to exclude coexisting endometrial carcinoma.      Lobulated soft tissue mass posterior to the uterus compatible with tumor. The extent of tumor was better shown on the CT scan from earlier today.                           Workstation performed: [id number]         CT abdomen pelvis with contrast    Final Interpretation by Chau Lakhani MD (2034)      1.  Redemonstrated pulmonary nodules and hepatic masses compatible with metastasis.   2.  Interval increase in peritoneal and omental nodularity compatible with carcinomatosis.   3.  Interval increase in size of nodular tract in the left lower quadrant abdominal wall compatible with metastasis.   4.  Mild right hydronephrosis which may indicate distal involvement of the right ureter.      The study was marked in EPIC for immediate notification.         Workstation performed: [id number]             Procedures    ED Medication and Procedure Management   Prior to Admission Medications   Prescriptions Last Dose Informant Patient Reported? Taking?   Trelegy Ellipta 100-62.5-25 MCG/ACT inhaler  Self Yes No   Sig: Inhale 1 puff daily   albuterol (2.5 mg/3 mL) 0.083 % nebulizer solution  Self No No   Sig: Take 3 mL (2.5 mg total) by nebulization every 6 (six) hours as needed for wheezing or shortness of breath   amLODIPine (NORVASC) 5 mg tablet  Self No No   Sig: Take 1 tablet (5 mg total) by mouth daily   aspirin 325 mg tablet  Self Yes No   Sig: Take 1 tablet by mouth daily   dicyclomine (BENTYL) 10 mg capsule  Self No No   Sig: Take 1 capsule (10 mg total) by mouth 3 (three) times a day before meals for 10 days   fluticasone (FLONASE) 50 mcg/act nasal spray  Self No No   Si spray into each nostril 2 (two) times a day   furosemide (LASIX) 20 mg tablet  Self No No   Sig: Take 1 tablet (20 mg total) by mouth daily Do not start before 2024.   hydrOXYzine pamoate (VISTARIL) 25 mg capsule  Self Yes No   Sig: TAKE 1 CAPSULE BY MOUTH NIGHTLY AS NEEDED FOR ITCHING.   lactulose (CHRONULAC) 10 g/15 mL solution  Self No No   Sig: Take 30 mL (20 g total) by mouth daily   lidocaine (LIDODERM) 5 %  Self No No   Sig: Apply 1 patch topically over 12 hours daily Remove & Discard patch within 12 hours or as directed by MD   lidocaine-prilocaine (EMLA) cream  Self  Yes No   Sig: APPLY 1 GRAM TOPICALLY DAILY.   meloxicam (MOBIC) 15 mg tablet  Self Yes No   Sig: Take 15 mg by mouth daily   Patient not taking: Reported on 10/23/2024   ondansetron (ZOFRAN-ODT) 8 mg disintegrating tablet  Self No No   Sig: Take 1 tablet (8 mg total) by mouth every 8 (eight) hours as needed for vomiting or nausea   oxyCODONE (ROXICODONE) 5 immediate release tablet  Self Yes No   Sig: TAKE 1 TABLET (5MG TOTAL) BY MOUTH EVERY 6 HOURS AS NEEDED FOR MODERATE PAIN (4-6) MAX 20MG PER DAY   potassium chloride (Klor-Con M20) 20 mEq tablet  Self No No   Sig: Take 2 tablets (40 mEq total) by mouth 2 (two) times a day for 7 doses   rOPINIRole (REQUIP) 0.5 mg tablet  Self No No   Sig: Take 1 tablet (0.5 mg total) by mouth 3 (three) times a day      Facility-Administered Medications: None     Discharge Medication List as of 3/4/2025 11:02 PM        CONTINUE these medications which have CHANGED    Details   !! oxyCODONE (Roxicodone) 5 immediate release tablet Take 1 tablet (5 mg total) by mouth every 6 (six) hours as needed for moderate pain for up to 10 days Max Daily Amount: 20 mg, Starting Tue 3/4/2025, Until Fri 3/14/2025 at 2359, Print       !! - Potential duplicate medications found. Please discuss with provider.        CONTINUE these medications which have NOT CHANGED    Details   albuterol (2.5 mg/3 mL) 0.083 % nebulizer solution Take 3 mL (2.5 mg total) by nebulization every 6 (six) hours as needed for wheezing or shortness of breath, Starting Tue 5/14/2024, Normal      amLODIPine (NORVASC) 5 mg tablet Take 1 tablet (5 mg total) by mouth daily, Starting Mon 4/22/2024, Normal      aspirin 325 mg tablet Take 1 tablet by mouth daily, Historical Med      dicyclomine (BENTYL) 10 mg capsule Take 1 capsule (10 mg total) by mouth 3 (three) times a day before meals for 10 days, Starting Wed 10/16/2024, Until Sat 10/26/2024, Normal      fluticasone (FLONASE) 50 mcg/act nasal spray 1 spray into each nostril 2 (two)  times a day, Starting Wed 10/16/2024, Until Fri 11/15/2024, Normal      furosemide (LASIX) 20 mg tablet Take 1 tablet (20 mg total) by mouth daily Do not start before October 17, 2024., Starting Thu 10/17/2024, Normal      hydrOXYzine pamoate (VISTARIL) 25 mg capsule TAKE 1 CAPSULE BY MOUTH NIGHTLY AS NEEDED FOR ITCHING., Historical Med      lactulose (CHRONULAC) 10 g/15 mL solution Take 30 mL (20 g total) by mouth daily, Starting Thu 10/17/2024, Normal      lidocaine (LIDODERM) 5 % Apply 1 patch topically over 12 hours daily Remove & Discard patch within 12 hours or as directed by MD, Starting Thu 10/17/2024, Normal      lidocaine-prilocaine (EMLA) cream APPLY 1 GRAM TOPICALLY DAILY., Historical Med      meloxicam (MOBIC) 15 mg tablet Take 15 mg by mouth daily, Starting Tue 10/1/2024, Historical Med      ondansetron (ZOFRAN-ODT) 8 mg disintegrating tablet Take 1 tablet (8 mg total) by mouth every 8 (eight) hours as needed for vomiting or nausea, Starting Wed 4/24/2024, Normal      !! oxyCODONE (ROXICODONE) 5 immediate release tablet TAKE 1 TABLET (5MG TOTAL) BY MOUTH EVERY 6 HOURS AS NEEDED FOR MODERATE PAIN (4-6) MAX 20MG PER DAY, Historical Med      potassium chloride (Klor-Con M20) 20 mEq tablet Take 2 tablets (40 mEq total) by mouth 2 (two) times a day for 7 doses, Starting Wed 10/16/2024, Until Wed 10/23/2024, Normal      rOPINIRole (REQUIP) 0.5 mg tablet Take 1 tablet (0.5 mg total) by mouth 3 (three) times a day, Starting Tue 3/19/2024, Normal      Trelegy Ellipta 100-62.5-25 MCG/ACT inhaler Inhale 1 puff daily, Starting Tue 10/1/2024, Historical Med       !! - Potential duplicate medications found. Please discuss with provider.        No discharge procedures on file.  ED SEPSIS DOCUMENTATION   Time reflects when diagnosis was documented in both MDM as applicable and the Disposition within this note       Time User Action Codes Description Comment    3/4/2025 10:54 PM Roger Mccormack Add [C79.9] Metastatic  cancer (HCC)                  Roger Mccormack,   03/05/25 1248

## 2025-03-10 ENCOUNTER — HOSPITAL ENCOUNTER (EMERGENCY)
Facility: HOSPITAL | Age: 65
Discharge: HOME | End: 2025-03-10
Attending: EMERGENCY MEDICINE
Payer: COMMERCIAL

## 2025-03-10 VITALS
HEART RATE: 74 BPM | RESPIRATION RATE: 18 BRPM | SYSTOLIC BLOOD PRESSURE: 222 MMHG | WEIGHT: 236 LBS | BODY MASS INDEX: 46.33 KG/M2 | TEMPERATURE: 98.5 F | DIASTOLIC BLOOD PRESSURE: 99 MMHG | HEIGHT: 60 IN | OXYGEN SATURATION: 96 %

## 2025-03-10 DIAGNOSIS — G89.3 CANCER ASSOCIATED PAIN: Primary | ICD-10-CM

## 2025-03-10 PROCEDURE — 99285 EMERGENCY DEPT VISIT HI MDM: CPT | Performed by: EMERGENCY MEDICINE

## 2025-03-10 PROCEDURE — 96372 THER/PROPH/DIAG INJ SC/IM: CPT

## 2025-03-10 PROCEDURE — 99284 EMERGENCY DEPT VISIT MOD MDM: CPT

## 2025-03-10 RX ORDER — HYDROMORPHONE HCL/PF 1 MG/ML
1 SYRINGE (ML) INJECTION ONCE
Status: COMPLETED | OUTPATIENT
Start: 2025-03-10 | End: 2025-03-10

## 2025-03-10 RX ORDER — OXYCODONE HYDROCHLORIDE 5 MG/1
5 TABLET ORAL ONCE
Refills: 0 | Status: COMPLETED | OUTPATIENT
Start: 2025-03-10 | End: 2025-03-10

## 2025-03-10 RX ORDER — OXYCODONE HYDROCHLORIDE 5 MG/1
5 TABLET ORAL EVERY 6 HOURS PRN
Qty: 20 TABLET | Refills: 0 | Status: SHIPPED | OUTPATIENT
Start: 2025-03-10 | End: 2025-03-14

## 2025-03-10 RX ADMIN — HYDROMORPHONE HYDROCHLORIDE 1 MG: 1 INJECTION, SOLUTION INTRAMUSCULAR; INTRAVENOUS; SUBCUTANEOUS at 19:29

## 2025-03-10 RX ADMIN — OXYCODONE HYDROCHLORIDE 5 MG: 5 TABLET ORAL at 20:26

## 2025-03-10 NOTE — ED PROVIDER NOTES
Time reflects when diagnosis was documented in both MDM as applicable and the Disposition within this note       Time User Action Codes Description Comment    3/10/2025  7:57 PM Delvis Zeng Add [G89.3] Cancer associated pain           ED Disposition       ED Disposition   Discharge    Condition   Stable    Date/Time   Mon Mar 10, 2025  7:57 PM    Comment   Dwainedyana Gould discharge to home/self care.                   Assessment & Plan       Medical Decision Making  64-year-old female, history of metastatic cancer, presenting with pain.  Patient was taking pain medication at home which she has ran out of.  Has an appointment later this week with palliative care.  Patient was recently seen in ED and had extensive evaluation including imaging.  Patient given IM dose of Dilaudid for acute pain, reports improvement afterwards.  Will represcribe oxycodone to use until her appointment with palliative care later this week, patient comfortable going home.    Patient will be unable to get medication due to pharmacy closing tonight, will give dose of oxycodone in ED prior to discharge.    Amount and/or Complexity of Data Reviewed  External Data Reviewed: radiology and notes.    Risk  Prescription drug management.             Medications   oxyCODONE (ROXICODONE) IR tablet 5 mg (has no administration in time range)   HYDROmorphone (DILAUDID) injection 1 mg (1 mg Intramuscular Given 3/10/25 1929)       ED Risk Strat Scores                                                History of Present Illness       Chief Complaint   Patient presents with    Abdominal Pain     Pt arrives c/o LLQ abdominal, uterine and back pain. Pt reports having colon cancer which has since mets.        Past Medical History:   Diagnosis Date    Acute metabolic encephalopathy     Anemia 3 2024    Asthma     Cancer (HCC) 3\2024    CHF (congestive heart failure) (HCC)     Chronic kidney disease     COPD (chronic obstructive pulmonary disease) (HCC)     Coronary  artery disease 11 6 2010    Hyperlipidemia     Hypertension     Liver disease     Myocardial infarction (HCC)     Seizures (HCC)     Sleep apnea     Transaminitis 3/12/2024      Past Surgical History:   Procedure Laterality Date    APPENDECTOMY      CARDIAC CATHETERIZATION      GANGLION CYST EXCISION      INCONTINENCE SURGERY      IR PORT PLACEMENT  05/03/2024    IR PORT PLACEMENT  6/24/2024    IR PORT REMOVAL  05/16/2024    LAPAROTOMY N/A 03/21/2024    Procedure: LAPAROTOMY EXPLORATORY, washout, complex closure, LISSY drain;  Surgeon: Francine Reid MD;  Location: [facility];  Service: General    WA LAPS ABD PRTM&OMENTUM DX W/WO SPEC BR/WA SPX N/A 03/19/2024    Procedure: DIAGNOSTIC LAPAROSCOPY converted to Exploratory Laparotomy, Right Hemicolectomy, Abdominal wash-out, Abthera Placement;  Surgeon: Roger Joel DO;  Location: [facility];  Service: General    TYMPANOSTOMY TUBE PLACEMENT        Family History   Problem Relation Age of Onset    Hypertension Mother     Cancer Father     Cancer Maternal Grandmother     Cancer Maternal Aunt     Cancer Sister     Diabetes Brother     Hypertension Brother     Kidney disease Maternal Uncle         kidney cancer      Social History     Tobacco Use    Smoking status: Every Day     Types: Cigarettes     Passive exposure: Current    Smokeless tobacco: Never   Vaping Use    Vaping status: Never Used   Substance Use Topics    Alcohol use: Yes     Comment: rare    Drug use: Never      E-Cigarette/Vaping    E-Cigarette Use Never User       E-Cigarette/Vaping Substances    Nicotine No     THC No     CBD No     Flavoring No     Other No     Unknown No       I have reviewed and agree with the history as documented.     64-year-old female, history of metastatic cancer, presenting with pain.  Patient was recently evaluated in ED, states she has appointment with palliative care at the end of this week.  Was taking oxycodone at home for pain which helps, states that she is  currently out.  Patient on baseline nasal cannula oxygen.      History provided by:  Patient   used: No    Abdominal Pain  Associated symptoms: no fever        Review of Systems   Constitutional:  Negative for fever.   Gastrointestinal:  Positive for abdominal pain.           Objective       ED Triage Vitals   Temperature Pulse Blood Pressure Respirations SpO2 Patient Position - Orthostatic VS   03/10/25 1736 03/10/25 1736 03/10/25 1736 03/10/25 1736 03/10/25 1736 03/10/25 1736   98.5 °F (36.9 °C) 74 (!) 222/99 18 96 % Sitting      Temp Source Heart Rate Source BP Location FiO2 (%) Pain Score    03/10/25 1736 03/10/25 1736 03/10/25 1736 -- 03/10/25 1929    Temporal Monitor Left arm  9      Vitals      Date and Time Temp Pulse SpO2 Resp BP Pain Score FACES Pain Rating User   03/10/25 1929 -- -- -- -- -- 9 -- NW   03/10/25 1736 98.5 °F (36.9 °C) 74 96 % 18 222/99 -- -- [place]            Physical Exam  Vitals and nursing note reviewed.   Constitutional:       General: She is not in acute distress.  Cardiovascular:      Rate and Rhythm: Normal rate and regular rhythm.   Pulmonary:      Effort: Pulmonary effort is normal.      Breath sounds: Normal breath sounds.   Abdominal:      Comments: Mildly distended, soft  Left lower quadrant tenderness   Neurological:      General: No focal deficit present.      Mental Status: She is alert and oriented to person, place, and time.         Results Reviewed       None            No orders to display       Procedures    ED Medication and Procedure Management   Prior to Admission Medications   Prescriptions Last Dose Informant Patient Reported? Taking?   Trelegy Ellipta 100-62.5-25 MCG/ACT inhaler  Self Yes No   Sig: Inhale 1 puff daily   albuterol (2.5 mg/3 mL) 0.083 % nebulizer solution  Self No No   Sig: Take 3 mL (2.5 mg total) by nebulization every 6 (six) hours as needed for wheezing or shortness of breath   amLODIPine (NORVASC) 5 mg tablet  Self No No   Sig:  Take 1 tablet (5 mg total) by mouth daily   aspirin 325 mg tablet  Self Yes No   Sig: Take 1 tablet by mouth daily   dicyclomine (BENTYL) 10 mg capsule  Self No No   Sig: Take 1 capsule (10 mg total) by mouth 3 (three) times a day before meals for 10 days   fluticasone (FLONASE) 50 mcg/act nasal spray  Self No No   Si spray into each nostril 2 (two) times a day   furosemide (LASIX) 20 mg tablet  Self No No   Sig: Take 1 tablet (20 mg total) by mouth daily Do not start before 2024.   hydrOXYzine pamoate (VISTARIL) 25 mg capsule  Self Yes No   Sig: TAKE 1 CAPSULE BY MOUTH NIGHTLY AS NEEDED FOR ITCHING.   lactulose (CHRONULAC) 10 g/15 mL solution  Self No No   Sig: Take 30 mL (20 g total) by mouth daily   lidocaine (LIDODERM) 5 %  Self No No   Sig: Apply 1 patch topically over 12 hours daily Remove & Discard patch within 12 hours or as directed by MD   lidocaine-prilocaine (EMLA) cream  Self Yes No   Sig: APPLY 1 GRAM TOPICALLY DAILY.   meloxicam (MOBIC) 15 mg tablet  Self Yes No   Sig: Take 15 mg by mouth daily   Patient not taking: Reported on 10/23/2024   ondansetron (ZOFRAN-ODT) 8 mg disintegrating tablet  Self No No   Sig: Take 1 tablet (8 mg total) by mouth every 8 (eight) hours as needed for vomiting or nausea   oxyCODONE (ROXICODONE) 5 immediate release tablet  Self Yes No   Sig: TAKE 1 TABLET (5MG TOTAL) BY MOUTH EVERY 6 HOURS AS NEEDED FOR MODERATE PAIN (4-6) MAX 20MG PER DAY   oxyCODONE (Roxicodone) 5 immediate release tablet   No No   Sig: Take 1 tablet (5 mg total) by mouth every 6 (six) hours as needed for moderate pain for up to 10 days Max Daily Amount: 20 mg   potassium chloride (Klor-Con M20) 20 mEq tablet  Self No No   Sig: Take 2 tablets (40 mEq total) by mouth 2 (two) times a day for 7 doses   rOPINIRole (REQUIP) 0.5 mg tablet  Self No No   Sig: Take 1 tablet (0.5 mg total) by mouth 3 (three) times a day      Facility-Administered Medications: None     Patient's Medications    Discharge Prescriptions    OXYCODONE (ROXICODONE) 5 IMMEDIATE RELEASE TABLET    Take 1 tablet (5 mg total) by mouth every 6 (six) hours as needed for severe pain for up to 10 days Max Daily Amount: 20 mg       Start Date: 3/10/2025 End Date: 3/20/2025       Order Dose: 5 mg       Quantity: 20 tablet    Refills: 0     No discharge procedures on file.  ED SEPSIS DOCUMENTATION   Time reflects when diagnosis was documented in both MDM as applicable and the Disposition within this note       Time User Action Codes Description Comment    3/10/2025  7:57 PM Delvis Zeng Add [G89.3] Cancer associated pain                  Delvis Zeng MD  03/10/25 2006

## 2025-03-11 NOTE — DISCHARGE INSTRUCTIONS
"Patient Education     Chronic pain   The Basics   Written by the doctors and editors at [facility]   What is chronic pain? -- This is pain that lasts longer than 3 to 6 months. In many cases, this means that pain continues even after the injury or condition that first caused it has healed.  Pain can affect the body in different ways. For example, pain can be:   An ache deep inside the muscle or bone   Stabbing or shooting, often with tingling or numbness   Dull and throbbing  People who have chronic pain might have a hard time doing their usual activities, such as bathing or dressing. This can lead to depression and anxiety. It can also cause problems with sleep.  What causes chronic pain? -- It is not always clear. Sometimes, it is caused by an ongoing medical problem, such as arthritis or nerve damage from diabetes. But doctors cannot always find the cause of chronic pain.  In some cases, people with chronic pain must accept that their pain will never be explained. This means that they have to work with their health care team to address the pain, even if they don't know its cause.  Will I need tests? -- Your doctor might do tests to figure out the cause of your pain. For example, you might get:   Blood tests - These can check for infection, signs of inflammation, or diseases that can cause pain.   X-rays or other imaging tests - Imaging tests create pictures of the inside of the body. They can check for bone fractures, joint damage, cancer, or other changes in your body that could cause pain.   Nerve tests - These are ways to check whether the nerves are working normally.  However, tests cannot always show the cause of pain. Scientists think that in some people, the pain signals in the brain stop working normally. The signals get \"stuck\" in the on position, even when the source of pain is gone.  How is chronic pain treated? -- There are many different ways to manage chronic pain. Most people need to try different " combinations. The right approach for you depends on your pain as well as your lifestyle and preferences.  Treatment options include:   Non-medicine techniques - Doctors recommend first trying to manage pain without medicines. This can involve lots of different things, such as:   Working with a counselor or therapist   Physical therapy or an exercise program   Lifestyle and behavior changes to improve your mood, sleep, diet, and stress level   Acupuncture   Massage   Ice or heat   Devices that affect nerve signals   Medicines - When the above methods are not enough to relieve pain, doctors can recommend medicines. Different medicines work in different ways. Some pain medicines, like opioids, are not recommended for treating chronic pain in most cases. That's because they come with serious risks. Doctors usually try other medicines first.  If your doctor suggests a medicine that seems strange, keep an open mind. Sometimes, doctors treat pain with medicines made to treat other medical problems. For example, medicines for depression can help with pain, because they work on areas of the brain that process pain. Doctors can also use medicines for seizures to treat pain, because they help with overactive nerves.   Other treatments - These might include:   Injections (shots) of numbing or pain-relieving medicines   Surgery  To find the best treatment for you:   Be open to trying new treatments and combinations of treatments. Sometimes, you have to try a few different options before you find one that works best.   Set realistic goals for your treatment. Even if you can't completely get rid of your pain, you might be able to control it enough so you can do the activities you like.  Is there anything I can do on my own to feel better? -- Yes. It can help to:   Try things like heat, cold, or massage - Depending on the cause of your pain, these things might help. Check with your doctor to make sure that it is OK for your  condition.   Practice relaxing - You can learn methods to relax your body, such as doing deep breathing exercises. Ask your doctor or nurse about these methods. Relaxing the mind can help with how the body feels pain. People can learn to quiet their pain or make it less bothersome. It can also help to make changes to improve your sleep habits.   Stay as active as possible - Walking, swimming, francie chi (a kind of martial art), or biking can all help ease muscle and joint pain. Even gentle forms of exercise are good for your health. If you are not active, your pain might get worse.  If you haven't been active for a while, start slowly. Make small increases in the intensity and amount of time you spend exercising. If exercising makes your pain worse, talk with your doctor. They might recommend a program that can help you get more active.  What medical care will I need? -- Many people need a team to help manage their care. A treatment team usually includes:   Doctors or specialists   A physical therapist   Someone trained in mental health, like a  or counselor  Your team will probably want to see you regularly:   They will ask you questions about your pain and other symptoms. They will also work with you to learn how treatment is working and make changes if needed.   They will ask you about your mood and your mental health. Depression and chronic pain can make each other worse. If you have depression, they might suggest treatment for it.  As you get better at managing your pain, you might see your team less frequently.  All topics are updated as new evidence becomes available and our peer review process is complete.  This topic retrieved from [employer] on: Mar 29, 2024.  Topic 00941 Version 18.0  Release: 32.2.4 - C32.87  © 2024 Up[employer]Date, Inc. and/or its affiliates. All rights reserved.  Consumer Information Use and Disclaimer   Disclaimer: This generalized information is a limited summary of diagnosis,  treatment, and/or medication information. It is not meant to be comprehensive and should be used as a tool to help the user understand and/or assess potential diagnostic and treatment options. It does NOT include all information about conditions, treatments, medications, side effects, or risks that may apply to a specific patient. It is not intended to be medical advice or a substitute for the medical advice, diagnosis, or treatment of a health care provider based on the health care provider's examination and assessment of a patient's specific and unique circumstances. Patients must speak with a health care provider for complete information about their health, medical questions, and treatment options, including any risks or benefits regarding use of medications. This information does not endorse any treatments or medications as safe, effective, or approved for treating a specific patient. Up[employer], Inc[employer] and its affiliates disclaim any warranty or liability relating to this information or the use thereof.The use of this information is governed by the Terms of Use, available at https://www.woltersPageflakesuwer.com/en/know/clinical-effectiveness-terms. 2024© UpToDate, Inc. and its affiliates and/or licensors. All rights reserved.  Copyright   © 2024 [employer], [employer]. and/or its affiliates. All rights reserved.

## 2025-03-14 ENCOUNTER — TELEPHONE (OUTPATIENT)
Dept: HEMATOLOGY ONCOLOGY | Facility: CLINIC | Age: 65
End: 2025-03-14

## 2025-03-14 ENCOUNTER — OFFICE VISIT (OUTPATIENT)
Dept: HEMATOLOGY ONCOLOGY | Facility: CLINIC | Age: 65
End: 2025-03-14
Payer: COMMERCIAL

## 2025-03-14 VITALS
BODY MASS INDEX: 44.47 KG/M2 | DIASTOLIC BLOOD PRESSURE: 90 MMHG | WEIGHT: 226.5 LBS | SYSTOLIC BLOOD PRESSURE: 150 MMHG | HEART RATE: 84 BPM | RESPIRATION RATE: 18 BRPM | TEMPERATURE: 97.8 F | OXYGEN SATURATION: 98 % | HEIGHT: 60 IN

## 2025-03-14 DIAGNOSIS — J44.9 CHRONIC OBSTRUCTIVE PULMONARY DISEASE, UNSPECIFIED COPD TYPE (HCC): ICD-10-CM

## 2025-03-14 DIAGNOSIS — C18.9 ADENOCARCINOMA, COLON (HCC): Primary | ICD-10-CM

## 2025-03-14 DIAGNOSIS — I10 ESSENTIAL HYPERTENSION: ICD-10-CM

## 2025-03-14 DIAGNOSIS — N18.31 STAGE 3A CHRONIC KIDNEY DISEASE (HCC): ICD-10-CM

## 2025-03-14 PROCEDURE — 99215 OFFICE O/P EST HI 40 MIN: CPT | Performed by: INTERNAL MEDICINE

## 2025-03-14 RX ORDER — OXYCODONE HYDROCHLORIDE 10 MG/1
TABLET ORAL EVERY 4 HOURS PRN
Refills: 0 | Status: CANCELLED | OUTPATIENT
Start: 2025-03-14

## 2025-03-14 NOTE — ASSESSMENT & PLAN NOTE
Lab Results   Component Value Date    EGFR 55 03/04/2025    EGFR 58 10/21/2024    EGFR 54 10/16/2024    CREATININE 1.08 03/04/2025    CREATININE 1.04 10/21/2024    CREATININE 1.09 10/16/2024   Follows with nephrology.

## 2025-03-14 NOTE — PROGRESS NOTES
Name: Dwaine Gould      : 1960      MRN: [id number]  Encounter Provider: Danii Espinoza MD  Encounter Date: 3/14/2025   Encounter department: [facility] [place]  :  Assessment & Plan  Adenocarcinoma, colon (HCC)  Patient now with widely metastatic disease.  Patient unsure if she wants to proceed with hemotherapy as she states chemotherapy previously made her extremely sick and she lost track of time.  She is requesting pain medications.  Discussed that I will be placing palliative referral to address her oncology related symptoms..  In the meantime I offered to give her enough pain medications to get her to the appointment with palliative that is not scheduled on 3/19/2025 however review of the PDMP revealed that patient had 60 pills of Percocet prescribed by her PCP on March 3 after which she presented to the ER twice and received oxycodone tablets (10 on the first visit, 20 on the next visit).  She should have enough medication to get her through to the appointment with palliative.  She will discuss with her family and let me know if she wants to proceed with systemic treatment.    If she opts for systemic treatment we will treat her with FOLFIRI.  Will hold off on bevacizumab given active vaginal bleeding.  Also discussed that we will need tissue sampling from the uterus in order to make sure she does not have a second primary cancer.  She does have HER2 amplification however Trastuzumab + tucatinib is FDA-approved for HER2-positive, FRANCISCO-wild-type advanced or metastatic CRC patients based on the MOUNTAINEER trial (Judith et al., ). FRANCISCO-mutated patients were not enrolled in the study, therefore, the utility of trastuzumab + tucatinib is uncertain for this patient    Orders:    Ambulatory Referral to Palliative Care; Future    Essential hypertension  Management as per nephrology and PCP.         Chronic obstructive pulmonary disease, unspecified COPD type  (HCC)  Continue supplemental oxygen.  Management as per PCP.         Stage 3a chronic kidney disease (HCC)  Lab Results   Component Value Date    EGFR 55 03/04/2025    EGFR 58 10/21/2024    EGFR 54 10/16/2024    CREATININE 1.08 03/04/2025    CREATININE 1.04 10/21/2024    CREATININE 1.09 10/16/2024   Follows with nephrology.             Return in about 1 week (around 3/21/2025) for Office Visit.    History of Present Illness   Chief Complaint   Patient presents with    Follow-up   64 y.o. c PMHx notable for HLP, HTN, HFpEF, CAD was seen in consultation initially on 04/24/2024 for advanced colon adenocarcinoma     Interval History:  Patient was lost to follow-up since her admission in October 2024.  States she was in [place] with her daughter.  Presents today with significant pain and asking for pain medications.  Reports vaginal bleeding.  Presented to the ER twice with pain.  Oncology History   Cancer Staging   Adenocarcinoma, colon (HCC)  Staging form: Colon and Rectum, AJCC 8th Edition  - Pathologic: pT4a, pN2b - Unsigned  Histologic grading system: 4 grade system  Histologic grade (G): G2  Residual tumor (R): R0 - None  Oncology History   Adenocarcinoma, colon (HCC)   4/16/2024 Initial Diagnosis    Adenocarcinoma, colon (HCC)     6/12/2024 - 10/2/2024 Chemotherapy    alteplase (CATHFLO), 2 mg, Intracatheter, Every 1 Minute as needed, 5 of 12 cycles  fluorouracil (ADRUCIL), 400 mg/m2 = 805 mg, Intravenous, Once, 5 of 12 cycles  Administration: 790 mg (6/12/2024), 790 mg (6/26/2024), 800 mg (7/16/2024), 795 mg (8/20/2024), 805 mg (10/2/2024)  leucovorin calcium IVPB, 400 mg/m2 = 804 mg, Intravenous, Once, 5 of 12 cycles  Administration: 792 mg (6/12/2024), 792 mg (6/26/2024), 800 mg (7/16/2024), 796 mg (8/20/2024), 804 mg (10/2/2024)  oxaliplatin (ELOXATIN) chemo infusion, 85 mg/m2 = 170.85 mg, Intravenous, Once, 5 of 12 cycles  Administration: 168.3 mg (6/12/2024), 168.3 mg (6/26/2024), 170 mg (7/16/2024), 169.15  mg (8/20/2024), 170.85 mg (10/2/2024)  fluorouracil (ADRUCIL) ambulatory infusion Soln, 1,200 mg/m2/day = 4,825 mg, Intravenous, Over 46 hours, 5 of 12 cycles        Pertinent Medical History   03/14/25: reviewed     Review of Systems  14 point review of systems was negative except that mentioned in HPI.        Objective   /90 (BP Location: Left arm, Patient Position: Sitting, Cuff Size: Adult)   Pulse 84   Temp 97.8 °F (36.6 °C) (Temporal)   Resp 18   Ht 5' (1.524 m)   Wt 103 kg (226 lb 8 oz)   SpO2 98%   PF (!) 3 L/min   BMI 44.24 kg/m²     Pain Screening:  Pain Score: 10-Worst pain ever  ECOG   2  Physical Exam  Vitals and nursing note reviewed.   Constitutional:       General: She is in acute distress (Due to pain).      Appearance: She is ill-appearing.   HENT:      Head: Normocephalic and atraumatic.      Mouth/Throat:      Mouth: Mucous membranes are moist.      Pharynx: Oropharynx is clear.   Eyes:      General: No scleral icterus.     Extraocular Movements: Extraocular movements intact.      Conjunctiva/sclera: Conjunctivae normal.   Cardiovascular:      Rate and Rhythm: Normal rate and regular rhythm.      Pulses: Normal pulses.      Heart sounds: No murmur heard.  Pulmonary:      Effort: Pulmonary effort is normal.      Breath sounds: Normal breath sounds. No wheezing, rhonchi or rales.   Abdominal:      General: Bowel sounds are normal.      Palpations: Abdomen is soft.      Tenderness: There is no abdominal tenderness.   Musculoskeletal:         General: No swelling or tenderness. Normal range of motion.      Cervical back: Neck supple.   Lymphadenopathy:      Cervical: No cervical adenopathy.   Skin:     General: Skin is warm and dry.      Coloration: Skin is not pale.      Findings: No bruising, erythema or rash.   Neurological:      General: No focal deficit present.      Mental Status: She is alert and oriented to person, place, and time.      Motor: No weakness.   Psychiatric:          Mood and Affect: Mood normal.         Behavior: Behavior normal.         Labs: I have reviewed the following labs:  Lab Results   Component Value Date/Time    WBC 17.23 (H) 03/04/2025 04:52 PM    RBC 5.33 (H) 03/04/2025 04:52 PM    Hemoglobin 13.1 03/04/2025 04:52 PM    Hematocrit 44.7 03/04/2025 04:52 PM    MCV 84 03/04/2025 04:52 PM    MCH 24.6 (L) 03/04/2025 04:52 PM    RDW 17.2 (H) 03/04/2025 04:52 PM    Platelets 340 03/04/2025 04:52 PM    Segmented % 72 03/04/2025 04:52 PM    Lymphocytes % 16 03/04/2025 04:52 PM    Monocytes % 6 03/04/2025 04:52 PM    Eosinophils Relative 4 03/04/2025 04:52 PM    Basophils Relative 1 03/04/2025 04:52 PM    Immature Grans % 1 03/04/2025 04:52 PM    Absolute Neutrophils 12.47 (H) 03/04/2025 04:52 PM     Lab Results   Component Value Date/Time    Potassium 4.0 03/04/2025 04:52 PM    Chloride 104 03/04/2025 04:52 PM    CO2 27 03/04/2025 04:52 PM    BUN 21 03/04/2025 04:52 PM    Creatinine 1.08 03/04/2025 04:52 PM    Glucose, Fasting 86 10/21/2024 11:57 AM    Calcium 8.7 03/04/2025 04:52 PM    Corrected Calcium 8.0 (L) 10/10/2024 05:12 AM    AST 16 03/04/2025 04:52 PM    ALT 19 03/04/2025 04:52 PM    Alkaline Phosphatase 220 (H) 03/04/2025 04:52 PM    Total Protein 7.3 03/04/2025 04:52 PM    Albumin 3.9 03/04/2025 04:52 PM    Total Bilirubin 0.42 03/04/2025 04:52 PM    eGFR 55 03/04/2025 04:52 PM             Disclaimer: This document was prepared using [employer] technology. If a word or phrase is confusing, or does not make sense, this is likely due to recognition error which was not discovered during this clinician's review. If you believe an error has occurred, please contact me through [facility] service line for fatoumata?cation.      Follow Up    All questions were answered to the patient's satisfaction during this encounter. The patient knows the contact information for our office and knows to reach out for any relevant concerns related to this encounter. They are to call  for any temperature 100.4 or higher, new symptoms including but not restricted to shaking chills, decreased appetite, nausea, vomiting, diarrhea, increased fatigue, shortness of breath or chest pain, confusion, and not feeling the strength to come to the clinic. For all other listed problems and medical diagnosis in their chart - they are managed by PCP and/or other specialists, which the patient acknowledges.     I spent 46 minutes reviewing the records (labs, clinician notes, outside records, medical history, ordering medicine/tests/procedures, monitoring of anti-neoplastic toxicities, interpreting the imaging/labs previously done) and coordination of care as well as direct time with the patient today, of which greater than 50% of the time was spent in counseling and coordination of care with the patient/family.

## 2025-03-14 NOTE — TELEPHONE ENCOUNTER
MO  Pls schedule for port flushes. Pt has not had this flushed since was on treatments. Went to [place] for some time and is now getting re-established. ty

## 2025-03-14 NOTE — ASSESSMENT & PLAN NOTE
Patient now with widely metastatic disease.  Patient unsure if she wants to proceed with hemotherapy as she states chemotherapy previously made her extremely sick and she lost track of time.  She is requesting pain medications.  Discussed that I will be placing palliative referral to address her oncology related symptoms..  In the meantime I offered to give her enough pain medications to get her to the appointment with palliative that is not scheduled on 3/19/2025 however review of the PDMP revealed that patient had 60 pills of Percocet prescribed by her PCP on March 3 after which she presented to the ER twice and received oxycodone tablets (10 on the first visit, 20 on the next visit).  She should have enough medication to get her through to the appointment with palliative.  She will discuss with her family and let me know if she wants to proceed with systemic treatment.    If she opts for systemic treatment we will treat her with FOLFIRI.  Will hold off on bevacizumab given active vaginal bleeding.  Also discussed that we will need tissue sampling from the uterus in order to make sure she does not have a second primary cancer.  She does have HER2 amplification however Trastuzumab + tucatinib is FDA-approved for HER2-positive, FRANCISCO-wild-type advanced or metastatic CRC patients based on the MOUNTAINEER trial (Judith et al., 2022). FRANCISCO-mutated patients were not enrolled in the study, therefore, the utility of trastuzumab + tucatinib is uncertain for this patient    Orders:    Ambulatory Referral to Palliative Care; Future

## 2025-03-17 ENCOUNTER — TELEPHONE (OUTPATIENT)
Dept: HEMATOLOGY ONCOLOGY | Facility: CLINIC | Age: 65
End: 2025-03-17

## 2025-03-17 ENCOUNTER — TELEPHONE (OUTPATIENT)
Age: 65
End: 2025-03-17

## 2025-03-17 RX ORDER — OXYCODONE AND ACETAMINOPHEN 5; 325 MG/1; MG/1
1 TABLET ORAL EVERY 8 HOURS PRN
COMMUNITY
Start: 2025-03-03 | End: 2025-03-19

## 2025-03-17 RX ORDER — METOPROLOL TARTRATE 25 MG/1
25 TABLET, FILM COATED ORAL 2 TIMES DAILY
COMMUNITY
Start: 2025-02-06 | End: 2025-03-25

## 2025-03-17 RX ORDER — LISINOPRIL 10 MG/1
10 TABLET ORAL DAILY
COMMUNITY
Start: 2025-02-06 | End: 2025-03-25

## 2025-03-17 RX ORDER — OXYCODONE AND ACETAMINOPHEN 5; 325 MG/1; MG/1
TABLET ORAL
COMMUNITY
Start: 2025-03-03 | End: 2025-03-19

## 2025-03-17 NOTE — TELEPHONE ENCOUNTER
Call received from patient. She is calling in and said that she does not want to proceed with chemotherapy. She is asking what should be done next and if she should keep her appointment at the infusion center on Wednesday.

## 2025-03-17 NOTE — TELEPHONE ENCOUNTER
Attempted to call pt regarding the change in her appt due to the Providers' request and no number on file allowed me to leave a message

## 2025-03-19 ENCOUNTER — SOCIAL WORK (OUTPATIENT)
Dept: PALLIATIVE MEDICINE | Facility: CLINIC | Age: 65
End: 2025-03-19
Payer: COMMERCIAL

## 2025-03-19 ENCOUNTER — TELEPHONE (OUTPATIENT)
Age: 65
End: 2025-03-19

## 2025-03-19 ENCOUNTER — HOSPITAL ENCOUNTER (OUTPATIENT)
Dept: INFUSION CENTER | Facility: CLINIC | Age: 65
Discharge: HOME | End: 2025-03-19

## 2025-03-19 ENCOUNTER — CONSULT (OUTPATIENT)
Dept: PALLIATIVE MEDICINE | Facility: CLINIC | Age: 65
End: 2025-03-19
Payer: COMMERCIAL

## 2025-03-19 ENCOUNTER — HOME CARE VISIT (OUTPATIENT)
Dept: HOME HEALTH SERVICES | Facility: HOME HEALTHCARE | Age: 65
End: 2025-03-19
Payer: COMMERCIAL

## 2025-03-19 ENCOUNTER — HOSPICE ADMISSION (OUTPATIENT)
Dept: HOME HOSPICE | Facility: HOSPICE | Age: 65
End: 2025-03-19
Payer: COMMERCIAL

## 2025-03-19 VITALS
TEMPERATURE: 97.2 F | SYSTOLIC BLOOD PRESSURE: 142 MMHG | BODY MASS INDEX: 45.86 KG/M2 | OXYGEN SATURATION: 95 % | HEART RATE: 72 BPM | DIASTOLIC BLOOD PRESSURE: 90 MMHG | HEIGHT: 60 IN | WEIGHT: 233.6 LBS

## 2025-03-19 DIAGNOSIS — J96.12 CHRONIC RESPIRATORY FAILURE WITH HYPOXIA AND HYPERCAPNIA (HCC): ICD-10-CM

## 2025-03-19 DIAGNOSIS — C18.9 ADENOCARCINOMA, COLON (HCC): Primary | ICD-10-CM

## 2025-03-19 DIAGNOSIS — J43.9 EMPHYSEMA LUNG (HCC): ICD-10-CM

## 2025-03-19 DIAGNOSIS — J96.11 CHRONIC RESPIRATORY FAILURE WITH HYPOXIA AND HYPERCAPNIA (HCC): ICD-10-CM

## 2025-03-19 DIAGNOSIS — G89.3 CANCER RELATED PAIN: ICD-10-CM

## 2025-03-19 DIAGNOSIS — Z95.828 PORT-A-CATH IN PLACE: Primary | ICD-10-CM

## 2025-03-19 DIAGNOSIS — Z71.89 COUNSELING AND COORDINATION OF CARE: Primary | ICD-10-CM

## 2025-03-19 DIAGNOSIS — I50.32 CHRONIC HEART FAILURE WITH PRESERVED EJECTION FRACTION (HCC): ICD-10-CM

## 2025-03-19 DIAGNOSIS — Z51.5 PALLIATIVE CARE BY SPECIALIST: ICD-10-CM

## 2025-03-19 DIAGNOSIS — R11.2 NAUSEA & VOMITING: ICD-10-CM

## 2025-03-19 DIAGNOSIS — Z51.5 HOSPICE CARE: ICD-10-CM

## 2025-03-19 DIAGNOSIS — T45.1X5A CHEMOTHERAPY INDUCED NAUSEA AND VOMITING: ICD-10-CM

## 2025-03-19 DIAGNOSIS — R11.2 CHEMOTHERAPY INDUCED NAUSEA AND VOMITING: ICD-10-CM

## 2025-03-19 PROCEDURE — NC001 PR NO CHARGE

## 2025-03-19 PROCEDURE — 99245 OFF/OP CONSLTJ NEW/EST HI 55: CPT | Performed by: STUDENT IN AN ORGANIZED HEALTH CARE EDUCATION/TRAINING PROGRAM

## 2025-03-19 RX ORDER — HYDROMORPHONE HYDROCHLORIDE 2 MG/1
2-4 TABLET ORAL EVERY 4 HOURS PRN
Qty: 48 TABLET | Refills: 0 | Status: SHIPPED | OUTPATIENT
Start: 2025-03-19 | End: 2025-03-24 | Stop reason: SDUPTHER

## 2025-03-19 RX ORDER — ONDANSETRON 8 MG/1
8 TABLET, ORALLY DISINTEGRATING ORAL EVERY 8 HOURS PRN
Start: 2025-03-19 | End: 2025-03-26

## 2025-03-19 RX ORDER — HALOPERIDOL 1 MG/1
1 TABLET ORAL EVERY 6 HOURS PRN
Qty: 30 TABLET | Refills: 0 | Status: SHIPPED | OUTPATIENT
Start: 2025-03-19 | End: 2025-03-26

## 2025-03-19 NOTE — ASSESSMENT & PLAN NOTE
Wt Readings from Last 3 Encounters:   03/19/25 106 kg (233 lb 9.6 oz)   03/14/25 103 kg (226 lb 8 oz)   03/10/25 107 kg (236 lb)       Echocardiogram 10/9/2024 revealed mildly increased LV wall thickness, grade 1 abnormal diastolic dysfunction.

## 2025-03-19 NOTE — PROGRESS NOTES
Palliative Outpatient Assessment of Need    LSW completed an assessment of need which was completed with patient and her spouse in the office.   Pt reports she is finished with treatment.  She reports adverse reactions to chemo and does not want to subject herself or her family to any further interventions    Relationship status:      Duration of relationship: 46  Name of significant other: Rocky  Children and Ages: 4 daughters  Pets: 2 [place]  Other important family information: granchildren and great grandchildren  Living situation (where and whom):  at home with spouse    Patient's primary caregiver:  self & spouse   Any limitations of caregiver: recent stroke, [place]  Environmental concerns or barriers: none   history: none  Employment history/source of income:   Disability:    Concerns regarding literacy: NA  Spirituality/ Cheondoism:    Patient's strengths, social supports, and resources: sense of humor  Cultural information: grew up in [facility] current or previous:   Substance use or history: NA  Sleep: poor  Exercise: limited  Diet/nutrition: mausea  Durable Medical Equipment needs: NA  Transportation:  Financial concerns:  Advanced Directive:   Other medical or social work providers involved: onc SW  Patient/caregiver current level of coping:  pt is coping well/accepting  Understanding:  pt understands her diagnosis  Patient/family concerns and areas of need: ending treatment  Patient's interests: spending time with family    I have spent 45 minutes with Patient and family today in which greater than 50% of this time was spent in counseling/coordination of care.  Referral sent to [facility] hospice.

## 2025-03-19 NOTE — PROGRESS NOTES
Pt to clinic for port flush, offers no complaints today, denies needing any labs drawn at this time, tolerated procedure without complications, aware of next appointment on 5/8/25 at 1330, port de-accessed, avs declined.

## 2025-03-19 NOTE — ASSESSMENT & PLAN NOTE
Oncology History   Adenocarcinoma, colon (HCC)   4/16/2024 Initial Diagnosis    Adenocarcinoma, colon (HCC)     6/12/2024 - 10/2/2024 Chemotherapy    alteplase (CATHFLO), 2 mg, Intracatheter, Every 1 Minute as needed, 5 of 12 cycles  fluorouracil (ADRUCIL), 400 mg/m2 = 805 mg, Intravenous, Once, 5 of 12 cycles  Administration: 790 mg (6/12/2024), 790 mg (6/26/2024), 800 mg (7/16/2024), 795 mg (8/20/2024), 805 mg (10/2/2024)  leucovorin calcium IVPB, 400 mg/m2 = 804 mg, Intravenous, Once, 5 of 12 cycles  Administration: 792 mg (6/12/2024), 792 mg (6/26/2024), 800 mg (7/16/2024), 796 mg (8/20/2024), 804 mg (10/2/2024)  oxaliplatin (ELOXATIN) chemo infusion, 85 mg/m2 = 170.85 mg, Intravenous, Once, 5 of 12 cycles  Administration: 168.3 mg (6/12/2024), 168.3 mg (6/26/2024), 170 mg (7/16/2024), 169.15 mg (8/20/2024), 170.85 mg (10/2/2024)  fluorouracil (ADRUCIL) ambulatory infusion Soln, 1,200 mg/m2/day = 4,825 mg, Intravenous, Over 46 hours, 5 of 12 cycles        Patient deferring all cancer related therapies and wishes to focus on comfort based cares  Orders:    Ambulatory Referral to Palliative Care    Ambulatory Referral to Hospice; Future    haloperidol (HALDOL) 1 mg tablet; Take 1 tablet (1 mg total) by mouth every 6 (six) hours as needed (nausea/vomiting 2nd line; also restlessness/agitation/hiccups)    HYDROmorphone (DILAUDID) 2 mg tablet; Take 1-2 tablets (2-4 mg total) by mouth every 4 (four) hours as needed for moderate pain or severe pain (cancer-related pain) Max Daily Amount: 24 mg

## 2025-03-19 NOTE — PROGRESS NOTES
Name: Dwaine Gould      : 1960      MRN: [id number]  Encounter Provider: Mauro Dodge DO  Encounter Date: 3/19/2025   Encounter department: [facility] PALLIATIVE [facility] [place]  :  Assessment & Plan  Adenocarcinoma, colon (HCC)  Oncology History   Adenocarcinoma, colon (HCC)   2024 Initial Diagnosis    Adenocarcinoma, colon (HCC)     2024 - 10/2/2024 Chemotherapy    alteplase (CATHFLO), 2 mg, Intracatheter, Every 1 Minute as needed, 5 of 12 cycles  fluorouracil (ADRUCIL), 400 mg/m2 = 805 mg, Intravenous, Once, 5 of 12 cycles  Administration: 790 mg (2024), 790 mg (2024), 800 mg (2024), 795 mg (2024), 805 mg (10/2/2024)  leucovorin calcium IVPB, 400 mg/m2 = 804 mg, Intravenous, Once, 5 of 12 cycles  Administration: 792 mg (2024), 792 mg (2024), 800 mg (2024), 796 mg (2024), 804 mg (10/2/2024)  oxaliplatin (ELOXATIN) chemo infusion, 85 mg/m2 = 170.85 mg, Intravenous, Once, 5 of 12 cycles  Administration: 168.3 mg (2024), 168.3 mg (2024), 170 mg (2024), 169.15 mg (2024), 170.85 mg (10/2/2024)  fluorouracil (ADRUCIL) ambulatory infusion Soln, 1,200 mg/m2/day = 4,825 mg, Intravenous, Over 46 hours, 5 of 12 cycles        Patient deferring all cancer related therapies and wishes to focus on comfort based cares  Orders:    Ambulatory Referral to Palliative Care    Ambulatory Referral to Hospice; Future    haloperidol (HALDOL) 1 mg tablet; Take 1 tablet (1 mg total) by mouth every 6 (six) hours as needed (nausea/vomiting 2nd line; also restlessness/agitation/hiccups)    HYDROmorphone (DILAUDID) 2 mg tablet; Take 1-2 tablets (2-4 mg total) by mouth every 4 (four) hours as needed for moderate pain or severe pain (cancer-related pain) Max Daily Amount: 24 mg    Chronic respiratory failure with hypoxia and hypercapnia (HCC)  In setting of emphysema and chronic CHF  2-4L NC O2       Emphysema lung (HCC)  CT chest abdomen pelvis 10/7/2024 revealed  moderate diffuse centrilobular emphysema    No PFT on file       Chronic heart failure with preserved ejection fraction (HCC)  Wt Readings from Last 3 Encounters:   03/19/25 106 kg (233 lb 9.6 oz)   03/14/25 103 kg (226 lb 8 oz)   03/10/25 107 kg (236 lb)       Echocardiogram 10/9/2024 revealed mildly increased LV wall thickness, grade 1 abnormal diastolic dysfunction.       Hospice care  Patient to transition to hospice cares. [place] of choice provided-->decided on [facility]  Orders:    Ambulatory Referral to Hospice; Future    haloperidol (HALDOL) 1 mg tablet; Take 1 tablet (1 mg total) by mouth every 6 (six) hours as needed (nausea/vomiting 2nd line; also restlessness/agitation/hiccups)    HYDROmorphone (DILAUDID) 2 mg tablet; Take 1-2 tablets (2-4 mg total) by mouth every 4 (four) hours as needed for moderate pain or severe pain (cancer-related pain) Max Daily Amount: 24 mg    Palliative care by specialist  Palliative diagnosis:Colon cancer, chronic respiratory failure  PPS: 60%  Prognosis: <6 months    Education/counseling provided on role/purpose of palliative care & hospice; benefits/risks of treatment options by our team reviewed; instructions for management and anticipatory guidance provided; supportive listening and presence provided; provided space for life review and whole person assessment    Communicated and coordinated with Palliative [facility]    Follow-up planning: N/A-->hospice        Cancer related pain  Codeine allergy.  Cannot do morphine.  Not good response from oxycodone with increased nausea and vomiting.  Transition to hydromorphone tablets for pain control until seen by hospice.  Orders:    HYDROmorphone (DILAUDID) 2 mg tablet; Take 1-2 tablets (2-4 mg total) by mouth every 4 (four) hours as needed for moderate pain or severe pain (cancer-related pain) Max Daily Amount: 24 mg    Nausea & vomiting  To prepare for transition of hospice.  Recommended taking Zofran ODT 30 minutes before  pain medication.  Haloperidol second line option as needed.  Orders:    haloperidol (HALDOL) 1 mg tablet; Take 1 tablet (1 mg total) by mouth every 6 (six) hours as needed (nausea/vomiting 2nd line; also restlessness/agitation/hiccups)    Chemotherapy induced nausea and vomiting    Orders:    ondansetron (ZOFRAN-ODT) 8 mg disintegrating tablet; Take 1 tablet (8 mg total) by mouth every 8 (eight) hours as needed for vomiting or nausea Take 30 minutes before pain medication.        Decisional apparatus: Patient is competent on my exam today. If competence is lost, patient's substitute decision maker would default to spouse by [place] Act 169.   Advance Directive / Living Will / POLST: None on file     PDMP Review: I have reviewed the patient's controlled substance dispensing history in the Prescription Drug Monitoring Program in compliance with the [facility] regulations before prescribing any controlled substances.    History of Present Illness   Dwaine Gould is a 64 y.o. female who presents for new patient visit consultation related colon cancer.  Physically she has been feeling a lot of lower abdominal pain 8/10, nauseousness, low appetite, and insomnia.  In the past year, she has had 4 hospitalizations.  Oxycodone medication was only mildly helpful, and unfortunately led to nausea/vomiting after taking.  She is done with cancer related treatments and would like to pursue hospice cares.  Otherwise she is doing well emotionally, socially, and spiritually.    Medical History Reviewed by provider this encounter:  Tobacco  Allergies  Meds  Problems  Med Hx  Surg Hx  Fam Hx     .  Past Medical History   Past Medical History:   Diagnosis Date    Acute metabolic encephalopathy     Anemia 3 2024    Asthma     Cancer (HCC) 3\2024    CHF (congestive heart failure) (HCC)     Chronic kidney disease     Colon cancer (HCC) 03 2024    COPD (chronic obstructive pulmonary disease) (HCC)     Coronary artery disease 11 6 2010     Hyperlipidemia     Hypertension     Liver disease     Myocardial infarction (HCC)     Seizures (HCC)     Sleep apnea     Transaminitis 03/12/2024     Past Surgical History:   Procedure Laterality Date    APPENDECTOMY      CARDIAC CATHETERIZATION      GANGLION CYST EXCISION      INCONTINENCE SURGERY      IR PORT PLACEMENT  05/03/2024    IR PORT PLACEMENT  6/24/2024    IR PORT REMOVAL  05/16/2024    LAPAROTOMY N/A 03/21/2024    Procedure: LAPAROTOMY EXPLORATORY, washout, complex closure, LISSY drain;  Surgeon: Francine Reid MD;  Location: [facility];  Service: General    CO LAPS ABD PRTM&OMENTUM DX W/WO SPEC BR/WA SPX N/A 03/19/2024    Procedure: DIAGNOSTIC LAPAROSCOPY converted to Exploratory Laparotomy, Right Hemicolectomy, Abdominal wash-out, Abthera Placement;  Surgeon: Roger Joel DO;  Location: [facility];  Service: General    TYMPANOSTOMY TUBE PLACEMENT       Family History   Problem Relation Age of Onset    Hypertension Mother     Cancer Father     Cancer Maternal Grandmother     Colon cancer Maternal Grandmother         passed from Colon Cancer    Cancer Maternal Aunt     Cancer Sister     Diabetes Brother     Hypertension Brother     Kidney disease Maternal Uncle         kidney cancer      reports that she quit smoking about a year ago. Her smoking use included cigarettes. She started smoking about 51 years ago. She has a 92 pack-year smoking history. She has been exposed to tobacco smoke. She has never used smokeless tobacco. She reports that she does not currently use alcohol. She reports that she does not currently use drugs after having used the following drugs: Cocaine, Methamphetamines, and Other.  Current Outpatient Medications   Medication Instructions    albuterol 2.5 mg, Nebulization, Every 6 hours PRN    amLODIPine (NORVASC) 5 mg, Oral, Daily    aspirin 325 mg tablet 1 tablet, Daily    dicyclomine (BENTYL) 10 mg, Oral, 3 times daily before meals    fluticasone (FLONASE) 50 mcg/act nasal  spray 1 spray, Nasal, 2 times daily    furosemide (LASIX) 20 mg, Oral, Daily    haloperidol (HALDOL) 1 mg, Oral, Every 6 hours PRN    HYDROmorphone (DILAUDID) 2-4 mg, Oral, Every 4 hours PRN    hydrOXYzine pamoate (VISTARIL) 25 mg capsule TAKE 1 CAPSULE BY MOUTH NIGHTLY AS NEEDED FOR ITCHING.    lactulose (CHRONULAC) 20 g, Oral, Daily    lidocaine (LIDODERM) 5 % 1 patch, Topical, Daily, Remove & Discard patch within 12 hours or as directed by MD    lidocaine-prilocaine (EMLA) cream APPLY 1 GRAM TOPICALLY DAILY.    lisinopril (ZESTRIL) 10 mg, Daily    meloxicam (MOBIC) 15 mg, Daily    metoprolol tartrate (LOPRESSOR) 25 mg, 2 times daily    ondansetron (ZOFRAN-ODT) 8 mg, Oral, Every 8 hours PRN, Take 30 minutes before pain medication.    potassium chloride (Klor-Con M20) 20 mEq tablet 40 mEq, Oral, 2 times daily    rOPINIRole (REQUIP) 0.5 mg, Oral, 3 times daily    Trelegy Ellipta 100-62.5-25 MCG/ACT inhaler 1 puff, Daily     Allergies   Allergen Reactions    Codeine Anaphylaxis    Wound Dressing Adhesive Blisters     ALL WOUND DRESSINGS     Contrast [Iodinated Contrast Media] GI Intolerance    Prednisone Irritability     Increase in anger/abusive behaviors    Ibuprofen Rash      Current Outpatient Medications on File Prior to Visit   Medication Sig Dispense Refill    albuterol (2.5 mg/3 mL) 0.083 % nebulizer solution Take 3 mL (2.5 mg total) by nebulization every 6 (six) hours as needed for wheezing or shortness of breath 360 mL 2    amLODIPine (NORVASC) 5 mg tablet Take 1 tablet (5 mg total) by mouth daily 90 tablet 3    aspirin 325 mg tablet Take 1 tablet by mouth daily      furosemide (LASIX) 20 mg tablet Take 1 tablet (20 mg total) by mouth daily Do not start before October 17, 2024. 30 tablet 0    lactulose (CHRONULAC) 10 g/15 mL solution Take 30 mL (20 g total) by mouth daily 240 mL 0    lidocaine-prilocaine (EMLA) cream APPLY 1 GRAM TOPICALLY DAILY.      lisinopril (ZESTRIL) 10 mg tablet Take 10 mg by mouth  daily      metoprolol tartrate (LOPRESSOR) 25 mg tablet Take 25 mg by mouth 2 (two) times a day      rOPINIRole (REQUIP) 0.5 mg tablet Take 1 tablet (0.5 mg total) by mouth 3 (three) times a day 90 tablet 0    Trelegy Ellipta 100-62.5-25 MCG/ACT inhaler Inhale 1 puff daily      dicyclomine (BENTYL) 10 mg capsule Take 1 capsule (10 mg total) by mouth 3 (three) times a day before meals for 10 days 30 capsule 0    fluticasone (FLONASE) 50 mcg/act nasal spray 1 spray into each nostril 2 (two) times a day 9.9 mL 0    hydrOXYzine pamoate (VISTARIL) 25 mg capsule TAKE 1 CAPSULE BY MOUTH NIGHTLY AS NEEDED FOR ITCHING. (Patient not taking: Reported on 3/19/2025)      lidocaine (LIDODERM) 5 % Apply 1 patch topically over 12 hours daily Remove & Discard patch within 12 hours or as directed by MD (Patient not taking: Reported on 3/19/2025) 30 patch 0    meloxicam (MOBIC) 15 mg tablet Take 15 mg by mouth daily (Patient not taking: Reported on 10/23/2024)      potassium chloride (Klor-Con M20) 20 mEq tablet Take 2 tablets (40 mEq total) by mouth 2 (two) times a day for 7 doses 14 tablet 0     No current facility-administered medications on file prior to visit.      Social History     Tobacco Use    Smoking status: Former     Current packs/day: 0.00     Average packs/day: 2.0 packs/day for 46.0 years (92.0 ttl pk-yrs)     Types: Cigarettes     Start date: 1973     Quit date: 3/18/2024     Years since quittin.0     Passive exposure: Current    Smokeless tobacco: Never   Vaping Use    Vaping status: Never Used   Substance and Sexual Activity    Alcohol use: Not Currently     Comment: rare    Drug use: Not Currently     Types: Cocaine, Methamphetamines, Other     Comment: Weed    Sexual activity: Not Currently     Partners: Female     Birth control/protection: None        Objective   /90 (BP Location: Right arm, Patient Position: Sitting, Cuff Size: Standard)   Pulse 72   Temp (!) 97.2 °F (36.2 °C) (Tympanic)   Ht  5' (1.524 m)   Wt 106 kg (233 lb 9.6 oz)   SpO2 95%   BMI 45.62 kg/m²     Physical Exam  Constitutional:       General: She is not in acute distress.     Appearance: She is well-developed. She is ill-appearing.      Interventions: Nasal cannula in place.   HENT:      Head: Normocephalic and atraumatic.      Right Ear: External ear normal.      Left Ear: External ear normal.      Nose: Nose normal.      Mouth/Throat:      Mouth: Mucous membranes are moist.      Pharynx: Oropharynx is clear.   Eyes:      General: No scleral icterus.     Conjunctiva/sclera: Conjunctivae normal.   Neck:      Thyroid: No thyromegaly.      Trachea: No tracheal deviation.   Cardiovascular:      Rate and Rhythm: Regular rhythm. Tachycardia present.      Pulses:           Radial pulses are 1+ on the right side and 1+ on the left side.   Pulmonary:      Effort: Pulmonary effort is normal. No respiratory distress.   Abdominal:      General: There is no distension.      Palpations: Abdomen is soft.      Tenderness: There is no abdominal tenderness.   Musculoskeletal:         General: No tenderness.      Cervical back: Neck supple. No tenderness.      Right lower leg: No edema.      Left lower leg: No edema.   Lymphadenopathy:      Cervical: No cervical adenopathy.   Skin:     General: Skin is warm.      Capillary Refill: Capillary refill takes more than 3 seconds.      Coloration: Skin is pale.      Findings: No erythema.   Neurological:      General: No focal deficit present.      Mental Status: She is alert. Mental status is at baseline.   Psychiatric:         Mood and Affect: Mood normal.         Behavior: Behavior normal.         Thought Content: Thought content normal.         Judgment: Judgment normal.         Recent labs:  Lab Results   Component Value Date/Time    SODIUM 139 03/04/2025 04:52 PM    SODIUM 140 12/01/2021 09:09 AM    K 4.0 03/04/2025 04:52 PM    K 4.5 12/01/2021 09:09 AM    BUN 21 03/04/2025 04:52 PM    BUN 22  12/01/2021 09:09 AM    CREATININE 1.08 03/04/2025 04:52 PM    CREATININE 0.84 12/01/2021 09:09 AM    GLUC 119 03/04/2025 04:52 PM    GLUC 94 12/01/2021 09:09 AM    CALCIUM 8.7 03/04/2025 04:52 PM    CALCIUM 9.0 12/01/2021 09:09 AM    AST 16 03/04/2025 04:52 PM    AST 13 12/01/2021 09:09 AM    ALT 19 03/04/2025 04:52 PM    ALT 30 12/01/2021 09:09 AM    ALB 3.9 03/04/2025 04:52 PM    ALB 3.6 12/01/2021 09:09 AM    TP 7.3 03/04/2025 04:52 PM    TP 7.1 12/01/2021 09:09 AM    EGFR 55 03/04/2025 04:52 PM    EGFR 65 07/09/2019 02:30 PM     Lab Results   Component Value Date/Time    HGB 13.1 03/04/2025 04:52 PM    WBC 17.23 (H) 03/04/2025 04:52 PM     03/04/2025 04:52 PM    INR 1.33 (H) 10/10/2024 06:54 AM    PTT 24 10/07/2024 08:52 PM     Lab Results   Component Value Date/Time    ZAN8RHXTYNOE 2.750 10/13/2024 05:48 AM       Recent Imaging:  Procedure: US pelvis complete w transvaginal  Result Date: 3/4/2025  Impression: Endometrium is homogeneous but thickened for postmenopausal woman. No focal abnormality. Consider tissue sampling to exclude coexisting endometrial carcinoma. Lobulated soft tissue mass posterior to the uterus compatible with tumor. The extent of tumor was better shown on the CT scan from earlier today. Workstation performed: [id number]     Procedure: CT abdomen pelvis with contrast  Result Date: 3/4/2025  Impression: 1.  Redemonstrated pulmonary nodules and hepatic masses compatible with metastasis. 2.  Interval increase in peritoneal and omental nodularity compatible with carcinomatosis. 3.  Interval increase in size of nodular tract in the left lower quadrant abdominal wall compatible with metastasis. 4.  Mild right hydronephrosis which may indicate distal involvement of the right ureter. The study was marked in EPIC for immediate notification. Workstation performed: [id number]       Administrative Statements   I have spent a total time of 60 minutes in caring for this patient on the day of the  visit/encounter including Risks and benefits of tx options, Instructions for management, Patient and family education, Importance of tx compliance, Risk factor reductions, Impressions, Counseling / Coordination of care, Documenting in the medical record, Reviewing/placing orders in the medical record (including tests, medications, and/or procedures), Obtaining or reviewing history  , and Communicating with other healthcare professionals .   Topics discussed with the patient / family include symptom assessment and management, medication review, medication adjustment, psychosocial support, goals of care, hospice services, opioid titration, supportive listening, and anticipatory guidance.

## 2025-03-19 NOTE — Clinical Note
65 yo   RLOC    Dx: Adenocarcimoma colon chronic resp failure with hypoxia and hypercapnia chronic HFpEF CKD3.     Referred by palliative. Pt experiencing LLQ uterine and back pain. Per hem onc now has widely metastatic disease.     pps 40

## 2025-03-19 NOTE — TELEPHONE ENCOUNTER
PA for HYDROMORPHONE 2 MG SUBMITTED to [facility]    via    [x]CMM-KEY: [id number]      [x]PA sent as URGENT    All office notes, labs and other pertaining documents and studies sent. Clinical questions answered. Awaiting determination from insurance company.     Turnaround time for your insurance to make a decision on your Prior Authorization can take 7-21 business days.

## 2025-03-20 NOTE — TELEPHONE ENCOUNTER
PA for HYDROMORPHONE 2 MG  APPROVED     Date(s) approved UNTIL 09/19/2025        Patient advised by          [x]MyChart Message  []Phone call   []LMOM  []L/M to call office as no active Communication consent on file  []Unable to leave detailed message as VM not approved on Communication consent       Pharmacy advised by    [x]Fax  []Phone call  []Secure Chat    Specialty Pharmacy    []     Approval letter scanned into Media Yes

## 2025-03-24 ENCOUNTER — TELEPHONE (OUTPATIENT)
Age: 65
End: 2025-03-24

## 2025-03-24 DIAGNOSIS — G89.3 CANCER RELATED PAIN: ICD-10-CM

## 2025-03-24 DIAGNOSIS — C18.9 ADENOCARCINOMA, COLON (HCC): ICD-10-CM

## 2025-03-24 DIAGNOSIS — Z51.5 HOSPICE CARE: ICD-10-CM

## 2025-03-24 RX ORDER — HYDROMORPHONE HYDROCHLORIDE 2 MG/1
2-4 TABLET ORAL EVERY 4 HOURS PRN
Qty: 10 TABLET | Refills: 0 | Status: SHIPPED | OUTPATIENT
Start: 2025-03-24 | End: 2025-03-25

## 2025-03-24 NOTE — TELEPHONE ENCOUNTER
FYI:  Pt  calling pt has 1 pill Dilaudid left  Pt is taking 2 tabs q 4 prn.  Pharmacy:  [facility] [place] - [phone]  [phone], [place] [address]  Phone: [phone]  Fax: [phone]

## 2025-03-24 NOTE — TELEPHONE ENCOUNTER
Refill must be reviewed and completed by the office or provider. The refill is unable to be approved or denied by the medication management team.      Patient Id Prescription # Filled Written Drug Label Qty Days Strength MME** Prescriber Pharmacy Payment REFILL #/Auth State Detail   1 [id number] 03/19/2025 03/19/2025 HYDROmorphone HCL (Tablet) 48.0 4 2 MG 96.0 ALLIE SHANKAR [facility] Private Pay 0 / 0 [place]    1 [id number] 03/11/2025 03/10/2025 oxyCODONE HCL (Tablet) 20.0 5 5 MG 30.0 LIBERTAD HONG [facility] Private Pay 0 / 0 [place]    1 [id number] 03/06/2025 03/04/2025 oxyCODONE HCL (Tablet) 10.0 3 5 MG 25.0 KIMANI VALERO [facility] Private Pay 0 / 0 [place]    2 [id number] 03/03/2025 03/03/2025 oxyCODONE HYDROCHLORIDE 5 MG ORAL TABLET/ACETAMINOPHEN 325 MG (Tablet) 60.0 20 325 MG-5 MG 22.50 RONAK SOTO [facility] Medicaid 0 / 0 [place]

## 2025-03-24 NOTE — TELEPHONE ENCOUNTER
Reason for call:   [x] Refill   [] Prior Auth  [] Other:     Office:   [] PCP/Provider -   [x] Specialty/Provider -     Medication: HYDROmorphone (DILAUDID) 2 mg tablet     Dose/Frequency: Take 1-2 tablets (2-4 mg total) by mouth every 4 (four) hours as needed for moderate pain or severe pain (cancer-related pain) Max Daily Amount: 24 mg     Quantity: 48    Pharmacy: [facility]     Local Pharmacy   Does the patient have enough for 3 days?   [] Yes   [x] No - Send as HP to POD

## 2025-03-24 NOTE — TELEPHONE ENCOUNTER
Patient is opening on hospice tomorrow.  Called pharmacy, who states that they only have 2 mg tablets in stock, and only 10 tablets left.  Sent prescription for remaining 10 tablets from pharmacy.  Will give us time to figure out alternative when hospice teams opens her tomorrow if need be.

## 2025-03-25 ENCOUNTER — HOME CARE VISIT (OUTPATIENT)
Dept: HOME HOSPICE | Facility: HOSPICE | Age: 65
End: 2025-03-25
Payer: COMMERCIAL

## 2025-03-25 ENCOUNTER — HOME CARE VISIT (OUTPATIENT)
Dept: HOME HEALTH SERVICES | Facility: HOME HEALTHCARE | Age: 65
End: 2025-03-25
Payer: COMMERCIAL

## 2025-03-25 VITALS
DIASTOLIC BLOOD PRESSURE: 70 MMHG | HEART RATE: 65 BPM | TEMPERATURE: 98.1 F | RESPIRATION RATE: 19 BRPM | SYSTOLIC BLOOD PRESSURE: 118 MMHG

## 2025-03-25 DIAGNOSIS — G89.3 CANCER RELATED PAIN: Primary | ICD-10-CM

## 2025-03-25 PROCEDURE — G0299 HHS/HOSPICE OF RN EA 15 MIN: HCPCS

## 2025-03-25 PROCEDURE — G0155 HHCP-SVS OF CSW,EA 15 MIN: HCPCS

## 2025-03-25 PROCEDURE — T2042 HOSPICE ROUTINE HOME CARE: HCPCS

## 2025-03-25 PROCEDURE — 10330057 MEDICATION, GENERAL

## 2025-03-25 RX ORDER — HYDROMORPHONE HYDROCHLORIDE 4 MG/1
4-8 TABLET ORAL EVERY 4 HOURS PRN
Qty: 30 TABLET | Refills: 0 | Status: SHIPPED | OUTPATIENT
Start: 2025-03-25

## 2025-03-25 NOTE — TELEPHONE ENCOUNTER
Prescription of 30 tablets hydromorphone 4 mg sent to [facility] in [place].  It is a higher dose because her pain has been uncontrolled with the 2 mg tablets.  She can take 1-2 tablets every 4 hours as needed.  This should hopefully give her enough supply until hospice opens her today and can get her new prescriptions.

## 2025-03-25 NOTE — TELEPHONE ENCOUNTER
"FYI:  Outreach to pt this morning and informed her of the following information from Dr Dodge:  \"  Prescription of 30 tablets hydromorphone 4 mg sent to [facility] in [place].  It is a higher dose because her pain has been uncontrolled with the 2 mg tablets.  She can take 1-2 tablets every 4 hours as needed.\"    Pt understood and informed that Hospice should be visiting pt today and can talk to them as well about the pain medication         "

## 2025-03-26 ENCOUNTER — HOME CARE VISIT (OUTPATIENT)
Dept: HOME HEALTH SERVICES | Facility: HOME HEALTHCARE | Age: 65
End: 2025-03-26
Payer: COMMERCIAL

## 2025-03-26 VITALS
TEMPERATURE: 97.6 F | DIASTOLIC BLOOD PRESSURE: 62 MMHG | HEART RATE: 69 BPM | SYSTOLIC BLOOD PRESSURE: 122 MMHG | RESPIRATION RATE: 18 BRPM

## 2025-03-26 PROCEDURE — T2042 HOSPICE ROUTINE HOME CARE: HCPCS

## 2025-03-26 PROCEDURE — G0299 HHS/HOSPICE OF RN EA 15 MIN: HCPCS

## 2025-03-27 ENCOUNTER — HOME CARE VISIT (OUTPATIENT)
Dept: HOME HOSPICE | Facility: HOSPICE | Age: 65
End: 2025-03-27
Payer: COMMERCIAL

## 2025-03-27 PROCEDURE — T2042 HOSPICE ROUTINE HOME CARE: HCPCS

## 2025-03-28 ENCOUNTER — HOME CARE VISIT (OUTPATIENT)
Dept: HOME HEALTH SERVICES | Facility: HOME HEALTHCARE | Age: 65
End: 2025-03-28
Payer: COMMERCIAL

## 2025-03-28 VITALS
HEART RATE: 97 BPM | DIASTOLIC BLOOD PRESSURE: 86 MMHG | SYSTOLIC BLOOD PRESSURE: 180 MMHG | TEMPERATURE: 97.7 F | RESPIRATION RATE: 19 BRPM

## 2025-03-28 PROCEDURE — G0299 HHS/HOSPICE OF RN EA 15 MIN: HCPCS

## 2025-03-28 PROCEDURE — T2042 HOSPICE ROUTINE HOME CARE: HCPCS

## 2025-03-29 PROCEDURE — T2042 HOSPICE ROUTINE HOME CARE: HCPCS

## 2025-03-30 PROCEDURE — T2042 HOSPICE ROUTINE HOME CARE: HCPCS

## 2025-03-31 PROCEDURE — T2042 HOSPICE ROUTINE HOME CARE: HCPCS

## 2025-04-01 ENCOUNTER — HOME CARE VISIT (OUTPATIENT)
Dept: HOME HEALTH SERVICES | Facility: HOME HEALTHCARE | Age: 65
End: 2025-04-01
Payer: COMMERCIAL

## 2025-04-01 VITALS
TEMPERATURE: 97.6 F | RESPIRATION RATE: 18 BRPM | HEART RATE: 79 BPM | SYSTOLIC BLOOD PRESSURE: 160 MMHG | DIASTOLIC BLOOD PRESSURE: 70 MMHG

## 2025-04-01 DIAGNOSIS — C18.9 ADENOCARCINOMA, COLON (HCC): ICD-10-CM

## 2025-04-01 DIAGNOSIS — Z51.5 HOSPICE CARE PATIENT: Primary | ICD-10-CM

## 2025-04-01 PROCEDURE — T2042 HOSPICE ROUTINE HOME CARE: HCPCS

## 2025-04-01 PROCEDURE — G0299 HHS/HOSPICE OF RN EA 15 MIN: HCPCS

## 2025-04-01 PROCEDURE — 10330057 MEDICATION, GENERAL

## 2025-04-01 RX ORDER — HYDROMORPHONE HYDROCHLORIDE 2 MG/1
4 TABLET ORAL EVERY 4 HOURS PRN
Qty: 30 TABLET | Refills: 0 | Status: SHIPPED | OUTPATIENT
Start: 2025-04-01 | End: 2025-04-10

## 2025-04-01 NOTE — TELEPHONE ENCOUNTER
4/1/2025 11:06 AM  [facility] home patient requests refill of CII medication and emergency fill until [facility] order arrives.  Filled electronically via Epic as per [place] State Law.    Requested Prescriptions     Signed Prescriptions Disp Refills    HYDROmorphone (DILAUDID) 2 mg tablet 30 tablet 0     Sig: Take 2 tablets (4 mg total) by mouth every 4 (four) hours as needed (pain / SOB) Max Daily Amount: 24 mg     Authorizing Provider: ALYSSA YEAGER CRNP  [facility] Visiting Nurse [facility]  Hospice Answering Service: [phone]

## 2025-04-02 PROCEDURE — T2042 HOSPICE ROUTINE HOME CARE: HCPCS

## 2025-04-03 ENCOUNTER — HOME CARE VISIT (OUTPATIENT)
Dept: HOME HOSPICE | Facility: HOSPICE | Age: 65
End: 2025-04-03
Payer: COMMERCIAL

## 2025-04-03 PROCEDURE — 10330057 MEDICATION, GENERAL

## 2025-04-03 PROCEDURE — T2042 HOSPICE ROUTINE HOME CARE: HCPCS

## 2025-04-04 ENCOUNTER — HOME CARE VISIT (OUTPATIENT)
Dept: HOME HEALTH SERVICES | Facility: HOME HEALTHCARE | Age: 65
End: 2025-04-04
Payer: COMMERCIAL

## 2025-04-04 VITALS
DIASTOLIC BLOOD PRESSURE: 62 MMHG | HEART RATE: 68 BPM | RESPIRATION RATE: 18 BRPM | SYSTOLIC BLOOD PRESSURE: 130 MMHG | TEMPERATURE: 97.7 F

## 2025-04-04 PROCEDURE — 10330057 MEDICATION, GENERAL

## 2025-04-04 PROCEDURE — G0299 HHS/HOSPICE OF RN EA 15 MIN: HCPCS

## 2025-04-04 PROCEDURE — T2042 HOSPICE ROUTINE HOME CARE: HCPCS

## 2025-04-05 PROCEDURE — T2042 HOSPICE ROUTINE HOME CARE: HCPCS

## 2025-04-06 PROCEDURE — T2042 HOSPICE ROUTINE HOME CARE: HCPCS

## 2025-04-07 PROCEDURE — T2042 HOSPICE ROUTINE HOME CARE: HCPCS

## 2025-04-08 PROCEDURE — T2042 HOSPICE ROUTINE HOME CARE: HCPCS

## 2025-04-09 PROCEDURE — T2042 HOSPICE ROUTINE HOME CARE: HCPCS

## 2025-04-16 ENCOUNTER — HOME CARE VISIT (OUTPATIENT)
Dept: HOME HOSPICE | Facility: HOSPICE | Age: 65
End: 2025-04-16
Payer: COMMERCIAL

## (undated) DEVICE — TROCAR: Brand: KII FIOS FIRST ENTRY

## (undated) DEVICE — [HIGH FLOW INSUFFLATOR,  DO NOT USE IF PACKAGE IS DAMAGED,  KEEP DRY,  KEEP AWAY FROM SUNLIGHT,  PROTECT FROM HEAT AND RADIOACTIVE SOURCES.]: Brand: PNEUMOSURE

## (undated) DEVICE — 4-PORT MANIFOLD: Brand: NEPTUNE 2

## (undated) DEVICE — DRESSING MEPILEX BORDER 4 X 10 IN

## (undated) DEVICE — DRESSING MEPILEX AG BORDER POST-OP 4 X 8 IN

## (undated) DEVICE — ABTHERA OPEN ABDOMEN DRESSING WITH SENSATRAC PAD: Brand: ABTHERA™ SENSAT.R.A.C.™

## (undated) DEVICE — ELECTRODE LAP L WIRE E-Z CLEAN 33CM -0100

## (undated) DEVICE — GLOVE INDICATOR PI UNDERGLOVE SZ 7 BLUE

## (undated) DEVICE — ENSEAL 20 CM SHAFT, LARGE JAW: Brand: ENSEAL X1

## (undated) DEVICE — CHLORAPREP HI-LITE 26ML ORANGE

## (undated) DEVICE — INTENDED FOR TISSUE SEPARATION, AND OTHER PROCEDURES THAT REQUIRE A SHARP SURGICAL BLADE TO PUNCTURE OR CUT.: Brand: BARD-PARKER SAFETY BLADES SIZE 10, STERILE

## (undated) DEVICE — TIBURON LAPAROSCOPIC ABDOMINAL DRAPE: Brand: CONVERTORS

## (undated) DEVICE — TUBING SMOKE EVAC W/FILTRATION DEVICE PLUMEPORT ACTIV

## (undated) DEVICE — LIGHT HANDLE COVER SLEEVE DISP BLUE STELLAR

## (undated) DEVICE — TRAY FOLEY 16FR URIMETER SILICONE SURESTEP

## (undated) DEVICE — BETHLEHEM MAJOR GENERAL PACK: Brand: CARDINAL HEALTH

## (undated) DEVICE — PLUMEPEN PRO 10FT

## (undated) DEVICE — ALLENTOWN LAP CHOLE APP PACK: Brand: CARDINAL HEALTH

## (undated) DEVICE — ECHELON CONTOUR W/ BLUE RELOAD: Brand: ECHELON

## (undated) DEVICE — MEDI-VAC NON-CONDUCTIVE SUCTION TUBING 6MM X 1.8M (6FT.) L: Brand: CARDINAL HEALTH

## (undated) DEVICE — PAD GROUNDING DUAL ADULT

## (undated) DEVICE — POOLE SUCTION HANDLE: Brand: CARDINAL HEALTH

## (undated) DEVICE — GLOVE SRG BIOGEL 7

## (undated) DEVICE — DRAPE EQUIPMENT RF WAND

## (undated) DEVICE — SUT PDS II 0 CTX 60 IN Z990G

## (undated) DEVICE — DRESSING MEPILEX AG BORDER POST-OP 4 X 12 IN

## (undated) DEVICE — PROXIMATE RELOADABLE LINEAR CUTTER WITH SAFETY LOCK-OUT, 75MM: Brand: PROXIMATE

## (undated) DEVICE — INTENDED FOR TISSUE SEPARATION, AND OTHER PROCEDURES THAT REQUIRE A SHARP SURGICAL BLADE TO PUNCTURE OR CUT.: Brand: BARD-PARKER SAFETY BLADES SIZE 15, STERILE

## (undated) DEVICE — PROXIMATE RELOADABLE LINEAR STAPLER: Brand: PROXIMATE

## (undated) DEVICE — TROCAR: Brand: KII® SLEEVE

## (undated) DEVICE — PROXIMATE LINEAR CUTTER RELOAD, BLUE, 75MM: Brand: PROXIMATE

## (undated) DEVICE — IRRIG ENDO FLO TUBING

## (undated) DEVICE — SUT VICRYL 1 CT-1 27 IN J261H

## (undated) DEVICE — SUT VICRYL 0 CT-1 27 IN J260H

## (undated) DEVICE — SUT MONOCRYL 4-0 PS-2 18 IN Y496G

## (undated) DEVICE — PROXIMATE SKIN STAPLERS (35 WIDE) CONTAINS 35 STAINLESS STEEL STAPLES (FIXED HEAD): Brand: PROXIMATE